# Patient Record
Sex: FEMALE | Race: WHITE | NOT HISPANIC OR LATINO | Employment: UNEMPLOYED | URBAN - NONMETROPOLITAN AREA
[De-identification: names, ages, dates, MRNs, and addresses within clinical notes are randomized per-mention and may not be internally consistent; named-entity substitution may affect disease eponyms.]

---

## 2017-04-04 ENCOUNTER — TELEPHONE (OUTPATIENT)
Dept: INTERNAL MEDICINE | Facility: OTHER | Age: 37
End: 2017-04-04

## 2017-04-04 NOTE — TELEPHONE ENCOUNTER
11:34 AM    Reason for Call: OVERBOOK    Patient is having the following symptoms: ingrown toenail for 2 months.    The patient is requesting an appointment for Friday 04/08/17  with Alexi.    Was an appointment offered for this call? No    Preferred method for responding to this message: Telephone Call    If we cannot reach you directly, may we leave a detailed response at the number you provided? Yes    Can this message wait until your PCP/provider returns, if unavailable today?     Breanna Cutler

## 2017-04-04 NOTE — TELEPHONE ENCOUNTER
Please call patient and offer her an appointment for this Friday at 4 pm. ( April 7th) Thank you.

## 2017-04-07 ENCOUNTER — OFFICE VISIT (OUTPATIENT)
Dept: INTERNAL MEDICINE | Facility: OTHER | Age: 37
End: 2017-04-07
Attending: NURSE PRACTITIONER
Payer: COMMERCIAL

## 2017-04-07 VITALS
TEMPERATURE: 98.4 F | DIASTOLIC BLOOD PRESSURE: 62 MMHG | BODY MASS INDEX: 23 KG/M2 | SYSTOLIC BLOOD PRESSURE: 114 MMHG | WEIGHT: 125 LBS | HEIGHT: 62 IN | HEART RATE: 85 BPM | OXYGEN SATURATION: 99 %

## 2017-04-07 DIAGNOSIS — M79.674 PAIN OF TOE OF RIGHT FOOT: Primary | ICD-10-CM

## 2017-04-07 PROCEDURE — 99213 OFFICE O/P EST LOW 20 MIN: CPT | Mod: 25 | Performed by: NURSE PRACTITIONER

## 2017-04-07 PROCEDURE — 11055 PARING/CUTG B9 HYPRKER LES 1: CPT | Performed by: NURSE PRACTITIONER

## 2017-04-07 RX ORDER — AMOXICILLIN 875 MG
875 TABLET ORAL 2 TIMES DAILY
Qty: 20 TABLET | Refills: 0 | Status: SHIPPED | OUTPATIENT
Start: 2017-04-07 | End: 2017-05-22

## 2017-04-07 RX ORDER — HYDROCODONE BITARTRATE AND ACETAMINOPHEN 5; 325 MG/1; MG/1
1 TABLET ORAL EVERY 4 HOURS PRN
Qty: 24 TABLET | Refills: 0 | Status: SHIPPED | OUTPATIENT
Start: 2017-04-07 | End: 2017-05-22

## 2017-04-07 RX ORDER — IBUPROFEN 800 MG/1
800 TABLET, FILM COATED ORAL EVERY 8 HOURS PRN
Qty: 90 TABLET | Refills: 1 | Status: SHIPPED | OUTPATIENT
Start: 2017-04-07 | End: 2017-09-26

## 2017-04-07 ASSESSMENT — ANXIETY QUESTIONNAIRES
6. BECOMING EASILY ANNOYED OR IRRITABLE: NOT AT ALL
5. BEING SO RESTLESS THAT IT IS HARD TO SIT STILL: NOT AT ALL
3. WORRYING TOO MUCH ABOUT DIFFERENT THINGS: NOT AT ALL
IF YOU CHECKED OFF ANY PROBLEMS ON THIS QUESTIONNAIRE, HOW DIFFICULT HAVE THESE PROBLEMS MADE IT FOR YOU TO DO YOUR WORK, TAKE CARE OF THINGS AT HOME, OR GET ALONG WITH OTHER PEOPLE: NOT DIFFICULT AT ALL
7. FEELING AFRAID AS IF SOMETHING AWFUL MIGHT HAPPEN: NOT AT ALL
1. FEELING NERVOUS, ANXIOUS, OR ON EDGE: NOT AT ALL
2. NOT BEING ABLE TO STOP OR CONTROL WORRYING: NOT AT ALL
GAD7 TOTAL SCORE: 1

## 2017-04-07 ASSESSMENT — PATIENT HEALTH QUESTIONNAIRE - PHQ9: 5. POOR APPETITE OR OVEREATING: SEVERAL DAYS

## 2017-04-07 ASSESSMENT — PAIN SCALES - GENERAL: PAINLEVEL: NO PAIN (1)

## 2017-04-07 NOTE — PATIENT INSTRUCTIONS
1.  Soak foot in warm soapy water for 10minutes,  2 times a day  2.  Referral to [facility]  Foot Specialist for removal redundant skin.   3. Amoxicillin 875mg 2 times a day for 10 days.

## 2017-04-07 NOTE — NURSING NOTE
"Chief Complaint   Patient presents with     RECHECK     Follow up per dameon. Possible ingrown toenail.       Initial /62 (BP Location: Left arm, Patient Position: Chair, Cuff Size: Adult Regular)  Pulse 85  Temp 98.4  F (36.9  C) (Tympanic)  Ht 5' 2\" (1.575 m)  Wt 125 lb (56.7 kg)  LMP 08/10/2013  SpO2 99%  BMI 22.86 kg/m2 Estimated body mass index is 22.86 kg/(m^2) as calculated from the following:    Height as of this encounter: 5' 2\" (1.575 m).    Weight as of this encounter: 125 lb (56.7 kg).  Medication Reconciliation: complete   Nadine Cedeno LPN      "

## 2017-04-07 NOTE — MR AVS SNAPSHOT
After Visit Summary   4/7/2017    Purnima Fallon    MRN: [id number]           Patient Information     Date Of Birth          1980        Visit Information        Provider Department      4/7/2017 4:00 PM Alexi Romero NP [facility]        Today's Diagnoses     Pain of toe of right foot          Care Instructions    1.  Soak foot in warm soapy water for 10minutes,  2 times a day  2.  Referral to [facility]  Foot Specialist for removal redundant skin.         Follow-ups after your visit        Who to contact     If you have questions or need follow up information about today's clinic visit or your schedule please contact [facility] directly at [phone].  Normal or non-critical lab and imaging results will be communicated to you by [employer], letter or phone within 4 business days after the clinic has received the results. If you do not hear from us within 7 days, please contact the clinic through [employer] or phone. If you have a critical or abnormal lab result, we will notify you by phone as soon as possible.  Submit refill requests through [employer] or call your pharmacy and they will forward the refill request to us. Please allow 3 business days for your refill to be completed.          Additional Information About Your Visit        MyChart Information     [employer] gives you secure access to your electronic health record. If you see a primary care provider, you can also send messages to your care team and make appointments. If you have questions, please call your primary care clinic.  If you do not have a primary care provider, please call [phone] and they will assist you.        Care EveryWhere ID     This is your Care EveryWhere ID. This could be used by other organizations to access your [place] medical records  [id number]        Your Vitals Were     Pulse Temperature Height Last Period Pulse Oximetry BMI (Body Mass Index)    85 98.4  F (36.9  C) (Tympanic) 5'  "2\" (1.575 m) 08/10/2013 99% 22.86 kg/m2       Blood Pressure from Last 3 Encounters:   04/07/17 114/62   09/26/16 94/64   08/05/16 98/60    Weight from Last 3 Encounters:   04/07/17 125 lb (56.7 kg)   08/05/16 120 lb (54.4 kg)   04/19/16 110 lb (49.9 kg)              Today, you had the following     No orders found for display         Today's Medication Changes          These changes are accurate as of: 4/7/17  4:29 PM.  If you have any questions, ask your nurse or doctor.               Start taking these medicines.        Dose/Directions    amoxicillin 875 MG tablet   Commonly known as:  AMOXIL   Used for:  Pain of toe of right foot   Started by:  Alexi Romero NP        Dose:  875 mg   Take 1 tablet (875 mg) by mouth 2 times daily   Quantity:  20 tablet   Refills:  0       HYDROcodone-acetaminophen 5-325 MG per tablet   Commonly known as:  NORCO   Used for:  Pain of toe of right foot   Started by:  Alexi Romero NP        Dose:  1 tablet   Take 1 tablet by mouth every 4 hours as needed for pain Take 1/2-1 tab every 4-6 hours as needed for worst pain   Quantity:  24 tablet   Refills:  0            Where to get your medicines      These medications were sent to [id number] - [place] - [address] AT [facility] [address]  [address], [place] [address]     Phone:  [phone]     amoxicillin 875 MG tablet    ibuprofen 800 MG tablet         Some of these will need a paper prescription and others can be bought over the counter.  Ask your nurse if you have questions.     Bring a paper prescription for each of these medications     HYDROcodone-acetaminophen 5-325 MG per tablet                Primary Care Provider Office Phone # Fax #    Alexi Romero [phone]       [facility] [address] [address]        Thank you!     Thank you for choosing [facility]  for your care. Our goal is always to provide you with excellent care. " Hearing back from our patients is one way we can continue to improve our services. Please take a few minutes to complete the written survey that you may receive in the mail after your visit with us. Thank you!             Your Updated Medication List - Protect others around you: Learn how to safely use, store and throw away your medicines at www.disposemymeds.org.          This list is accurate as of: 4/7/17  4:29 PM.  Always use your most recent med list.                   Brand Name Dispense Instructions for use    amoxicillin 875 MG tablet    AMOXIL    20 tablet    Take 1 tablet (875 mg) by mouth 2 times daily       HYDROcodone-acetaminophen 5-325 MG per tablet    NORCO    24 tablet    Take 1 tablet by mouth every 4 hours as needed for pain Take 1/2-1 tab every 4-6 hours as needed for worst pain       ibuprofen 800 MG tablet    ADVIL/MOTRIN    90 tablet    Take 1 tablet (800 mg) by mouth every 8 hours as needed for pain       valACYclovir 500 MG tablet    VALTREX    90 tablet    Take 1 tablet (500 mg) by mouth daily

## 2017-04-08 ASSESSMENT — ANXIETY QUESTIONNAIRES: GAD7 TOTAL SCORE: 1

## 2017-04-08 ASSESSMENT — PATIENT HEALTH QUESTIONNAIRE - PHQ9: SUM OF ALL RESPONSES TO PHQ QUESTIONS 1-9: 0

## 2017-04-10 NOTE — PROGRESS NOTES
"SUBJECTIVE:  Purnima Fallon, a 36 year old female scheduled an appointment to discuss the following issues:     Pain of toe of right foot Has been having pain in 5th toe right foot on and off  Thinks has ingrown toenail.  Does not remember any injury.  No redness or pus-difficult to wear shoes.         Medical, social, surgical, and family histories reviewed.    Current Outpatient Prescriptions   Medication     ibuprofen (ADVIL/MOTRIN) 800 MG tablet     HYDROcodone-acetaminophen (NORCO) 5-325 MG per tablet     amoxicillin (AMOXIL) 875 MG tablet     valACYclovir (VALTREX) 500 MG tablet     No current facility-administered medications for this visit.        ROS:  C: NEGATIVE for fever, chills, sore throat, earache  E: NEGATIVE for vision changes   R: NEGATIVE for  cough , SOB  CV: NEGATIVE for chest pain, palpitations   GI: NEGATIVE for nausea, abdominal pain, heartburn, or constipation, diarrhea.   : NEGATIVE for frequency, dysuria, or hematuria  M:POSITIVE for  arthralgias or myalgia 5th toe  Right foot pain     N: NEGATIVE for weakness, no   Paresthesias, dizziness  or headache    OBJECTIVE:  /62 (BP Location: Left arm, Patient Position: Chair, Cuff Size: Adult Regular)  Pulse 85  Temp 98.4  F (36.9  C) (Tympanic)  Ht 5' 2\" (1.575 m)  Wt 125 lb (56.7 kg)  LMP 08/10/2013  SpO2 99%  BMI 22.86 kg/m2  EXAM:  GENERAL APPEARANCE:  alert and no distress  EYES: EOMI,  PERRL  NECK: Supple, no lymphadenopathy, no thyromegaly, no carotid bruits, No JVD  RESP: lungs clear to auscultation anteriorly and posteriorly - no rales, rhonchi or wheezes  CV: regular rates and rhythm, normal S1 S2, no S3 or S4 and no murmur, no click or rub -  MS: No edema 5th toe right foot has damaged nail. Overgrowth tissue to lateral edge. Has small swelling to lateral edge of what is the toenail. No ingrown toenail seen. Looks like foreign body tissue growth. Washed with soap and water. Pared down with #11 blade-thought saw sliver of " glass come out.     NEURO:CMS intact to toes.       ASSESSMENT/PLAN:  ASSESSMENT / PLAN:  (M28.045) Pain of toe of right foot  (primary encounter diagnosis)  Comment:  Looked like foreign body overgrowth of skin.   Plan: ibuprofen (ADVIL/MOTRIN) 800 MG tablet,         HYDROcodone-acetaminophen (NORCO) 5-325 MG per         tablet, amoxicillin (AMOXIL) 875 MG tablet,         ORTHOPEDICS ADULT REFERRAL

## 2017-04-10 NOTE — PROGRESS NOTES
"SUBJECTIVE:  Purnima Fallon, a 36 year old female scheduled an appointment to discuss the following issues:     Pain of toe of right foot  Has been having pain in little toe for 1month.  No known injury. Thinks has ingrown toenail. No redness no fever-pain.        Medical, social, surgical, and family histories reviewed.    Current Outpatient Prescriptions   Medication     ibuprofen (ADVIL/MOTRIN) 800 MG tablet     HYDROcodone-acetaminophen (NORCO) 5-325 MG per tablet     amoxicillin (AMOXIL) 875 MG tablet     valACYclovir (VALTREX) 500 MG tablet     No current facility-administered medications for this visit.        ROS:  C: NEGATIVE for fever, chills, sore throat, earache  E: NEGATIVE for vision changes   R: NEGATIVE for  cough , SOB  CV: NEGATIVE for chest pain, palpitations   GI: NEGATIVE for nausea, abdominal pain, heartburn, or constipation, diarrhea.   : NEGATIVE for frequency, dysuria, or hematuria  M: POSITIVE  for  arthralgias or myalgia 5th toe right foot.    N: NEGATIVE for weakness,  Paresthesias, dizziness  or headache    OBJECTIVE:  /62 (BP Location: Left arm, Patient Position: Chair, Cuff Size: Adult Regular)  Pulse 85  Temp 98.4  F (36.9  C) (Tympanic)  Ht 5' 2\" (1.575 m)  Wt 125 lb (56.7 kg)  LMP 08/10/2013  SpO2 99%  BMI 22.86 kg/m2  EXAM:  GENERAL APPEARANCE:  alert and no distress  EYES: EOMI,  PERRL   NECK: Supple, no lymphadenopathy, no thyromegaly, no carotid bruits, No JVD  RESP: lungs clear to auscultation anteriorly and posteriorly - no rales, rhonchi or wheezes  CV: regular rates and rhythm, normal S1 S2, no S3 or S4 and no murmur, no click or rub -  MS: No edema  Verbal consent given.   5th toe right foot.   Washed with soap and water  Pared down built up skin to lateral nail (lateral edge of nail-not normal-has been injured in past)  Thought saw sliver glass come out.    NEURO:CMS intact to toes         ASSESSMENT / PLAN:  (M79.568) Pain of toe of right foot  (primary " encounter diagnosis)  Comment: Pared down overgrown reactive tissue to lateral side toenail-think saw glass sliver come out.  Covered with bandaid.  Nail bed has been damaged in past-1/2 toenail is spongy, and nail less Will have evaluated by [facility].  No ingrown nail noted.  Given Amoxicillin 875mg BID for 10 days-soak foot 2 times a day in warm soapy water.  Given few Lorta for pain.    Plan: ibuprofen (ADVIL/MOTRIN) 800 MG tablet,         HYDROcodone-acetaminophen (NORCO) 5-325 MG per         tablet, amoxicillin (AMOXIL) 875 MG tablet,         ORTHOPEDICS ADULT REFERRAL              Subjective: Purnima Fallon a 36 year old female who presents today for lesion removal. The lesion(s) is/are located on the 5th  Toe, right foot.  , number 1 and measures 4mm She The patient reports the lesion is painfull and denies other significant symptoms on ROS. Medications reviewed.    Pause for the cause has been completed prior to the prceedure.   1. Purnima was identified by both name and date of birth   2. The correct site was identified   3. Site was marked by provider    4. Written informed consent correct and signed or verbal authorization  to proceed was obtained   5. Verifed necessary supplies, equipment, and diagnostics are available    6. Time out was performed immediately prior to procedure    Objective: The lesion(s) is/are of the above mentioned size and location and is/are typical. The area was prepped and appropriately anesthetized. Using the usual technique, #11 blade, pared down overgrown tisssue.   removal of foreign body was performed. An appropriate dressing was applied. The procedure was well tolerated and without complications.     Assessment: foreign body    Plan: Follow up: The patient may return prn.. Wound care instructions provided.  Patient was instructed to be alert for any signs of cutaneous infection.

## 2017-05-15 DIAGNOSIS — B00.9 HERPES SIMPLEX VIRUS INFECTION: ICD-10-CM

## 2017-05-16 RX ORDER — VALACYCLOVIR HYDROCHLORIDE 500 MG/1
TABLET, FILM COATED ORAL
Qty: 90 TABLET | Refills: 0 | Status: SHIPPED | OUTPATIENT
Start: 2017-05-16 | End: 2017-09-27

## 2017-05-22 ENCOUNTER — HOSPITAL ENCOUNTER (EMERGENCY)
Facility: HOSPITAL | Age: 37
Discharge: HOME | End: 2017-05-22
Attending: NURSE PRACTITIONER | Admitting: NURSE PRACTITIONER
Payer: COMMERCIAL

## 2017-05-22 ENCOUNTER — TELEPHONE (OUTPATIENT)
Dept: INTERNAL MEDICINE | Facility: OTHER | Age: 37
End: 2017-05-22

## 2017-05-22 VITALS
DIASTOLIC BLOOD PRESSURE: 68 MMHG | RESPIRATION RATE: 20 BRPM | HEART RATE: 73 BPM | SYSTOLIC BLOOD PRESSURE: 106 MMHG | TEMPERATURE: 98.4 F

## 2017-05-22 DIAGNOSIS — Z76.0 ENCOUNTER FOR MEDICATION REFILL: ICD-10-CM

## 2017-05-22 PROCEDURE — 99213 OFFICE O/P EST LOW 20 MIN: CPT

## 2017-05-22 PROCEDURE — 99213 OFFICE O/P EST LOW 20 MIN: CPT | Performed by: NURSE PRACTITIONER

## 2017-05-22 RX ORDER — HYDROCODONE BITARTRATE AND ACETAMINOPHEN 5; 325 MG/1; MG/1
1 TABLET ORAL EVERY 6 HOURS PRN
Qty: 20 TABLET | Refills: 0 | Status: SHIPPED | OUTPATIENT
Start: 2017-05-22 | End: 2017-06-27

## 2017-05-22 ASSESSMENT — ENCOUNTER SYMPTOMS
TROUBLE SWALLOWING: 0
WOUND: 1
APPETITE CHANGE: 0
DIARRHEA: 0
NAUSEA: 0
DYSURIA: 0
VOMITING: 0
FEVER: 0
ACTIVITY CHANGE: 0
CHILLS: 0

## 2017-05-22 NOTE — ED AVS SNAPSHOT
[facility]    [address]    [place] [address]    Phone:  [phone]                                       Purnima Fallon   MRN: [id number]    Department:  [facility]   Date of Visit:  5/22/2017           Patient Information     Date Of Birth          1980        Your diagnoses for this visit were:     Encounter for medication refill        You were seen by Payton Levi NP.      Follow-up Information     Follow up with Alexi Romero NP.    Specialty:  Internal Medicine    Why:  As needed, If symptoms worsen    Contact information:    [facility] [place]  [address]  [place] [address] [phone]          Follow up with [facility].    Specialty:  EMERGENCY MEDICINE    Why:  As needed, If symptoms worsen    Contact information:    [address]  [place] [address] [phone]    Additional information:    From [place]: Take [address]. Turn left at [address]. Turn left at the first stoplight on [address]. At the first stop sign, take a right onto [address]. Take a left into the parking lot and continue through until you reach the North enterance of the building.       From [place]: Take [address]. Take the [address] ramp towards [place]. Turn left onto [address]. Take a slight right onto [address]. Turn left at the first stoplight on [address]. At the first stop sign, take a right onto [address]. Take a left into the parking lot and continue through until you reach the North enterance of the building.       From [place]: Take [address]. Take a right at [address]. At the first stop sign, take a right onto [address]. Take a left into the parking lot and continue through until you reach the North enterance of the building.         Discharge Instructions       Take tylenol and or ibuprofen for pain. Follow dosing on package.  No more than 4000 mg of tylenol in 24 hours.   Take Lortab for pain not helped by tylenol or ibuprofen. Do not drive or participate in activities that require alertness when taking.   Elevate right foot as much as able.   Follow up with [facility] as directed.   Follow up with PCP with any increase in symptoms or concerns.   Return to urgent care or emergency department with any increase in symptoms or concerns.     Discharge References/Attachments     HYDROCODONE BITARTRATE, ACETAMINOPHEN ORAL TABLET (ENGLISH)         Review of your medicines      START taking        Dose / Directions Last dose taken    HYDROcodone-acetaminophen 5-325 MG per tablet   Commonly known as:  NORCO   Dose:  1 tablet   Quantity:  20 tablet        Take 1 tablet by mouth every 6 hours as needed for moderate to severe pain   Refills:  0          Our records show that you are taking the medicines listed below. If these are incorrect, please call your family doctor or clinic.        Dose / Directions Last dose taken    ibuprofen 800 MG tablet   Commonly known as:  ADVIL/MOTRIN   Dose:  800 mg   Quantity:  90 tablet        Take 1 tablet (800 mg) by mouth every 8 hours as needed for pain   Refills:  1        valACYclovir 500 MG tablet   Commonly known as:  VALTREX   Quantity:  90 tablet        TAKE ONE TABLET BY MOUTH DAILY   Refills:  0                Prescriptions were sent or printed at these locations (1 Prescription)                   [facility] [address] [place] [address] [address] AT [facility] [address]   [address] [place] [address]    Telephone:  [phone]   Fax:  [phone]   Hours:                  Printed at Department/Unit printer (1 of 1)         HYDROcodone-acetaminophen (NORCO) 5-325 MG per tablet                Orders Needing Specimen Collection     None      Pending Results     No orders found from 5/20/2017 to 5/23/2017.            Pending Culture Results     No orders found from 5/20/2017  to 5/23/2017.            Thank you for choosing [facility]       Thank you for choosing [facility] for your care. Our goal is always to provide you with excellent care. Hearing back from our patients is one way we can continue to improve our services. Please take a few minutes to complete the written survey that you may receive in the mail after you visit with us. Thank you!        [employer] Information     [employer] gives you secure access to your electronic health record. If you see a primary care provider, you can also send messages to your care team and make appointments. If you have questions, please call your primary care clinic.  If you do not have a primary care provider, please call [phone] and they will assist you.        Care EveryWhere ID     This is your Care EveryWhere ID. This could be used by other organizations to access your [facility] medical records  [id number]        After Visit Summary       This is your record. Keep this with you and show to your community pharmacist(s) and doctor(s) at your next visit.

## 2017-05-22 NOTE — ED PROVIDER NOTES
History     Chief Complaint   Patient presents with     Medication Refill     The history is provided by the patient. No  was used.     Purnima Fallon is a 36 year old female who presents with right 5th toe pain after she had her right 5th toenail removed this am at [facility]. She's taken ibuprofen with no effectiveness. Pain has a throbbing and burning characteristic. Denies fever, chills, or night sweats. Eating and drinking well. Bowel and bladder are working well. She is out of Norco. She is able to bear weight on right foot.     I have reviewed the Medications, Allergies, Past Medical and Surgical History, and Social History in the Epic system.    Review of Systems   Constitutional: Negative for activity change, appetite change, chills and fever.   HENT: Negative for trouble swallowing.    Gastrointestinal: Negative for diarrhea, nausea and vomiting.   Genitourinary: Negative for dysuria.   Skin: Positive for wound. Negative for rash.        To right 5th toe.        Physical Exam   BP: 106/68  Pulse: 73  Temp: 98.4  F (36.9  C)  Resp: 20  Physical Exam   Constitutional: She is oriented to person, place, and time. She appears well-developed and well-nourished. No distress.   HENT:   Head: Normocephalic.   Mouth/Throat: Oropharynx is clear and moist. No oropharyngeal exudate.   Cardiovascular: Normal rate, regular rhythm, normal heart sounds and intact distal pulses.    No murmur heard.  Pulmonary/Chest: Effort normal. No respiratory distress. She has no wheezes. She has no rales.   Abdominal: Soft. She exhibits no distension.   Musculoskeletal: Normal range of motion. She exhibits tenderness. She exhibits no edema or deformity.   CMS and ROM intact to right foot. Right dorsalis pedis pulse +2.    Neurological: She is alert and oriented to person, place, and time.   Skin: Skin is warm and dry. No rash noted. She is not diaphoretic.   Wound to right anterior 5th digit tip with  nail bed absent. Bleeding controlled. Dressing removed and re-dressed.    Psychiatric: She has a normal mood and affect. Her behavior is normal.   Nursing note and vitals reviewed.      ED Course     ED Course     Procedures      Assessments & Plan (with Medical Decision Making)     Called PCP, Alexi Romero NP to discuss pain management. Norco 5/325 #20 (twenty) will be prescribed for pain management. She will follow up with [facility] in 2 weeks as scheduled. She will follow up with PCP or UC/ER with any increase in symptoms or concerns. She is happy with this plan.     Discussed plan of care. She verbalized understanding. All questions answered.     I have reviewed the nursing notes.    I have reviewed the findings, diagnosis, plan and need for follow up with the patient.  Discharged in stable condition.     New Prescriptions    HYDROCODONE-ACETAMINOPHEN (NORCO) 5-325 MG PER TABLET    Take 1 tablet by mouth every 6 hours as needed for moderate to severe pain       Final diagnoses:   Encounter for medication refill     Take tylenol and or ibuprofen for pain. Follow dosing on package. No more than 4000 mg of tylenol in 24 hours.   Take Lortab for pain not helped by tylenol or ibuprofen. Do not drive or participate in activities that require alertness when taking.   Elevate right foot as much as able.   Follow up with [facility] as directed in 2 weeks.   Follow up with PCP with any increase in symptoms or concerns.   Return to urgent care or emergency department with any increase in symptoms or concerns.     GALLITO Alberto  5/22/2017  3:22 PM  URGENT CARE CLINIC       Payton Levi NP  05/22/17 [id number]

## 2017-05-22 NOTE — ED AVS SNAPSHOT
[facility]    [address] [address]    Phone:  [phone]                                       Purnima Fallon   MRN: [id number]    Department:  [facility]   Date of Visit:  5/22/2017           After Visit Summary Signature Page     I have received my discharge instructions, and my questions have been answered. I have discussed any challenges I see with this plan with the nurse or doctor.    ..........................................................................................................................................  Patient/Patient Representative Signature      ..........................................................................................................................................  Patient Representative Print Name and Relationship to Patient    ..................................................               ................................................  Date                                            Time    ..........................................................................................................................................  Reviewed by Signature/Title    ...................................................              ..............................................  Date                                                            Time

## 2017-05-22 NOTE — ED NOTES
Pt presents today alone for c/o pain in foot after having had surgery this am, says she was on Hydrocodone prior to surgery and the surgeon must not have known that because he told this pt that since you weren't on anything prior, you don't need anything now, she tried to get in with SAM Romero NP with no luck and has called and left messages for the surgeons nurse and has gotten no response. Surgery was done By [facility].

## 2017-05-22 NOTE — DISCHARGE INSTRUCTIONS
Take tylenol and or ibuprofen for pain. Follow dosing on package. No more than 4000 mg of tylenol in 24 hours.   Take Lortab for pain not helped by tylenol or ibuprofen. Do not drive or participate in activities that require alertness when taking.   Elevate right foot as much as able.   Follow up with [facility] as directed.   Follow up with PCP with any increase in symptoms or concerns.   Return to urgent care or emergency department with any increase in symptoms or concerns.

## 2017-05-22 NOTE — TELEPHONE ENCOUNTER
12:11 PM    Reason for Call: Phone Call    Description: patient in pain, had her baby toenail removed.  Could she get something for the pain?    Was an appointment offered for this call? No    Preferred method for responding to this message: Telephone Call    If we cannot reach you directly, may we leave a detailed response at the number you provided? Yes    Can this message wait until your PCP/provider returns, if available today? Not applicable, provider is in today    Barbara Pan

## 2017-05-22 NOTE — TELEPHONE ENCOUNTER
The patient is requesting something for toe pain, see note below.  Last seen 4/7/2017 last fill of Norco 4/7/2017 #24

## 2017-05-22 NOTE — TELEPHONE ENCOUNTER
If still having pain-needs to see a foot doctor as not appropriate to keep giving Lortab.Can refer if wishes.   Alexi

## 2017-05-23 NOTE — TELEPHONE ENCOUNTER
Alexi gave patient Norco #20, 5/22/2017, patient had toe nail removed from a foot doctor, called patient, no answer.

## 2017-06-27 ENCOUNTER — OFFICE VISIT (OUTPATIENT)
Dept: FAMILY MEDICINE | Facility: OTHER | Age: 37
End: 2017-06-27
Attending: FAMILY MEDICINE
Payer: COMMERCIAL

## 2017-06-27 VITALS
OXYGEN SATURATION: 99 % | BODY MASS INDEX: 22.86 KG/M2 | HEART RATE: 77 BPM | WEIGHT: 125 LBS | SYSTOLIC BLOOD PRESSURE: 128 MMHG | RESPIRATION RATE: 19 BRPM | DIASTOLIC BLOOD PRESSURE: 74 MMHG | TEMPERATURE: 98.6 F

## 2017-06-27 DIAGNOSIS — R10.84 ABDOMINAL PAIN, GENERALIZED: Primary | ICD-10-CM

## 2017-06-27 LAB
ALBUMIN SERPL-MCNC: 4.5 G/DL (ref 3.4–5)
ALBUMIN UR-MCNC: NEGATIVE MG/DL
ALP SERPL-CCNC: 50 U/L (ref 40–150)
ALT SERPL W P-5'-P-CCNC: 29 U/L (ref 0–50)
AMYLASE SERPL-CCNC: 349 U/L (ref 30–110)
ANION GAP SERPL CALCULATED.3IONS-SCNC: 7 MMOL/L (ref 3–14)
APPEARANCE UR: CLEAR
AST SERPL W P-5'-P-CCNC: 19 U/L (ref 0–45)
BASOPHILS # BLD AUTO: 0 10E9/L (ref 0–0.2)
BASOPHILS NFR BLD AUTO: 0.4 %
BILIRUB SERPL-MCNC: 0.5 MG/DL (ref 0.2–1.3)
BILIRUB UR QL STRIP: NEGATIVE
BUN SERPL-MCNC: 11 MG/DL (ref 7–30)
CALCIUM SERPL-MCNC: 9.1 MG/DL (ref 8.5–10.1)
CHLORIDE SERPL-SCNC: 105 MMOL/L (ref 94–109)
CO2 SERPL-SCNC: 27 MMOL/L (ref 20–32)
COLOR UR AUTO: YELLOW
CREAT SERPL-MCNC: 0.72 MG/DL (ref 0.52–1.04)
DIFFERENTIAL METHOD BLD: ABNORMAL
EOSINOPHIL # BLD AUTO: 0 10E9/L (ref 0–0.7)
EOSINOPHIL NFR BLD AUTO: 0.3 %
ERYTHROCYTE [DISTWIDTH] IN BLOOD BY AUTOMATED COUNT: 12.9 % (ref 10–15)
GFR SERPL CREATININE-BSD FRML MDRD: NORMAL ML/MIN/1.7M2
GLUCOSE SERPL-MCNC: 85 MG/DL (ref 70–99)
GLUCOSE UR STRIP-MCNC: NEGATIVE MG/DL
HCT VFR BLD AUTO: 42.5 % (ref 35–47)
HGB BLD-MCNC: 14.7 G/DL (ref 11.7–15.7)
HGB UR QL STRIP: NEGATIVE
IMM GRANULOCYTES # BLD: 0 10E9/L (ref 0–0.4)
IMM GRANULOCYTES NFR BLD: 0.3 %
KETONES UR STRIP-MCNC: 5 MG/DL
LEUKOCYTE ESTERASE UR QL STRIP: NEGATIVE
LIPASE SERPL-CCNC: 94 U/L (ref 73–393)
LYMPHOCYTES # BLD AUTO: 3.5 10E9/L (ref 0.8–5.3)
LYMPHOCYTES NFR BLD AUTO: 45.2 %
MCH RBC QN AUTO: 33.6 PG (ref 26.5–33)
MCHC RBC AUTO-ENTMCNC: 34.6 G/DL (ref 31.5–36.5)
MCV RBC AUTO: 97 FL (ref 78–100)
MONOCYTES # BLD AUTO: 0.4 10E9/L (ref 0–1.3)
MONOCYTES NFR BLD AUTO: 4.8 %
NEUTROPHILS # BLD AUTO: 3.8 10E9/L (ref 1.6–8.3)
NEUTROPHILS NFR BLD AUTO: 49 %
NITRATE UR QL: NEGATIVE
NRBC # BLD AUTO: 0 10*3/UL
NRBC BLD AUTO-RTO: 0 /100
PH UR STRIP: 7.5 PH (ref 4.7–8)
PLATELET # BLD AUTO: 239 10E9/L (ref 150–450)
POTASSIUM SERPL-SCNC: 3.6 MMOL/L (ref 3.4–5.3)
PROT SERPL-MCNC: 7.9 G/DL (ref 6.8–8.8)
RBC # BLD AUTO: 4.37 10E12/L (ref 3.8–5.2)
SODIUM SERPL-SCNC: 139 MMOL/L (ref 133–144)
SP GR UR STRIP: 1.01 (ref 1–1.03)
URN SPEC COLLECT METH UR: ABNORMAL
UROBILINOGEN UR STRIP-MCNC: NORMAL MG/DL (ref 0–2)
WBC # BLD AUTO: 7.7 10E9/L (ref 4–11)

## 2017-06-27 PROCEDURE — 99214 OFFICE O/P EST MOD 30 MIN: CPT | Performed by: FAMILY MEDICINE

## 2017-06-27 PROCEDURE — 83690 ASSAY OF LIPASE: CPT | Performed by: FAMILY MEDICINE

## 2017-06-27 PROCEDURE — 36415 COLL VENOUS BLD VENIPUNCTURE: CPT | Performed by: FAMILY MEDICINE

## 2017-06-27 PROCEDURE — 85025 COMPLETE CBC W/AUTO DIFF WBC: CPT | Performed by: FAMILY MEDICINE

## 2017-06-27 PROCEDURE — 80053 COMPREHEN METABOLIC PANEL: CPT | Performed by: FAMILY MEDICINE

## 2017-06-27 PROCEDURE — 82150 ASSAY OF AMYLASE: CPT | Performed by: FAMILY MEDICINE

## 2017-06-27 PROCEDURE — 81003 URINALYSIS AUTO W/O SCOPE: CPT | Performed by: FAMILY MEDICINE

## 2017-06-27 RX ORDER — TRAMADOL HYDROCHLORIDE 50 MG/1
50 TABLET ORAL EVERY 6 HOURS PRN
Qty: 30 TABLET | Refills: 0 | Status: SHIPPED | OUTPATIENT
Start: 2017-06-27 | End: 2017-09-21

## 2017-06-27 ASSESSMENT — PAIN SCALES - GENERAL: PAINLEVEL: MILD PAIN (2)

## 2017-06-27 NOTE — Clinical Note
Alexi, I saw Purnima today and ordered an ultrasound to evaluate for gallstones. She may just have a mild case of pancreatitis but I'm not sure what would cause this. She says she doesn't drink alcohol. She is scheduled to follow up with you later this week to review the ultrasound. I will be out on vacation the rest of the week.

## 2017-06-27 NOTE — PROGRESS NOTES
[facility]    Purnima Fallon, 36 year old, female presents with   Chief Complaint   Patient presents with     Abdominal Pain     started friday with some bloating with diarrhea. No vomiting or nausea. Pain is throughout abdomen. Rates pain 2/10. No fever, worse with eating. No symptoms of nephrolithiasis. She has had a hysterectomy, No alcohol use.        PAST MEDICAL HISTORY:  Past Medical History:   Diagnosis Date     Hidradenitis 2/16/2007     Ischiorectal abscess and fistula      Kidney stones 2008    x2 passed on her own     Nondependent tobacco use disorder 10/11/2012     Papanicolaou smear of cervix with low grade squamous intraepithelial lesion (LGSIL) 10/29/2008       PAST SURGICAL HISTORY:  Past Surgical History:   Procedure Laterality Date     EXCISE MASS NECK Right 8/4/2015    Procedure: EXCISE MASS NECK;  Surgeon: Nasir Jones MD;  Location: [facility]     INCISION AND DRAINAGE PERINEAL, COMBINED N/A 3/18/2015    Procedure: COMBINED INCISION AND DRAINAGE PERINEAL;  Surgeon: Jay Torres DO;  Location: [facility]     LAPAROSCOPIC HYSTERECTOMY SUPRACERVICAL, BILATERAL SALPINGO-OOPHORECTOMY, COMBINED  9/27/2013    Procedure: COMBINED LAPAROSCOPIC HYSTERECTOMY SUPRACERVICAL, SALPINGO-OOPHORECTOMY;  LAPAROSCOPIC SUPRACERVICAL HYSTERECTOMY AND RIGHT SALPINGO-OOPHORECTOMY, CYSTOSCOPY;  Surgeon: Denys Callahan MD;  Location: [facility] OR     marsupialization of pilonidal cyst  2007     TUBAL LIGATION  2005       SOCIAL HISTORY  Social History     Social History     Marital status:      Spouse name: N/A     Number of children: N/A     Years of education: N/A     Occupational History     Not on file.     Social History Main Topics     Smoking status: Current Every Day Smoker     Packs/day: 0.50     Years: 15.00     Types: Cigarettes     Last attempt to quit: 7/18/2013     Smokeless tobacco: Never Used     Alcohol use No     Drug use: No     Sexual activity: Yes     Partners: Male     Birth  control/ protection: Condom     Other Topics Concern     Parent/Sibling W/ Cabg, Mi Or Angioplasty Before 65f 55m? No     Blood Transfusions Yes     PERMITS     Caffeine Concern Yes     SODA - NOT DAILY     Social History Narrative    9/14  Purnima lives with her two boys.  She has a boyfriend who doesn't live with her.  She is an .   She has been a  for 4 years.            MEDICATIONS:  Prior to Admission medications    Medication Sig Start Date End Date Taking? Authorizing Provider   valACYclovir (VALTREX) 500 MG tablet TAKE ONE TABLET BY MOUTH DAILY 5/16/17  Yes Alexi Romero NP   ibuprofen (ADVIL/MOTRIN) 800 MG tablet Take 1 tablet (800 mg) by mouth every 8 hours as needed for pain 4/7/17  Yes Alexi Romero NP       ALLERGIES:   No Known Allergies    ROS:  C: NEGATIVE for fever, chills, change in weight  I: NEGATIVE for worrisome rashes, moles or lesions  R: NEGATIVE for significant cough or SOB  CV: NEGATIVE for chest pain, palpitations or peripheral edema  : NEGATIVE for frequency, dysuria, or hematuria  M: NEGATIVE for significant arthralgias or myalgia  N: NEGATIVE for weakness, dizziness or paresthesias  P: NEGATIVE for changes in mood or affect    EXAM:  /74  Pulse 77  Temp 98.6  F (37  C) (Tympanic)  Resp 19  Wt 125 lb (56.7 kg)  LMP 08/10/2013  SpO2 99%  Breastfeeding? No  BMI 22.86 kg/m2 Body mass index is 22.86 kg/(m^2).   GENERAL APPEARANCE: healthy, alert and no distress  NECK: no adenopathy, no asymmetry, masses, or scars and thyroid normal to palpation  RESP: lungs clear to auscultation - no rales, rhonchi or wheezes  CV: regular rates and rhythm, normal S1 S2, no S3 or S4 and no murmur, click or rub  LYMPHATICS: normal ant/post cervical and supraclavicular nodes  ABDOMEN: soft, tender in the right upper quadrant and epigastric area without rebound or guarding, without hepatosplenomegaly or masses and bowel sounds normal  MS: extremities normal- no  gross deformities noted  SKIN: no suspicious lesions or rashes  NEURO: Normal strength and tone, mentation intact and speech normal  PSYCH: mentation appears normal and affect normal/bright    LABS AND IMAGING:     Results for orders placed or performed in visit on 06/27/17   CBC with platelets and differential   Result Value Ref Range    WBC 7.7 4.0 - 11.0 10e9/L    RBC Count 4.37 3.8 - 5.2 10e12/L    Hemoglobin 14.7 11.7 - 15.7 g/dL    Hematocrit 42.5 35.0 - 47.0 %    MCV 97 78 - 100 fl    MCH 33.6 (H) 26.5 - 33.0 pg    MCHC 34.6 31.5 - 36.5 g/dL    RDW 12.9 10.0 - 15.0 %    Platelet Count 239 150 - 450 10e9/L    Diff Method Automated Method     % Neutrophils 49.0 %    % Lymphocytes 45.2 %    % Monocytes 4.8 %    % Eosinophils 0.3 %    % Basophils 0.4 %    % Immature Granulocytes 0.3 %    Nucleated RBCs 0 0 /100    Absolute Neutrophil 3.8 1.6 - 8.3 10e9/L    Absolute Lymphocytes 3.5 0.8 - 5.3 10e9/L    Absolute Monocytes 0.4 0.0 - 1.3 10e9/L    Absolute Eosinophils 0.0 0.0 - 0.7 10e9/L    Absolute Basophils 0.0 0.0 - 0.2 10e9/L    Abs Immature Granulocytes 0.0 0 - 0.4 10e9/L    Absolute Nucleated RBC 0.0    Comprehensive metabolic panel   Result Value Ref Range    Sodium 139 133 - 144 mmol/L    Potassium 3.6 3.4 - 5.3 mmol/L    Chloride 105 94 - 109 mmol/L    Carbon Dioxide 27 20 - 32 mmol/L    Anion Gap 7 3 - 14 mmol/L    Glucose 85 70 - 99 mg/dL    Urea Nitrogen 11 7 - 30 mg/dL    Creatinine 0.72 0.52 - 1.04 mg/dL    GFR Estimate >90  Non  GFR Calc   >60 mL/min/1.7m2    GFR Estimate If Black >90   GFR Calc   >60 mL/min/1.7m2    Calcium 9.1 8.5 - 10.1 mg/dL    Bilirubin Total 0.5 0.2 - 1.3 mg/dL    Albumin 4.5 3.4 - 5.0 g/dL    Protein Total 7.9 6.8 - 8.8 g/dL    Alkaline Phosphatase 50 40 - 150 U/L    ALT 29 0 - 50 U/L    AST 19 0 - 45 U/L   Lipase   Result Value Ref Range    Lipase 94 73 - 393 U/L   Amylase   Result Value Ref Range    Amylase 349 (H) 30 - 110 U/L   UA reflex to  Microscopic and Culture   Result Value Ref Range    Color Urine Yellow     Appearance Urine Clear     Glucose Urine Negative NEG mg/dL    Bilirubin Urine Negative NEG    Ketones Urine 5 (A) NEG mg/dL    Specific Gravity Urine 1.012 1.003 - 1.035    Blood Urine Negative NEG    pH Urine 7.5 4.7 - 8.0 pH    Protein Albumin Urine Negative NEG mg/dL    Urobilinogen mg/dL Normal 0.0 - 2.0 mg/dL    Nitrite Urine Negative NEG    Leukocyte Esterase Urine Negative NEG    Source Midstream Urine          ASSESSMENT/PLAN:  (R10.84) Abdominal pain, generalized  (primary encounter diagnosis)  Comment:   Plan: CBC with platelets and differential,         Comprehensive metabolic panel, Lipase, Amylase,        UA reflex to Microscopic and Culture        Reviewed labs with her, amylase is the only abnormality. This may be a mild case of pancreatitis. Will check further with an ultrasound of the abdomen to evaluate for gallstones. Will have her schedule follow up with her primary Alexi Romero later this week to review findings. She is given Tramadol for pain. Follow up sooner if symptoms worsen      Laurita Avery MD  June 27, 2017

## 2017-06-27 NOTE — MR AVS SNAPSHOT
After Visit Summary   6/27/2017    Purnima Fallon    MRN: [id number]           Patient Information     Date Of Birth          1980        Visit Information        Provider Department      6/27/2017 1:30 PM Laurita Avery MD [facility]        Today's Diagnoses     Abdominal pain, generalized    -  1       Follow-ups after your visit        Your next 10 appointments already scheduled     Jul 07, 2017  8:00 AM CDT   Radiology with [facility] 2   [facility] ([facility] )    [address]  [facility] [address] [phone]            Jul 20, 2017  2:00 PM CDT   (Arrive by 1:45 PM)   SHORT with Alexi Romero NP   [facility] ([facility] )    [address]  [facility] [address] [phone]              Who to contact     If you have questions or need follow up information about today's clinic visit or your schedule please contact [facility] directly at [phone].  Normal or non-critical lab and imaging results will be communicated to you by F[employer], letter or phone within 4 business days after the clinic has received the results. If you do not hear from us within 7 days, please contact the clinic through ieCrow[employer] or phone. If you have a critical or abnormal lab result, we will notify you by phone as soon as possible.  Submit refill requests through [employer] or call your pharmacy and they will forward the refill request to us. Please allow 3 business days for your refill to be completed.          Additional Information About Your Visit        [employer] Information     [employer] gives you secure access to your electronic health record. If you see a primary care provider, you can also send messages to your care team and make appointments. If you have questions, please call your primary care clinic.  If you do not have a primary care provider, please call [phone] and they will assist you.        Care EveryWhere ID      This is your Care EveryWhere ID. This could be used by other organizations to access your [place] medical records  [id number]        Your Vitals Were     Pulse Temperature Respirations Last Period Pulse Oximetry Breastfeeding?    77 98.6  F (37  C) (Tympanic) 19 08/10/2013 99% No    BMI (Body Mass Index)                   22.86 kg/m2            Blood Pressure from Last 3 Encounters:   06/27/17 128/74   05/22/17 106/68   04/07/17 114/62    Weight from Last 3 Encounters:   06/27/17 125 lb (56.7 kg)   04/07/17 125 lb (56.7 kg)   08/05/16 120 lb (54.4 kg)              We Performed the Following     Amylase     CBC with platelets and differential     Comprehensive metabolic panel     Lipase     UA reflex to Microscopic and Culture          Today's Medication Changes          These changes are accurate as of: 6/27/17  2:42 PM.  If you have any questions, ask your nurse or doctor.               Start taking these medicines.        Dose/Directions    traMADol 50 MG tablet   Commonly known as:  ULTRAM   Used for:  Abdominal pain, generalized   Started by:  Laurita Avery MD        Dose:  50 mg   Take 1 tablet (50 mg) by mouth every 6 hours as needed for pain maximum 4 tablet(s) per day   Quantity:  30 tablet   Refills:  0            Where to get your medicines      Some of these will need a paper prescription and others can be bought over the counter.  Ask your nurse if you have questions.     Bring a paper prescription for each of these medications     traMADol 50 MG tablet                Primary Care Provider Office Phone # Fax #    Alexi Romero [phone]       [place] [facility] [place] [address]  [place] [address]        Equal Access to Services     [facility]: Hadii otto hoover Sostacy, waaxda luqadaha, qaybta kaalmada stella, debby clemons. So [facility] [phone].    ATENCIÓN: Si habla español, tiene a cyr disposición servicios gratuitos de asistencia  lingüística. Maycol al [phone].    We comply with applicable federal civil rights laws and [place] laws. We do not discriminate on the basis of race, color, national origin, age, disability sex, sexual orientation or gender identity.            Thank you!     Thank you for choosing [facility]  for your care. Our goal is always to provide you with excellent care. Hearing back from our patients is one way we can continue to improve our services. Please take a few minutes to complete the written survey that you may receive in the mail after your visit with us. Thank you!             Your Updated Medication List - Protect others around you: Learn how to safely use, store and throw away your medicines at www.disposemymeds.org.          This list is accurate as of: 6/27/17  2:42 PM.  Always use your most recent med list.                   Brand Name Dispense Instructions for use Diagnosis    ibuprofen 800 MG tablet    ADVIL/MOTRIN    90 tablet    Take 1 tablet (800 mg) by mouth every 8 hours as needed for pain    Pain of toe of right foot       traMADol 50 MG tablet    ULTRAM    30 tablet    Take 1 tablet (50 mg) by mouth every 6 hours as needed for pain maximum 4 tablet(s) per day    Abdominal pain, generalized       valACYclovir 500 MG tablet    VALTREX    90 tablet    TAKE ONE TABLET BY MOUTH DAILY    Herpes simplex virus infection

## 2017-06-27 NOTE — NURSING NOTE
"Chief Complaint   Patient presents with     Abdominal Pain     started friday with some bloating with diarrhea. No vomiting or nausea. Pain is throughout abdomen. Rates pain 2/10.       Initial /74  Pulse 77  Temp 98.6  F (37  C) (Tympanic)  Resp 19  Wt 125 lb (56.7 kg)  LMP 08/10/2013  SpO2 99%  Breastfeeding? No  BMI 22.86 kg/m2 Estimated body mass index is 22.86 kg/(m^2) as calculated from the following:    Height as of 4/7/17: 5' 2\" (1.575 m).    Weight as of this encounter: 125 lb (56.7 kg).  Medication Reconciliation: complete   Marisela Higgins      "

## 2017-06-29 ENCOUNTER — HOSPITAL ENCOUNTER (EMERGENCY)
Facility: HOSPITAL | Age: 37
Discharge: HOME | End: 2017-06-29
Attending: PHYSICIAN ASSISTANT | Admitting: PHYSICIAN ASSISTANT
Payer: COMMERCIAL

## 2017-06-29 VITALS
HEART RATE: 66 BPM | SYSTOLIC BLOOD PRESSURE: 94 MMHG | TEMPERATURE: 97.4 F | RESPIRATION RATE: 16 BRPM | OXYGEN SATURATION: 98 % | DIASTOLIC BLOOD PRESSURE: 65 MMHG

## 2017-06-29 DIAGNOSIS — R10.11 RUQ ABDOMINAL PAIN: ICD-10-CM

## 2017-06-29 LAB
ALBUMIN SERPL-MCNC: 4.4 G/DL (ref 3.4–5)
ALBUMIN UR-MCNC: NEGATIVE MG/DL
ALP SERPL-CCNC: 51 U/L (ref 40–150)
ALT SERPL W P-5'-P-CCNC: 29 U/L (ref 0–50)
ANION GAP SERPL CALCULATED.3IONS-SCNC: 8 MMOL/L (ref 3–14)
APPEARANCE UR: CLEAR
AST SERPL W P-5'-P-CCNC: 23 U/L (ref 0–45)
BASOPHILS # BLD AUTO: 0.1 10E9/L (ref 0–0.2)
BASOPHILS NFR BLD AUTO: 0.4 %
BILIRUB SERPL-MCNC: 0.4 MG/DL (ref 0.2–1.3)
BILIRUB UR QL STRIP: NEGATIVE
BUN SERPL-MCNC: 15 MG/DL (ref 7–30)
CALCIUM SERPL-MCNC: 9.3 MG/DL (ref 8.5–10.1)
CHLORIDE SERPL-SCNC: 103 MMOL/L (ref 94–109)
CO2 SERPL-SCNC: 27 MMOL/L (ref 20–32)
COLOR UR AUTO: YELLOW
CREAT SERPL-MCNC: 0.78 MG/DL (ref 0.52–1.04)
CRP SERPL-MCNC: <2.9 MG/L (ref 0–8)
DIFFERENTIAL METHOD BLD: ABNORMAL
EOSINOPHIL # BLD AUTO: 0.1 10E9/L (ref 0–0.7)
EOSINOPHIL NFR BLD AUTO: 0.6 %
ERYTHROCYTE [DISTWIDTH] IN BLOOD BY AUTOMATED COUNT: 12.6 % (ref 10–15)
GFR SERPL CREATININE-BSD FRML MDRD: 83 ML/MIN/1.7M2
GLUCOSE SERPL-MCNC: 81 MG/DL (ref 70–99)
GLUCOSE UR STRIP-MCNC: NEGATIVE MG/DL
HCT VFR BLD AUTO: 42.2 % (ref 35–47)
HGB BLD-MCNC: 14.7 G/DL (ref 11.7–15.7)
HGB UR QL STRIP: NEGATIVE
IMM GRANULOCYTES # BLD: 0 10E9/L (ref 0–0.4)
IMM GRANULOCYTES NFR BLD: 0.3 %
KETONES UR STRIP-MCNC: 5 MG/DL
LEUKOCYTE ESTERASE UR QL STRIP: NEGATIVE
LIPASE SERPL-CCNC: 126 U/L (ref 73–393)
LYMPHOCYTES # BLD AUTO: 4.8 10E9/L (ref 0.8–5.3)
LYMPHOCYTES NFR BLD AUTO: 42 %
MCH RBC QN AUTO: 33.6 PG (ref 26.5–33)
MCHC RBC AUTO-ENTMCNC: 34.8 G/DL (ref 31.5–36.5)
MCV RBC AUTO: 96 FL (ref 78–100)
MONOCYTES # BLD AUTO: 0.7 10E9/L (ref 0–1.3)
MONOCYTES NFR BLD AUTO: 5.9 %
NEUTROPHILS # BLD AUTO: 5.8 10E9/L (ref 1.6–8.3)
NEUTROPHILS NFR BLD AUTO: 50.8 %
NITRATE UR QL: NEGATIVE
NRBC # BLD AUTO: 0 10*3/UL
NRBC BLD AUTO-RTO: 0 /100
PH UR STRIP: 5.5 PH (ref 4.7–8)
PLATELET # BLD AUTO: 237 10E9/L (ref 150–450)
POTASSIUM SERPL-SCNC: 3.6 MMOL/L (ref 3.4–5.3)
PROT SERPL-MCNC: 7.8 G/DL (ref 6.8–8.8)
RBC # BLD AUTO: 4.38 10E12/L (ref 3.8–5.2)
SODIUM SERPL-SCNC: 138 MMOL/L (ref 133–144)
SP GR UR STRIP: 1.02 (ref 1–1.03)
URN SPEC COLLECT METH UR: ABNORMAL
UROBILINOGEN UR STRIP-MCNC: 2 MG/DL (ref 0–2)
WBC # BLD AUTO: 11.4 10E9/L (ref 4–11)

## 2017-06-29 PROCEDURE — 25000125 ZZHC RX 250: Performed by: PHYSICIAN ASSISTANT

## 2017-06-29 PROCEDURE — 99285 EMERGENCY DEPT VISIT HI MDM: CPT | Performed by: PHYSICIAN ASSISTANT

## 2017-06-29 PROCEDURE — 96375 TX/PRO/DX INJ NEW DRUG ADDON: CPT

## 2017-06-29 PROCEDURE — 81003 URINALYSIS AUTO W/O SCOPE: CPT | Performed by: PHYSICIAN ASSISTANT

## 2017-06-29 PROCEDURE — 86140 C-REACTIVE PROTEIN: CPT | Performed by: PHYSICIAN ASSISTANT

## 2017-06-29 PROCEDURE — 99285 EMERGENCY DEPT VISIT HI MDM: CPT | Mod: 25

## 2017-06-29 PROCEDURE — 25000128 H RX IP 250 OP 636: Performed by: PHYSICIAN ASSISTANT

## 2017-06-29 PROCEDURE — 74177 CT ABD & PELVIS W/CONTRAST: CPT | Mod: TC

## 2017-06-29 PROCEDURE — 25000132 ZZH RX MED GY IP 250 OP 250 PS 637: Performed by: PHYSICIAN ASSISTANT

## 2017-06-29 PROCEDURE — 96374 THER/PROPH/DIAG INJ IV PUSH: CPT | Mod: 59

## 2017-06-29 PROCEDURE — 83690 ASSAY OF LIPASE: CPT | Performed by: PHYSICIAN ASSISTANT

## 2017-06-29 PROCEDURE — 76705 ECHO EXAM OF ABDOMEN: CPT | Mod: TC

## 2017-06-29 PROCEDURE — 85025 COMPLETE CBC W/AUTO DIFF WBC: CPT | Performed by: PHYSICIAN ASSISTANT

## 2017-06-29 PROCEDURE — 80053 COMPREHEN METABOLIC PANEL: CPT | Performed by: PHYSICIAN ASSISTANT

## 2017-06-29 PROCEDURE — 36415 COLL VENOUS BLD VENIPUNCTURE: CPT | Performed by: PHYSICIAN ASSISTANT

## 2017-06-29 RX ORDER — ONDANSETRON 2 MG/ML
4 INJECTION INTRAMUSCULAR; INTRAVENOUS ONCE
Status: COMPLETED | OUTPATIENT
Start: 2017-06-29 | End: 2017-06-29

## 2017-06-29 RX ORDER — KETOROLAC TROMETHAMINE 30 MG/ML
30 INJECTION, SOLUTION INTRAMUSCULAR; INTRAVENOUS ONCE
Status: COMPLETED | OUTPATIENT
Start: 2017-06-29 | End: 2017-06-29

## 2017-06-29 RX ORDER — MORPHINE SULFATE 2 MG/ML
4 INJECTION, SOLUTION INTRAMUSCULAR; INTRAVENOUS ONCE
Status: COMPLETED | OUTPATIENT
Start: 2017-06-29 | End: 2017-06-29

## 2017-06-29 RX ORDER — IOPAMIDOL 612 MG/ML
100 INJECTION, SOLUTION INTRAVASCULAR ONCE
Status: COMPLETED | OUTPATIENT
Start: 2017-06-29 | End: 2017-06-29

## 2017-06-29 RX ADMIN — LIDOCAINE HYDROCHLORIDE 30 ML: 20 SOLUTION ORAL; TOPICAL at 20:13

## 2017-06-29 RX ADMIN — ONDANSETRON 4 MG: 2 INJECTION, SOLUTION INTRAMUSCULAR; INTRAVENOUS at 19:37

## 2017-06-29 RX ADMIN — SODIUM CHLORIDE 1000 ML: 9 INJECTION, SOLUTION INTRAVENOUS at 20:30

## 2017-06-29 RX ADMIN — PANTOPRAZOLE SODIUM 40 MG: 40 INJECTION, POWDER, FOR SOLUTION INTRAVENOUS at 20:15

## 2017-06-29 RX ADMIN — KETOROLAC TROMETHAMINE 30 MG: 30 INJECTION, SOLUTION INTRAMUSCULAR at 19:37

## 2017-06-29 RX ADMIN — MORPHINE SULFATE 4 MG: 2 INJECTION, SOLUTION INTRAMUSCULAR; INTRAVENOUS at 20:31

## 2017-06-29 RX ADMIN — IOPAMIDOL 100 ML: 612 INJECTION, SOLUTION INTRAVASCULAR at 20:47

## 2017-06-29 ASSESSMENT — ENCOUNTER SYMPTOMS
ANAL BLEEDING: 0
FEVER: 0
VOMITING: 0
SORE THROAT: 0
DYSURIA: 0
MUSCULOSKELETAL NEGATIVE: 1
CHILLS: 0
DIFFICULTY URINATING: 0
FREQUENCY: 0
APPETITE CHANGE: 0
SHORTNESS OF BREATH: 0
UNEXPECTED WEIGHT CHANGE: 0
CONSTIPATION: 0
DIARRHEA: 0
ABDOMINAL DISTENTION: 0
NAUSEA: 0
NEUROLOGICAL NEGATIVE: 1
HEMATURIA: 0
ABDOMINAL PAIN: 1
BLOOD IN STOOL: 0
FLANK PAIN: 0

## 2017-06-29 NOTE — ED AVS SNAPSHOT
[facility]    [address]    [place] [address]    Phone:  [phone]                                       Purnima Fallon   MRN: [id number]    Department:  [facility]   Date of Visit:  6/29/2017           Patient Information     Date Of Birth          1980        Your diagnoses for this visit were:     RUQ abdominal pain        You were seen by Yuliana Teague PA-C.      Follow-up Information     Follow up with Alexi Romero NP In 4 days.    Specialty:  Internal Medicine    Contact information:    [address] [address]  [address] [place] [address]  [phone]          Discharge Instructions         Follow up with your primary care provider in 4 days for re-check. Return here sooner with any new or worsening symptoms.             *Abdominal Pain, Unknown Cause (Female)    The exact cause of your abdominal (stomach) pain is not certain. This does not mean that this is something to worry about, or the right tests were not done. Everyone likes to know the exact cause of the problem, but sometimes with abdominal pain, there is no clear-cut cause, and this could be a good thing. The good news is that your symptoms can be treated, and you will feel better.   Your condition does not seem serious now; however, sometimes the signs of a serious problem may take more time to appear. For this reason, it is important for you to watch for any new symptoms, problems, or worsening of your condition.  Over the next few days, the abdominal pain may come and go, or be continuous. Other common symptoms can include nausea and vomiting. Sometimes it can be difficult to tell if you feel nauseous, you may just feel bad and not associate that feeling with nausea. Constipation, diarrhea, and a fever may go along with the pain.  The pain may continue even if treated correctly over the following days. Depending on how things go, sometimes the cause can become clear and may require  further or different treatment. Additional evaluations, medications, or tests may be needed.  Home care  Your health care provider may prescribe medications for pain, symptoms, or an infection.  Follow the health care provider's instructions for taking these medications.  General care    Rest until your next exam. No strenuous activities.    Try to find positions that ease discomfort. A small pillow placed on the abdomen may help relieve pain.    Something warm on your abdomen (such as a heating pad) may help, but be careful not to burn yourself.  Diet    Do not force yourself to eat, especially if having cramps, vomiting, or diarrhea.    Water is important so you do not get dehydrated. Soup may also be good. Sports drinks may also help, especially if they are not too acidic. Make sure you don't drink sugary drinks as this can make things worse. Take liquids in small amounts. Do not guzzle them.    Caffeine sometimes makes the pain and cramping worse.    Avoid dairy products if you have vomiting or diarrhea.    Don't eat large amounts at a time. Wait a few minutes between bites.    Eat a diet low in fiber (called a low-residue diet). Foods allowed include refined breads, white rice, fruit and vegetable juices without pulp, tender meats. These foods will pass more easily through the intestine.    Avoid fried or fatty foods, dairy, alcohol and spicy foods until your symptoms go away.  Follow-up care  Follow up with your health care provider as instructed, or if your pain does not begin to improve in the next 24 hours.  When to seek medical care  Seek prompt medical care if any of the following occur:    Pain gets worse or moves to the right lower abdomen    New or worsening vomiting or diarrhea    Swelling of the abdomen    Unable to pass stool for more than three days    New fever over 101  F (38.3 C), or rising fever    Blood in vomit or bowel movements (dark red or black color)    Jaundice (yellow color of eyes and  skin)    Weakness, dizziness    Chest, arm, back, neck or jaw pain    Unexpected vaginal bleeding or missed period  Call 911  Call emergency services if any of the following occur:    Trouble breathing    Confusion    Fainting or loss of consciousness    Rapid heart rate    Seizure    [id number] Derrek Yip, [address] [facility], [place] [address]. All rights reserved. This information is not intended as a substitute for professional medical care. Always follow your healthcare professional's instructions.          What to expect when you have contrast    During your exam, we will inject  contrast  into your vein or artery. (Contrast is a clear liquid with iodine in it. It shows up on X-rays.)    You may feel warm or hot. You may have a metal taste in your mouth and a slight upset stomach. You may also feel pressure near the kidneys and bladder. These effects will last about 1 to 3 minutes.    Please tell us if you have:    Sneezing     Itching    Hives     Swelling in the face    A hoarse voice    Breathing problems    Other new symptoms    Serious problems are rare.  They may include:    Irregular heartbeat     Seizures    Kidney failure              Tissue damage    Shock      Death    If you have any problems during the exam, we  will treat them right away.    When you get home    Call your hospital if you have any new symptoms in the next 2 days, like hives or swelling. (Phone numbers are at the bottom of this page.) Or call your family doctor.     If you have wheezing or trouble breathing, call 911.    Self-care  -Drink at least 4 extra glasses of water today.   This reduces the stress on your kidneys.  -Keep taking your regular medicines.    The contrast will pass out of your body in your  Urine(pee). This will happen in the next 24 hours. You  will not feel this. Your urine will not  change color.    If you have kidney problems or take metformin    Drink 4 to 8 large glasses of water for the next  2  days, if you are not on a fluid restriction.    ?If you take metformin (Glucophage or Glucovance) for diabetes, keep taking it.      ?Your kidney function tests are abnormal.  If you take Metformin, do not take it for 48 hours. Please go to your clinic for a blood test within 3 days after your exam before the restarting this medicine.     (Note to provider:please give patient prescription for lab tests.)    ?Special instructions: DRINK EXTRA WATER TODAY    I have read and understand the above information.    Patient Sign Here:______________________________________Date:________Time:______    Staff Sign Here:________________________________________Date:_______Time:______      Radiology Departments:     ?[facility] [phone] ?[facility]: [phone]     ?[place]: [phone] ?[facility]:[phone]      ?[place]: [phone]  ?[facility]: [phone]  ?[place]:[phone]    ?[facility]:[phone]  ?[facility]:[phone]    Future Appointments        Provider Department Dept Phone [place]    7/7/2017 8:00 AM [facility] 2 [facility] [phone] [facility]    7/20/2017 2:00 PM Alexi Romero NP [facility] [phone] [place]         Review of your medicines      Our records show that you are taking the medicines listed below. If these are incorrect, please call your family doctor or clinic.        Dose / Directions Last dose taken    ibuprofen 800 MG tablet   Commonly known as:  ADVIL/MOTRIN   Dose:  800 mg   Quantity:  90 tablet        Take 1 tablet (800 mg) by mouth every 8 hours as needed for pain   Refills:  1        traMADol 50 MG tablet   Commonly known as:  ULTRAM   Dose:  50 mg   Quantity:  30 tablet        Take 1 tablet (50 mg) by mouth every 6 hours as needed for pain maximum 4 tablet(s) per day   Refills:  0        valACYclovir 500 MG tablet   Commonly known as:  VALTREX   Quantity:  90 tablet        TAKE ONE TABLET BY MOUTH DAILY   Refills:  0                 Procedures and tests performed during your visit     Abdomen US, limited (RUQ only)    CBC with platelets differential    CRP inflammation    CT Abdomen Pelvis w Contrast    Comprehensive metabolic panel    Lipase    Peripheral IV catheter    UA reflex to Microscopic and Culture      Orders Needing Specimen Collection     None      Pending Results     Date and Time Order Name Status Description    6/29/2017 2023 CT Abdomen Pelvis w Contrast In process     6/29/2017 1924 Abdomen US, limited (RUQ only) In process             Pending Culture Results     No orders found from 6/27/2017 to 6/30/2017.            Thank you for choosing [facility]       Thank you for choosing [facility] for your care. Our goal is always to provide you with excellent care. Hearing back from our patients is one way we can continue to improve our services. Please take a few minutes to complete the written survey that you may receive in the mail after you visit with us. Thank you!        [employer] Information     [employer] gives you secure access to your electronic health record. If you see a primary care provider, you can also send messages to your care team and make appointments. If you have questions, please call your primary care clinic.  If you do not have a primary care provider, please call [phone] and they will assist you.        Care EveryWhere ID     This is your Care EveryWhere ID. This could be used by other organizations to access your [facility] medical records  [id number]        Equal Access to Services     ALDO CRUZ : Felix Buckley, waaxda lupatriciaadaha, qaybta kaalmada stella, debby clemons. So [facility] [phone].    ATENCIÓN: Si habla español, tiene a cyr disposición servicios gratuitos de asistencia lingüística. Llame al [phone].    We comply with applicable federal civil rights laws and [place] laws. We do not discriminate on the basis of race, color, national origin, age,  disability sex, sexual orientation or gender identity.            After Visit Summary       This is your record. Keep this with you and show to your community pharmacist(s) and doctor(s) at your next visit.

## 2017-06-29 NOTE — ED AVS SNAPSHOT
[facility]    [address] [address]    Phone:  [phone]                                       Purnima Fallon   MRN: [id number]    Department:  [facility]   Date of Visit:  6/29/2017           After Visit Summary Signature Page     I have received my discharge instructions, and my questions have been answered. I have discussed any challenges I see with this plan with the nurse or doctor.    ..........................................................................................................................................  Patient/Patient Representative Signature      ..........................................................................................................................................  Patient Representative Print Name and Relationship to Patient    ..................................................               ................................................  Date                                            Time    ..........................................................................................................................................  Reviewed by Signature/Title    ...................................................              ..............................................  Date                                                            Time

## 2017-06-30 NOTE — ED NOTES
Pt ambulatory to ED room 5 with c/o RLQ, right shoulder pain, and n/v/d. Pt states that she had an elevated amylase in the clinic 2 days ago and was scheduled for an outpatient US next week. Pt states that she does have her gallbladder and appendix. Pt denies dysuria.

## 2017-06-30 NOTE — PROGRESS NOTES
CT Scan of Abdomen and Pelvis with IV Contrast report routed to PCP, SAM Romero NP.  IMPRESSION:    ESSENTIALLY NEGATIVE  CT SCAN OF ABDOMEN AND PELVIS. THERE IS A SMALL VOLUME OF FLUID WITHIN THE LOWER PELVIS AND SOMEWHAT  PROMINENT FOLLICLE, SUGGESTING RECENT RUPTURE OF AN OVARIAN FOLLICLE VERSUS CYST. Pt was advised to f/u with PCP next week for further work up and probably a GI referral.

## 2017-06-30 NOTE — DISCHARGE INSTRUCTIONS
Follow up with your primary care provider in 4 days for re-check. Return here sooner with any new or worsening symptoms.             *Abdominal Pain, Unknown Cause (Female)    The exact cause of your abdominal (stomach) pain is not certain. This does not mean that this is something to worry about, or the right tests were not done. Everyone likes to know the exact cause of the problem, but sometimes with abdominal pain, there is no clear-cut cause, and this could be a good thing. The good news is that your symptoms can be treated, and you will feel better.   Your condition does not seem serious now; however, sometimes the signs of a serious problem may take more time to appear. For this reason, it is important for you to watch for any new symptoms, problems, or worsening of your condition.  Over the next few days, the abdominal pain may come and go, or be continuous. Other common symptoms can include nausea and vomiting. Sometimes it can be difficult to tell if you feel nauseous, you may just feel bad and not associate that feeling with nausea. Constipation, diarrhea, and a fever may go along with the pain.  The pain may continue even if treated correctly over the following days. Depending on how things go, sometimes the cause can become clear and may require further or different treatment. Additional evaluations, medications, or tests may be needed.  Home care  Your health care provider may prescribe medications for pain, symptoms, or an infection.  Follow the health care provider's instructions for taking these medications.  General care    Rest until your next exam. No strenuous activities.    Try to find positions that ease discomfort. A small pillow placed on the abdomen may help relieve pain.    Something warm on your abdomen (such as a heating pad) may help, but be careful not to burn yourself.  Diet    Do not force yourself to eat, especially if having cramps, vomiting, or diarrhea.    Water is important so  you do not get dehydrated. Soup may also be good. Sports drinks may also help, especially if they are not too acidic. Make sure you don't drink sugary drinks as this can make things worse. Take liquids in small amounts. Do not guzzle them.    Caffeine sometimes makes the pain and cramping worse.    Avoid dairy products if you have vomiting or diarrhea.    Don't eat large amounts at a time. Wait a few minutes between bites.    Eat a diet low in fiber (called a low-residue diet). Foods allowed include refined breads, white rice, fruit and vegetable juices without pulp, tender meats. These foods will pass more easily through the intestine.    Avoid fried or fatty foods, dairy, alcohol and spicy foods until your symptoms go away.  Follow-up care  Follow up with your health care provider as instructed, or if your pain does not begin to improve in the next 24 hours.  When to seek medical care  Seek prompt medical care if any of the following occur:    Pain gets worse or moves to the right lower abdomen    New or worsening vomiting or diarrhea    Swelling of the abdomen    Unable to pass stool for more than three days    New fever over 101  F (38.3 C), or rising fever    Blood in vomit or bowel movements (dark red or black color)    Jaundice (yellow color of eyes and skin)    Weakness, dizziness    Chest, arm, back, neck or jaw pain    Unexpected vaginal bleeding or missed period  Call 911  Call emergency services if any of the following occur:    Trouble breathing    Confusion    Fainting or loss of consciousness    Rapid heart rate    Seizure    [id number] [facility], [address] [place] [address]. All rights reserved. This information is not intended as a substitute for professional medical care. Always follow your healthcare professional's instructions.          What to expect when you have contrast    During your exam, we will inject  contrast  into your vein or artery. (Contrast is a clear liquid with  iodine in it. It shows up on X-rays.)    You may feel warm or hot. You may have a metal taste in your mouth and a slight upset stomach. You may also feel pressure near the kidneys and bladder. These effects will last about 1 to 3 minutes.    Please tell us if you have:    Sneezing     Itching    Hives     Swelling in the face    A hoarse voice    Breathing problems    Other new symptoms    Serious problems are rare.  They may include:    Irregular heartbeat     Seizures    Kidney failure              Tissue damage    Shock      Death    If you have any problems during the exam, we  will treat them right away.    When you get home    Call your hospital if you have any new symptoms in the next 2 days, like hives or swelling. (Phone numbers are at the bottom of this page.) Or call your family doctor.     If you have wheezing or trouble breathing, call 911.    Self-care  -Drink at least 4 extra glasses of water today.   This reduces the stress on your kidneys.  -Keep taking your regular medicines.    The contrast will pass out of your body in your  Urine(pee). This will happen in the next 24 hours. You  will not feel this. Your urine will not  change color.    If you have kidney problems or take metformin    Drink 4 to 8 large glasses of water for the next  2 days, if you are not on a fluid restriction.    ?If you take metformin (Glucophage or Glucovance) for diabetes, keep taking it.      ?Your kidney function tests are abnormal.  If you take Metformin, do not take it for 48 hours. Please go to your clinic for a blood test within 3 days after your exam before the restarting this medicine.     (Note to provider:please give patient prescription for lab tests.)    ?Special instructions: DRINK EXTRA WATER TODAY    I have read and understand the above information.    Patient Sign Here:______________________________________Date:________Time:______    Staff Sign  Here:________________________________________Date:_______Time:______      Radiology Departments:     ?[facility]: [phone] ?[place]: [phone]     ?[place]: [phone] ?[facility]:[phone]      ?[place]: [phone]  ?[place]: [phone]  ?[place]:[phone]    ?[facility]:[phone]  ?[facility]:[phone]

## 2017-06-30 NOTE — ED PROVIDER NOTES
History     Chief Complaint   Patient presents with     Abdominal Pain     Right flank, right lower chest, and right shoulder pain since last friday. Patient states Amylase was increased at PCP office.      HPI  Purnima Fallon is a 36 year old female who presents with right upper abdominal pain with radiation to her right shoulder x 6 days. No alleviating or aggravating factors. Denies n/v/d or fevers/chills. No dysuria. States her amylase was elevated at her PCP's office a few days ago. Scheduled for a gallbladder US in 1 week.     I have reviewed the Medications, Allergies, Past Medical and Surgical History, and Social History in the [facility] system.    Past Medical History:   Past Medical History:   Diagnosis Date     Hidradenitis 2/16/2007     Ischiorectal abscess and fistula      Kidney stones 2008    x2 passed on her own     Nondependent tobacco use disorder 10/11/2012     Papanicolaou smear of cervix with low grade squamous intraepithelial lesion (LGSIL) 10/29/2008       Past Surgical History:   Procedure Laterality Date     EXCISE MASS NECK Right 8/4/2015    Procedure: EXCISE MASS NECK;  Surgeon: Nasir Jones MD;  Location: [facility]     INCISION AND DRAINAGE PERINEAL, COMBINED N/A 3/18/2015    Procedure: COMBINED INCISION AND DRAINAGE PERINEAL;  Surgeon: Jay Torres DO;  Location: [facility]     LAPAROSCOPIC HYSTERECTOMY SUPRACERVICAL, BILATERAL SALPINGO-OOPHORECTOMY, COMBINED  9/27/2013    Procedure: COMBINED LAPAROSCOPIC HYSTERECTOMY SUPRACERVICAL, SALPINGO-OOPHORECTOMY;  LAPAROSCOPIC SUPRACERVICAL HYSTERECTOMY AND RIGHT SALPINGO-OOPHORECTOMY, CYSTOSCOPY;  Surgeon: Denys Callahan MD;  Location: [facility]     marsupialization of pilonidal cyst  2007     TUBAL LIGATION  2005       Social History     Social History     Marital status:      Spouse name: N/A     Number of children: N/A     Years of education: N/A     Occupational History     Not on file.     Social History Main Topics     Smoking  status: Current Every Day Smoker     Packs/day: 0.50     Years: 15.00     Types: Cigarettes     Last attempt to quit: 7/18/2013     Smokeless tobacco: Never Used     Alcohol use No     Drug use: No     Sexual activity: Yes     Partners: Male     Birth control/ protection: Condom     Other Topics Concern     Parent/Sibling W/ Cabg, Mi Or Angioplasty Before 65f 55m? No     Blood Transfusions Yes     PERMITS     Caffeine Concern Yes     SODA - NOT DAILY     Social History Narrative    9/14  Purinma lives with her two boys.  She has a boyfriend who doesn't live with her.  She is an .   She has been a  for 4 years.            Patient's Medications   New Prescriptions    No medications on file   Previous Medications    IBUPROFEN (ADVIL/MOTRIN) 800 MG TABLET    Take 1 tablet (800 mg) by mouth every 8 hours as needed for pain    TRAMADOL (ULTRAM) 50 MG TABLET    Take 1 tablet (50 mg) by mouth every 6 hours as needed for pain maximum 4 tablet(s) per day    VALACYCLOVIR (VALTREX) 500 MG TABLET    TAKE ONE TABLET BY MOUTH DAILY   Modified Medications    No medications on file   Discontinued Medications    No medications on file       Allergies: Review of patient's allergies indicates no known allergies.      Review of Systems   Constitutional: Negative for appetite change, chills, fever and unexpected weight change.   HENT: Negative for sore throat.    Respiratory: Negative for shortness of breath.    Cardiovascular: Negative for chest pain.   Gastrointestinal: Positive for abdominal pain. Negative for abdominal distention, anal bleeding, blood in stool, constipation, diarrhea, nausea and vomiting.   Genitourinary: Negative.  Negative for difficulty urinating, dyspareunia, dysuria, flank pain, frequency, genital sores, hematuria, pelvic pain, urgency, vaginal bleeding, vaginal discharge and vaginal pain.   Musculoskeletal: Negative.    Skin: Negative.    Neurological: Negative.    All other systems  reviewed and are negative.      Physical Exam   BP: 108/76  Pulse: 82  Heart Rate: 63  Temp: 98.4  F (36.9  C)  Resp: 16  SpO2: 98 %  Physical Exam   Constitutional: She is oriented to person, place, and time. Vital signs are normal. She appears well-developed and well-nourished.  Non-toxic appearance. She does not have a sickly appearance. She does not appear ill. No distress.   HENT:   Head: Normocephalic and atraumatic.   Right Ear: External ear normal.   Left Ear: External ear normal.   Nose: Nose normal.   Mouth/Throat: Oropharynx is clear and moist. No oropharyngeal exudate.   Eyes: Conjunctivae are normal. Pupils are equal, round, and reactive to light. Right eye exhibits no discharge. Left eye exhibits no discharge. No scleral icterus.   Neck: Normal range of motion. Neck supple.   Cardiovascular: Normal rate, regular rhythm, normal heart sounds and intact distal pulses.  Exam reveals no gallop and no friction rub.    No murmur heard.  Pulmonary/Chest: Effort normal and breath sounds normal. No respiratory distress. She has no wheezes. She has no rales. She exhibits no tenderness.   Abdominal: Soft. Bowel sounds are normal. She exhibits no distension and no mass. There is tenderness in the right upper quadrant and epigastric area. There is no rebound and no guarding.   Musculoskeletal: Normal range of motion. She exhibits no edema.   Lymphadenopathy:     She has no cervical adenopathy.   Neurological: She is alert and oriented to person, place, and time. No cranial nerve deficit.   Skin: Skin is warm and dry. No rash noted. She is not diaphoretic. No erythema. No pallor.   Psychiatric: She has a normal mood and affect. Her behavior is normal. Judgment and thought content normal.   Nursing note and vitals reviewed.      ED Course     ED Course     Procedures          Labs Ordered and Resulted from Time of ED Arrival Up to the Time of Departure from the ED   CBC WITH PLATELETS DIFFERENTIAL - Abnormal; Notable  for the following:        Result Value    WBC 11.4 (*)     MCH 33.6 (*)     All other components within normal limits   UA MACROSCOPIC WITH REFLEX TO MICRO AND CULTURE - Abnormal; Notable for the following:     Ketones Urine 5 (*)     All other components within normal limits   COMPREHENSIVE METABOLIC PANEL   CRP INFLAMMATION   LIPASE   PERIPHERAL IV CATHETER       Assessments & Plan (with Medical Decision Making)   Purnima presents with RUQ/epigastric pain with radiation to her right shoulder x 6 days. She is tender to the RUQ and epigastrum. RUQ US is completely normal. Labs largely unremarkable other than slight bump in her white count at 11.4. Discussed pros/cons of CT and pt absolutely wants to pursue a CT for further evaluation at this point. CT abd/pelvis with IV contrast is also completely normal. She had no relief with a GI cocktail or protonix. No relief from toradol. Morphine provided some relief but she is in NAD. Will have her f/u with PCP next week for further work up and probably a GI referral. She is happy with this plan and was discharged home in good condition.       I have reviewed the nursing notes.    I have reviewed the findings, diagnosis, plan and need for follow up with the patient.    New Prescriptions    No medications on file       Final diagnoses:   RUQ abdominal pain       6/29/2017   [facility]     Yuliana Teague PA-C  06/29/17 [id number]

## 2017-09-03 ENCOUNTER — HEALTH MAINTENANCE LETTER (OUTPATIENT)
Age: 37
End: 2017-09-03

## 2017-09-05 ENCOUNTER — OFFICE VISIT (OUTPATIENT)
Dept: INTERNAL MEDICINE | Facility: OTHER | Age: 37
End: 2017-09-05
Attending: NURSE PRACTITIONER
Payer: COMMERCIAL

## 2017-09-05 ENCOUNTER — TELEPHONE (OUTPATIENT)
Dept: FAMILY MEDICINE | Facility: OTHER | Age: 37
End: 2017-09-05

## 2017-09-05 VITALS
BODY MASS INDEX: 23.92 KG/M2 | TEMPERATURE: 97.7 F | SYSTOLIC BLOOD PRESSURE: 100 MMHG | DIASTOLIC BLOOD PRESSURE: 60 MMHG | WEIGHT: 130 LBS | OXYGEN SATURATION: 97 % | HEART RATE: 79 BPM | HEIGHT: 62 IN | RESPIRATION RATE: 18 BRPM

## 2017-09-05 DIAGNOSIS — R10.11 RUQ ABDOMINAL PAIN: ICD-10-CM

## 2017-09-05 DIAGNOSIS — L05.01 PILONIDAL CYST WITH ABSCESS: Primary | ICD-10-CM

## 2017-09-05 PROCEDURE — 99214 OFFICE O/P EST MOD 30 MIN: CPT | Performed by: NURSE PRACTITIONER

## 2017-09-05 RX ORDER — CEPHALEXIN 500 MG/1
500 CAPSULE ORAL 4 TIMES DAILY
Qty: 40 CAPSULE | Refills: 0 | Status: SHIPPED | OUTPATIENT
Start: 2017-09-05 | End: 2017-09-26

## 2017-09-05 RX ORDER — OXYCODONE AND ACETAMINOPHEN 5; 325 MG/1; MG/1
1 TABLET ORAL EVERY 4 HOURS PRN
Qty: 24 TABLET | Refills: 0 | Status: SHIPPED | OUTPATIENT
Start: 2017-09-05 | End: 2017-09-21

## 2017-09-05 ASSESSMENT — PAIN SCALES - GENERAL: PAINLEVEL: SEVERE PAIN (6)

## 2017-09-05 NOTE — TELEPHONE ENCOUNTER
Called Purnima and she thinks that she has a cyst some where near her tail bone, it is a little painful and is draining. She would like to be seen sooner than 9/8/17.

## 2017-09-05 NOTE — NURSING NOTE
"Chief Complaint   Patient presents with     Derm Problem     cyst near tail bone, c/o drainage and pain 6/10       Initial /60 (BP Location: Left arm, Patient Position: Chair, Cuff Size: Adult Regular)  Pulse 79  Temp 97.7  F (36.5  C) (Tympanic)  Resp 18  Ht 5' 2\" (1.575 m)  Wt 130 lb (59 kg)  LMP 08/10/2013  SpO2 97%  BMI 23.78 kg/m2 Estimated body mass index is 23.78 kg/(m^2) as calculated from the following:    Height as of this encounter: 5' 2\" (1.575 m).    Weight as of this encounter: 130 lb (59 kg).  Medication Reconciliation: complete   Crystal Polo    "

## 2017-09-05 NOTE — PATIENT INSTRUCTIONS
1. Set up with Dr. Kat hoover THursday AM  2. Keflex 500mg 4 times a day for 10 days  3. Hot bath or warm packs to area 2 times a day.    4 Percocet 5/235mg up to 4 a day for pain.    5. Set up for HIDA scan.

## 2017-09-05 NOTE — TELEPHONE ENCOUNTER
8:57 AM    Reason for Call: OVERBOOK    Patient is having the following symptoms: tailbone is bleeding for 1 weeks.    The patient is requesting an appointment for (09/05/17) with Alexi Romero.    Was an appointment offered for this call? Yes did not want to wait until Sep 8th  If yes : Appointment type              Date    Preferred method for responding to this message: Telephone Call  What is your phone number ?    If we cannot reach you directly, may we leave a detailed response at the number you provided? Yes    Can this message wait until your PCP/provider returns, if unavailable today? Not applicable,     Daya Moctezuma

## 2017-09-05 NOTE — TELEPHONE ENCOUNTER
Called the patient to let her know that we can see her today at 12:30 am .  Tg please add Purnima to Alexi's schedule today. Thank you.

## 2017-09-05 NOTE — PROGRESS NOTES
"SUBJECTIVE:  Purnima Fallon, a 36 year old female scheduled an appointment to discuss the following issues:  Possible new  Pilonidal cyst: Has pain and pus, bloody drainage from lower spinal area.  Can't sit well.  No fevers Hx pilonidal cyst removal .    RUQ pain:  Was in ER with RUQ pain. CT and Ultrasound showed some fluid in pelvis consistent with ruptured follicle.  No GB stones.    Continues to have intermittant pain. Her sister and mother had GB problems.  No constipation or diarrhea.  Eats pretty healthy.    Medical, social, surgical, and family histories reviewed.    Current Outpatient Prescriptions   Medication     valACYclovir (VALTREX) 500 MG tablet     traMADol (ULTRAM) 50 MG tablet     ibuprofen (ADVIL/MOTRIN) 800 MG tablet     No current facility-administered medications for this visit.        ROS:  C: NEGATIVE for fever, chills, sore throat, earache  E: NEGATIVE for vision changes   R: NEGATIVE for  cough , SOB  CV: NEGATIVE for chest pain, palpitations   GI: POSITIVE   for nausea, RUQ   abdominal pain, comes and goes.   No  heartburn, or constipation, diarrhea.   : NEGATIVE for frequency, dysuria, or hematuria  M:POSITIVE  for significant arthralgias or myalgia Pain to lower spine area-feels painful bump , and   Has pus, bloody drainage.    N: NEGATIVE for weakness,  Paresthesias, dizziness  , +  headache    OBJECTIVE:  /60 (BP Location: Left arm, Patient Position: Chair, Cuff Size: Adult Regular)  Pulse 79  Temp 97.7  F (36.5  C) (Tympanic)  Resp 18  Ht 5' 2\" (1.575 m)  Wt 130 lb (59 kg)  LMP 08/10/2013  SpO2 97%  BMI 23.78 kg/m2  EXAM:  GENERAL APPEARANCE:  alert and no distress  EYES: EOMI,  PERRL  HENT:EARS: TM's pearly gray, good lite reflex, NOSE: Nares red, moist nonswollen, THROAT: Oropharynx pink  Tongue midline, no fasciculations.   NECK: Supple, no lymphadenopathy, no thyromegaly, no carotid bruits, No JVD  RESP: lungs clear to auscultation anteriorly and posteriorly - no " rales, rhonchi or wheezes  CV: regular rates and rhythm, normal S1 S2, no S3 or S4 and no murmur, no click or rub -  ABDOMEN:  soft, slight tender RUQ  , no hepatomegaly, no splenomegaly,  or masses,  bowel sounds normal  MS: No edema Has dime size soft  Tender swelling at base of spine above old pilonidal cyst area. 2 pinpoint holes noted. No drainage at this time.    NEURO:No focal deficits.     ER labs and Xrays:    Results for orders placed or performed during the hospital encounter of 06/29/17   Abdomen US, limited (RUQ only)    Narrative    ABDOMINAL ULTRASOUND    HISTORY:  This is a 36-year-old female with abdominal pain, right  upper quadrant.    FINDINGS:  The pancreas is unremarkable.  Hepatic echogenicity is  uniform.  The abdominal aorta proximally measures 1.9 cm and distally  1.4 cm.  There is no aneurysm.  The inferior vena cava is patent.  No  gallstones are present.  Wall thickness is 2 mm.  There is no  pericholecystic fluid.  The extrahepatic bile duct is 4 mm in  diameter.  The right kidney is 9.8 cm in length.  Cortical thickness  and echogenicity are normal.  There is no hydronephrosis.  There is an  echogenic structure in the right lobe of the liver, measuring 6 x 10 x  7 mm in diameter.  The appearance is suggestive of hemangioma.    IMPRESSION:  NEGATIVE ABDOMINAL ULTRASOUND.  THERE IS NO EVIDENCE OF  CHOLELITHIASIS OR ACUTE CHOLECYSTITIS.  THERE IS NO EVIDENCE OF  BILIARY DILATATION.  Exam Date: Jun 29, 2017 07:58:00 PM  Author: ROLANDA MALDONADO  This report is final and signed     CT Abdomen Pelvis w Contrast    Narrative    CT SCAN OF ABDOMEN AND PELVIS WITH IV CONTRAST    CLINICAL HISTORY:  A 36-year-old female with abdominal pain and prior  surgery.    COMPARISON:  Today's study is compared to a prior examination which is  dated September 26, 2016.    FINDINGS:  Lung bases are essentially clear.  The liver, gallbladder,  spleen, pancreas, adrenal glands and kidneys are normal. The  ureters  are not well characterized, however, appear to be grossly normal.  The  urinary bladder is also normal.    The large and small bowel are non-dilated.  The appendix is normal.  Small volume of fluid is seen within the lower pelvis.    Postoperative change is seen, consistent with a partial hysterectomy.  A number of prominent follicles are seen upon the ovaries.  Bony  structures are unremarkable.    IMPRESSION:    ESSENTIALLY NEGATIVE  CT SCAN OF ABDOMEN AND PELVIS.  THERE IS A SMALL VOLUME OF FLUID WITHIN THE LOWER PELVIS AND SOMEWHAT  PROMINENT FOLLICLE, SUGGESTING RECENT RUPTURE OF AN OVARIAN FOLLICLE  VERSUS CYST.  THIS REPORT IS IN AGREEMENT WITH THE PRELIMINARY REPORT  AS SUBMITTED BY [facility].                        SIGNATURE PAGE ONLY  Exam Date: Jun 29, 2017 08:56:00 PM  Author: CHIN JARAMILLO  This report is final and signed     CBC with platelets differential   Result Value Ref Range    WBC 11.4 (H) 4.0 - 11.0 10e9/L    RBC Count 4.38 3.8 - 5.2 10e12/L    Hemoglobin 14.7 11.7 - 15.7 g/dL    Hematocrit 42.2 35.0 - 47.0 %    MCV 96 78 - 100 fl    MCH 33.6 (H) 26.5 - 33.0 pg    MCHC 34.8 31.5 - 36.5 g/dL    RDW 12.6 10.0 - 15.0 %    Platelet Count 237 150 - 450 10e9/L    Diff Method Automated Method     % Neutrophils 50.8 %    % Lymphocytes 42.0 %    % Monocytes 5.9 %    % Eosinophils 0.6 %    % Basophils 0.4 %    % Immature Granulocytes 0.3 %    Nucleated RBCs 0 0 /100    Absolute Neutrophil 5.8 1.6 - 8.3 10e9/L    Absolute Lymphocytes 4.8 0.8 - 5.3 10e9/L    Absolute Monocytes 0.7 0.0 - 1.3 10e9/L    Absolute Eosinophils 0.1 0.0 - 0.7 10e9/L    Absolute Basophils 0.1 0.0 - 0.2 10e9/L    Abs Immature Granulocytes 0.0 0 - 0.4 10e9/L    Absolute Nucleated RBC 0.0    Comprehensive metabolic panel   Result Value Ref Range    Sodium 138 133 - 144 mmol/L    Potassium 3.6 3.4 - 5.3 mmol/L    Chloride 103 94 - 109 mmol/L    Carbon Dioxide 27 20 - 32 mmol/L    Anion Gap 8 3 - 14 mmol/L    Glucose  81 70 - 99 mg/dL    Urea Nitrogen 15 7 - 30 mg/dL    Creatinine 0.78 0.52 - 1.04 mg/dL    GFR Estimate 83 >60 mL/min/1.7m2    GFR Estimate If Black >90   GFR Calc   >60 mL/min/1.7m2    Calcium 9.3 8.5 - 10.1 mg/dL    Bilirubin Total 0.4 0.2 - 1.3 mg/dL    Albumin 4.4 3.4 - 5.0 g/dL    Protein Total 7.8 6.8 - 8.8 g/dL    Alkaline Phosphatase 51 40 - 150 U/L    ALT 29 0 - 50 U/L    AST 23 0 - 45 U/L   CRP inflammation   Result Value Ref Range    CRP Inflammation <2.9 0.0 - 8.0 mg/L   Lipase   Result Value Ref Range    Lipase 126 73 - 393 U/L   UA reflex to Microscopic and Culture   Result Value Ref Range    Color Urine Yellow     Appearance Urine Clear     Glucose Urine Negative NEG mg/dL    Bilirubin Urine Negative NEG    Ketones Urine 5 (A) NEG mg/dL    Specific Gravity Urine 1.018 1.003 - 1.035    Blood Urine Negative NEG    pH Urine 5.5 4.7 - 8.0 pH    Protein Albumin Urine Negative NEG mg/dL    Urobilinogen mg/dL 2.0 0.0 - 2.0 mg/dL    Nitrite Urine Negative NEG    Leukocyte Esterase Urine Negative NEG    Source Midstream Urine          ASSESSMENT / PLAN:  (L05.01) Pilonidal cyst with abscess  (primary encounter diagnosis)  Comment:Will set up with Dr. Stephens to evaluate cyst for drainage or other.  Given Keflex 500mg QID for 10 days, Sit in hot tub.  Percocet 5/325mg for pain.    Plan: cephALEXin (KEFLEX) 500 MG capsule,         oxyCODONE-acetaminophen (PERCOCET) 5-325 MG per        tablet         (R10.11) RUQ abdominal pain  Comment: Will get HIDA scan.    Plan: NM Hepatobiliary Scan w GB EF

## 2017-09-05 NOTE — MR AVS SNAPSHOT
After Visit Summary   9/5/2017    Purnima Fallon    MRN: [id number]           Patient Information     Date Of Birth          1980        Visit Information        Provider Department      9/5/2017 12:30 PM Alexi Romero NP [facility]        Today's Diagnoses     Pilonidal cyst with abscess    -  1    RUQ abdominal pain          Care Instructions    1. Set up with Dr. Kat hoover THursday AM  2. Keflex 500mg 4 times a day for 10 days  3. Hot bath or warm packs to area 2 times a day.    4 Percocet 5/235mg up to 4 a day for pain.    5. Set up for HIDA scan.            Follow-ups after your visit        Your next 10 appointments already scheduled     Sep 07, 2017  9:00 AM CDT   (Arrive by 8:45 AM)   New Visit with Cordell Stephens MD   [facility] ([facility] )    [address] [address]   [phone]              Who to contact     If you have questions or need follow up information about today's clinic visit or your schedule please contact [facility] directly at [phone].  Normal or non-critical lab and imaging results will be communicated to you by MyChart, letter or phone within 4 business days after the clinic has received the results. If you do not hear from us within 7 days, please contact the clinic through [employer] or phone. If you have a critical or abnormal lab result, we will notify you by phone as soon as possible.  Submit refill requests through [facility] or call your pharmacy and they will forward the refill request to us. Please allow 3 business days for your refill to be completed.          Additional Information About Your Visit        MyChart Information     [facility] gives you secure access to your electronic health record. If you see a primary care provider, you can also send messages to your care team and make appointments. If you have questions, please call your primary care clinic.  If you do not  "have a primary care provider, please call [phone] and they will assist you.        Care EveryWhere ID     This is your Care EveryWhere ID. This could be used by other organizations to access your [place] medical records  [id number]        Your Vitals Were     Pulse Temperature Respirations Height Last Period Pulse Oximetry    79 97.7  F (36.5  C) (Tympanic) 18 5' 2\" (1.575 m) 08/10/2013 97%    BMI (Body Mass Index)                   23.78 kg/m2            Blood Pressure from Last 3 Encounters:   09/05/17 100/60   06/29/17 94/65   06/27/17 128/74    Weight from Last 3 Encounters:   09/05/17 130 lb (59 kg)   06/27/17 125 lb (56.7 kg)   04/07/17 125 lb (56.7 kg)                 Today's Medication Changes          These changes are accurate as of: 9/5/17  1:26 PM.  If you have any questions, ask your nurse or doctor.               Start taking these medicines.        Dose/Directions    cephALEXin 500 MG capsule   Commonly known as:  KEFLEX   Used for:  Pilonidal cyst with abscess   Started by:  Alexi Romero, MYLES        Dose:  500 mg   Take 1 capsule (500 mg) by mouth 4 times daily   Quantity:  40 capsule   Refills:  0       oxyCODONE-acetaminophen 5-325 MG per tablet   Commonly known as:  PERCOCET   Used for:  Pilonidal cyst with abscess   Started by:  Alexi Romero, MYLES        Dose:  1 tablet   Take 1 tablet by mouth every 4 hours as needed for pain   Quantity:  24 tablet   Refills:  0            Where to get your medicines      These medications were sent to [id number] - [place], [place] - [address] [place] [facility] [place] [address], [place] [address]     Phone:  [phone]     cephALEXin 500 MG capsule         Some of these will need a paper prescription and others can be bought over the counter.  Ask your nurse if you have questions.     Bring a paper prescription for each of these medications     oxyCODONE-acetaminophen 5-325 MG per tablet                Primary Care Provider " Office Phone # Fax #    Alexi Romero [phone]       [facility] [place] [address]  [facility] [address]        Equal Access to Services     MAGALYCIELO NANCY : Hadii aad ku hadaileeno Soomaali, waaxda luqadaha, qaybta kaalmada adeegyada, waxay manoloin griceldan juan casiano laSageirene clemons. So [facility] [phone].    ATENCIÓN: Si habla español, tiene a cyr disposición servicios gratuitos de asistencia lingüística. Llame al [phone].    We comply with applicable federal civil rights laws and [place] laws. We do not discriminate on the basis of race, color, national origin, age, disability sex, sexual orientation or gender identity.            Thank you!     Thank you for choosing [facility]  for your care. Our goal is always to provide you with excellent care. Hearing back from our patients is one way we can continue to improve our services. Please take a few minutes to complete the written survey that you may receive in the mail after your visit with us. Thank you!             Your Updated Medication List - Protect others around you: Learn how to safely use, store and throw away your medicines at www.disposemymeds.org.          This list is accurate as of: 9/5/17  1:26 PM.  Always use your most recent med list.                   Brand Name Dispense Instructions for use Diagnosis    cephALEXin 500 MG capsule    KEFLEX    40 capsule    Take 1 capsule (500 mg) by mouth 4 times daily    Pilonidal cyst with abscess       ibuprofen 800 MG tablet    ADVIL/MOTRIN    90 tablet    Take 1 tablet (800 mg) by mouth every 8 hours as needed for pain    Pain of toe of right foot       oxyCODONE-acetaminophen 5-325 MG per tablet    PERCOCET    24 tablet    Take 1 tablet by mouth every 4 hours as needed for pain    Pilonidal cyst with abscess       traMADol 50 MG tablet    ULTRAM    30 tablet    Take 1 tablet (50 mg) by mouth every 6 hours as needed for pain maximum 4 tablet(s) per day    Abdominal pain,  generalized       valACYclovir 500 MG tablet    VALTREX    90 tablet    TAKE ONE TABLET BY MOUTH DAILY    Herpes simplex virus infection

## 2017-09-07 ENCOUNTER — HOSPITAL ENCOUNTER (OUTPATIENT)
Dept: ULTRASOUND IMAGING | Facility: HOSPITAL | Age: 37
Discharge: HOME | End: 2017-09-07
Attending: SURGERY | Admitting: SURGERY
Payer: COMMERCIAL

## 2017-09-07 ENCOUNTER — OFFICE VISIT (OUTPATIENT)
Dept: SURGERY | Facility: OTHER | Age: 37
End: 2017-09-07
Attending: SURGERY
Payer: COMMERCIAL

## 2017-09-07 VITALS
HEART RATE: 82 BPM | WEIGHT: 130 LBS | DIASTOLIC BLOOD PRESSURE: 65 MMHG | TEMPERATURE: 98.5 F | BODY MASS INDEX: 23.92 KG/M2 | HEIGHT: 62 IN | OXYGEN SATURATION: 99 % | SYSTOLIC BLOOD PRESSURE: 106 MMHG | RESPIRATION RATE: 18 BRPM

## 2017-09-07 DIAGNOSIS — L05.91 PILONIDAL CYST: Primary | ICD-10-CM

## 2017-09-07 PROCEDURE — 76705 ECHO EXAM OF ABDOMEN: CPT | Mod: TC

## 2017-09-07 PROCEDURE — 99243 OFF/OP CNSLTJ NEW/EST LOW 30: CPT | Performed by: SURGERY

## 2017-09-07 ASSESSMENT — PAIN SCALES - GENERAL: PAINLEVEL: SEVERE PAIN (6)

## 2017-09-07 NOTE — PATIENT INSTRUCTIONS
Thank you for allowing Dr. Stephens and our surgical team to participate in your care.  If you have a scheduling or an appointment question please contact Radha, our Health Unit Coordinator at her direct line [phone].   ALL nursing questions or concerns can be directed to your surgical nurse at: [phone].

## 2017-09-07 NOTE — PROGRESS NOTES
[facility] Surgery Consultation    CC:  Pilonidal cyst    HPI:  This 36 year old year old female is seen at the request of Alexi Rmoero NP for evaluation of pilonidal cyst.  The history is obtained from the patient, and reviewing the medical record.  She is good medical historian.    Mrs. Fallon presents with a history of painful, draining superior gluteal cleft cysts. She states that this most recent pilonidal cyst has formed over the past year. Over the past year she has noted that there has been recurrent draining to the area. At times the area will become inflamed and then drain. She has used warm compresses and hot bath soaks for symptomatic. She was recently seen with her primary care doctor and started on antibiotics as she has been draining purulent fluid.     Mrs. Fallon does state that she has a history of pilonidal cysts. She says that about 6-10 years ago she underwent surgical excision with reconstruction. Since that time she has had continued problems with cysts and drainage. She does state that she has had problems with cysts/abscesses in other areas as well and has had these opened or removed.     With her ongoing problem she was then referred to general surgery for evaluation.     Past Medical History:   Diagnosis Date     Hidradenitis 2/16/2007     Ischiorectal abscess and fistula      Kidney stones 2008    x2 passed on her own     Nondependent tobacco use disorder 10/11/2012     Papanicolaou smear of cervix with low grade squamous intraepithelial lesion (LGSIL) 10/29/2008       Past Surgical History:   Procedure Laterality Date     EXCISE MASS NECK Right 8/4/2015    Procedure: EXCISE MASS NECK;  Surgeon: Nasir Jones MD;  Location: [facility]     INCISION AND DRAINAGE PERINEAL, COMBINED N/A 3/18/2015    Procedure: COMBINED INCISION AND DRAINAGE PERINEAL;  Surgeon: Jay Torres DO;  Location: [facility]     LAPAROSCOPIC HYSTERECTOMY SUPRACERVICAL, BILATERAL SALPINGO-OOPHORECTOMY, COMBINED   "9/27/2013    Procedure: COMBINED LAPAROSCOPIC HYSTERECTOMY SUPRACERVICAL, SALPINGO-OOPHORECTOMY;  LAPAROSCOPIC SUPRACERVICAL HYSTERECTOMY AND RIGHT SALPINGO-OOPHORECTOMY, CYSTOSCOPY;  Surgeon: Denys Callahan MD;  Location: [facility] OR     marsupialization of pilonidal cyst  2007     TUBAL LIGATION  2005       Pt denied problems with bleeding or anesthesia    Current Outpatient Prescriptions   Medication Sig Dispense Refill     cephALEXin (KEFLEX) 500 MG capsule Take 1 capsule (500 mg) by mouth 4 times daily 40 capsule 0     oxyCODONE-acetaminophen (PERCOCET) 5-325 MG per tablet Take 1 tablet by mouth every 4 hours as needed for pain 24 tablet 0     valACYclovir (VALTREX) 500 MG tablet TAKE ONE TABLET BY MOUTH DAILY 90 tablet 0     ibuprofen (ADVIL/MOTRIN) 800 MG tablet Take 1 tablet (800 mg) by mouth every 8 hours as needed for pain 90 tablet 1     traMADol (ULTRAM) 50 MG tablet Take 1 tablet (50 mg) by mouth every 6 hours as needed for pain maximum 4 tablet(s) per day (Patient not taking: Reported on 9/5/2017) 30 tablet 0        No Known Allergies      HABITS:    Social History   Substance Use Topics     Smoking status: Current Every Day Smoker     Packs/day: 0.50     Years: 15.00     Types: Cigarettes     Last attempt to quit: 7/18/2013     Smokeless tobacco: Never Used     Alcohol use No     No mood altering drug use.    Family History   Problem Relation Age of Onset     DIABETES Paternal Grandmother        REVIEW OF SYSTEMS:  Ten point review of systems negative except those mentioned in the HPI.     The patient denies sleep apnea, latex allergies or MRSA    OBJECTIVE:    /65 (BP Location: Right arm, Patient Position: Chair, Cuff Size: Adult Regular)  Pulse 82  Temp 98.5  F (36.9  C) (Tympanic)  Resp 18  Ht 5' 2\" (1.575 m)  Wt 130 lb (59 kg)  LMP 08/10/2013  SpO2 99%  BMI 23.78 kg/m2    GENERAL: Generally appears well, in no distress with appropriate affect.  HEENT:   Sclerae anicteric - No cervical, " supra/infraclavciular lymphadenopathy, no thyroid masses  Respiratory:  Lungs clear to ausculation bilaterally with good air excursion  Cardiovascular:  Regular Rate and Rhythm with no murmurs gallops or rubs, normal   Abdomen: soft, nontender, nondistended  : pilonidal cyst at superior gluteal cleft. Palpable cyst with small, 3mm nodule, superior to large cyst. There is a well formed Z-plasty scar. At the inferior edge of the z-plasty scar there are to small openings with no active drainage. The most superior aspect there are 2 small pits with no active drainage. The area is not erythematous but is tender to palpation.  Extremities:  Extremities normal. No deformities, edema, or skin discoloration.  Skin:  no suspicious lesions or rashes  Neurological: grossly intact    Psych:  Alert, oriented, affect appropriate with normal decision making ability.      IMPRESSION:  Pilonidal cyst with sinus and no active infection.    PLAN:  Will plan on doing an ultrasound to evaluate the depth of the most superior nodule as this was extensively tender and to rule out any deep attachments. After the ultrasound I will have the patient come back into the office to discuss surgical options at that time and proceed forward. Patient was agreeable with the plan.  Thank you for allowing me to participate in her care.        Cordell Stephens MD, FACS    9/7/2017  9:27 AM    cc:  Alexi Romero NP

## 2017-09-07 NOTE — NURSING NOTE
"Chief Complaint   Patient presents with     Consult     cyst on tailbone        Initial /65 (BP Location: Right arm, Patient Position: Chair, Cuff Size: Adult Regular)  Pulse 82  Temp 98.5  F (36.9  C) (Tympanic)  Resp 18  Ht 5' 2\" (1.575 m)  Wt 130 lb (59 kg)  LMP 08/10/2013  SpO2 99%  BMI 23.78 kg/m2 Estimated body mass index is 23.78 kg/(m^2) as calculated from the following:    Height as of this encounter: 5' 2\" (1.575 m).    Weight as of this encounter: 130 lb (59 kg).  Medication Reconciliation: complete   Whit Tristan    "

## 2017-09-07 NOTE — MR AVS SNAPSHOT
After Visit Summary   9/7/2017    Purnima Fallon    MRN: [id number]           Patient Information     Date Of Birth          1980        Visit Information        Provider Department      9/7/2017 9:00 AM Cordell Stephens MD [facility]        Today's Diagnoses     Pilonidal cyst    -  1      Care Instructions    Thank you for allowing Dr. Stephens and our surgical team to participate in your care.  If you have a scheduling or an appointment question please contact Radha, our Health Unit Coordinator at her direct line [phone].   ALL nursing questions or concerns can be directed to your surgical nurse at: [phone].            Follow-ups after your visit        Your next 10 appointments already scheduled     Sep 07, 2017  1:00 PM CDT   Radiology with [facility] UNTRASOUND 1   [facility] ([facility] )    [address] [place] [id number] [phone]            Sep 29, 2017 12:00 PM CDT   Radiology with [facility] NUC MED   [facility] ([facility] )    [address] [place] [id number] [phone]              Who to contact     If you have questions or need follow up information about today's clinic visit or your schedule please contact [facility] (formerly [facility]) directly at [phone].  Normal or non-critical lab and imaging results will be communicated to you by MyChart, letter or phone within 4 business days after the clinic has received the results. If you do not hear from us within 7 days, please contact the clinic through [employer] or phone. If you have a critical or abnormal lab result, we will notify you by phone as soon as possible.  Submit refill requests through [facility] or call your pharmacy and they will forward the refill request to us. Please allow 3 business days for your refill to be completed.          Additional Information About Your Visit        [employer] Information     [facility] gives you secure access to your  "electronic health record. If you see a primary care provider, you can also send messages to your care team and make appointments. If you have questions, please call your primary care clinic.  If you do not have a primary care provider, please call [phone] and they will assist you.        Care EveryWhere ID     This is your Care EveryWhere ID. This could be used by other organizations to access your [place] medical records  [id number]        Your Vitals Were     Pulse Temperature Respirations Height Last Period Pulse Oximetry    82 98.5  F (36.9  C) (Tympanic) 18 5' 2\" (1.575 m) 08/10/2013 99%    BMI (Body Mass Index)                   23.78 kg/m2            Blood Pressure from Last 3 Encounters:   09/07/17 106/65   09/05/17 100/60   06/29/17 94/65    Weight from Last 3 Encounters:   09/07/17 130 lb (59 kg)   09/05/17 130 lb (59 kg)   06/27/17 125 lb (56.7 kg)               Primary Care Provider Office Phone # Fax #    Alexi Romero [phone]       [facility] [place] [address]  [facility] [address]        Equal Access to Services     ALDO CRUZ AH: Hadii aad ku hadasho Soomaali, waaxda luqadaha, qaybta kaalmada adeegyada, waxay manoloin hayjaymen juan casiano larhea . So [facility] [phone].    ATENCIÓN: Si habla español, tiene a cyr disposición servicios gratuitos de asistencia lingüística. Llame al [phone].    We comply with applicable federal civil rights laws and [place] laws. We do not discriminate on the basis of race, color, national origin, age, disability sex, sexual orientation or gender identity.            Thank you!     Thank you for choosing [facility]  for your care. Our goal is always to provide you with excellent care. Hearing back from our patients is one way we can continue to improve our services. Please take a few minutes to complete the written survey that you may receive in the mail after your visit with us. Thank you!             Your Updated " Medication List - Protect others around you: Learn how to safely use, store and throw away your medicines at www.disposemymeds.org.          This list is accurate as of: 9/7/17  9:50 AM.  Always use your most recent med list.                   Brand Name Dispense Instructions for use Diagnosis    cephALEXin 500 MG capsule    KEFLEX    40 capsule    Take 1 capsule (500 mg) by mouth 4 times daily    Pilonidal cyst with abscess       ibuprofen 800 MG tablet    ADVIL/MOTRIN    90 tablet    Take 1 tablet (800 mg) by mouth every 8 hours as needed for pain    Pain of toe of right foot       oxyCODONE-acetaminophen 5-325 MG per tablet    PERCOCET    24 tablet    Take 1 tablet by mouth every 4 hours as needed for pain    Pilonidal cyst with abscess       traMADol 50 MG tablet    ULTRAM    30 tablet    Take 1 tablet (50 mg) by mouth every 6 hours as needed for pain maximum 4 tablet(s) per day    Abdominal pain, generalized       valACYclovir 500 MG tablet    VALTREX    90 tablet    TAKE ONE TABLET BY MOUTH DAILY    Herpes simplex virus infection

## 2017-09-12 ENCOUNTER — OFFICE VISIT (OUTPATIENT)
Dept: SURGERY | Facility: OTHER | Age: 37
End: 2017-09-12
Attending: SURGERY
Payer: COMMERCIAL

## 2017-09-12 VITALS
OXYGEN SATURATION: 97 % | RESPIRATION RATE: 18 BRPM | HEIGHT: 62 IN | WEIGHT: 130 LBS | SYSTOLIC BLOOD PRESSURE: 102 MMHG | HEART RATE: 77 BPM | BODY MASS INDEX: 23.92 KG/M2 | DIASTOLIC BLOOD PRESSURE: 63 MMHG | TEMPERATURE: 98.8 F

## 2017-09-12 DIAGNOSIS — L05.91 PILONIDAL CYST: Primary | ICD-10-CM

## 2017-09-12 PROCEDURE — 99212 OFFICE O/P EST SF 10 MIN: CPT | Performed by: SURGERY

## 2017-09-12 ASSESSMENT — PAIN SCALES - GENERAL: PAINLEVEL: SEVERE PAIN (6)

## 2017-09-12 NOTE — PROGRESS NOTES
SUBJECTIVE:  36 year old female presents for discussion of her pilonidal cyst. She states that she has not had any further problems or complications associated with the cyst.    OBJECTIVE:  Gen: awake, alert, oriented, and in no acute distress  Imaging was reviewed and discussed with the patient.    ASSESSMENT/PLAN:  A discussion was had with the patient regarding her pilonidal cyst and the treatment plan. At this time I had a discussion with her regarding doing a pilonidal cystectomy and exam under anesthesia. I discussed the treatment of pilonidal disease and then long term treatment of an open wound. She understood and wishes to proceed with operative intervention.    I have discussed these issues with the patient and all questions were answered.  Patient is agreeable and consents to the procedure mentioned above.    Cordell Stephens  9/12/2017

## 2017-09-12 NOTE — H&P (VIEW-ONLY)
[facility] Surgery Consultation    CC:  Pilonidal cyst    HPI:  This 36 year old year old female is seen at the request of Alexi Romero NP for evaluation of pilonidal cyst.  The history is obtained from the patient, and reviewing the medical record.  She is good medical historian.    Mrs. Fallon presents with a history of painful, draining superior gluteal cleft cysts. She states that this most recent pilonidal cyst has formed over the past year. Over the past year she has noted that there has been recurrent draining to the area. At times the area will become inflamed and then drain. She has used warm compresses and hot bath soaks for symptomatic. She was recently seen with her primary care doctor and started on antibiotics as she has been draining purulent fluid.     Mrs. Fallon does state that she has a history of pilonidal cysts. She says that about 6-10 years ago she underwent surgical excision with reconstruction. Since that time she has had continued problems with cysts and drainage. She does state that she has had problems with cysts/abscesses in other areas as well and has had these opened or removed.     With her ongoing problem she was then referred to general surgery for evaluation.     Past Medical History:   Diagnosis Date     Hidradenitis 2/16/2007     Ischiorectal abscess and fistula      Kidney stones 2008    x2 passed on her own     Nondependent tobacco use disorder 10/11/2012     Papanicolaou smear of cervix with low grade squamous intraepithelial lesion (LGSIL) 10/29/2008       Past Surgical History:   Procedure Laterality Date     EXCISE MASS NECK Right 8/4/2015    Procedure: EXCISE MASS NECK;  Surgeon: Nasir Jones MD;  [facility]: [facility]     INCISION AND DRAINAGE PERINEAL, COMBINED N/A 3/18/2015    Procedure: COMBINED INCISION AND DRAINAGE PERINEAL;  Surgeon: Jay Torres DO;  Location: [facility]     LAPAROSCOPIC HYSTERECTOMY SUPRACERVICAL, BILATERAL SALPINGO-OOPHORECTOMY, COMBINED   "9/27/2013    Procedure: COMBINED LAPAROSCOPIC HYSTERECTOMY SUPRACERVICAL, SALPINGO-OOPHORECTOMY;  LAPAROSCOPIC SUPRACERVICAL HYSTERECTOMY AND RIGHT SALPINGO-OOPHORECTOMY, CYSTOSCOPY;  Surgeon: Denys Callahan MD;  Location: [facility]     marsupialization of pilonidal cyst  2007     TUBAL LIGATION  2005       Pt denied problems with bleeding or anesthesia    Current Outpatient Prescriptions   Medication Sig Dispense Refill     cephALEXin (KEFLEX) 500 MG capsule Take 1 capsule (500 mg) by mouth 4 times daily 40 capsule 0     oxyCODONE-acetaminophen (PERCOCET) 5-325 MG per tablet Take 1 tablet by mouth every 4 hours as needed for pain 24 tablet 0     valACYclovir (VALTREX) 500 MG tablet TAKE ONE TABLET BY MOUTH DAILY 90 tablet 0     ibuprofen (ADVIL/MOTRIN) 800 MG tablet Take 1 tablet (800 mg) by mouth every 8 hours as needed for pain 90 tablet 1     traMADol (ULTRAM) 50 MG tablet Take 1 tablet (50 mg) by mouth every 6 hours as needed for pain maximum 4 tablet(s) per day (Patient not taking: Reported on 9/5/2017) 30 tablet 0        No Known Allergies      HABITS:    Social History   Substance Use Topics     Smoking status: Current Every Day Smoker     Packs/day: 0.50     Years: 15.00     Types: Cigarettes     Last attempt to quit: 7/18/2013     Smokeless tobacco: Never Used     Alcohol use No     No mood altering drug use.    Family History   Problem Relation Age of Onset     DIABETES Paternal Grandmother        REVIEW OF SYSTEMS:  Ten point review of systems negative except those mentioned in the HPI.     The patient denies sleep apnea, latex allergies or MRSA    OBJECTIVE:    /65 (BP Location: Right arm, Patient Position: Chair, Cuff Size: Adult Regular)  Pulse 82  Temp 98.5  F (36.9  C) (Tympanic)  Resp 18  Ht 5' 2\" (1.575 m)  Wt 130 lb (59 kg)  LMP 08/10/2013  SpO2 99%  BMI 23.78 kg/m2    GENERAL: Generally appears well, in no distress with appropriate affect.  HEENT:   Sclerae anicteric - No cervical, " supra/infraclavciular lymphadenopathy, no thyroid masses  Respiratory:  Lungs clear to ausculation bilaterally with good air excursion  Cardiovascular:  Regular Rate and Rhythm with no murmurs gallops or rubs, normal   Abdomen: soft, nontender, nondistended  : pilonidal cyst at superior gluteal cleft. Palpable cyst with small, 3mm nodule, superior to large cyst. There is a well formed Z-plasty scar. At the inferior edge of the z-plasty scar there are to small openings with no active drainage. The most superior aspect there are 2 small pits with no active drainage. The area is not erythematous but is tender to palpation.  Extremities:  Extremities normal. No deformities, edema, or skin discoloration.  Skin:  no suspicious lesions or rashes  Neurological: grossly intact    Psych:  Alert, oriented, affect appropriate with normal decision making ability.      IMPRESSION:  Pilonidal cyst with sinus and no active infection.    PLAN:  Will plan on doing an ultrasound to evaluate the depth of the most superior nodule as this was extensively tender and to rule out any deep attachments. After the ultrasound I will have the patient come back into the office to discuss surgical options at that time and proceed forward. Patient was agreeable with the plan.  Thank you for allowing me to participate in her care.        Cordell Stephens MD, FACS    9/7/2017  9:27 AM    cc:  Alexi Romero NP

## 2017-09-12 NOTE — PATIENT INSTRUCTIONS
Thank you for allowing Dr. Stephens and our surgical team to participate in your care.  If you have a scheduling or an appointment question please contact our Health Unit Coordinator, Radha,  at her direct line [phone].     You are scheduled for a: Cyst removal  Your procedure date is: 9/18/17        HOW TO PREPARE-      You need a friend or family member available to drive you home AND stay with you for 24 hours after you leave the hospital. You will not be allowed to drive yourself. IF you need to take a taxi or the bus you MUST have a responsible person to ride with you. YOUR PROCEDURE WILL BE CANCELLED IF YOU DO NOT HAVE A RESPONSIBLE ADULT TO DRIVE YOU HOME.       You CANNOT have anything to eat or drink after midnight the night before your surgery, ncluding water and coffee. Your stomach needs to be completely empty. Do NOT chew gum, suck on hard candy, or smoke. You can brush your teeth the morning of surgery.       You need to call our Surgery Education Nurses 1-2 weeks prior to your surgery date at  [phone] or toll free [phone]. Please have you medication and allergy lists ready.      Stop your aspirin or other NSAIDs(Ibuprofen, Motrin, Aleve, Celebrex, Naproxen, etc...) 7 days before your surgery.      Hospital admitting will call you the day before your surgery with your arrival time. If you are scheduled on a Monday admitting will call you the Friday before.      Please call your primary care physician if you should become ill within 24 hours of scheduled surgery. (ex.vomiting, diarrhea, fever)          You will need to wash the night before AND the morning of you procedure with the supplied Hibiclens. Wash your Surgical area with your bare hands, apply friction and rinse. KEEP IT AWAY FROM YOUR EYES, EARS, NOSE AND MOUTH.     Hold Advil until after procedure. Hold all medication morning of procedure.

## 2017-09-12 NOTE — NURSING NOTE
"Chief Complaint   Patient presents with     Consult     discuss surg options for pilonidal cyst       Initial /63 (BP Location: Right arm, Patient Position: Chair, Cuff Size: Adult Regular)  Pulse 77  Temp 98.8  F (37.1  C) (Tympanic)  Resp 18  Ht 5' 2\" (1.575 m)  Wt 130 lb (59 kg)  LMP 08/10/2013  SpO2 97%  BMI 23.78 kg/m2 Estimated body mass index is 23.78 kg/(m^2) as calculated from the following:    Height as of this encounter: 5' 2\" (1.575 m).    Weight as of this encounter: 130 lb (59 kg).  Medication Reconciliation: complete   Whit Tristan    "

## 2017-09-12 NOTE — MR AVS SNAPSHOT
After Visit Summary   9/12/2017    Purnima Fallon    MRN: [id number]           Patient Information     Date Of Birth          1980        Visit Information        Provider Department      9/12/2017 1:30 PM Cordell Stephens MD [facility]        Care Instructions    Thank you for allowing Dr. Stephens and our surgical team to participate in your care.  If you have a scheduling or an appointment question please contact our Health Unit Coordinator, Radha,  at her direct line [phone].     You are scheduled for a: Cyst removal  Your procedure date is: 9/18/17        HOW TO PREPARE-      You need a friend or family member available to drive you home AND stay with you for 24 hours after you leave the hospital. You will not be allowed to drive yourself. IF you need to take a taxi or the bus you MUST have a responsible person to ride with you. YOUR PROCEDURE WILL BE CANCELLED IF YOU DO NOT HAVE A RESPONSIBLE ADULT TO DRIVE YOU HOME.       You CANNOT have anything to eat or drink after midnight the night before your surgery, ncluding water and coffee. Your stomach needs to be completely empty. Do NOT chew gum, suck on hard candy, or smoke. You can brush your teeth the morning of surgery.       You need to call our Surgery Education Nurses 1-2 weeks prior to your surgery date at  [phone] or toll free [phone]. Please have you medication and allergy lists ready.      Stop your aspirin or other NSAIDs(Ibuprofen, Motrin, Aleve, Celebrex, Naproxen, etc...) 7 days before your surgery.      Hospital admitting will call you the day before your surgery with your arrival time. If you are scheduled on a Monday admitting will call you the Friday before.      Please call your primary care physician if you should become ill within 24 hours of scheduled surgery. (ex.vomiting, diarrhea, fever)          You will need to wash the night before AND the morning of you procedure with the  "supplied Hibiclens. Wash your Surgical area with your bare hands, apply friction and rinse. KEEP IT AWAY FROM YOUR EYES, EARS, NOSE AND MOUTH.     Hold Advil until after procedure. Hold all medication morning of procedure.             Follow-ups after your visit        Your next 10 appointments already scheduled     Sep 29, 2017 12:00 PM CDT   Radiology with [facility]   [facility] ([facility] )    [address] [place] [id number] [phone]              Who to contact     If you have questions or need follow up information about today's clinic visit or your schedule please contact [facility] directly at [phone].  Normal or non-critical lab and imaging results will be communicated to you by [employer], letter or phone within 4 business days after the clinic has received the results. If you do not hear from us within 7 days, please contact the clinic through [employer] or phone. If you have a critical or abnormal lab result, we will notify you by phone as soon as possible.  Submit refill requests through [employer] or call your pharmacy and they will forward the refill request to us. Please allow 3 business days for your refill to be completed.          Additional Information About Your Visit        [employer] Information     [employer] gives you secure access to your electronic health record. If you see a primary care provider, you can also send messages to your care team and make appointments. If you have questions, please call your primary care clinic.  If you do not have a primary care provider, please call [phone] and they will assist you.        Care EveryWhere ID     This is your Care EveryWhere ID. This could be used by other organizations to access your [facility] medical records  [id number]        Your Vitals Were     Pulse Temperature Respirations Height Last Period Pulse Oximetry    77 98.8  F (37.1  C) (Tympanic) 18 5' 2\" (1.575 m) 08/10/2013 97%    BMI (Body " Mass Index)                   23.78 kg/m2            Blood Pressure from Last 3 Encounters:   09/12/17 102/63   09/07/17 106/65   09/05/17 100/60    Weight from Last 3 Encounters:   09/12/17 130 lb (59 kg)   09/07/17 130 lb (59 kg)   09/05/17 130 lb (59 kg)              Today, you had the following     No orders found for display       Primary Care Provider Office Phone # Fax #    Alexi CAROLINA Heather, MYLES [phone]       [facility] [place] [address]  [facility] [address]        Equal Access to Services     [facility]: Hadii aad ku hadasho Soomaali, waaxda luqadaha, qaybta kaalmada adeegyada, debby bailey hayirene garcia . So [facility] [phone].    ATENCIÓN: Si habla español, tiene a cyr disposición servicios gratuitos de asistencia lingüística. Llame al [phone].    We comply with applicable federal civil rights laws and [place] laws. We do not discriminate on the basis of race, color, national origin, age, disability sex, sexual orientation or gender identity.            Thank you!     Thank you for choosing [facility]  for your care. Our goal is always to provide you with excellent care. Hearing back from our patients is one way we can continue to improve our services. Please take a few minutes to complete the written survey that you may receive in the mail after your visit with us. Thank you!             Your Updated Medication List - Protect others around you: Learn how to safely use, store and throw away your medicines at www.disposemymeds.org.          This list is accurate as of: 9/12/17  1:56 PM.  Always use your most recent med list.                   Brand Name Dispense Instructions for use Diagnosis    cephALEXin 500 MG capsule    KEFLEX    40 capsule    Take 1 capsule (500 mg) by mouth 4 times daily    Pilonidal cyst with abscess       ibuprofen 800 MG tablet    ADVIL/MOTRIN    90 tablet    Take 1 tablet (800 mg) by mouth every 8 hours as needed for pain     Pain of toe of right foot       oxyCODONE-acetaminophen 5-325 MG per tablet    PERCOCET    24 tablet    Take 1 tablet by mouth every 4 hours as needed for pain    Pilonidal cyst with abscess       traMADol 50 MG tablet    ULTRAM    30 tablet    Take 1 tablet (50 mg) by mouth every 6 hours as needed for pain maximum 4 tablet(s) per day    Abdominal pain, generalized       valACYclovir 500 MG tablet    VALTREX    90 tablet    TAKE ONE TABLET BY MOUTH DAILY    Herpes simplex virus infection

## 2017-09-18 ENCOUNTER — ANESTHESIA EVENT (OUTPATIENT)
Dept: SURGERY | Facility: HOSPITAL | Age: 37
End: 2017-09-18
Payer: COMMERCIAL

## 2017-09-18 ENCOUNTER — SURGERY (OUTPATIENT)
Age: 37
End: 2017-09-18

## 2017-09-18 ENCOUNTER — ANESTHESIA (OUTPATIENT)
Dept: SURGERY | Facility: HOSPITAL | Age: 37
End: 2017-09-18
Payer: COMMERCIAL

## 2017-09-18 ENCOUNTER — HOSPITAL ENCOUNTER (OUTPATIENT)
Facility: HOSPITAL | Age: 37
Discharge: HOME | End: 2017-09-18
Attending: SURGERY | Admitting: SURGERY
Payer: COMMERCIAL

## 2017-09-18 VITALS
OXYGEN SATURATION: 97 % | RESPIRATION RATE: 20 BRPM | HEART RATE: 92 BPM | DIASTOLIC BLOOD PRESSURE: 70 MMHG | SYSTOLIC BLOOD PRESSURE: 102 MMHG | TEMPERATURE: 97 F

## 2017-09-18 DIAGNOSIS — L05.91 PILONIDAL CYST: Primary | ICD-10-CM

## 2017-09-18 PROCEDURE — 25000128 H RX IP 250 OP 636: Performed by: SURGERY

## 2017-09-18 PROCEDURE — 71000027 ZZH RECOVERY PHASE 2 EACH 15 MINS: Performed by: SURGERY

## 2017-09-18 PROCEDURE — S0074 INJECTION, CEFOTETAN DISODIU: HCPCS | Performed by: SURGERY

## 2017-09-18 PROCEDURE — 11771 EXC PILONIDAL CYST XTNSV: CPT | Performed by: SURGERY

## 2017-09-18 PROCEDURE — 37000008 ZZH ANESTHESIA TECHNICAL FEE, 1ST 30 MIN: Performed by: SURGERY

## 2017-09-18 PROCEDURE — 25000125 ZZHC RX 250: Performed by: ANESTHESIOLOGY

## 2017-09-18 PROCEDURE — 25000128 H RX IP 250 OP 636: Performed by: ANESTHESIOLOGY

## 2017-09-18 PROCEDURE — 25000132 ZZH RX MED GY IP 250 OP 250 PS 637: Performed by: ANESTHESIOLOGY

## 2017-09-18 PROCEDURE — 37000009 ZZH ANESTHESIA TECHNICAL FEE, EACH ADDTL 15 MIN: Performed by: SURGERY

## 2017-09-18 PROCEDURE — 25000125 ZZHC RX 250: Performed by: NURSE ANESTHETIST, CERTIFIED REGISTERED

## 2017-09-18 PROCEDURE — 25000125 ZZHC RX 250: Performed by: SURGERY

## 2017-09-18 PROCEDURE — 36000050 ZZH SURGERY LEVEL 2 1ST 30 MIN: Performed by: SURGERY

## 2017-09-18 PROCEDURE — 36000052 ZZH SURGERY LEVEL 2 EA 15 ADDTL MIN: Performed by: SURGERY

## 2017-09-18 PROCEDURE — 11771 EXC PILONIDAL CYST XTNSV: CPT | Performed by: ANESTHESIOLOGY

## 2017-09-18 PROCEDURE — 40000306 ZZH STATISTIC PRE PROC ASSESS II: Performed by: SURGERY

## 2017-09-18 PROCEDURE — 27210794 ZZH OR GENERAL SUPPLY STERILE: Performed by: SURGERY

## 2017-09-18 PROCEDURE — 25000128 H RX IP 250 OP 636: Performed by: NURSE ANESTHETIST, CERTIFIED REGISTERED

## 2017-09-18 RX ORDER — FENTANYL CITRATE 50 UG/ML
25-50 INJECTION, SOLUTION INTRAMUSCULAR; INTRAVENOUS
Status: DISCONTINUED | OUTPATIENT
Start: 2017-09-18 | End: 2017-09-20 | Stop reason: HOSPADM

## 2017-09-18 RX ORDER — ACETAMINOPHEN 325 MG/1
975 TABLET ORAL ONCE
Status: DISCONTINUED | OUTPATIENT
Start: 2017-09-18 | End: 2017-09-18 | Stop reason: HOSPADM

## 2017-09-18 RX ORDER — LABETALOL HYDROCHLORIDE 5 MG/ML
10 INJECTION, SOLUTION INTRAVENOUS
Status: DISCONTINUED | OUTPATIENT
Start: 2017-09-18 | End: 2017-09-20 | Stop reason: HOSPADM

## 2017-09-18 RX ORDER — OXYCODONE HYDROCHLORIDE 5 MG/1
5 TABLET ORAL EVERY 4 HOURS PRN
Status: DISCONTINUED | OUTPATIENT
Start: 2017-09-18 | End: 2017-09-20 | Stop reason: HOSPADM

## 2017-09-18 RX ORDER — PROMETHAZINE HYDROCHLORIDE 25 MG/ML
12.5 INJECTION, SOLUTION INTRAMUSCULAR; INTRAVENOUS
Status: DISCONTINUED | OUTPATIENT
Start: 2017-09-18 | End: 2017-09-20 | Stop reason: HOSPADM

## 2017-09-18 RX ORDER — OXYCODONE HCL 5 MG/5 ML
5 SOLUTION, ORAL ORAL EVERY 4 HOURS PRN
Status: DISCONTINUED | OUTPATIENT
Start: 2017-09-18 | End: 2017-09-18 | Stop reason: CLARIF

## 2017-09-18 RX ORDER — SODIUM CHLORIDE, SODIUM LACTATE, POTASSIUM CHLORIDE, CALCIUM CHLORIDE 600; 310; 30; 20 MG/100ML; MG/100ML; MG/100ML; MG/100ML
INJECTION, SOLUTION INTRAVENOUS CONTINUOUS
Status: DISCONTINUED | OUTPATIENT
Start: 2017-09-18 | End: 2017-09-18 | Stop reason: HOSPADM

## 2017-09-18 RX ORDER — HYDRALAZINE HYDROCHLORIDE 20 MG/ML
2.5-5 INJECTION INTRAMUSCULAR; INTRAVENOUS EVERY 10 MIN PRN
Status: DISCONTINUED | OUTPATIENT
Start: 2017-09-18 | End: 2017-09-20 | Stop reason: HOSPADM

## 2017-09-18 RX ORDER — MEPERIDINE HYDROCHLORIDE 25 MG/ML
12.5 INJECTION INTRAMUSCULAR; INTRAVENOUS; SUBCUTANEOUS
Status: DISCONTINUED | OUTPATIENT
Start: 2017-09-18 | End: 2017-09-20 | Stop reason: HOSPADM

## 2017-09-18 RX ORDER — PROPOFOL 10 MG/ML
INJECTION, EMULSION INTRAVENOUS CONTINUOUS PRN
Status: DISCONTINUED | OUTPATIENT
Start: 2017-09-18 | End: 2017-09-18

## 2017-09-18 RX ORDER — KETOROLAC TROMETHAMINE 30 MG/ML
30 INJECTION, SOLUTION INTRAMUSCULAR; INTRAVENOUS EVERY 6 HOURS PRN
Status: DISCONTINUED | OUTPATIENT
Start: 2017-09-18 | End: 2017-09-20 | Stop reason: HOSPADM

## 2017-09-18 RX ORDER — BUPIVACAINE HYDROCHLORIDE AND EPINEPHRINE 5; 5 MG/ML; UG/ML
INJECTION, SOLUTION PERINEURAL PRN
Status: DISCONTINUED | OUTPATIENT
Start: 2017-09-18 | End: 2017-09-18 | Stop reason: HOSPADM

## 2017-09-18 RX ORDER — FENTANYL CITRATE 50 UG/ML
50 INJECTION, SOLUTION INTRAMUSCULAR; INTRAVENOUS EVERY 5 MIN PRN
Status: ACTIVE | OUTPATIENT
Start: 2017-09-18 | End: 2017-09-19

## 2017-09-18 RX ORDER — ONDANSETRON 4 MG/1
4 TABLET, ORALLY DISINTEGRATING ORAL EVERY 30 MIN PRN
Status: DISCONTINUED | OUTPATIENT
Start: 2017-09-18 | End: 2017-09-20 | Stop reason: HOSPADM

## 2017-09-18 RX ORDER — NALOXONE HYDROCHLORIDE 0.4 MG/ML
.1-.4 INJECTION, SOLUTION INTRAMUSCULAR; INTRAVENOUS; SUBCUTANEOUS
Status: ACTIVE | OUTPATIENT
Start: 2017-09-18 | End: 2017-09-19

## 2017-09-18 RX ORDER — OXYCODONE HYDROCHLORIDE 5 MG/1
5 TABLET ORAL EVERY 4 HOURS PRN
Qty: 18 TABLET | Refills: 0 | Status: SHIPPED | OUTPATIENT
Start: 2017-09-18 | End: 2017-09-21

## 2017-09-18 RX ORDER — ONDANSETRON 2 MG/ML
INJECTION INTRAMUSCULAR; INTRAVENOUS PRN
Status: DISCONTINUED | OUTPATIENT
Start: 2017-09-18 | End: 2017-09-18

## 2017-09-18 RX ORDER — NALOXONE HYDROCHLORIDE 0.4 MG/ML
.1-.4 INJECTION, SOLUTION INTRAMUSCULAR; INTRAVENOUS; SUBCUTANEOUS
Status: DISCONTINUED | OUTPATIENT
Start: 2017-09-18 | End: 2017-09-20 | Stop reason: HOSPADM

## 2017-09-18 RX ORDER — DEXAMETHASONE SODIUM PHOSPHATE 10 MG/ML
INJECTION, SOLUTION INTRAMUSCULAR; INTRAVENOUS PRN
Status: DISCONTINUED | OUTPATIENT
Start: 2017-09-18 | End: 2017-09-18

## 2017-09-18 RX ORDER — LIDOCAINE HYDROCHLORIDE 20 MG/ML
INJECTION, SOLUTION INFILTRATION; PERINEURAL PRN
Status: DISCONTINUED | OUTPATIENT
Start: 2017-09-18 | End: 2017-09-18

## 2017-09-18 RX ORDER — ALBUTEROL SULFATE 0.83 MG/ML
2.5 SOLUTION RESPIRATORY (INHALATION) EVERY 4 HOURS PRN
Status: DISCONTINUED | OUTPATIENT
Start: 2017-09-18 | End: 2017-09-20 | Stop reason: HOSPADM

## 2017-09-18 RX ORDER — SCOLOPAMINE TRANSDERMAL SYSTEM 1 MG/1
1 PATCH, EXTENDED RELEASE TRANSDERMAL ONCE
Status: COMPLETED | OUTPATIENT
Start: 2017-09-18 | End: 2017-09-18

## 2017-09-18 RX ORDER — GLYCOPYRROLATE 0.2 MG/ML
INJECTION, SOLUTION INTRAMUSCULAR; INTRAVENOUS PRN
Status: DISCONTINUED | OUTPATIENT
Start: 2017-09-18 | End: 2017-09-18

## 2017-09-18 RX ORDER — SODIUM CHLORIDE, SODIUM LACTATE, POTASSIUM CHLORIDE, CALCIUM CHLORIDE 600; 310; 30; 20 MG/100ML; MG/100ML; MG/100ML; MG/100ML
INJECTION, SOLUTION INTRAVENOUS CONTINUOUS
Status: DISCONTINUED | OUTPATIENT
Start: 2017-09-18 | End: 2017-09-20 | Stop reason: HOSPADM

## 2017-09-18 RX ORDER — PROPOFOL 10 MG/ML
INJECTION, EMULSION INTRAVENOUS PRN
Status: DISCONTINUED | OUTPATIENT
Start: 2017-09-18 | End: 2017-09-18

## 2017-09-18 RX ORDER — FENTANYL CITRATE 50 UG/ML
INJECTION, SOLUTION INTRAMUSCULAR; INTRAVENOUS PRN
Status: DISCONTINUED | OUTPATIENT
Start: 2017-09-18 | End: 2017-09-18

## 2017-09-18 RX ORDER — KETAMINE HYDROCHLORIDE 10 MG/ML
INJECTION, SOLUTION INTRAMUSCULAR; INTRAVENOUS PRN
Status: DISCONTINUED | OUTPATIENT
Start: 2017-09-18 | End: 2017-09-18

## 2017-09-18 RX ORDER — DEXAMETHASONE SODIUM PHOSPHATE 4 MG/ML
4 INJECTION, SOLUTION INTRA-ARTICULAR; INTRALESIONAL; INTRAMUSCULAR; INTRAVENOUS; SOFT TISSUE EVERY 10 MIN PRN
Status: DISCONTINUED | OUTPATIENT
Start: 2017-09-18 | End: 2017-09-20 | Stop reason: HOSPADM

## 2017-09-18 RX ORDER — ONDANSETRON 2 MG/ML
4 INJECTION INTRAMUSCULAR; INTRAVENOUS EVERY 30 MIN PRN
Status: DISCONTINUED | OUTPATIENT
Start: 2017-09-18 | End: 2017-09-20 | Stop reason: HOSPADM

## 2017-09-18 RX ADMIN — SCOPALAMINE 1 PATCH: 1 PATCH, EXTENDED RELEASE TRANSDERMAL at 07:32

## 2017-09-18 RX ADMIN — LIDOCAINE HYDROCHLORIDE 60 MG: 20 INJECTION, SOLUTION INFILTRATION; PERINEURAL at 08:03

## 2017-09-18 RX ADMIN — FENTANYL CITRATE 50 MCG: 50 INJECTION, SOLUTION INTRAMUSCULAR; INTRAVENOUS at 09:55

## 2017-09-18 RX ADMIN — KETAMINE HYDROCHLORIDE 5 MG: 10 INJECTION, SOLUTION INTRAMUSCULAR; INTRAVENOUS at 08:25

## 2017-09-18 RX ADMIN — MIDAZOLAM HYDROCHLORIDE 1 MG: 1 INJECTION, SOLUTION INTRAMUSCULAR; INTRAVENOUS at 08:01

## 2017-09-18 RX ADMIN — ONDANSETRON 4 MG: 2 INJECTION INTRAMUSCULAR; INTRAVENOUS at 08:14

## 2017-09-18 RX ADMIN — FENTANYL CITRATE 50 MCG: 50 INJECTION, SOLUTION INTRAMUSCULAR; INTRAVENOUS at 09:00

## 2017-09-18 RX ADMIN — KETAMINE HYDROCHLORIDE 5 MG: 10 INJECTION, SOLUTION INTRAMUSCULAR; INTRAVENOUS at 08:30

## 2017-09-18 RX ADMIN — MIDAZOLAM HYDROCHLORIDE 2 MG: 1 INJECTION, SOLUTION INTRAMUSCULAR; INTRAVENOUS at 07:58

## 2017-09-18 RX ADMIN — KETAMINE HYDROCHLORIDE 5 MG: 10 INJECTION, SOLUTION INTRAMUSCULAR; INTRAVENOUS at 08:28

## 2017-09-18 RX ADMIN — FENTANYL CITRATE 50 MCG: 50 INJECTION, SOLUTION INTRAMUSCULAR; INTRAVENOUS at 09:38

## 2017-09-18 RX ADMIN — FENTANYL CITRATE 50 MCG: 50 INJECTION, SOLUTION INTRAMUSCULAR; INTRAVENOUS at 09:13

## 2017-09-18 RX ADMIN — PROPOFOL 100 MCG/KG/MIN: 10 INJECTION, EMULSION INTRAVENOUS at 08:04

## 2017-09-18 RX ADMIN — KETAMINE HYDROCHLORIDE 10 MG: 10 INJECTION, SOLUTION INTRAMUSCULAR; INTRAVENOUS at 08:15

## 2017-09-18 RX ADMIN — GLYCOPYRROLATE 0.2 MG: 0.2 INJECTION, SOLUTION INTRAMUSCULAR; INTRAVENOUS at 08:10

## 2017-09-18 RX ADMIN — FENTANYL CITRATE 50 MCG: 50 INJECTION, SOLUTION INTRAMUSCULAR; INTRAVENOUS at 08:29

## 2017-09-18 RX ADMIN — CEFOTETAN DISODIUM 50 ML: 2 INJECTION, POWDER, FOR SOLUTION INTRAMUSCULAR; INTRAVENOUS at 08:12

## 2017-09-18 RX ADMIN — SODIUM CHLORIDE, POTASSIUM CHLORIDE, SODIUM LACTATE AND CALCIUM CHLORIDE 1000 ML: 600; 310; 30; 20 INJECTION, SOLUTION INTRAVENOUS at 07:33

## 2017-09-18 RX ADMIN — PROPOFOL 20 MG: 10 INJECTION, EMULSION INTRAVENOUS at 08:03

## 2017-09-18 RX ADMIN — OXYCODONE HYDROCHLORIDE 5 MG: 5 TABLET ORAL at 10:30

## 2017-09-18 RX ADMIN — FENTANYL CITRATE 50 MCG: 50 INJECTION, SOLUTION INTRAMUSCULAR; INTRAVENOUS at 08:30

## 2017-09-18 RX ADMIN — MIDAZOLAM HYDROCHLORIDE 1 MG: 1 INJECTION, SOLUTION INTRAMUSCULAR; INTRAVENOUS at 08:16

## 2017-09-18 RX ADMIN — DEXAMETHASONE SODIUM PHOSPHATE 6 MG: 10 INJECTION, SOLUTION INTRAMUSCULAR; INTRAVENOUS at 08:14

## 2017-09-18 RX ADMIN — KETAMINE HYDROCHLORIDE 10 MG: 10 INJECTION, SOLUTION INTRAMUSCULAR; INTRAVENOUS at 08:19

## 2017-09-18 RX ADMIN — FENTANYL CITRATE 100 MCG: 50 INJECTION, SOLUTION INTRAMUSCULAR; INTRAVENOUS at 07:58

## 2017-09-18 RX ADMIN — KETAMINE HYDROCHLORIDE 5 MG: 10 INJECTION, SOLUTION INTRAMUSCULAR; INTRAVENOUS at 08:23

## 2017-09-18 RX ADMIN — BUPIVACAINE HYDROCHLORIDE AND EPINEPHRINE BITARTRATE 38 ML: 5; .005 INJECTION, SOLUTION PERINEURAL at 08:48

## 2017-09-18 ASSESSMENT — LIFESTYLE VARIABLES: TOBACCO_USE: 1

## 2017-09-18 NOTE — ANESTHESIA CARE TRANSFER NOTE
Patient: Purnima Fallon    Procedure(s):  PILONIDAL CYSTECTOMY  - Wound Class: II-Clean Contaminated    Diagnosis: PILONIDAL CYST  Diagnosis Additional Information: No value filed.    Anesthesia Type:   MAC     Note:  Airway :Nasal Cannula  Patient transferred to:Phase II        Vitals: (Last set prior to Anesthesia Care Transfer)    CRNA VITALS  9/18/2017 0816 - 9/18/2017 0852      9/18/2017             Pulse: 80    SpO2: 100 %    Resp Rate (set): 8                Electronically Signed By: ALONDRA Young CRNA  September 18, 2017  8:52 AM

## 2017-09-18 NOTE — OR NURSING
Patient and responsible adult given discharge instructions with no questions regarding instructions. Aliya score 20 Pain level 0/10.  Discharged from unit via ambulatory. Patient discharged to home after drank fluids no nausea or vomiting and IV dced..

## 2017-09-18 NOTE — IP AVS SNAPSHOT
[facility] Preop/Phase II    [address] [address]    Phone:  [phone]                                       After Visit Summary   9/18/2017    Purnima Fallon    MRN: [id number]           After Visit Summary Signature Page     I have received my discharge instructions, and my questions have been answered. I have discussed any challenges I see with this plan with the nurse or doctor.    ..........................................................................................................................................  Patient/Patient Representative Signature      ..........................................................................................................................................  Patient Representative Print Name and Relationship to Patient    ..................................................               ................................................  Date                                            Time    ..........................................................................................................................................  Reviewed by Signature/Title    ...................................................              ..............................................  Date                                                            Time

## 2017-09-18 NOTE — ANESTHESIA POSTPROCEDURE EVALUATION
Patient: Purnima Fallon    Procedure(s):  PILONIDAL CYSTECTOMY  - Wound Class: II-Clean Contaminated    Diagnosis:PILONIDAL CYST  Diagnosis Additional Information: No value filed.    Anesthesia Type:  MAC    Note:  Anesthesia Post Evaluation    Patient location during evaluation: Phase 2 and Bedside  Patient participation: Able to fully participate in evaluation  Level of consciousness: awake and alert  Pain management: adequate  Airway patency: patent  Cardiovascular status: acceptable  Respiratory status: acceptable  Hydration status: stable  PONV: none     Anesthetic complications: None          Last vitals:  Vitals:    09/18/17 1000 09/18/17 1015 09/18/17 1030   BP: 112/60 116/70 102/70   Pulse: 92 90 92   Resp: 20 20 20   Temp:   97  F (36.1  C)         Electronically Signed By: Mariano Ash MD  September 18, 2017  10:56 AM

## 2017-09-18 NOTE — IP AVS SNAPSHOT
MRN:[id number]                      After Visit Summary   9/18/2017    Purnima Fallon    MRN: [id number]           Thank you!     Thank you for choosing [facility] for your care. Our goal is always to provide you with excellent care. Hearing back from our patients is one way we can continue to improve our services. Please take a few minutes to complete the written survey that you may receive in the mail after you visit with us. Thank you!        Patient Information     Date Of Birth          1980        About your hospital stay     You were admitted on:  September 18, 2017 You last received care in the:  [facility] Preop/Phase II    You were discharged on:  September 18, 2017       Who to Call     For medical emergencies, please call 911.  For non-urgent questions about your medical care, please call your primary care provider or clinic, [phone]  For questions related to your surgery, please call your surgery clinic        Attending Provider     Provider Specialty    Cordell Stephens MD Surgery       Primary Care Provider Office Phone # Fax #    Alexi CAITY Romero [phone]      After Care Instructions     Dressing       Patient is to pack her wound with fluffs twice daily. She can use moist dressings and then cover with a dry dressing.                  Your next 10 appointments already scheduled     Sep 26, 2017 10:00 AM CDT   (Arrive by 9:45 AM)   Post Op with Cordell Stephens MD   [facility] ([facility] )    [address] [place] [id number]   [phone]            Sep 29, 2017 12:00 PM CDT   Radiology with [facility] ([facility] )    [address] [place] [id number] [phone]              Further instructions from your care team         Wound Care After Packing Removal or Replacement  Packing is a special type of dressing placed inside a wound to help it heal. Once the packing is  removed, you need to care for your wound. Good wound care helps prevent infection. Be sure to keep all follow-up appointments with your healthcare provider. Follow these instructions to take care of the wound once you re at home.  Home care  Your healthcare provider may prescribe medicines for pain. Or he or she may suggest an over-the-counter (OTC) pain reliever, such as ibuprofen or acetaminophen. Talk with your healthcare provider before taking any OTC medicines if you have chronic liver or kidney disease, a stomach ulcer, or gastrointestinal bleeding. In certain cases, you may also need to take antibiotics to help prevent infection. If so, take them exactly as directed for as long as directed. Don t stop taking your antibiotics until they are all gone, even if you feel better.  Here are some general care guidelines for your wound:    Follow your healthcare provider s instructions on how to care for your wound.  Always wash your hands with soap and warm water before and after tending to your wound.    Change dressing twice daily, use 4x4 (fluffs) and get them moist with water and pack the wound, cover the moist 4x4s with dry and then tape in place. Do this twice daily        If a bandage was put on, remove and change it once a day or as directed. If the bandage gets wet or dirty, replace it as soon as possible with a new bandage. Use a clean cloth to gently pat the wound dry.    If your packing was replaced, a small piece of gauze may hang from the wound. It allows fluid to continue draining from the wound. You may need to use an ointment or cream to keep the packing from sticking to the bandage.    Don't bathe in a tub or soak your wound until your healthcare provider says it s OK. Take showers or sponge baths instead. Avoid swimming.    Don t scratch, rub, scrub, or pick your wound.    Check your wound daily for the signs of infection listed below.  If your wound was closed, it was likely with one of four types  of closures. These include stitches (sutures), strips of surgical tape, skin glue, and staples. Your healthcare provider will decide on the best closure based on the size and location of your wound. Each type of closure needs specific care.    Sutures. You may want to clean the wound daily after the first 2 to 3 days. To do this, remove the bandage and gently wash the area with soap and warm water. After cleaning, apply a thin layer of antibiotic ointment if recommended. Then put on a new bandage. Sutures on the outside of the skin usually need to be removed by your healthcare provider.    Surgical tape. Keep the area dry. If it gets wet, blot it dry with a towel. Surgical tape closures usually fall off within 7 to 10 days. If they have not fallen off after 10 days, you can remove them yourself. To remove the tape, use mineral oil or petroleum jelly on a cotton ball to gently rub the adhesive.    Skin glue. You may shower or bathe as usual. But do not use soaps, lotions, or ointments on the wound area. Do not scrub the wound. After bathing, pat the wound dry with a soft towel. Do not apply liquids like peroxide, ointments, or creams to the wound while the strips or film is in place. Don't scratch, rub, or pick at the strips or film. Don't put tape directly over the strips or film. Skin adhesive film will fall off naturally in 5 to 10 days. If it does not peel off in 10 days, gently rub petroleum jelly or an ointment onto the film.    Maria Elena. Take showers or sponge baths. Don't take tub baths. Don't use lotions on the wound area. The area may be cleaned with soap and water 2 to 3 days after the wound was stapled. Don't scrub the wound. Pat it dry with a clean soft cloth or towel. You can use antibiotic ointment if your healthcare provider tells you to. Staples will need to be removed in 10 to 14 days.  Follow-up care  Follow up with your healthcare provider, or as directed. If your packing was replaced, you may need  another visit within 1 to 3 days to remove or replace it. If you have sutures or staples, return for their removal as directed.  When to seek medical advice  Call your health care provider right away if you have signs of infection:    Fever of 1 degree or more above your normal temperature, or as directed by your healthcare provider    Increasing pain in the wound or pain that doesn t get better even with pain medicine    Increasing redness or swelling    Pus or bad-smelling drainage from the wound  Also call your healthcare provider right away if any of these occur:    Your wound bleeds more than a small amount or won t stop bleeding.    The edges of your wound come apart.    You have numbness or weakness in the wound area that doesn t go away.  Date Last Reviewed: 10/2/2015    8895-9336 [employer]. [address] [place] [address]. All rights reserved. This information is not intended as a substitute for professional medical care. Always follow your healthcare professional's instructions.        Pending Results     No orders found from 9/16/2017 to 9/19/2017.            Statement of Approval     Ordered          09/18/17 0907  I have reviewed and agree with all the recommendations and orders detailed in this document.  EFFECTIVE NOW     Approved and electronically signed by:  Cordell Stephens MD             Admission Information     Date & Time Provider Department Dept. Phone    9/18/2017 Cordell Stephens MD [facility] Preop/Phase [phone]      Your Vitals Were     Last Period                   08/10/2013           MyChart Information     [employer] gives you secure access to your electronic health record. If you see a primary care provider, you can also send messages to your care team and make appointments. If you have questions, please call your primary care clinic.  If you do not have a primary care provider, please call [phone] and they will assist you.        Care EveryWhere  ID     This is your Care EveryWhere ID. This could be used by other organizations to access your [facility] medical records  [id number]        Equal Access to Services     ALDO CRUZ : Felix Buckley, brian rain, mileysavanah escamillajudymontez barnettcliff, waxcami manoloin hayaaaminta barnettpresley casiano laamosaminta clemonsTerese So [facility] [phone].    ATENCIÓN: Si habla español, tiene a cyr disposición servicios gratuitos de asistencia lingüística. Llame al [phone].    We comply with applicable federal civil rights laws and [place] laws. We do not discriminate on the basis of race, color, national origin, age, disability sex, sexual orientation or gender identity.               Review of your medicines      START taking        Dose / Directions    oxyCODONE 5 MG IR tablet   Commonly known as:  ROXICODONE   Used for:  Pilonidal cyst        Dose:  5 mg   Take 1 tablet (5 mg) by mouth every 4 hours as needed for moderate to severe pain or pain maximum 6 tablet(s) per day   Quantity:  18 tablet   Refills:  0         CONTINUE these medicines which have NOT CHANGED        Dose / Directions    cephALEXin 500 MG capsule   Commonly known as:  KEFLEX   Used for:  Pilonidal cyst with abscess        Dose:  500 mg   Take 1 capsule (500 mg) by mouth 4 times daily   Quantity:  40 capsule   Refills:  0       ibuprofen 800 MG tablet   Commonly known as:  ADVIL/MOTRIN   Used for:  Pain of toe of right foot        Dose:  800 mg   Take 1 tablet (800 mg) by mouth every 8 hours as needed for pain   Quantity:  90 tablet   Refills:  1       oxyCODONE-acetaminophen 5-325 MG per tablet   Commonly known as:  PERCOCET   Used for:  Pilonidal cyst with abscess        Dose:  1 tablet   Take 1 tablet by mouth every 4 hours as needed for pain   Quantity:  24 tablet   Refills:  0       traMADol 50 MG tablet   Commonly known as:  ULTRAM   Used for:  Abdominal pain, generalized        Dose:  50 mg   Take 1 tablet (50 mg) by mouth every 6 hours as needed for pain  maximum 4 tablet(s) per day   Quantity:  30 tablet   Refills:  0       valACYclovir 500 MG tablet   Commonly known as:  VALTREX   Used for:  Herpes simplex virus infection        TAKE ONE TABLET BY MOUTH DAILY   Quantity:  90 tablet   Refills:  0            Where to get your medicines      Some of these will need a paper prescription and others can be bought over the counter. Ask your nurse if you have questions.     Bring a paper prescription for each of these medications     oxyCODONE 5 MG IR tablet                Protect others around you: Learn how to safely use, store and throw away your medicines at www.disposemymeds.org.             Medication List: This is a list of all your medications and when to take them. Check marks below indicate your daily home schedule. Keep this list as a reference.      Medications           Morning Afternoon Evening Bedtime As Needed    cephALEXin 500 MG capsule   Commonly known as:  KEFLEX   Take 1 capsule (500 mg) by mouth 4 times daily                                ibuprofen 800 MG tablet   Commonly known as:  ADVIL/MOTRIN   Take 1 tablet (800 mg) by mouth every 8 hours as needed for pain                                oxyCODONE 5 MG IR tablet   Commonly known as:  ROXICODONE   Take 1 tablet (5 mg) by mouth every 4 hours as needed for moderate to severe pain or pain maximum 6 tablet(s) per day                                oxyCODONE-acetaminophen 5-325 MG per tablet   Commonly known as:  PERCOCET   Take 1 tablet by mouth every 4 hours as needed for pain                                traMADol 50 MG tablet   Commonly known as:  ULTRAM   Take 1 tablet (50 mg) by mouth every 6 hours as needed for pain maximum 4 tablet(s) per day                                valACYclovir 500 MG tablet   Commonly known as:  VALTREX   TAKE ONE TABLET BY MOUTH DAILY                                          More Information        Pilonidal Cyst    A pilonidal cyst is found near the base of the  spine (tailbone) or top of the buttocks crease. It may look like a pit or small depression. In some cases, it may have a hollow tunnel (sinus tract) that connects it to the surface of the skin. Normally, a pilonidal cyst does not cause symptoms. But if it becomes infected, it can cause pain and swelling. This sheet tells you more about pilonidal cysts and how they are treated.  What causes a pilonidal cyst and who gets them?  Two main causes are:    Ingrown hairs. This happens when a hair is forced under the skin or when a hair follicle ruptures.    Injury to the area. This can happen from sitting for long periods of time.  These cysts are often diagnosed in people between ages 16 and 26. But people of any age can have a pilonidal cyst. They affect both men and women, but they are more common in men.  Symptoms of a pilonidal cyst infection  A pilonidal cyst does not cause symptoms unless it becomes infected. Once a pilonidal cyst becomes infected, it is called a pilonidal abscess. Infection may cause the following symptoms:    Pain, redness, and swelling of the cyst and area around it    Foul-smelling drainage from the cyst    Fever  Diagnosing a pilonidal cyst  A pilonidal cyst can be diagnosed by how it looks and by its location. Your healthcare provider will examine the suspected cyst to confirm a diagnosis. You will be told if any tests are needed.  Treating a pilonidal cyst infection  Most pilonidal cysts are left alone. But if a cyst becomes infected, treatment is needed. It may include the following:    Incision and drainage. If needed, the cyst is cut open, and pus and other infected material is allowed to drain.    Antibiotic medicines for the infection. Know that medicines do not make the cyst go away, and antibiotics have limited use in treating an abscess. They also won t keep a cyst from becoming infected again.    Hot water soaks. These can help draw out the infection and ease pain and  itching.    Surgery to remove the cyst (excision). This may be done if the infection is severe, does not respond to medicine, or keeps coming back. A surgeon cuts and removes the cyst and the tissue around it. Your healthcare provider can tell you more if this is needed.    Laser hair removalaround the area. This may decrease the frequency of flare-ups.  Preventing infection  A pilonidal cyst can easily become infected. Do the following to help prevent infections:    Keep the cyst and surrounding skin area clean.    Remove hair from the area of the cyst regularly. Ask your healthcare provider about safe hair removal products or procedures.    Avoid sitting in one position for long periods of time. This helps to reduce weight and pressure on your tailbone area. Sitting on a special cushion to relieve pressure on the tailbone may also help. Ask your healthcare provider about where to purchase these cushions.    Avoid tight-fitting clothing to reduce skin irritation around the cyst.  Date Last Reviewed: 2/1/2017 2000-2017 [employer]. [address] [place] [address], [place] [address]. All rights reserved. This information is not intended as a substitute for professional medical care. Always follow your healthcare professional's instructions.

## 2017-09-18 NOTE — DISCHARGE INSTRUCTIONS
Wound Care After Packing Removal or Replacement  Packing is a special type of dressing placed inside a wound to help it heal. Once the packing is removed, you need to care for your wound. Good wound care helps prevent infection. Be sure to keep all follow-up appointments with your healthcare provider. Follow these instructions to take care of the wound once you re at home.  Home care  Your healthcare provider may prescribe medicines for pain. Or he or she may suggest an over-the-counter (OTC) pain reliever, such as ibuprofen or acetaminophen. Talk with your healthcare provider before taking any OTC medicines if you have chronic liver or kidney disease, a stomach ulcer, or gastrointestinal bleeding. In certain cases, you may also need to take antibiotics to help prevent infection. If so, take them exactly as directed for as long as directed. Don t stop taking your antibiotics until they are all gone, even if you feel better.  Here are some general care guidelines for your wound:    Follow your healthcare provider s instructions on how to care for your wound.  Always wash your hands with soap and warm water before and after tending to your wound.    Change dressing twice daily, use 4x4 (fluffs) and get them moist with water and pack the wound, cover the moist 4x4s with dry and then tape in place. Do this twice daily        If a bandage was put on, remove and change it once a day or as directed. If the bandage gets wet or dirty, replace it as soon as possible with a new bandage. Use a clean cloth to gently pat the wound dry.    If your packing was replaced, a small piece of gauze may hang from the wound. It allows fluid to continue draining from the wound. You may need to use an ointment or cream to keep the packing from sticking to the bandage.    Don't bathe in a tub or soak your wound until your healthcare provider says it s OK. Take showers or sponge baths instead. Avoid swimming.    Don t scratch, rub, scrub, or  pick your wound.    Check your wound daily for the signs of infection listed below.  If your wound was closed, it was likely with one of four types of closures. These include stitches (sutures), strips of surgical tape, skin glue, and staples. Your healthcare provider will decide on the best closure based on the size and location of your wound. Each type of closure needs specific care.    Sutures. You may want to clean the wound daily after the first 2 to 3 days. To do this, remove the bandage and gently wash the area with soap and warm water. After cleaning, apply a thin layer of antibiotic ointment if recommended. Then put on a new bandage. Sutures on the outside of the skin usually need to be removed by your healthcare provider.    Surgical tape. Keep the area dry. If it gets wet, blot it dry with a towel. Surgical tape closures usually fall off within 7 to 10 days. If they have not fallen off after 10 days, you can remove them yourself. To remove the tape, use mineral oil or petroleum jelly on a cotton ball to gently rub the adhesive.    Skin glue. You may shower or bathe as usual. But do not use soaps, lotions, or ointments on the wound area. Do not scrub the wound. After bathing, pat the wound dry with a soft towel. Do not apply liquids like peroxide, ointments, or creams to the wound while the strips or film is in place. Don't scratch, rub, or pick at the strips or film. Don't put tape directly over the strips or film. Skin adhesive film will fall off naturally in 5 to 10 days. If it does not peel off in 10 days, gently rub petroleum jelly or an ointment onto the film.    Maria Elena. Take showers or sponge baths. Don't take tub baths. Don't use lotions on the wound area. The area may be cleaned with soap and water 2 to 3 days after the wound was stapled. Don't scrub the wound. Pat it dry with a clean soft cloth or towel. You can use antibiotic ointment if your healthcare provider tells you to. Staples will need  to be removed in 10 to 14 days.  Follow-up care  Follow up with your healthcare provider, or as directed. If your packing was replaced, you may need another visit within 1 to 3 days to remove or replace it. If you have sutures or staples, return for their removal as directed.  When to seek medical advice  Call your health care provider right away if you have signs of infection:    Fever of 1 degree or more above your normal temperature, or as directed by your healthcare provider    Increasing pain in the wound or pain that doesn t get better even with pain medicine    Increasing redness or swelling    Pus or bad-smelling drainage from the wound  Also call your healthcare provider right away if any of these occur:    Your wound bleeds more than a small amount or won t stop bleeding.    The edges of your wound come apart.    You have numbness or weakness in the wound area that doesn t go away.  Date Last Reviewed: 10/2/2015    3116-2042 [employer]. [address] [place] [address]. All rights reserved. This information is not intended as a substitute for professional medical care. Always follow your healthcare professional's instructions.

## 2017-09-18 NOTE — ANESTHESIA PREPROCEDURE EVALUATION
Anesthesia Evaluation     . Pt has had prior anesthetic.     No history of anesthetic complications          ROS/MED HX    ENT/Pulmonary:     (+)tobacco use, Current use 0.5 PPD packs/day  , . .    Neurologic:     (+)migraines,     Cardiovascular:  - neg cardiovascular ROS       METS/Exercise Tolerance:     Hematologic:  - neg hematologic  ROS       Musculoskeletal:   (+) , , other musculoskeletal- Cystic acne      GI/Hepatic:     (+) Other GI/Hepatic pilonidal cysts      Renal/Genitourinary:     (+) Nephrolithiasis ,       Endo:  - neg endo ROS       Psychiatric:     (+) psychiatric history depression and other (comment) (ADHD)      Infectious Disease:  - neg infectious disease ROS       Malignancy:      - no malignancy   Other: Comment: S/p Hysterectomy   (+) No chance of pregnancy   - neg other ROS                 Physical Exam  Normal systems: cardiovascular, pulmonary and dental    Airway   Mallampati: II  TM distance: >3 FB  Neck ROM: full    Dental     Cardiovascular   Rhythm and rate: regular and normal      Pulmonary    breath sounds clear to auscultation                    Anesthesia Plan      History & Physical Review  History and physical reviewed and following examination; no interval change.    ASA Status:  2 .    NPO Status:  > 8 hours    Plan for MAC with Intravenous and Propofol induction. Maintenance will be TIVA.  Reason for MAC:  Deep or markedly invasive procedure (G8) and Chronic cardiopulmonary disease (G9)  PONV prophylaxis:  Ondansetron (or other 5HT-3), Scopolamine patch and Dexamethasone or Solumedrol       Postoperative Care  Postoperative pain management:  IV analgesics and Oral pain medications.      Consents  Anesthetic plan, risks, benefits and alternatives discussed with:  Patient..                          .

## 2017-09-18 NOTE — OP NOTE
PREOPERATIVE DIAGNOSIS:  Pilonidal sinus with cyst      POSTOPERATIVE DIAGNOSIS:  Pilonidal sinus with cyst      PROCEDURE PERFORMED:  Excision pilonidal cyst/sinus with marsupulization.      HISTORY:  This 36-year-old male has had repetitive infections with a large pilonidal process.  She presents for elective repair.      DESCRIPTION OF PROCEDURE:  With the patient in the supine position on the operating table, monitored anesthesia was induced.  She was rolled to the prone position with and the perianal region, buttocks, and spine were prepped and draped sterilely.  The requisite timeout pause was observed during which the patient's correct identity and planned procedure was confirmed by the operating room personnel in attendance.  A lacrimal probe was extended from the inferior sinus and a small skin incision was made on the superior aspect of the pilonodal cyst. Using sharp dissection an incision was made over the lacrimal probe. The pilonidal cyst was identified along the tract of the lacrimal probe. Curretage was performed of the pilonidal cyst overlying the presacral fascia. The pilonidal cysts, pits, and sinuses that were surrounding the incision were excised.  The skin and full thickness-subcutaneous tissue was elevated off the presacral fascia and the incision was extended to include the sinus tracts, both to the left and right of midline.  The wound was irrigated with copious amounts of saline solution. The edges of the wound were then sutured to the base of the wound using 2-0 chromic in a running fashion. Local anesthetic was applied surrounding the wound. Sterile dressings were applied and the patient was transported to the recovery room in satisfactory condition.  The estimated blood loss was minimal and there were no apparent complications.  The procedure was well tolerated and the patient left the operating room in good condition upon confirmation that the final sponge, needle and instrument counts  were correct.      Cordell Stephens MD

## 2017-09-19 ENCOUNTER — TELEPHONE (OUTPATIENT)
Dept: SURGERY | Facility: OTHER | Age: 37
End: 2017-09-19

## 2017-09-19 NOTE — TELEPHONE ENCOUNTER
Returned pt call about her pain. Dr Stephens would like her to take 1,000mg of tylenol Q6hr and 400mg of ibuprofen Q6hr, alternating so she is taking something Q3hrs. Also instructed her to take her oxy Q4hrs as directed to keep up on her pain. If pain continues to get worse in the next few days to call us back. Pt verbalized understanding.   Whit Tristan

## 2017-09-19 NOTE — TELEPHONE ENCOUNTER
[facility] she would like a call back regarding her pain she is still having. Patient can be reached at [phone].

## 2017-09-21 ENCOUNTER — TELEPHONE (OUTPATIENT)
Dept: SURGERY | Facility: OTHER | Age: 37
End: 2017-09-21

## 2017-09-21 ENCOUNTER — OFFICE VISIT (OUTPATIENT)
Dept: SURGERY | Facility: OTHER | Age: 37
End: 2017-09-21
Attending: SURGERY
Payer: COMMERCIAL

## 2017-09-21 VITALS
DIASTOLIC BLOOD PRESSURE: 70 MMHG | WEIGHT: 130 LBS | OXYGEN SATURATION: 99 % | SYSTOLIC BLOOD PRESSURE: 102 MMHG | RESPIRATION RATE: 18 BRPM | HEART RATE: 72 BPM | HEIGHT: 62 IN | TEMPERATURE: 98.8 F | BODY MASS INDEX: 23.92 KG/M2

## 2017-09-21 DIAGNOSIS — Z87.2 H/O PILONIDAL CYST: Primary | ICD-10-CM

## 2017-09-21 PROCEDURE — 99024 POSTOP FOLLOW-UP VISIT: CPT | Performed by: SURGERY

## 2017-09-21 RX ORDER — GABAPENTIN 600 MG/1
600 TABLET ORAL EVERY EVENING
Qty: 7 TABLET | Refills: 0 | Status: SHIPPED | OUTPATIENT
Start: 2017-09-21 | End: 2017-09-26

## 2017-09-21 RX ORDER — CELECOXIB 200 MG/1
200 CAPSULE ORAL 2 TIMES DAILY
Qty: 14 CAPSULE | Refills: 0 | Status: SHIPPED | OUTPATIENT
Start: 2017-09-21 | End: 2017-10-05

## 2017-09-21 RX ORDER — OXYCODONE AND ACETAMINOPHEN 5; 325 MG/1; MG/1
1-2 TABLET ORAL EVERY 4 HOURS PRN
Qty: 40 TABLET | Refills: 0 | Status: SHIPPED | OUTPATIENT
Start: 2017-09-21 | End: 2017-09-29

## 2017-09-21 ASSESSMENT — PAIN SCALES - GENERAL: PAINLEVEL: SEVERE PAIN (7)

## 2017-09-21 NOTE — NURSING NOTE
"Chief Complaint   Patient presents with     Surgical Followup     is having increased pain says pain medication is not helping       Initial /70  Pulse 72  Temp 98.8  F (37.1  C) (Tympanic)  Resp 18  Ht 5' 2\" (1.575 m)  Wt 130 lb (59 kg)  LMP 08/10/2013  SpO2 99%  BMI 23.78 kg/m2 Estimated body mass index is 23.78 kg/(m^2) as calculated from the following:    Height as of this encounter: 5' 2\" (1.575 m).    Weight as of this encounter: 130 lb (59 kg).  Medication Reconciliation: complete   Whit Tristan    "

## 2017-09-21 NOTE — PATIENT INSTRUCTIONS
Thank you for allowing Dr. Stephens and our surgical team to participate in your care. Please call with any scheduling questions or concerns to Radha at [phone] or for nursing questions Dominique [phone].      Please got to your pharmacy to  new prescriptions. Continue to do dressing changes twice a day.

## 2017-09-21 NOTE — MR AVS SNAPSHOT
After Visit Summary   9/21/2017    Purnima Fallon    MRN: [id number]           Patient Information     Date Of Birth          1980        Visit Information        Provider Department      9/21/2017 4:00 PM Cordell Stephens MD [facility]        Today's Diagnoses     H/O pilonidal cyst    -  1      Care Instructions    Thank you for allowing Dr. Stephens and our surgical team to participate in your care. Please call with any scheduling questions or concerns to [facility] at [phone] or for nursing questions Dominique [phone].      Please got to your pharmacy to  new prescriptions. Continue to do dressing changes twice a day.             Follow-ups after your visit        Your next 10 appointments already scheduled     Sep 26, 2017 10:00 AM CDT   (Arrive by 9:45 AM)   Post Op with Cordell Stephens MD   [facility] ([facility] )    [address] [place] [address] [id number] [phone]            Sep 29, 2017 12:00 PM CDT   Radiology with [facility] ([facility] )    [address] [address] [phone]              Who to contact     If you have questions or need follow up information about today's clinic visit or your schedule please contact [facility] directly at [phone].  Normal or non-critical lab and imaging results will be communicated to you by MyChart, letter or phone within 4 business days after the clinic has received the results. If you do not hear from us within 7 days, please contact the clinic through MyChart or phone. If you have a critical or abnormal lab result, we will notify you by phone as soon as possible.  Submit refill requests through [employer] or call your pharmacy and they will forward the refill request to us. Please allow 3 business days for your refill to be completed.          Additional Information About Your Visit        [facility]  "Information     R[employer] gives you secure access to your electronic health record. If you see a primary care provider, you can also send messages to your care team and make appointments. If you have questions, please call your primary care clinic.  If you do not have a primary care provider, please call [phone] and they will assist you.        Care EveryWhere ID     This is your Care EveryWhere ID. This could be used by other organizations to access your [place] medical records  [id number]        Your Vitals Were     Pulse Temperature Respirations Height Last Period Pulse Oximetry    72 98.8  F (37.1  C) (Tympanic) 18 5' 2\" (1.575 m) 08/10/2013 99%    BMI (Body Mass Index)                   23.78 kg/m2            Blood Pressure from Last 3 Encounters:   09/21/17 102/70   09/18/17 102/70   09/12/17 102/63    Weight from Last 3 Encounters:   09/21/17 130 lb (59 kg)   09/12/17 130 lb (59 kg)   09/07/17 130 lb (59 kg)              Today, you had the following     No orders found for display         Today's Medication Changes          These changes are accurate as of: 9/21/17  4:22 PM.  If you have any questions, ask your nurse or doctor.               Start taking these medicines.        Dose/Directions    celecoxib 200 MG capsule   Commonly known as:  celeBREX   Used for:  H/O pilonidal cyst   Started by:  Cordell Stephens MD        Dose:  200 mg   Take 1 capsule (200 mg) by mouth 2 times daily for 7 days   Quantity:  14 capsule   Refills:  0       gabapentin 600 MG tablet   Commonly known as:  NEURONTIN   Used for:  H/O pilonidal cyst   Started by:  Cordell Stephens MD        Dose:  600 mg   Take 1 tablet (600 mg) by mouth every evening for 7 days   Quantity:  7 tablet   Refills:  0         These medicines have changed or have updated prescriptions.        Dose/Directions    oxyCODONE-acetaminophen 5-325 MG per tablet   Commonly known as:  PERCOCET   This may have changed:    - how much to take  - " reasons to take this  - additional instructions   Used for:  H/O pilonidal cyst   Changed by:  Cordell Stephens MD        Dose:  1-2 tablet   Take 1-2 tablets by mouth every 4 hours as needed for moderate to severe pain or pain maximum 12 tablet(s) per day   Quantity:  40 tablet   Refills:  0         Stop taking these medicines if you haven't already. Please contact your care team if you have questions.     oxyCODONE 5 MG IR tablet   Commonly known as:  ROXICODONE   Stopped by:  Cordell Stephens MD           traMADol 50 MG tablet   Commonly known as:  ULTRAM   Stopped by:  Cordell Stephens MD                Where to get your medicines      These medications were sent to [id number] - [place], [place] - [address] AT [facility] of [facility] [address], [facility] [address]     Phone:  [phone]     celecoxib 200 MG capsule    gabapentin 600 MG tablet         Some of these will need a paper prescription and others can be bought over the counter.  Ask your nurse if you have questions.     Bring a paper prescription for each of these medications     oxyCODONE-acetaminophen 5-325 MG per tablet                Primary Care Provider Office Phone # Fax #    Aelxi Romero [phone]       [facility] [address] [address]        Equal Access to Services     [facility] AH: Hadii aad ku hadasho Soomaali, waaxda luqadaha, qaybta kaalmada adeegyada, debby clemons. So [facility] [phone].    ATENCIÓN: Si habla español, tiene a cyr disposición servicios gratuitos de asistencia lingüística. Maycol al [phone].    We comply with applicable federal civil rights laws and [place] laws. We do not discriminate on the basis of race, color, national origin, age, disability sex, sexual orientation or gender identity.            Thank you!     Thank you for choosing [facility]  for your care. Our goal is always to provide  you with excellent care. Hearing back from our patients is one way we can continue to improve our services. Please take a few minutes to complete the written survey that you may receive in the mail after your visit with us. Thank you!             Your Updated Medication List - Protect others around you: Learn how to safely use, store and throw away your medicines at www.disposemymeds.org.          This list is accurate as of: 9/21/17  4:22 PM.  Always use your most recent med list.                   Brand Name Dispense Instructions for use Diagnosis    celecoxib 200 MG capsule    celeBREX    14 capsule    Take 1 capsule (200 mg) by mouth 2 times daily for 7 days    H/O pilonidal cyst       cephALEXin 500 MG capsule    KEFLEX    40 capsule    Take 1 capsule (500 mg) by mouth 4 times daily    Pilonidal cyst with abscess       gabapentin 600 MG tablet    NEURONTIN    7 tablet    Take 1 tablet (600 mg) by mouth every evening for 7 days    H/O pilonidal cyst       ibuprofen 800 MG tablet    ADVIL/MOTRIN    90 tablet    Take 1 tablet (800 mg) by mouth every 8 hours as needed for pain    Pain of toe of right foot       oxyCODONE-acetaminophen 5-325 MG per tablet    PERCOCET    40 tablet    Take 1-2 tablets by mouth every 4 hours as needed for moderate to severe pain or pain maximum 12 tablet(s) per day    H/O pilonidal cyst       valACYclovir 500 MG tablet    VALTREX    90 tablet    TAKE ONE TABLET BY MOUTH DAILY    Herpes simplex virus infection

## 2017-09-21 NOTE — PROGRESS NOTES
"  HPI:  Returns for first post surgical examination following pilonidal cystectomy. Over the course of the past week she has been having pain associated with the surgical wound. She has called in on Tuesday requesting a change in pain medication/regimen which was then discussed with the patient.  Denies wound complication, fever, chills, nausea or vomiting. She has been tolerating dressing changes.  ROS:   10 point ROS neg other than the symptoms noted above in the HPI.    Examination:  /70  Pulse 72  Temp 98.8  F (37.1  C) (Tympanic)  Resp 18  Ht 5' 2\" (1.575 m)  Wt 130 lb (59 kg)  LMP 08/10/2013  SpO2 99%  BMI 23.78 kg/m2    Constitutional: healthy, alert and no distress  HEENT:  No obvious masses, lesions,  or abnormalities  Wound(s) healing nicely without evidence of infection.  Wound packing was removed at the bedside. The edges of the wound and base has granulation tissue growing in. With palpation there is tenderness to the base, but no fluctuance, drainage, erythema. The skin edges all appear to be healthy with no signs of infection.    Impression:  Satisfactory course post pilonidal cystectomy  Recommendations:  At this time recommendations were given to the patient regarding a new pain regimen.  I recommended that the patient start gabapentin 600 mg every evening, celecoxib 200 mg twice daily, Percocet 5-325 mg 1-2 tabs every 4 hours when necessary.  The patient is to continue dressing changes twice daily.  I instructed her significant other how to pack the wound appropriately and discussed with them that for appropriate debridement to allow the dressings to dry between dressing changes to allow for debridement. However, if it is too painful removing the dressings then by making them wet in a shower and then remove them that can help decrease pain associated with the dressing changes.  I will have patient follow-up next Tuesday for further evaluation.  I discussed that if the wound is healing " appropriately at that time referral for a wound VAC therapy will be made.  Patient understands and can call us with any questions or concerns.  Cordell Stephens MD, FACS    9/21/2017  4:40 PM

## 2017-09-21 NOTE — TELEPHONE ENCOUNTER
Returned patients call, stated her pain medication is not working and would like something else. Dr Stephens would like to see her before he gives her a new prescription so appointment was made for this afternoon. Whit Tristan

## 2017-09-21 NOTE — TELEPHONE ENCOUNTER
Patient called and is requesting more pain medication from Dr Stephens. I spoke with Dr Stephens and her post op appointment is on 9-26-17.  Patient can be reached at [phone].

## 2017-09-26 ENCOUNTER — OFFICE VISIT (OUTPATIENT)
Dept: SURGERY | Facility: OTHER | Age: 37
End: 2017-09-26
Attending: SURGERY
Payer: COMMERCIAL

## 2017-09-26 VITALS
WEIGHT: 130 LBS | TEMPERATURE: 98.5 F | HEART RATE: 62 BPM | HEIGHT: 62 IN | SYSTOLIC BLOOD PRESSURE: 100 MMHG | DIASTOLIC BLOOD PRESSURE: 58 MMHG | OXYGEN SATURATION: 99 % | BODY MASS INDEX: 23.92 KG/M2 | RESPIRATION RATE: 16 BRPM

## 2017-09-26 DIAGNOSIS — Z87.2 H/O PILONIDAL CYST: Primary | ICD-10-CM

## 2017-09-26 PROCEDURE — 99024 POSTOP FOLLOW-UP VISIT: CPT | Performed by: SURGERY

## 2017-09-26 ASSESSMENT — PAIN SCALES - GENERAL: PAINLEVEL: SEVERE PAIN (6)

## 2017-09-26 NOTE — MR AVS SNAPSHOT
After Visit Summary   9/26/2017    Purnima Fallon    MRN: [id number]           Patient Information     Date Of Birth          1980        Visit Information        Provider Department      9/26/2017 10:00 AM Cordell Stephens MD [facility]        Today's Diagnoses     H/O pilonidal cyst    -  1      Care Instructions    Thank you for allowing Dr. Stephens and our surgical team to participate in your care. Please call with any scheduling questions or concerns to Radha at [phone] or for nursing questions Whit [phone].    Wound care will be calling you to set up your appointment for wound vac placement.           Follow-ups after your visit        Your next 10 appointments already scheduled     Sep 29, 2017 12:00 PM CDT   [facility] ([facility] )    [address] [place] [address] [phone]              Who to contact     If you have questions or need follow up information about today's clinic visit or your schedule please contact [facility] directly at [phone].  Normal or non-critical lab and imaging results will be communicated to you by EyesBothart, letter or phone within 4 business days after the clinic has received the results. If you do not hear from us within 7 days, please contact the clinic through [employer]Both[employer] or phone. If you have a critical or abnormal lab result, we will notify you by phone as soon as possible.  Submit refill requests through [email or web address] or call your pharmacy and they will forward the refill request to us. Please allow 3 business days for your refill to be completed.          Additional Information About Your Visit        Eyes[employer] Information     [email or web address] gives you secure access to your electronic health record. If you see a primary care provider, you can also send messages to your care team and make appointments. If you have questions, please call your primary care  "clinic.  If you do not have a primary care provider, please call [phone] and they will assist you.        Care EveryWhere ID     This is your Care EveryWhere ID. This could be used by other organizations to access your [place] medical records  [id number]        Your Vitals Were     Pulse Temperature Respirations Height Last Period Pulse Oximetry    62 98.5  F (36.9  C) (Tympanic) 16 5' 2\" (1.575 m) 08/10/2013 99%    BMI (Body Mass Index)                   23.78 kg/m2            Blood Pressure from Last 3 Encounters:   09/26/17 100/58   09/21/17 102/70   09/18/17 102/70    Weight from Last 3 Encounters:   09/26/17 130 lb (59 kg)   09/21/17 130 lb (59 kg)   09/12/17 130 lb (59 kg)              Today, you had the following     No orders found for display         Today's Medication Changes          These changes are accurate as of: 9/26/17 10:00 AM.  If you have any questions, ask your nurse or doctor.               Stop taking these medicines if you haven't already. Please contact your care team if you have questions.     cephALEXin 500 MG capsule   Commonly known as:  KEFLEX   Stopped by:  Cordell Stephens MD           gabapentin 600 MG tablet   Commonly known as:  NEURONTIN   Stopped by:  Cordell Stephens MD           ibuprofen 800 MG tablet   Commonly known as:  ADVIL/MOTRIN   Stopped by:  Cordell Stephens MD                    Primary Care Provider Office Phone # Fax #    Alexi CAITY Romero [phone]       [place] [facility] [place] [address]  [place] [address]        Equal Access to Services     [facility] AH: Hadii otto fletcher hadashange Sostacy, waaxda luqadaha, qaybta kaalmada debby douglas. So [facility] [phone].    ATENCIÓN: Si habla español, tiene a cyr disposición servicios gratuitos de asistencia lingüística. Llame al [phone].    We comply with applicable federal civil rights laws and [place] laws. We do not discriminate on the " basis of race, color, national origin, age, disability sex, sexual orientation or gender identity.            Thank you!     Thank you for choosing [facility]  for your care. Our goal is always to provide you with excellent care. Hearing back from our patients is one way we can continue to improve our services. Please take a few minutes to complete the written survey that you may receive in the mail after your visit with us. Thank you!             Your Updated Medication List - Protect others around you: Learn how to safely use, store and throw away your medicines at www.disposemymeds.org.          This list is accurate as of: 9/26/17 10:00 AM.  Always use your most recent med list.                   Brand Name Dispense Instructions for use Diagnosis    celecoxib 200 MG capsule    celeBREX    14 capsule    Take 1 capsule (200 mg) by mouth 2 times daily for 7 days    H/O pilonidal cyst       oxyCODONE-acetaminophen 5-325 MG per tablet    PERCOCET    40 tablet    Take 1-2 tablets by mouth every 4 hours as needed for moderate to severe pain or pain maximum 12 tablet(s) per day    H/O pilonidal cyst       valACYclovir 500 MG tablet    VALTREX    90 tablet    TAKE ONE TABLET BY MOUTH DAILY    Herpes simplex virus infection

## 2017-09-26 NOTE — NURSING NOTE
"Chief Complaint   Patient presents with     Surgical Followup     S/P Excsion pilonidal cyst 09/18/2017.       Initial /58  Pulse 62  Temp 98.5  F (36.9  C) (Tympanic)  Resp 16  Ht 5' 2\" (1.575 m)  Wt 130 lb (59 kg)  LMP 08/10/2013  SpO2 99%  BMI 23.78 kg/m2 Estimated body mass index is 23.78 kg/(m^2) as calculated from the following:    Height as of this encounter: 5' 2\" (1.575 m).    Weight as of this encounter: 130 lb (59 kg).  Medication Reconciliation: complete   Priya Hinojosa      "

## 2017-09-26 NOTE — PATIENT INSTRUCTIONS
Thank you for allowing Dr. Stephens and our surgical team to participate in your care. Please call with any scheduling questions or concerns to Radha at [phone] or for nursing questions Whit [phone].    Wound care will be calling you to set up your appointment for wound vac placement.

## 2017-09-26 NOTE — PROGRESS NOTES
"  HPI:  Returns for post surgical examination following pilonidal cystectomy without complaint.  Denies wound complication, fever, chills, nausea or vomiting. Since her last visit she says that her pain has been well controlled. She did use the gabapentin one evening and the following morning felt very drowsy so she has stopped that medication. He pain is well controlled with the addition of schedule tylenol and celecoxib. She is tolerating dressing changes without any complaints.  ROS:   10 point ROS neg other than the symptoms noted above in the HPI.    Examination:  /58  Pulse 62  Temp 98.5  F (36.9  C) (Tympanic)  Resp 16  Ht 5' 2\" (1.575 m)  Wt 130 lb (59 kg)  LMP 08/10/2013  SpO2 99%  BMI 23.78 kg/m2    Constitutional: healthy, alert and no distress  HEENT:  No obvious masses, lesions,  or abnormalities  Cardiovascular: negative findings: regular rate and rhythm, S1 normal, S2 normal  Respiratory: Good diaphragmatic excursion. Llungs clear to auscultation  Abdomen:  Soft nontender, nondistended    Wound(s) healing nicely without evidence of infection. Good granulation tissue at the base and around the edges, no purulent discharge or drainage.     Impression:  Satisfactory course status post pilonidal cystectomy  Recommendations:  The wound is healing in quiet well with twice daily dressing changes and has formed a nice base of granulation tissue. I discussed with the patient that at this time I would recommend that she be referred to wound care therapy for negative pressure wound VAC therapy. A discussion was had with the patient regarding negative pressure wound therapy and the rationale behind its use. She understood and is willing to proceed with that therapy. All questions and concerns were addressed. She can call the office with any concerns.  Cordell Stephens MD    9/26/2017  10:53 AM               "

## 2017-09-27 DIAGNOSIS — B00.9 HERPES SIMPLEX VIRUS INFECTION: ICD-10-CM

## 2017-09-27 RX ORDER — VALACYCLOVIR HYDROCHLORIDE 500 MG/1
TABLET, FILM COATED ORAL
Qty: 90 TABLET | Refills: 0 | Status: SHIPPED | OUTPATIENT
Start: 2017-09-27 | End: 2018-01-16

## 2017-09-29 ENCOUNTER — OFFICE VISIT (OUTPATIENT)
Dept: WOUND CARE | Facility: OTHER | Age: 37
End: 2017-09-29
Attending: SURGERY
Payer: COMMERCIAL

## 2017-09-29 ENCOUNTER — HOSPITAL ENCOUNTER (OUTPATIENT)
Dept: NUCLEAR MEDICINE | Facility: HOSPITAL | Age: 37
Discharge: HOME | End: 2017-09-29
Attending: NURSE PRACTITIONER | Admitting: NURSE PRACTITIONER
Payer: COMMERCIAL

## 2017-09-29 VITALS
BODY MASS INDEX: 23.92 KG/M2 | HEIGHT: 62 IN | TEMPERATURE: 98.9 F | SYSTOLIC BLOOD PRESSURE: 105 MMHG | DIASTOLIC BLOOD PRESSURE: 68 MMHG | HEART RATE: 74 BPM | WEIGHT: 130 LBS

## 2017-09-29 DIAGNOSIS — T81.89XA DELAYED SURGICAL WOUND HEALING, INITIAL ENCOUNTER: Primary | ICD-10-CM

## 2017-09-29 DIAGNOSIS — Z87.2 H/O PILONIDAL CYST: ICD-10-CM

## 2017-09-29 PROCEDURE — 99213 OFFICE O/P EST LOW 20 MIN: CPT | Mod: 25 | Performed by: NURSE PRACTITIONER

## 2017-09-29 PROCEDURE — 78227 HEPATOBIL SYST IMAGE W/DRUG: CPT | Mod: TC

## 2017-09-29 PROCEDURE — A9537 TC99M MEBROFENIN: HCPCS | Mod: TC

## 2017-09-29 PROCEDURE — 97605 NEG PRS WND THER DME<=50SQCM: CPT | Performed by: NURSE PRACTITIONER

## 2017-09-29 RX ORDER — OXYCODONE AND ACETAMINOPHEN 5; 325 MG/1; MG/1
1 TABLET ORAL EVERY 8 HOURS PRN
Qty: 20 TABLET | Refills: 0 | Status: SHIPPED | OUTPATIENT
Start: 2017-09-29 | End: 2017-10-16

## 2017-09-29 RX ORDER — KIT FOR THE PREPARATION OF TECHNETIUM TC 99M MEBROFENIN 45 MG/10ML
5 INJECTION, POWDER, LYOPHILIZED, FOR SOLUTION INTRAVENOUS ONCE
Status: COMPLETED | OUTPATIENT
Start: 2017-09-29 | End: 2017-09-29

## 2017-09-29 RX ADMIN — KIT FOR THE PREPARATION OF TECHNETIUM TC 99M MEBROFENIN 5 MILLICURIE: 45 INJECTION, POWDER, LYOPHILIZED, FOR SOLUTION INTRAVENOUS at 12:18

## 2017-09-29 ASSESSMENT — PAIN SCALES - GENERAL: PAINLEVEL: SEVERE PAIN (6)

## 2017-09-29 NOTE — MR AVS SNAPSHOT
After Visit Summary   9/29/2017    Purnima Fallon    MRN: [id number]           Patient Information     Date Of Birth          1980        Visit Information        Provider Department      9/29/2017 8:00 AM Yanci Arauz NP [facility]        Today's Diagnoses     H/O pilonidal cyst          Care Instructions    Please come back to see us on Monday and we will change the dressing.  Call [facility] for pump problems.          Follow-ups after your visit        Your next 10 appointments already scheduled     Sep 29, 2017 12:00 PM CDT   Radiology with [facility] NUC MED   [facility] ([facility] )    [address] [address]   [phone]            Oct 02, 2017  3:00 PM CDT   (Arrive by 2:45 PM)   Return Visit with Yanci Arauz NP   [facility] [place] ([facility] [place] )    [address]  [place] [address]   [phone]            Oct 04, 2017  3:00 PM CDT   Return Visit with [facility] WOUND CARE   [facility] W[facility] Ostomy ([facility] )    [address] [place] [address]   [phone]            Oct 06, 2017 10:00 AM CDT   (Arrive by 9:45 AM)   Return Visit with Yanci Arauz NP   [facility] [place] ([facility] [place] )    [address]  [place] [address]   [phone]            Oct 09, 2017  3:00 PM CDT   (Arrive by 2:45 PM)   Return Visit with Yanci Arauz NP   [facility] [place] ([facility] [place] )    [address]  [place] [address]   [phone]            Oct 11, 2017  3:00 PM CDT   (Arrive by 2:45 PM)   Return Visit with Yanci Arauz NP   [facility] [place] ([facility] [place] )    [address]  [place] [address]   [phone]            Oct 13, 2017 11:00 AM CDT   Return Visit with [facility] W[facility] CARE   [facility] ([facility] )    [address] [place] [address]   [phone]             Oct 16, 2017  3:00 PM CDT   (Arrive by 2:45 PM)   [facility] [place] [facility]    [address]  [place] [id number] [phone]            Oct 18, 2017  3:00 PM CDT   Return Visit with [facility] W[facility] CARE   [facility] W[facility] Ost[facility] ([facility] )    [address] [place] [address]  [place] [id number] [phone]            Oct 20, 2017  3:00 PM CDT   (Arrive by 2:45 PM)   Return Visit with Yanci Arauz NP   [facility] [place] ([facility] [place] )    [address]  [place] [id number] [phone]              Who to contact     If you have questions or need follow up information about today's clinic visit or your schedule please contact [facility] directly at [phone].  Normal or non-critical lab and imaging results will be communicated to you by Quixbyhart, letter or phone within 4 business days after the clinic has received the results. If you do not hear from us within 7 days, please contact the clinic through NLT SPINEt or phone. If you have a critical or abnormal lab result, we will notify you by phone as soon as possible.  Submit refill requests through [employer] or call your pharmacy and they will forward the refill request to us. Please allow 3 business days for your refill to be completed.          Additional Information About Your Visit        QuixbyharGlocal Information     [employer] gives you secure access to your electronic health record. If you see a primary care provider, you can also send messages to your care team and make appointments. If you have questions, please call your primary care clinic.  If you do not have a primary care provider, please call [phone] and they will assist you.        Care EveryWhere ID     This is your Care EveryWhere ID. This could be used by other organizations to access your [facility] medical records  [id number]        Your Vitals Were     Pulse Temperature  "Height Last Period BMI (Body Mass Index)       74 98.9  F (37.2  C) 5' 2\" (1.575 m) 08/10/2013 23.78 kg/m2        Blood Pressure from Last 3 Encounters:   09/29/17 105/68   09/26/17 100/58   09/21/17 102/70    Weight from Last 3 Encounters:   09/29/17 130 lb (59 kg)   09/26/17 130 lb (59 kg)   09/21/17 130 lb (59 kg)              Today, you had the following     No orders found for display         Today's Medication Changes          These changes are accurate as of: 9/29/17  8:59 AM.  If you have any questions, ask your nurse or doctor.               These medicines have changed or have updated prescriptions.        Dose/Directions    oxyCODONE-acetaminophen 5-325 MG per tablet   Commonly known as:  PERCOCET   This may have changed:    - how much to take  - when to take this   Used for:  H/O pilonidal cyst   Changed by:  Yanci Arauz NP        Dose:  1 tablet   Take 1 tablet by mouth every 8 hours as needed for moderate to severe pain or pain maximum 12 tablet(s) per day   Quantity:  20 tablet   Refills:  0            Where to get your medicines      Some of these will need a paper prescription and others can be bought over the counter.  Ask your nurse if you have questions.     Bring a paper prescription for each of these medications     oxyCODONE-acetaminophen 5-325 MG per tablet                Primary Care Provider Office Phone # Fax #    Alexi Romero [phone]       [facility] [place] [address]  [place] [address]        Equal Access to Services     [facility] AH: Hadii otto fletcher hadasho Soomaali, waaxda luqadaha, qaybta kaalmada adeegyada, waxay idicharlotte clemons. So [facility] [phone].    ATENCIÓN: Si habla español, tiene a cyr disposición servicios gratuitos de asistencia lingüística. Llame al [phone].    We comply with applicable federal civil rights laws and [place] laws. We do not discriminate on the basis of race, color, national origin, age, " disability sex, sexual orientation or gender identity.            Thank you!     Thank you for choosing [facility]  for your care. Our goal is always to provide you with excellent care. Hearing back from our patients is one way we can continue to improve our services. Please take a few minutes to complete the written survey that you may receive in the mail after your visit with us. Thank you!             Your Updated Medication List - Protect others around you: Learn how to safely use, store and throw away your medicines at www.disposemymeds.org.          This list is accurate as of: 9/29/17  8:59 AM.  Always use your most recent med list.                   Brand Name Dispense Instructions for use Diagnosis    celecoxib 200 MG capsule    celeBREX    14 capsule    Take 1 capsule (200 mg) by mouth 2 times daily for 7 days    H/O pilonidal cyst       oxyCODONE-acetaminophen 5-325 MG per tablet    PERCOCET    20 tablet    Take 1 tablet by mouth every 8 hours as needed for moderate to severe pain or pain maximum 12 tablet(s) per day    H/O pilonidal cyst       valACYclovir 500 MG tablet    VALTREX    90 tablet    TAKE ONE TABLET BY MOUTH DAILY    Herpes simplex virus infection

## 2017-09-29 NOTE — PROGRESS NOTES
Patient is seen in consultation for referring provider Dr. Stephens and PCP Alexi Romero    SUBJECTIVE:  Purnima Fallon, 36 year old, female presents with the following Chief Complaint(s) with HPI to follow: wound care     HPI:  Purnima is here today for evaluation and treatment of her pilonidal cyst wound.  She had had an issue with pilonidal cysts for many years.  She has had 3 different operations on the cysts over the years.  She had her last surgery on 9/18/17 and her  has been dressing the area twice daily with wet to dry dressings.  She had quite a bit of pain in the beginning, but this is beginning to get better, she is taking her percocet about twice daily and is not taking the gabapentin or celecoxib.  She has noticed a good amount of bloody drainage from the wound.  Of note: she also tells me that she has been diagnosed with hidradenditis supprativa    Patient Active Problem List   Diagnosis     Migraines     Depression     ADHD (attention deficit hyperactivity disorder)     Cystic acne     S/P laparoscopic hysterectomy     Kidney stones     Ischiorectal abscess and fistula     ACP (advance care planning)     Mass of breast     Pilonidal cyst     H/O pilonidal cyst       Past Medical History:   Diagnosis Date     Hidradenitis 2/16/2007     Ischiorectal abscess and fistula      Kidney stones 2008    x2 passed on her own     Nondependent tobacco use disorder 10/11/2012     Papanicolaou smear of cervix with low grade squamous intraepithelial lesion (LGSIL) 10/29/2008       Past Surgical History:   Procedure Laterality Date     CYSTECTOMY PILONIDAL N/A 9/18/2017    Procedure: CYSTECTOMY PILONIDAL;  PILONIDAL CYSTECTOMY ;  Surgeon: Cordell Stephens MD;  [facility]: [facility]     EXCISE MASS NECK Right 8/4/2015    Procedure: EXCISE MASS NECK;  Surgeon: Nasir Jones MD;  Location: [facility]     INCISION AND DRAINAGE PERINEAL, COMBINED N/A 3/18/2015    Procedure: COMBINED INCISION AND DRAINAGE PERINEAL;   Surgeon: Jay Torres DO;  Location: [facility] OR     LAPAROSCOPIC HYSTERECTOMY SUPRACERVICAL, BILATERAL SALPINGO-OOPHORECTOMY, COMBINED  9/27/2013    Procedure: COMBINED LAPAROSCOPIC HYSTERECTOMY SUPRACERVICAL, SALPINGO-OOPHORECTOMY;  LAPAROSCOPIC SUPRACERVICAL HYSTERECTOMY AND RIGHT SALPINGO-OOPHORECTOMY, CYSTOSCOPY;  Surgeon: Denys Callahan MD;  Location: [facility] OR     marsupialization of pilonidal cyst  2007     TUBAL LIGATION  2005       Family History   Problem Relation Age of Onset     DIABETES Paternal Grandmother        Social History   Substance Use Topics     Smoking status: Current Every Day Smoker     Packs/day: 0.50     Years: 15.00     Types: Cigarettes     Last attempt to quit: 7/18/2013     Smokeless tobacco: Never Used     Alcohol use No       Current Outpatient Prescriptions   Medication Sig Dispense Refill     valACYclovir (VALTREX) 500 MG tablet TAKE ONE TABLET BY MOUTH DAILY 90 tablet 0     celecoxib (CELEBREX) 200 MG capsule Take 1 capsule (200 mg) by mouth 2 times daily for 7 days 14 capsule 0     oxyCODONE-acetaminophen (PERCOCET) 5-325 MG per tablet Take 1-2 tablets by mouth every 4 hours as needed for moderate to severe pain or pain maximum 12 tablet(s) per day 40 tablet 0       No Known Allergies    REVIEW OF SYSTEMS  Skin: as above, hydradenditis suprativa  Eyes: contacts  Ears/Nose/Throat: negative  Respiratory: No shortness of breath, dyspnea on exertion, cough, or hemoptysis  Cardiovascular: negative  Gastrointestinal: abdominal pain  Genitourinary: hx of kidney stones  Musculoskeletal: negative  Neurologic: negative  Psychiatric: negative  Hematologic/Lymphatic/Immunologic: negative  Endocrine: negative    OBJECTIVE:    B/P: 105/68, T: 98.9, P: 74, R: Data Unavailable, W: 130 lbs 0 oz, BMI: Body mass index is 23.78 kg/(m^2).  Constitutional: healthy, alert and no distress  Head: Normocephalic. No masses, lesions, tenderness or abnormalities  Cardiovascular: RRR. No murmurs, clicks  gallops or rub  Respiratory:  Good diaphragmatic excursion. Lungs clear  Gastrointestinal: Abdomen soft, non-tender. BS normal. No masses, organomegaly  : Deferred  Musculoskeletal: extremities normal- no gross deformities noted, gait normal and normal muscle tone  Skin:        Wound description:  Type of Wound- surgical; Location- buttocks/coccyx region, Drainage amount-large, Drainage color-bloody,  Odor- none; Wound bed-100% pink and clean, Surrounding skin-intact, a few sutures still noted.       Measurements: 6.2 X 2.5 cm X 1.5 cm deep       Dressing change:  Wound cleansed with soap and water, wound windowed with thin duoderm, wound packed with one black sponge in the wound and another on top for the disc landing.  Pump is set at 125 mm/hg continuous, good seal obtained.    Neurologic: Gait normal. Sensation grossly WNL.  Psychiatric: mentation appears normal and affect normal/bright    THERAPY GOAL: Keep wound bed moist and drainage contained.  Prevent infection. Provide negative pressure to enhance healing.    ASSESSMENT / PLAN:  (Z87.2) H/O pilonidal cyst  Comment: NPWT placed today and patient verbalized understanding of all instructions, including how to contact [facility] with pump emergencies.  Plan: Start NPWT today and continue 3X week for now.    Yanci Arauz  CNP, CWOCN  Urology and Wound Care  September 29, 2017

## 2017-09-29 NOTE — PATIENT INSTRUCTIONS
Please come back to see us on Monday and we will change the dressing.  Call [facility] for pump problems.

## 2017-10-02 ENCOUNTER — OFFICE VISIT (OUTPATIENT)
Dept: WOUND CARE | Facility: OTHER | Age: 37
End: 2017-10-02
Attending: SURGERY
Payer: COMMERCIAL

## 2017-10-02 VITALS
OXYGEN SATURATION: 98 % | BODY MASS INDEX: 23.92 KG/M2 | HEIGHT: 62 IN | DIASTOLIC BLOOD PRESSURE: 66 MMHG | HEART RATE: 78 BPM | SYSTOLIC BLOOD PRESSURE: 102 MMHG | WEIGHT: 130 LBS | TEMPERATURE: 99.3 F

## 2017-10-02 DIAGNOSIS — T81.89XA DELAYED SURGICAL WOUND HEALING, INITIAL ENCOUNTER: Primary | ICD-10-CM

## 2017-10-02 PROCEDURE — 99212 OFFICE O/P EST SF 10 MIN: CPT | Mod: 25 | Performed by: NURSE PRACTITIONER

## 2017-10-02 PROCEDURE — 97605 NEG PRS WND THER DME<=50SQCM: CPT | Performed by: NURSE PRACTITIONER

## 2017-10-02 NOTE — MR AVS SNAPSHOT
After Visit Summary   10/2/2017    Purnima Fallon    MRN: [id number]           Patient Information     Date Of Birth          1980        Visit Information        Provider Department      10/2/2017 3:00 PM Yanci Arauz NP [facility] [place]        Today's Diagnoses     Delayed surgical wound healing, initial encounter    -  1       Follow-ups after your visit        Your next 10 appointments already scheduled     Oct 04, 2017  3:00 PM CDT   Return Visit with [facility] W[facility] CARE   [facility] Ostomy ([facility] )    [address]  [place] [address]   [phone]            Oct 06, 2017 10:00 AM CDT   (Arrive by 9:45 AM)   Return Visit with Yanci Arauz NP   [facility] [place] ([facility] [place] )    [address]  [place] [address]   [phone]            Oct 09, 2017  3:00 PM CDT   (Arrive by 2:45 PM)   Return Visit with Yanci Arauz NP   [facility] [place] ([facility] [place] )    [address]  [place] [address]   [phone]            Oct 11, 2017  3:00 PM CDT   (Arrive by 2:45 PM)   Return Visit with Yanci Arauz NP   [facility] [place] ([facility] [place] )    [address]  [place] [address]   [phone]            Oct 13, 2017 11:00 AM CDT   Return Visit with [facility] WOUND CARE   [facility] ([facility] )    [address]  [place] [address]   [phone]            Oct 16, 2017  3:00 PM CDT   (Arrive by 2:45 PM)   Return Visit with Yanci Arauz NP   [facility] [place] ([facility] [place] )    [address]  [place] [address]   [phone]            Oct 18, 2017  3:00 PM CDT   Return Visit with [facility] WOUND CARE   [facility] ([facility] )    [address]  [place] [address]   [phone]            Oct 20, 2017  3:00 PM CDT   (Arrive by 2:45 PM)   Return Visit with Yanci Arauz NP  "  [facility] [place] ([facility] [place] [facility] )    [place] [address] [phone]              Who to contact     If you have questions or need follow up information about today's clinic visit or your schedule please contact [facility] directly at [phone].  Normal or non-critical lab and imaging results will be communicated to you by MyChart, letter or phone within 4 business days after the clinic has received the results. If you do not hear from us within 7 days, please contact the clinic through MyChart or phone. If you have a critical or abnormal lab result, we will notify you by phone as soon as possible.  Submit refill requests through [employer] or call your pharmacy and they will forward the refill request to us. Please allow 3 business days for your refill to be completed.          Additional Information About Your Visit        SmarterShadehart Information     [employer] gives you secure access to your electronic health record. If you see a primary care provider, you can also send messages to your care team and make appointments. If you have questions, please call your primary care clinic.  If you do not have a primary care provider, please call [phone] and they will assist you.        Care EveryWhere ID     This is your Care EveryWhere ID. This could be used by other organizations to access your [facility] medical records  [id number]        Your Vitals Were     Pulse Temperature Height Last Period Pulse Oximetry BMI (Body Mass Index)    78 99.3  F (37.4  C) 5' 2\" (1.575 m) 08/10/2013 98% 23.78 kg/m2       Blood Pressure from Last 3 Encounters:   10/02/17 102/66   09/29/17 105/68   09/26/17 100/58    Weight from Last 3 Encounters:   10/02/17 130 lb (59 kg)   09/29/17 130 lb (59 kg)   09/26/17 130 lb (59 kg)              Today, you had the following     No orders found for display       Primary Care Provider Office Phone # Fax #    Alexi Romero NP " [phone]       [facility] [place] [address]  [place] [address]        Equal Access to Services     ALDO CRUZ : Hadii aad ku hadaileenange Ina, wadayannada luqlara, mileyta kajudyda stella, debby wardaminta clemons. So [facility] [phone].    ATENCIÓN: Si habla español, tiene a cyr disposición servicios gratuitos de asistencia lingüística. Llame al [phone].    We comply with applicable federal civil rights laws and [place] laws. We do not discriminate on the basis of race, color, national origin, age, disability, sex, sexual orientation, or gender identity.            Thank you!     Thank you for choosing [facility]  for your care. Our goal is always to provide you with excellent care. Hearing back from our patients is one way we can continue to improve our services. Please take a few minutes to complete the written survey that you may receive in the mail after your visit with us. Thank you!             Your Updated Medication List - Protect others around you: Learn how to safely use, store and throw away your medicines at www.disposemymeds.org.          This list is accurate as of: 10/2/17  3:40 PM.  Always use your most recent med list.                   Brand Name Dispense Instructions for use Diagnosis    celecoxib 200 MG capsule    celeBREX    14 capsule    Take 1 capsule (200 mg) by mouth 2 times daily for 7 days    H/O pilonidal cyst       oxyCODONE-acetaminophen 5-325 MG per tablet    PERCOCET    20 tablet    Take 1 tablet by mouth every 8 hours as needed for moderate to severe pain or pain maximum 12 tablet(s) per day    H/O pilonidal cyst       valACYclovir 500 MG tablet    VALTREX    90 tablet    TAKE ONE TABLET BY MOUTH DAILY    Herpes simplex virus infection

## 2017-10-02 NOTE — PROGRESS NOTES
SUBJECTIVE:  Purnima Fallon, 36 year old, female presents with the following Chief Complaint(s) with HPI to follow: wound care     HPI:  Purnima is here today for evaluation and treatment of her pilonidal cyst wound.  We are currently dressing the area with NPWT (negative pressure wound therapy) (wound vac).  She had it started on 9/29/17.  She has very little discomfort in the area now.  Past hx:  She has had an issue with pilonidal cysts for many years.  She has had 3 different operations on the cysts over the years.  She had her last surgery on 9/18/17. Of note: she also tells me that she has been diagnosed with hidradenditis supprativa    Patient Active Problem List   Diagnosis     Migraines     Depression     ADHD (attention deficit hyperactivity disorder)     Cystic acne     S/P laparoscopic hysterectomy     Kidney stones     Ischiorectal abscess and fistula     ACP (advance care planning)     Mass of breast     Pilonidal cyst     H/O pilonidal cyst     Delayed surgical wound healing, initial encounter       Past Medical History:   Diagnosis Date     Hidradenitis 2/16/2007     Ischiorectal abscess and fistula      Kidney stones 2008    x2 passed on her own     Nondependent tobacco use disorder 10/11/2012     Papanicolaou smear of cervix with low grade squamous intraepithelial lesion (LGSIL) 10/29/2008       Past Surgical History:   Procedure Laterality Date     CYSTECTOMY PILONIDAL N/A 9/18/2017    Procedure: CYSTECTOMY PILONIDAL;  PILONIDAL CYSTECTOMY ;  Surgeon: Cordell Stephens MD;  Location: [facility]     EXCISE MASS NECK Right 8/4/2015    Procedure: EXCISE MASS NECK;  Surgeon: Nasir Jones MD;  Location: [facility]     INCISION AND DRAINAGE PERINEAL, COMBINED N/A 3/18/2015    Procedure: COMBINED INCISION AND DRAINAGE PERINEAL;  Surgeon: Jay Torres DO;  Location: [facility]     LAPAROSCOPIC HYSTERECTOMY SUPRACERVICAL, BILATERAL SALPINGO-OOPHORECTOMY, COMBINED  9/27/2013    Procedure: COMBINED  LAPAROSCOPIC HYSTERECTOMY SUPRACERVICAL, SALPINGO-OOPHORECTOMY;  LAPAROSCOPIC SUPRACERVICAL HYSTERECTOMY AND RIGHT SALPINGO-OOPHORECTOMY, CYSTOSCOPY;  Surgeon: Denys Callahan MD;  Location: [facility] OR     marsupialization of pilonidal cyst  2007     TUBAL LIGATION  2005       Family History   Problem Relation Age of Onset     DIABETES Paternal Grandmother        Social History   Substance Use Topics     Smoking status: Current Every Day Smoker     Packs/day: 0.50     Years: 15.00     Types: Cigarettes     Last attempt to quit: 7/18/2013     Smokeless tobacco: Never Used     Alcohol use No       Current Outpatient Prescriptions   Medication Sig Dispense Refill     oxyCODONE-acetaminophen (PERCOCET) 5-325 MG per tablet Take 1 tablet by mouth every 8 hours as needed for moderate to severe pain or pain maximum 12 tablet(s) per day 20 tablet 0     valACYclovir (VALTREX) 500 MG tablet TAKE ONE TABLET BY MOUTH DAILY 90 tablet 0     celecoxib (CELEBREX) 200 MG capsule Take 1 capsule (200 mg) by mouth 2 times daily for 7 days 14 capsule 0       No Known Allergies    REVIEW OF SYSTEMS  Skin: as above, hydradenditis suprativa  Eyes: contacts  Ears/Nose/Throat: negative  Respiratory: No shortness of breath, dyspnea on exertion, cough, or hemoptysis  Cardiovascular: negative  Gastrointestinal: abdominal pain  Genitourinary: hx of kidney stones  Musculoskeletal: negative  Neurologic: negative  Psychiatric: negative  Hematologic/Lymphatic/Immunologic: negative  Endocrine: negative    OBJECTIVE:    B/P: 105/68, T: 98.9, P: 74, R: Data Unavailable, W: 130 lbs 0 oz, BMI: Body mass index is 23.78 kg/(m^2).  Constitutional: healthy, alert and no distress  Head: Normocephalic. No masses, lesions, tenderness or abnormalities  Cardiovascular: RRR. No murmurs, clicks gallops or rub  Respiratory:  Good diaphragmatic excursion. Lungs clear  Gastrointestinal: Abdomen soft, non-tender. BS normal. No masses, organomegaly  :  Deferred  Musculoskeletal: extremities normal- no gross deformities noted, gait normal and normal muscle tone  Skin:        Wound description:  Type of Wound- surgical; Location- buttocks/coccyx region, Drainage amount-small, Drainage color-bloody,  Odor- none; Wound bed- pink, but slippery and not granulating at the base, Surrounding skin-intact, a few sutures still noted.       Measurements: 6.1 X 2.2cm X 1.3 cm deep       Dressing change:  Wound cleansed with soap and water, wound windowed with thin duoderm, wound packed with one black sponge in the base of the wound (cut to a thinner thickness) and another on top, and a final black sponge cut thinner for the disc landing.  Pump is set at 125 mm/hg continuous, good seal obtained.    Neurologic: Gait normal. Sensation grossly WNL.  Psychiatric: mentation appears normal and affect normal/bright    THERAPY GOAL: Keep wound bed moist and drainage contained.  Prevent infection. Provide negative pressure to enhance healing.    ASSESSMENT / PLAN:  (Z87.2) H/O pilonidal cyst  Comment: Patient did well with the NPWT equipment and dressing.  She had one alarm and dealt with it.  I cut the sponges differently today because I suspect that the base sponge did not go all the way to the base of the wound, causing decreased granulation.   Plan: Continue NPWT 3X week.    Yanic Arauz  CNP, CWOCN  Urology and Wound Care  October 2, 2017

## 2017-10-04 ENCOUNTER — OFFICE VISIT (OUTPATIENT)
Dept: WOUND CARE | Facility: OTHER | Age: 37
End: 2017-10-04
Attending: SURGERY
Payer: COMMERCIAL

## 2017-10-04 VITALS
WEIGHT: 125 LBS | DIASTOLIC BLOOD PRESSURE: 70 MMHG | BODY MASS INDEX: 23 KG/M2 | HEART RATE: 74 BPM | TEMPERATURE: 99.4 F | HEIGHT: 62 IN | SYSTOLIC BLOOD PRESSURE: 103 MMHG

## 2017-10-04 DIAGNOSIS — K61.39 ISCHIORECTAL ABSCESS AND FISTULA: ICD-10-CM

## 2017-10-04 DIAGNOSIS — Z87.2 H/O PILONIDAL CYST: ICD-10-CM

## 2017-10-04 DIAGNOSIS — T81.89XA DELAYED SURGICAL WOUND HEALING, INITIAL ENCOUNTER: Primary | ICD-10-CM

## 2017-10-04 PROCEDURE — 97605 NEG PRS WND THER DME<=50SQCM: CPT | Performed by: CLINICAL NURSE SPECIALIST

## 2017-10-04 PROCEDURE — 99213 OFFICE O/P EST LOW 20 MIN: CPT | Mod: 25 | Performed by: CLINICAL NURSE SPECIALIST

## 2017-10-04 ASSESSMENT — PAIN SCALES - GENERAL: PAINLEVEL: NO PAIN (0)

## 2017-10-04 NOTE — MR AVS SNAPSHOT
After Visit Summary   10/4/2017    Purnima Fallon    MRN: [id number]           Patient Information     Date Of Birth          1980        Visit Information        Provider Department      10/4/2017 3:00 PM Samira Santiago APRN CNS [facility] [place]        Today's Diagnoses     Delayed surgical wound healing, initial encounter    -  1    H/O pilonidal cyst        Ischiorectal abscess and fistula           Follow-ups after your visit        Your next 10 appointments already scheduled     Oct 06, 2017 10:00 AM CDT   (Arrive by 9:45 AM)   Return Visit with Yanci Arauz NP   [facility] [place] ([facility] [place] )    [address]  [place] [address]   [phone]            Oct 09, 2017  3:00 PM CDT   (Arrive by 2:45 PM)   Return Visit with Yanci Arauz NP   [facility] [place] ([facility] [place] )    [address]  [place] [address]   [phone]            Oct 11, 2017  3:00 PM CDT   (Arrive by 2:45 PM)   Return Visit with Yanci Arauz NP   [facility] [place] ([facility] [place] )    [address]  [place] [address]   [phone]            Oct 13, 2017 11:00 AM CDT   [facility] Visit with [facility]   [facility] ([facility] )    [address]  [place] [address] [phone]            Oct 16, 2017  3:00 PM CDT   (Arrive by 2:45 PM)   Return Visit with Yanci Arauz NP   [facility] [place] ([facility] [place] )    [address]  [place] [address]   [phone]            Oct 18, 2017  3:00 PM CDT   [facility] Visit with [facility]   [facility] ([facility] )    [address] [place] [address] [phone]            Oct 20, 2017  3:00 PM CDT   (Arrive by 2:45 PM)   Return Visit with Yanci Arauz NP   [facility] [place] ([facility] [place] )    [address]  [place] [address]  "  [phone]              Who to contact     If you have questions or need follow up information about today's clinic visit or your schedule please contact [facility] directly at [phone].  Normal or non-critical lab and imaging results will be communicated to you by MyChart, letter or phone within 4 business days after the clinic has received the results. If you do not hear from us within 7 days, please contact the clinic through MyChart or phone. If you have a critical or abnormal lab result, we will notify you by phone as soon as possible.  Submit refill requests through [employer] or call your pharmacy and they will forward the refill request to us. Please allow 3 business days for your refill to be completed.          Additional Information About Your Visit        [employer] Information     [employer] gives you secure access to your electronic health record. If you see a primary care provider, you can also send messages to your care team and make appointments. If you have questions, please call your primary care clinic.  If you do not have a primary care provider, please call [phone] and they will assist you.        Care EveryWhere ID     This is your Care EveryWhere ID. This could be used by other organizations to access your [facility] medical records  [id number]        Your Vitals Were     Pulse Temperature Height Last Period BMI (Body Mass Index)       74 99.4  F (37.4  C) 5' 2\" (1.575 m) 08/10/2013 22.86 kg/m2        Blood Pressure from Last 3 Encounters:   10/04/17 103/70   10/02/17 102/66   09/29/17 105/68    Weight from Last 3 Encounters:   10/04/17 125 lb (56.7 kg)   10/02/17 130 lb (59 kg)   09/29/17 130 lb (59 kg)              Today, you had the following     No orders found for display       Primary Care Provider Office Phone # Fax #    Alexi Romero [phone]       [facility] [address] [place] [address]        Equal Access to Services     " ALDO CRUZ : Hadii aad ku kiko Buckley, waaxda luqadaha, qaybta kaalmada erickajoanamontez, debby lynn hayirene thompsonomarlily garcia . So [facility] [phone].    ATENCIÓN: Si habla español, tiene a cyr disposición servicios gratuitos de asistencia lingüística. Llame al [phone].    We comply with applicable federal civil rights laws and [place] laws. We do not discriminate on the basis of race, color, national origin, age, disability, sex, sexual orientation, or gender identity.            Thank you!     Thank you for choosing [facility]  for your care. Our goal is always to provide you with excellent care. Hearing back from our patients is one way we can continue to improve our services. Please take a few minutes to complete the written survey that you may receive in the mail after your visit with us. Thank you!             Your Updated Medication List - Protect others around you: Learn how to safely use, store and throw away your medicines at www.disposemymeds.org.          This list is accurate as of: 10/4/17  5:04 PM.  Always use your most recent med list.                   Brand Name Dispense Instructions for use Diagnosis    celecoxib 200 MG capsule    celeBREX    14 capsule    Take 1 capsule (200 mg) by mouth 2 times daily for 7 days    H/O pilonidal cyst       oxyCODONE-acetaminophen 5-325 MG per tablet    PERCOCET    20 tablet    Take 1 tablet by mouth every 8 hours as needed for moderate to severe pain or pain maximum 12 tablet(s) per day    H/O pilonidal cyst       valACYclovir 500 MG tablet    VALTREX    90 tablet    TAKE ONE TABLET BY MOUTH DAILY    Herpes simplex virus infection

## 2017-10-04 NOTE — PROGRESS NOTES
SUBJECTIVE:  Purnima Fallon, 36 year old, female presents with the following Chief Complaint(s) with HPI to follow:   Wound care     HPI:  Purnima is here today for evaluation and treatment of her pilonidal cyst wound.  We are currently dressing the area with NPWT (negative pressure wound therapy) (wound vac).  She had it started on 9/29/17.  Her pain is well managed with percocet; she takes one prior to the dressing change and one at bedtime.    Past hx:  She has had an issue with pilonidal cysts for many years.  She has had three different operations on the cysts over the years.  She had her last surgery on 9/18/17. Of note: she also reports she has been diagnosed with hidradenditis supprativa and she believes this contributes to her cyst issue.    Patient Active Problem List   Diagnosis     Migraines     Depression     ADHD (attention deficit hyperactivity disorder)     Cystic acne     S/P laparoscopic hysterectomy     Kidney stones     Ischiorectal abscess and fistula     ACP (advance care planning)     Mass of breast     Pilonidal cyst     H/O pilonidal cyst     Delayed surgical wound healing, initial encounter       Past Medical History:   Diagnosis Date     Hidradenitis 2/16/2007     Ischiorectal abscess and fistula      Kidney stones 2008    x2 passed on her own     Nondependent tobacco use disorder 10/11/2012     Papanicolaou smear of cervix with low grade squamous intraepithelial lesion (LGSIL) 10/29/2008       Past Surgical History:   Procedure Laterality Date     CYSTECTOMY PILONIDAL N/A 9/18/2017    Procedure: CYSTECTOMY PILONIDAL;  PILONIDAL CYSTECTOMY ;  Surgeon: Cordell Stephens MD;  [facility]     EXCISE MASS NECK Right 8/4/2015    Procedure: EXCISE MASS NECK;  Surgeon: Nasir Jones MD;  [facility]     INCISION AND DRAINAGE PERINEAL, COMBINED N/A 3/18/2015    Procedure: COMBINED INCISION AND DRAINAGE PERINEAL;  Surgeon: Jay Torres DO;  [facility]: [facility]     LAPAROSCOPIC  "HYSTERECTOMY SUPRACERVICAL, BILATERAL SALPINGO-OOPHORECTOMY, COMBINED  9/27/2013    Procedure: COMBINED LAPAROSCOPIC HYSTERECTOMY SUPRACERVICAL, SALPINGO-OOPHORECTOMY;  LAPAROSCOPIC SUPRACERVICAL HYSTERECTOMY AND RIGHT SALPINGO-OOPHORECTOMY, CYSTOSCOPY;  Surgeon: Denys Callahan MD;  Location: [facility]     marsupialization of pilonidal cyst  2007     TUBAL LIGATION  2005       Family History   Problem Relation Age of Onset     DIABETES Paternal Grandmother        Social History   Substance Use Topics     Smoking status: Current Every Day Smoker     Packs/day: 0.50     Years: 15.00     Types: Cigarettes     Last attempt to quit: 7/18/2013     Smokeless tobacco: Never Used     Alcohol use No       Current Outpatient Prescriptions   Medication Sig Dispense Refill     oxyCODONE-acetaminophen (PERCOCET) 5-325 MG per tablet Take 1 tablet by mouth every 8 hours as needed for moderate to severe pain or pain maximum 12 tablet(s) per day 20 tablet 0     valACYclovir (VALTREX) 500 MG tablet TAKE ONE TABLET BY MOUTH DAILY 90 tablet 0     celecoxib (CELEBREX) 200 MG capsule Take 1 capsule (200 mg) by mouth 2 times daily for 7 days 14 capsule 0       No Known Allergies    REVIEW OF SYSTEMS  Skin: as above, hydradenditis suprativa  Eyes: contacts  Ears/Nose/Throat: negative  Respiratory: No shortness of breath, dyspnea on exertion, cough, or hemoptysis  Cardiovascular: negative  Gastrointestinal: abdominal pain  Genitourinary: hx of kidney stones  Musculoskeletal: negative  Neurologic: negative  Psychiatric: negative  Hematologic/Lymphatic/Immunologic: negative  Endocrine: negative    OBJECTIVE:  /70  Pulse 74  Temp 99.4  F (37.4  C)  Ht 5' 2\" (1.575 m)  Wt 125 lb (56.7 kg)  LMP 08/10/2013  BMI 22.86 kg/m2  Constitutional: healthy, alert and no distress  Head: Normocephalic. No masses, lesions, tenderness or abnormalities  Cardiovascular: RRR. No murmurs, clicks gallops or rub  Respiratory:  Good diaphragmatic excursion. " Lungs clear  Gastrointestinal: Abdomen soft, non-tender. BS normal. No masses, organomegaly  : Deferred  Musculoskeletal: extremities normal- no gross deformities noted, gait normal and normal muscle tone  Skin:        Wound description:  Type of Wound- surgical; Location- buttocks/coccyx region, Drainage amount-small, Drainage color-bloody,  Odor- strong when the vac is shut off; Wound bed- pink, slippery and not granulating at the base, sides are granulating nicely. Surrounding skin-intact       Measurements: 6.1 X 2.5cm X 1.3 cm deep       Dressing change:  Wound cleansed with soap and water, wound windowed with thin duoderm, wound packed with one black sponge in the base of the wound (cut to a thinner thickness) and another on top, and a final black sponge cut thinner for the disc landing.  Pump is set at 125 mm/hg continuous, good seal obtained.    Neurologic: Gait normal. Sensation grossly WNL.  Psychiatric: mentation appears normal and affect normal/bright    THERAPY GOAL: Keep wound bed moist and drainage contained.  Prevent infection. Provide negative pressure to enhance healing.    ASSESSMENT / PLAN:  (Z87.2) H/O pilonidal cyst  Comment: Patient did well with the NPWT equipment and dressing.      Plan: Continue NPWT 3X week.    Samira Santiago CNS  Surgery and Wound Care  [facility]

## 2017-10-05 ENCOUNTER — OFFICE VISIT (OUTPATIENT)
Dept: WOUND CARE | Facility: OTHER | Age: 37
End: 2017-10-05
Attending: NURSE PRACTITIONER
Payer: COMMERCIAL

## 2017-10-05 ENCOUNTER — TELEPHONE (OUTPATIENT)
Dept: INTERNAL MEDICINE | Facility: OTHER | Age: 37
End: 2017-10-05

## 2017-10-05 VITALS — HEART RATE: 73 BPM | TEMPERATURE: 97.6 F | SYSTOLIC BLOOD PRESSURE: 114 MMHG | DIASTOLIC BLOOD PRESSURE: 76 MMHG

## 2017-10-05 DIAGNOSIS — T81.89XA DELAYED SURGICAL WOUND HEALING, INITIAL ENCOUNTER: Primary | ICD-10-CM

## 2017-10-05 PROCEDURE — 99213 OFFICE O/P EST LOW 20 MIN: CPT | Mod: 25 | Performed by: NURSE PRACTITIONER

## 2017-10-05 PROCEDURE — 97605 NEG PRS WND THER DME<=50SQCM: CPT | Performed by: NURSE PRACTITIONER

## 2017-10-05 NOTE — PROGRESS NOTES
SUBJECTIVE:  Purnima Fallon, 36 year old, female presents with the following Chief Complaint(s) with HPI to follow:  Chief Complaint   Patient presents with     WOUND CARE     NPWT dressing, leaking        Wound Care    HPI:  Purnima is here today for the reassessment and treatment of pilonidal post-surgical wound.  She's here today as she was having issues with her NPWT not sealing  Back story:   She has had an issue with pilonidal cysts for many years.  She has had 3 different operations on the cysts over the years.  She had her last surgery on 9/18/17.   Continues to see Wound Care every Monday, Wednesday, and Friday for NPWT wound dressing changes.  As noted above, she was having issues with her NPWT not sealing.     Denies any fevers, chills, and/or malodorous drainage.   PMH: hidradenditis supprativa    She's planning on coming here tomorrow as Dr. Stephens was planning on seeing her wound at this time.     Patient Active Problem List   Diagnosis     Migraines     Depression     ADHD (attention deficit hyperactivity disorder)     Cystic acne     S/P laparoscopic hysterectomy     Kidney stones     Ischiorectal abscess and fistula     ACP (advance care planning)     Mass of breast     Pilonidal cyst     H/O pilonidal cyst     Biliary dyskinesia       Past Medical History:   Diagnosis Date     Hidradenitis 2/16/2007     Ischiorectal abscess and fistula      Kidney stones 2008    x2 passed on her own     Nondependent tobacco use disorder 10/11/2012     Papanicolaou smear of cervix with low grade squamous intraepithelial lesion (LGSIL) 10/29/2008       Past Surgical History:   Procedure Laterality Date     CYSTECTOMY PILONIDAL N/A 9/18/2017    Procedure: CYSTECTOMY PILONIDAL;  PILONIDAL CYSTECTOMY ;  Surgeon: Cordell Stephens MD;  [facility]     EXCISE MASS NECK Right 8/4/2015    Procedure: EXCISE MASS NECK;  Surgeon: Nasir Jones MD;  [facility]: [facility]     INCISION AND DRAINAGE PERINEAL, COMBINED N/A  3/18/2015    Procedure: COMBINED INCISION AND DRAINAGE PERINEAL;  Surgeon: Jay Torres DO;  Location: [facility]     LAPAROSCOPIC HYSTERECTOMY SUPRACERVICAL, BILATERAL SALPINGO-OOPHORECTOMY, COMBINED  9/27/2013    Procedure: COMBINED LAPAROSCOPIC HYSTERECTOMY SUPRACERVICAL, SALPINGO-OOPHORECTOMY;  LAPAROSCOPIC SUPRACERVICAL HYSTERECTOMY AND RIGHT SALPINGO-OOPHORECTOMY, CYSTOSCOPY;  Surgeon: Denys Callahan MD;  Location: [facility]     marsupialization of pilonidal cyst  2007     TUBAL LIGATION  2005       Family History   Problem Relation Age of Onset     DIABETES Paternal Grandmother        Social History   Substance Use Topics     Smoking status: Current Every Day Smoker     Packs/day: 0.50     Years: 15.00     Types: Cigarettes     Last attempt to quit: 7/18/2013     Smokeless tobacco: Never Used     Alcohol use No       Current Outpatient Prescriptions   Medication Sig Dispense Refill     oxyCODONE-acetaminophen (PERCOCET) 5-325 MG per tablet Take 1 tablet by mouth every 8 hours as needed for moderate to severe pain or pain maximum 12 tablet(s) per day (Patient not taking: Reported on 10/11/2017) 20 tablet 0     valACYclovir (VALTREX) 500 MG tablet TAKE ONE TABLET BY MOUTH DAILY 90 tablet 0       No Known Allergies    REVIEW OF SYSTEMS  Skin: as noted above  Eyes: negative; wear corrective lens  Ears/Nose/Throat: negative  Respiratory: No shortness of breath, dyspnea on exertion, cough, or hemoptysis  Cardiovascular: negative  Gastrointestinal: negative  Genitourinary: negative  Musculoskeletal: negative  Neurologic: negative  Psychiatric: negative  Hematologic/Lymphatic/Immunologic: negative  Endocrine: negative    OBJECTIVE:  /76  Pulse 73  Temp 97.6  F (36.4  C)  LMP 08/10/2013  Constitutional: healthy, alert and no distress  Cardiovascular: RRR. No murmurs, clicks gallops or rub  Respiratory:  Good diaphragmatic excursion. Lungs clear  Skin:   Wound description: Type of Wound- surgical; Location-  coccyx region, Drainage amount-scant, Drainage color- serosanguinous, Odor- none; Wound bed-reddish pink, more granulated tissue noted, Surrounding skin- several follicular lesions, otherwise WDL.  Measurements: not measured  Dressing change:   Cris HAN RN CWON assisted with NPWT change.   Removed old dressing.   Wound cleansed with saline.   Periwound area cleansed with water and soap, dried.   Cris shaved periwound area.  Painted periwound area with Chlorhexidine.   Window-paned wound with CGF duoderm  Applied 2 black foams to base, drapped, and added another black foam for disc landing  NPWT restarted--low, 125, continuous.  No leaks noted     Psychiatric: mentation appears normal and affect normal/bright      LABS  Results for orders placed or performed in visit on 09/07/17   US Abdomen Limited    Narrative    ABDOMINAL ULTRASOUND    FINDINGS:  There is an echogenic collection seen in the lower midline  of the back, in the region of the sacrum and coccyx.  This mixed  echogenicity lesion lies approximately 1 cm deep to the skin and lies  superficial to the sacral fascia.    IMPRESSION:  ECHOGENIC MASS, POSSIBLY A PILONIDAL CYST.  THE DEPTH ON  THE SCAN IS APPROXIMATELY 1 CM.  Exam Date: Sep 07, 2017 01:12:00 PM  Author: DAISHA HAAS  This report is final and signed         ASSESSMENT / PLAN:  (T81.89XA) Delayed surgical wound healing, initial encounter  (primary encounter diagnosis)  Comment:   Added chlorhexidine to periwound area.   Window-paned wound with CGF duoderm  Plan:   Follow up with Wound Care tomorrow.      Treatment goal: NPWT to enhance healing and moisture balance.          Patient Instructions   Continue with Monday, Wednesday, and Friday NPWT dressing changes at the Wound Care Center.      Please call if any questions/concerns and/or problems ([phone])    Follow up this Friday, as scheduled      Three building blocks of wound healing  1. Protein (if not contraindicated)  2. Vitamin C  500 mg oral twice a day  3.  Zinc 220 mg oral daily         Time: 40 minutes  Barrier: none  Willingness to learn: accepting    Maida CANTU FNP-BC  Disease Management

## 2017-10-05 NOTE — PATIENT INSTRUCTIONS
Continue with Monday, Wednesday, and Friday NPWT dressing changes at the Wound Care Center.      Please call if any questions/concerns and/or problems ([phone])    Follow up this Friday, as scheduled      Three building blocks of wound healing  1. Protein (if not contraindicated)  2. Vitamin C 500 mg oral twice a day  3.  Zinc 220 mg oral daily

## 2017-10-05 NOTE — MR AVS SNAPSHOT
After Visit Summary   10/5/2017    Purnima Fallon    MRN: [id number]           Patient Information     Date Of Birth          1980        Visit Information        Provider Department      10/5/2017 2:00 PM Maida Mckeon NP [facility] [place]        Today's Diagnoses     Delayed surgical wound healing, initial encounter    -  1      Care Instructions    Continue with Monday, Wednesday, and Friday NPWT dressing changes at the [facility] Care Center.      Please call if any questions/concerns and/or problems ([phone])    Follow up this Friday, as scheduled      Three building blocks of wound healing  1. Protein (if not contraindicated)  2. Vitamin C 500 mg oral twice a day  3.  Zinc 220 mg oral daily             Follow-ups after your visit        Follow-up notes from your care team     Return in about 1 day (around 10/6/2017).      Your next 10 appointments already scheduled     Oct 13, 2017 11:00 AM CDT   (Arrive by 10:45 AM)   Return Visit with Yanci Arauz NP   [facility] ([facility] [place] )    [address]  [place] [address]   [phone]            Oct 16, 2017  3:00 PM CDT   (Arrive by 2:45 PM)   Return Visit with Yanci Arauz NP   [facility] ([facility] [place] )    [address]  [place] [address]   [phone]            Oct 18, 2017  3:00 PM CDT   Return Visit with [facility]   [facility] ([facility] )    [address] [place] [address]  [place] [address] [phone]            Oct 20, 2017  3:00 PM CDT   (Arrive by 2:45 PM)   Return Visit with [facility] NP   [facility] [place] )    [address]  [place] [address]   [phone]            Oct 23, 2017  3:00 PM CDT   (Arrive by 2:45 PM)   Return Visit with [facility] NP   [facility] [place] )    [address]  [place] [address]    [phone]            Oct 25, 2017  3:00 PM CDT   Return Visit with [facility]   [facility] ([facility] )    [address] [place] [address] [phone]            Oct 27, 2017  3:30 PM CDT   (Arrive by 3:15 PM)   Return Visit with Yanci Arauz NP   [facility] ([facility] )    [address]  [place] [address] [phone]            Nov 20, 2017   Procedure with Cordell Stephens MD   [facility] ([facility] )    [address] [address] [phone]              Who to contact     If you have questions or need follow up information about today's clinic visit or your schedule please contact [facility] directly at [phone].  Normal or non-critical lab and imaging results will be communicated to you by MyChart, letter or phone within 4 business days after the clinic has received the results. If you do not hear from us within 7 days, please contact the clinic through [employer] or phone. If you have a critical or abnormal lab result, we will notify you by phone as soon as possible.  Submit refill requests through [employer] or call your pharmacy and they will forward the refill request to us. Please allow 3 business days for your refill to be completed.          Additional Information About Your Visit        [employer] Information     [employer] gives you secure access to your electronic health record. If you see a primary care provider, you can also send messages to your care team and make appointments. If you have questions, please call your primary care clinic.  If you do not have a primary care provider, please call [phone] and they will assist you.        Care EveryWhere ID     This is your Care EveryWhere ID. This could be used by other organizations to access your [facility] medical records  [id number]        Your Vitals Were     Pulse Temperature Last Period             73  97.6  F (36.4  C) 08/10/2013          Blood Pressure from Last 3 Encounters:   10/11/17 104/66   10/09/17 107/70   10/06/17 108/68    Weight from Last 3 Encounters:   10/06/17 125 lb (56.7 kg)   10/06/17 125 lb (56.7 kg)   10/04/17 125 lb (56.7 kg)              Today, you had the following     No orders found for display         Today's Medication Changes          These changes are accurate as of: 10/5/17 11:59 PM.  If you have any questions, ask your nurse or doctor.               Stop taking these medicines if you haven't already. Please contact your care team if you have questions.     celecoxib 200 MG capsule   Commonly known as:  celeBREX   Stopped by:  Maida Mckeon NP                    Primary Care Provider Office Phone # Fax #    Alexi Romero [phone]       [facility] [place] [address]  [facility] [place] [address]        Equal Access to Services     [facility]: Hadii aad ku hadasho Soomaali, waaxda luqadaha, qaybta kaalmada adeegyada, waxay idiin hayaan erickaeg oh garcia . So [facility] [phone].    ATENCIÓN: Si habla español, tiene a cyr disposición servicios gratuitos de asistencia lingüística. Llame al [phone].    We comply with applicable federal civil rights laws and [place] laws. We do not discriminate on the basis of race, color, national origin, age, disability, sex, sexual orientation, or gender identity.            Thank you!     Thank you for choosing [facility] [place]  for your care. Our goal is always to provide you with excellent care. Hearing back from our patients is one way we can continue to improve our services. Please take a few minutes to complete the written survey that you may receive in the mail after your visit with us. Thank you!             Your Updated Medication List - Protect others around you: Learn how to safely use, store and throw away your medicines at www.disposemymeds.org.          This list is accurate as of: 10/5/17 11:59  PM.  Always use your most recent med list.                   Brand Name Dispense Instructions for use Diagnosis    oxyCODONE-acetaminophen 5-325 MG per tablet    PERCOCET    20 tablet    Take 1 tablet by mouth every 8 hours as needed for moderate to severe pain or pain maximum 12 tablet(s) per day    H/O pilonidal cyst       valACYclovir 500 MG tablet    VALTREX    90 tablet    TAKE ONE TABLET BY MOUTH DAILY    Herpes simplex virus infection

## 2017-10-05 NOTE — TELEPHONE ENCOUNTER
Reason for call:  RESULTS    1. What is the test that was ordered? Radiology  2. Who ordered your test? Alexi Romero  3. When was the test performed?  9-29-17  Description: Patient is requesting the results of the test performed  Was an appointment offered for this a call? No  If Yes :  Appointment type                 Date    Preferred method for responding to this message: Telephone Call  What is your phone number ? [phone]    If we cannot reach you directly, may we leave a detailed response at the number you provided? Yes  Can this message wait until your PCP/Provider returns if not available today? YES

## 2017-10-05 NOTE — TELEPHONE ENCOUNTER
Called patient with HIDA scan result-was abnormal-Plan surgery referral for Hien after wound has healed. GURPREET

## 2017-10-06 ENCOUNTER — OFFICE VISIT (OUTPATIENT)
Dept: SURGERY | Facility: OTHER | Age: 37
End: 2017-10-06
Attending: SURGERY
Payer: COMMERCIAL

## 2017-10-06 ENCOUNTER — OFFICE VISIT (OUTPATIENT)
Dept: WOUND CARE | Facility: OTHER | Age: 37
End: 2017-10-06
Attending: NURSE PRACTITIONER
Payer: COMMERCIAL

## 2017-10-06 VITALS
BODY MASS INDEX: 23 KG/M2 | HEART RATE: 71 BPM | SYSTOLIC BLOOD PRESSURE: 108 MMHG | DIASTOLIC BLOOD PRESSURE: 68 MMHG | TEMPERATURE: 98.7 F | OXYGEN SATURATION: 98 % | HEIGHT: 62 IN | WEIGHT: 125 LBS

## 2017-10-06 VITALS
HEIGHT: 62 IN | SYSTOLIC BLOOD PRESSURE: 104 MMHG | BODY MASS INDEX: 23 KG/M2 | DIASTOLIC BLOOD PRESSURE: 60 MMHG | TEMPERATURE: 98.8 F | OXYGEN SATURATION: 97 % | HEART RATE: 90 BPM | WEIGHT: 125 LBS

## 2017-10-06 DIAGNOSIS — K82.8 BILIARY DYSKINESIA: Primary | ICD-10-CM

## 2017-10-06 DIAGNOSIS — T81.89XD DELAYED SURGICAL WOUND HEALING, SUBSEQUENT ENCOUNTER: Primary | ICD-10-CM

## 2017-10-06 PROBLEM — T81.89XA DELAYED SURGICAL WOUND HEALING, INITIAL ENCOUNTER: Status: RESOLVED | Noted: 2017-09-29 | Resolved: 2017-10-06

## 2017-10-06 PROCEDURE — 97605 NEG PRS WND THER DME<=50SQCM: CPT | Performed by: NURSE PRACTITIONER

## 2017-10-06 PROCEDURE — 99213 OFFICE O/P EST LOW 20 MIN: CPT | Performed by: SURGERY

## 2017-10-06 ASSESSMENT — PAIN SCALES - GENERAL: PAINLEVEL: NO PAIN (0)

## 2017-10-06 NOTE — PATIENT INSTRUCTIONS
Thank you for allowing Dr. Stephens and our surgical team to participate in your care.  If you have a scheduling or an appointment question please contact our Health Unit Coordinator, Radha,  at her direct line [phone].   ALL nursing questions or concerns can be directed to your surgical nurse at: [phone]    You are scheduled for a: Laparoscopic Cholecystectomy   Your procedure date is: 11/20/17        HOW TO PREPARE-      You need a friend or family member available to drive you home AND stay with you for 24 hours after you leave the hospital. You will not be allowed to drive yourself. IF you need to take a taxi or the bus you MUST have a responsible person to ride with you. YOUR PROCEDURE WILL BE CANCELLED IF YOU DO NOT HAVE A RESPONSIBLE ADULT TO DRIVE YOU HOME.       You CANNOT have anything to eat or drink after midnight the night before your surgery, ncluding water and coffee. Your stomach needs to be completely empty. Do NOT chew gum, suck on hard candy, or smoke. You can brush your teeth the morning of surgery. Hold all medications the morning of procedure.       You need to call our Surgery Education Nurses 1-2 weeks prior to your surgery date at  [phone] or toll free [phone]. Please have you medication and allergy lists ready.      Stop your aspirin or other NSAIDs(Ibuprofen, Motrin, Aleve, Celebrex, Naproxen, etc...) 7 days before your surgery.      Hospital admitting will call you the day before your surgery with your arrival time. If you are scheduled on a Monday admitting will call you the Friday before.      Please call your primary care physician if you should become ill within 24 hours of scheduled surgery. (ex.vomiting, diarrhea, fever)          You will need to wash the night before AND the morning of you procedure with the supplied Hibiclens. Wash your Surgical area with your bare hands, apply friction and rinse. KEEP IT AWAY FROM YOUR EYES, EARS, NOSE AND MOUTH.

## 2017-10-06 NOTE — MR AVS SNAPSHOT
After Visit Summary   10/6/2017    Purnima Fallon    MRN: [id number]           Patient Information     Date Of Birth          1980        Visit Information        Provider Department      10/6/2017 10:15 AM Cordell Stephens MD [facility]        Care Instructions    Thank you for allowing Dr. Stephens and our surgical team to participate in your care.  If you have a scheduling or an appointment question please contact our Health Unit Coordinator, Radha,  at her direct line [phone].   ALL nursing questions or concerns can be directed to your surgical nurse at: [phone]    You are scheduled for a: Laparoscopic Cholecystectomy   Your procedure date is: 11/20/17        HOW TO PREPARE-      You need a friend or family member available to drive you home AND stay with you for 24 hours after you leave the hospital. You will not be allowed to drive yourself. IF you need to take a taxi or the bus you MUST have a responsible person to ride with you. YOUR PROCEDURE WILL BE CANCELLED IF YOU DO NOT HAVE A RESPONSIBLE ADULT TO DRIVE YOU HOME.       You CANNOT have anything to eat or drink after midnight the night before your surgery, ncluding water and coffee. Your stomach needs to be completely empty. Do NOT chew gum, suck on hard candy, or smoke. You can brush your teeth the morning of surgery. Hold all medications the morning of procedure.       You need to call our Surgery Education Nurses 1-2 weeks prior to your surgery date at  [phone] or toll free [phone]. Please have you medication and allergy lists ready.      Stop your aspirin or other NSAIDs(Ibuprofen, Motrin, Aleve, Celebrex, Naproxen, etc...) 7 days before your surgery.      Hospital admitting will call you the day before your surgery with your arrival time. If you are scheduled on a Monday admitting will call you the Friday before.      Please call your primary care physician if you should become ill within 24  hours of scheduled surgery. (ex.vomiting, diarrhea, fever)          You will need to wash the night before AND the morning of you procedure with the supplied Hibiclens. Wash your Surgical area with your bare hands, apply friction and rinse. KEEP IT AWAY FROM YOUR EYES, EARS, NOSE AND MOUTH.             Follow-ups after your visit        Your next 10 appointments already scheduled     Oct 09, 2017  3:00 PM CDT   Return Visit with [facility]   [facility] ([facility] )    [address]  [place] [address] [phone]            Oct 11, 2017  3:00 PM CDT   (Arrive by 2:45 PM)   Return Visit with Yanci Arauz NP   [facility] [place] ([facility] )    [address]  [place] [address] [phone]            Oct 13, 2017 11:00 AM CDT   Return Visit with [facility]   [facility] ([facility] )    [address]  [place] [address] [phone]            Oct 16, 2017  3:00 PM CDT   (Arrive by 2:45 PM)   Return Visit with Yanci Arauz NP   [facility] [place] ([facility] )    [address]  [place] [address] [phone]            Oct 18, 2017  3:00 PM CDT   Return Visit with [facility]   [facility] ([facility] )    [address]  [place] [address] [phone]            Oct 20, 2017  3:00 PM CDT   (Arrive by 2:45 PM)   Return Visit with Yanci Arauz NP   [facility] [place] ([facility] [place] )    [address]  [place] [address] [phone]              Who to contact     If you have questions or need follow up information about today's clinic visit or your schedule please contact [facility] directly at [phone].  Normal or non-critical lab and imaging results will be communicated to you by MyChart, letter or phone within 4 business days after the clinic has received the results. If you do not hear  "from us within 7 days, please contact the clinic through ShinyByte or phone. If you have a critical or abnormal lab result, we will notify you by phone as soon as possible.  Submit refill requests through ShinyByte or call your pharmacy and they will forward the refill request to us. Please allow 3 business days for your refill to be completed.          Additional Information About Your Visit        Finariohart Information     ShinyByte gives you secure access to your electronic health record. If you see a primary care provider, you can also send messages to your care team and make appointments. If you have questions, please call your primary care clinic.  If you do not have a primary care provider, please call [phone] and they will assist you.        Care EveryWhere ID     This is your Care EveryWhere ID. This could be used by other organizations to access your [place] medical records  [id number]        Your Vitals Were     Pulse Temperature Height Last Period Pulse Oximetry BMI (Body Mass Index)    71 98.7  F (37.1  C) (Tympanic) 5' 2\" (1.575 m) 08/10/2013 98% 22.86 kg/m2       Blood Pressure from Last 3 Encounters:   10/06/17 108/68   10/06/17 104/60   10/05/17 114/76    Weight from Last 3 Encounters:   10/06/17 125 lb (56.7 kg)   10/06/17 125 lb (56.7 kg)   10/04/17 125 lb (56.7 kg)              Today, you had the following     No orders found for display       Primary Care Provider Office Phone # Fax #    Alexi Romero [phone]       [place] [address]  [place] [address]        Equal Access to Services     [facility]: Hadii otto fletcher hadashange Sostacy, waaxda luqadaha, qaybta kaalmada debby douglas. So [facility] [phone].    ATENCIÓN: Si habla español, tiene a cyr disposición servicios gratuitos de asistencia lingüística. Maycol al [phone].    We comply with applicable federal civil rights laws and [place] laws. We do not " discriminate on the basis of race, color, national origin, age, disability, sex, sexual orientation, or gender identity.            Thank you!     Thank you for choosing [facility]  for your care. Our goal is always to provide you with excellent care. Hearing back from our patients is one way we can continue to improve our services. Please take a few minutes to complete the written survey that you may receive in the mail after your visit with us. Thank you!             Your Updated Medication List - Protect others around you: Learn how to safely use, store and throw away your medicines at www.disposemymeds.org.          This list is accurate as of: 10/6/17 10:45 AM.  Always use your most recent med list.                   Brand Name Dispense Instructions for use Diagnosis    oxyCODONE-acetaminophen 5-325 MG per tablet    PERCOCET    20 tablet    Take 1 tablet by mouth every 8 hours as needed for moderate to severe pain or pain maximum 12 tablet(s) per day    H/O pilonidal cyst       valACYclovir 500 MG tablet    VALTREX    90 tablet    TAKE ONE TABLET BY MOUTH DAILY    Herpes simplex virus infection

## 2017-10-06 NOTE — PROGRESS NOTES
SUBJECTIVE:  Purnima Fallon, 36 year old, female presents with the following Chief Complaint(s) with HPI to follow: wound care     HPI:  Purnima is here today for evaluation and treatment of her pilonidal cyst wound.  We are currently dressing the area with NPWT (negative pressure wound therapy) (wound vac).  She had it started on 9/29/17.  She has very little discomfort in the area now.  She did have an extra visit yesterday because the dressing was leaking.  She had no further leaks from yesterday to today.    Past hx:  She has had an issue with pilonidal cysts for many years.  She has had 3 different operations on the cysts over the years.  She had her last surgery on 9/18/17. Of note: she also tells me that she has been diagnosed with hidradenditis supprativa    Patient Active Problem List   Diagnosis     Migraines     Depression     ADHD (attention deficit hyperactivity disorder)     Cystic acne     S/P laparoscopic hysterectomy     Kidney stones     Ischiorectal abscess and fistula     ACP (advance care planning)     Mass of breast     Pilonidal cyst     H/O pilonidal cyst     Delayed surgical wound healing, initial encounter       Past Medical History:   Diagnosis Date     Hidradenitis 2/16/2007     Ischiorectal abscess and fistula      Kidney stones 2008    x2 passed on her own     Nondependent tobacco use disorder 10/11/2012     Papanicolaou smear of cervix with low grade squamous intraepithelial lesion (LGSIL) 10/29/2008       Past Surgical History:   Procedure Laterality Date     CYSTECTOMY PILONIDAL N/A 9/18/2017    Procedure: CYSTECTOMY PILONIDAL;  PILONIDAL CYSTECTOMY ;  Surgeon: Cordell Stephens MD;  [facility]     EXCISE MASS NECK Right 8/4/2015    Procedure: EXCISE MASS NECK;  Surgeon: Nasir Jones MD;  Location: [facility]     INCISION AND DRAINAGE PERINEAL, COMBINED N/A 3/18/2015    Procedure: COMBINED INCISION AND DRAINAGE PERINEAL;  Surgeon: Jay Torres DO;  Location[facility]  "    LAPAROSCOPIC HYSTERECTOMY SUPRACERVICAL, BILATERAL SALPINGO-OOPHORECTOMY, COMBINED  9/27/2013    Procedure: COMBINED LAPAROSCOPIC HYSTERECTOMY SUPRACERVICAL, SALPINGO-OOPHORECTOMY;  LAPAROSCOPIC SUPRACERVICAL HYSTERECTOMY AND RIGHT SALPINGO-OOPHORECTOMY, CYSTOSCOPY;  Surgeon: Denys Callahan MD;  Location: [facility] OR     marsupialization of pilonidal cyst  2007     TUBAL LIGATION  2005       Family History   Problem Relation Age of Onset     DIABETES Paternal Grandmother        Social History   Substance Use Topics     Smoking status: Current Every Day Smoker     Packs/day: 0.50     Years: 15.00     Types: Cigarettes     Last attempt to quit: 7/18/2013     Smokeless tobacco: Never Used     Alcohol use No       Current Outpatient Prescriptions   Medication Sig Dispense Refill     oxyCODONE-acetaminophen (PERCOCET) 5-325 MG per tablet Take 1 tablet by mouth every 8 hours as needed for moderate to severe pain or pain maximum 12 tablet(s) per day 20 tablet 0     valACYclovir (VALTREX) 500 MG tablet TAKE ONE TABLET BY MOUTH DAILY 90 tablet 0       No Known Allergies    REVIEW OF SYSTEMS  Skin: as above, hydradenditis suprativa  Eyes: contacts  Ears/Nose/Throat: negative  Respiratory: No shortness of breath, dyspnea on exertion, cough, or hemoptysis  Cardiovascular: negative  Gastrointestinal: abdominal pain  Genitourinary: hx of kidney stones  Musculoskeletal: negative  Neurologic: negative  Psychiatric: negative  Hematologic/Lymphatic/Immunologic: negative  Endocrine: negative    OBJECTIVE:  Vital signs:  Temp: 98.8  F (37.1  C)   BP: 104/60 Pulse: 90     SpO2: 97 %     Height: 5' 2\" (157.5 cm) Weight: 125 lb (56.7 kg)  Estimated body mass index is 22.86 kg/(m^2) as calculated from the following:    Height as of this encounter: 5' 2\" (1.575 m).    Weight as of this encounter: 125 lb (56.7 kg).  Constitutional: healthy, alert and no distress  Head: Normocephalic. No masses, lesions, tenderness or " "abnormalities  Cardiovascular: RRR. No murmurs, clicks gallops or rub  Respiratory:  Good diaphragmatic excursion. Lungs clear  Gastrointestinal: Abdomen soft, non-tender. BS normal. No masses, organomegaly  : Deferred  Musculoskeletal: extremities normal- no gross deformities noted, gait normal and normal muscle tone  Skin:        Wound description:  Type of Wound- surgical; Location- buttocks/coccyx region, Drainage amount-scant, Drainage color-bloody,  Odor- none; Wound bed- pink, more granulation tissue noted, the very base is still a bit \"slippery\" appearing;  Surrounding skin-intact, a few sutures still noted.       Measurements: not measured, visually looks smaller       Dressing change:  Wound cleansed with saline and water, wound windowed with thick duoderm, wound packed with one black sponge in the base of the wound  and another on top for the disc landing.  Pump is set at 125 mm/hg continuous, good seal obtained.    Neurologic: Gait normal. Sensation grossly WNL.  Psychiatric: mentation appears normal and affect normal/bright    THERAPY GOAL: Keep wound bed moist and drainage contained.  Prevent infection. Provide negative pressure to enhance healing.    ASSESSMENT / PLAN:  Continue NPWT 3X week.   Dr. Stephens stopped by to see Purnima as well and examine her wound.  I tried one single sponge in the wound again, per Dr. Salas's suggestion, her wound may have matured enough where this will work better.  We will also use the thicker duoderm to window her wound, which may help to keep a better seal.  She will see Dr. Stephens in office today to discuss a choleycystectomy.    Yanci Arauz  CNP, CWOCN  Urology and Wound Care  October 2, 2017        "

## 2017-10-06 NOTE — MR AVS SNAPSHOT
After Visit Summary   10/6/2017    Purnima Fallon    MRN: [id number]           Patient Information     Date Of Birth          1980        Visit Information        Provider Department      10/6/2017 10:00 AM Davonte, Yanci M, NP [facility] [place]        Today's Diagnoses     Delayed surgical wound healing, subsequent encounter    -  1       Follow-ups after your visit        Your next 10 appointments already scheduled     Oct 09, 2017  3:00 PM CDT   Return Visit with [facility]   [facility] ([facility] )    [address]  [place] [address] [phone]            Oct 11, 2017  3:00 PM CDT   (Arrive by 2:45 PM)   Return Visit with MYLES [place] [facility] [place] ([facility] [place] )    [address]  [place] [address] [phone]            Oct 13, 2017 11:00 AM CDT   Return Visit with [facility]   [facility] ([facility] )    [address]  [place] [id number] [phone]            Oct 16, 2017  3:00 PM CDT   (Arrive by 2:45 PM)   Return Visit with Yanci Arauz NP   [facility] [place] ([facility] [place] )    [address]  [place] [address] [phone]            Oct 18, 2017  3:00 PM CDT   Return Visit with [facility]   [facility] ([facility] )    [address] [place] [address] [phone]            Oct 20, 2017  3:00 PM CDT   (Arrive by 2:45 PM)   Return Visit with Yanci Arauz NP   [facility] [place] ([facility] [place] )    [address]  [place] [address] [phone]              Who to contact     If you have questions or need follow up information about today's clinic visit or your schedule please contact [facility] [facility] directly at [phone].  Normal or non-critical lab and imaging results will be communicated to you by MyChart, letter or phone within 4 business  "days after the clinic has received the results. If you do not hear from us within 7 days, please contact the clinic through Affle or phone. If you have a critical or abnormal lab result, we will notify you by phone as soon as possible.  Submit refill requests through Affle or call your pharmacy and they will forward the refill request to us. Please allow 3 business days for your refill to be completed.          Additional Information About Your Visit        Affle Information     Affle gives you secure access to your electronic health record. If you see a primary care provider, you can also send messages to your care team and make appointments. If you have questions, please call your primary care clinic.  If you do not have a primary care provider, please call [phone] and they will assist you.        Care EveryWhere ID     This is your Care EveryWhere ID. This could be used by other organizations to access your [facility] medical records  [id number]        Your Vitals Were     Pulse Temperature Height Last Period Pulse Oximetry BMI (Body Mass Index)    90 98.8  F (37.1  C) 5' 2\" (1.575 m) 08/10/2013 97% 22.86 kg/m2       Blood Pressure from Last 3 Encounters:   10/06/17 108/68   10/06/17 104/60   10/05/17 114/76    Weight from Last 3 Encounters:   10/06/17 125 lb (56.7 kg)   10/06/17 125 lb (56.7 kg)   10/04/17 125 lb (56.7 kg)              Today, you had the following     No orders found for display       Primary Care Provider Office Phone # Fax #    Alexi Romero [phone]       [place] [place] [address]  [place] [address]        Equal Access to Services     [facility] : Hadii otto fletcher hadasho Soomaali, waaxda luqadaha, qaybta kaalmada adeegyada, debby garcia . So [facility] [phone].    ATENCIÓN: Si habla español, tiene a cyr disposición servicios gratuitos de asistencia lingüística. Llame al [phone].    We comply with applicable federal " civil rights laws and [place] laws. We do not discriminate on the basis of race, color, national origin, age, disability, sex, sexual orientation, or gender identity.            Thank you!     Thank you for choosing [facility]  for your care. Our goal is always to provide you with excellent care. Hearing back from our patients is one way we can continue to improve our services. Please take a few minutes to complete the written survey that you may receive in the mail after your visit with us. Thank you!             Your Updated Medication List - Protect others around you: Learn how to safely use, store and throw away your medicines at www.disposemymeds.org.          This list is accurate as of: 10/6/17 10:44 AM.  Always use your most recent med list.                   Brand Name Dispense Instructions for use Diagnosis    oxyCODONE-acetaminophen 5-325 MG per tablet    PERCOCET    20 tablet    Take 1 tablet by mouth every 8 hours as needed for moderate to severe pain or pain maximum 12 tablet(s) per day    H/O pilonidal cyst       valACYclovir 500 MG tablet    VALTREX    90 tablet    TAKE ONE TABLET BY MOUTH DAILY    Herpes simplex virus infection

## 2017-10-06 NOTE — PROGRESS NOTES
SUBJECTIVE:  Purnima returns for followup of pilonidal surgery. In the interval of time Purnima is doing well. She has been undergoing dressing changes with a wound vac in wound care.She is experiencing pain, soreness Minor. She has been tolerating the wound care with the wound VAC and dressing changes. The soreness is progressively getting better.    She does state that she is having abdominal bloating and post prandial pain. She was evaluated over the summer with an ultrasound which did not demonstrate any cholelithiasis, gallbladder wall thickening, or pericholecystic fluid. She then underwent a HIDA scan which demonstrated an ejection fraction of 23%. Due to this she was seen in general surgery for biliary dyskinesia.     Mrs. Fallon states that she has had issues with abdominal bloating, and pain for some time now. She states that the pain is in the right upper quadrant and can occur post prandial. She has not had any fevers, chills, or emesis associated with the pain or bloating.     ROS: 10 point review of system negative     EXAM:   General: awake, alert, no acute distress  Chest: clear to auscultation bilaterally  CV: regular rate and rhythm  Ab: soft, nontender, nondistended, scars from previously laparoscopic procedures at umbilicus and right/left upper quadrant    Assessment: Biliary dyskinesia and pilonidal cyst status post pilonidal cystectomy    Plan: I discussed with the patient the pathophysiology of biliary dyskinesia and the variety of symptoms that can occur. I discussed how approximately 20% of patients who undergo laparoscopic cholecystectomy for biliary dyskinesia may not have symptom relief. She understands the disease and the procedure. Informed consent was obtained from the patient and she wishes to proceed forward with scheduling a laparoscopic cholecystectomy for biliary dyskinesia at a mutually agreable time.    Thank you for allowing me to participate in the care of your patient.     Cordell  BELLO Stephens  10/6/2017    Cc: Alexi Romero

## 2017-10-06 NOTE — NURSING NOTE
"Chief Complaint   Patient presents with     Consult     WOund check/Abdominal pain        Initial /68 (BP Location: Right arm, Patient Position: Chair, Cuff Size: Adult Regular)  Pulse 71  Temp 98.7  F (37.1  C) (Tympanic)  Ht 5' 2\" (1.575 m)  Wt 125 lb (56.7 kg)  LMP 08/10/2013  SpO2 98%  BMI 22.86 kg/m2 Estimated body mass index is 22.86 kg/(m^2) as calculated from the following:    Height as of this encounter: 5' 2\" (1.575 m).    Weight as of this encounter: 125 lb (56.7 kg).  Medication Reconciliation: complete   Allyson Smith          "

## 2017-10-09 ENCOUNTER — HOSPITAL ENCOUNTER (OUTPATIENT)
Dept: WOUND CARE | Facility: HOSPITAL | Age: 37
Discharge: HOME | End: 2017-10-09
Attending: NURSE PRACTITIONER | Admitting: SURGERY
Payer: COMMERCIAL

## 2017-10-09 VITALS — SYSTOLIC BLOOD PRESSURE: 107 MMHG | DIASTOLIC BLOOD PRESSURE: 70 MMHG | HEART RATE: 67 BPM | TEMPERATURE: 99.2 F

## 2017-10-09 PROCEDURE — 97605 NEG PRS WND THER DME<=50SQCM: CPT

## 2017-10-09 NOTE — PROGRESS NOTES
Purnima Fallon, 1980  Chief Complaint   Patient presents with     WOUND CARE     ischiorectal abscess and fistula       Patient Active Problem List   Diagnosis     Migraines     Depression     ADHD (attention deficit hyperactivity disorder)     Cystic acne     S/P laparoscopic hysterectomy     Kidney stones     Ischiorectal abscess and fistula     ACP (advance care planning)     Mass of breast     Pilonidal cyst     H/O pilonidal cyst     Biliary dyskinesia       Concerns/Comments since last visits: Here for follow up of pilonidal cyst with NPWT in place.  This is the 3rd time she has had a pilonidal cyst.  She also has a dx of hidradenitis.      The wound vac was initiated on 9/29/2017.  She has had some issues with leakage and a noisy vac.  Today she rates her pain as a 3 - 4 and states it is better every day.  There is some tenderness along the left edge and some itching in this area.     Objective:   Wound base had some tan fibrinous debris in the base.  There is a slight odor to the foam when it was removed.  The left edge of the wound is macerated.  There are scattered tiny pustules 4 - 6 from what appears to be folliculits.  The base of the wound is pink/red and smooth.  Edges are open.  Serosanguinous scant - small amount of drainage with slight odor.      Procedures:   Wound vac dressing change done.  Removed 1 black foam and disc landing.  Cleansed with soap and water and chloroprep applied. Dusted pruritic areas with miconazole powder. Shahnaz wound skin protected with marathon skin protectant and Duoderm CGF.  Inserted 2 small foam into irregular base of wound with 1 piece over entire base and disc landing.  Draped wound.  Confirmed seal check and vac settings (125 mm/hg, low, continuous)  prior to departure.  There were some cycling noises present that were decreasing in frequency.    Assessments  Slight odor; macerated edge, few pustules    Plan of care:   LILY Arauz CNP present to assist with  assessment and development of plan of care  Chloroprep to decrease bioburden to periwound skin  Marathon to protect edges  Miconazole to pruritic areas   NPWT - dressing changed 3 x weekly    Treatment Goals:  Enhanced granulation tissue formation    Supplies provided:  n/a    Education:  Wound care instructions/education provided. Patient verbalized understanding of instruction/education.    Nurse face to face time: 25 min    CC: Alexi Romero/Cordell Stephens

## 2017-10-11 ENCOUNTER — OFFICE VISIT (OUTPATIENT)
Dept: WOUND CARE | Facility: OTHER | Age: 37
End: 2017-10-11
Attending: PHYSICAL MEDICINE & REHABILITATION
Payer: COMMERCIAL

## 2017-10-11 VITALS
OXYGEN SATURATION: 97 % | TEMPERATURE: 99.1 F | HEART RATE: 77 BPM | DIASTOLIC BLOOD PRESSURE: 66 MMHG | SYSTOLIC BLOOD PRESSURE: 104 MMHG

## 2017-10-11 DIAGNOSIS — K61.39 ISCHIORECTAL ABSCESS AND FISTULA: Primary | ICD-10-CM

## 2017-10-11 PROCEDURE — 97605 NEG PRS WND THER DME<=50SQCM: CPT | Performed by: NURSE PRACTITIONER

## 2017-10-11 NOTE — PROGRESS NOTES
SUBJECTIVE:  Purnima Fallon, 36 year old, female presents with the following Chief Complaint(s) with HPI to follow: wound care     HPI:  Purnima is here today for evaluation and treatment of her pilonidal cyst wound.  We are currently dressing the area with NPWT (negative pressure wound therapy) (wound vac).  She had it started on 9/29/17.  She has very little discomfort in the area now.  She had no issues with the pump or dressing since the last change.    Past hx:  She has had an issue with pilonidal cysts for many years.  She has had 3 different operations on the cysts over the years.  She had her last surgery on 9/18/17. Of note: she also tells me that she has been diagnosed with hidradenditis supprativa    Patient Active Problem List   Diagnosis     Migraines     Depression     ADHD (attention deficit hyperactivity disorder)     Cystic acne     S/P laparoscopic hysterectomy     Kidney stones     Ischiorectal abscess and fistula     ACP (advance care planning)     Mass of breast     Pilonidal cyst     H/O pilonidal cyst     Biliary dyskinesia       Past Medical History:   Diagnosis Date     Hidradenitis 2/16/2007     Ischiorectal abscess and fistula      Kidney stones 2008    x2 passed on her own     Nondependent tobacco use disorder 10/11/2012     Papanicolaou smear of cervix with low grade squamous intraepithelial lesion (LGSIL) 10/29/2008       Past Surgical History:   Procedure Laterality Date     CYSTECTOMY PILONIDAL N/A 9/18/2017    Procedure: CYSTECTOMY PILONIDAL;  PILONIDAL CYSTECTOMY ;  Surgeon: Cordell Stephens MD;  [facility]     EXCISE MASS NECK Right 8/4/2015    Procedure: EXCISE MASS NECK;  Surgeon: Nasir Jones MD;  [facility]     INCISION AND DRAINAGE PERINEAL, COMBINED N/A 3/18/2015    Procedure: COMBINED INCISION AND DRAINAGE PERINEAL;  Surgeon: Jay Torres DO;  [facility]     LAPAROSCOPIC HYSTERECTOMY SUPRACERVICAL, BILATERAL SALPINGO-OOPHORECTOMY, COMBINED   "9/27/2013    Procedure: COMBINED LAPAROSCOPIC HYSTERECTOMY SUPRACERVICAL, SALPINGO-OOPHORECTOMY;  LAPAROSCOPIC SUPRACERVICAL HYSTERECTOMY AND RIGHT SALPINGO-OOPHORECTOMY, CYSTOSCOPY;  Surgeon: Denys Callahan MD;  [facility]     marsupialization of pilonidal cyst  2007     TUBAL LIGATION  2005       Family History   Problem Relation Age of Onset     DIABETES Paternal Grandmother        Social History   Substance Use Topics     Smoking status: Current Every Day Smoker     Packs/day: 0.50     Years: 15.00     Types: Cigarettes     Last attempt to quit: 7/18/2013     Smokeless tobacco: Never Used     Alcohol use No       Current Outpatient Prescriptions   Medication Sig Dispense Refill     valACYclovir (VALTREX) 500 MG tablet TAKE ONE TABLET BY MOUTH DAILY 90 tablet 0     oxyCODONE-acetaminophen (PERCOCET) 5-325 MG per tablet Take 1 tablet by mouth every 8 hours as needed for moderate to severe pain or pain maximum 12 tablet(s) per day (Patient not taking: Reported on 10/11/2017) 20 tablet 0       No Known Allergies    REVIEW OF SYSTEMS  Skin: as above, hydradenditis suprativa  Eyes: contacts  Ears/Nose/Throat: negative  Respiratory: No shortness of breath, dyspnea on exertion, cough, or hemoptysis  Cardiovascular: negative  Gastrointestinal: abdominal pain  Genitourinary: hx of kidney stones  Musculoskeletal: negative  Neurologic: negative  Psychiatric: negative  Hematologic/Lymphatic/Immunologic: negative  Endocrine: negative    OBJECTIVE:  Vital signs:  Temp: 99.1  F (37.3  C)   BP: 104/66 Pulse: 77     SpO2: 97 %          Estimated body mass index is 22.86 kg/(m^2) as calculated from the following:    Height as of 10/6/17: 5' 2\" (1.575 m).    Weight as of 10/6/17: 125 lb (56.7 kg).  Constitutional: healthy, alert and no distress  Cardiovascular: RRR. No murmurs, clicks gallops or rub  Respiratory:  Good diaphragmatic excursion. Lungs clear  Gastrointestinal: Abdomen soft, non-tender. BS normal. No masses, " "organomegaly  : Deferred  Musculoskeletal: extremities normal- no gross deformities noted, gait normal and normal muscle tone  Skin:        Wound description:  Type of Wound- surgical; Location- buttocks/coccyx region, Drainage amount-scant, Drainage color-bloody,  Odor- none; Wound bed- pink, more granulation tissue noted, the very base is still a bit \"slippery\" appearing;  Surrounding skin-intact, a few sutures still noted.       Measurements: 4.2 X 1.6 cm X 0.8 cm deep       Dressing change:  Wound cleansed with soap and water, wound windowed with thick duoderm, wound packed with three black sponges in the base of the wound (cut like a jigsaw to perfectly fit in the base, with another on top that fills the whole wound and another on top for the disc landing.  Pump is set at 150 mm/hg continuous, good seal obtained.    Neurologic: Gait normal. Sensation grossly WNL.  Psychiatric: mentation appears normal and affect normal/bright    THERAPY GOAL: Keep wound bed moist and drainage contained.  Prevent infection. Provide negative pressure to enhance healing.    ASSESSMENT / PLAN:  Continue NPWT 3X week. I increased the pump pressure to 150 mm/hg and cut more small sponges to try to cover the base really well.    Yanci Arauz  CNP, CWOCN  Urology and Wound Care  October 11, 2017          "

## 2017-10-11 NOTE — MR AVS SNAPSHOT
After Visit Summary   10/11/2017    Purnima Fallon    MRN: [id number]           Patient Information     Date Of Birth          1980        Visit Information        Provider Department      10/11/2017 3:00 PM Yanci Arauz NP [facility] [place]        Today's Diagnoses     Ischiorectal abscess and fistula    -  1       Follow-ups after your visit        Your next 10 appointments already scheduled     Oct 13, 2017 11:00 AM CDT   (Arrive by 10:45 AM)   Return Visit with Yanci Arauz NP   [facility] [place] ([facility] [place] )    [address]  [place] [address]   [phone]            Oct 16, 2017  3:00 PM CDT   (Arrive by 2:45 PM)   Return Visit with Yanci Arauz NP   [facility] [place] ([facility] [place] )    [address]  [place] [address]   [phone]            Oct 18, 2017  3:00 PM CDT   [facility] with [facility]   [facility] ([facility] )    [address]  [place] [address]   [phone]            Oct 20, 2017  3:00 PM CDT   (Arrive by 2:45 PM)   Return Visit with Yanci Arauz NP   [facility] [place] ([facility] [place] )    [address]  [place] [address]   [phone]            Oct 23, 2017  3:00 PM CDT   (Arrive by 2:45 PM)   Return Visit with Yanci Arauz NP   [facility] [place] ([facility] [place] )    [address]  [place] [address]   [phone]            Oct 25, 2017  3:00 PM CDT   [facility] Visit with [facility]   [facility] ([facility] )    [address] [place] [address]   [phone]            Oct 27, 2017  3:30 PM CDT   (Arrive by 3:15 PM)   Return Visit with Yanci Arauz NP   [facility] [place] ([facility] [place] )    [address]  [place] [address]   [phone]            Nov 20, 2017   Procedure with Cordell Stephens MD   [facility] ([facility] )    [address] [place] [id number] [phone]              Who to contact     If you have questions or need follow up information about today's clinic visit or your schedule please contact [facility] directly at [phone].  Normal or non-critical lab and imaging results will be communicated to you by MyChart, letter or phone within 4 business days after the clinic has received the results. If you do not hear from us within 7 days, please contact the clinic through MyChart or phone. If you have a critical or abnormal lab result, we will notify you by phone as soon as possible.  Submit refill requests through [employer] or call your pharmacy and they will forward the refill request to us. Please allow 3 business days for your refill to be completed.          Additional Information About Your Visit        MyChart Information     [employer] gives you secure access to your electronic health record. If you see a primary care provider, you can also send messages to your care team and make appointments. If you have questions, please call your primary care clinic.  If you do not have a primary care provider, please call [phone] and they will assist you.        Care EveryWhere ID     This is your Care EveryWhere ID. This could be used by other organizations to access your [place] medical records  [id number]        Your Vitals Were     Pulse Temperature Last Period Pulse Oximetry          77 99.1  F (37.3  C) 08/10/2013 97%         Blood Pressure from Last 3 Encounters:   10/11/17 104/66   10/09/17 107/70   10/06/17 108/68    Weight from Last 3 Encounters:   10/06/17 125 lb (56.7 kg)   10/06/17 125 lb (56.7 kg)   10/04/17 125 lb (56.7 kg)              Today, you had the following     No orders found for display       Primary Care Provider Office Phone # Fax #    Alexi Romero [phone]       [place] [id number] [facility]  [place] [address]        Equal Access to Services     [facility] : Hadii otto fletcher loganange Sostacy, wadayannada luqadaha, qaybta kajudydebby akersomarlily clemons. So [facility] [phone].    ATENCIÓN: Si habla español, tiene a cyr disposición servicios gratuitos de asistencia lingüística. Janellame al [phone].    We comply with applicable federal civil rights laws and [place] laws. We do not discriminate on the basis of race, color, national origin, age, disability, sex, sexual orientation, or gender identity.            Thank you!     Thank you for choosing [facility]  for your care. Our goal is always to provide you with excellent care. Hearing back from our patients is one way we can continue to improve our services. Please take a few minutes to complete the written survey that you may receive in the mail after your visit with us. Thank you!             Your Updated Medication List - Protect others around you: Learn how to safely use, store and throw away your medicines at www.disposemymeds.org.          This list is accurate as of: 10/11/17  4:48 PM.  Always use your most recent med list.                   Brand Name Dispense Instructions for use Diagnosis    oxyCODONE-acetaminophen 5-325 MG per tablet    PERCOCET    20 tablet    Take 1 tablet by mouth every 8 hours as needed for moderate to severe pain or pain maximum 12 tablet(s) per day    H/O pilonidal cyst       valACYclovir 500 MG tablet    VALTREX    90 tablet    TAKE ONE TABLET BY MOUTH DAILY    Herpes simplex virus infection

## 2017-10-13 ENCOUNTER — OFFICE VISIT (OUTPATIENT)
Dept: WOUND CARE | Facility: OTHER | Age: 37
End: 2017-10-13
Attending: NURSE PRACTITIONER
Payer: COMMERCIAL

## 2017-10-13 VITALS
WEIGHT: 125 LBS | HEIGHT: 62 IN | SYSTOLIC BLOOD PRESSURE: 104 MMHG | BODY MASS INDEX: 23 KG/M2 | OXYGEN SATURATION: 98 % | DIASTOLIC BLOOD PRESSURE: 68 MMHG | HEART RATE: 90 BPM | TEMPERATURE: 99.3 F

## 2017-10-13 DIAGNOSIS — T81.89XD DELAYED SURGICAL WOUND HEALING, SUBSEQUENT ENCOUNTER: ICD-10-CM

## 2017-10-13 DIAGNOSIS — K61.39 ISCHIORECTAL ABSCESS AND FISTULA: Primary | ICD-10-CM

## 2017-10-13 PROCEDURE — 97605 NEG PRS WND THER DME<=50SQCM: CPT | Performed by: NURSE PRACTITIONER

## 2017-10-13 NOTE — PROGRESS NOTES
SUBJECTIVE:  Purnima Fallon, 36 year old, female presents with the following Chief Complaint(s) with HPI to follow: wound care     HPI:  Purnima is here today for evaluation and treatment of her pilonidal cyst wound.  We are currently dressing the area with NPWT (negative pressure wound therapy) (wound vac).  She had it started on 9/29/17.  She has very little discomfort in the area now.  At her last visit I increased the pump pressure to 150 mm/hg  She had no issues with the pump or dressing since the last change.    Past hx:  She has had an issue with pilonidal cysts for many years.  She has had 3 different operations on the cysts over the years.  She had her last surgery on 9/18/17. Of note: she also tells me that she has been diagnosed with hidradenditis supprativa    Patient Active Problem List   Diagnosis     Migraines     Depression     ADHD (attention deficit hyperactivity disorder)     Cystic acne     S/P laparoscopic hysterectomy     Kidney stones     Ischiorectal abscess and fistula     ACP (advance care planning)     Mass of breast     Pilonidal cyst     H/O pilonidal cyst     Biliary dyskinesia       Past Medical History:   Diagnosis Date     Hidradenitis 2/16/2007     Ischiorectal abscess and fistula      Kidney stones 2008    x2 passed on her own     Nondependent tobacco use disorder 10/11/2012     Papanicolaou smear of cervix with low grade squamous intraepithelial lesion (LGSIL) 10/29/2008       Past Surgical History:   Procedure Laterality Date     CYSTECTOMY PILONIDAL N/A 9/18/2017    Procedure: CYSTECTOMY PILONIDAL;  PILONIDAL CYSTECTOMY ;  Surgeon: Cordell Stepehns MD;  [facility]     EXCISE MASS NECK Right 8/4/2015    Procedure: EXCISE MASS NECK;  Surgeon: Nasir Jones MD;  [facility]     INCISION AND DRAINAGE PERINEAL, COMBINED N/A 3/18/2015    Procedure: COMBINED INCISION AND DRAINAGE PERINEAL;  Surgeon: Jay Torres DO;  [facility]     LAPAROSCOPIC  "HYSTERECTOMY SUPRACERVICAL, BILATERAL SALPINGO-OOPHORECTOMY, COMBINED  9/27/2013    Procedure: COMBINED LAPAROSCOPIC HYSTERECTOMY SUPRACERVICAL, SALPINGO-OOPHORECTOMY;  LAPAROSCOPIC SUPRACERVICAL HYSTERECTOMY AND RIGHT SALPINGO-OOPHORECTOMY, CYSTOSCOPY;  Surgeon: Denys Callahan MD;  [facility]     marsupialization of pilonidal cyst  2007     TUBAL LIGATION  2005       Family History   Problem Relation Age of Onset     DIABETES Paternal Grandmother        Social History   Substance Use Topics     Smoking status: Current Every Day Smoker     Packs/day: 0.50     Years: 15.00     Types: Cigarettes     Last attempt to quit: 7/18/2013     Smokeless tobacco: Never Used     Alcohol use No       Current Outpatient Prescriptions   Medication Sig Dispense Refill     valACYclovir (VALTREX) 500 MG tablet TAKE ONE TABLET BY MOUTH DAILY 90 tablet 0     oxyCODONE-acetaminophen (PERCOCET) 5-325 MG per tablet Take 1 tablet by mouth every 8 hours as needed for moderate to severe pain or pain maximum 12 tablet(s) per day (Patient not taking: Reported on 10/11/2017) 20 tablet 0       No Known Allergies    REVIEW OF SYSTEMS  Skin: as above, hydradenditis suprativa  Eyes: contacts  Ears/Nose/Throat: negative  Respiratory: No shortness of breath, dyspnea on exertion, cough, or hemoptysis  Cardiovascular: negative  Gastrointestinal: abdominal pain  Genitourinary: hx of kidney stones  Musculoskeletal: negative  Neurologic: negative  Psychiatric: negative  Hematologic/Lymphatic/Immunologic: negative  Endocrine: negative    OBJECTIVE:  Vital signs:  Vital signs:  Temp: 99.3  F (37.4  C)   BP: 104/68 Pulse: 90     SpO2: 98 %     Height: 5' 2\" (157.5 cm) Weight: 125 lb (56.7 kg)  Estimated body mass index is 22.86 kg/(m^2) as calculated from the following:    Height as of this encounter: 5' 2\" (1.575 m).    Weight as of this encounter: 125 lb (56.7 kg).  Constitutional: healthy, alert and no distress  Cardiovascular: RRR. No murmurs, clicks " "gallops or rub  Respiratory:  Good diaphragmatic excursion. Lungs clear  Gastrointestinal: Abdomen soft, non-tender. BS normal. No masses, organomegaly  : Deferred  Musculoskeletal: extremities normal- no gross deformities noted, gait normal and normal muscle tone  Skin:        Wound description:  Type of Wound- surgical; Location- buttocks/coccyx region, Drainage amount-scant, Drainage color-bloody,  Odor- none; Wound bed- pink, more granulation tissue noted, the very base is still a bit \"slippery\" appearing;  Surrounding skin-intact, but a bit more macerated       Measurements: 4.2 X 1.6 cm X 0.8 cm deep       Dressing change:  Wound cleansed with soap and water, wound edges painted with Marathon skin protectant, then windowed with thick duoderm, wound packed with two black sponges in the base of the wound (cut like a jigsaw to perfectly fit in the base, with another on top that fills the whole wound and another on top for the disc landing.  Pump is set at 150 mm/hg continuous, good seal obtained.    Neurologic: Gait normal. Sensation grossly WNL.  Psychiatric: mentation appears normal and affect normal/bright    THERAPY GOAL: Keep wound bed moist and drainage contained.  Prevent infection. Provide negative pressure to enhance healing.    ASSESSMENT / PLAN:  Continue NPWT 3X week.  She seems a bit better with the increased pump pressure, but still has some \"slippery\" tissue where we are not expecting it.    Yanci Arauz  CNP, CWOCN  Urology and Wound Care  October 13, 2017            "

## 2017-10-13 NOTE — MR AVS SNAPSHOT
After Visit Summary   10/13/2017    Purnima Fallon    MRN: [id number]           Patient Information     Date Of Birth          1980        Visit Information        Provider Department      10/13/2017 11:00 AM Yanci Arauz NP [facility] [place]        Today's Diagnoses     Ischiorectal abscess and fistula    -  1    Delayed surgical wound healing, subsequent encounter           Follow-ups after your visit        Your next 10 appointments already scheduled     Oct 16, 2017  3:00 PM CDT   (Arrive by 2:45 PM)   Return Visit with Yanci Arauz NP   [facility] [place] ([facility] [place] )    [address]  [place] [address]   [phone]            Oct 18, 2017  3:00 PM CDT   Return Visit with [facility] W[facility]ARE   [facility] ([facility] )    [address]  [place] [address]   [phone]            Oct 20, 2017  3:00 PM CDT   (Arrive by 2:45 PM)   Return Visit with Yanci Arauz NP   [facility] [place] ([facility] [place] )    [address]  [place] [address]   [phone]            Oct 23, 2017  3:00 PM CDT   (Arrive by 2:45 PM)   Return Visit with Yanci Arauz NP   [facility] [place] ([facility] [place] )    [address]  [place] [address]   [phone]            Oct 25, 2017  3:00 PM CDT   Return Visit with [facility] W[facility]   [facility] ([facility] )    [address]  [place] [address]   [phone]            Oct 27, 2017  3:30 PM CDT   (Arrive by 3:15 PM)   [facility] [place] [facility] [place] [facility]    [address]  [place] [address]   [phone]            Oct 30, 2017  3:00 PM CDT   (Arrive by 2:45 PM)   [facility] [place] [facility] [place] [facility]    [address]  [place] [address] [phone]             Nov 01, 2017  3:00 PM CDT   (Arrive by 2:45 PM)   Return Visit with Yanci Arauz NP   [facility] [place] ([facility] [place] )    [address]  [place] [address] [phone]            Nov 03, 2017  3:00 PM CDT   (Arrive by 2:45 PM)   Return Visit with Yanci Arauz NP   [facility] [place] ([facility] [place] )    [address]  [place] [address] [phone]            Nov 20, 2017   Procedure with Cordell Stephens MD   [facility] ([facility] )    [address] [place] [address]  [place] [address] [phone]              Who to contact     If you have questions or need follow up information about today's clinic visit or your schedule please contact [facility] directly at [phone].  Normal or non-critical lab and imaging results will be communicated to you by QualySensehart, letter or phone within 4 business days after the clinic has received the results. If you do not hear from us within 7 days, please contact the clinic through Emtricst or phone. If you have a critical or abnormal lab result, we will notify you by phone as soon as possible.  Submit refill requests through Acrisure or call your pharmacy and they will forward the refill request to us. Please allow 3 business days for your refill to be completed.          Additional Information About Your Visit        QualySenseharPredictSpring Information     Acrisure gives you secure access to your electronic health record. If you see a primary care provider, you can also send messages to your care team and make appointments. If you have questions, please call your primary care clinic.  If you do not have a primary care provider, please call [phone] and they will assist you.        Care EveryWhere ID     This is your Care EveryWhere ID. This could be used by other organizations to access your [place] medical records  [id number]        Your Vitals Were     Pulse Temperature Height  "Last Period Pulse Oximetry BMI (Body Mass Index)    90 99.3  F (37.4  C) 5' 2\" (1.575 m) 08/10/2013 98% 22.86 kg/m2       Blood Pressure from Last 3 Encounters:   10/13/17 104/68   10/11/17 104/66   10/09/17 107/70    Weight from Last 3 Encounters:   10/13/17 125 lb (56.7 kg)   10/06/17 125 lb (56.7 kg)   10/06/17 125 lb (56.7 kg)              Today, you had the following     No orders found for display       Primary Care Provider Office Phone # Fax #    Alexi CAROLINA Heather, MYLES [phone]       [facility] [address] [place] [address]        Equal Access to Services     [facility] : Hadii otto fordo Sostacy, waaxda luqadaha, qaybta kaalmada adeegyada, debby garcia . So [facility] [phone].    ATENCIÓN: Si habla español, tiene a cyr disposición servicios gratuitos de asistencia lingüística. Llame al [phone].    We comply with applicable federal civil rights laws and [place] laws. We do not discriminate on the basis of race, color, national origin, age, disability, sex, sexual orientation, or gender identity.            Thank you!     Thank you for choosing [facility]  for your care. Our goal is always to provide you with excellent care. Hearing back from our patients is one way we can continue to improve our services. Please take a few minutes to complete the written survey that you may receive in the mail after your visit with us. Thank you!             Your Updated Medication List - Protect others around you: Learn how to safely use, store and throw away your medicines at www.disposemymeds.org.          This list is accurate as of: 10/13/17 11:46 AM.  Always use your most recent med list.                   Brand Name Dispense Instructions for use Diagnosis    oxyCODONE-acetaminophen 5-325 MG per tablet    PERCOCET    20 tablet    Take 1 tablet by mouth every 8 hours as needed for moderate to severe pain or pain maximum 12 tablet(s) per " day    H/O pilonidal cyst       valACYclovir 500 MG tablet    VALTREX    90 tablet    TAKE ONE TABLET BY MOUTH DAILY    Herpes simplex virus infection

## 2017-10-16 ENCOUNTER — OFFICE VISIT (OUTPATIENT)
Dept: WOUND CARE | Facility: OTHER | Age: 37
End: 2017-10-16
Attending: NURSE PRACTITIONER
Payer: COMMERCIAL

## 2017-10-16 VITALS
HEART RATE: 74 BPM | OXYGEN SATURATION: 98 % | SYSTOLIC BLOOD PRESSURE: 102 MMHG | TEMPERATURE: 98.5 F | DIASTOLIC BLOOD PRESSURE: 64 MMHG

## 2017-10-16 DIAGNOSIS — K61.39 ISCHIORECTAL ABSCESS AND FISTULA: Primary | ICD-10-CM

## 2017-10-16 DIAGNOSIS — T81.89XD DELAYED SURGICAL WOUND HEALING, SUBSEQUENT ENCOUNTER: ICD-10-CM

## 2017-10-16 PROCEDURE — 97605 NEG PRS WND THER DME<=50SQCM: CPT | Performed by: NURSE PRACTITIONER

## 2017-10-16 NOTE — PROGRESS NOTES
SUBJECTIVE:  Purnima Fallon, 36 year old, female presents with the following Chief Complaint(s) with HPI to follow: wound care     HPI:  Purnima is here today for evaluation and treatment of her pilonidal cyst wound.  We are currently dressing the area with NPWT (negative pressure wound therapy) (wound vac).  She had it started on 9/29/17.  She has very little discomfort in the area now.  Her pump pressure is now increased to 150 mm/hg  Her pump has been a bit noisier since the last change.    Past hx:  She has had an issue with pilonidal cysts for many years.  She has had 3 different operations on the cysts over the years.  She had her last surgery on 9/18/17. Of note: she also tells me that she has been diagnosed with hidradenditis supprativa    Patient Active Problem List   Diagnosis     Migraines     Depression     ADHD (attention deficit hyperactivity disorder)     Cystic acne     S/P laparoscopic hysterectomy     Kidney stones     Ischiorectal abscess and fistula     ACP (advance care planning)     Mass of breast     Pilonidal cyst     H/O pilonidal cyst     Biliary dyskinesia       Past Medical History:   Diagnosis Date     Hidradenitis 2/16/2007     Ischiorectal abscess and fistula      Kidney stones 2008    x2 passed on her own     Nondependent tobacco use disorder 10/11/2012     Papanicolaou smear of cervix with low grade squamous intraepithelial lesion (LGSIL) 10/29/2008       Past Surgical History:   Procedure Laterality Date     CYSTECTOMY PILONIDAL N/A 9/18/2017    Procedure: CYSTECTOMY PILONIDAL;  PILONIDAL CYSTECTOMY ;  Surgeon: Cordell Stephens MD;  Location: [facility]     EXCISE MASS NECK Right 8/4/2015    Procedure: EXCISE MASS NECK;  Surgeon: Nasir Jones MD;  Location: [facility] OR     INCISION AND DRAINAGE PERINEAL, COMBINED N/A 3/18/2015    Procedure: COMBINED INCISION AND DRAINAGE PERINEAL;  Surgeon: Jay Torres DO;  Location: [facility] OR     LAPAROSCOPIC HYSTERECTOMY SUPRACERVICAL,  BILATERAL SALPINGO-OOPHORECTOMY, COMBINED  9/27/2013    Procedure: COMBINED LAPAROSCOPIC HYSTERECTOMY SUPRACERVICAL, SALPINGO-OOPHORECTOMY;  LAPAROSCOPIC SUPRACERVICAL HYSTERECTOMY AND RIGHT SALPINGO-OOPHORECTOMY, CYSTOSCOPY;  Surgeon: Denys Callahan MD;  Location: [facility] OR     marsupialization of pilonidal cyst  2007     TUBAL LIGATION  2005       Family History   Problem Relation Age of Onset     DIABETES Paternal Grandmother        Social History   Substance Use Topics     Smoking status: Current Every Day Smoker     Packs/day: 0.50     Years: 15.00     Types: Cigarettes     Last attempt to quit: 7/18/2013     Smokeless tobacco: Never Used     Alcohol use No       Current Outpatient Prescriptions   Medication Sig Dispense Refill     valACYclovir (VALTREX) 500 MG tablet TAKE ONE TABLET BY MOUTH DAILY 90 tablet 0       No Known Allergies    REVIEW OF SYSTEMS  Skin: as above, hydradenditis suprativa  Eyes: contacts  Ears/Nose/Throat: negative  Respiratory: No shortness of breath, dyspnea on exertion, cough, or hemoptysis  Cardiovascular: negative  Gastrointestinal: abdominal pain  Genitourinary: hx of kidney stones  Musculoskeletal: negative  Neurologic: negative  Psychiatric: negative  Hematologic/Lymphatic/Immunologic: negative  Endocrine: negative    OBJECTIVE:  Temp: 98.5  F (36.9  C)   BP: 102/64 Pulse: 74     SpO2: 98 %      Constitutional: healthy, alert and no distress  Cardiovascular: RRR. No murmurs, clicks gallops or rub  Respiratory:  Good diaphragmatic excursion. Lungs clear  Gastrointestinal: Abdomen soft, non-tender. BS normal. No masses, organomegaly  : Deferred  Musculoskeletal: extremities normal- no gross deformities noted, gait normal and normal muscle tone  Skin:        Wound description:  Type of Wound- surgical; Location- buttocks/coccyx region, Drainage amount-scant, Drainage color-bloody,  Odor- none; Wound bed- pink and clean, more granulation tissue noted,  Surrounding skin-intact, the skin  on the left side of her wound has healed with bumps, rather than smoothly       Measurements: not measured today       Dressing change:  Wound cleansed with soap and water, a strip of Coloplast strip paste was placed on the left side of the wound, then windowed with thick duoderm, wound packed with one black sponges in the base of the wound,with second black sponge on top and another on top for the disc landing.  Pump is set at 150 mm/hg continuous, good seal obtained.  Pump seems quieter than the last visit.  Neurologic: Gait normal. Sensation grossly WNL.  Psychiatric: mentation appears normal and affect normal/bright    THERAPY GOAL: Keep wound bed moist and drainage contained.  Prevent infection. Provide negative pressure to enhance healing.    ASSESSMENT / PLAN:  Continue NPWT 3X week.  Wound continues to heal.    Yanci Arauz  CNP, CWOCN  Urology and Wound Care  October 16, 2017

## 2017-10-16 NOTE — MR AVS SNAPSHOT
After Visit Summary   10/16/2017    Purnima Fallon    MRN: [id number]           Patient Information     Date Of Birth          1980        Visit Information        Provider Department      10/16/2017 3:00 PM Yanci Arauz NP [facility]        Today's Diagnoses     Ischiorectal abscess and fistula    -  1    Delayed surgical wound healing, subsequent encounter           Follow-ups after your visit        Your next 10 appointments already scheduled     Oct 18, 2017  3:00 PM CDT   Return Visit with [facility]   [facility] ([facility] )    [address]  [place] [address]   [phone]            Oct 20, 2017  3:00 PM CDT   (Arrive by 2:45 PM)   [facility] [place] [facility] [place] [facility]    [address]  [place] [address]   [phone]            Oct 23, 2017  3:00 PM CDT   (Arrive by 2:45 PM)   Return Visit with Yanci Arauz NP   [facility] [place] ([facility] [place] )    [address]  [place] [address]   [phone]            Oct 25, 2017  3:00 PM CDT   Return Visit with [facility] WOUND CARE   [facility] ([facility] )    [address]  [place] [address]   [phone]            Oct 27, 2017  3:30 PM CDT   (Arrive by 3:15 PM)   [facility] [place] [facility] [place] [facility]    [address]  [place] [address]   [phone]            Oct 30, 2017  3:00 PM CDT   (Arrive by 2:45 PM)   [facility] [place] [facility] [place] [facility]    [address]  [place] [address]   [phone]            Nov 01, 2017  3:00 PM CDT   (Arrive by 2:45 PM)   [facility] [place] [facility] [place] [facility]    [address]  [place] [address]   [phone]            Nov  03, 2017  3:00 PM CDT   (Arrive by 2:45 PM)   [facility] Visit [facility]   [facility]    [address]  [place] [address] [phone]            Nov 20, 2017   Procedure with Cordell Stephens MD   [facility] ([facility] )    [address]  [place] [address] [phone]              Who to contact     If you have questions or need follow up information about today's clinic visit or your schedule please contact [facility] directly at [phone].  Normal or non-critical lab and imaging results will be communicated to you by MyChart, letter or phone within 4 business days after the clinic has received the results. If you do not hear from us within 7 days, please contact the clinic through Momentum Biosciencehart or phone. If you have a critical or abnormal lab result, we will notify you by phone as soon as possible.  Submit refill requests through [employer] or call your pharmacy and they will forward the refill request to us. Please allow 3 business days for your refill to be completed.          Additional Information About Your Visit        Momentum Biosciencehart Information     [employer] gives you secure access to your electronic health record. If you see a primary care provider, you can also send messages to your care team and make appointments. If you have questions, please call your primary care clinic.  If you do not have a primary care provider, please call [phone] and they will assist you.        Care EveryWhere ID     This is your Care EveryWhere ID. This could be used by other organizations to access your [place] medical records  [id number]        Your Vitals Were     Pulse Temperature Last Period Pulse Oximetry          74 98.5  F (36.9  C) 08/10/2013 98%         Blood Pressure from Last 3 Encounters:   10/16/17 102/64   10/13/17 104/68   10/11/17 104/66    Weight from Last 3 Encounters:   10/13/17 125 lb (56.7  kg)   10/06/17 125 lb (56.7 kg)   10/06/17 125 lb (56.7 kg)              Today, you had the following     No orders found for display         Today's Medication Changes          These changes are accurate as of: 10/16/17  3:23 PM.  If you have any questions, ask your nurse or doctor.               Stop taking these medicines if you haven't already. Please contact your care team if you have questions.     oxyCODONE-acetaminophen 5-325 MG per tablet   Commonly known as:  PERCOCET   Stopped by:  Yanci Arauz NP                    Primary Care Provider Office Phone # Fax #    Alexi Romero [phone]       [facility] [place] [address] [facility] [address]        Equal Access to Services     [facility]: Hadii otto fletcher hadasho Soomaali, waaxda luqadaha, qaybta kaalmada adeegyada, waxay manoloin angie garcia . So [facility] [phone].    ATENCIÓN: Si habla español, tiene a cyr disposición servicios gratuitos de asistencia lingüística. Llame al [phone].    We comply with applicable federal civil rights laws and [place] laws. We do not discriminate on the basis of race, color, national origin, age, disability, sex, sexual orientation, or gender identity.            Thank you!     Thank you for choosing [facility]]  for your care. Our goal is always to provide you with excellent care. Hearing back from our patients is one way we can continue to improve our services. Please take a few minutes to complete the written survey that you may receive in the mail after your visit with us. Thank you!             Your Updated Medication List - Protect others around you: Learn how to safely use, store and throw away your medicines at www.disposemymeds.org.          This list is accurate as of: 10/16/17  3:23 PM.  Always use your most recent med list.                   Brand Name Dispense Instructions for use Diagnosis    valACYclovir 500 MG tablet    VALTREX    90 tablet     TAKE ONE TABLET BY MOUTH DAILY    Herpes simplex virus infection

## 2017-10-18 ENCOUNTER — HOSPITAL ENCOUNTER (OUTPATIENT)
Dept: WOUND CARE | Facility: HOSPITAL | Age: 37
Discharge: HOME | End: 2017-10-18
Attending: SURGERY | Admitting: SURGERY
Payer: COMMERCIAL

## 2017-10-18 VITALS — SYSTOLIC BLOOD PRESSURE: 117 MMHG | HEART RATE: 102 BPM | TEMPERATURE: 99.4 F | DIASTOLIC BLOOD PRESSURE: 81 MMHG

## 2017-10-18 DIAGNOSIS — K61.39 ISCHIORECTAL ABSCESS AND FISTULA: Primary | ICD-10-CM

## 2017-10-18 DIAGNOSIS — T81.89XD DELAYED SURGICAL WOUND HEALING, SUBSEQUENT ENCOUNTER: ICD-10-CM

## 2017-10-18 PROCEDURE — 99213 OFFICE O/P EST LOW 20 MIN: CPT

## 2017-10-18 NOTE — PROGRESS NOTES
Purnima Fallon, 1980  Chief Complaint   Patient presents with     WOUND CARE     pilonidal cyst with wound vac        Patient Active Problem List   Diagnosis     Migraines     Depression     ADHD (attention deficit hyperactivity disorder)     Cystic acne     S/P laparoscopic hysterectomy     Kidney stones     Ischiorectal abscess and fistula     ACP (advance care planning)     Mass of breast     Pilonidal cyst     H/O pilonidal cyst     Biliary dyskinesia       Concerns/Comments since last visits: Purnima is here for a wound vac dressing change.  She has not had any issues with the vac - the pressure was increased to 150 mm/Hg by LILY Arauz CNP.    She has a history of pilonidal cysts and hidradenitis supprativa - her most recent surgery was 9/18/17.  She lives with her  who is supportive and able to help with dressing changes.  She is getting somewhat tired of the wound vac    Objective:   Scant drainage in canister that is serosanguinous.  There is a very faint odor of stagnant drainage when dressing removed.  The wound base is pink and non granular and measures 4.5 x 2.4 x 0.5 cm.  The right edge is macerated and tender to touch.  The rest of the surrounding skin is intact.  No undermining.  Edges are open.    Procedures:   Cleansed with soap and water and irrigated with saline.  LILY Arauz CNP to assist with assessment and plan of care.  Decision made to place vac on hold and see how she does with saline damp to dry dressings.    Assessments  Wound size is improving but right edge is macerated    Plan of care:   Place vac on hold - [facility] Asst to contact [facility] to notify them  BID saline damp to dry dressings covered with dry gauze  Shower Daily with dressing off to cleanse wound; cleanse with baby soap for 2nd dressing  Return on 10/23 and bring vac to [facility]     Treatment Goals:  Mechanical debridement with damp to dry dressing to facilitate healing    Supplies provided:  Rx sent for 2 x 2's, [facility]  gauze and saline by LILY Arauz    Education:  Wound care instructions/education provided. Patient verbalized understanding of instruction/education.    Nurse face to face time: 35 min    CC: Alexi Romero/Cordell Stephens

## 2017-10-25 ENCOUNTER — HOSPITAL ENCOUNTER (OUTPATIENT)
Dept: WOUND CARE | Facility: HOSPITAL | Age: 37
Discharge: HOME | End: 2017-10-25
Attending: SURGERY | Admitting: SURGERY
Payer: COMMERCIAL

## 2017-10-25 ENCOUNTER — TELEPHONE (OUTPATIENT)
Dept: WOUND CARE | Facility: OTHER | Age: 37
End: 2017-10-25

## 2017-10-25 VITALS — SYSTOLIC BLOOD PRESSURE: 108 MMHG | TEMPERATURE: 99 F | DIASTOLIC BLOOD PRESSURE: 74 MMHG | HEART RATE: 85 BPM

## 2017-10-25 DIAGNOSIS — Z87.2 HISTORY OF HIDRADENITIS SUPPURATIVA: Primary | ICD-10-CM

## 2017-10-25 PROCEDURE — 97597 DBRDMT OPN WND 1ST 20 CM/<: CPT

## 2017-10-25 NOTE — PROGRESS NOTES
Purnima Fallon, 1980  Chief Complaint   Patient presents with     WOUND CARE     pilonidal cyst       Patient Active Problem List   Diagnosis     Migraines     Depression     ADHD (attention deficit hyperactivity disorder)     Cystic acne     S/P laparoscopic hysterectomy     Kidney stones     Ischiorectal abscess and fistula     ACP (advance care planning)     Mass of breast     Pilonidal cyst     H/O pilonidal cyst     Biliary dyskinesia       Concerns/Comments since last visits:   Purnima is here for follow up for her pilonidal cyst S/P I and D.  Last visit we placed the wound vac on hold due to deterioration of her edges from maceration.  Her  has been doing BID damp to dry dressings.      Purnima says she has slightly more pain as she feels the gauze dressings aren't quite as comfortable, but she is willing to continue them and not have the wound vac.    Objective:   Scant tan drainage on dressing - no odor.  Wound base had some fibrinous buildup prior to debridment.  3.1 x 1.7 x 0.3 cm with pink non-granular base.  Edges are attached.  There are signs of epithelial migration on the distal end.      Procedures:   Cleansed with antibacterial soap      Informed consent done for serial conservative sharp debridement - gave explanation of risks (slight bleeding, pain, infection, scarring) and benefits (enhanced healing) and answered all questions.  Form signed and sent to medical records.  Verified name, , and site prior to procedure as part of time out.  4% lidocaine cream applied. Used curette and sharp pickups to remove fibrinous debris and devitalized tissue from wound base (<20 sq cm).  no bleeding;  No c/o pain    Assessments  Improvement in maceration; wound steadily improving    Plan of care:   D/C NPWT - Christel Domínguez to notify [employer] (done)  Continue BID saline damp to dry dressings  Return in one week  Left Message for BELLO Mckeon on pt request to see Dr Bernal for hidrenditis suppurativa  "treatment    Treatment Goals:  Continued granulation tissue formation     Supplies provided:  Pt had received \"soft woven gauze\"  Talked with MAURICE Shelton at [facility] - they will replace with the non-woven gauze.    Education:  Wound care instructions/education provided. Patient verbalized understanding of instruction/education.    Nurse face to face time: 25 min    CC: Alexi Romero/Cordell Stephens                  "

## 2017-11-01 ENCOUNTER — OFFICE VISIT (OUTPATIENT)
Dept: WOUND CARE | Facility: OTHER | Age: 37
End: 2017-11-01
Attending: NURSE PRACTITIONER
Payer: COMMERCIAL

## 2017-11-01 VITALS — TEMPERATURE: 99.2 F | SYSTOLIC BLOOD PRESSURE: 104 MMHG | HEART RATE: 82 BPM | DIASTOLIC BLOOD PRESSURE: 60 MMHG

## 2017-11-01 DIAGNOSIS — K61.39 ISCHIORECTAL ABSCESS AND FISTULA: ICD-10-CM

## 2017-11-01 DIAGNOSIS — T81.89XD DELAYED SURGICAL WOUND HEALING, SUBSEQUENT ENCOUNTER: Primary | ICD-10-CM

## 2017-11-01 PROCEDURE — 99213 OFFICE O/P EST LOW 20 MIN: CPT | Performed by: NURSE PRACTITIONER

## 2017-11-01 NOTE — MR AVS SNAPSHOT
After Visit Summary   11/1/2017    Purnima Fallon    MRN: [id number]           Patient Information     Date Of Birth          1980        Visit Information        Provider Department      11/1/2017 3:00 PM Yanci Arauz NP [facility]        Today's Diagnoses     Delayed surgical wound healing, subsequent encounter    -  1    Ischiorectal abscess and fistula           Follow-ups after your visit        Your next 10 appointments already scheduled     Nov 08, 2017  3:00 PM CST   (Arrive by 2:45 PM)   Return Visit with Yanci Arauz NP   [facility] ([facility] )    [facility] [facility] [address] [phone]            Nov 20, 2017   Procedure with Cordell Stephens MD   [facility] ([facility] )    [address] [place] [id number] [phone]            May 15, 2018 10:45 AM CDT   (Arrive by 10:30 AM)   New Visit with DIDI Bernal MD   [facility] ([facility] )    [address]  [facility] [id number] [phone]              Who to contact     If you have questions or need follow up information about today's clinic visit or your schedule please contact [facility] directly at [phone].  Normal or non-critical lab and imaging results will be communicated to you by MyChart, letter or phone within 4 business days after the clinic has received the results. If you do not hear from us within 7 days, please contact the clinic through MyChart or phone. If you have a critical or abnormal lab result, we will notify you by phone as soon as possible.  Submit refill requests through [employer] or call your pharmacy and they will forward the refill request to us. Please allow 3 business days for your refill to be completed.          Additional Information About Your Visit        [facility] Information     [employer] gives you secure access to your electronic  health record. If you see a primary care provider, you can also send messages to your care team and make appointments. If you have questions, please call your primary care clinic.  If you do not have a primary care provider, please call [phone] and they will assist you.        Care EveryWhere ID     This is your Care EveryWhere ID. This could be used by other organizations to access your [place] medical records  [id number]        Your Vitals Were     Pulse Temperature Last Period             82 99.2  F (37.3  C) 08/10/2013          Blood Pressure from Last 3 Encounters:   11/01/17 104/60   10/25/17 108/74   10/18/17 117/81    Weight from Last 3 Encounters:   10/13/17 125 lb (56.7 kg)   10/06/17 125 lb (56.7 kg)   10/06/17 125 lb (56.7 kg)              Today, you had the following     No orders found for display       Primary Care Provider Office Phone # Fax #    Alexi Romero [phone]       [facility] [place] [address]  [facility] [address]        Equal Access to Services     [facility] : Hadii aad ku hadasho Soomaali, waaxda luqadaha, qaybta kaalmada adeegyada, debby garcia . So [facility] [phone].    ATENCIÓN: Si habla español, tiene a cyr disposición servicios gratuitos de asistencia lingüística. Llame al [phone].    We comply with applicable federal civil rights laws and [place] laws. We do not discriminate on the basis of race, color, national origin, age, disability, sex, sexual orientation, or gender identity.            Thank you!     Thank you for choosing [facility] [place]  for your care. Our goal is always to provide you with excellent care. Hearing back from our patients is one way we can continue to improve our services. Please take a few minutes to complete the written survey that you may receive in the mail after your visit with us. Thank you!             Your Updated Medication List - Protect others around you: Learn how  "to safely use, store and throw away your medicines at www.disposemymeds.org.          This list is accurate as of: 11/1/17  3:44 PM.  Always use your most recent med list.                   Brand Name Dispense Instructions for use Diagnosis    * order for DME     1 Package    medipore tape, 4\"    Ischiorectal abscess and fistula, Delayed surgical wound healing, subsequent encounter       * order for DME     1 Package    2X2 gauze pads, non sterile sleeve    Ischiorectal abscess and fistula, Delayed surgical wound healing, subsequent encounter       valACYclovir 500 MG tablet    VALTREX    90 tablet    TAKE ONE TABLET BY MOUTH DAILY    Herpes simplex virus infection       * Notice:  This list has 2 medication(s) that are the same as other medications prescribed for you. Read the directions carefully, and ask your doctor or other care provider to review them with you.      "

## 2017-11-01 NOTE — PROGRESS NOTES
SUBJECTIVE:  Purnima Fallon, 36 year old, female presents with the following Chief Complaint(s) with HPI to follow: wound care     HPI:  Purnima is here today for evaluation and treatment of her pilonidal cyst wound.  We are currently dressing the area with gauze moistened with saline bid. She has had NPWT (negative pressure wound therapy) as well in the past, it was discontinued on 10/18/17.  She has been healing well.  Her  assists her with the dressings.    Past hx:  She has had an issue with pilonidal cysts for many years.  She has had 3 different operations on the cysts over the years.  She had her last surgery on 9/18/17. Of note: she also tells me that she has been diagnosed with hidradenditis supprativa    Patient Active Problem List   Diagnosis     Migraines     Depression     ADHD (attention deficit hyperactivity disorder)     Cystic acne     S/P laparoscopic hysterectomy     Kidney stones     Ischiorectal abscess and fistula     ACP (advance care planning)     Mass of breast     Pilonidal cyst     H/O pilonidal cyst     Biliary dyskinesia       Past Medical History:   Diagnosis Date     Hidradenitis 2/16/2007     Ischiorectal abscess and fistula      Kidney stones 2008    x2 passed on her own     Nondependent tobacco use disorder 10/11/2012     Papanicolaou smear of cervix with low grade squamous intraepithelial lesion (LGSIL) 10/29/2008       Past Surgical History:   Procedure Laterality Date     CYSTECTOMY PILONIDAL N/A 9/18/2017    Procedure: CYSTECTOMY PILONIDAL;  PILONIDAL CYSTECTOMY ;  Surgeon: Cordell Stephens MD;  [facility]     EXCISE MASS NECK Right 8/4/2015    Procedure: EXCISE MASS NECK;  Surgeon: Nasir Jones MD;  [facility]: [facility]     INCISION AND DRAINAGE PERINEAL, COMBINED N/A 3/18/2015    Procedure: COMBINED INCISION AND DRAINAGE PERINEAL;  Surgeon: Jay Torres DO;  [facility]: [facility]     LAPAROSCOPIC HYSTERECTOMY SUPRACERVICAL, BILATERAL SALPINGO-OOPHORECTOMY,  "COMBINED  9/27/2013    Procedure: COMBINED LAPAROSCOPIC HYSTERECTOMY SUPRACERVICAL, SALPINGO-OOPHORECTOMY;  LAPAROSCOPIC SUPRACERVICAL HYSTERECTOMY AND RIGHT SALPINGO-OOPHORECTOMY, CYSTOSCOPY;  Surgeon: Denys Callahan MD;  Location: [facility]     marsupialization of pilonidal cyst  2007     TUBAL LIGATION  2005       Family History   Problem Relation Age of Onset     DIABETES Paternal Grandmother        Social History   Substance Use Topics     Smoking status: Current Every Day Smoker     Packs/day: 0.50     Years: 15.00     Types: Cigarettes     Last attempt to quit: 7/18/2013     Smokeless tobacco: Never Used     Alcohol use No       Current Outpatient Prescriptions   Medication Sig Dispense Refill     order for DME medipore tape, 4\" 1 Package 0     order for DME 2X2 gauze pads, non sterile sleeve 1 Package 0     valACYclovir (VALTREX) 500 MG tablet TAKE ONE TABLET BY MOUTH DAILY 90 tablet 0       No Known Allergies    REVIEW OF SYSTEMS  Skin: as above, hydradenditis suprativa  Eyes: contacts  Ears/Nose/Throat: negative  Respiratory: No shortness of breath, dyspnea on exertion, cough, or hemoptysis  Cardiovascular: negative  Gastrointestinal: abdominal pain  Genitourinary: hx of kidney stones  Musculoskeletal: negative  Neurologic: negative  Psychiatric: negative  Hematologic/Lymphatic/Immunologic: negative  Endocrine: negative    OBJECTIVE:  Temp: 99.2  F (37.3  C)   BP: 104/60 Pulse: 82            Constitutional: healthy, alert and no distress  Cardiovascular: RRR. No murmurs, clicks gallops or rub  Respiratory:  Good diaphragmatic excursion. Lungs clear  Gastrointestinal: Abdomen soft, non-tender. BS normal. No masses, organomegaly  : Deferred  Musculoskeletal: extremities normal- no gross deformities noted, gait normal and normal muscle tone  Skin:        Wound description:  Type of Wound- surgical; Location- buttocks/coccyx region, Drainage amount-scant, Drainage color-bloody,  Odor- none; Wound bed- pink and " clean,  Surrounding skin-intact,      Measurements: 2.2 X 1.0 cm X 0.2 cm deep       Dressing change:  Wound cleansed with soap and water, dressed with gauze moistened with saline and covered with dry gauze.  Neurologic: Gait normal. Sensation grossly WNL.  Psychiatric: mentation appears normal and affect normal/bright    THERAPY GOAL: Keep wound bed moist and drainage contained.  Prevent infection.     ASSESSMENT / PLAN:  Patient will continue bid dressings with saline and gauze. She will return in one week.  She is [place] out [place] in [place] a [place] and will need an option for dressings that she can do independently over the weekend.    Yanci Arauz CNP, CWOCN  Urology and Wound Care  November 1, 2017

## 2017-11-08 ENCOUNTER — OFFICE VISIT (OUTPATIENT)
Dept: WOUND CARE | Facility: OTHER | Age: 37
End: 2017-11-08
Attending: NURSE PRACTITIONER
Payer: COMMERCIAL

## 2017-11-08 VITALS
HEART RATE: 69 BPM | HEIGHT: 63 IN | SYSTOLIC BLOOD PRESSURE: 116 MMHG | WEIGHT: 125 LBS | TEMPERATURE: 99.2 F | BODY MASS INDEX: 22.15 KG/M2 | DIASTOLIC BLOOD PRESSURE: 79 MMHG

## 2017-11-08 DIAGNOSIS — T81.89XD DELAYED SURGICAL WOUND HEALING, SUBSEQUENT ENCOUNTER: Primary | ICD-10-CM

## 2017-11-08 PROCEDURE — 99213 OFFICE O/P EST LOW 20 MIN: CPT | Performed by: NURSE PRACTITIONER

## 2017-11-08 ASSESSMENT — PAIN SCALES - GENERAL: PAINLEVEL: MODERATE PAIN (4)

## 2017-11-08 NOTE — MR AVS SNAPSHOT
After Visit Summary   11/8/2017    Purnima Fallon    MRN: [id number]           Patient Information     Date Of Birth          1980        Visit Information        Provider Department      11/8/2017 3:00 PM Yanci Arauz NP [facility]        Today's Diagnoses     Delayed surgical wound healing, subsequent encounter    -  1       Follow-ups after your visit        Your next 10 appointments already scheduled     Nov 20, 2017   Procedure with Cordell Stephens MD   [facility] ([facility] )    [address] [place] [id number] [phone]            May 15, 2018 10:45 AM CDT   (Arrive by 10:30 AM)   New Visit with DIDI Bernal MD   [facility] ([facility] )    [address] [facility] [address] [phone]              Who to contact     If you have questions or need follow up information about today's clinic visit or your schedule please contact [facility] directly at [phone].  Normal or non-critical lab and imaging results will be communicated to you by [employer], letter or phone within 4 business days after the clinic has received the results. If you do not hear from us within 7 days, please contact the clinic through [employer] or phone. If you have a critical or abnormal lab result, we will notify you by phone as soon as possible.  Submit refill requests through [employer] or call your pharmacy and they will forward the refill request to us. Please allow 3 business days for your refill to be completed.          Additional Information About Your Visit        MyChart Information     [employer] gives you secure access to your electronic health record. If you see a primary care provider, you can also send messages to your care team and make appointments. If you have questions, please call your primary care clinic.  If you do not have a primary care provider, please call [phone] and they  "will assist you.        Care EveryWhere ID     This is your Care EveryWhere ID. This could be used by other organizations to access your [place] medical records  [id number]        Your Vitals Were     Pulse Temperature Height Last Period BMI (Body Mass Index)       69 99.2  F (37.3  C) 5' 2.5\" (1.588 m) 08/10/2013 22.5 kg/m2        Blood Pressure from Last 3 Encounters:   11/08/17 116/79   11/01/17 104/60   10/25/17 108/74    Weight from Last 3 Encounters:   11/08/17 125 lb (56.7 kg)   10/13/17 125 lb (56.7 kg)   10/06/17 125 lb (56.7 kg)              Today, you had the following     No orders found for display         Today's Medication Changes          These changes are accurate as of: 11/8/17  4:05 PM.  If you have any questions, ask your nurse or doctor.               These medicines have changed or have updated prescriptions.        Dose/Directions    * order for DME   This may have changed:  Another medication with the same name was added. Make sure you understand how and when to take each.   Used for:  Ischiorectal abscess and fistula, Delayed surgical wound healing, subsequent encounter   Changed by:  Yanci Arauz NP        medipore tape, 4\"   Quantity:  1 Package   Refills:  0       * order for DME   This may have changed:  Another medication with the same name was added. Make sure you understand how and when to take each.   Used for:  Ischiorectal abscess and fistula, Delayed surgical wound healing, subsequent encounter   Changed by:  Yanci Arauz NP        2X2 gauze pads, non sterile sleeve   Quantity:  1 Package   Refills:  0       * order for DME   This may have changed:  You were already taking a medication with the same name, and this prescription was added. Make sure you understand how and when to take each.   Used for:  Delayed surgical wound healing, subsequent encounter   Changed by:  Yanci Arauz NP        3X3\" optifoam dressings, change dressing every other day, please fill #7 " "  Quantity:  7 each   Refills:  1       * Notice:  This list has 3 medication(s) that are the same as other medications prescribed for you. Read the directions carefully, and ask your doctor or other care provider to review them with you.         Where to get your medicines      Some of these will need a paper prescription and others can be bought over the counter.  Ask your nurse if you have questions.     Bring a paper prescription for each of these medications     order for DME                Primary Care Provider Office Phone # Fax #    Alexi Romero [phone]       [facility] [place] [address]  [facility] [address]        Equal Access to Services     [facility]: Hadii aad ku hadasho Soomaali, waaxda luqadaha, qaybta kaalmada adeegyada, waxay idiin hayaan adeeg oh garcia . So [facility] [phone].    ATENCIÓN: Si habla español, tiene a cyr disposición servicios gratuitos de asistencia lingüística. Llame al [phone].    We comply with applicable federal civil rights laws and [place] laws. We do not discriminate on the basis of race, color, national origin, age, disability, sex, sexual orientation, or gender identity.            Thank you!     Thank you for choosing [facility]  for your care. Our goal is always to provide you with excellent care. Hearing back from our patients is one way we can continue to improve our services. Please take a few minutes to complete the written survey that you may receive in the mail after your visit with us. Thank you!             Your Updated Medication List - Protect others around you: Learn how to safely use, store and throw away your medicines at www.disposemymeds.org.          This list is accurate as of: 11/8/17  4:05 PM.  Always use your most recent med list.                   Brand Name Dispense Instructions for use Diagnosis    * order for DME     1 Package    medipore tape, 4\"    Ischiorectal abscess and fistula, " "Delayed surgical wound healing, subsequent encounter       * order for DME     1 Package    2X2 gauze pads, non sterile sleeve    Ischiorectal abscess and fistula, Delayed surgical wound healing, subsequent encounter       * order for DME     7 each    3X3\" optifoam dressings, change dressing every other day, please fill #7    Delayed surgical wound healing, subsequent encounter       valACYclovir 500 MG tablet    VALTREX    90 tablet    TAKE ONE TABLET BY MOUTH DAILY    Herpes simplex virus infection       * Notice:  This list has 3 medication(s) that are the same as other medications prescribed for you. Read the directions carefully, and ask your doctor or other care provider to review them with you.      "

## 2017-11-08 NOTE — PROGRESS NOTES
SUBJECTIVE:  Purnima Fallon, 36 year old, female presents with the following Chief Complaint(s) with HPI to follow: wound care     HPI:  Purnima is here today for evaluation and treatment of her pilonidal cyst wound.  We are currently dressing the area with gauze moistened with saline bid. She has had NPWT (negative pressure wound therapy) as well in the past, it was discontinued on 10/18/17.  She has been healing well.  Her  assists her with the dressings.  She will be flying out of [place] tomorrow morning and needs an alternate dressing ordered that she can do independently (her  is not going with her).    Past hx:  She has had an issue with pilonidal cysts for many years.  She has had 3 different operations on the cysts over the years.  She had her last surgery on 9/18/17. Of note: she also tells me that she has been diagnosed with hidradenditis supprativa    Patient Active Problem List   Diagnosis     Migraines     Depression     ADHD (attention deficit hyperactivity disorder)     Cystic acne     S/P laparoscopic hysterectomy     Kidney stones     Ischiorectal abscess and fistula     ACP (advance care planning)     Mass of breast     Pilonidal cyst     H/O pilonidal cyst     Biliary dyskinesia       Past Medical History:   Diagnosis Date     Hidradenitis 2/16/2007     Ischiorectal abscess and fistula      Kidney stones 2008    x2 passed on her own     Nondependent tobacco use disorder 10/11/2012     Papanicolaou smear of cervix with low grade squamous intraepithelial lesion (LGSIL) 10/29/2008       Past Surgical History:   Procedure Laterality Date     CYSTECTOMY PILONIDAL N/A 9/18/2017    Procedure: CYSTECTOMY PILONIDAL;  PILONIDAL CYSTECTOMY ;  Surgeon: Cordell Stephens MD;  [facility]: [facility]     EXCISE MASS NECK Right 8/4/2015    Procedure: EXCISE MASS NECK;  Surgeon: Nasir Jones MD;  [facility]: [facility]     INCISION AND DRAINAGE PERINEAL, COMBINED N/A 3/18/2015    Procedure: COMBINED  "INCISION AND DRAINAGE PERINEAL;  Surgeon: Jay Torres DO;  [facility]: [facility]     LAPAROSCOPIC HYSTERECTOMY SUPRACERVICAL, BILATERAL SALPINGO-OOPHORECTOMY, COMBINED  9/27/2013    Procedure: COMBINED LAPAROSCOPIC HYSTERECTOMY SUPRACERVICAL, SALPINGO-OOPHORECTOMY;  LAPAROSCOPIC SUPRACERVICAL HYSTERECTOMY AND RIGHT SALPINGO-OOPHORECTOMY, CYSTOSCOPY;  Surgeon: Denys Callahan MD;  Location: [facility]     marsupialization of pilonidal cyst  2007     TUBAL LIGATION  2005       Family History   Problem Relation Age of Onset     DIABETES Paternal Grandmother        Social History   Substance Use Topics     Smoking status: Current Every Day Smoker     Packs/day: 0.50     Years: 15.00     Types: Cigarettes     Last attempt to quit: 7/18/2013     Smokeless tobacco: Never Used     Alcohol use No       Current Outpatient Prescriptions   Medication Sig Dispense Refill     order for DME medipore tape, 4\" 1 Package 0     order for DME 2X2 gauze pads, non sterile sleeve 1 Package 0     valACYclovir (VALTREX) 500 MG tablet TAKE ONE TABLET BY MOUTH DAILY 90 tablet 0       No Known Allergies    REVIEW OF SYSTEMS  Skin: as above, hydradenditis suprativa  Eyes: contacts  Ears/Nose/Throat: negative  Respiratory: No shortness of breath, dyspnea on exertion, cough, or hemoptysis  Cardiovascular: negative  Gastrointestinal: abdominal pain  Genitourinary: hx of kidney stones  Musculoskeletal: negative  Neurologic: negative  Psychiatric: negative  Hematologic/Lymphatic/Immunologic: negative  Endocrine: negative    OBJECTIVE:  Temp: 99.2  F (37.3  C)   BP: 116/79 Pulse: 69            Constitutional: healthy, alert and no distress  Cardiovascular: RRR. No murmurs, clicks gallops or rub  Respiratory:  Good diaphragmatic excursion. Lungs clear  Gastrointestinal: Abdomen soft, non-tender. BS normal. No masses, organomegaly  : Deferred  Musculoskeletal: extremities normal- no gross deformities noted, gait normal and normal muscle tone  Skin:      " "  Wound description:  Type of Wound- surgical; Location- buttocks/coccyx region, Drainage amount-scant, Drainage color-bloody,  Odor- none; Wound bed- pink and clean,  Surrounding skin-intact,      Measurements: 1.5 X 0.7 cm X surface cm deep       Dressing change:  Wound cleansed with soap and water, dressed with a 3X3\" bordered foam.    Neurologic: Gait normal. Sensation grossly WNL.  Psychiatric: mentation appears normal and affect normal/bright    THERAPY GOAL: Keep wound bed moist and drainage contained.  Prevent infection.     ASSESSMENT / PLAN:  Patient will switch to dressing the area every 2-3 days with a 3X3\" bordered foam.  Supplies ordered.  She will follow with us in about two weeks.    Yanci Arauz  CNP, CWOCN  Urology and Wound Care  November 8, 2017                "

## 2017-11-15 ENCOUNTER — HOSPITAL ENCOUNTER (OUTPATIENT)
Dept: WOUND CARE | Facility: HOSPITAL | Age: 37
Discharge: HOME | End: 2017-11-15
Attending: NURSE PRACTITIONER | Admitting: NURSE PRACTITIONER
Payer: COMMERCIAL

## 2017-11-15 VITALS — TEMPERATURE: 98 F | HEART RATE: 73 BPM | DIASTOLIC BLOOD PRESSURE: 71 MMHG | SYSTOLIC BLOOD PRESSURE: 111 MMHG

## 2017-11-15 PROCEDURE — 99212 OFFICE O/P EST SF 10 MIN: CPT

## 2017-11-15 NOTE — PROGRESS NOTES
Purnima Fallon, 1980  Chief Complaint   Patient presents with     WOUND CARE     pilondial cyst       Patient Active Problem List   Diagnosis     Migraines     Depression     ADHD (attention deficit hyperactivity disorder)     Cystic acne     S/P laparoscopic hysterectomy     Kidney stones     Ischiorectal abscess and fistula     ACP (advance care planning)     Mass of breast     Pilonidal cyst     H/O pilonidal cyst     Biliary dyskinesia       Concerns/Comments since last visits: Purnima is here for follow up of her pilonidal cyst.  She has been treated with NPWT (wound Vac) and damp to dry saline dressings until recently.  She needed to travel east due to her grandmothers serious illness, so she was switched to Optifoam so she could manage on her own and had no difficulties with the dressing.  Stated everything feels good.  She is set to hap a lap jayant on Monday    Objective:   Scant serosanguinous drainage on foam dressing.  There is an area approx 1 x 0.1 cm that is either open or has very fragile epithelial tissue.  This is surrounded by dry encrustations.  Attempted to lift these with petroleum jelly and pickups but was unable to do so without damaging fragile tissue.  Edges are attached and surrounding skin has some scarring.    Procedures:   Cleansed and evaluated.  Applied petroleum jelly to attempt to lift encrustations but unable to do so    Assessments  Fully epithelialized vs very small area that is still open  Encrustations    Plan of care: LILY Arauz CNP present to assist in evaluation and care planning  Applied 3 x 3 optifoam adhesive - she can remove this if there is no drainage  Use emollient lotion to soften encrustations  No additional visits needed    Treatment Goals:  met    Supplies provided:  n/a    Education:  Wound care instructions/education provided. Patient verbalized understanding of instruction/education.    Nurse face to face time: 20 min    CC: Alexi Romero/ Kat  Cordell

## 2017-11-20 ENCOUNTER — SURGERY (OUTPATIENT)
Age: 37
End: 2017-11-20

## 2017-11-20 ENCOUNTER — ANESTHESIA EVENT (OUTPATIENT)
Dept: SURGERY | Facility: HOSPITAL | Age: 37
End: 2017-11-20
Payer: COMMERCIAL

## 2017-11-20 ENCOUNTER — ANESTHESIA (OUTPATIENT)
Dept: SURGERY | Facility: HOSPITAL | Age: 37
End: 2017-11-20
Payer: COMMERCIAL

## 2017-11-20 ENCOUNTER — HOSPITAL ENCOUNTER (OUTPATIENT)
Facility: HOSPITAL | Age: 37
Discharge: HOME | End: 2017-11-20
Attending: SURGERY | Admitting: SURGERY
Payer: COMMERCIAL

## 2017-11-20 VITALS
SYSTOLIC BLOOD PRESSURE: 95 MMHG | WEIGHT: 126 LBS | OXYGEN SATURATION: 97 % | BODY MASS INDEX: 23.19 KG/M2 | HEART RATE: 79 BPM | HEIGHT: 62 IN | TEMPERATURE: 98.4 F | RESPIRATION RATE: 16 BRPM | DIASTOLIC BLOOD PRESSURE: 64 MMHG

## 2017-11-20 DIAGNOSIS — K82.8 BILIARY DYSKINESIA: Primary | ICD-10-CM

## 2017-11-20 PROCEDURE — 27210794 ZZH OR GENERAL SUPPLY STERILE: Performed by: SURGERY

## 2017-11-20 PROCEDURE — 25000128 H RX IP 250 OP 636: Performed by: SURGERY

## 2017-11-20 PROCEDURE — 40000306 ZZH STATISTIC PRE PROC ASSESS II: Performed by: SURGERY

## 2017-11-20 PROCEDURE — 36000058 ZZH SURGERY LEVEL 3 EA 15 ADDTL MIN: Performed by: SURGERY

## 2017-11-20 PROCEDURE — 47562 LAPAROSCOPIC CHOLECYSTECTOMY: CPT | Performed by: ANESTHESIOLOGY

## 2017-11-20 PROCEDURE — 36000056 ZZH SURGERY LEVEL 3 1ST 30 MIN: Performed by: SURGERY

## 2017-11-20 PROCEDURE — 25000125 ZZHC RX 250: Performed by: NURSE ANESTHETIST, CERTIFIED REGISTERED

## 2017-11-20 PROCEDURE — 37000009 ZZH ANESTHESIA TECHNICAL FEE, EACH ADDTL 15 MIN: Performed by: SURGERY

## 2017-11-20 PROCEDURE — 71000027 ZZH RECOVERY PHASE 2 EACH 15 MINS: Performed by: SURGERY

## 2017-11-20 PROCEDURE — 88304 TISSUE EXAM BY PATHOLOGIST: CPT | Mod: TC | Performed by: SURGERY

## 2017-11-20 PROCEDURE — 25000125 ZZHC RX 250: Performed by: ANESTHESIOLOGY

## 2017-11-20 PROCEDURE — 25000128 H RX IP 250 OP 636: Performed by: ANESTHESIOLOGY

## 2017-11-20 PROCEDURE — 27110028 ZZH OR GENERAL SUPPLY NON-STERILE: Performed by: SURGERY

## 2017-11-20 PROCEDURE — 25000566 ZZH SEVOFLURANE, EA 15 MIN: Performed by: ANESTHESIOLOGY

## 2017-11-20 PROCEDURE — 25000132 ZZH RX MED GY IP 250 OP 250 PS 637: Performed by: SURGERY

## 2017-11-20 PROCEDURE — 25000125 ZZHC RX 250: Performed by: SURGERY

## 2017-11-20 PROCEDURE — 71000014 ZZH RECOVERY PHASE 1 LEVEL 2 FIRST HR: Performed by: SURGERY

## 2017-11-20 PROCEDURE — 37000008 ZZH ANESTHESIA TECHNICAL FEE, 1ST 30 MIN: Performed by: SURGERY

## 2017-11-20 PROCEDURE — 47562 LAPAROSCOPIC CHOLECYSTECTOMY: CPT | Performed by: SURGERY

## 2017-11-20 PROCEDURE — 25000128 H RX IP 250 OP 636: Performed by: NURSE ANESTHETIST, CERTIFIED REGISTERED

## 2017-11-20 PROCEDURE — 01999 UNLISTED ANES PROCEDURE: CPT | Performed by: NURSE ANESTHETIST, CERTIFIED REGISTERED

## 2017-11-20 RX ORDER — KETAMINE HYDROCHLORIDE 10 MG/ML
INJECTION, SOLUTION INTRAMUSCULAR; INTRAVENOUS PRN
Status: DISCONTINUED | OUTPATIENT
Start: 2017-11-20 | End: 2017-11-20

## 2017-11-20 RX ORDER — HEPARIN SODIUM 5000 [USP'U]/.5ML
5000 INJECTION, SOLUTION INTRAVENOUS; SUBCUTANEOUS
Status: COMPLETED | OUTPATIENT
Start: 2017-11-20 | End: 2017-11-20

## 2017-11-20 RX ORDER — PROMETHAZINE HYDROCHLORIDE 25 MG/ML
12.5 INJECTION, SOLUTION INTRAMUSCULAR; INTRAVENOUS
Status: DISCONTINUED | OUTPATIENT
Start: 2017-11-20 | End: 2017-11-20 | Stop reason: HOSPADM

## 2017-11-20 RX ORDER — ONDANSETRON 2 MG/ML
4 INJECTION INTRAMUSCULAR; INTRAVENOUS EVERY 30 MIN PRN
Status: DISCONTINUED | OUTPATIENT
Start: 2017-11-20 | End: 2017-11-20 | Stop reason: HOSPADM

## 2017-11-20 RX ORDER — AMOXICILLIN 250 MG
1-2 CAPSULE ORAL 2 TIMES DAILY
Qty: 30 TABLET | Refills: 0 | Status: SHIPPED | OUTPATIENT
Start: 2017-11-20 | End: 2018-03-30

## 2017-11-20 RX ORDER — DEXAMETHASONE SODIUM PHOSPHATE 10 MG/ML
INJECTION, SOLUTION INTRAMUSCULAR; INTRAVENOUS PRN
Status: DISCONTINUED | OUTPATIENT
Start: 2017-11-20 | End: 2017-11-20

## 2017-11-20 RX ORDER — OXYCODONE HYDROCHLORIDE 5 MG/1
5 TABLET ORAL EVERY 4 HOURS PRN
Qty: 30 TABLET | Refills: 0 | Status: SHIPPED | OUTPATIENT
Start: 2017-11-20 | End: 2018-03-30

## 2017-11-20 RX ORDER — BUPIVACAINE HYDROCHLORIDE AND EPINEPHRINE 2.5; 5 MG/ML; UG/ML
INJECTION, SOLUTION EPIDURAL; INFILTRATION; INTRACAUDAL; PERINEURAL PRN
Status: DISCONTINUED | OUTPATIENT
Start: 2017-11-20 | End: 2017-11-20 | Stop reason: HOSPADM

## 2017-11-20 RX ORDER — NALOXONE HYDROCHLORIDE 0.4 MG/ML
.1-.4 INJECTION, SOLUTION INTRAMUSCULAR; INTRAVENOUS; SUBCUTANEOUS
Status: DISCONTINUED | OUTPATIENT
Start: 2017-11-20 | End: 2017-11-20 | Stop reason: HOSPADM

## 2017-11-20 RX ORDER — HYDRALAZINE HYDROCHLORIDE 20 MG/ML
2.5-5 INJECTION INTRAMUSCULAR; INTRAVENOUS EVERY 10 MIN PRN
Status: DISCONTINUED | OUTPATIENT
Start: 2017-11-20 | End: 2017-11-20 | Stop reason: HOSPADM

## 2017-11-20 RX ORDER — FENTANYL CITRATE 50 UG/ML
INJECTION, SOLUTION INTRAMUSCULAR; INTRAVENOUS PRN
Status: DISCONTINUED | OUTPATIENT
Start: 2017-11-20 | End: 2017-11-20

## 2017-11-20 RX ORDER — ALBUTEROL SULFATE 0.83 MG/ML
2.5 SOLUTION RESPIRATORY (INHALATION) EVERY 4 HOURS PRN
Status: DISCONTINUED | OUTPATIENT
Start: 2017-11-20 | End: 2017-11-20 | Stop reason: HOSPADM

## 2017-11-20 RX ORDER — CEFAZOLIN SODIUM 2 G/100ML
2 INJECTION, SOLUTION INTRAVENOUS
Status: COMPLETED | OUTPATIENT
Start: 2017-11-20 | End: 2017-11-20

## 2017-11-20 RX ORDER — PROPOFOL 10 MG/ML
INJECTION, EMULSION INTRAVENOUS PRN
Status: DISCONTINUED | OUTPATIENT
Start: 2017-11-20 | End: 2017-11-20

## 2017-11-20 RX ORDER — DEXAMETHASONE SODIUM PHOSPHATE 4 MG/ML
4 INJECTION, SOLUTION INTRA-ARTICULAR; INTRALESIONAL; INTRAMUSCULAR; INTRAVENOUS; SOFT TISSUE EVERY 10 MIN PRN
Status: DISCONTINUED | OUTPATIENT
Start: 2017-11-20 | End: 2017-11-20 | Stop reason: HOSPADM

## 2017-11-20 RX ORDER — ONDANSETRON 4 MG/1
4 TABLET, ORALLY DISINTEGRATING ORAL EVERY 30 MIN PRN
Status: DISCONTINUED | OUTPATIENT
Start: 2017-11-20 | End: 2017-11-20 | Stop reason: HOSPADM

## 2017-11-20 RX ORDER — OXYCODONE HYDROCHLORIDE 5 MG/1
5 TABLET ORAL ONCE
Status: COMPLETED | OUTPATIENT
Start: 2017-11-20 | End: 2017-11-20

## 2017-11-20 RX ORDER — LIDOCAINE HYDROCHLORIDE 20 MG/ML
INJECTION, SOLUTION INFILTRATION; PERINEURAL PRN
Status: DISCONTINUED | OUTPATIENT
Start: 2017-11-20 | End: 2017-11-20

## 2017-11-20 RX ORDER — SODIUM CHLORIDE, SODIUM LACTATE, POTASSIUM CHLORIDE, CALCIUM CHLORIDE 600; 310; 30; 20 MG/100ML; MG/100ML; MG/100ML; MG/100ML
INJECTION, SOLUTION INTRAVENOUS CONTINUOUS
Status: DISCONTINUED | OUTPATIENT
Start: 2017-11-20 | End: 2017-11-20 | Stop reason: HOSPADM

## 2017-11-20 RX ORDER — FENTANYL CITRATE 50 UG/ML
25-50 INJECTION, SOLUTION INTRAMUSCULAR; INTRAVENOUS
Status: DISCONTINUED | OUTPATIENT
Start: 2017-11-20 | End: 2017-11-20 | Stop reason: HOSPADM

## 2017-11-20 RX ORDER — SCOLOPAMINE TRANSDERMAL SYSTEM 1 MG/1
1 PATCH, EXTENDED RELEASE TRANSDERMAL ONCE
Status: COMPLETED | OUTPATIENT
Start: 2017-11-20 | End: 2017-11-20

## 2017-11-20 RX ORDER — MEPERIDINE HYDROCHLORIDE 25 MG/ML
12.5 INJECTION INTRAMUSCULAR; INTRAVENOUS; SUBCUTANEOUS
Status: DISCONTINUED | OUTPATIENT
Start: 2017-11-20 | End: 2017-11-20 | Stop reason: HOSPADM

## 2017-11-20 RX ORDER — HYDROMORPHONE HYDROCHLORIDE 1 MG/ML
.3-.5 INJECTION, SOLUTION INTRAMUSCULAR; INTRAVENOUS; SUBCUTANEOUS EVERY 10 MIN PRN
Status: DISCONTINUED | OUTPATIENT
Start: 2017-11-20 | End: 2017-11-20 | Stop reason: HOSPADM

## 2017-11-20 RX ORDER — ONDANSETRON 2 MG/ML
INJECTION INTRAMUSCULAR; INTRAVENOUS PRN
Status: DISCONTINUED | OUTPATIENT
Start: 2017-11-20 | End: 2017-11-20

## 2017-11-20 RX ORDER — KETOROLAC TROMETHAMINE 30 MG/ML
INJECTION, SOLUTION INTRAMUSCULAR; INTRAVENOUS PRN
Status: DISCONTINUED | OUTPATIENT
Start: 2017-11-20 | End: 2017-11-20

## 2017-11-20 RX ORDER — NEOSTIGMINE METHYLSULFATE 1 MG/ML
VIAL (ML) INJECTION PRN
Status: DISCONTINUED | OUTPATIENT
Start: 2017-11-20 | End: 2017-11-20

## 2017-11-20 RX ORDER — IBUPROFEN 200 MG
400 TABLET ORAL EVERY 6 HOURS PRN
Qty: 50 TABLET | Refills: 1 | Status: SHIPPED | OUTPATIENT
Start: 2017-11-20 | End: 2018-03-30

## 2017-11-20 RX ORDER — GLYCOPYRROLATE 0.2 MG/ML
INJECTION, SOLUTION INTRAMUSCULAR; INTRAVENOUS PRN
Status: DISCONTINUED | OUTPATIENT
Start: 2017-11-20 | End: 2017-11-20

## 2017-11-20 RX ORDER — KETOROLAC TROMETHAMINE 30 MG/ML
30 INJECTION, SOLUTION INTRAMUSCULAR; INTRAVENOUS EVERY 6 HOURS PRN
Status: DISCONTINUED | OUTPATIENT
Start: 2017-11-20 | End: 2017-11-20 | Stop reason: HOSPADM

## 2017-11-20 RX ADMIN — HYDROMORPHONE HYDROCHLORIDE 0.5 MG: 1 INJECTION, SOLUTION INTRAMUSCULAR; INTRAVENOUS; SUBCUTANEOUS at 10:08

## 2017-11-20 RX ADMIN — CEFAZOLIN SODIUM 2 G: 2 INJECTION, SOLUTION INTRAVENOUS at 09:08

## 2017-11-20 RX ADMIN — FENTANYL CITRATE 50 MCG: 50 INJECTION, SOLUTION INTRAMUSCULAR; INTRAVENOUS at 10:47

## 2017-11-20 RX ADMIN — DEXAMETHASONE SODIUM PHOSPHATE 10 MG: 10 INJECTION, SOLUTION INTRAMUSCULAR; INTRAVENOUS at 09:05

## 2017-11-20 RX ADMIN — MIDAZOLAM HYDROCHLORIDE 2 MG: 1 INJECTION, SOLUTION INTRAMUSCULAR; INTRAVENOUS at 08:56

## 2017-11-20 RX ADMIN — FENTANYL CITRATE 50 MCG: 50 INJECTION, SOLUTION INTRAMUSCULAR; INTRAVENOUS at 10:35

## 2017-11-20 RX ADMIN — GLYCOPYRROLATE 0.5 MG: 0.2 INJECTION, SOLUTION INTRAMUSCULAR; INTRAVENOUS at 09:56

## 2017-11-20 RX ADMIN — ROCURONIUM BROMIDE 30 MG: 10 INJECTION INTRAVENOUS at 09:05

## 2017-11-20 RX ADMIN — FENTANYL CITRATE 50 MCG: 50 INJECTION, SOLUTION INTRAMUSCULAR; INTRAVENOUS at 10:32

## 2017-11-20 RX ADMIN — ONDANSETRON 4 MG: 2 INJECTION INTRAMUSCULAR; INTRAVENOUS at 09:55

## 2017-11-20 RX ADMIN — KETOROLAC TROMETHAMINE 30 MG: 30 INJECTION, SOLUTION INTRAMUSCULAR at 09:55

## 2017-11-20 RX ADMIN — SCOPALAMINE 1 PATCH: 1 PATCH, EXTENDED RELEASE TRANSDERMAL at 08:20

## 2017-11-20 RX ADMIN — BUPIVACAINE HYDROCHLORIDE AND EPINEPHRINE 15 ML: 2.5; 5 INJECTION, SOLUTION EPIDURAL; INFILTRATION; INTRACAUDAL; PERINEURAL at 10:05

## 2017-11-20 RX ADMIN — FENTANYL CITRATE 50 MCG: 50 INJECTION, SOLUTION INTRAMUSCULAR; INTRAVENOUS at 09:03

## 2017-11-20 RX ADMIN — Medication 2.5 MG: at 09:56

## 2017-11-20 RX ADMIN — LIDOCAINE HYDROCHLORIDE 40 MG: 20 INJECTION, SOLUTION INFILTRATION; PERINEURAL at 09:04

## 2017-11-20 RX ADMIN — OXYCODONE HYDROCHLORIDE 5 MG: 5 TABLET ORAL at 11:50

## 2017-11-20 RX ADMIN — FENTANYL CITRATE 25 MCG: 50 INJECTION, SOLUTION INTRAMUSCULAR; INTRAVENOUS at 09:48

## 2017-11-20 RX ADMIN — HYDROMORPHONE HYDROCHLORIDE 0.5 MG: 1 INJECTION, SOLUTION INTRAMUSCULAR; INTRAVENOUS; SUBCUTANEOUS at 10:23

## 2017-11-20 RX ADMIN — KETAMINE HYDROCHLORIDE 20 MG: 10 INJECTION, SOLUTION INTRAMUSCULAR; INTRAVENOUS at 09:05

## 2017-11-20 RX ADMIN — HEPARIN SODIUM 5000 UNITS: 5000 INJECTION, SOLUTION INTRAVENOUS; SUBCUTANEOUS at 08:19

## 2017-11-20 RX ADMIN — PROPOFOL 150 MG: 10 INJECTION, EMULSION INTRAVENOUS at 09:04

## 2017-11-20 RX ADMIN — FENTANYL CITRATE 25 MCG: 50 INJECTION, SOLUTION INTRAMUSCULAR; INTRAVENOUS at 09:26

## 2017-11-20 RX ADMIN — SODIUM CHLORIDE, POTASSIUM CHLORIDE, SODIUM LACTATE AND CALCIUM CHLORIDE: 600; 310; 30; 20 INJECTION, SOLUTION INTRAVENOUS at 08:22

## 2017-11-20 RX ADMIN — FENTANYL CITRATE 50 MCG: 50 INJECTION, SOLUTION INTRAMUSCULAR; INTRAVENOUS at 11:00

## 2017-11-20 ASSESSMENT — LIFESTYLE VARIABLES: TOBACCO_USE: 1

## 2017-11-20 NOTE — OP NOTE
[facility]    PREOPERATIVE DIAGNOSIS: Biliary dyskinesia    POSTOPERATIVE DIAGNOSES:  Biliary dyskinesia    PROCEDURE: Laparoscopic cholecystectomy.    HISTORY: This 37 year old female developed abdominal pain,bloating and was evaluated with imaging and a HIDA scan and was found to have biliary dyskinesia. She was then recommended to undergo laparoscopic cholecystectomy     OPERATIVE FINDINGS:  The gallbladder was normal appearance without suggestion cholecystitis.      DESCRIPTION OF PROCEDURE: With the patient in the supine position on the operating room table, general anesthesia was induced. The abdomen was prepped and draped sterilely and the requisite timeout pause observed during which her correct identity and planned procedure were confirmed by the operating room personnel in attendance. An infraumbilical curvilinear incision was made and carried through full thickness skin and subcutaneous tissue.  The fascia was identified and incised. A finger sweep was performed and then a balloon trochar was placed. Pneumoperitoneum was achieved with insufflation of carbon dioxide to 15 mmHg pressure. Three working ports were placed subcostally on the right side. Local anesthetic was injected subxiphoid and then at the midclavicular line and a skin incision was then created. The 5 mm trochars were then placed under direct visualization. A right lateral 5 mm port was placed under direct visualization after injection of local anesthetic and a skin incision.  The gallbladder was grasped with ratcheted clamp and elevated cephalad. Using blunt dissection, the origin of the cystic duct was identified and the gallbladder neck was released from its reflection on the liver.   The cystic duct was skeletonized and then the cystic artery was skeletonized. With identification of two tubular structures entering the gallbladder the cystic duct was clipped. Two clips were placed on the stay side and on the  specimen side. The duct was then transected. Two clips were then placed on the cystic artery stay side and one on the specimen side. The artery was then transected.  The gallbladder was taken down from its bed on the liver in an antegrade fashion with electrocautery dissection and it was retrieved intact in an endocatch bag through the umbilical port site without spillage.     The abdomen was irrigated with copious amounts of saline solution. The 5 mm ports were removed under direct visualization. The gallbladder bed was inspected and the clips were in place. Pneumoperitoneum was released. The umbilical fascial opening was then closed with 0 vircyl. The skin was reapproximated with subdermal 4-0 Monocryl and reinforced with benzoin and steri-strips. Sterile dressings were applied and the patient was transported to the recovery room in satisfactory condition. The estimated blood loss was minimal and there were no apparent intraoperative complications. The procedure was well tolerated and the patient left the operating room in good condition upon confirmation that the final sponge, needle and instruments counts were correct.  The post surgical debriefing was held and acknowledged at completion.    Cordell Stephens  11/20/2017

## 2017-11-20 NOTE — OR NURSING
Pateint discharged to [facility].  Aliya score 10/10. Pain level 2/10.  Discharged from unit via cart.  Hand off report given to [facility] rn

## 2017-11-20 NOTE — IP AVS SNAPSHOT
MRN:[id number]                      After Visit Summary   11/20/2017    Purnima Fallon    MRN: [id number]           Thank you!     Thank you for choosing [facility] for your care. Our goal is always to provide you with excellent care. Hearing back from our patients is one way we can continue to improve our services. Please take a few minutes to complete the written survey that you may receive in the mail after you visit with us. Thank you!        Patient Information     Date Of Birth          1980        About your hospital stay     You were admitted on:  November 20, 2017 You last received care in the:  [facility]    You were discharged on:  November 20, 2017       Who to Call     For medical emergencies, please call 911.  For non-urgent questions about your medical care, please call your primary care provider or clinic, [phone]  For questions related to your surgery, please call your surgery clinic        Attending Provider     Provider Specialty    Cordell Stephens MD Surgery       Primary Care Provider Office Phone # Fax #    Alexi CAITY Romero [phone]      After Care Instructions     Diet Instructions       Resume pre-procedure diet            Dressing       Keep dressing clean and dry.  Dressing / incisional care as instructed by RN and or Surgeon. Band aids may be removed on Wednesday and the steri-strips will fall off in 7-10 days            Ice to affected area       Ice to operative site PRN            No lifting        No lifting over 20 lbs and no strenuous physical activity for 2 weeks            Shower       No shower for 48 hours post procedure. May shower Postoperative Day (POD)  2                  Your next 10 appointments already scheduled     May 15, 2018 10:45 AM CDT   (Arrive by 10:30 AM)   New Visit with DIDI Bernal MD   [facility] ([facility] )    [address] [place] [address]    [phone]              Further instructions from your care team             ELECTIVE LAPAROSCOPIC CHOLECYSTECTOMY     ACTIVITY:    You may climb stairs.    You may lift or exercise when comfortable.    You may drive without restrictions when comfortable and not using prescription pain medications.    BATHING:    You may take a shower the day following surgery.  You may have steri-strips (looks like tape) on your incision.  They will fall off by themselves; if not, remove after one week.    DIET:    Starting with liquids, gradually return to the diet you were on before surgery.    CALL YOUR PHYSICIAN IF YOU HAVE:    Chills or fever about 101 F    Pain not relieved by your pain medication or rest    HELPFUL HINTS:    It is not uncommon to experience some loose stools or diarrhea after surgery.  This is your body s way of adapting.    You may experience shoulder pain which is due to the air placed within your abdomen during the procedure.  This is temporary and usually passes within 2 days.  If you have difficulty after surgery and are unable to contact your physician at his/her clinic, call the hospital Emergency Room for assistance at [phone].    Post-Anesthesia Patient Instructions    IMMEDIATELY FOLLOWING SURGERY:  Do not drive or operate machinery for the first twenty four hours after surgery.  Do not make any important decisions for twenty four hours after surgery or while taking narcotic pain medications or sedatives.  If you develop intractable nausea and vomiting or a severe headache please notify your doctor immediately.    FOLLOW-UP:  Please make an appointment with your surgeon as instructed. You do not need to follow up with anesthesia unless specifically instructed to do so.    WOUND CARE INSTRUCTIONS (if applicable):  Keep a dry clean dressing on the anesthesia/puncture wound site if there is drainage.  Once the wound has quit draining you may leave it open to air.  Generally you should leave  "the bandage intact for twenty four hours unless there is drainage.  If the epidural site drains for more than 36-48 hours please call the anesthesia department.    QUESTIONS?:  Please feel free to call your physician or the hospital  if you have any questions, and they will be happy to assist you.       Pending Results     No orders found from 11/18/2017 to 11/21/2017.            Admission Information     Date & Time Provider Department Dept. Phone    11/20/2017 Cordell Stephens MD [facility] Preop/Phase [phone]      Your Vitals Were     Blood Pressure Pulse Temperature Respirations Height Weight    95/54 79 98.4  F (36.9  C) (Oral) 18 1.575 m (5' 2\") 57.2 kg (126 lb)    Last Period Pulse Oximetry BMI (Body Mass Index)             08/10/2013 97% 23.05 kg/m2         MyChart Information     [employer] gives you secure access to your electronic health record. If you see a primary care provider, you can also send messages to your care team and make appointments. If you have questions, please call your primary care clinic.  If you do not have a primary care provider, please call [phone] and they will assist you.        Care EveryWhere ID     This is your Care EveryWhere ID. This could be used by other organizations to access your [facility] medical records  [id number]        Equal Access to Services     ALDO CRUZ : Felix hoover Sostacy, waaxda luqadaha, qaybta kaalmada stella, debby clemons. So [facility] [phone].    ATENCIÓN: Si habla español, tiene a cyr disposición servicios gratuitos de asistencia lingüística. Llame al [phone].    We comply with applicable federal civil rights laws and [place] laws. We do not discriminate on the basis of race, color, national origin, age, disability, sex, sexual orientation, or gender identity.               Review of your medicines      START taking        Dose / Directions    ibuprofen 200 MG tablet   Commonly known as:  " "ADVIL/MOTRIN   Used for:  Biliary dyskinesia        Dose:  400 mg   Take 2 tablets (400 mg) by mouth every 6 hours as needed for pain (mild)   Quantity:  50 tablet   Refills:  1       oxyCODONE IR 5 MG tablet   Commonly known as:  ROXICODONE   Used for:  Biliary dyskinesia        Dose:  5 mg   Take 1 tablet (5 mg) by mouth every 4 hours as needed for pain or other (Moderate to Severe)   Quantity:  30 tablet   Refills:  0       senna-docusate 8.6-50 MG per tablet   Commonly known as:  SENOKOT-S;PERICOLACE   Used for:  Biliary dyskinesia        Dose:  1-2 tablet   Take 1-2 tablets by mouth 2 times daily Take while on oral narcotics to prevent or treat constipation.   Quantity:  30 tablet   Refills:  0         CONTINUE these medicines which have NOT CHANGED        Dose / Directions    * order for DME   Used for:  Ischiorectal abscess and fistula, Delayed surgical wound healing, subsequent encounter        medipore tape, 4\"   Quantity:  1 Package   Refills:  0       * order for DME   Used for:  Ischiorectal abscess and fistula, Delayed surgical wound healing, subsequent encounter        2X2 gauze pads, non sterile sleeve   Quantity:  1 Package   Refills:  0       * order for DME   Used for:  Delayed surgical wound healing, subsequent encounter        3X3\" optifoam dressings, change dressing every other day, please fill #7   Quantity:  7 each   Refills:  1       valACYclovir 500 MG tablet   Commonly known as:  VALTREX   Used for:  Herpes simplex virus infection        TAKE ONE TABLET BY MOUTH DAILY   Quantity:  90 tablet   Refills:  0       * Notice:  This list has 3 medication(s) that are the same as other medications prescribed for you. Read the directions carefully, and ask your doctor or other care provider to review them with you.         Where to get your medicines      These medications were sent to [facility] - [address]  [address] [address]     Phone:  " "[phone]     ibuprofen 200 MG tablet    senna-docusate 8.6-50 MG per tablet         Some of these will need a paper prescription and others can be bought over the counter. Ask your nurse if you have questions.     Bring a paper prescription for each of these medications     oxyCODONE IR 5 MG tablet                Protect others around you: Learn how to safely use, store and throw away your medicines at www.disposemymeds.org.             Medication List: This is a list of all your medications and when to take them. Check marks below indicate your daily home schedule. Keep this list as a reference.      Medications           Morning Afternoon Evening Bedtime As Needed    ibuprofen 200 MG tablet   Commonly known as:  ADVIL/MOTRIN   Take 2 tablets (400 mg) by mouth every 6 hours as needed for pain (mild)                                * order for DME   medipore tape, 4\"                                * order for DME   2X2 gauze pads, non sterile sleeve                                * order for DME   3X3\" optifoam dressings, change dressing every other day, please fill #7                                oxyCODONE IR 5 MG tablet   Commonly known as:  ROXICODONE   Take 1 tablet (5 mg) by mouth every 4 hours as needed for pain or other (Moderate to Severe)                                senna-docusate 8.6-50 MG per tablet   Commonly known as:  SENOKOT-S;PERICOLACE   Take 1-2 tablets by mouth 2 times daily Take while on oral narcotics to prevent or treat constipation.                                valACYclovir 500 MG tablet   Commonly known as:  VALTREX   TAKE ONE TABLET BY MOUTH DAILY                                * Notice:  This list has 3 medication(s) that are the same as other medications prescribed for you. Read the directions carefully, and ask your doctor or other care provider to review them with you.      "

## 2017-11-20 NOTE — IP AVS SNAPSHOT
[facility]    [address] [address]    Phone:  [phone]                                       After Visit Summary   11/20/2017    Purnima Fallon    MRN: [id number]           After Visit Summary Signature Page     I have received my discharge instructions, and my questions have been answered. I have discussed any challenges I see with this plan with the nurse or doctor.    ..........................................................................................................................................  Patient/Patient Representative Signature      ..........................................................................................................................................  Patient Representative Print Name and Relationship to Patient    ..................................................               ................................................  Date                                            Time    ..........................................................................................................................................  Reviewed by Signature/Title    ...................................................              ..............................................  Date                                                            Time

## 2017-11-20 NOTE — H&P
[facility] Surgery Consultation    CC:  Bilary dyskinesia    HPI:  Mrs. Fallon was previously seen on 10/6/2017 after return for pilonidal surgery. At that time she was evaluated for post prandial abdominal pain and bloating. She had undergone an ultrasound which did not demonstrate any gallbladder wall thickening, pericholecystic fluid, or cholelithiasis. She then had a HIDA scan done which showed an ejection fraction of 23%.     She has had no changes in her medical history or symptomatology since the last time I saw her.     Past Medical History:   Diagnosis Date     Hidradenitis 2/16/2007     Ischiorectal abscess and fistula      Kidney stones 2008    x2 passed on her own     Nondependent tobacco use disorder 10/11/2012     Papanicolaou smear of cervix with low grade squamous intraepithelial lesion (LGSIL) 10/29/2008       Past Surgical History:   Procedure Laterality Date     CYSTECTOMY PILONIDAL N/A 9/18/2017    Procedure: CYSTECTOMY PILONIDAL;  PILONIDAL CYSTECTOMY ;  Surgeon: Cordell Stephens MD;  Location: [facility] OR     EXCISE MASS NECK Right 8/4/2015    Procedure: EXCISE MASS NECK;  Surgeon: Nasir Jones MD;  Location: [facility] OR     INCISION AND DRAINAGE PERINEAL, COMBINED N/A 3/18/2015    Procedure: COMBINED INCISION AND DRAINAGE PERINEAL;  Surgeon: Jay Torres DO;  Location: [facility] OR     LAPAROSCOPIC HYSTERECTOMY SUPRACERVICAL, BILATERAL SALPINGO-OOPHORECTOMY, COMBINED  9/27/2013    Procedure: COMBINED LAPAROSCOPIC HYSTERECTOMY SUPRACERVICAL, SALPINGO-OOPHORECTOMY;  LAPAROSCOPIC SUPRACERVICAL HYSTERECTOMY AND RIGHT SALPINGO-OOPHORECTOMY, CYSTOSCOPY;  Surgeon: Denys Callahan MD;  Location: [facility] OR     marsupialization of pilonidal cyst  2007     TUBAL LIGATION  2005       Pt denied problems with bleeding or anesthesia    No current outpatient prescriptions on file.        No Known Allergies      HABITS:    Social History   Substance Use Topics     Smoking status: Current Every Day Smoker      Packs/day: 0.50     Years: 15.00     Types: Cigarettes     Last attempt to quit: 7/18/2013     Smokeless tobacco: Never Used     Alcohol use No     No mood altering drug use.    Family History   Problem Relation Age of Onset     DIABETES Paternal Grandmother        REVIEW OF SYSTEMS:  Ten point review of systems negative except those mentioned in the HPI.     The patient denies sleep apnea, latex allergies or MRSA    OBJECTIVE:    LMP 08/10/2013    GENERAL: Generally appears well, in no distress with appropriate affect.  Respiratory:  Lungs clear to ausculation bilaterally with good air excursion  Cardiovascular:  Regular Rate and Rhythm with no murmurs gallops or rubs, normal   Abdomen: soft, non-tender, non-distended  :  deferred  Extremities:  Extremities normal. No deformities, edema, or skin discoloration.  Skin:  no suspicious lesions or rashes  Neurological: grossly intact    Psych:  Alert, oriented, affect appropriate with normal decision making ability.      IMPRESSION:  Biliary Dyskinesia     PLAN:  Plan for laparoscopic cholecystectomy.        Cordell Stephens MD    11/20/2017  8:09 AM

## 2017-11-20 NOTE — DISCHARGE INSTRUCTIONS
ELECTIVE LAPAROSCOPIC CHOLECYSTECTOMY     ACTIVITY:    You may climb stairs.    You may lift or exercise when comfortable.    You may drive without restrictions when comfortable and not using prescription pain medications.    BATHING:    You may take a shower the day following surgery.  You may have steri-strips (looks like tape) on your incision.  They will fall off by themselves; if not, remove after one week.    DIET:    Starting with liquids, gradually return to the diet you were on before surgery.    CALL YOUR PHYSICIAN IF YOU HAVE:    Chills or fever about 101 F    Pain not relieved by your pain medication or rest    HELPFUL HINTS:    It is not uncommon to experience some loose stools or diarrhea after surgery.  This is your body s way of adapting.    You may experience shoulder pain which is due to the air placed within your abdomen during the procedure.  This is temporary and usually passes within 2 days.  If you have difficulty after surgery and are unable to contact your physician at his/her clinic, call the [facility] Emergency Room for assistance at [phone].    Post-Anesthesia Patient Instructions    IMMEDIATELY FOLLOWING SURGERY:  Do not drive or operate machinery for the first twenty four hours after surgery.  Do not make any important decisions for twenty four hours after surgery or while taking narcotic pain medications or sedatives.  If you develop intractable nausea and vomiting or a severe headache please notify your doctor immediately.    FOLLOW-UP:  Please make an appointment with your surgeon as instructed. You do not need to follow up with anesthesia unless specifically instructed to do so.    WOUND CARE INSTRUCTIONS (if applicable):  Keep a dry clean dressing on the anesthesia/puncture wound site if there is drainage.  Once the wound has quit draining you may leave it open to air.  Generally you should leave the bandage intact for twenty four hours unless there is drainage.  If the  epidural site drains for more than 36-48 hours please call the anesthesia department.    QUESTIONS?:  Please feel free to call your physician or the hospital  if you have any questions, and they will be happy to assist you.

## 2017-11-20 NOTE — ANESTHESIA CARE TRANSFER NOTE
Patient: Purnima Fallon    Procedure(s):  LAPAROSCOPIC CHOLECYSTECTOMY - Wound Class: III-Contaminated    Diagnosis: BILIARY DYSKINESIA  Diagnosis Additional Information: No value filed.    Anesthesia Type:   General, ETT     Note:  Airway :Room Air  Patient transferred to:Phase II  Handoff Report: Identifed the Patient, Identified the Reponsible Provider, Reviewed the pertinent medical history, Discussed the surgical course, Reviewed Intra-OP anesthesia mangement and issues during anesthesia, Set expectations for post-procedure period and Allowed opportunity for questions and acknowledgement of understanding      Vitals: (Last set prior to Anesthesia Care Transfer)    CRNA VITALS  11/20/2017 0950 - 11/20/2017 1035      11/20/2017             Resp Rate (set): 8                Electronically Signed By: ALONDRA De La Torre CRNA  November 20, 2017  10:35 AM

## 2017-11-20 NOTE — OR NURSING
Patient and responsible adult given discharge instructions with no questions regarding instructions. Aliya score 19/20. Pain level 3/10.  Discharged from unit via wheelchair. Patient discharged to home.

## 2017-11-20 NOTE — ANESTHESIA PREPROCEDURE EVALUATION
Anesthesia Evaluation     . Pt has had prior anesthetic.     No history of anesthetic complications          ROS/MED HX    ENT/Pulmonary:     (+)tobacco use, Current use 0.5 PPD packs/day  , . .    Neurologic:     (+)migraines,     Cardiovascular:  - neg cardiovascular ROS       METS/Exercise Tolerance:     Hematologic:  - neg hematologic  ROS       Musculoskeletal:   (+) , , other musculoskeletal- Cystic acne, pilonidal cysts s/p marsupialization  9/18/2017, hidradenditis supprativa      GI/Hepatic:     (+) cholecystitis/cholelithiasis (BILIARY DYSKINESIA),       Renal/Genitourinary:     (+) Nephrolithiasis ,       Endo:  - neg endo ROS       Psychiatric:     (+) psychiatric history depression and other (comment) (ADHD)      Infectious Disease:  - neg infectious disease ROS       Malignancy:      - no malignancy   Other: Comment: S/p Hysterectomy    (+) No chance of pregnancy                    Physical Exam  Normal systems: cardiovascular, pulmonary and dental    Airway   Mallampati: III  TM distance: >3 FB  Neck ROM: full    Dental     Cardiovascular   Rhythm and rate: regular and normal      Pulmonary    breath sounds clear to auscultation                    Anesthesia Plan      History & Physical Review  History and physical reviewed and following examination; no interval change.    ASA Status:  2 .    NPO Status:  > 8 hours    Plan for General and ETT with Intravenous and Propofol induction. Maintenance will be Balanced.    PONV prophylaxis:  Ondansetron (or other 5HT-3), Dexamethasone or Solumedrol and Scopolamine patch       Postoperative Care  Postoperative pain management:  IV analgesics and Oral pain medications.      Consents  Anesthetic plan, risks, benefits and alternatives discussed with:  Patient..                          .

## 2017-11-21 LAB — COPATH REPORT: NORMAL

## 2017-11-21 NOTE — ANESTHESIA POSTPROCEDURE EVALUATION
Patient: Purnima Fallon    Procedure(s):  LAPAROSCOPIC CHOLECYSTECTOMY - Wound Class: III-Contaminated    Diagnosis:BILIARY DYSKINESIA  Diagnosis Additional Information: No value filed.    Anesthesia Type:  General, ETT    Note:  Anesthesia Post Evaluation    Patient location during evaluation: Phase 2, Bedside and PACU  Patient participation: Able to fully participate in evaluation  Level of consciousness: awake and alert  Pain management: adequate  Airway patency: patent  Cardiovascular status: acceptable  Respiratory status: acceptable  Hydration status: stable  PONV: none     Anesthetic complications: None          Last vitals:  Vitals:    11/20/17 1125 11/20/17 1140 11/20/17 1155   BP: 95/54 93/60 95/64   Pulse:      Resp: 18 16 16   Temp:   98.4  F (36.9  C)   SpO2:   97%         Electronically Signed By: Mariano Ash MD  November 21, 2017  6:47 AM

## 2017-11-29 ENCOUNTER — HOSPITAL ENCOUNTER (EMERGENCY)
Facility: HOSPITAL | Age: 37
Discharge: HOME | End: 2017-11-29
Attending: PHYSICIAN ASSISTANT | Admitting: PHYSICIAN ASSISTANT
Payer: COMMERCIAL

## 2017-11-29 ENCOUNTER — APPOINTMENT (OUTPATIENT)
Dept: GENERAL RADIOLOGY | Facility: HOSPITAL | Age: 37
End: 2017-11-29
Attending: PHYSICIAN ASSISTANT
Payer: COMMERCIAL

## 2017-11-29 VITALS
HEART RATE: 78 BPM | OXYGEN SATURATION: 97 % | TEMPERATURE: 98.3 F | RESPIRATION RATE: 16 BRPM | DIASTOLIC BLOOD PRESSURE: 72 MMHG | SYSTOLIC BLOOD PRESSURE: 105 MMHG

## 2017-11-29 DIAGNOSIS — R10.11 RIGHT UPPER QUADRANT ABDOMINAL PAIN OF UNKNOWN ETIOLOGY: ICD-10-CM

## 2017-11-29 LAB
ALBUMIN SERPL-MCNC: 4.3 G/DL (ref 3.4–5)
ALBUMIN UR-MCNC: 10 MG/DL
ALP SERPL-CCNC: 63 U/L (ref 40–150)
ALT SERPL W P-5'-P-CCNC: 57 U/L (ref 0–50)
ANION GAP SERPL CALCULATED.3IONS-SCNC: 6 MMOL/L (ref 3–14)
APPEARANCE UR: CLEAR
AST SERPL W P-5'-P-CCNC: 30 U/L (ref 0–45)
BACTERIA #/AREA URNS HPF: ABNORMAL /HPF
BASOPHILS # BLD AUTO: 0.1 10E9/L (ref 0–0.2)
BASOPHILS NFR BLD AUTO: 1.2 %
BILIRUB SERPL-MCNC: 0.4 MG/DL (ref 0.2–1.3)
BILIRUB UR QL STRIP: NEGATIVE
BUN SERPL-MCNC: 15 MG/DL (ref 7–30)
CALCIUM SERPL-MCNC: 9.1 MG/DL (ref 8.5–10.1)
CHLORIDE SERPL-SCNC: 102 MMOL/L (ref 94–109)
CO2 SERPL-SCNC: 30 MMOL/L (ref 20–32)
COLOR UR AUTO: YELLOW
CREAT SERPL-MCNC: 0.68 MG/DL (ref 0.52–1.04)
CRP SERPL-MCNC: <2.9 MG/L (ref 0–8)
D DIMER PPP DDU-MCNC: 241 NG/ML D-DU (ref 0–300)
DIFFERENTIAL METHOD BLD: ABNORMAL
EOSINOPHIL # BLD AUTO: 0.2 10E9/L (ref 0–0.7)
EOSINOPHIL NFR BLD AUTO: 2.6 %
ERYTHROCYTE [DISTWIDTH] IN BLOOD BY AUTOMATED COUNT: 12.4 % (ref 10–15)
GFR SERPL CREATININE-BSD FRML MDRD: >90 ML/MIN/1.7M2
GLUCOSE SERPL-MCNC: 100 MG/DL (ref 70–99)
GLUCOSE UR STRIP-MCNC: NEGATIVE MG/DL
HCT VFR BLD AUTO: 40.9 % (ref 35–47)
HGB BLD-MCNC: 14.5 G/DL (ref 11.7–15.7)
HGB UR QL STRIP: NEGATIVE
IMM GRANULOCYTES # BLD: 0 10E9/L (ref 0–0.4)
IMM GRANULOCYTES NFR BLD: 0.2 %
KETONES UR STRIP-MCNC: 10 MG/DL
LACTATE SERPL-SCNC: 0.9 MMOL/L (ref 0.4–2)
LEUKOCYTE ESTERASE UR QL STRIP: NEGATIVE
LIPASE SERPL-CCNC: 163 U/L (ref 73–393)
LYMPHOCYTES # BLD AUTO: 4.3 10E9/L (ref 0.8–5.3)
LYMPHOCYTES NFR BLD AUTO: 47.4 %
MCH RBC QN AUTO: 33.7 PG (ref 26.5–33)
MCHC RBC AUTO-ENTMCNC: 35.5 G/DL (ref 31.5–36.5)
MCV RBC AUTO: 95 FL (ref 78–100)
MONOCYTES # BLD AUTO: 0.6 10E9/L (ref 0–1.3)
MONOCYTES NFR BLD AUTO: 6.3 %
MUCOUS THREADS #/AREA URNS LPF: PRESENT /LPF
NEUTROPHILS # BLD AUTO: 3.8 10E9/L (ref 1.6–8.3)
NEUTROPHILS NFR BLD AUTO: 42.3 %
NITRATE UR QL: NEGATIVE
NRBC # BLD AUTO: 0 10*3/UL
NRBC BLD AUTO-RTO: 0 /100
PH UR STRIP: 5.5 PH (ref 4.7–8)
PLATELET # BLD AUTO: 275 10E9/L (ref 150–450)
POTASSIUM SERPL-SCNC: 3.4 MMOL/L (ref 3.4–5.3)
PROT SERPL-MCNC: 7.9 G/DL (ref 6.8–8.8)
RBC # BLD AUTO: 4.3 10E12/L (ref 3.8–5.2)
RBC #/AREA URNS AUTO: <1 /HPF (ref 0–2)
SODIUM SERPL-SCNC: 138 MMOL/L (ref 133–144)
SOURCE: ABNORMAL
SP GR UR STRIP: 1.02 (ref 1–1.03)
SQUAMOUS #/AREA URNS AUTO: 2 /HPF (ref 0–1)
UROBILINOGEN UR STRIP-MCNC: NORMAL MG/DL (ref 0–2)
WBC # BLD AUTO: 9 10E9/L (ref 4–11)
WBC #/AREA URNS AUTO: <1 /HPF (ref 0–2)

## 2017-11-29 PROCEDURE — 99284 EMERGENCY DEPT VISIT MOD MDM: CPT | Performed by: PHYSICIAN ASSISTANT

## 2017-11-29 PROCEDURE — 25000128 H RX IP 250 OP 636: Performed by: PHYSICIAN ASSISTANT

## 2017-11-29 PROCEDURE — 83690 ASSAY OF LIPASE: CPT | Performed by: PHYSICIAN ASSISTANT

## 2017-11-29 PROCEDURE — 85025 COMPLETE CBC W/AUTO DIFF WBC: CPT | Performed by: PHYSICIAN ASSISTANT

## 2017-11-29 PROCEDURE — 81001 URINALYSIS AUTO W/SCOPE: CPT | Performed by: PHYSICIAN ASSISTANT

## 2017-11-29 PROCEDURE — 85379 FIBRIN DEGRADATION QUANT: CPT | Performed by: PHYSICIAN ASSISTANT

## 2017-11-29 PROCEDURE — 86140 C-REACTIVE PROTEIN: CPT | Performed by: PHYSICIAN ASSISTANT

## 2017-11-29 PROCEDURE — 83605 ASSAY OF LACTIC ACID: CPT | Performed by: PHYSICIAN ASSISTANT

## 2017-11-29 PROCEDURE — 74020 XR ABDOMEN 2 VW: CPT | Mod: TC

## 2017-11-29 PROCEDURE — 36415 COLL VENOUS BLD VENIPUNCTURE: CPT | Performed by: PHYSICIAN ASSISTANT

## 2017-11-29 PROCEDURE — 80053 COMPREHEN METABOLIC PANEL: CPT | Performed by: PHYSICIAN ASSISTANT

## 2017-11-29 PROCEDURE — 99284 EMERGENCY DEPT VISIT MOD MDM: CPT | Mod: 25

## 2017-11-29 PROCEDURE — 96374 THER/PROPH/DIAG INJ IV PUSH: CPT

## 2017-11-29 RX ORDER — KETOROLAC TROMETHAMINE 30 MG/ML
30 INJECTION, SOLUTION INTRAMUSCULAR; INTRAVENOUS ONCE
Status: COMPLETED | OUTPATIENT
Start: 2017-11-29 | End: 2017-11-29

## 2017-11-29 RX ORDER — HYDROCODONE BITARTRATE AND ACETAMINOPHEN 5; 325 MG/1; MG/1
1-2 TABLET ORAL EVERY 4 HOURS PRN
Qty: 15 TABLET | Refills: 0 | Status: SHIPPED | OUTPATIENT
Start: 2017-11-29 | End: 2017-12-06

## 2017-11-29 RX ADMIN — KETOROLAC TROMETHAMINE 30 MG: 30 INJECTION, SOLUTION INTRAMUSCULAR at 19:39

## 2017-11-29 ASSESSMENT — ENCOUNTER SYMPTOMS
DIARRHEA: 1
VOMITING: 0
NAUSEA: 0
FEVER: 1
FLANK PAIN: 1
APPETITE CHANGE: 1
CHILLS: 1
ACTIVITY CHANGE: 0
ABDOMINAL PAIN: 1
CHEST TIGHTNESS: 0

## 2017-11-29 NOTE — ED NOTES
Pt presents with c/o abd pain, fever,nausea, and night sweats after having her gall bladder removed on 11/20 /17 by dr Rhodes.

## 2017-11-29 NOTE — ED AVS SNAPSHOT
[facility]    [address] [address]    Phone:  [phone]                                       Purnima Fallon   MRN: [id number]    Department:  [facility]   Date of Visit:  11/29/2017           After Visit Summary Signature Page     I have received my discharge instructions, and my questions have been answered. I have discussed any challenges I see with this plan with the nurse or doctor.    ..........................................................................................................................................  Patient/Patient Representative Signature      ..........................................................................................................................................  Patient Representative Print Name and Relationship to Patient    ..................................................               ................................................  Date                                            Time    ..........................................................................................................................................  Reviewed by Signature/Title    ...................................................              ..............................................  Date                                                            Time

## 2017-11-29 NOTE — ED AVS SNAPSHOT
[facility]    [address] [address]    Phone:  [phone]                                       Purnima Fallon   MRN: [id number]    Department:  [facility]   Date of Visit:  11/29/2017           Patient Information     Date Of Birth          1980        Your diagnoses for this visit were:     Right upper quadrant abdominal pain of unknown etiology        You were seen by Beth Sumner PA-C.      Follow-up Information     Schedule an appointment as soon as possible for a visit with Cordell Stephens MD.    Specialty:  Surgery    Contact information:    [address]  [facility] [address] [phone]          Follow up with [facility].    Specialty:  EMERGENCY MEDICINE    Why:  If symptoms worsen    Contact information:    [address] [address] [phone]    Additional information:    From [facility]: Take [address]. Turn left at [address]. Turn left at the first stoplight on [address] [place] [address]. At the first stop sign, take a right onto [address]. Take a left into the parking lot and continue through until you reach the North enterance of the building.       From [place]: Take [address]. Take the [address] ramp towards [place]. Turn left onto [address]. Take a slight right onto [address] [facility]. Turn left at the first stoplight on [address]. At the first stop sign, take a right onto [address]. Take a left into the parking lot and continue through until you reach the North enterance of the building.       From [place]: Take [address]. Take a right at [address]. At the first stop sign, take a right onto [address]. Take a left into the parking lot and continue through until you reach the North enterance of the building.         Discharge Instructions       I don't know what is causing your pain today.  This does not mean that  "nothing is wrong.      Rest and stay hydrated.      Pain medications as needed.     Follow-up with Dr. Stephens for further evaluation.     Please return HERE for ANY worsening pain, return of fevers, or any other concerns.      Future Appointments        Provider Department Dept Phone Center    12/6/2017 3:30 PM Cordell Stephens MD [facility] [place] [phone] [place]    5/15/2018 10:45 AM H Blaise Bernal MD [facility] [phone] [place]         Review of your medicines      START taking        Dose / Directions Last dose taken    HYDROcodone-acetaminophen 5-325 MG per tablet   Commonly known as:  NORCO   Dose:  1-2 tablet   Quantity:  15 tablet        Take 1-2 tablets by mouth every 4 hours as needed for moderate to severe pain   Refills:  0          Our records show that you are taking the medicines listed below. If these are incorrect, please call your family doctor or clinic.        Dose / Directions Last dose taken    ibuprofen 200 MG tablet   Commonly known as:  ADVIL/MOTRIN   Dose:  400 mg   Quantity:  50 tablet        Take 2 tablets (400 mg) by mouth every 6 hours as needed for pain (mild)   Refills:  1        * order for DME   Quantity:  1 Package        medipore tape, 4\"   Refills:  0        * order for DME   Quantity:  1 Package        2X2 gauze pads, non sterile sleeve   Refills:  0        * order for DME   Quantity:  7 each        3X3\" optifoam dressings, change dressing every other day, please fill #7   Refills:  1        oxyCODONE IR 5 MG tablet   Commonly known as:  ROXICODONE   Dose:  5 mg   Quantity:  30 tablet        Take 1 tablet (5 mg) by mouth every 4 hours as needed for pain or other (Moderate to Severe)   Refills:  0        senna-docusate 8.6-50 MG per tablet   Commonly known as:  SENOKOT-S;PERICOLACE   Dose:  1-2 tablet   Quantity:  30 tablet        Take 1-2 tablets by mouth 2 times daily Take while on oral narcotics to prevent or treat " constipation.   Refills:  0        valACYclovir 500 MG tablet   Commonly known as:  VALTREX   Quantity:  90 tablet        TAKE ONE TABLET BY MOUTH DAILY   Refills:  0        * Notice:  This list has 3 medication(s) that are the same as other medications prescribed for you. Read the directions carefully, and ask your doctor or other care provider to review them with you.            Prescriptions were sent or printed at these locations (1 Prescription)                   Other Prescriptions                Printed at Department/Unit printer (1 of 1)         HYDROcodone-acetaminophen (NORCO) 5-325 MG per tablet                Procedures and tests performed during your visit     CBC with platelets differential    CRP inflammation    Comprehensive metabolic panel    D-Dimer ([facility])    Lactic acid    Lipase    Peripheral IV: Standard    UA reflex to Microscopic    XR Abdomen 2 Views      Orders Needing Specimen Collection     None      Pending Results     Date and Time Order Name Status Description    11/29/2017 1933 XR Abdomen 2 Views In process             Pending Culture Results     No orders found from 11/27/2017 to 11/30/2017.            Thank you for choosing [facility]       Thank you for choosing [facility] for your care. Our goal is always to provide you with excellent care. Hearing back from our patients is one way we can continue to improve our services. Please take a few minutes to complete the written survey that you may receive in the mail after you visit with us. Thank you!        [facility] Information     [facility] gives you secure access to your electronic health record. If you see a primary care provider, you can also send messages to your care team and make appointments. If you have questions, please call your primary care clinic.  If you do not have a primary care provider, please call [phone] and they will assist you.        Care EveryWhere ID     This is your Care EveryWhere ID. This could be used by  other organizations to access your [facility] medical records  [id number]        Equal Access to Services     ALDO CRUZ : Felix Buckley, brian rain, debby hayden. So [facility] [phone].    ATENCIÓN: Si habla español, tiene a cyr disposición servicios gratuitos de asistencia lingüística. Llame al [phone].    We comply with applicable federal civil rights laws and [place] laws. We do not discriminate on the basis of race, color, national origin, age, disability, sex, sexual orientation, or gender identity.            After Visit Summary       This is your record. Keep this with you and show to your community pharmacist(s) and doctor(s) at your next visit.

## 2017-11-30 NOTE — ED NOTES
"Pt states she has had pain \"like someone is encircling me starting around lower ribs/abdomen that radiates up into my chest\". Started on Sunday. Pt reports a fever up to 102.0 yesterday. Insicion areas healing, no redness, swelling or drainage.  "

## 2017-11-30 NOTE — DISCHARGE INSTRUCTIONS
I don't know what is causing your pain today.  This does not mean that nothing is wrong.      Rest and stay hydrated.      Pain medications as needed.     Follow-up with Dr. Stephens for further evaluation.     Please return HERE for ANY worsening pain, return of fevers, or any other concerns.

## 2017-11-30 NOTE — ED PROVIDER NOTES
History     Chief Complaint   Patient presents with     Post-op Problem     abd pain     The history is provided by the patient.     Purnima Fallon is a 37 year old female who presented to the ED ambulatory for evaluation of RUQ pain.  PMH most appreciable for cholecystectomy on 11/20 by Dr. Stephens.  Did well for about 4 days post op.  Developed RUQ/right flank pain approx 5 days ago.  Approx 3 times a day she has has severe RUQ pain that radiates to her shoulder with associated dyspnea.  Sharp. Has been having fevers.  No LUTS.  No cough.  No chest pain.  No exacerbation with movement.     Problem List:    Patient Active Problem List    Diagnosis Date Noted     Biliary dyskinesia 10/06/2017     Priority: Medium     H/O pilonidal cyst 09/26/2017     Priority: Medium     Pilonidal cyst 09/12/2017     Priority: Medium     ACP (advance care planning) 08/05/2016     Priority: Medium     Advance Care Planning 8/5/2016: ACP Review of Chart / Resources Provided:  Reviewed chart for advance care plan.  Purnima DE JESUS [place] has no plan or code status on file. Discussed available resources and provided with information. Confirmed code status reflects current choices pending further ACP discussions.  Confirmed/documented legally designated decision makers.  Added by Purnima Mireles             Ischiorectal abscess and fistula      Priority: Medium     Kidney stones      Priority: Medium     x2 passed on her own       S/P laparoscopic hysterectomy 09/27/2013     Priority: Medium     Cystic acne 07/23/2013     Priority: Medium     Migraines      Priority: Medium     Depression      Priority: Medium     ADHD (attention deficit hyperactivity disorder)      Priority: Medium     Mass of breast 02/02/2012     Priority: Medium        Past Medical History:    Past Medical History:   Diagnosis Date     Hidradenitis 2/16/2007     Ischiorectal abscess and fistula      Kidney stones 2008     Nondependent tobacco use disorder 10/11/2012      Papanicolaou smear of cervix with low grade squamous intraepithelial lesion (LGSIL) 10/29/2008       Past Surgical History:    Past Surgical History:   Procedure Laterality Date     CYSTECTOMY PILONIDAL N/A 9/18/2017    Procedure: CYSTECTOMY PILONIDAL;  PILONIDAL CYSTECTOMY ;  Surgeon: Cordell Stephens MD;  [facility]: [facility]     EXCISE MASS NECK Right 8/4/2015    Procedure: EXCISE MASS NECK;  Surgeon: Nasir Jones MD;  [facility]: [facility]     INCISION AND DRAINAGE PERINEAL, COMBINED N/A 3/18/2015    Procedure: COMBINED INCISION AND DRAINAGE PERINEAL;  Surgeon: Jay Torres DO;  Location: [facility]     LAPAROSCOPIC CHOLECYSTECTOMY N/A 11/20/2017    Procedure: LAPAROSCOPIC CHOLECYSTECTOMY;  LAPAROSCOPIC CHOLECYSTECTOMY;  Surgeon: Cordell Stephens MD;  [facility]     LAPAROSCOPIC HYSTERECTOMY SUPRACERVICAL, BILATERAL SALPINGO-OOPHORECTOMY, COMBINED  9/27/2013    Procedure: COMBINED LAPAROSCOPIC HYSTERECTOMY SUPRACERVICAL, SALPINGO-OOPHORECTOMY;  LAPAROSCOPIC SUPRACERVICAL HYSTERECTOMY AND RIGHT SALPINGO-OOPHORECTOMY, CYSTOSCOPY;  Surgeon: Denys Callahan MD;  Location: [facility]     marsupialization of pilonidal cyst  2007     TUBAL LIGATION  2005       Family History:    Family History   Problem Relation Age of Onset     DIABETES Paternal Grandmother        Social History:  Marital Status:   [2]  Social History   Substance Use Topics     Smoking status: Current Every Day Smoker     Packs/day: 0.50     Years: 15.00     Types: Cigarettes     Last attempt to quit: 7/18/2013     Smokeless tobacco: Never Used     Alcohol use No        Medications:      HYDROcodone-acetaminophen (NORCO) 5-325 MG per tablet   ibuprofen (ADVIL/MOTRIN) 200 MG tablet   oxyCODONE IR (ROXICODONE) 5 MG tablet   senna-docusate (SENOKOT-S;PERICOLACE) 8.6-50 MG per tablet   order for DME   order for DME   order for DME   valACYclovir (VALTREX) 500 MG tablet         Review of Systems   Constitutional: Positive for appetite change,  chills and fever. Negative for activity change.   Respiratory: Negative for chest tightness.    Cardiovascular: Negative for chest pain.   Gastrointestinal: Positive for abdominal pain and diarrhea. Negative for nausea and vomiting.   Genitourinary: Positive for flank pain.   Skin: Negative.        Physical Exam   BP: 109/75  Pulse: 78  Temp: 98.4  F (36.9  C)  Resp: 16  SpO2: 98 %      Physical Exam   Constitutional: She is oriented to person, place, and time. She appears well-developed and well-nourished. No distress.   Playing on her cell phone   Cardiovascular: Normal rate and regular rhythm.    Pulmonary/Chest: Effort normal and breath sounds normal.   Abdominal: Soft. There is tenderness. There is no guarding.       Surgical wounds look great    Neurological: She is alert and oriented to person, place, and time.   Skin: Skin is warm and dry.   Psychiatric: She has a normal mood and affect.   Nursing note and vitals reviewed.      ED Course     ED Course     Procedures  Medications   ketorolac (TORADOL) injection 30 mg (30 mg Intravenous Given 11/29/17 1939)     Results for orders placed or performed during the hospital encounter of 11/29/17   CBC with platelets differential   Result Value Ref [facility]    WBC 9.0 4.0 - 11.0 10e9/L    RBC Count 4.30 3.8 - 5.2 10e12/L    Hemoglobin 14.5 11.7 - 15.7 g/dL    Hematocrit 40.9 35.0 - 47.0 %    MCV 95 78 - 100 fl    MCH 33.7 (H) 26.5 - 33.0 pg    MCHC 35.5 31.5 - 36.5 g/dL    RDW 12.4 10.0 - 15.0 %    Platelet Count 275 150 - 450 10e9/L    Diff Method Automated Method     % Neutrophils 42.3 %    % Lymphocytes 47.4 %    % Monocytes 6.3 %    % Eosinophils 2.6 %    % Basophils 1.2 %    % Immature Granulocytes 0.2 %    Nucleated RBCs 0 0 /100    Absolute Neutrophil 3.8 1.6 - 8.3 10e9/L    Absolute Lymphocytes 4.3 0.8 - 5.3 10e9/L    Absolute Monocytes 0.6 0.0 - 1.3 10e9/L    Absolute Eosinophils 0.2 0.0 - 0.7 10e9/L    Absolute Basophils 0.1 0.0 - 0.2 10e9/L    Abs Immature  Granulocytes 0.0 0 - 0.4 10e9/L    Absolute Nucleated RBC 0.0    Comprehensive metabolic panel   Result Value Ref [facility]    Sodium 138 133 - 144 mmol/L    Potassium 3.4 3.4 - 5.3 mmol/L    Chloride 102 94 - 109 mmol/L    Carbon Dioxide 30 20 - 32 mmol/L    Anion Gap 6 3 - 14 mmol/L    Glucose 100 (H) 70 - 99 mg/dL    Urea Nitrogen 15 7 - 30 mg/dL    Creatinine 0.68 0.52 - 1.04 mg/dL    GFR Estimate >90 >60 mL/min/1.7m2    GFR Estimate If Black >90 >60 mL/min/1.7m2    Calcium 9.1 8.5 - 10.1 mg/dL    Bilirubin Total 0.4 0.2 - 1.3 mg/dL    Albumin 4.3 3.4 - 5.0 g/dL    Protein Total 7.9 6.8 - 8.8 g/dL    Alkaline Phosphatase 63 40 - 150 U/L    ALT 57 (H) 0 - 50 U/L    AST 30 0 - 45 U/L   Lactic acid   Result Value Ref Range    Lactic Acid 0.9 0.4 - 2.0 mmol/L   CRP inflammation   Result Value Ref Range    CRP Inflammation <2.9 0.0 - 8.0 mg/L   Lipase   Result Value Ref [facility]    Lipase 163 73 - 393 U/L   D-Dimer ([facility])   Result Value Ref [facility]    D-Dimer ng/mL 241 0 - 300 ng/ml D-DU   UA reflex to Microscopic   Result Value Ref Range    Color Urine Yellow     Appearance Urine Clear     Glucose Urine Negative NEG^Negative mg/dL    Bilirubin Urine Negative NEG^Negative    Ketones Urine 10 (A) NEG^Negative mg/dL    Specific Gravity Urine 1.024 1.003 - 1.035    Blood Urine Negative NEG^Negative    pH Urine 5.5 4.7 - 8.0 pH    Protein Albumin Urine 10 (A) NEG^Negative mg/dL    Urobilinogen mg/dL Normal 0.0 - 2.0 mg/dL    Nitrite Urine Negative NEG^Negative    Leukocyte Esterase Urine Negative NEG^Negative    Source Unspecified Urine     RBC Urine <1 0 - 2 /HPF    WBC Urine <1 0 - 2 /HPF    Bacteria Urine None (A) NEG^Negative /HPF    Squamous Epithelial /HPF Urine 2 (H) 0 - 1 /HPF    Mucous Urine Present (A) NEG^Negative /LPF                Critical Care time:  none               Labs Ordered and Resulted from Time of ED Arrival Up to the Time of Departure from the ED   CBC WITH PLATELETS DIFFERENTIAL - Abnormal; Notable for  the following:        Result Value    MCH 33.7 (*)     All other components within normal limits   COMPREHENSIVE METABOLIC PANEL - Abnormal; Notable for the following:     Glucose 100 (*)     ALT 57 (*)     All other components within normal limits   URINE MACROSCOPIC WITH REFLEX TO MICRO - Abnormal; Notable for the following:     Ketones Urine 10 (*)     Protein Albumin Urine 10 (*)     Bacteria Urine None (*)     Squamous Epithelial /HPF Urine 2 (*)     Mucous Urine Present (*)     All other components within normal limits   LACTIC ACID   CRP INFLAMMATION   LIPASE   D-DIMER ([facility],)   PERIPHERAL IV CATHETER       Assessments & Plan (with Medical Decision Making)   RUQ pain of unknown etiology.  Likely postop pain.  X-ray shows no free air or obstruction.  No reasonable indication for CT at this point. Labs all normal.  Certainly not in any distress.  Pleasant and talkative.  Surgical wounds look good.  Follow-up with surgery.  Return here for any return of pain, fevers, or other concerns.  Ms. Fallon voiced complete understanding and was happy and agreeable.       I have reviewed the nursing notes.    I have reviewed the findings, diagnosis, plan and need for follow up with the patient.       New Prescriptions    HYDROCODONE-ACETAMINOPHEN (NORCO) 5-325 MG PER TABLET    Take 1-2 tablets by mouth every 4 hours as needed for moderate to severe pain       Final diagnoses:   Right upper quadrant abdominal pain of unknown etiology       11/29/2017   [facility]     Beth Sumner PA-C  11/29/17 2019

## 2017-12-06 ENCOUNTER — OFFICE VISIT (OUTPATIENT)
Dept: SURGERY | Facility: OTHER | Age: 37
End: 2017-12-06
Attending: SURGERY
Payer: COMMERCIAL

## 2017-12-06 VITALS
BODY MASS INDEX: 23 KG/M2 | WEIGHT: 125 LBS | TEMPERATURE: 98.3 F | HEIGHT: 62 IN | DIASTOLIC BLOOD PRESSURE: 70 MMHG | OXYGEN SATURATION: 97 % | SYSTOLIC BLOOD PRESSURE: 104 MMHG | RESPIRATION RATE: 18 BRPM | HEART RATE: 70 BPM

## 2017-12-06 DIAGNOSIS — Z98.890 POSTOPERATIVE STATE: Primary | ICD-10-CM

## 2017-12-06 PROCEDURE — 99024 POSTOP FOLLOW-UP VISIT: CPT | Performed by: SURGERY

## 2017-12-06 ASSESSMENT — PAIN SCALES - GENERAL: PAINLEVEL: NO PAIN (0)

## 2017-12-06 NOTE — NURSING NOTE
"Chief Complaint   Patient presents with     Surgical Followup     post op lap jayant       Initial /70  Pulse 70  Temp 98.3  F (36.8  C) (Tympanic)  Resp 18  Ht 5' 2\" (1.575 m)  Wt 125 lb (56.7 kg)  LMP 08/10/2013  SpO2 97%  BMI 22.86 kg/m2 Estimated body mass index is 22.86 kg/(m^2) as calculated from the following:    Height as of this encounter: 5' 2\" (1.575 m).    Weight as of this encounter: 125 lb (56.7 kg).  Medication Reconciliation: complete     Whit Tristan LPN        "

## 2017-12-06 NOTE — MR AVS SNAPSHOT
After Visit Summary   12/6/2017    Purnima Fallon    MRN: [id number]           Patient Information     Date Of Birth          1980        Visit Information        Provider Department      12/6/2017 3:30 PM Cordell Stephens MD [facility]        Today's Diagnoses     Postoperative state    -  1      Care Instructions    Thank you for allowing Dr. Stephens and our surgical team to participate in your care. Please call with any scheduling questions or concerns to Radha at [phone] or for nursing questions Whit [phone]            Follow-ups after your visit        Your next 10 appointments already scheduled     May 15, 2018 10:45 AM CDT   (Arrive by 10:30 AM)   New Visit with DIDI Bernal MD   [facility] [place] ([facility] )    [address]  [place] [address] [phone]              Who to contact     If you have questions or need follow up information about today's clinic visit or your schedule please contact [facility] [place] directly at [phone].  Normal or non-critical lab and imaging results will be communicated to you by Boqiihart, letter or phone within 4 business days after the clinic has received the results. If you do not hear from us within 7 days, please contact the clinic through Boqiihart or phone. If you have a critical or abnormal lab result, we will notify you by phone as soon as possible.  Submit refill requests through [email or web address] or call your pharmacy and they will forward the refill request to us. Please allow 3 business days for your refill to be completed.          Additional Information About Your Visit        MyChart Information     [email or web address] gives you secure access to your electronic health record. If you see a primary care provider, you can also send messages to your care team and make appointments. If you have questions, please call your primary care clinic.  If you do not have a primary care  "provider, please call [phone] and they will assist you.        Care EveryWhere ID     This is your Care EveryWhere ID. This could be used by other organizations to access your [place] medical records  [id number]        Your Vitals Were     Pulse Temperature Respirations Height Last Period Pulse Oximetry    70 98.3  F (36.8  C) (Tympanic) 18 5' 2\" (1.575 m) 08/10/2013 97%    BMI (Body Mass Index)                   22.86 kg/m2            Blood Pressure from Last 3 Encounters:   12/06/17 104/70   11/29/17 105/72   11/20/17 95/64    Weight from Last 3 Encounters:   12/06/17 125 lb (56.7 kg)   11/20/17 126 lb (57.2 kg)   11/08/17 125 lb (56.7 kg)              Today, you had the following     No orders found for display         Today's Medication Changes          These changes are accurate as of: 12/6/17  8:56 PM.  If you have any questions, ask your nurse or doctor.               Stop taking these medicines if you haven't already. Please contact your care team if you have questions.     HYDROcodone-acetaminophen 5-325 MG per tablet   Commonly known as:  NORCO   Stopped by:  Cordell Stephens MD                    Primary Care Provider Office Phone # Fax #    Alexi CAITY Romero [phone]       [facility] [place] [address]  [facility] [address]        Equal Access to Services     [facility]: Hadii aad ku hadasho Soomaali, waaxda luqadaha, qaybta kaalmada erickaegyada, debby clemons. So [facility] [phone].    ATENCIÓN: Si habla español, tiene a cyr disposición servicios gratuitos de asistencia lingüística. Llame al [phone].    We comply with applicable federal civil rights laws and [place] laws. We do not discriminate on the basis of race, color, national origin, age, disability, sex, sexual orientation, or gender identity.            Thank you!     Thank you for choosing [facility] [place]  for your care. Our goal is always to provide you with " "excellent care. Hearing back from our patients is one way we can continue to improve our services. Please take a few minutes to complete the written survey that you may receive in the mail after your visit with us. Thank you!             Your Updated Medication List - Protect others around you: Learn how to safely use, store and throw away your medicines at www.disposemymeds.org.          This list is accurate as of: 12/6/17  8:56 PM.  Always use your most recent med list.                   Brand Name Dispense Instructions for use Diagnosis    ibuprofen 200 MG tablet    ADVIL/MOTRIN    50 tablet    Take 2 tablets (400 mg) by mouth every 6 hours as needed for pain (mild)    Biliary dyskinesia       * order for DME     1 Package    medipore tape, 4\"    Ischiorectal abscess and fistula, Delayed surgical wound healing, subsequent encounter       * order for DME     1 Package    2X2 gauze pads, non sterile sleeve    Ischiorectal abscess and fistula, Delayed surgical wound healing, subsequent encounter       * order for DME     7 each    3X3\" optifoam dressings, change dressing every other day, please fill #7    Delayed surgical wound healing, subsequent encounter       oxyCODONE IR 5 MG tablet    ROXICODONE    30 tablet    Take 1 tablet (5 mg) by mouth every 4 hours as needed for pain or other (Moderate to Severe)    Biliary dyskinesia       senna-docusate 8.6-50 MG per tablet    SENOKOT-S;PERICOLACE    30 tablet    Take 1-2 tablets by mouth 2 times daily Take while on oral narcotics to prevent or treat constipation.    Biliary dyskinesia       valACYclovir 500 MG tablet    VALTREX    90 tablet    TAKE ONE TABLET BY MOUTH DAILY    Herpes simplex virus infection       * Notice:  This list has 3 medication(s) that are the same as other medications prescribed for you. Read the directions carefully, and ask your doctor or other care provider to review them with you.      "

## 2017-12-06 NOTE — PATIENT INSTRUCTIONS
Thank you for allowing Dr. Stephens and our surgical team to participate in your care. Please call with any scheduling questions or concerns to Radha at [phone] or for nursing questions Whit [phone]

## 2017-12-07 NOTE — PROGRESS NOTES
"SUBJECTIVE:  Patient states that she has done ok since the operation. She says that she has had crampy abdominal pain that occurs in her bilateral flanks. She has not had any chills, nausea, emesis since the operation. She did present to the ER for right upper quadrant pain after the operation and her labs at that time were all normal. She says that the pain she had at that time has improved.     OBJECTIVE:  /70  Pulse 70  Temp 98.3  F (36.8  C) (Tympanic)  Resp 18  Ht 5' 2\" (1.575 m)  Wt 125 lb (56.7 kg)  LMP 08/10/2013  SpO2 97%  BMI 22.86 kg/m2    General: awake, alert, in no acute distress  Abdomen: soft, non-tender, non-distended, incisions are well healed with no erythema or drainage    ASSESSMENT/PLAN:  37 year old female status post laparoscopic cholecystectomy for biliary dyskinesia    At this time the patient has done well with no surgical complications. She says that she will have intermittent abdominal pain at times but this does not occur every day or every other day. She is tolerating a diet, ambulating well, and is not using narcotic pain medication on a daily basis. She can be discharged from the surgical clinic and follow up on an as needed basis. As for the cramping abdominal pain I would recommend that she back off on the Senokot and see how that helps. All questions and concerns were answered.     Cordell Stephens  12/6/2017    "

## 2018-03-30 ENCOUNTER — OFFICE VISIT (OUTPATIENT)
Dept: INTERNAL MEDICINE | Facility: OTHER | Age: 38
End: 2018-03-30
Attending: NURSE PRACTITIONER
Payer: COMMERCIAL

## 2018-03-30 VITALS
WEIGHT: 115 LBS | HEIGHT: 62 IN | TEMPERATURE: 97.8 F | SYSTOLIC BLOOD PRESSURE: 110 MMHG | RESPIRATION RATE: 18 BRPM | HEART RATE: 79 BPM | BODY MASS INDEX: 21.16 KG/M2 | DIASTOLIC BLOOD PRESSURE: 60 MMHG

## 2018-03-30 DIAGNOSIS — M67.40 MUCOID CYST, JOINT: Primary | ICD-10-CM

## 2018-03-30 PROCEDURE — 99213 OFFICE O/P EST LOW 20 MIN: CPT | Performed by: NURSE PRACTITIONER

## 2018-03-30 RX ORDER — OXYCODONE AND ACETAMINOPHEN 5; 325 MG/1; MG/1
1 TABLET ORAL EVERY 6 HOURS PRN
Qty: 30 TABLET | Refills: 0 | Status: SHIPPED | OUTPATIENT
Start: 2018-03-30 | End: 2018-05-15

## 2018-03-30 ASSESSMENT — ANXIETY QUESTIONNAIRES
2. NOT BEING ABLE TO STOP OR CONTROL WORRYING: MORE THAN HALF THE DAYS
GAD7 TOTAL SCORE: 12
1. FEELING NERVOUS, ANXIOUS, OR ON EDGE: MORE THAN HALF THE DAYS
IF YOU CHECKED OFF ANY PROBLEMS ON THIS QUESTIONNAIRE, HOW DIFFICULT HAVE THESE PROBLEMS MADE IT FOR YOU TO DO YOUR WORK, TAKE CARE OF THINGS AT HOME, OR GET ALONG WITH OTHER PEOPLE: SOMEWHAT DIFFICULT
5. BEING SO RESTLESS THAT IT IS HARD TO SIT STILL: MORE THAN HALF THE DAYS
6. BECOMING EASILY ANNOYED OR IRRITABLE: MORE THAN HALF THE DAYS
7. FEELING AFRAID AS IF SOMETHING AWFUL MIGHT HAPPEN: NOT AT ALL
3. WORRYING TOO MUCH ABOUT DIFFERENT THINGS: MORE THAN HALF THE DAYS

## 2018-03-30 ASSESSMENT — PATIENT HEALTH QUESTIONNAIRE - PHQ9: 5. POOR APPETITE OR OVEREATING: MORE THAN HALF THE DAYS

## 2018-03-30 ASSESSMENT — PAIN SCALES - GENERAL: PAINLEVEL: MODERATE PAIN (5)

## 2018-03-30 NOTE — MR AVS SNAPSHOT
After Visit Summary   3/30/2018    Purnima Fallon    MRN: [id number]           Patient Information     Date Of Birth          1980        Visit Information        Provider Department      3/30/2018 10:00 AM [facility]        Today's Diagnoses     Mucoid cyst, joint    -  1      Care Instructions    1. Wrap cyst with wrap and keep on 24 hours for compression  2. Keflex 500mg 4 times a day for 10 days  3. Wear roomy shoes.    4.  Percocet 5/325mg   Every six hours as needed for pain            Follow-ups after your visit        Your next 10 appointments already scheduled     May 15, 2018 10:45 AM CDT   (Arrive by 10:30 AM)   New Visit with DIDI Bernal MD   [facility] [place] ([facility] )    [address]  [place] [address] [phone]              Who to contact     If you have questions or need follow up information about today's clinic visit or your schedule please contact [facility] directly at [phone].  Normal or non-critical lab and imaging results will be communicated to you by Entigohart, letter or phone within 4 business days after the clinic has received the results. If you do not hear from us within 7 days, please contact the clinic through Entigohart or phone. If you have a critical or abnormal lab result, we will notify you by phone as soon as possible.  Submit refill requests through CrimeReports or call your pharmacy and they will forward the refill request to us. Please allow 3 business days for your refill to be completed.          Additional Information About Your Visit        MyChart Information     CrimeReports gives you secure access to your electronic health record. If you see a primary care provider, you can also send messages to your care team and make appointments. If you have questions, please call your primary care clinic.  If you do not have a primary care provider, please call [phone]  "and they will assist you.        Care EveryWhere ID     This is your Care EveryWhere ID. This could be used by other organizations to access your [place] medical records  [id number]        Your Vitals Were     Pulse Temperature Respirations Height Last Period BMI (Body Mass Index)    79 97.8  F (36.6  C) (Tympanic) 18 5' 2\" (1.575 m) 08/10/2013 21.03 kg/m2       Blood Pressure from Last 3 Encounters:   03/30/18 110/60   12/06/17 104/70   11/29/17 105/72    Weight from Last 3 Encounters:   03/30/18 115 lb (52.2 kg)   12/06/17 125 lb (56.7 kg)   11/20/17 126 lb (57.2 kg)              Today, you had the following     No orders found for display         Today's Medication Changes          These changes are accurate as of 3/30/18 10:10 AM.  If you have any questions, ask your nurse or doctor.               Start taking these medicines.        Dose/Directions    cephalexin 250 MG capsule   Commonly known as:  KEFLEX   Used for:  Mucoid cyst, joint   Started by:  Alexi Romero NP        Dose:  250 mg   Take 1 capsule (250 mg) by mouth 4 times daily   Quantity:  40 capsule   Refills:  0       oxyCODONE-acetaminophen 5-325 MG per tablet   Commonly known as:  PERCOCET   Used for:  Mucoid cyst, joint   Started by:  Alexi Romero NP        Dose:  1 tablet   Take 1 tablet by mouth every 6 hours as needed for severe pain maximum 6 tablet(s) per day   Quantity:  30 tablet   Refills:  0         These medicines have changed or have updated prescriptions.        Dose/Directions    ibuprofen 800 MG tablet   Commonly known as:  ADVIL/MOTRIN   This may have changed:  Another medication with the same name was removed. Continue taking this medication, and follow the directions you see here.   Used for:  Pain of toe of right foot   Changed by:  Alexi Romero NP        TAKE 1 TABLET(800 MG) BY MOUTH EVERY 8 HOURS AS NEEDED FOR PAIN   Quantity:  90 tablet   Refills:  0         Stop taking these medicines if you haven't already. " Please contact your care team if you have questions.     order for DME   Stopped by:  Alexi Romero NP           oxyCODONE IR 5 MG tablet   Commonly known as:  ROXICODONE   Stopped by:  Alexi Romero NP           senna-docusate 8.6-50 MG per tablet   Commonly known as:  SENOKOT-S;PERICOLACE   Stopped by:  Alexi Romero NP                Where to get your medicines      These medications were sent to [facility] [address] [facility] [place], [place] - [address] AT [facility] [address], [facility] [address]     Phone:  [phone]     cephalexin 250 MG capsule         Some of these will need a paper prescription and others can be bought over the counter.  Ask your nurse if you have questions.     Bring a paper prescription for each of these medications     oxyCODONE-acetaminophen 5-325 MG per tablet                Primary Care Provider Office Phone # Fax #    Alexi Romero [phone]       [facility] [address]  [facility] [address]        Equal Access to Services     [facility]: Hadii aad ku hadasho Soomaali, waaxda luqadaha, qaybta kaalmada adeegyada, debby garcia . So [facility] [phone].    ATENCIÓN: Si habla español, tiene a cyr disposición servicios gratPresentainos de asistencia lingüística. LlLicking [facility] [phone].    We comply with applicable federal civil rights laws and [place] laws. We do not discriminate on the basis of race, color, national origin, age, disability, sex, sexual orientation, or gender identity.            Thank you!     Thank you for choosing [facility]  for your care. Our goal is always to provide you with excellent care. Hearing back from our patients is one way we can continue to improve our services. Please take a few minutes to complete the written survey that you may receive in the mail after your visit with us. Thank you!             Your Updated Medication List - Protect others around you:  Learn how to safely use, store and throw away your medicines at www.disposemymeds.org.          This list is accurate as of 3/30/18 10:10 AM.  Always use your most recent med list.                   Brand Name Dispense Instructions for use Diagnosis    cephalexin 250 MG capsule    KEFLEX    40 capsule    Take 1 capsule (250 mg) by mouth 4 times daily    Mucoid cyst, joint       ibuprofen 800 MG tablet    ADVIL/MOTRIN    90 tablet    TAKE 1 TABLET(800 MG) BY MOUTH EVERY 8 HOURS AS NEEDED FOR PAIN    Pain of toe of right foot       oxyCODONE-acetaminophen 5-325 MG per tablet    PERCOCET    30 tablet    Take 1 tablet by mouth every 6 hours as needed for severe pain maximum 6 tablet(s) per day    Mucoid cyst, joint       valACYclovir 500 MG tablet    VALTREX    90 tablet    TAKE ONE TABLET BY MOUTH DAILY    Herpes simplex virus infection

## 2018-03-30 NOTE — PROGRESS NOTES
"SUBJECTIVE:  Purnima Fallon, a 37 year old female scheduled an appointment to discuss the following issues:  Cyst on top Right  large toe  - 3 weeks ago started having swelling on top of toe. Has gotten larger, painful and red.  No fever-has been soaking. Looked up on internet-similar to a mucoid cyst.    Cysts:  Just got over pilonidal cyst removal. Has had many incidences of cysts.    Will be seeing Dermatologist for evaluation.    Medical, social, surgical, and family histories reviewed.    Current Outpatient Prescriptions   Medication     cephalexin (KEFLEX) 250 MG capsule     oxyCODONE-acetaminophen (PERCOCET) 5-325 MG per tablet     ibuprofen (ADVIL/MOTRIN) 800 MG tablet     valACYclovir (VALTREX) 500 MG tablet     No current facility-administered medications for this visit.        ROS:  CONSTITUTIONAL:  negative for  fevers, chills, malaise and weight loss  EYES:  negative for  blurred vision and eye discharge  EARS/NOSE/THROAT:  negative for  ear drainage, earaches, nasal congestion , sore throat , difficulty swallowing  SKIN:  Has cyst at base of nail on large toe  RESPIRATORY:  negative for cough, dyspnea and wheezing  CARDIOVASCULAR:  negative for  chest pain, palpitations, edema  GASTROINTESTINAL:  negative for nausea, vomiting, reflux,   abdominal pain   diarrhea, constipation Had gallbladder out and has had no further problems.    MUSCULOSKELETAL:  negative for  myalgias, arthralgias, neck pain,  back   pain, Large right toe pain.  Patient has historically low pain tolerance.   URINARY:  negative for frequency, dysuria, nocturia and hesitancy  NEUROLOGICAL:  negative for headaches, dizziness, memory problems, weakness and No   numbness    OBJECTIVE:  /60 (BP Location: Left arm, Patient Position: Chair, Cuff Size: Adult Regular)  Pulse 79  Temp 97.8  F (36.6  C) (Tympanic)  Resp 18  Ht 5' 2\" (1.575 m)  Wt 115 lb (52.2 kg)  LMP 08/10/2013  BMI 21.03 kg/m2  EXAM:  GENERAL APPEARANCE:  alert " and no distress  EYES: EOMI,  PERRL  NECK: Supple, no lymphadenopathy, no thyromegaly, no carotid bruits, No JVD  RESP: lungs clear to auscultation anteriorly and posteriorly - no rales, rhonchi or wheezes  CV: regular rates and rhythm, normal S1 S2, no S3 or S4 and no murmur, no click or rub -  MS: No edema Has fluid filled sac extending length of toenail bed. Red around edges. Painful to palpation.     NEURO:No focal deficits, CN 2-12 intact. CMS intact to toe.          ASSESSMENT / PLAN:  (M67.40) Mucoid cyst, joint  (primary encounter diagnosis)  Comment:   Does look like a mucoid cyst.  Given Keflex 500mg QID for 10 days. Stretch wrap given to wrap toe for compression. Wear roomy shoes. Continue to soak.  Percocet 5/325mg for pain #30. Will see if recedes , if not will refer for removal.     Plan: cephalexin (KEFLEX) 250 MG capsule,         oxyCODONE-acetaminophen (PERCOCET) 5-325 MG per        tablet

## 2018-03-30 NOTE — PATIENT INSTRUCTIONS
1. Wrap cyst with wrap and keep on 24 hours for compression  2. Keflex 500mg 4 times a day for 10 days  3. Wear roomy shoes.    4.  Percocet 5/325mg   Every six hours as needed for pain

## 2018-03-31 ASSESSMENT — ANXIETY QUESTIONNAIRES: GAD7 TOTAL SCORE: 12

## 2018-03-31 ASSESSMENT — PATIENT HEALTH QUESTIONNAIRE - PHQ9: SUM OF ALL RESPONSES TO PHQ QUESTIONS 1-9: 1

## 2018-05-05 DIAGNOSIS — M79.674 PAIN OF TOE OF RIGHT FOOT: ICD-10-CM

## 2018-05-07 RX ORDER — IBUPROFEN 800 MG/1
TABLET, FILM COATED ORAL
Qty: 90 TABLET | Refills: 1 | Status: SHIPPED | OUTPATIENT
Start: 2018-05-07 | End: 2018-06-21

## 2018-05-15 ENCOUNTER — OFFICE VISIT (OUTPATIENT)
Dept: INTERNAL MEDICINE | Facility: OTHER | Age: 38
End: 2018-05-15
Attending: NURSE PRACTITIONER
Payer: COMMERCIAL

## 2018-05-15 ENCOUNTER — OFFICE VISIT (OUTPATIENT)
Dept: DERMATOLOGY | Facility: OTHER | Age: 38
End: 2018-05-15
Attending: DERMATOLOGY
Payer: COMMERCIAL

## 2018-05-15 VITALS
RESPIRATION RATE: 18 BRPM | TEMPERATURE: 99.4 F | SYSTOLIC BLOOD PRESSURE: 110 MMHG | HEART RATE: 75 BPM | HEIGHT: 63 IN | OXYGEN SATURATION: 98 % | BODY MASS INDEX: 21.26 KG/M2 | DIASTOLIC BLOOD PRESSURE: 62 MMHG | WEIGHT: 120 LBS

## 2018-05-15 VITALS
DIASTOLIC BLOOD PRESSURE: 62 MMHG | HEART RATE: 75 BPM | TEMPERATURE: 98.3 F | SYSTOLIC BLOOD PRESSURE: 114 MMHG | BODY MASS INDEX: 21.4 KG/M2 | WEIGHT: 117 LBS | OXYGEN SATURATION: 98 %

## 2018-05-15 DIAGNOSIS — R10.13 ABDOMINAL PAIN, EPIGASTRIC: ICD-10-CM

## 2018-05-15 DIAGNOSIS — Z13.220 LIPID SCREENING: ICD-10-CM

## 2018-05-15 DIAGNOSIS — Z01.419 WELL WOMAN EXAM WITH ROUTINE GYNECOLOGICAL EXAM: Primary | ICD-10-CM

## 2018-05-15 DIAGNOSIS — Z71.6 ENCOUNTER FOR TOBACCO USE CESSATION COUNSELING: ICD-10-CM

## 2018-05-15 DIAGNOSIS — Z87.2 HISTORY OF HIDRADENITIS SUPPURATIVA: ICD-10-CM

## 2018-05-15 DIAGNOSIS — M67.40 MUCOID CYST OF JOINT: ICD-10-CM

## 2018-05-15 DIAGNOSIS — L73.2 HYDRADENITIS: ICD-10-CM

## 2018-05-15 LAB
ALBUMIN SERPL-MCNC: 4.5 G/DL (ref 3.4–5)
ALP SERPL-CCNC: 54 U/L (ref 40–150)
ALT SERPL W P-5'-P-CCNC: 33 U/L (ref 0–50)
ANION GAP SERPL CALCULATED.3IONS-SCNC: 5 MMOL/L (ref 3–14)
AST SERPL W P-5'-P-CCNC: 22 U/L (ref 0–45)
BASOPHILS # BLD AUTO: 0.1 10E9/L (ref 0–0.2)
BASOPHILS NFR BLD AUTO: 0.5 %
BILIRUB SERPL-MCNC: 0.3 MG/DL (ref 0.2–1.3)
BUN SERPL-MCNC: 14 MG/DL (ref 7–30)
CALCIUM SERPL-MCNC: 9 MG/DL (ref 8.5–10.1)
CHLORIDE SERPL-SCNC: 105 MMOL/L (ref 94–109)
CHOLEST SERPL-MCNC: 131 MG/DL
CO2 SERPL-SCNC: 28 MMOL/L (ref 20–32)
CREAT SERPL-MCNC: 0.83 MG/DL (ref 0.52–1.04)
DIFFERENTIAL METHOD BLD: ABNORMAL
EOSINOPHIL # BLD AUTO: 0.1 10E9/L (ref 0–0.7)
EOSINOPHIL NFR BLD AUTO: 1.2 %
ERYTHROCYTE [DISTWIDTH] IN BLOOD BY AUTOMATED COUNT: 13.2 % (ref 10–15)
GFR SERPL CREATININE-BSD FRML MDRD: 77 ML/MIN/1.7M2
GLUCOSE SERPL-MCNC: 81 MG/DL (ref 70–99)
HCT VFR BLD AUTO: 41.9 % (ref 35–47)
HDLC SERPL-MCNC: 62 MG/DL
HGB BLD-MCNC: 14.7 G/DL (ref 11.7–15.7)
IMM GRANULOCYTES # BLD: 0 10E9/L (ref 0–0.4)
IMM GRANULOCYTES NFR BLD: 0.3 %
LDLC SERPL CALC-MCNC: 60 MG/DL
LYMPHOCYTES # BLD AUTO: 4.9 10E9/L (ref 0.8–5.3)
LYMPHOCYTES NFR BLD AUTO: 52.7 %
MCH RBC QN AUTO: 33.8 PG (ref 26.5–33)
MCHC RBC AUTO-ENTMCNC: 35.1 G/DL (ref 31.5–36.5)
MCV RBC AUTO: 96 FL (ref 78–100)
MONOCYTES # BLD AUTO: 0.5 10E9/L (ref 0–1.3)
MONOCYTES NFR BLD AUTO: 5.1 %
NEUTROPHILS # BLD AUTO: 3.8 10E9/L (ref 1.6–8.3)
NEUTROPHILS NFR BLD AUTO: 40.2 %
NONHDLC SERPL-MCNC: 69 MG/DL
NRBC # BLD AUTO: 0 10*3/UL
NRBC BLD AUTO-RTO: 0 /100
PLATELET # BLD AUTO: 245 10E9/L (ref 150–450)
POTASSIUM SERPL-SCNC: 3.8 MMOL/L (ref 3.4–5.3)
PROT SERPL-MCNC: 7.7 G/DL (ref 6.8–8.8)
RBC # BLD AUTO: 4.35 10E12/L (ref 3.8–5.2)
SODIUM SERPL-SCNC: 138 MMOL/L (ref 133–144)
SPECIMEN SOURCE: NORMAL
TRIGL SERPL-MCNC: 46 MG/DL
TSH SERPL DL<=0.005 MIU/L-ACNC: 2.12 MU/L (ref 0.4–4)
WBC # BLD AUTO: 9.4 10E9/L (ref 4–11)
WET PREP SPEC: NORMAL

## 2018-05-15 PROCEDURE — 87624 HPV HI-RISK TYP POOLED RSLT: CPT | Mod: 90 | Performed by: NURSE PRACTITIONER

## 2018-05-15 PROCEDURE — 99395 PREV VISIT EST AGE 18-39: CPT | Performed by: NURSE PRACTITIONER

## 2018-05-15 PROCEDURE — 36415 COLL VENOUS BLD VENIPUNCTURE: CPT | Performed by: NURSE PRACTITIONER

## 2018-05-15 PROCEDURE — 99202 OFFICE O/P NEW SF 15 MIN: CPT | Performed by: DERMATOLOGY

## 2018-05-15 PROCEDURE — 87591 N.GONORRHOEAE DNA AMP PROB: CPT | Performed by: NURSE PRACTITIONER

## 2018-05-15 PROCEDURE — 80050 GENERAL HEALTH PANEL: CPT | Performed by: NURSE PRACTITIONER

## 2018-05-15 PROCEDURE — 80061 LIPID PANEL: CPT | Performed by: NURSE PRACTITIONER

## 2018-05-15 PROCEDURE — 87491 CHLMYD TRACH DNA AMP PROBE: CPT | Performed by: NURSE PRACTITIONER

## 2018-05-15 PROCEDURE — 99000 SPECIMEN HANDLING OFFICE-LAB: CPT | Performed by: NURSE PRACTITIONER

## 2018-05-15 PROCEDURE — G0123 SCREEN CERV/VAG THIN LAYER: HCPCS | Performed by: NURSE PRACTITIONER

## 2018-05-15 PROCEDURE — 87210 SMEAR WET MOUNT SALINE/INK: CPT | Performed by: NURSE PRACTITIONER

## 2018-05-15 RX ORDER — SUCRALFATE 1 G/1
1 TABLET ORAL 4 TIMES DAILY
Qty: 40 TABLET | Refills: 0 | Status: SHIPPED | OUTPATIENT
Start: 2018-05-15 | End: 2018-08-20

## 2018-05-15 RX ORDER — HYDROCODONE BITARTRATE AND ACETAMINOPHEN 5; 325 MG/1; MG/1
1 TABLET ORAL 3 TIMES DAILY PRN
Qty: 24 TABLET | Refills: 0 | Status: SHIPPED | OUTPATIENT
Start: 2018-05-15 | End: 2018-05-31

## 2018-05-15 ASSESSMENT — ANXIETY QUESTIONNAIRES
GAD7 TOTAL SCORE: 3
2. NOT BEING ABLE TO STOP OR CONTROL WORRYING: NOT AT ALL
5. BEING SO RESTLESS THAT IT IS HARD TO SIT STILL: SEVERAL DAYS
6. BECOMING EASILY ANNOYED OR IRRITABLE: NOT AT ALL
3. WORRYING TOO MUCH ABOUT DIFFERENT THINGS: NOT AT ALL
7. FEELING AFRAID AS IF SOMETHING AWFUL MIGHT HAPPEN: NOT AT ALL
IF YOU CHECKED OFF ANY PROBLEMS ON THIS QUESTIONNAIRE, HOW DIFFICULT HAVE THESE PROBLEMS MADE IT FOR YOU TO DO YOUR WORK, TAKE CARE OF THINGS AT HOME, OR GET ALONG WITH OTHER PEOPLE: NOT DIFFICULT AT ALL
1. FEELING NERVOUS, ANXIOUS, OR ON EDGE: SEVERAL DAYS

## 2018-05-15 ASSESSMENT — ENCOUNTER SYMPTOMS
NECK PAIN: 0
FATIGUE: 0
PALPITATIONS: 0
FREQUENCY: 0
TROUBLE SWALLOWING: 0
CHEST TIGHTNESS: 0
NERVOUS/ANXIOUS: 1
SORE THROAT: 0
SHORTNESS OF BREATH: 0
VOMITING: 0
WEAKNESS: 0
DIZZINESS: 0
ADENOPATHY: 0
HEADACHES: 0
ABDOMINAL PAIN: 1
ARTHRALGIAS: 0
FEVER: 0
COUGH: 0
SLEEP DISTURBANCE: 0
DIARRHEA: 0
NUMBNESS: 0
MYALGIAS: 0
NAUSEA: 0
RHINORRHEA: 0
DYSPHORIC MOOD: 0
DYSURIA: 0
SINUS PAIN: 0
SINUS PRESSURE: 0
CONSTIPATION: 1
BACK PAIN: 0

## 2018-05-15 ASSESSMENT — PAIN SCALES - GENERAL
PAINLEVEL: MODERATE PAIN (5)
PAINLEVEL: MODERATE PAIN (4)

## 2018-05-15 ASSESSMENT — PATIENT HEALTH QUESTIONNAIRE - PHQ9: 5. POOR APPETITE OR OVEREATING: SEVERAL DAYS

## 2018-05-15 NOTE — NURSING NOTE
"Chief Complaint   Patient presents with     Derm Problem     HX of Hidradenitis       Initial /62 (BP Location: Left arm, Patient Position: Chair, Cuff Size: Adult Large)  Pulse 75  Temp 98.3  F (36.8  C) (Tympanic)  Wt 53.1 kg (117 lb)  LMP 08/10/2013  SpO2 98%  Breastfeeding? No  BMI 21.4 kg/m2 Estimated body mass index is 21.4 kg/(m^2) as calculated from the following:    Height as of 3/30/18: 1.575 m (5' 2\").    Weight as of this encounter: 53.1 kg (117 lb).  Medication Reconciliation: complete    Kamilla Doshi MA    "

## 2018-05-15 NOTE — NURSING NOTE
"Chief Complaint   Patient presents with     Physical       Initial /62  Pulse 75  Temp 99.4  F (37.4  C) (Tympanic)  Resp 18  Ht 5' 3\" (1.6 m)  Wt 120 lb (54.4 kg)  LMP 08/10/2013  SpO2 98%  BMI 21.26 kg/m2 Estimated body mass index is 21.26 kg/(m^2) as calculated from the following:    Height as of this encounter: 5' 3\" (1.6 m).    Weight as of this encounter: 120 lb (54.4 kg).  Medication Reconciliation: complete    Crystal Polo LPN  "

## 2018-05-15 NOTE — PROGRESS NOTES
SUBJECTIVE:   CC: Purnima Fallon is an 37 year old woman who presents for preventive health visit.     Healthy Habits:    Do you get at least three servings of calcium containing foods daily (dairy, green leafy vegetables, etc.)? yes and no vitamins    Amount of exercise or daily activities, outside of work: none    Problems taking medications regularly No    Medication side effects: No    Have you had an eye exam in the past two years? yes    Do you see a dentist twice per year? no    Do you have sleep apnea, excessive snoring or daytime drowsiness?no      cysts      Duration: follow up on cysts    Description (location/character/radiation):  Left  Buttocks crease.  Well healed pilonidal surgery.      Intensity:  4-5    Accompanying signs and symptoms: Pain      History (similar episodes/previous evaluation): yes    Precipitating or alleviating factors: None    Therapies tried and outcome: Tried to see a dermatologist who did not offer any good advice.      Mucoid Cyst: Has mucoid cyst on large right toe. Was large this spring, wrapped it and did rupture and go down. Now has returned. Hurts.      Epigastric pain: Has sharp pains midepigastric area.  Has had gallbladder removed.  Some nausea, no vomiting.   Today's PHQ-2 Score: No flowsheet data found.    Abuse: Current or Past(Physical, Sexual or Emotional)- No  Do you feel safe in your environment - No    Social History   Substance Use Topics     Smoking status: Current Every Day Smoker     Packs/day: 0.50     Years: 15.00     Types: Cigarettes     Last attempt to quit: 7/18/2013     Smokeless tobacco: Never Used     Alcohol use No     If you drink alcohol do you typically have >3 drinks per day or >7 drinks per week? No                     Reviewed orders with patient.  Reviewed health maintenance and updated orders accordingly - Yes  BP Readings from Last 3 Encounters:   05/15/18 110/62   05/15/18 114/62   03/30/18 110/60    Wt Readings from Last 3  Encounters:   05/15/18 120 lb (54.4 kg)   05/15/18 117 lb (53.1 kg)   03/30/18 115 lb (52.2 kg)                  Patient Active Problem List   Diagnosis     Migraines     Depression     ADHD (attention deficit hyperactivity disorder)     Cystic acne     S/P laparoscopic hysterectomy     Kidney stones     Ischiorectal abscess and fistula     ACP (advance care planning)     Mass of breast     Pilonidal cyst     H/O pilonidal cyst     Biliary dyskinesia     Past Surgical History:   Procedure Laterality Date     CYSTECTOMY PILONIDAL N/A 9/18/2017    Procedure: CYSTECTOMY PILONIDAL;  PILONIDAL CYSTECTOMY ;  Surgeon: Cordell Stephens MD;  [facility]     EXCISE MASS NECK Right 8/4/2015    Procedure: EXCISE MASS NECK;  Surgeon: Nasir Jones MD;  [facility]     INCISION AND DRAINAGE PERINEAL, COMBINED N/A 3/18/2015    Procedure: COMBINED INCISION AND DRAINAGE PERINEAL;  Surgeon: Jay Torres DO;  [facility]     LAPAROSCOPIC CHOLECYSTECTOMY N/A 11/20/2017    Procedure: LAPAROSCOPIC CHOLECYSTECTOMY;  LAPAROSCOPIC CHOLECYSTECTOMY;  Surgeon: Cordell Stephens MD;  [facility]     LAPAROSCOPIC HYSTERECTOMY SUPRACERVICAL, BILATERAL SALPINGO-OOPHORECTOMY, COMBINED  9/27/2013    Procedure: COMBINED LAPAROSCOPIC HYSTERECTOMY SUPRACERVICAL, SALPINGO-OOPHORECTOMY;  LAPAROSCOPIC SUPRACERVICAL HYSTERECTOMY AND RIGHT SALPINGO-OOPHORECTOMY, CYSTOSCOPY;  Surgeon: Denys Callahan MD;  [facility]     marsupialization of pilonidal cyst  2007     TUBAL LIGATION  2005       Social History   Substance Use Topics     Smoking status: Current Every Day Smoker     Packs/day: 0.50     Years: 15.00     Types: Cigarettes     Last attempt to quit: 7/18/2013     Smokeless tobacco: Never Used     Alcohol use No     Family History   Problem Relation Age of Onset     DIABETES Paternal Grandmother          Current Outpatient Prescriptions   Medication Sig Dispense Refill     HYDROcodone-acetaminophen (NORCO) 5-325  MG per tablet Take 1 tablet by mouth 3 times daily as needed for pain 24 tablet 0     ibuprofen (ADVIL/MOTRIN) 800 MG tablet TAKE 1 TABLET(800 MG) BY MOUTH EVERY 8 HOURS AS NEEDED FOR PAIN 90 tablet 1     omeprazole (PRILOSEC) 20 MG CR capsule Take 1 capsule (20 mg) by mouth daily 30 capsule 1     sucralfate (CARAFATE) 1 GM tablet Take 1 tablet (1 g) by mouth 4 times daily 40 tablet 0     valACYclovir (VALTREX) 500 MG tablet TAKE ONE TABLET BY MOUTH DAILY 90 tablet 1     No Known Allergies      Pertinent mammograms are reviewed under the imaging tab.  History of abnormal Pap smear: Had hysterectoomy-still has cervix and one ovary.   Para 2002.      Reviewed and updated as needed this visit by clinical staff  Tobacco  Allergies  Meds  Med Hx  Surg Hx  Fam Hx  Soc Hx        Reviewed and updated as needed this visit by Provider        Past Medical History:   Diagnosis Date     Hidradenitis 2/16/2007     Ischiorectal abscess and fistula      Kidney stones 2008    x2 passed on her own     Nondependent tobacco use disorder 10/11/2012     Papanicolaou smear of cervix with low grade squamous intraepithelial lesion (LGSIL) 10/29/2008      Past Surgical History:   Procedure Laterality Date     CYSTECTOMY PILONIDAL N/A 9/18/2017    Procedure: CYSTECTOMY PILONIDAL;  PILONIDAL CYSTECTOMY ;  Surgeon: Cordell Stephens MD;  [facility]     EXCISE MASS NECK Right 8/4/2015    Procedure: EXCISE MASS NECK;  Surgeon: Nasir Jones MD;  [facility]     INCISION AND DRAINAGE PERINEAL, COMBINED N/A 3/18/2015    Procedure: COMBINED INCISION AND DRAINAGE PERINEAL;  Surgeon: Jay Torres DO;  [facility]     LAPAROSCOPIC CHOLECYSTECTOMY N/A 11/20/2017    Procedure: LAPAROSCOPIC CHOLECYSTECTOMY;  LAPAROSCOPIC CHOLECYSTECTOMY;  Surgeon: Cordell Stephens MD;  [facility]     LAPAROSCOPIC HYSTERECTOMY SUPRACERVICAL, BILATERAL SALPINGO-OOPHORECTOMY, COMBINED  9/27/2013    Procedure: COMBINED LAPAROSCOPIC  "HYSTERECTOMY SUPRACERVICAL, SALPINGO-OOPHORECTOMY;  LAPAROSCOPIC SUPRACERVICAL HYSTERECTOMY AND RIGHT SALPINGO-OOPHORECTOMY, CYSTOSCOPY;  Surgeon: Denys Callahan MD;  [facility]     marsupialization of pilonidal cyst  2007     TUBAL LIGATION  2005     Obstetric History     No data available          ROS:  Review of Systems   Constitutional: Negative for fatigue and fever.   HENT: Negative for congestion, nosebleeds, postnasal drip, rhinorrhea, sinus pain, sinus pressure, sore throat and trouble swallowing.         + dentist.     Eyes:        Saw Opthalmologist this AM.     Respiratory: Negative for cough, chest tightness and shortness of breath.    Cardiovascular: Negative for chest pain, palpitations and leg swelling.   Gastrointestinal: Positive for abdominal pain and constipation. Negative for diarrhea, nausea and vomiting.        Bloating , pain, passes flatus.  Occas constipation but better after gallbladder out.     Endocrine: Negative for cold intolerance and heat intolerance.   Genitourinary: Negative for dysuria, frequency, urgency, vaginal bleeding, vaginal discharge and vaginal pain.   Musculoskeletal: Negative for arthralgias, back pain, gait problem, myalgias and neck pain.   Skin:        Cyst on right foot large toe, and buttock sebaceous cyst.     Neurological: Negative for dizziness, syncope, weakness, numbness and headaches.   Hematological: Negative for adenopathy.   Psychiatric/Behavioral: Negative for dysphoric mood and sleep disturbance. The patient is nervous/anxious.         Has anxiety.         OBJECTIVE:   /62  Pulse 75  Temp 99.4  F (37.4  C) (Tympanic)  Resp 18  Ht 5' 3\" (1.6 m)  Wt 120 lb (54.4 kg)  LMP 08/10/2013  SpO2 98%  BMI 21.26 kg/m2  EXAM:  Physical Exam   Constitutional: She is oriented to person, place, and time. She appears well-developed and well-nourished.   HENT:   Right Ear: External ear normal.   Left Ear: External ear normal.   Nose: Nose normal. "   Mouth/Throat: Oropharynx is clear and moist.   Eyes: Conjunctivae and EOM are normal. Pupils are equal, round, and reactive to light.   Neck: Normal range of motion. Neck supple. No JVD present. No thyromegaly present.   Cardiovascular: Normal rate, regular rhythm, normal heart sounds and intact distal pulses.    No murmur heard.  Pulmonary/Chest: Effort normal and breath sounds normal.   Abdominal: Soft. Bowel sounds are normal. She exhibits no mass. There is no tenderness.   Genitourinary: Vagina normal. No breast swelling, tenderness, discharge or bleeding. No vaginal discharge found.   Genitourinary Comments: Labia without lmps or lesions. Vagina well rugated. Cervix pink. PAP done, Wet prep done, Gc chlamydia done.  Has no uterus. No masses or tenderness in adnexa.     Musculoskeletal: Normal range of motion. She exhibits no edema or tenderness.   Lymphadenopathy:     She has no cervical adenopathy.   Neurological: She is alert and oriented to person, place, and time. She has normal reflexes. She displays normal reflexes. No cranial nerve deficit. She exhibits normal muscle tone. Coordination normal.   Skin: Skin is warm and dry.   Has red lump to crease on left buttock. Painful to touch.  Small mucoid cyst structure reappearing from nail bed. On large toe right foot.       Results for orders placed or performed in visit on 05/15/18   Lipid Profile (Chol, Trig, HDL, LDL calc)   Result Value Ref Range    Cholesterol 131 <200 mg/dL    Triglycerides 46 <150 mg/dL    HDL Cholesterol 62 >49 mg/dL    LDL Cholesterol Calculated 60 <100 mg/dL    Non HDL Cholesterol 69 <130 mg/dL   CBC with platelets and differential   Result Value Ref Range    WBC 9.4 4.0 - 11.0 10e9/L    RBC Count 4.35 3.8 - 5.2 10e12/L    Hemoglobin 14.7 11.7 - 15.7 g/dL    Hematocrit 41.9 35.0 - 47.0 %    MCV 96 78 - 100 fl    MCH 33.8 (H) 26.5 - 33.0 pg    MCHC 35.1 31.5 - 36.5 g/dL    RDW 13.2 10.0 - 15.0 %    Platelet Count 245 150 - 450 10e9/L     Diff Method Automated Method     % Neutrophils 40.2 %    % Lymphocytes 52.7 %    % Monocytes 5.1 %    % Eosinophils 1.2 %    % Basophils 0.5 %    % Immature Granulocytes 0.3 %    Nucleated RBCs 0 0 /100    Absolute Neutrophil 3.8 1.6 - 8.3 10e9/L    Absolute Lymphocytes 4.9 0.8 - 5.3 10e9/L    Absolute Monocytes 0.5 0.0 - 1.3 10e9/L    Absolute Eosinophils 0.1 0.0 - 0.7 10e9/L    Absolute Basophils 0.1 0.0 - 0.2 10e9/L    Abs Immature Granulocytes 0.0 0 - 0.4 10e9/L    Absolute Nucleated RBC 0.0    Comprehensive metabolic panel (BMP + Alb, Alk Phos, ALT, AST, Total. Bili, TP)   Result Value Ref Range    Sodium 138 133 - 144 mmol/L    Potassium 3.8 3.4 - 5.3 mmol/L    Chloride 105 94 - 109 mmol/L    Carbon Dioxide 28 20 - 32 mmol/L    Anion Gap 5 3 - 14 mmol/L    Glucose 81 70 - 99 mg/dL    Urea Nitrogen 14 7 - 30 mg/dL    Creatinine 0.83 0.52 - 1.04 mg/dL    GFR Estimate 77 >60 mL/min/1.7m2    GFR Estimate If Black >90 >60 mL/min/1.7m2    Calcium 9.0 8.5 - 10.1 mg/dL    Bilirubin Total 0.3 0.2 - 1.3 mg/dL    Albumin 4.5 3.4 - 5.0 g/dL    Protein Total 7.7 6.8 - 8.8 g/dL    Alkaline Phosphatase 54 40 - 150 U/L    ALT 33 0 - 50 U/L    AST 22 0 - 45 U/L   TSH with free T4 reflex   Result Value Ref Range    TSH 2.12 0.40 - 4.00 mU/L   Wet prep   Result Value Ref Range    Specimen Description Vagina     Wet Prep Moderate  WBC'S seen       Wet Prep No Trichomonas seen     Wet Prep No yeast seen     Wet Prep No clue cells seen    GC/Chlamydia by PCR - [facility]   Result Value Ref Range    Specimen Source Cervix     Neisseria gonorrhoreae PCR Not Detected NDET^Not Detected    Chlamydia Trachomatis PCR Not Detected NDET^Not Detected       ASSESSMENT/PLAN:   ASSESSMENT / PLAN:  (Z01.419) Well woman exam with routine gynecological exam  (primary encounter diagnosis)  Comment:PAP done  Wet prep and GC chlamydia negative.    Plan: CBC with platelets and differential,         Comprehensive metabolic panel (BMP + Alb, Alk          Phos, ALT, AST, Total. Bili, TP), TSH with free        T4 reflex, Wet prep, A pap thin layer screen         with  HPV - recommended age 30 - 65 years         (select HPV order below), HPV High Risk Types         DNA Cervical, GC/Chlamydia by PCR - [facility],         CANCELED: NEISSERIA GONORRHOEA PCR, CANCELED:         CHLAMYDIA TRACHOMATIS PCR      (R10.13) Abdominal pain, epigastric  Comment: May have gastric erosion. Given Prilosec 20mg a day for 10 days at least, and Carafate 1 gm 4 times a day.    Plan: sucralfate (CARAFATE) 1 GM tablet, omeprazole         (PRILOSEC) 20 MG CR capsule, GC/Chlamydia by         PCR - [facility]        (Z13.220) Lipid screening  Comment:  Lab Results   Component Value Date    CHOL 131 05/15/2018     Lab Results   Component Value Date    HDL 62 05/15/2018     Lab Results   Component Value Date    LDL 60 05/15/2018     Lab Results   Component Value Date    TRIG 46 05/15/2018     No results found for: CHOLHDLRATIO  Lab Results   Component Value Date    AST 22 05/15/2018     Lab Results   Component Value Date    ALT 33 05/15/2018       Plan: Lipid Profile (Chol, Trig, HDL, LDL calc)       (L73.2) Hydradenitis  Comment: Will have Dr. Stephens look at hidradenitis on buttock while still small.  For possible removal Asking for pain med. Given Lortabs 5/325mg  Over the counter Ibu not working.   Will look to refer patient to [facility]  Postnote-referred to Dr. Apple Pruitt, [facility].   [phone].    Plan: GENERAL SURG ADULT REFERRAL,         HYDROcodone-acetaminophen (NORCO) 5-325 MG per         tablet           (Z71.6) Encounter for tobacco use cessation counseling  Comment:Offered smoking cessation help    Plan: Declines at  This time      (M67.40) Mucoid cyst of joint  Comment:Will see if Dr. Stephens will remove. If not referral to ortho or derm.    Plan:Referral.          COUNSELING:     Counseled to quit smoking.         reports that she has been smoking  "Cigarettes.  She has a 7.50 pack-year smoking history. She has never used smokeless tobacco.    Estimated body mass index is 21.26 kg/(m^2) as calculated from the following:    Height as of this encounter: 5' 3\" (1.6 m).    Weight as of this encounter: 120 lb (54.4 kg).       Counseling Resources:  ATP IV Guidelines  Pooled Cohorts Equation Calculator  Breast Cancer Risk Calculator  FRAX Risk Assessment  ICSI Preventive Guidelines  Dietary Guidelines for Americans, 2010  USDA's MyPlate  ASA Prophylaxis  Lung CA Screening    Alexi Romero, NP  [facility] [place]  "

## 2018-05-15 NOTE — PATIENT INSTRUCTIONS
1. Referral to Dr. Stephens  Will ask him about mucoid cyst.    2. Will find derm place in [place].      Preventive Health Recommendations  Female Ages 26 - 39  Yearly exam:   See your health care provider every year in order to    Review health changes.     Discuss preventive care.      Review your medicines if you your doctor has prescribed any.    Until age 30: Get a Pap test every three years (more often if you have had an abnormal result).    After age 30: Talk to your doctor about whether you should have a Pap test every 3 years or have a Pap test with HPV screening every 5 years.   You do not need a Pap test if your uterus was removed (hysterectomy) and you have not had cancer.  You should be tested each year for STDs (sexually transmitted diseases), if you're at risk.   Talk to your provider about how often to have your cholesterol checked.  If you are at risk for diabetes, you should have a diabetes test (fasting glucose).  Shots: Get a flu shot each year. Get a tetanus shot every 10 years.   Nutrition:     Eat at least 5 servings of fruits and vegetables each day.    Eat whole-grain bread, whole-wheat pasta and brown rice instead of white grains and rice.    Talk to your provider about Calcium and Vitamin D.     Lifestyle    Exercise at least 150 minutes a week (30 minutes a day, 5 days of the week). This will help you control your weight and prevent disease.    Limit alcohol to one drink per day.    No smoking.     Wear sunscreen to prevent skin cancer.    See your dentist every six months for an exam and cleaning.

## 2018-05-15 NOTE — MR AVS SNAPSHOT
After Visit Summary   5/15/2018    Purnima Fallon    MRN: [id number]           Patient Information     Date Of Birth          1980        Visit Information        Provider Department      5/15/2018 4:00 PM Alexi Romero NP [facility] [place]        Today's Diagnoses     Abdominal pain, epigastric    -  1    Lipid screening        Well woman exam with routine gynecological exam        Hydradenitis          Care Instructions    1. Referral to Dr. Stephens  Will ask him about mucoid cyst.    2. Will find derm place in [place].      Preventive Health Recommendations  Female Ages 26 - 39  Yearly exam:   See your health care provider every year in order to    Review health changes.     Discuss preventive care.      Review your medicines if you your doctor has prescribed any.    Until age 30: Get a Pap test every three years (more often if you have had an abnormal result).    After age 30: Talk to your doctor about whether you should have a Pap test every 3 years or have a Pap test with HPV screening every 5 years.   You do not need a Pap test if your uterus was removed (hysterectomy) and you have not had cancer.  You should be tested each year for STDs (sexually transmitted diseases), if you're at risk.   Talk to your provider about how often to have your cholesterol checked.  If you are at risk for diabetes, you should have a diabetes test (fasting glucose).  Shots: Get a flu shot each year. Get a tetanus shot every 10 years.   Nutrition:     Eat at least 5 servings of fruits and vegetables each day.    Eat whole-grain bread, whole-wheat pasta and brown rice instead of white grains and rice.    Talk to your provider about Calcium and Vitamin D.     Lifestyle    Exercise at least 150 minutes a week (30 minutes a day, 5 days of the week). This will help you control your weight and prevent disease.    Limit alcohol to one drink per day.    No smoking.     Wear sunscreen to prevent skin  cancer.    See your dentist every six months for an exam and cleaning.            Follow-ups after your visit        Additional Services     GENERAL SURG ADULT REFERRAL       Your provider has referred you to: Dr. Stephens.   Re small hydradenitis  cyst to right buttock crease-draining painful.  Would like removed while is still small.      Please be aware that coverage of these services is subject to the terms and limitations of your health insurance plan.  Call member services at your health plan with any benefit or coverage questions.      Please bring the following with you to your appointment:    (1) Any X-Rays, CTs or MRIs which have been performed.  Contact the facility where they were done to arrange for  prior to your scheduled appointment.   (2) List of current medications   (3) This referral request   (4) Any documents/labs given to you for this referral                  Who to contact     If you have questions or need follow up information about today's clinic visit or your schedule please contact [facility] directly at [phone].  Normal or non-critical lab and imaging results will be communicated to you by [employer], letter or phone within 4 business days after the clinic has received the results. If you do not hear from us within 7 days, please contact the clinic through [employer] or phone. If you have a critical or abnormal lab result, we will notify you by phone as soon as possible.  Submit refill requests through [employer] or call your pharmacy and they will forward the refill request to us. Please allow 3 business days for your refill to be completed.          Additional Information About Your Visit        [employer] Information     [employer] gives you secure access to your electronic health record. If you see a primary care provider, you can also send messages to your care team and make appointments. If you have questions, please call your primary care clinic.  If you do not have a  "primary care provider, please call [phone] and they will assist you.        Care EveryWhere ID     This is your Care EveryWhere ID. This could be used by other organizations to access your [place] medical records  [id number]        Your Vitals Were     Pulse Temperature Respirations Height Last Period Pulse Oximetry    75 99.4  F (37.4  C) (Tympanic) 18 5' 3\" (1.6 m) 08/10/2013 98%    BMI (Body Mass Index)                   21.26 kg/m2            Blood Pressure from Last 3 Encounters:   05/15/18 110/62   05/15/18 114/62   03/30/18 110/60    Weight from Last 3 Encounters:   05/15/18 120 lb (54.4 kg)   05/15/18 117 lb (53.1 kg)   03/30/18 115 lb (52.2 kg)              We Performed the Following     CBC with platelets and differential     Comprehensive metabolic panel (BMP + Alb, Alk Phos, ALT, AST, Total. Bili, TP)     GENERAL SURG ADULT REFERRAL     Lipid Profile (Chol, Trig, HDL, LDL calc)     TSH with free T4 reflex          Today's Medication Changes          These changes are accurate as of 5/15/18  4:58 PM.  If you have any questions, ask your nurse or doctor.               Start taking these medicines.        Dose/Directions    omeprazole 20 MG CR capsule   Commonly known as:  priLOSEC   Used for:  Abdominal pain, epigastric   Started by:  Alexi Romero NP        Dose:  20 mg   Take 1 capsule (20 mg) by mouth daily   Quantity:  30 capsule   Refills:  1       sucralfate 1 GM tablet   Commonly known as:  CARAFATE   Used for:  Abdominal pain, epigastric   Started by:  Alexi Romero NP        Dose:  1 g   Take 1 tablet (1 g) by mouth 4 times daily   Quantity:  40 tablet   Refills:  0            Where to get your medicines      These medications were sent to [id number] - [place], [place] - [address] AT [facility] [address] [address]     Phone:  [phone]     omeprazole 20 MG CR capsule    sucralfate 1 GM tablet                Primary Care Provider Office " Phone # Fax #    Alexi RomeroMYLES [phone] [address] [facility] [address]        Equal Access to Services     ALDO CRUZ : Hadii aad ku hadaileenange Sostacy, wadayannada luqadaha, qaybta kaalmada stella, debby madisonaminta barnettpresley casiano radha clemons. So [facility] [phone].    ATENCIÓN: Si habla español, tiene a cyr disposición servicios gratuitos de asistencia lingüística. Llame al [phone].    We comply with applicable federal civil rights laws and [place] laws. We do not discriminate on the basis of race, color, national origin, age, disability, sex, sexual orientation, or gender identity.            Thank you!     Thank you for choosing [facility]  for your care. Our goal is always to provide you with excellent care. Hearing back from our patients is one way we can continue to improve our services. Please take a few minutes to complete the written survey that you may receive in the mail after your visit with us. Thank you!             Your Updated Medication List - Protect others around you: Learn how to safely use, store and throw away your medicines at www.disposemymeds.org.          This list is accurate as of 5/15/18  4:58 PM.  Always use your most recent med list.                   Brand Name Dispense Instructions for use Diagnosis    ibuprofen 800 MG tablet    ADVIL/MOTRIN    90 tablet    TAKE 1 TABLET(800 MG) BY MOUTH EVERY 8 HOURS AS NEEDED FOR PAIN    Pain of toe of right foot       omeprazole 20 MG CR capsule    priLOSEC    30 capsule    Take 1 capsule (20 mg) by mouth daily    Abdominal pain, epigastric       sucralfate 1 GM tablet    CARAFATE    40 tablet    Take 1 tablet (1 g) by mouth 4 times daily    Abdominal pain, epigastric       valACYclovir 500 MG tablet    VALTREX    90 tablet    TAKE ONE TABLET BY MOUTH DAILY    Herpes simplex virus infection

## 2018-05-15 NOTE — MR AVS SNAPSHOT
After Visit Summary   5/15/2018    Purnima Fallon    MRN: [id number]           Patient Information     Date Of Birth          1980        Visit Information        Provider Department      5/15/2018 10:45 AM DIDI Bernal MD [facility]        Today's Diagnoses     History of hidradenitis suppurativa           Follow-ups after your visit        Your next 10 appointments already scheduled     May 31, 2018 10:00 AM CDT   (Arrive by 9:45 AM)   New Visit with Cordell Stephens MD   [facility] ([facility] )    [address] [place] [address]   [phone]              Who to contact     If you have questions or need follow up information about today's clinic visit or your schedule please contact [facility] directly at [phone].  Normal or non-critical lab and imaging results will be communicated to you by MyChart, letter or phone within 4 business days after the clinic has received the results. If you do not hear from us within 7 days, please contact the clinic through MyChart or phone. If you have a critical or abnormal lab result, we will notify you by phone as soon as possible.  Submit refill requests through Exmovere or call your pharmacy and they will forward the refill request to us. Please allow 3 business days for your refill to be completed.          Additional Information About Your Visit        MyChart Information     Exmovere gives you secure access to your electronic health record. If you see a primary care provider, you can also send messages to your care team and make appointments. If you have questions, please call your primary care clinic.  If you do not have a primary care provider, please call [phone] and they will assist you.        Care EveryWhere ID     This is your Care EveryWhere ID. This could be used by other organizations to access your [place] medical records  [id number]        Your Vitals  Were     Pulse Temperature Last Period Pulse Oximetry Breastfeeding? BMI (Body Mass Index)    75 98.3  F (36.8  C) (Tympanic) 08/10/2013 98% No 21.4 kg/m2       Blood Pressure from Last 3 Encounters:   05/15/18 110/62   05/15/18 114/62   03/30/18 110/60    Weight from Last 3 Encounters:   05/15/18 54.4 kg (120 lb)   05/15/18 53.1 kg (117 lb)   03/30/18 52.2 kg (115 lb)              Today, you had the following     No orders found for display         Today's Medication Changes          These changes are accurate as of 5/15/18 11:59 PM.  If you have any questions, ask your nurse or doctor.               Start taking these medicines.        Dose/Directions    HYDROcodone-acetaminophen 5-325 MG per tablet   Commonly known as:  NORCO   Used for:  Hydradenitis   Started by:  Alexi Romero NP        Dose:  1 tablet   Take 1 tablet by mouth 3 times daily as needed for pain   Quantity:  24 tablet   Refills:  0       omeprazole 20 MG CR capsule   Commonly known as:  priLOSEC   Used for:  Abdominal pain, epigastric   Started by:  Alexi Romero NP        Dose:  20 mg   Take 1 capsule (20 mg) by mouth daily   Quantity:  30 capsule   Refills:  1       sucralfate 1 GM tablet   Commonly known as:  CARAFATE   Used for:  Abdominal pain, epigastric   Started by:  Alexi Romero NP        Dose:  1 g   Take 1 tablet (1 g) by mouth 4 times daily   Quantity:  40 tablet   Refills:  0            Where to get your medicines      These medications were sent to [facility] [address]  [place] - [address] [place] [facility] [place] [address] [address]     Phone:  [phone]     omeprazole 20 MG CR capsule    sucralfate 1 GM tablet    valACYclovir 500 MG tablet         Some of these will need a paper prescription and others can be bought over the counter.  Ask your nurse if you have questions.     Bring a paper prescription for each of these medications     HYDROcodone-acetaminophen 5-325 MG per tablet                 Primary Care Provider Office Phone # Fax #    Alexi Romero [phone] [address] [facility] [address]        Equal Access to Services     ALDO CRUZ : Hadhonorio otto fletcher kiko Buckley, waaxda luqadaha, qaybta kaalmada stella, debby casiano laSageirene clemons. So [facility] [phone].    ATENCIÓN: Si habla español, tiene a cyr disposición servicios gratuitos de asistencia lingüística. Llame al [phone].    We comply with applicable federal civil rights laws and [place] laws. We do not discriminate on the basis of race, color, national origin, age, disability, sex, sexual orientation, or gender identity.            Thank you!     Thank you for choosing [facility]  for your care. Our goal is always to provide you with excellent care. Hearing back from our patients is one way we can continue to improve our services. Please take a few minutes to complete the written survey that you may receive in the mail after your visit with us. Thank you!             Your Updated Medication List - Protect others around you: Learn how to safely use, store and throw away your medicines at www.disposemymeds.org.          This list is accurate as of 5/15/18 11:59 PM.  Always use your most recent med list.                   Brand Name Dispense Instructions for use Diagnosis    HYDROcodone-acetaminophen 5-325 MG per tablet    NORCO    24 tablet    Take 1 tablet by mouth 3 times daily as needed for pain    Hydradenitis       ibuprofen 800 MG tablet    ADVIL/MOTRIN    90 tablet    TAKE 1 TABLET(800 MG) BY MOUTH EVERY 8 HOURS AS NEEDED FOR PAIN    Pain of toe of right foot       omeprazole 20 MG CR capsule    priLOSEC    30 capsule    Take 1 capsule (20 mg) by mouth daily    Abdominal pain, epigastric       sucralfate 1 GM tablet    CARAFATE    40 tablet    Take 1 tablet (1 g) by mouth 4 times daily    Abdominal pain, epigastric       valACYclovir 500 MG tablet    VALTREX     90 tablet    TAKE ONE TABLET BY MOUTH DAILY    Herpes simplex virus infection

## 2018-05-16 LAB
C TRACH DNA SPEC QL PROBE+SIG AMP: NOT DETECTED
N GONORRHOEA DNA SPEC QL PROBE+SIG AMP: NOT DETECTED
SPECIMEN SOURCE: NORMAL

## 2018-05-16 ASSESSMENT — PATIENT HEALTH QUESTIONNAIRE - PHQ9: SUM OF ALL RESPONSES TO PHQ QUESTIONS 1-9: 3

## 2018-05-16 ASSESSMENT — ANXIETY QUESTIONNAIRES: GAD7 TOTAL SCORE: 3

## 2018-05-16 NOTE — CONSULTS
"Consult Date:  05/15/2018      SUBJECTIVE:  First visit for Purnima, who states that she has had a history of hidradenitis suppurativa (HS) .  However, she has never had any axillary lesions.  She has had no breast lesions.  She states that she gets scattered lesions that can be anywhere on her body.  She states they get festered, then they leave deep scars.  She has had  them in the inguinal area, which I did not examine today.  She points out 1 on the cuticle of the right great toe.  This is very nonspecific and though she states this is one of her typical lesions it is certainly not something that would be seen with hidradenitis.  She has a few scattered on her arms and legs as well.  These sites don't contain apocrine glands which is the focus of hidradenitis.       ASSESSMENT:  By history this does not appear to be hidradenitis. The lesions come  very episodically.  It is not constant and overall I do not know exactly what is going on.      She comes in having been told  that there is \"a shot\" for her condition.  I was not aware of any shots for hidradenitis unless they are talking about some of the new biologics ( Humira or Enbrel, etc. )  that are being used in a few resistant cases of H.S.but have their main use for psoriasis.       PLAN: Her lesions (other than persistent groin abscesses)  do not suggest hidradentis   I advised her that what she tells me and what I see today (the toe lesion and some deeply scarred lesions on her arms) does not suggest HS.  I advised her that the best thing to do would be to come in with acute lesions and have it cultured for exam and perhaps culture if these lesions look more like true abscesses to  find out what organism is present and go from there.  She did not seem satisfied  with what I said to her today, but hopefully she will come in for acute lesions or have  them photographed, cultured etc. so that we get a better handle of what is going on with her.  It is possible " that she has a staphylococcus issue, but today there was  no evidence of that today.      MEDICATIONS/ALLERGIES:  Reviewed.         H TOMY JOSEPH MD             D: 2018   T: 2018   MT: JUNE      Name:     MAO LANDA   MRN:      [id number]        Account:       [id number]   :      1980           Consult Date:  05/15/2018      Document: [id number]

## 2018-05-22 LAB
COPATH REPORT: NORMAL
PAP: NORMAL

## 2018-05-24 LAB
FINAL DIAGNOSIS: NORMAL
HPV HR 12 DNA CVX QL NAA+PROBE: NEGATIVE
HPV16 DNA SPEC QL NAA+PROBE: NEGATIVE
HPV18 DNA SPEC QL NAA+PROBE: NEGATIVE
SPECIMEN DESCRIPTION: NORMAL
SPECIMEN SOURCE CVX/VAG CYTO: NORMAL

## 2018-05-31 ENCOUNTER — OFFICE VISIT (OUTPATIENT)
Dept: SURGERY | Facility: OTHER | Age: 38
End: 2018-05-31
Attending: NURSE PRACTITIONER
Payer: COMMERCIAL

## 2018-05-31 VITALS
HEART RATE: 78 BPM | DIASTOLIC BLOOD PRESSURE: 62 MMHG | SYSTOLIC BLOOD PRESSURE: 108 MMHG | TEMPERATURE: 99 F | HEIGHT: 62 IN | WEIGHT: 126 LBS | OXYGEN SATURATION: 100 % | BODY MASS INDEX: 23.19 KG/M2

## 2018-05-31 DIAGNOSIS — L73.2 HYDRADENITIS: ICD-10-CM

## 2018-05-31 DIAGNOSIS — M67.40 MUCOID CYST OF JOINT: Primary | ICD-10-CM

## 2018-05-31 PROCEDURE — 99213 OFFICE O/P EST LOW 20 MIN: CPT | Performed by: SURGERY

## 2018-05-31 NOTE — PROGRESS NOTES
[facility] Surgery Consultation    CC:  Hidradenitis    HPI:  This 37 year old year old female is seen at the request of Alexi Romero for evaluation of hidradenitis.  The history is obtained from the patient, and reviewing the medical record.  She is good medical historian. She has a long history of problems with skin abscesses and multiple procedures in the past. She says that this recent problem she has been having is associated with her left inferior gluteal crease. She has has this for the past year where it will enlarge and drain over time. She has pain associated with this lesion and it has not gone away. She says she has similar lesions to her bilateral groins which have not cause her any problems recently. She has never had any lesions along her breasts or in her axillas.     She also states that she was having problems with a great toe mucoid cyst. The cyst has almost resolved as of now, but previously she was having a bulge and pain associated with it. She has been doing wraps to it.     Past Medical History:   Diagnosis Date     Hidradenitis 2/16/2007     Ischiorectal abscess and fistula      Kidney stones 2008    x2 passed on her own     Nondependent tobacco use disorder 10/11/2012     Papanicolaou smear of cervix with low grade squamous intraepithelial lesion (LGSIL) 10/29/2008       Past Surgical History:   Procedure Laterality Date     CYSTECTOMY PILONIDAL N/A 9/18/2017    Procedure: CYSTECTOMY PILONIDAL;  PILONIDAL CYSTECTOMY ;  Surgeon: Cordell Stephens MD;  Location: [facility]     EXCISE MASS NECK Right 8/4/2015    Procedure: EXCISE MASS NECK;  Surgeon: Nasir Jones MD;  Location: [facility]     INCISION AND DRAINAGE PERINEAL, COMBINED N/A 3/18/2015    Procedure: COMBINED INCISION AND DRAINAGE PERINEAL;  Surgeon: Jay Torres DO;  Location: [facility]     LAPAROSCOPIC CHOLECYSTECTOMY N/A 11/20/2017    Procedure: LAPAROSCOPIC CHOLECYSTECTOMY;  LAPAROSCOPIC CHOLECYSTECTOMY;  Surgeon:  "Cordell Stephens MD;  Location: [facility] OR     LAPAROSCOPIC HYSTERECTOMY SUPRACERVICAL, BILATERAL SALPINGO-OOPHORECTOMY, COMBINED  9/27/2013    Procedure: COMBINED LAPAROSCOPIC HYSTERECTOMY SUPRACERVICAL, SALPINGO-OOPHORECTOMY;  LAPAROSCOPIC SUPRACERVICAL HYSTERECTOMY AND RIGHT SALPINGO-OOPHORECTOMY, CYSTOSCOPY;  Surgeon: Denys Callahan MD;  Location: [facility] OR     marsupialization of pilonidal cyst  2007     TUBAL LIGATION  2005       Pt denied problems with bleeding or anesthesia    Prior to Admission medications    Medication Sig Start Date End Date Taking? Authorizing Provider   ibuprofen (ADVIL/MOTRIN) 800 MG tablet TAKE 1 TABLET(800 MG) BY MOUTH EVERY 8 HOURS AS NEEDED FOR PAIN 5/7/18  Yes Alexi Romero NP   omeprazole (PRILOSEC) 20 MG CR capsule Take 1 capsule (20 mg) by mouth daily 5/15/18  Yes Alexi Romero NP   sucralfate (CARAFATE) 1 GM tablet Take 1 tablet (1 g) by mouth 4 times daily 5/15/18  Yes Alexi Romero NP   valACYclovir (VALTREX) 500 MG tablet TAKE ONE TABLET BY MOUTH DAILY 5/16/18  Yes Alexi Romero NP        No Known Allergies      HABITS:    Social History   Substance Use Topics     Smoking status: Current Every Day Smoker     Packs/day: 0.50     Years: 15.00     Types: Cigarettes     Last attempt to quit: 7/18/2013     Smokeless tobacco: Never Used     Alcohol use No     No mood altering drug use.    Family History   Problem Relation Age of Onset     DIABETES Paternal Grandmother        REVIEW OF SYSTEMS:  Ten point review of systems negative except those mentioned in the HPI.     The patient denies sleep apnea, latex allergies or MRSA    OBJECTIVE:    /62 (BP Location: Right arm, Patient Position: Chair, Cuff Size: Adult Regular)  Pulse 78  Temp 99  F (37.2  C) (Tympanic)  Ht 5' 2\" (1.575 m)  Wt 126 lb (57.2 kg)  LMP 08/10/2013  SpO2 100%  BMI 23.05 kg/m2    GENERAL: Generally appears well, in no distress with appropriate affect.  :  Left inferior gluteal crease with an " area of erythema 1 x 2cm with multiple pits. There is tenderness to the area with no purulent drainage  Neurological: grossly intact  Psych:  Alert, oriented, affect appropriate with normal decision making ability.      IMPRESSION:  37 year old female with concern for hidradenitis and mucoid cyst of the foot    PLAN:  I discussed the pathophysiology of hidradenitis and the staging system for the disease. I also spent time discussing the treatment options for the disease. I stated that typically for Stage I and II medical treatment with punch biopsy can be performed. As she has multiple areas in close proximity I discussed that excision of this inflamed area can be undertaken. An informed consent was obtained documenting the risks including but not limited to bleeding and infection. All questions and concerns were addressed.    A referral to podiatry will be made for her mucoid cyst.     Thank you for allowing me to participate in the care of your patient.           Cordell Stephens MD    5/31/2018  10:31 AM    cc:  Alexi Romero

## 2018-05-31 NOTE — NURSING NOTE
"Chief Complaint   Patient presents with     Consult     Cyst on right buttock x's 1 year-draining and painful.  SAM Romero is primary.  Also has one on her right foot x's a couple months.         Initial /62 (BP Location: Right arm, Patient Position: Chair, Cuff Size: Adult Regular)  Pulse 78  Temp 99  F (37.2  C) (Tympanic)  Ht 5' 2\" (1.575 m)  Wt 126 lb (57.2 kg)  LMP 08/10/2013  SpO2 100%  BMI 23.05 kg/m2 Estimated body mass index is 23.05 kg/(m^2) as calculated from the following:    Height as of this encounter: 5' 2\" (1.575 m).    Weight as of this encounter: 126 lb (57.2 kg).  Medication Reconciliation: complete    HEENA KEARNS LPN    "

## 2018-05-31 NOTE — PATIENT INSTRUCTIONS
Thank you for allowing Dr. Stephens and our surgical team to participate in your care.  If you have a scheduling or an appointment question please contact our Health Unit Coordinator at her direct line [phone].   ALL nursing questions or concerns can be directed to your clinic surgical nurse at: [phone]     You are scheduled for : Excision of left gluteal skin lesion  Your procedure date is: 6/8/18      HOW TO PREPARE-      You need a friend or family member available to drive you home AND stay with you for 24 hours after you leave the hospital. You will not be allowed to drive yourself. IF you need to take a taxi or the bus you MUST have a responsible person to ride with you. YOUR PROCEDURE WILL BE CANCELLED IF YOU DO NOT HAVE A RESPONSIBLE ADULT TO DRIVE YOU HOME.       You CANNOT have anything to eat or drink after midnight the night before your surgery, ncluding water and coffee. Your stomach needs to be completely empty. Do NOT chew gum, suck on hard candy, or smoke. You can brush your teeth the morning of surgery.       Please call our Surgery Education Nurses 1-2 weeks prior to your surgery date at  [phone] or toll free [phone]. Please have you medication and allergy lists ready.      Stop your aspirin or other NSAIDs(Ibuprofen, Motrin, Aleve, Celebrex, Naproxen, etc...) 7 days before your surgery unless otherwise instructed.      Hospital admitting will call you the day before your surgery with your arrival time. If you are scheduled on a Monday admitting will call you the Friday before.      Please call your primary care physician if you should become ill within 24 hours of scheduled surgery. (ex.vomiting, diarrhea, fever)          You will need to wash the night before AND the morning of you procedure with the supplied Hibiclens following the instructions in your surgery handbook.       Return to clinic for a postop appointment in 2 weeks.

## 2018-05-31 NOTE — MR AVS SNAPSHOT
After Visit Summary   5/31/2018    Purnima Fallon    MRN: [id number]           Patient Information     Date Of Birth          1980        Visit Information        Provider Department      5/31/2018 10:00 AM Cordell Stephens MD [facility]        Today's Diagnoses     Hydradenitis          Care Instructions    Thank you for allowing Dr. Stephens and our surgical team to participate in your care.  If you have a scheduling or an appointment question please contact our Health Unit Coordinator at her direct line [phone].   ALL nursing questions or concerns can be directed to your clinic surgical nurse at: [phone]     You are scheduled for : Excision of left gluteal skin lesion  Your procedure date is: 6/8/18      HOW TO PREPARE-      You need a friend or family member available to drive you home AND stay with you for 24 hours after you leave the hospital. You will not be allowed to drive yourself. IF you need to take a taxi or the bus you MUST have a responsible person to ride with you. YOUR PROCEDURE WILL BE CANCELLED IF YOU DO NOT HAVE A RESPONSIBLE ADULT TO DRIVE YOU HOME.       You CANNOT have anything to eat or drink after midnight the night before your surgery, ncluding water and coffee. Your stomach needs to be completely empty. Do NOT chew gum, suck on hard candy, or smoke. You can brush your teeth the morning of surgery.       Please call our Surgery Education Nurses 1-2 weeks prior to your surgery date at  [phone] or toll free [phone]. Please have you medication and allergy lists ready.      Stop your aspirin or other NSAIDs(Ibuprofen, Motrin, Aleve, Celebrex, Naproxen, etc...) 7 days before your surgery unless otherwise instructed.      Hospital admitting will call you the day before your surgery with your arrival time. If you are scheduled on a Monday admitting will call you the Friday before.      Please call your primary care physician if you should  "become ill within 24 hours of scheduled surgery. (ex.vomiting, diarrhea, fever)          You will need to wash the night before AND the morning of you procedure with the supplied Hibiclens following the instructions in your surgery handbook.       Return to clinic for a postop appointment in 2 weeks.             Follow-ups after your visit        Who to contact     If you have questions or need follow up information about today's clinic visit or your schedule please contact [facility] directly at [phone].  Normal or non-critical lab and imaging results will be communicated to you by Ultromexhart, letter or phone within 4 business days after the clinic has received the results. If you do not hear from us within 7 days, please contact the clinic through Infot or phone. If you have a critical or abnormal lab result, we will notify you by phone as soon as possible.  Submit refill requests through Epom or call your pharmacy and they will forward the refill request to us. Please allow 3 business days for your refill to be completed.          Additional Information About Your Visit        Ult[employer] Information     Epom gives you secure access to your electronic health record. If you see a primary care provider, you can also send messages to your care team and make appointments. If you have questions, please call your primary care clinic.  If you do not have a primary care provider, please call [phone] and they will assist you.        Care EveryWhere ID     This is your Care EveryWhere ID. This could be used by other organizations to access your [place] medical records  [id number]        Your Vitals Were     Pulse Temperature Height Last Period Pulse Oximetry BMI (Body Mass Index)    78 99  F (37.2  C) (Tympanic) 5' 2\" (1.575 m) 08/10/2013 100% 23.05 kg/m2       Blood Pressure from Last 3 Encounters:   05/31/18 108/62   05/15/18 110/62   05/15/18 114/62    Weight from Last 3 Encounters: "   05/31/18 126 lb (57.2 kg)   05/15/18 120 lb (54.4 kg)   05/15/18 117 lb (53.1 kg)              Today, you had the following     No orders found for display         Today's Medication Changes          These changes are accurate as of 5/31/18 10:25 AM.  If you have any questions, ask your nurse or doctor.               Stop taking these medicines if you haven't already. Please contact your care team if you have questions.     HYDROcodone-acetaminophen 5-325 MG per tablet   Commonly known as:  NORCO   Stopped by:  Cordell Stephens MD                    Primary Care Provider Office Phone # Fax #    Alexi CAROLINA Heather, MYLES [phone] [address] [address]        Equal Access to Services     [facility] : Felix Buckley, waaxda luqadaha, qaybta kaalmada adeegyada, debby garcia . So [facility] [phone].    ATENCIÓN: Si habla español, tiene a cyr disposición servicios gratuitos de asistencia lingüística. [facility] [phone].    We comply with applicable federal civil rights laws and [place] laws. We do not discriminate on the basis of race, color, national origin, age, disability, sex, sexual orientation, or gender identity.            Thank you!     Thank you for choosing [facility]  for your care. Our goal is always to provide you with excellent care. Hearing back from our patients is one way we can continue to improve our services. Please take a few minutes to complete the written survey that you may receive in the mail after your visit with us. Thank you!             Your Updated Medication List - Protect others around you: Learn how to safely use, store and throw away your medicines at www.disposemymeds.org.          This list is accurate as of 5/31/18 10:25 AM.  Always use your most recent med list.                   Brand Name Dispense Instructions for use Diagnosis    ibuprofen 800 MG tablet    ADVIL/MOTRIN    90 tablet     TAKE 1 TABLET(800 MG) BY MOUTH EVERY 8 HOURS AS NEEDED FOR PAIN    Pain of toe of right foot       omeprazole 20 MG CR capsule    priLOSEC    30 capsule    Take 1 capsule (20 mg) by mouth daily    Abdominal pain, epigastric       sucralfate 1 GM tablet    CARAFATE    40 tablet    Take 1 tablet (1 g) by mouth 4 times daily    Abdominal pain, epigastric       valACYclovir 500 MG tablet    VALTREX    90 tablet    TAKE ONE TABLET BY MOUTH DAILY    Herpes simplex virus infection

## 2018-06-01 NOTE — H&P (VIEW-ONLY)
SUBJECTIVE:   CC: Purnima Fallon is an 37 year old woman who presents for preventive health visit.     Healthy Habits:    Do you get at least three servings of calcium containing foods daily (dairy, green leafy vegetables, etc.)? yes and no vitamins    Amount of exercise or daily activities, outside of work: none    Problems taking medications regularly No    Medication side effects: No    Have you had an eye exam in the past two years? yes    Do you see a dentist twice per year? no    Do you have sleep apnea, excessive snoring or daytime drowsiness?no      cysts      Duration: follow up on cysts    Description (location/character/radiation):  Left  Buttocks crease.  Well healed pilonidal surgery.      Intensity:  4-5    Accompanying signs and symptoms: Pain      History (similar episodes/previous evaluation): yes    Precipitating or alleviating factors: None    Therapies tried and outcome: Tried to see a dermatologist who did not offer any good advice.      Mucoid Cyst: Has mucoid cyst on large right toe. Was large this spring, wrapped it and did rupture and go down. Now has returned. Hurts.      Epigastric pain: Has sharp pains midepigastric area.  Has had gallbladder removed.  Some nausea, no vomiting.   Today's PHQ-2 Score: No flowsheet data found.    Abuse: Current or Past(Physical, Sexual or Emotional)- No  Do you feel safe in your environment - No    Social History   Substance Use Topics     Smoking status: Current Every Day Smoker     Packs/day: 0.50     Years: 15.00     Types: Cigarettes     Last attempt to quit: 7/18/2013     Smokeless tobacco: Never Used     Alcohol use No     If you drink alcohol do you typically have >3 drinks per day or >7 drinks per week? No                     Reviewed orders with patient.  Reviewed health maintenance and updated orders accordingly - Yes  BP Readings from Last 3 Encounters:   05/15/18 110/62   05/15/18 114/62   03/30/18 110/60    Wt Readings from Last 3  Encounters:   05/15/18 120 lb (54.4 kg)   05/15/18 117 lb (53.1 kg)   03/30/18 115 lb (52.2 kg)                  Patient Active Problem List   Diagnosis     Migraines     Depression     ADHD (attention deficit hyperactivity disorder)     Cystic acne     S/P laparoscopic hysterectomy     Kidney stones     Ischiorectal abscess and fistula     ACP (advance care planning)     Mass of breast     Pilonidal cyst     H/O pilonidal cyst     Biliary dyskinesia     Past Surgical History:   Procedure Laterality Date     CYSTECTOMY PILONIDAL N/A 9/18/2017    Procedure: CYSTECTOMY PILONIDAL;  PILONIDAL CYSTECTOMY ;  Surgeon: Cordell Stephens MD;  [facility]: [facility]     EXCISE MASS NECK Right 8/4/2015    Procedure: EXCISE MASS NECK;  Surgeon: Nasir Jones MD;  [facility]: [facility]     INCISION AND DRAINAGE PERINEAL, COMBINED N/A 3/18/2015    Procedure: COMBINED INCISION AND DRAINAGE PERINEAL;  Surgeon: Jay Torres DO;  Location: [facility]     LAPAROSCOPIC CHOLECYSTECTOMY N/A 11/20/2017    Procedure: LAPAROSCOPIC CHOLECYSTECTOMY;  LAPAROSCOPIC CHOLECYSTECTOMY;  Surgeon: Cordell Stephens MD;  [facility]     LAPAROSCOPIC HYSTERECTOMY SUPRACERVICAL, BILATERAL SALPINGO-OOPHORECTOMY, COMBINED  9/27/2013    Procedure: COMBINED LAPAROSCOPIC HYSTERECTOMY SUPRACERVICAL, SALPINGO-OOPHORECTOMY;  LAPAROSCOPIC SUPRACERVICAL HYSTERECTOMY AND RIGHT SALPINGO-OOPHORECTOMY, CYSTOSCOPY;  Surgeon: Denys Callahan MD;  [facility]: [facility]     marsupialization of pilonidal cyst  2007     TUBAL LIGATION  2005       Social History   Substance Use Topics     Smoking status: Current Every Day Smoker     Packs/day: 0.50     Years: 15.00     Types: Cigarettes     Last attempt to quit: 7/18/2013     Smokeless tobacco: Never Used     Alcohol use No     Family History   Problem Relation Age of Onset     DIABETES Paternal Grandmother          Current Outpatient Prescriptions   Medication Sig Dispense Refill     HYDROcodone-acetaminophen (NORCO) 5-325  MG per tablet Take 1 tablet by mouth 3 times daily as needed for pain 24 tablet 0     ibuprofen (ADVIL/MOTRIN) 800 MG tablet TAKE 1 TABLET(800 MG) BY MOUTH EVERY 8 HOURS AS NEEDED FOR PAIN 90 tablet 1     omeprazole (PRILOSEC) 20 MG CR capsule Take 1 capsule (20 mg) by mouth daily 30 capsule 1     sucralfate (CARAFATE) 1 GM tablet Take 1 tablet (1 g) by mouth 4 times daily 40 tablet 0     valACYclovir (VALTREX) 500 MG tablet TAKE ONE TABLET BY MOUTH DAILY 90 tablet 1     No Known Allergies      Pertinent mammograms are reviewed under the imaging tab.  History of abnormal Pap smear: Had hysterectoomy-still has cervix and one ovary.   Para 2002.      Reviewed and updated as needed this visit by clinical staff  Tobacco  Allergies  Meds  Med Hx  Surg Hx  Fam Hx  Soc Hx        Reviewed and updated as needed this visit by Provider        Past Medical History:   Diagnosis Date     Hidradenitis 2/16/2007     Ischiorectal abscess and fistula      Kidney stones 2008    x2 passed on her own     Nondependent tobacco use disorder 10/11/2012     Papanicolaou smear of cervix with low grade squamous intraepithelial lesion (LGSIL) 10/29/2008      Past Surgical History:   Procedure Laterality Date     CYSTECTOMY PILONIDAL N/A 9/18/2017    Procedure: CYSTECTOMY PILONIDAL;  PILONIDAL CYSTECTOMY ;  Surgeon: Cordell Stephens MD;  [facility]     EXCISE MASS NECK Right 8/4/2015    Procedure: EXCISE MASS NECK;  Surgeon: Nasir Jones MD;  [facility]     INCISION AND DRAINAGE PERINEAL, COMBINED N/A 3/18/2015    Procedure: COMBINED INCISION AND DRAINAGE PERINEAL;  Surgeon: Jay Torres DO;  [facility]     LAPAROSCOPIC CHOLECYSTECTOMY N/A 11/20/2017    Procedure: LAPAROSCOPIC CHOLECYSTECTOMY;  LAPAROSCOPIC CHOLECYSTECTOMY;  Surgeon: Cordell Stephens MD;  [facility]     LAPAROSCOPIC HYSTERECTOMY SUPRACERVICAL, BILATERAL SALPINGO-OOPHORECTOMY, COMBINED  9/27/2013    Procedure: COMBINED LAPAROSCOPIC  "HYSTERECTOMY SUPRACERVICAL, SALPINGO-OOPHORECTOMY;  LAPAROSCOPIC SUPRACERVICAL HYSTERECTOMY AND RIGHT SALPINGO-OOPHORECTOMY, CYSTOSCOPY;  Surgeon: Denys Callahan MD;  [facility]     marsupialization of pilonidal cyst  2007     TUBAL LIGATION  2005     Obstetric History     No data available          ROS:  Review of Systems   Constitutional: Negative for fatigue and fever.   HENT: Negative for congestion, nosebleeds, postnasal drip, rhinorrhea, sinus pain, sinus pressure, sore throat and trouble swallowing.         + dentist.     Eyes:        Saw Opthalmologist this AM.     Respiratory: Negative for cough, chest tightness and shortness of breath.    Cardiovascular: Negative for chest pain, palpitations and leg swelling.   Gastrointestinal: Positive for abdominal pain and constipation. Negative for diarrhea, nausea and vomiting.        Bloating , pain, passes flatus.  Occas constipation but better after gallbladder out.     Endocrine: Negative for cold intolerance and heat intolerance.   Genitourinary: Negative for dysuria, frequency, urgency, vaginal bleeding, vaginal discharge and vaginal pain.   Musculoskeletal: Negative for arthralgias, back pain, gait problem, myalgias and neck pain.   Skin:        Cyst on right foot large toe, and buttock sebaceous cyst.     Neurological: Negative for dizziness, syncope, weakness, numbness and headaches.   Hematological: Negative for adenopathy.   Psychiatric/Behavioral: Negative for dysphoric mood and sleep disturbance. The patient is nervous/anxious.         Has anxiety.         OBJECTIVE:   /62  Pulse 75  Temp 99.4  F (37.4  C) (Tympanic)  Resp 18  Ht 5' 3\" (1.6 m)  Wt 120 lb (54.4 kg)  LMP 08/10/2013  SpO2 98%  BMI 21.26 kg/m2  EXAM:  Physical Exam   Constitutional: She is oriented to person, place, and time. She appears well-developed and well-nourished.   HENT:   Right Ear: External ear normal.   Left Ear: External ear normal.   Nose: Nose normal. "   Mouth/Throat: Oropharynx is clear and moist.   Eyes: Conjunctivae and EOM are normal. Pupils are equal, round, and reactive to light.   Neck: Normal range of motion. Neck supple. No JVD present. No thyromegaly present.   Cardiovascular: Normal rate, regular rhythm, normal heart sounds and intact distal pulses.    No murmur heard.  Pulmonary/Chest: Effort normal and breath sounds normal.   Abdominal: Soft. Bowel sounds are normal. She exhibits no mass. There is no tenderness.   Genitourinary: Vagina normal. No breast swelling, tenderness, discharge or bleeding. No vaginal discharge found.   Genitourinary Comments: Labia without lmps or lesions. Vagina well rugated. Cervix pink. PAP done, Wet prep done, Gc chlamydia done.  Has no uterus. No masses or tenderness in adnexa.     Musculoskeletal: Normal range of motion. She exhibits no edema or tenderness.   Lymphadenopathy:     She has no cervical adenopathy.   Neurological: She is alert and oriented to person, place, and time. She has normal reflexes. She displays normal reflexes. No cranial nerve deficit. She exhibits normal muscle tone. Coordination normal.   Skin: Skin is warm and dry.   Has red lump to crease on left buttock. Painful to touch.  Small mucoid cyst structure reappearing from nail bed. On large toe right foot.       Results for orders placed or performed in visit on 05/15/18   Lipid Profile (Chol, Trig, HDL, LDL calc)   Result Value Ref Range    Cholesterol 131 <200 mg/dL    Triglycerides 46 <150 mg/dL    HDL Cholesterol 62 >49 mg/dL    LDL Cholesterol Calculated 60 <100 mg/dL    Non HDL Cholesterol 69 <130 mg/dL   CBC with platelets and differential   Result Value Ref Range    WBC 9.4 4.0 - 11.0 10e9/L    RBC Count 4.35 3.8 - 5.2 10e12/L    Hemoglobin 14.7 11.7 - 15.7 g/dL    Hematocrit 41.9 35.0 - 47.0 %    MCV 96 78 - 100 fl    MCH 33.8 (H) 26.5 - 33.0 pg    MCHC 35.1 31.5 - 36.5 g/dL    RDW 13.2 10.0 - 15.0 %    Platelet Count 245 150 - 450 10e9/L     Diff Method Automated Method     % Neutrophils 40.2 %    % Lymphocytes 52.7 %    % Monocytes 5.1 %    % Eosinophils 1.2 %    % Basophils 0.5 %    % Immature Granulocytes 0.3 %    Nucleated RBCs 0 0 /100    Absolute Neutrophil 3.8 1.6 - 8.3 10e9/L    Absolute Lymphocytes 4.9 0.8 - 5.3 10e9/L    Absolute Monocytes 0.5 0.0 - 1.3 10e9/L    Absolute Eosinophils 0.1 0.0 - 0.7 10e9/L    Absolute Basophils 0.1 0.0 - 0.2 10e9/L    Abs Immature Granulocytes 0.0 0 - 0.4 10e9/L    Absolute Nucleated RBC 0.0    Comprehensive metabolic panel (BMP + Alb, Alk Phos, ALT, AST, Total. Bili, TP)   Result Value Ref Range    Sodium 138 133 - 144 mmol/L    Potassium 3.8 3.4 - 5.3 mmol/L    Chloride 105 94 - 109 mmol/L    Carbon Dioxide 28 20 - 32 mmol/L    Anion Gap 5 3 - 14 mmol/L    Glucose 81 70 - 99 mg/dL    Urea Nitrogen 14 7 - 30 mg/dL    Creatinine 0.83 0.52 - 1.04 mg/dL    GFR Estimate 77 >60 mL/min/1.7m2    GFR Estimate If Black >90 >60 mL/min/1.7m2    Calcium 9.0 8.5 - 10.1 mg/dL    Bilirubin Total 0.3 0.2 - 1.3 mg/dL    Albumin 4.5 3.4 - 5.0 g/dL    Protein Total 7.7 6.8 - 8.8 g/dL    Alkaline Phosphatase 54 40 - 150 U/L    ALT 33 0 - 50 U/L    AST 22 0 - 45 U/L   TSH with free T4 reflex   Result Value Ref Range    TSH 2.12 0.40 - 4.00 mU/L   Wet prep   Result Value Ref Range    Specimen Description Vagina     Wet Prep Moderate  WBC'S seen       Wet Prep No Trichomonas seen     Wet Prep No yeast seen     Wet Prep No clue cells seen    GC/Chlamydia by PCR - [facility]   Result Value Ref Range    Specimen Source Cervix     Neisseria gonorrhoreae PCR Not Detected NDET^Not Detected    Chlamydia Trachomatis PCR Not Detected NDET^Not Detected       ASSESSMENT/PLAN:   ASSESSMENT / PLAN:  (Z01.419) Well woman exam with routine gynecological exam  (primary encounter diagnosis)  Comment:PAP done  Wet prep and GC chlamydia negative.    Plan: CBC with platelets and differential,         Comprehensive metabolic panel (BMP + Alb, Alk          Phos, ALT, AST, Total. Bili, TP), TSH with free        T4 reflex, Wet prep, A pap thin layer screen         with  HPV - recommended age 30 - 65 years         (select HPV order below), HPV High Risk Types         DNA Cervical, GC/Chlamydia by PCR - [facility],         CANCELED: NEISSERIA GONORRHOEA PCR, CANCELED:         CHLAMYDIA TRACHOMATIS PCR      (R10.13) Abdominal pain, epigastric  Comment: May have gastric erosion. Given Prilosec 20mg a day for 10 days at least, and Carafate 1 gm 4 times a day.    Plan: sucralfate (CARAFATE) 1 GM tablet, omeprazole         (PRILOSEC) 20 MG CR capsule, GC/Chlamydia by         PCR - [facility]        (Z13.220) Lipid screening  Comment:  Lab Results   Component Value Date    CHOL 131 05/15/2018     Lab Results   Component Value Date    HDL 62 05/15/2018     Lab Results   Component Value Date    LDL 60 05/15/2018     Lab Results   Component Value Date    TRIG 46 05/15/2018     No results found for: CHOLHDLRATIO  Lab Results   Component Value Date    AST 22 05/15/2018     Lab Results   Component Value Date    ALT 33 05/15/2018       Plan: Lipid Profile (Chol, Trig, HDL, LDL calc)       (L73.2) Hydradenitis  Comment: Will have Dr. Stephens look at hidradenitis on buttock while still small.  For possible removal Asking for pain med. Given Lortabs 5/325mg  Over the counter Ibu not working.   Will look to refer patient to [facility]  Postnote-referred to Dr. Apple Pruitt, [facility].   [phone].    Plan: GENERAL SURG ADULT REFERRAL,         HYDROcodone-acetaminophen (NORCO) 5-325 MG per         tablet           (Z71.6) Encounter for tobacco use cessation counseling  Comment:Offered smoking cessation help    Plan: Declines at  This time      (M67.40) Mucoid cyst of joint  Comment:Will see if Dr. Stephens will remove. If not referral to ortho or derm.    Plan:Referral.          COUNSELING:     Counseled to quit smoking.         reports that she has been smoking  "Cigarettes.  She has a 7.50 pack-year smoking history. She has never used smokeless tobacco.    Estimated body mass index is 21.26 kg/(m^2) as calculated from the following:    Height as of this encounter: 5' 3\" (1.6 m).    Weight as of this encounter: 120 lb (54.4 kg).       Counseling Resources:  ATP IV Guidelines  Pooled Cohorts Equation Calculator  Breast Cancer Risk Calculator  FRAX Risk Assessment  ICSI Preventive Guidelines  Dietary Guidelines for Americans, 2010  USDA's MyPlate  ASA Prophylaxis  Lung CA Screening    Alexi Romero, NP  [facility] [place]  "

## 2018-06-07 ENCOUNTER — ANESTHESIA EVENT (OUTPATIENT)
Dept: SURGERY | Facility: HOSPITAL | Age: 38
End: 2018-06-07
Payer: COMMERCIAL

## 2018-06-07 ASSESSMENT — LIFESTYLE VARIABLES: TOBACCO_USE: 1

## 2018-06-07 NOTE — ANESTHESIA PREPROCEDURE EVALUATION
Anesthesia Evaluation     . Pt has had prior anesthetic. Type: General    No history of anesthetic complications          ROS/MED HX    ENT/Pulmonary:     (+)tobacco use, Current use 1/2PPD packs/day  , . .   (-) recent URI   Neurologic:     (+)migraines,     Cardiovascular:  - neg cardiovascular ROS       METS/Exercise Tolerance:     Hematologic:  - neg hematologic  ROS       Musculoskeletal:   (+) arthritis, , , -       GI/Hepatic:  - neg GI/hepatic ROS       Renal/Genitourinary:  - ROS Renal section negative    (-) renal disease   Endo:  - neg endo ROS       Psychiatric: Comment: ADHD - neg psychiatric ROS   (+) psychiatric history anxiety and depression      Infectious Disease:  - neg infectious disease ROS       Malignancy:      - no malignancy   Other:    - neg other ROS                 Physical Exam  Normal systems: dental    Airway   Mallampati: II  TM distance: >3 FB  Neck ROM: full    Dental     Cardiovascular   Rhythm and rate: regular and normal      Pulmonary    breath sounds clear to auscultation                    Anesthesia Plan      History & Physical Review  History and physical reviewed and following examination; no interval change.    ASA Status:  2 .    NPO Status:  > 8 hours    Plan for MAC with Intravenous and Propofol induction. Reason for MAC:  Difficulty with conscious sedation (QS)  PONV prophylaxis:  Ondansetron (or other 5HT-3)       Postoperative Care  Postoperative pain management:  IV analgesics.      Consents  Anesthetic plan, risks, benefits and alternatives discussed with:  Patient..                          .

## 2018-06-08 ENCOUNTER — HOSPITAL ENCOUNTER (OUTPATIENT)
Facility: HOSPITAL | Age: 38
Discharge: HOME | End: 2018-06-08
Attending: SURGERY | Admitting: SURGERY
Payer: COMMERCIAL

## 2018-06-08 ENCOUNTER — SURGERY (OUTPATIENT)
Age: 38
End: 2018-06-08

## 2018-06-08 ENCOUNTER — ANESTHESIA (OUTPATIENT)
Dept: SURGERY | Facility: HOSPITAL | Age: 38
End: 2018-06-08
Payer: COMMERCIAL

## 2018-06-08 VITALS
BODY MASS INDEX: 22.63 KG/M2 | OXYGEN SATURATION: 96 % | DIASTOLIC BLOOD PRESSURE: 69 MMHG | TEMPERATURE: 97.2 F | SYSTOLIC BLOOD PRESSURE: 102 MMHG | HEIGHT: 62 IN | WEIGHT: 123 LBS | HEART RATE: 80 BPM | RESPIRATION RATE: 18 BRPM

## 2018-06-08 DIAGNOSIS — L98.9 SKIN LESION OF LEFT LOWER LIMB: Primary | ICD-10-CM

## 2018-06-08 PROCEDURE — 12032 INTMD RPR S/A/T/EXT 2.6-7.5: CPT | Performed by: SURGERY

## 2018-06-08 PROCEDURE — 11404 EXC TR-EXT B9+MARG 3.1-4 CM: CPT | Performed by: SURGERY

## 2018-06-08 PROCEDURE — 25000132 ZZH RX MED GY IP 250 OP 250 PS 637: Performed by: SURGERY

## 2018-06-08 PROCEDURE — 11406 EXC TR-EXT B9+MARG >4.0 CM: CPT | Performed by: ANESTHESIOLOGY

## 2018-06-08 PROCEDURE — 37000008 ZZH ANESTHESIA TECHNICAL FEE, 1ST 30 MIN: Performed by: SURGERY

## 2018-06-08 PROCEDURE — 25000125 ZZHC RX 250: Performed by: NURSE ANESTHETIST, CERTIFIED REGISTERED

## 2018-06-08 PROCEDURE — 25000128 H RX IP 250 OP 636: Performed by: NURSE ANESTHETIST, CERTIFIED REGISTERED

## 2018-06-08 PROCEDURE — 01999 UNLISTED ANES PROCEDURE: CPT | Performed by: NURSE ANESTHETIST, CERTIFIED REGISTERED

## 2018-06-08 PROCEDURE — 40000306 ZZH STATISTIC PRE PROC ASSESS II: Performed by: SURGERY

## 2018-06-08 PROCEDURE — 36000050 ZZH SURGERY LEVEL 2 1ST 30 MIN: Performed by: SURGERY

## 2018-06-08 PROCEDURE — 88305 TISSUE EXAM BY PATHOLOGIST: CPT | Mod: TC | Performed by: SURGERY

## 2018-06-08 PROCEDURE — 27210794 ZZH OR GENERAL SUPPLY STERILE: Performed by: SURGERY

## 2018-06-08 PROCEDURE — 25000128 H RX IP 250 OP 636: Performed by: ANESTHESIOLOGY

## 2018-06-08 PROCEDURE — 71000027 ZZH RECOVERY PHASE 2 EACH 15 MINS: Performed by: SURGERY

## 2018-06-08 PROCEDURE — 25000125 ZZHC RX 250: Performed by: SURGERY

## 2018-06-08 PROCEDURE — 25000128 H RX IP 250 OP 636: Performed by: SURGERY

## 2018-06-08 RX ORDER — BUPIVACAINE HYDROCHLORIDE AND EPINEPHRINE 5; 5 MG/ML; UG/ML
INJECTION, SOLUTION PERINEURAL PRN
Status: DISCONTINUED | OUTPATIENT
Start: 2018-06-08 | End: 2018-06-08 | Stop reason: HOSPADM

## 2018-06-08 RX ORDER — PROPOFOL 10 MG/ML
INJECTION, EMULSION INTRAVENOUS PRN
Status: DISCONTINUED | OUTPATIENT
Start: 2018-06-08 | End: 2018-06-08

## 2018-06-08 RX ORDER — HYDROMORPHONE HYDROCHLORIDE 1 MG/ML
.3-.5 INJECTION, SOLUTION INTRAMUSCULAR; INTRAVENOUS; SUBCUTANEOUS EVERY 10 MIN PRN
Status: DISCONTINUED | OUTPATIENT
Start: 2018-06-08 | End: 2018-06-08 | Stop reason: HOSPADM

## 2018-06-08 RX ORDER — SODIUM CHLORIDE, SODIUM LACTATE, POTASSIUM CHLORIDE, CALCIUM CHLORIDE 600; 310; 30; 20 MG/100ML; MG/100ML; MG/100ML; MG/100ML
INJECTION, SOLUTION INTRAVENOUS CONTINUOUS
Status: DISCONTINUED | OUTPATIENT
Start: 2018-06-08 | End: 2018-06-08 | Stop reason: HOSPADM

## 2018-06-08 RX ORDER — OXYCODONE HYDROCHLORIDE 5 MG/1
5-10 TABLET ORAL EVERY 4 HOURS PRN
Qty: 6 TABLET | Refills: 0 | Status: SHIPPED | OUTPATIENT
Start: 2018-06-08 | End: 2018-06-21

## 2018-06-08 RX ORDER — MEPERIDINE HYDROCHLORIDE 50 MG/ML
12.5 INJECTION INTRAMUSCULAR; INTRAVENOUS; SUBCUTANEOUS
Status: DISCONTINUED | OUTPATIENT
Start: 2018-06-08 | End: 2018-06-08 | Stop reason: HOSPADM

## 2018-06-08 RX ORDER — LIDOCAINE HYDROCHLORIDE 20 MG/ML
INJECTION, SOLUTION INFILTRATION; PERINEURAL PRN
Status: DISCONTINUED | OUTPATIENT
Start: 2018-06-08 | End: 2018-06-08

## 2018-06-08 RX ORDER — CEFAZOLIN SODIUM 1 G/50ML
1 INJECTION, SOLUTION INTRAVENOUS SEE ADMIN INSTRUCTIONS
Status: DISCONTINUED | OUTPATIENT
Start: 2018-06-08 | End: 2018-06-08 | Stop reason: HOSPADM

## 2018-06-08 RX ORDER — OXYCODONE HYDROCHLORIDE 5 MG/1
5 TABLET ORAL
Status: COMPLETED | OUTPATIENT
Start: 2018-06-08 | End: 2018-06-08

## 2018-06-08 RX ORDER — ONDANSETRON 4 MG/1
4 TABLET, ORALLY DISINTEGRATING ORAL EVERY 30 MIN PRN
Status: DISCONTINUED | OUTPATIENT
Start: 2018-06-08 | End: 2018-06-08 | Stop reason: HOSPADM

## 2018-06-08 RX ORDER — ONDANSETRON 2 MG/ML
4 INJECTION INTRAMUSCULAR; INTRAVENOUS EVERY 30 MIN PRN
Status: DISCONTINUED | OUTPATIENT
Start: 2018-06-08 | End: 2018-06-08 | Stop reason: HOSPADM

## 2018-06-08 RX ORDER — CEFAZOLIN SODIUM 2 G/100ML
2 INJECTION, SOLUTION INTRAVENOUS
Status: COMPLETED | OUTPATIENT
Start: 2018-06-08 | End: 2018-06-08

## 2018-06-08 RX ORDER — FENTANYL CITRATE 50 UG/ML
25-50 INJECTION, SOLUTION INTRAMUSCULAR; INTRAVENOUS
Status: DISCONTINUED | OUTPATIENT
Start: 2018-06-08 | End: 2018-06-08 | Stop reason: HOSPADM

## 2018-06-08 RX ORDER — FENTANYL CITRATE 50 UG/ML
INJECTION, SOLUTION INTRAMUSCULAR; INTRAVENOUS PRN
Status: DISCONTINUED | OUTPATIENT
Start: 2018-06-08 | End: 2018-06-08

## 2018-06-08 RX ORDER — NALOXONE HYDROCHLORIDE 0.4 MG/ML
.1-.4 INJECTION, SOLUTION INTRAMUSCULAR; INTRAVENOUS; SUBCUTANEOUS
Status: DISCONTINUED | OUTPATIENT
Start: 2018-06-08 | End: 2018-06-08 | Stop reason: HOSPADM

## 2018-06-08 RX ADMIN — PROPOFOL 20 MG: 10 INJECTION, EMULSION INTRAVENOUS at 08:37

## 2018-06-08 RX ADMIN — OXYCODONE HYDROCHLORIDE 5 MG: 5 TABLET ORAL at 09:30

## 2018-06-08 RX ADMIN — CEFAZOLIN SODIUM 2 G: 2 INJECTION, SOLUTION INTRAVENOUS at 08:35

## 2018-06-08 RX ADMIN — SODIUM CHLORIDE, POTASSIUM CHLORIDE, SODIUM LACTATE AND CALCIUM CHLORIDE: 600; 310; 30; 20 INJECTION, SOLUTION INTRAVENOUS at 08:20

## 2018-06-08 RX ADMIN — PROPOFOL 20 MG: 10 INJECTION, EMULSION INTRAVENOUS at 08:42

## 2018-06-08 RX ADMIN — BUPIVACAINE HYDROCHLORIDE AND EPINEPHRINE BITARTRATE 18 ML: 5; .005 INJECTION, SOLUTION PERINEURAL at 08:50

## 2018-06-08 RX ADMIN — PROPOFOL 50 MG: 10 INJECTION, EMULSION INTRAVENOUS at 08:33

## 2018-06-08 RX ADMIN — FENTANYL CITRATE 100 MCG: 50 INJECTION, SOLUTION INTRAMUSCULAR; INTRAVENOUS at 08:32

## 2018-06-08 RX ADMIN — LIDOCAINE HYDROCHLORIDE 40 MG: 20 INJECTION, SOLUTION INFILTRATION; PERINEURAL at 08:33

## 2018-06-08 RX ADMIN — PROPOFOL 20 MG: 10 INJECTION, EMULSION INTRAVENOUS at 08:39

## 2018-06-08 RX ADMIN — MIDAZOLAM 2 MG: 1 INJECTION INTRAMUSCULAR; INTRAVENOUS at 08:32

## 2018-06-08 RX ADMIN — PROPOFOL 20 MG: 10 INJECTION, EMULSION INTRAVENOUS at 08:45

## 2018-06-08 NOTE — OR NURSING
Wants to go home.Pain tolerable 4/10.No bleeding or drainage op site.Patient and responsible adult given discharge instructions with no questions regarding instructions. Aliya score 19. Pain level 4/10.  Discharged from unit via walking. Patient discharged to home.

## 2018-06-08 NOTE — IP AVS SNAPSHOT
MRN:[id number]                      After Visit Summary   6/8/2018    Purnima Fallon    MRN: [id number]           Thank you!     Thank you for choosing [facility] for your care. Our goal is always to provide you with excellent care. Hearing back from our patients is one way we can continue to improve our services. Please take a few minutes to complete the written survey that you may receive in the mail after you visit with us. Thank you!        Patient Information     Date Of Birth          1980        About your hospital stay     You were admitted on:  June 8, 2018 You last received care in the:  [facility]    You were discharged on:  June 8, 2018       Who to Call     For medical emergencies, please call 911.  For non-urgent questions about your medical care, please call your primary care provider or clinic, [phone]  For questions related to your surgery, please call your surgery clinic        Attending Provider     Provider Specialty    Cordell Stephens MD Surgery       Primary Care Provider Office Phone # Fax #    Alexi CAITY Romero [phone]      After Care Instructions     Diet Instructions       Resume pre-procedure diet            Discharge Instructions       Patient to follow up with appointment in 2 weeks            Shower       No shower for 24 hours post procedure. May shower Postoperative Day (POD)  1, may shower on Saturday June 9th.                  Your next 10 appointments already scheduled     Jun 21, 2018 10:30 AM CDT   (Arrive by 10:15 AM)   Post Op with Cordell Stephens MD   [facility] ([facility] )    [address] [place] [address]   [phone]              Further instructions from your care team           Post-Anesthesia Patient Instructions    IMMEDIATELY FOLLOWING SURGERY:  Do not drive or operate machinery for the first twenty four hours after surgery.  Do not make any important  decisions for twenty four hours after surgery or while taking narcotic pain medications or sedatives.  If you develop intractable nausea and vomiting or a severe headache please notify your doctor immediately.    FOLLOW-UP:  Please make an appointment with your surgeon as instructed. You do not need to follow up with anesthesia unless specifically instructed to do so.    WOUND CARE INSTRUCTIONS (if applicable):  Keep a dry clean dressing on the anesthesia/puncture wound site if there is drainage.  Once the wound has quit draining you may leave it open to air.  Generally you should leave the bandage intact for twenty four hours unless there is drainage.  If the epidural site drains for more than 36-48 hours please call the anesthesia department.    QUESTIONS?:  Please feel free to call your physician or the hospital  if you have any questions, and they will be happy to assist you.     Wound Care    You have a break in the skin. This wound may be because of an injury. Or it may be the result of surgery. Closing the wound helps stop bleeding, protects the wound from infection, and speeds healing. The type of closure that is used depends on the size and location of the wound. Choices include stitches (sutures), strips of surgical tape, skin glue, or staples.  Home care  Your healthcare provider may prescribe medicines for pain. Or he or she may suggest an over-the-counter (OTC) pain reliever, such as ibuprofen. If you have chronic kidney disease, talk with your provider before taking any OTC medicines. Also talk with your provider if you've had a stomach ulcer or gastrointestinal bleeding. In certain cases, antibiotics may be prescribed to help prevent infection. If antibiotics are prescribed, take them exactly as directed for as long as directed. Do not stop taking your antibiotics until they are all gone, even if you feel better.  General care    Follow the healthcare provider s instructions on how to care for  the wound.    Wash your hands with soap and warm water before and after caring for the wound. This helps prevent infection.    If a bandage was applied, change it once a day or as directed. If at any time the bandage becomes wet or dirty, replace it with a new one.    Unless told otherwise, avoid soaking the wound in water. Take showers or sponge baths instead of tub baths. Don't scrub or pick at the wound.    Don't go swimming.    If you have a bandage and it gets wet, use a clean cloth to gently pat the wound dry. Then replace the bandage with a dry one.    Don't scratch, rub, or pick at the area.    Watch for the signs of infection listed below. Any wound can get infected, even if you are taking antibiotics. Seek care right away if you see any possible signs of infection.  Care for specific closures    Sutures. You may want to clean the wound daily after the first 2 to 3 days. To do this, remove the bandage and gently wash the area with soap and warm water. After cleaning, apply a thin layer of antibiotic ointment if recommended. Then apply a new bandage. Sutures on the outside of the skin usually need to be removed by your healthcare provider.    Surgical tape. Keep the area dry. If it gets wet, blot it dry with a towel. Surgical tape closures usually fall off within 7 to 10 days. If they have not fallen off after 10 days, you can remove them yourself. To remove the tape, use mineral oil or petroleum jelly on a cotton ball to gently rub the adhesive.    Skin glue. You may shower or bathe as usual, but do not use soaps, lotions, or ointments on the wound area. Do not scrub the wound. After bathing, pat the wound dry with a soft towel. Do not apply liquids like peroxide, ointments, or creams to the wound while the strips or film is in place. Don't scratch, rub, or pick at the strips or film. Don't put tape directly over the strips or film. Skin adhesive film will fall off naturally in 5 to 10 days. If it does not  "peel off in 10 days, gently rub petroleum jelly or an ointment onto the film.    [place]. Take showers or sponge baths. Don't take tub baths. Don't use lotions on the wound area. The area may be cleaned with soap and water 2 to 3 days after the wound was stapled. Don't scrub the wound. Pat it dry with a clean soft cloth or towel. You can use antibiotic ointment if your provider tells you to. Staples will need to be removed by your healthcare provider in 10 to 14 days.  Follow-up care  Follow up with your healthcare provider, or as directed. If you have sutures or staples, return for their removal as directed.  When to seek medical advice  Call your healthcare provider right away if you have signs of infection:    Fever of 100.4 F (38 C) or higher, or as directed by your healthcare provider    Increasing pain in the wound    Increasing redness or swelling    Pus or bad-smelling drainage from the wound  Also call your provider right away if any of these occur:    Wound bleeds more than a small amount or won t stop bleeding    Wound edges come apart    Numbness or weakness in the wound area that doesn t go away  Date Last Reviewed: 1/7/2016 2000-2017 [employer]. [address] [place] [address], [place] [address]. All rights reserved. This information is not intended as a substitute for professional medical care. Always follow your healthcare professional's instructions.          Pending Results     No orders found from 6/6/2018 to 6/9/2018.            Admission Information     Date & Time Provider Department Dept. Phone    6/8/2018 Cordell Stephens MD [facility] Operating [facility] [phone]      Your Vitals Were     Blood Pressure Pulse Temperature Respirations Height Weight    100/58 80 98.6  F (37  C) (Oral) 18 1.575 m (5' 2\") 55.8 kg (123 lb)    Last Period Pulse Oximetry BMI (Body Mass Index)             08/10/2013 97% 22.5 kg/m2         MyChart Information     [employer] gives you secure access to your " electronic health record. If you see a primary care provider, you can also send messages to your care team and make appointments. If you have questions, please call your primary care clinic.  If you do not have a primary care provider, please call [phone] and they will assist you.        Care EveryWhere ID     This is your Care EveryWhere ID. This could be used by other organizations to access your [facility] medical records  [id number]        Equal Access to Services     ALDO CRUZ : Hadii otto hoover Sostacy, wadayannada lupatriciaadaha, qaybta kaalmada stella, debby thompsonomarlily clemons. So [facility] [place] [facility] [phone].    ATENCIÓN: Si habla marlyn, tiene a cyr disposición servicios gratuitos de asistencia lingüística. Llame al [phone].    We comply with applicable federal civil rights laws and [place] laws. We do not discriminate on the basis of race, color, national origin, age, disability, sex, sexual orientation, or gender identity.               Review of your medicines      START taking        Dose / Directions    oxyCODONE IR 5 MG tablet   Commonly known as:  ROXICODONE   Used for:  Skin lesion of left lower limb        Dose:  5-10 mg   Take 1-2 tablets (5-10 mg) by mouth every 4 hours as needed for pain or other (Moderate to Severe)   Quantity:  6 tablet   Refills:  0         CONTINUE these medicines which have NOT CHANGED        Dose / Directions    ibuprofen 800 MG tablet   Commonly known as:  ADVIL/MOTRIN   Used for:  Pain of toe of right foot        TAKE 1 TABLET(800 MG) BY MOUTH EVERY 8 HOURS AS NEEDED FOR PAIN   Quantity:  90 tablet   Refills:  1       omeprazole 20 MG CR capsule   Commonly known as:  priLOSEC   Used for:  Abdominal pain, epigastric        Dose:  20 mg   Take 1 capsule (20 mg) by mouth daily   Quantity:  30 capsule   Refills:  1       sucralfate 1 GM tablet   Commonly known as:  CARAFATE   Used for:  Abdominal pain, epigastric        Dose:  1 g   Take 1 tablet (1 g) by  mouth 4 times daily   Quantity:  40 tablet   Refills:  0       valACYclovir 500 MG tablet   Commonly known as:  VALTREX   Used for:  Herpes simplex virus infection        TAKE ONE TABLET BY MOUTH DAILY   Quantity:  90 tablet   Refills:  1            Where to get your medicines      Some of these will need a paper prescription and others can be bought over the counter. Ask your nurse if you have questions.     Bring a paper prescription for each of these medications     oxyCODONE IR 5 MG tablet                Protect others around you: Learn how to safely use, store and throw away your medicines at www.disposemymeds.org.        Information about OPIOIDS     PRESCRIPTION OPIOIDS: WHAT YOU NEED TO KNOW   You have a prescription for an opioid (narcotic) pain medicine. Opioids can cause addiction. If you have a history of chemical dependency of any type, you are at a higher risk of becoming addicted to opioids. Only take this medicine after all other options have been tried. Take it for as short a time and as few doses as possible.     Do not:    Drive. If you drive while taking these medicines, you could be arrested for driving under the influence (DUI).    Operate heavy machinery    Do any other dangerous activities while taking these medicines.     Drink any alcohol while taking these medicines.      Take with any other medicines that contain acetaminophen. Read all labels carefully. Look for the word  acetaminophen  or  Tylenol.  Ask your pharmacist if you have questions or are unsure.    Store your pills in a secure place, locked if possible. We will not replace any lost or stolen medicine. If you don t finish your medicine, please throw away (dispose) as directed by your pharmacist. The [place] Pollution [employer] has more information about safe disposal: https://www.pca.[facility].mn.us/living-green/managing-unwanted-medications    All opioids tend to cause constipation. Drink plenty of water and eat foods that  have a lot of fiber, such as fruits, vegetables, prune juice, apple juice and high-fiber cereal. Take a laxative (Miralax, milk of magnesia, Colace, Senna) if you don t move your bowels at least every other day.              Medication List: This is a list of all your medications and when to take them. Check marks below indicate your daily home schedule. Keep this list as a reference.      Medications           Morning Afternoon Evening Bedtime As Needed    ibuprofen 800 MG tablet   Commonly known as:  ADVIL/MOTRIN   TAKE 1 TABLET(800 MG) BY MOUTH EVERY 8 HOURS AS NEEDED FOR PAIN                                omeprazole 20 MG CR capsule   Commonly known as:  priLOSEC   Take 1 capsule (20 mg) by mouth daily                                oxyCODONE IR 5 MG tablet   Commonly known as:  ROXICODONE   Take 1-2 tablets (5-10 mg) by mouth every 4 hours as needed for pain or other (Moderate to Severe)                                sucralfate 1 GM tablet   Commonly known as:  CARAFATE   Take 1 tablet (1 g) by mouth 4 times daily                                valACYclovir 500 MG tablet   Commonly known as:  VALTREX   TAKE ONE TABLET BY MOUTH DAILY

## 2018-06-08 NOTE — ANESTHESIA CARE TRANSFER NOTE
Patient: Purnima Fallon    Procedure(s):  EXCISION OF GLUTEAL LEFT SKIN LESION - Wound Class: I-Clean    Diagnosis: HYDRADENITIS  Diagnosis Additional Information: No value filed.    Anesthesia Type:   MAC     Note:  Airway :Room Air  Patient transferred to:Phase II  Handoff Report: Identifed the Patient, Identified the Reponsible Provider, Reviewed the pertinent medical history, Discussed the surgical course, Reviewed Intra-OP anesthesia mangement and issues during anesthesia, Set expectations for post-procedure period and Allowed opportunity for questions and acknowledgement of understanding      Vitals: (Last set prior to Anesthesia Care Transfer)    CRNA VITALS  6/8/2018 0828 - 6/8/2018 0858      6/8/2018             Resp Rate (set): 8                Electronically Signed By: ALONDRA Young CRNA  June 8, 2018  8:58 AM

## 2018-06-08 NOTE — PHARMACY
[facility]    Pharmacy      Antimicrobial Stewardship Note     Current antimicrobial therapy:  Anti-infectives (Future)    Start     Dose/Rate Route Frequency Ordered Stop    06/08/18 0742  ceFAZolin (ANCEF) intermittent infusion 2 g in 100 mL dextrose PRE-MIX      2 g  over 30 Minutes Intravenous PRE-OP/PRE-PROCEDURE 06/08/18 0742      06/08/18 0742  ceFAZolin (ANCEF) intermittent infusion 1 g      1 g  over 30 Minutes Intravenous SEE ADMIN INSTRUCTIONS 06/08/18 0742            Indication: Perioperative Pharmacoprophylaxis    Days of Therapy: 1     Pertinent labs:  Creatinine   Creatinine   Date Value Ref Range Status   05/15/2018 0.83 0.52 - 1.04 mg/dL Final   11/29/2017 0.68 0.52 - 1.04 mg/dL Final   06/29/2017 0.78 0.52 - 1.04 mg/dL Final     WBC   WBC   Date Value Ref Range Status   05/15/2018 9.4 4.0 - 11.0 10e9/L Final   11/29/2017 9.0 4.0 - 11.0 10e9/L Final   06/29/2017 11.4 (H) 4.0 - 11.0 10e9/L Final     Procalcitonin No results found for: PCAL  CRP   CRP Inflammation   Date Value Ref Range Status   11/29/2017 <2.9 0.0 - 8.0 mg/L Final   06/29/2017 <2.9 0.0 - 8.0 mg/L Final   07/31/2015 <2.9 0.0 - 8.0 mg/L Final       Culture Results: N/A     Recommendations/Interventions:  1. No recommendations at this time.    Delio Santana DIDI  June 8, 2018

## 2018-06-08 NOTE — DISCHARGE INSTRUCTIONS
Post-Anesthesia Patient Instructions    IMMEDIATELY FOLLOWING SURGERY:  Do not drive or operate machinery for the first twenty four hours after surgery.  Do not make any important decisions for twenty four hours after surgery or while taking narcotic pain medications or sedatives.  If you develop intractable nausea and vomiting or a severe headache please notify your doctor immediately.    FOLLOW-UP:  Please make an appointment with your surgeon as instructed. You do not need to follow up with anesthesia unless specifically instructed to do so.    WOUND CARE INSTRUCTIONS (if applicable):  Keep a dry clean dressing on the anesthesia/puncture wound site if there is drainage.  Once the wound has quit draining you may leave it open to air.  Generally you should leave the bandage intact for twenty four hours unless there is drainage.  If the epidural site drains for more than 36-48 hours please call the anesthesia department.    QUESTIONS?:  Please feel free to call your physician or the hospital  if you have any questions, and they will be happy to assist you.     Wound Care    You have a break in the skin. This wound may be because of an injury. Or it may be the result of surgery. Closing the wound helps stop bleeding, protects the wound from infection, and speeds healing. The type of closure that is used depends on the size and location of the wound. Choices include stitches (sutures), strips of surgical tape, skin glue, or staples.  Home care  Your healthcare provider may prescribe medicines for pain. Or he or she may suggest an over-the-counter (OTC) pain reliever, such as ibuprofen. If you have chronic kidney disease, talk with your provider before taking any OTC medicines. Also talk with your provider if you've had a stomach ulcer or gastrointestinal bleeding. In certain cases, antibiotics may be prescribed to help prevent infection. If antibiotics are prescribed, take them exactly as directed for as  long as directed. Do not stop taking your antibiotics until they are all gone, even if you feel better.  General care    Follow the healthcare provider s instructions on how to care for the wound.    Wash your hands with soap and warm water before and after caring for the wound. This helps prevent infection.    If a bandage was applied, change it once a day or as directed. If at any time the bandage becomes wet or dirty, replace it with a new one.    Unless told otherwise, avoid soaking the wound in water. Take showers or sponge baths instead of tub baths. Don't scrub or pick at the wound.    Don't go swimming.    If you have a bandage and it gets wet, use a clean cloth to gently pat the wound dry. Then replace the bandage with a dry one.    Don't scratch, rub, or pick at the area.    Watch for the signs of infection listed below. Any wound can get infected, even if you are taking antibiotics. Seek care right away if you see any possible signs of infection.  Care for specific closures    Sutures. You may want to clean the wound daily after the first 2 to 3 days. To do this, remove the bandage and gently wash the area with soap and warm water. After cleaning, apply a thin layer of antibiotic ointment if recommended. Then apply a new bandage. Sutures on the outside of the skin usually need to be removed by your healthcare provider.    Surgical tape. Keep the area dry. If it gets wet, blot it dry with a towel. Surgical tape closures usually fall off within 7 to 10 days. If they have not fallen off after 10 days, you can remove them yourself. To remove the tape, use mineral oil or petroleum jelly on a cotton ball to gently rub the adhesive.    Skin glue. You may shower or bathe as usual, but do not use soaps, lotions, or ointments on the wound area. Do not scrub the wound. After bathing, pat the wound dry with a soft towel. Do not apply liquids like peroxide, ointments, or creams to the wound while the strips or film is  in place. Don't scratch, rub, or pick at the strips or film. Don't put tape directly over the strips or film. Skin adhesive film will fall off naturally in 5 to 10 days. If it does not peel off in 10 days, gently rub petroleum jelly or an ointment onto the film.    [place]. Take showers or sponge baths. Don't take tub baths. Don't use lotions on the wound area. The area may be cleaned with soap and water 2 to 3 days after the wound was stapled. Don't scrub the wound. Pat it dry with a clean soft cloth or towel. You can use antibiotic ointment if your provider tells you to. Staples will need to be removed by your healthcare provider in 10 to 14 days.  Follow-up care  Follow up with your healthcare provider, or as directed. If you have sutures or staples, return for their removal as directed.  When to seek medical advice  Call your healthcare provider right away if you have signs of infection:    Fever of 100.4 F (38 C) or higher, or as directed by your healthcare provider    Increasing pain in the wound    Increasing redness or swelling    Pus or bad-smelling drainage from the wound  Also call your provider right away if any of these occur:    Wound bleeds more than a small amount or won t stop bleeding    Wound edges come apart    Numbness or weakness in the wound area that doesn t go away  Date Last Reviewed: 1/7/2016 2000-2017 [employer]. [address] [place] [address], [place] [address]. All rights reserved. This information is not intended as a substitute for professional medical care. Always follow your healthcare professional's instructions.

## 2018-06-08 NOTE — ANESTHESIA POSTPROCEDURE EVALUATION
Patient: Purnima Faloln    Procedure(s):  EXCISION OF GLUTEAL LEFT SKIN LESION - Wound Class: I-Clean    Diagnosis:HYDRADENITIS  Diagnosis Additional Information: No value filed.    Anesthesia Type:  MAC    Note:  Anesthesia Post Evaluation    Patient location during evaluation: PACU  Patient participation: Able to fully participate in evaluation  Level of consciousness: awake and alert  Pain management: adequate  Airway patency: patent  Cardiovascular status: acceptable  Respiratory status: acceptable  Hydration status: acceptable  PONV: none             Last vitals:  Vitals:    06/08/18 0910 06/08/18 0915 06/08/18 0920   BP: 90/60 93/66 98/64   Pulse:      Resp: 18 18 18   Temp:   97.2  F (36.2  C)   SpO2: 100% 100% 100%         Electronically Signed By: Gael Grant MD  June 8, 2018  9:26 AM

## 2018-06-08 NOTE — IP AVS SNAPSHOT
[facility] Operating Room    [address] [place] [id number]    Phone:  [phone]                                       After Visit Summary   6/8/2018    Purnima Fallon    MRN: [id number]           After Visit Summary Signature Page     I have received my discharge instructions, and my questions have been answered. I have discussed any challenges I see with this plan with the nurse or doctor.    ..........................................................................................................................................  Patient/Patient Representative Signature      ..........................................................................................................................................  Patient Representative Print Name and Relationship to Patient    ..................................................               ................................................  Date                                            Time    ..........................................................................................................................................  Reviewed by Signature/Title    ...................................................              ..............................................  Date                                                            Time

## 2018-06-08 NOTE — OP NOTE
[facility]   Operative Note    Pre-operative diagnosis: HYDRADENITIS   Post-operative diagnosis Left inferior gluteal skin lesion   Procedure: Procedure(s):  EXCISION OF GLUTEAL LEFT SKIN LESION - Wound Class: I-Clean   Surgeon(s): Surgeon(s) and Role:     * Cordell Stephens MD - Primary   Estimated blood loss: minimal    Specimens:   ID Type Source Tests Collected by Time Destination   A : left inferior gluteal skin lesion Tissue Other SURGICAL PATHOLOGY EXAM Cordell Stephens MD 6/8/2018  8:45 AM       Findings: 35 mm by 20 mm ellipse skin excision of inferior gluteal skin lesion. Skin lesion had sinus tracts under the skin with no abscess cavities.     Description of procedure:   The inferior left gluteus was prepped and draped sterilely.  The timeout pause was observed during which the patient confirmed her correct identity, side, site and nature of procedure.  Local anesthesia was obtained with infiltration of 0.5% lidocaine with epinephrine.  An ellipse was fashioned to include grossly clear margins with the incision taken through full thickness skin to the subcutaneous tissue.  The ellipse measured 35 mm x 20 mm. Full thickness skin was elevated off the subcutaneous fat sharply and the lesion was excised in toto.   The wound was irrigated with sterile saline.  Layered closure was accomplished with interrupted 3-0 Vicryl in the subcutaneous tissue; the skin was reapproximated with running 4-0 Monocryl.      The wound was cleansed and dermabond was then applied. The patient was transferred to the PACU in stable condition.    Cordell Stephens  6/8/2018

## 2018-06-08 NOTE — OR NURSING
2 fentanyl vials charged for patient out of omnicell, but only one taken out of omnicell. Total of 100mcg fentanyl given to patient for surgery. Will complete cycle count and resolve the discrepancy with a RN cosign on fentanyl left in omnicell.

## 2018-06-11 LAB — COPATH REPORT: NORMAL

## 2018-06-21 ENCOUNTER — OFFICE VISIT (OUTPATIENT)
Dept: SURGERY | Facility: OTHER | Age: 38
End: 2018-06-21
Attending: SURGERY
Payer: COMMERCIAL

## 2018-06-21 VITALS
WEIGHT: 122.8 LBS | SYSTOLIC BLOOD PRESSURE: 96 MMHG | OXYGEN SATURATION: 99 % | HEART RATE: 69 BPM | HEIGHT: 62 IN | BODY MASS INDEX: 22.6 KG/M2 | TEMPERATURE: 98.1 F | DIASTOLIC BLOOD PRESSURE: 60 MMHG

## 2018-06-21 DIAGNOSIS — L72.3 SEBACEOUS CYST: Primary | ICD-10-CM

## 2018-06-21 PROCEDURE — 99212 OFFICE O/P EST SF 10 MIN: CPT | Performed by: SURGERY

## 2018-06-21 ASSESSMENT — PAIN SCALES - GENERAL: PAINLEVEL: NO PAIN (0)

## 2018-06-21 NOTE — PROGRESS NOTES
"SUBJECTIVE:  Mrs. Fallon has been doing well since her excision. She has no pain, drainage, redness or irritation. She has no complaints.    OBJECTIVE:  BP 96/60  Pulse 69  Temp 98.1  F (36.7  C)  Ht 5' 2\" (1.575 m)  Wt 122 lb 12.8 oz (55.7 kg)  LMP 08/10/2013  SpO2 99%  BMI 22.46 kg/m2    General: aaa, nad  Extremities: left inferior gluteal skin incision well healed with no surrounding skin erythema    ASSESSMENT/PLAN:  37 year old female status post excision of epidermal inclusion cyst    She can follow up on an as needed basis. All questions and concerns were addressed.      Cordell Stephens  6/21/2018      "

## 2018-06-21 NOTE — PATIENT INSTRUCTIONS
Thank you for allowing Dr. Stephens and our surgical team to participate in your care. Please call with any scheduling questions or concerns to our health unit coordinator at [phone] or for nursing questions to nurse at [phone].

## 2018-06-21 NOTE — NURSING NOTE
"Chief Complaint   Patient presents with     Surgical Followup     s/p lesion excision buttocks 6/8/18       Initial BP 96/60  Pulse 69  Temp 98.1  F (36.7  C)  Ht 5' 2\" (1.575 m)  Wt 122 lb 12.8 oz (55.7 kg)  LMP 08/10/2013  SpO2 99%  BMI 22.46 kg/m2 Estimated body mass index is 22.46 kg/(m^2) as calculated from the following:    Height as of this encounter: 5' 2\" (1.575 m).    Weight as of this encounter: 122 lb 12.8 oz (55.7 kg).  Medication Reconciliation: complete    PAMELA PERDOMO LPN    "

## 2018-06-21 NOTE — MR AVS SNAPSHOT
After Visit Summary   6/21/2018    Purnima Fallon    MRN: [id number]           Patient Information     Date Of Birth          1980        Visit Information        Provider Department      6/21/2018 10:30 AM Cordell Stephens MD [facility]        Care Instructions    Thank you for allowing Dr. Stephens and our surgical team to participate in your care. Please call with any scheduling questions or concerns to our health unit coordinator at [phone] or for nursing questions to nurse at [phone].            Follow-ups after your visit        Your next 10 appointments already scheduled     Jun 21, 2018 10:30 AM CDT   (Arrive by 10:15 AM)   Post Op with Cordell Stephens MD   [facility] [place] ([facility] [place] )    [address]  [place] [address] [phone]              Who to contact     If you have questions or need follow up information about today's clinic visit or your schedule please contact [facility] directly at [phone].  Normal or non-critical lab and imaging results will be communicated to you by MyChart, letter or phone within 4 business days after the clinic has received the results. If you do not hear from us within 7 days, please contact the clinic through [employer] or phone. If you have a critical or abnormal lab result, we will notify you by phone as soon as possible.  Submit refill requests through [employer] or call your pharmacy and they will forward the refill request to us. Please allow 3 business days for your refill to be completed.          Additional Information About Your Visit        MyChart Information     [employer] gives you secure access to your electronic health record. If you see a primary care provider, you can also send messages to your care team and make appointments. If you have questions, please call your primary care clinic.  If you do not have a primary care provider, please call [phone]  "and they will assist you.        Care EveryWhere ID     This is your Care EveryWhere ID. This could be used by other organizations to access your [facility] medical records  [id number]        Your Vitals Were     Pulse Temperature Height Last Period Pulse Oximetry BMI (Body Mass Index)    69 98.1  F (36.7  C) 5' 2\" (1.575 m) 08/10/2013 99% 22.46 kg/m2       Blood Pressure from Last 3 Encounters:   06/21/18 96/60   06/08/18 102/69   05/31/18 108/62    Weight from Last 3 Encounters:   06/21/18 122 lb 12.8 oz (55.7 kg)   06/08/18 123 lb (55.8 kg)   05/31/18 126 lb (57.2 kg)              Today, you had the following     No orders found for display       Primary Care Provider Office Phone # Fax #    Alexi Romero [phone]       [facility] [address]        Equal Access to Services     [facility] Center[facility] : Hadii aad ku hadasho Soomaali, waaxda luqadaha, qaybta kaalmada adeegyada, waxay idiin hayaan juan garcia . So [facility] [phone].    ATENCIÓN: Si habla español, tiene a cyr disposición servicios gratuitos de asistencia lingüística. Llame al [phone].    We comply with applicable federal civil rights laws and [place] laws. We do not discriminate on the basis of race, color, national origin, age, disability, sex, sexual orientation, or gender identity.            Thank you!     Thank you for choosing [facility]  for your care. Our goal is always to provide you with excellent care. Hearing back from our patients is one way we can continue to improve our services. Please take a few minutes to complete the written survey that you may receive in the mail after your visit with us. Thank you!             Your Updated Medication List - Protect others around you: Learn how to safely use, store and throw away your medicines at www.disposemymeds.org.          This list is accurate as of 6/21/18 10:22 AM.  Always use your most recent med list.                   Brand Name " Dispense Instructions for use Diagnosis    omeprazole 20 MG CR capsule    priLOSEC    30 capsule    Take 1 capsule (20 mg) by mouth daily    Abdominal pain, epigastric       sucralfate 1 GM tablet    CARAFATE    40 tablet    Take 1 tablet (1 g) by mouth 4 times daily    Abdominal pain, epigastric       valACYclovir 500 MG tablet    VALTREX    90 tablet    TAKE ONE TABLET BY MOUTH DAILY    Herpes simplex virus infection

## 2018-07-02 ENCOUNTER — TELEPHONE (OUTPATIENT)
Dept: INTERNAL MEDICINE | Facility: OTHER | Age: 38
End: 2018-07-02

## 2018-07-02 DIAGNOSIS — K08.89 DENTALGIA: Primary | ICD-10-CM

## 2018-07-02 RX ORDER — AMOXICILLIN 875 MG
875 TABLET ORAL 2 TIMES DAILY
Qty: 20 TABLET | Refills: 0 | Status: SHIPPED | OUTPATIENT
Start: 2018-07-02 | End: 2018-08-20

## 2018-07-02 RX ORDER — HYDROCODONE BITARTRATE AND ACETAMINOPHEN 5; 325 MG/1; MG/1
1 TABLET ORAL 2 TIMES DAILY PRN
Qty: 10 TABLET | Refills: 0 | Status: SHIPPED | OUTPATIENT
Start: 2018-07-02 | End: 2018-08-20

## 2018-07-02 NOTE — TELEPHONE ENCOUNTER
Pt notified that the written RX is ready at the [facility]  registration to be picked up.   norco 5-325 mg

## 2018-07-02 NOTE — TELEPHONE ENCOUNTER
8:01 AM    Reason for Call: OVERBOOK    Patient is having the following symptoms: Poss oral infection for 6 days.    The patient is requesting an appointment for ASAP with Heather.    Was an appointment offered for this call? Yes  If yes : Appointment type short              Date 07/10/18    Preferred method for responding to this message: Telephone Call  What is your phone number ? [phone]    If we cannot reach you directly, may we leave a detailed response at the number you provided? Yes    Can this message wait until your PCP/provider returns, if unavailable today? Not applicable    Lupe Mcduffie

## 2018-07-24 DIAGNOSIS — M79.674 PAIN OF TOE OF RIGHT FOOT: ICD-10-CM

## 2018-07-25 NOTE — TELEPHONE ENCOUNTER
ibuprofen      Last Written Prescription Date:  Not on medication list  Last Fill Quantity: ,   # refills:   Last Office Visit: 05/15/18  Future Office visit: none

## 2018-07-26 RX ORDER — IBUPROFEN 800 MG/1
TABLET, FILM COATED ORAL
Qty: 90 TABLET | Refills: 0 | Status: SHIPPED | OUTPATIENT
Start: 2018-07-26 | End: 2018-09-24

## 2018-08-20 ENCOUNTER — HOSPITAL ENCOUNTER (EMERGENCY)
Facility: HOSPITAL | Age: 38
Discharge: HOME | End: 2018-08-20
Attending: EMERGENCY MEDICINE | Admitting: EMERGENCY MEDICINE
Payer: COMMERCIAL

## 2018-08-20 ENCOUNTER — APPOINTMENT (OUTPATIENT)
Dept: CT IMAGING | Facility: HOSPITAL | Age: 38
End: 2018-08-20
Attending: EMERGENCY MEDICINE
Payer: COMMERCIAL

## 2018-08-20 VITALS
SYSTOLIC BLOOD PRESSURE: 100 MMHG | RESPIRATION RATE: 16 BRPM | DIASTOLIC BLOOD PRESSURE: 66 MMHG | TEMPERATURE: 98.8 F | HEART RATE: 67 BPM | OXYGEN SATURATION: 97 %

## 2018-08-20 DIAGNOSIS — R10.9 FLANK PAIN: ICD-10-CM

## 2018-08-20 LAB
ALBUMIN SERPL-MCNC: 4.6 G/DL (ref 3.4–5)
ALBUMIN UR-MCNC: NEGATIVE MG/DL
ALP SERPL-CCNC: 59 U/L (ref 40–150)
ALT SERPL W P-5'-P-CCNC: 38 U/L (ref 0–50)
ANION GAP SERPL CALCULATED.3IONS-SCNC: 6 MMOL/L (ref 3–14)
APPEARANCE UR: CLEAR
AST SERPL W P-5'-P-CCNC: 18 U/L (ref 0–45)
BASOPHILS # BLD AUTO: 0.1 10E9/L (ref 0–0.2)
BASOPHILS NFR BLD AUTO: 0.5 %
BILIRUB SERPL-MCNC: 0.5 MG/DL (ref 0.2–1.3)
BILIRUB UR QL STRIP: NEGATIVE
BUN SERPL-MCNC: 13 MG/DL (ref 7–30)
CALCIUM SERPL-MCNC: 9 MG/DL (ref 8.5–10.1)
CHLORIDE SERPL-SCNC: 106 MMOL/L (ref 94–109)
CO2 SERPL-SCNC: 27 MMOL/L (ref 20–32)
COLOR UR AUTO: YELLOW
CREAT SERPL-MCNC: 0.8 MG/DL (ref 0.52–1.04)
CRP SERPL-MCNC: <2.9 MG/L (ref 0–8)
DIFFERENTIAL METHOD BLD: ABNORMAL
EOSINOPHIL # BLD AUTO: 0.1 10E9/L (ref 0–0.7)
EOSINOPHIL NFR BLD AUTO: 0.7 %
ERYTHROCYTE [DISTWIDTH] IN BLOOD BY AUTOMATED COUNT: 12.6 % (ref 10–15)
GFR SERPL CREATININE-BSD FRML MDRD: 81 ML/MIN/1.7M2
GLUCOSE SERPL-MCNC: 98 MG/DL (ref 70–99)
GLUCOSE UR STRIP-MCNC: NEGATIVE MG/DL
HCT VFR BLD AUTO: 44 % (ref 35–47)
HGB BLD-MCNC: 15.6 G/DL (ref 11.7–15.7)
HGB UR QL STRIP: NEGATIVE
IMM GRANULOCYTES # BLD: 0 10E9/L (ref 0–0.4)
IMM GRANULOCYTES NFR BLD: 0.3 %
KETONES UR STRIP-MCNC: NEGATIVE MG/DL
LEUKOCYTE ESTERASE UR QL STRIP: NEGATIVE
LYMPHOCYTES # BLD AUTO: 3.7 10E9/L (ref 0.8–5.3)
LYMPHOCYTES NFR BLD AUTO: 37.3 %
MCH RBC QN AUTO: 34.1 PG (ref 26.5–33)
MCHC RBC AUTO-ENTMCNC: 35.5 G/DL (ref 31.5–36.5)
MCV RBC AUTO: 96 FL (ref 78–100)
MONOCYTES # BLD AUTO: 0.5 10E9/L (ref 0–1.3)
MONOCYTES NFR BLD AUTO: 4.8 %
NEUTROPHILS # BLD AUTO: 5.6 10E9/L (ref 1.6–8.3)
NEUTROPHILS NFR BLD AUTO: 56.4 %
NITRATE UR QL: NEGATIVE
NRBC # BLD AUTO: 0 10*3/UL
NRBC BLD AUTO-RTO: 0 /100
PH UR STRIP: 6 PH (ref 4.7–8)
PLATELET # BLD AUTO: 233 10E9/L (ref 150–450)
POTASSIUM SERPL-SCNC: 3.3 MMOL/L (ref 3.4–5.3)
PROT SERPL-MCNC: 8.3 G/DL (ref 6.8–8.8)
RBC # BLD AUTO: 4.57 10E12/L (ref 3.8–5.2)
SODIUM SERPL-SCNC: 139 MMOL/L (ref 133–144)
SOURCE: NORMAL
SP GR UR STRIP: 1.02 (ref 1–1.03)
UROBILINOGEN UR STRIP-MCNC: NORMAL MG/DL (ref 0–2)
WBC # BLD AUTO: 9.9 10E9/L (ref 4–11)

## 2018-08-20 PROCEDURE — 36415 COLL VENOUS BLD VENIPUNCTURE: CPT | Performed by: EMERGENCY MEDICINE

## 2018-08-20 PROCEDURE — 25000128 H RX IP 250 OP 636: Performed by: EMERGENCY MEDICINE

## 2018-08-20 PROCEDURE — 99285 EMERGENCY DEPT VISIT HI MDM: CPT | Performed by: EMERGENCY MEDICINE

## 2018-08-20 PROCEDURE — 85025 COMPLETE CBC W/AUTO DIFF WBC: CPT | Performed by: EMERGENCY MEDICINE

## 2018-08-20 PROCEDURE — 86140 C-REACTIVE PROTEIN: CPT | Performed by: EMERGENCY MEDICINE

## 2018-08-20 PROCEDURE — 99285 EMERGENCY DEPT VISIT HI MDM: CPT | Mod: 25

## 2018-08-20 PROCEDURE — 96361 HYDRATE IV INFUSION ADD-ON: CPT

## 2018-08-20 PROCEDURE — 74176 CT ABD & PELVIS W/O CONTRAST: CPT | Mod: TC

## 2018-08-20 PROCEDURE — 80053 COMPREHEN METABOLIC PANEL: CPT | Performed by: EMERGENCY MEDICINE

## 2018-08-20 PROCEDURE — 96374 THER/PROPH/DIAG INJ IV PUSH: CPT

## 2018-08-20 PROCEDURE — 81003 URINALYSIS AUTO W/O SCOPE: CPT | Performed by: EMERGENCY MEDICINE

## 2018-08-20 RX ORDER — KETOROLAC TROMETHAMINE 30 MG/ML
30 INJECTION, SOLUTION INTRAMUSCULAR; INTRAVENOUS ONCE
Status: COMPLETED | OUTPATIENT
Start: 2018-08-20 | End: 2018-08-20

## 2018-08-20 RX ORDER — SODIUM CHLORIDE, SODIUM LACTATE, POTASSIUM CHLORIDE, CALCIUM CHLORIDE 600; 310; 30; 20 MG/100ML; MG/100ML; MG/100ML; MG/100ML
1000 INJECTION, SOLUTION INTRAVENOUS CONTINUOUS
Status: DISCONTINUED | OUTPATIENT
Start: 2018-08-20 | End: 2018-08-20 | Stop reason: HOSPADM

## 2018-08-20 RX ORDER — METOCLOPRAMIDE 10 MG/1
10 TABLET ORAL
Qty: 10 TABLET | Refills: 1 | Status: SHIPPED | OUTPATIENT
Start: 2018-08-20 | End: 2018-08-30

## 2018-08-20 RX ORDER — OXYCODONE AND ACETAMINOPHEN 5; 325 MG/1; MG/1
1-2 TABLET ORAL EVERY 4 HOURS PRN
Qty: 10 TABLET | Refills: 0 | Status: SHIPPED | OUTPATIENT
Start: 2018-08-20 | End: 2018-08-30

## 2018-08-20 RX ORDER — NAPROXEN 500 MG/1
500 TABLET ORAL 2 TIMES DAILY WITH MEALS
Qty: 16 TABLET | Refills: 0 | Status: SHIPPED | OUTPATIENT
Start: 2018-08-20 | End: 2019-02-07

## 2018-08-20 RX ORDER — LIDOCAINE 40 MG/G
CREAM TOPICAL
Status: DISCONTINUED | OUTPATIENT
Start: 2018-08-20 | End: 2018-08-20 | Stop reason: HOSPADM

## 2018-08-20 RX ADMIN — KETOROLAC TROMETHAMINE 30 MG: 30 INJECTION, SOLUTION INTRAMUSCULAR at 15:01

## 2018-08-20 RX ADMIN — SODIUM CHLORIDE, POTASSIUM CHLORIDE, SODIUM LACTATE AND CALCIUM CHLORIDE 1000 ML: 600; 310; 30; 20 INJECTION, SOLUTION INTRAVENOUS at 15:01

## 2018-08-20 NOTE — ED AVS SNAPSHOT
[facility]    [address] [address]    Phone:  [phone]                                       Purnima Fallon   MRN: [id number]    Department:  [facility]   Date of Visit:  8/20/2018           After Visit Summary Signature Page     I have received my discharge instructions, and my questions have been answered. I have discussed any challenges I see with this plan with the nurse or doctor.    ..........................................................................................................................................  Patient/Patient Representative Signature      ..........................................................................................................................................  Patient Representative Print Name and Relationship to Patient    ..................................................               ................................................  Date                                            Time    ..........................................................................................................................................  Reviewed by Signature/Title    ...................................................              ..............................................  Date                                                            Time

## 2018-08-20 NOTE — ED AVS SNAPSHOT
[facility]    [address] [address]    Phone:  [phone]                                       Purnima Fallon   MRN: [id number]    Department:  [facility]   Date of Visit:  8/20/2018           Patient Information     Date Of Birth          1980        Your diagnoses for this visit were:     Flank pain        You were seen by Jcarlos Perales MD.      Follow-up Information     Follow up with Alexi Romero NP.    Specialty:  Internal Medicine    Why:  As needed    Contact information:    [address] [place] [address]  [phone]           Review of your medicines      START taking        Dose / Directions Last dose taken    metoclopramide 10 MG tablet   Commonly known as:  REGLAN   Dose:  10 mg   Quantity:  10 tablet        Take 1 tablet (10 mg) by mouth once as needed   Refills:  1        naproxen 500 MG tablet   Commonly known as:  NAPROSYN   Dose:  500 mg   Quantity:  16 tablet        Take 1 tablet (500 mg) by mouth 2 times daily (with meals) for 8 days   Refills:  0        oxyCODONE-acetaminophen 5-325 MG per tablet   Commonly known as:  PERCOCET   Dose:  1-2 tablet   Quantity:  10 tablet        Take 1-2 tablets by mouth every 4 hours as needed for pain or moderate to severe pain   Refills:  0          Our records show that you are taking the medicines listed below. If these are incorrect, please call your family doctor or clinic.        Dose / Directions Last dose taken    ibuprofen 800 MG tablet   Commonly known as:  ADVIL/MOTRIN   Quantity:  90 tablet        TAKE 1 TABLET(800 MG) BY MOUTH EVERY 8 HOURS AS NEEDED FOR PAIN   Refills:  0        omeprazole 20 MG CR capsule   Commonly known as:  priLOSEC   Quantity:  30 capsule        TAKE 1 CAPSULE(20 MG) BY MOUTH DAILY   Refills:  9        valACYclovir 500 MG tablet   Commonly known as:  VALTREX   Quantity:  90 tablet        TAKE ONE TABLET BY MOUTH DAILY   Refills:  1                 Information about OPIOIDS     PRESCRIPTION OPIOIDS: WHAT YOU NEED TO KNOW   We gave you an opioid (narcotic) pain medicine. It is important to manage your pain, but opioids are not always the best choice. You should first try all the other options your care team gave you. Take this medicine for as short a time (and as few doses) as possible.    Some activities can increase your pain, such as bandage changes or therapy sessions. It may help to take your pain medicine 30 to 60 minutes before these activities. Reduce your stress by getting enough sleep, working on hobbies you enjoy and practicing relaxation or meditation. Talk to your care team about ways to manage your pain beyond prescription opioids.    These medicines have risks:    DO NOT drive when on new or higher doses of pain medicine. These medicines can affect your alertness and reaction times, and you could be arrested for driving under the influence (DUI). If you need to use opioids long-term, talk to your care team about driving.    DO NOT operate heavy machinery    DO NOT do any other dangerous activities while taking these medicines.    DO NOT drink any alcohol while taking these medicines.     If the opioid prescribed includes acetaminophen, DO NOT take with any other medicines that contain acetaminophen. Read all labels carefully. Look for the word  acetaminophen  or  Tylenol.  Ask your pharmacist if you have questions or are unsure.    You can get addicted to pain medicines, especially if you have a history of addiction (chemical, alcohol or substance dependence). Talk to your care team about ways to reduce this risk.    All opioids tend to cause constipation. Drink plenty of water and eat foods that have a lot of fiber, such as fruits, vegetables, prune juice, apple juice and high-fiber cereal. Take a laxative (Miralax, milk of magnesia, Colace, Senna) if you don t move your bowels at least every other day. Other side effects include upset  stomach, sleepiness, dizziness, throwing up, tolerance (needing more of the medicine to have the same effect), physical dependence and slowed breathing.    Store your pills in a secure place, locked if possible. We will not replace any lost or stolen medicine. If you don t finish your medicine, please throw away (dispose) as directed by your pharmacist. The [place] Pollution Control Agency has more information about safe disposal: https://www.pca.[facility].[place].us/living-green/managing-unwanted-medications        Prescriptions were sent or printed at these locations (3 Prescriptions)                   Other Prescriptions                Printed at Department/Unit printer (3 of 3)         metoclopramide (REGLAN) 10 MG tablet               naproxen (NAPROSYN) 500 MG tablet               oxyCODONE-acetaminophen (PERCOCET) 5-325 MG per tablet                Procedures and tests performed during your visit     CBC with platelets differential    CRP inflammation    CT Abdomen Pelvis without Contrast (stone protocol)    Comprehensive metabolic panel    Peripheral IV catheter    UA reflex to Microscopic and Culture      Orders Needing Specimen Collection     None      Pending Results     No orders found from 8/18/2018 to 8/21/2018.            Pending Culture Results     No orders found from 8/18/2018 to 8/21/2018.            Thank you for choosing [facility]       Thank you for choosing [facility] for your care. Our goal is always to provide you with excellent care. Hearing back from our patients is one way we can continue to improve our services. Please take a few minutes to complete the written survey that you may receive in the mail after you visit with us. Thank you!        [employer] Information     [employer] gives you secure access to your electronic health record. If you see a primary care provider, you can also send messages to your care team and make appointments. If you have questions, please call your primary care clinic.  If  you do not have a primary care provider, please call [phone] and they will assist you.        Care EveryWhere ID     This is your Care EveryWhere ID. This could be used by other organizations to access your [facility] medical records  [id number]        Equal Access to Services     ALDO CRUZ : Felix Buckley, brian rain, jeffrey kaalkendra douglas, debby clemons. So [facility] [phone].    ATENCIÓN: Si habla español, tiene a cyr disposición servicios gratuitos de asistencia lingüística. Llame al [phone].    We comply with applicable federal civil rights laws and [place] laws. We do not discriminate on the basis of race, color, national origin, age, disability, sex, sexual orientation, or gender identity.            After Visit Summary       This is your record. Keep this with you and show to your community pharmacist(s) and doctor(s) at your next visit.

## 2018-08-20 NOTE — ED PROVIDER NOTES
"  History     Chief Complaint   Patient presents with     Flank Pain     lt flank pain, notes hx of kidney stones     HPI  Purnima Fallon is a 37 year old female with the above hx.  Pain began this AM associated with some nausea and radiation into her left groin.  She has had laparoscopic hysterectomy for endometriosis in  Past and occasionally can tell when a small ovarian cyst in her residual left ovary \"pops\".  Denies LUTSx, fever, or gi symptoms.  Never had urologic intervention been able to pas stones on her own previously.     Problem List:    Patient Active Problem List    Diagnosis Date Noted     Biliary dyskinesia 10/06/2017     Priority: Medium     H/O pilonidal cyst 09/26/2017     Priority: Medium     Pilonidal cyst 09/12/2017     Priority: Medium     ACP (advance care planning) 08/05/2016     Priority: Medium     Advance Care Planning 8/5/2016: ACP Review of Chart / Resources Provided:  Reviewed chart for advance care plan.  Purnima Del Real has no plan or code status on file. Discussed available resources and provided with information. Confirmed code status reflects current choices pending further ACP discussions.  Confirmed/documented legally designated decision makers.  Added by Purnima Mireles             Ischiorectal abscess and fistula      Priority: Medium     Kidney stones      Priority: Medium     x2 passed on her own       S/P laparoscopic hysterectomy 09/27/2013     Priority: Medium     Cystic acne 07/23/2013     Priority: Medium     Migraines      Priority: Medium     Depression      Priority: Medium     ADHD (attention deficit hyperactivity disorder)      Priority: Medium     Mass of breast 02/02/2012     Priority: Medium        Past Medical History:    Past Medical History:   Diagnosis Date     Hidradenitis 2/16/2007     Ischiorectal abscess and fistula      Kidney stones 2008     Nondependent tobacco use disorder 10/11/2012     Papanicolaou smear of cervix with low grade squamous " intraepithelial lesion (LGSIL) 10/29/2008       Past Surgical History:    Past Surgical History:   Procedure Laterality Date     CYSTECTOMY PILONIDAL N/A 9/18/2017    Procedure: CYSTECTOMY PILONIDAL;  PILONIDAL CYSTECTOMY ;  Surgeon: Cordell Stephens MD;  Location: [facility]     EXCISE LESION BUTTOCK(S) Left 6/8/2018    Procedure: EXCISE LESION BUTTOCK(S);  EXCISION OF GLUTEAL LEFT SKIN LESION;  Surgeon: Cordell Stephens MD;  [facility]     EXCISE MASS NECK Right 8/4/2015    Procedure: EXCISE MASS NECK;  Surgeon: Nasir Jones MD;  Location: [facility]     INCISION AND DRAINAGE PERINEAL, COMBINED N/A 3/18/2015    Procedure: COMBINED INCISION AND DRAINAGE PERINEAL;  Surgeon: Jay Torres DO;  Location: [facility]     LAPAROSCOPIC CHOLECYSTECTOMY N/A 11/20/2017    Procedure: LAPAROSCOPIC CHOLECYSTECTOMY;  LAPAROSCOPIC CHOLECYSTECTOMY;  Surgeon: Cordell Stephens MD;  [facility]     LAPAROSCOPIC HYSTERECTOMY SUPRACERVICAL, BILATERAL SALPINGO-OOPHORECTOMY, COMBINED  9/27/2013    Procedure: COMBINED LAPAROSCOPIC HYSTERECTOMY SUPRACERVICAL, SALPINGO-OOPHORECTOMY;  LAPAROSCOPIC SUPRACERVICAL HYSTERECTOMY AND RIGHT SALPINGO-OOPHORECTOMY, CYSTOSCOPY;  Surgeon: Denys Callahan MD;  Location: [facility]     marsupialization of pilonidal cyst  2007     TUBAL LIGATION  2005       Family History:    Family History   Problem Relation Age of Onset     Diabetes Paternal Grandmother        Social History:  Marital Status:   [2]  Social History   Substance Use Topics     Smoking status: Current Every Day Smoker     Packs/day: 0.50     Years: 15.00     Types: Cigarettes     Last attempt to quit: 7/18/2013     Smokeless tobacco: Never Used      Comment: Quitplan referral declined 6/21/18     Alcohol use No        Medications:      metoclopramide (REGLAN) 10 MG tablet   naproxen (NAPROSYN) 500 MG tablet   omeprazole (PRILOSEC) 20 MG CR capsule   oxyCODONE-acetaminophen (PERCOCET) 5-325 MG per tablet   valACYclovir  (VALTREX) 500 MG tablet   ibuprofen (ADVIL/MOTRIN) 800 MG tablet         Review of Systems   Constitutional: Positive for appetite change.   HENT: Negative.    Respiratory: Negative.    Cardiovascular: Negative.    Gastrointestinal: Positive for abdominal pain and nausea.   Genitourinary: Positive for flank pain and pelvic pain.   Musculoskeletal: Positive for back pain and myalgias.   Skin: Positive for color change.   Neurological: Positive for light-headedness.   All other systems reviewed and are negative.    Physical Exam   BP: 114/62  Heart Rate: 69  Temp: 99.5  F (37.5  C)  Resp: 14  SpO2: 99 %    Physical Exam   Constitutional: She is oriented to person, place, and time. She appears well-developed and well-nourished. No distress.   Tanned cooperative female with left flank pain.    HENT:   Head: Normocephalic and atraumatic.   Right Ear: External ear normal.   Left Ear: External ear normal.   Eyes: Conjunctivae and EOM are normal. Pupils are equal, round, and reactive to light.   Neck: Normal range of motion. Neck supple. No thyromegaly present.   Cardiovascular: Normal rate.    Pulmonary/Chest: Effort normal.   Abdominal: Soft. Bowel sounds are normal. There is tenderness.   Deep LUQ tenderness without rebound or guarding. + left paralumbar tenderness to percussion   Musculoskeletal: Normal range of motion.   Neurological: She is alert and oriented to person, place, and time.   Skin: Skin is warm. She is not diaphoretic.     ED Course     ED Course     Procedures  Critical Care time:  none    Results for orders placed or performed during the hospital encounter of 08/20/18 (from the past 24 hour(s))   UA reflex to Microscopic and Culture   Result Value Ref Range    Color Urine Yellow     Appearance Urine Clear     Glucose Urine Negative NEG^Negative mg/dL    Bilirubin Urine Negative NEG^Negative    Ketones Urine Negative NEG^Negative mg/dL    Specific Gravity Urine 1.018 1.003 - 1.035    Blood Urine Negative  NEG^Negative    pH Urine 6.0 4.7 - 8.0 pH    Protein Albumin Urine Negative NEG^Negative mg/dL    Urobilinogen mg/dL Normal 0.0 - 2.0 mg/dL    Nitrite Urine Negative NEG^Negative    Leukocyte Esterase Urine Negative NEG^Negative    Source Midstream Urine    CBC with platelets differential   Result Value Ref Range    WBC 9.9 4.0 - 11.0 10e9/L    RBC Count 4.57 3.8 - 5.2 10e12/L    Hemoglobin 15.6 11.7 - 15.7 g/dL    Hematocrit 44.0 35.0 - 47.0 %    MCV 96 78 - 100 fl    MCH 34.1 (H) 26.5 - 33.0 pg    MCHC 35.5 31.5 - 36.5 g/dL    RDW 12.6 10.0 - 15.0 %    Platelet Count 233 150 - 450 10e9/L    Diff Method Automated Method     % Neutrophils 56.4 %    % Lymphocytes 37.3 %    % Monocytes 4.8 %    % Eosinophils 0.7 %    % Basophils 0.5 %    % Immature Granulocytes 0.3 %    Nucleated RBCs 0 0 /100    Absolute Neutrophil 5.6 1.6 - 8.3 10e9/L    Absolute Lymphocytes 3.7 0.8 - 5.3 10e9/L    Absolute Monocytes 0.5 0.0 - 1.3 10e9/L    Absolute Eosinophils 0.1 0.0 - 0.7 10e9/L    Absolute Basophils 0.1 0.0 - 0.2 10e9/L    Abs Immature Granulocytes 0.0 0 - 0.4 10e9/L    Absolute Nucleated RBC 0.0    CRP inflammation   Result Value Ref Range    CRP Inflammation <2.9 0.0 - 8.0 mg/L   Comprehensive metabolic panel   Result Value Ref Range    Sodium 139 133 - 144 mmol/L    Potassium 3.3 (L) 3.4 - 5.3 mmol/L    Chloride 106 94 - 109 mmol/L    Carbon Dioxide 27 20 - 32 mmol/L    Anion Gap 6 3 - 14 mmol/L    Glucose 98 70 - 99 mg/dL    Urea Nitrogen 13 7 - 30 mg/dL    Creatinine 0.80 0.52 - 1.04 mg/dL    GFR Estimate 81 >60 mL/min/1.7m2    GFR Estimate If Black >90 >60 mL/min/1.7m2    Calcium 9.0 8.5 - 10.1 mg/dL    Bilirubin Total 0.5 0.2 - 1.3 mg/dL    Albumin 4.6 3.4 - 5.0 g/dL    Protein Total 8.3 6.8 - 8.8 g/dL    Alkaline Phosphatase 59 40 - 150 U/L    ALT 38 0 - 50 U/L    AST 18 0 - 45 U/L   CT Abdomen Pelvis without Contrast (stone protocol)    Narrative    EXAMINATION: CT ABDOMEN PELVIS W/O CONTRAST, 8/20/2018 3:17  PM    TECHNIQUE:  Helical CT images from the lung bases through the  symphysis pubis were obtained  with IV contrast. Contrast dose:    COMPARISON: none    HISTORY: Abdominal or flank pain; left abdominal pain    FINDINGS:    1    The liver is free of masses or biliary ductal enlargement. The  gallbladder has been removed.    The the spleen and pancreas appear normal.    The adrenal glands are normal.    The right and left kidneys are free of masses or hydronephrosis.    The periaortic lymph nodes are normal in caliber.    No intraperitoneal masses or inflammatory changes are noted. The  appendix was visualized and appears normal.    In the pelvis the bladder and rectum appear normal. The left ovary  appears along the left pelvic sidewall. The uterus appears normal.    The regional skeleton is intact      Impression    IMPRESSION: Negative CT scan of the abdomen and pelvis post  cholecystectomy     DAISHA HAAS MD       Medications   lidocaine 1 % 1 mL (not administered)   lidocaine (LMX4) kit (not administered)   sodium chloride (PF) 0.9% PF flush 3 mL (not administered)   sodium chloride (PF) 0.9% PF flush 3 mL (not administered)   lactated ringers BOLUS 1,000 mL (0 mLs Intravenous Stopped 8/20/18 1602)     Followed by   lactated ringers infusion (not administered)   ketorolac (TORADOL) injection 30 mg (30 mg Intravenous Given 8/20/18 1501)     Assessments & Plan (with Medical Decision Making)   Purnima had hx and exam that seemed like ureteral colic but her stone protocol CT was negative.  IV had been placed and 1 L LR bolused and given Toradol 30mg IV for pain with good effect. Labs were nondiagnostic.  Unclear what is causing this pain. Perhaps a stone already dribbled through.  Does not appear to be nephrolithiasis in either of the kidneys at this time.   She will monitor and return or check in with her NP Alexi RICHARDS  I have reviewed the nursing notes.    I have reviewed the findings, diagnosis, plan and need  for follow up with the patient.       New Prescriptions    METOCLOPRAMIDE (REGLAN) 10 MG TABLET    Take 1 tablet (10 mg) by mouth once as needed    NAPROXEN (NAPROSYN) 500 MG TABLET    Take 1 tablet (500 mg) by mouth 2 times daily (with meals) for 8 days    OXYCODONE-ACETAMINOPHEN (PERCOCET) 5-325 MG PER TABLET    Take 1-2 tablets by mouth every 4 hours as needed for pain or moderate to severe pain       Final diagnoses:   Flank pain       8/20/2018   [facility]     Jcarlos Perales MD  08/21/18 0000

## 2018-08-20 NOTE — ED NOTES
Pt reports with c/o 7/10 left sided flank pain, starting yesterday, has been having pain with urinating and increased frequency since this Friday. Does have history of kidney stones in the past. See assessments. Call light placed within reach. Up to bathroom for urine sample.

## 2018-08-21 ASSESSMENT — ENCOUNTER SYMPTOMS
NAUSEA: 1
FLANK PAIN: 1
BACK PAIN: 1
ABDOMINAL PAIN: 1
RESPIRATORY NEGATIVE: 1
CARDIOVASCULAR NEGATIVE: 1
MYALGIAS: 1
LIGHT-HEADEDNESS: 1
APPETITE CHANGE: 1
COLOR CHANGE: 1

## 2018-08-29 NOTE — PROGRESS NOTES
"  SUBJECTIVE:   Purnima Fallon is a 37 year old female who presents to clinic today for the following health issues:    ED/UC Followup:    Facility:  [facility]  Date of visit: 8/20/2018  Reason for visit: flank pain  Current Status: still has abdominal pain         Abdominal Pain      Duration: 1 year  Ongoing pain, wants to find out what is causing it.      Description (location/character/radiation): left sided flank pain, left lower abdominal pain       Associated flank pain:left side      Intensity: the pain varies can get to a  10/10    Accompanying signs and symptoms:        Fever/Chills: YES had a fever in the ER, does not check temp at home       Gas/Bloating: YES       Nausea/vomitting: YES \" feels sick all the time\"       Diarrhea: YES       Dysuria or Hematuria: YES, has fequency UA in ER negative.      History (previous similar pain/trauma/previous testing): had a CT scan in the ER    Precipitating or alleviating factors:       Pain worse with eating/BM/urination: no comes on suddenly, usually at night.         Pain relieved by BM: no     Therapies tried and outcome: has tried medication and it did not help.   LMP:  not applicable Hx hysterectomy.  Has Left ovary still.      Left gluteal surgery site: Wondering if still open.     Problem list and histories reviewed & adjusted, as indicated.  Additional history: as documented    Patient Active Problem List   Diagnosis     Migraines     Depression     ADHD (attention deficit hyperactivity disorder)     Cystic acne     S/P laparoscopic hysterectomy     Kidney stones     Ischiorectal abscess and fistula     ACP (advance care planning)     Mass of breast     Pilonidal cyst     H/O pilonidal cyst     Biliary dyskinesia     Past Surgical History:   Procedure Laterality Date     CYSTECTOMY PILONIDAL N/A 9/18/2017    Procedure: CYSTECTOMY PILONIDAL;  PILONIDAL CYSTECTOMY ;  Surgeon: Cordell Stephens MD;  [facility]     EXCISE LESION BUTTOCK(S) Left " 6/8/2018    Procedure: EXCISE LESION BUTTOCK(S);  EXCISION OF GLUTEAL LEFT SKIN LESION;  Surgeon: Cordell Stephens MD;  [facility]     EXCISE MASS NECK Right 8/4/2015    Procedure: EXCISE MASS NECK;  Surgeon: Nasir Jones MD;  [facility]     INCISION AND DRAINAGE PERINEAL, COMBINED N/A 3/18/2015    Procedure: COMBINED INCISION AND DRAINAGE PERINEAL;  Surgeon: Jay Torres DO;  [facility]     LAPAROSCOPIC CHOLECYSTECTOMY N/A 11/20/2017    Procedure: LAPAROSCOPIC CHOLECYSTECTOMY;  LAPAROSCOPIC CHOLECYSTECTOMY;  Surgeon: Cordell Stephens MD;  [facility]     LAPAROSCOPIC HYSTERECTOMY SUPRACERVICAL, BILATERAL SALPINGO-OOPHORECTOMY, COMBINED  9/27/2013    Procedure: COMBINED LAPAROSCOPIC HYSTERECTOMY SUPRACERVICAL, SALPINGO-OOPHORECTOMY;  LAPAROSCOPIC SUPRACERVICAL HYSTERECTOMY AND RIGHT SALPINGO-OOPHORECTOMY, CYSTOSCOPY;  Surgeon: Denys Callahan MD;  Location: [facility]     marsupialization of pilonidal cyst  2007     TUBAL LIGATION  2005       Social History   Substance Use Topics     Smoking status: Current Every Day Smoker     Packs/day: 0.50     Years: 15.00     Types: Cigarettes     Last attempt to quit: 7/18/2013     Smokeless tobacco: Never Used      Comment: Quitplan referral declined 6/21/18     Alcohol use No     Family History   Problem Relation Age of Onset     Diabetes Paternal Grandmother          Current Outpatient Prescriptions   Medication Sig Dispense Refill     ibuprofen (ADVIL/MOTRIN) 800 MG tablet TAKE 1 TABLET(800 MG) BY MOUTH EVERY 8 HOURS AS NEEDED FOR PAIN 90 tablet 0     omeprazole (PRILOSEC) 20 MG CR capsule TAKE 1 CAPSULE(20 MG) BY MOUTH DAILY 30 capsule 9     valACYclovir (VALTREX) 500 MG tablet TAKE ONE TABLET BY MOUTH DAILY 90 tablet 1     No Known Allergies    Reviewed and updated as needed this visit by clinical staff       Reviewed and updated as needed this visit by Provider      Review of Systems   Constitutional: Negative for fatigue and fever.  "  HENT: Negative.    Respiratory: Negative for cough and shortness of breath.    Cardiovascular: Negative for chest pain, palpitations and leg swelling.   Gastrointestinal: Positive for abdominal pain, diarrhea and nausea. Negative for constipation and vomiting.        Has bloating daily .  Left flank pain, and sometimes aching all day.    Had hysterectomy  And Left oophorectomy. Had Endometriosis per patient.  .     Genitourinary: Positive for frequency and pelvic pain. Negative for difficulty urinating, vaginal bleeding and vaginal discharge.   Musculoskeletal: Positive for arthralgias, back pain, myalgias and neck pain.   Skin:        Possible open operative site on buttock.     Neurological: Negative for dizziness and headaches.   Psychiatric/Behavioral: Negative.    BP (!) 88/60  Pulse 78  Temp 98.9  F (37.2  C) (Tympanic)  Ht 5' 2\" (1.575 m)  Wt 127 lb (57.6 kg)  LMP 08/10/2013  SpO2 98%  BMI 23.23 kg/m2    Physical Exam   Constitutional: She is oriented to person, place, and time. She appears well-developed and well-nourished.   HENT:   Right Ear: External ear normal.   Left Ear: External ear normal.   Nose: Nose normal.   Mouth/Throat: Oropharynx is clear and moist.   Eyes: Conjunctivae and EOM are normal. Pupils are equal, round, and reactive to light.   Neck: Normal range of motion. Neck supple. No thyromegaly present.   Cardiovascular: Normal rate, regular rhythm, normal heart sounds and intact distal pulses.    No murmur heard.  Pulmonary/Chest: Effort normal and breath sounds normal.   Abdominal: Soft. Bowel sounds are normal. She exhibits no mass. There is tenderness. There is no rebound and no guarding.   Abdomen looks slightly rounded. Has discomfort on LLQ and right side below umbilicus.     Genitourinary: Vagina normal. No vaginal discharge found.   Genitourinary Comments: Wet prep done.     Lymphadenopathy:     She has no cervical adenopathy.   Neurological: She is alert and oriented to " person, place, and time. No cranial nerve deficit.   Skin: Skin is warm and dry.   Has scabbed Pimpled lesion to inner Left thigh. Has scar that is scabbed to posterior left lower buttock. It is not open Per se.  Washed with betadine and Opsite placed to help keep area dry.     Psychiatric: She has a normal mood and affect.     Results for orders placed or performed in visit on 08/30/18   Wet prep   Result Value Ref Range    Specimen Description Vagina     Wet Prep No Trichomonas seen     Wet Prep No clue cells seen     Wet Prep No yeast seen     Wet Prep Few  WBC'S seen        ER labs:   Lab Results   Component Value Date    WBC 9.9 08/20/2018     Lab Results   Component Value Date    RBC 4.57 08/20/2018     Lab Results   Component Value Date    HGB 15.6 08/20/2018     Lab Results   Component Value Date    HCT 44.0 08/20/2018     No components found for: MCT  Lab Results   Component Value Date    MCV 96 08/20/2018     Lab Results   Component Value Date    MCH 34.1 08/20/2018     Lab Results   Component Value Date    MCHC 35.5 08/20/2018     Lab Results   Component Value Date    RDW 12.6 08/20/2018     Lab Results   Component Value Date     08/20/2018     Recent Labs   Lab Test  08/20/18   1431  05/15/18   1707   NA  139  138   POTASSIUM  3.3*  3.8   CHLORIDE  106  105   CO2  27  28   ANIONGAP  6  5   GLC  98  81   BUN  13  14   CR  0.80  0.83   ANDRÉS  9.0  9.0     Lab Results   Component Value Date    AST 18 08/20/2018     Lab Results   Component Value Date    ALT 38 08/20/2018     CRP Inflammation   Date Value Ref Range Status   08/20/2018 <2.9 0.0 - 8.0 mg/L Final   .  URINE: Negative.      ASSESSMENT / PLAN:  (R10.2) Pelvic pain in female  (primary encounter diagnosis)  Comment: Will have patient evaluated by Dr. Reynoso. Unsure if endometriosis causing her pain.  Scar tissue, or adhered left ovary to side wall?   Wet prep=negative. Pain med does help relieve pain when it comes.  Given Lortab 5/325mg #18  For  worst pain.    Plan: US Pelvic Complete with Transvaginal, OB/GYN         REFERRAL, HYDROcodone-acetaminophen (NORCO)         5-325 MG per tablet, Wet prep      (Z72.0) Tobacco abuse  Comment:   Plan: Tobacco Cessation - Order to Satisfy Health         Maintenance      (Z71.6) Tobacco abuse counseling  Comment: Encouraged to stop smoking.    Plan: Patient not ready to stop      (Z87.42) Hx of endometriosis  Comment:Will get pelvic ultrasound and have patient consult with Dr. Reynoso.    Plan: US Pelvic Complete with Transvaginal, OB/GYN         REFERRAL        (R14.3,  R14.1,  R14.2) Flatulence, eructation, and gas pain  Comment:Because of persistent bloating Will do Colonoscopy.Referred to Dr. Lewis.    Plan: GENERAL SURG ADULT REFERRAL         (T81.31XA) Wound check  . Comment: Left gluteal fold surgical site non red. Has scabbing to medial side. Washed with betadine, and  Covered with opsite.  To keep moisture off area.

## 2018-08-30 ENCOUNTER — OFFICE VISIT (OUTPATIENT)
Dept: INTERNAL MEDICINE | Facility: OTHER | Age: 38
End: 2018-08-30
Attending: NURSE PRACTITIONER
Payer: COMMERCIAL

## 2018-08-30 VITALS
HEIGHT: 62 IN | HEART RATE: 78 BPM | WEIGHT: 127 LBS | TEMPERATURE: 98.9 F | SYSTOLIC BLOOD PRESSURE: 88 MMHG | DIASTOLIC BLOOD PRESSURE: 60 MMHG | BODY MASS INDEX: 23.37 KG/M2 | OXYGEN SATURATION: 98 %

## 2018-08-30 DIAGNOSIS — T81.31XA POSTOPERATIVE DEHISCENCE OF SKIN WOUND, INITIAL ENCOUNTER: ICD-10-CM

## 2018-08-30 DIAGNOSIS — Z72.0 TOBACCO ABUSE: ICD-10-CM

## 2018-08-30 DIAGNOSIS — R10.2 PELVIC PAIN IN FEMALE: Primary | ICD-10-CM

## 2018-08-30 DIAGNOSIS — Z87.42 HX OF ENDOMETRIOSIS: ICD-10-CM

## 2018-08-30 DIAGNOSIS — R14.2 FLATULENCE, ERUCTATION, AND GAS PAIN: ICD-10-CM

## 2018-08-30 DIAGNOSIS — Z71.6 TOBACCO ABUSE COUNSELING: ICD-10-CM

## 2018-08-30 DIAGNOSIS — R14.3 FLATULENCE, ERUCTATION, AND GAS PAIN: ICD-10-CM

## 2018-08-30 DIAGNOSIS — R14.1 FLATULENCE, ERUCTATION, AND GAS PAIN: ICD-10-CM

## 2018-08-30 LAB
SPECIMEN SOURCE: NORMAL
WET PREP SPEC: NORMAL

## 2018-08-30 PROCEDURE — 87210 SMEAR WET MOUNT SALINE/INK: CPT | Performed by: NURSE PRACTITIONER

## 2018-08-30 PROCEDURE — 99214 OFFICE O/P EST MOD 30 MIN: CPT | Performed by: NURSE PRACTITIONER

## 2018-08-30 RX ORDER — HYDROCODONE BITARTRATE AND ACETAMINOPHEN 5; 325 MG/1; MG/1
1 TABLET ORAL EVERY 8 HOURS
Qty: 18 TABLET | Refills: 0 | Status: SHIPPED | OUTPATIENT
Start: 2018-08-30 | End: 2018-09-24

## 2018-08-30 ASSESSMENT — ENCOUNTER SYMPTOMS
HEADACHES: 0
DIZZINESS: 0
FREQUENCY: 1
SHORTNESS OF BREATH: 0
VOMITING: 0
DIARRHEA: 1
DIFFICULTY URINATING: 0
ABDOMINAL PAIN: 1
FATIGUE: 0
COUGH: 0
BACK PAIN: 1
PSYCHIATRIC NEGATIVE: 1
PALPITATIONS: 0
ARTHRALGIAS: 1
NECK PAIN: 1
MYALGIAS: 1
FEVER: 0
CONSTIPATION: 0
NAUSEA: 1

## 2018-08-30 ASSESSMENT — ANXIETY QUESTIONNAIRES
7. FEELING AFRAID AS IF SOMETHING AWFUL MIGHT HAPPEN: NOT AT ALL
1. FEELING NERVOUS, ANXIOUS, OR ON EDGE: MORE THAN HALF THE DAYS
GAD7 TOTAL SCORE: 10
IF YOU CHECKED OFF ANY PROBLEMS ON THIS QUESTIONNAIRE, HOW DIFFICULT HAVE THESE PROBLEMS MADE IT FOR YOU TO DO YOUR WORK, TAKE CARE OF THINGS AT HOME, OR GET ALONG WITH OTHER PEOPLE: SOMEWHAT DIFFICULT
3. WORRYING TOO MUCH ABOUT DIFFERENT THINGS: MORE THAN HALF THE DAYS
2. NOT BEING ABLE TO STOP OR CONTROL WORRYING: MORE THAN HALF THE DAYS
5. BEING SO RESTLESS THAT IT IS HARD TO SIT STILL: MORE THAN HALF THE DAYS
6. BECOMING EASILY ANNOYED OR IRRITABLE: NOT AT ALL

## 2018-08-30 ASSESSMENT — PATIENT HEALTH QUESTIONNAIRE - PHQ9: 5. POOR APPETITE OR OVEREATING: MORE THAN HALF THE DAYS

## 2018-08-30 ASSESSMENT — PAIN SCALES - GENERAL: PAINLEVEL: MODERATE PAIN (4)

## 2018-08-30 NOTE — PATIENT INSTRUCTIONS
1. Referral to Dr. Reynoso to assess if endometriosis pain, or ovary pain.    2.  Referral to Dr. Lewis for colonoscopy.    3.  Will check on  CT for other things, like hernia , adherance of ovary?     HOW TO QUIT SMOKING  Smoking is one of the hardest habits to break. About half of all those who have ever smoked have been able to quit, and most of those (about 70%) who still smoke want to quit. Here are some of the best ways to stop smoking.     KEEP TRYING:  It takes most smokers about 8 tries before they are finally able to fully quit. So, the more often you try and fail, the better your chance of quitting the next time! So, don't give up!    GO COLD TURKEY:  Most ex-smokers quit cold turkey. Trying to cut back gradually doesn't seem to work as well, perhaps because it continues the smoking habit. Also, it is possible to fool yourself by inhaling more while smoking fewer cigarettes. This results in the same amount of nicotine in your body!    GET SUPPORT:  Support programs can make an important difference, especially for the heavy smoker. These groups offer lectures, methods to change your behavior and peer support. Call the free national Quitline for more information. 800-QUIT-NOW (520-977-0496). Low-cost or free programs are offered by many hospitals, local chapters of the American Lung Association (401-786-9970) and the American Cancer Society (937-628-6127). Support at home is important too. Non-smokers can help by offering praise and encouragement. If the smoker fails to quit, encourage them to try again!    OVER-THE-COUNTER MEDICINES:  For those who can't quit on their own, Nicotine Replacement Therapy (NRT) may make quitting much easier. Certain aids such as the nicotine patch, gum and lozenge are available without a prescription. However, it is best to use these under the guidance of your doctor. The skin patch provides a steady supply of nicotine to the body. Nicotine gum and lozenge gives temporary bursts  of low levels of nicotine. Both methods take the edge off the craving for cigarettes. WARNING: If you feel symptoms of nicotine overdose, such as nausea, vomiting, dizziness, weakness, or fast heartbeat, stop using these and see your doctor.    PRESCRIPTION MEDICINES:  After evaluating your smoking patterns and prior attempts at quitting, your doctor may offer a prescription medicine such as bupropion (Zyban, Wellbutrin), varenicline (Chantix, Champix), a niocotine inhaler or nasal spray. Each has its unique advantage and side effects which your doctor can review with you.    HEALTH BENEFITS OF QUITTING:  The benefits of quitting start right away and keep improving the longer you go without smokin minutes: blood pressure and pulse return to normal  8 hours: oxygen levels return to normal  2 days: ability to smell and taste begins to improve as damaged nerves start to regrow  2-3 weeks: circulation and lung function improves  1-9 months: decreased cough, congestion and shortness of breath; less tired  1 year: risk of heart attack decreases by half  5 years: risk of lung cancer decreases by half; risk of stroke becomes the same as a non-smoker  For information about how to quit smoking, visit the following links:  National Cancer [place] ,   Clearing the Air, Quit Smoking Today   - an online booklet. http://www.smokefree.gov/pubs/clearing_the_air.pdf  Smokefree.gov http://smokefree.gov/  QuitNet http://www.quitnet.com/    0995-4972 [facility], [address] [place] [address]. All rights reserved. This information is not intended as a substitute for professional medical care. Always follow your healthcare professional's instructions.    The Benefits of Living Smoke Free  What do you want to gain from quitting? Check off some reasons to quit.  Health Benefits  ___ Reduce my risk of lung cancer, heart disease, chronic lung disease  ___ Have fewer wrinkles and softer skin  ___ Improve my sense of  taste and smell  ___ For pregnant women--reduce the risk of having a miscarriage, stillbirth, premature birth, or low-birth-weight baby  Personal Benefits  ___ Feel more in control of my life  ___ Have better-smelling hair, breath, clothes, home, and car  ___ Save time by not having to take smoke breaks, buy cigarettes, or hunt for a light  ___ Have whiter teeth  Family Benefits  ___ Reduce my children s respiratory tract infections  ___ Set a good example for my children  ___ Reduce my family s cancer risk  Financial Benefits  ___ Save hundreds of dollars each year that would be spent on cigarettes  ___ Save money on medical bills  ___ Save on life, health, and car insurance premiums    Those Dollars Add Up!  Cigarettes are expensive, and getting more expensive all the time. Do you realize how much money you are spending on cigarettes per year? What is the average amount you spend on a pack of cigarettes? What is the average number of packs that you smoke per day? Using your answers to these questions, fill in this formula to help you find out:  ($ _____ per pack) ×  ( _____ number of packs per day) × (365 days) =  $ _____ yearly cost of smoking  Besides tobacco, there are other costs, including extra cleaning bills and replacement costs for clothing and furniture; medical expenses for smoking-related illnesses; and higher health, life, and car insurance premiums.    Cigars and Pipes Count Too!  Cigars and pipes are also dangerous. So are smokeless (chewing) tobacco and snuff. All of these products contain nicotine, a highly addictive substance that has harmful effects on your body. Quitting smoking means giving up all tobacco products.      9348-5422 [facility], [address] [place] [address], [place] [address]. All rights reserved. This information is not intended as a substitute for professional medical care. Always follow your healthcare professional's instructions.

## 2018-08-30 NOTE — MR AVS SNAPSHOT
After Visit Summary   8/30/2018    Purnima Fallon    MRN: [id number]           Patient Information     Date Of Birth          1980        Visit Information        Provider Department      8/30/2018 8:00 AM Alexi Romero NP [facility]        Today's Diagnoses     Pelvic pain in female    -  1    Tobacco abuse        Tobacco abuse counseling        Hx of endometriosis        Flatulence, eructation, and gas pain          Care Instructions    1. Referral to Dr. Renyoso to assess if endometriosis pain, or ovary pain.    2.  Referral to Dr. Lewis for colonoscopy.    3.  Will check on  CT for other things, like hernia , adherance of ovary?     HOW TO QUIT SMOKING  Smoking is one of the hardest habits to break. About half of all those who have ever smoked have been able to quit, and most of those (about 70%) who still smoke want to quit. Here are some of the best ways to stop smoking.     KEEP TRYING:  It takes most smokers about 8 tries before they are finally able to fully quit. So, the more often you try and fail, the better your chance of quitting the next time! So, don't give up!    GO COLD TURKEY:  Most ex-smokers quit cold turkey. Trying to cut back gradually doesn't seem to work as well, perhaps because it continues the smoking habit. Also, it is possible to fool yourself by inhaling more while smoking fewer cigarettes. This results in the same amount of nicotine in your body!    GET SUPPORT:  Support programs can make an important difference, especially for the heavy smoker. These groups offer lectures, methods to change your behavior and peer support. Call the free national Quitline for more information. 800-QUIT-NOW (121-473-8560). Low-cost or free programs are offered by many hospitals, local chapters of the American Lung Association (045-506-8043) and the American Cancer Society (856-387-3417). Support at home is important too. Non-smokers can help by offering praise and  encouragement. If the smoker fails to quit, encourage them to try again!    OVER-THE-COUNTER MEDICINES:  For those who can't quit on their own, Nicotine Replacement Therapy (NRT) may make quitting much easier. Certain aids such as the nicotine patch, gum and lozenge are available without a prescription. However, it is best to use these under the guidance of your doctor. The skin patch provides a steady supply of nicotine to the body. Nicotine gum and lozenge gives temporary bursts of low levels of nicotine. Both methods take the edge off the craving for cigarettes. WARNING: If you feel symptoms of nicotine overdose, such as nausea, vomiting, dizziness, weakness, or fast heartbeat, stop using these and see your doctor.    PRESCRIPTION MEDICINES:  After evaluating your smoking patterns and prior attempts at quitting, your doctor may offer a prescription medicine such as bupropion (Zyban, Wellbutrin), varenicline (Chantix, Champix), a niocotine inhaler or nasal spray. Each has its unique advantage and side effects which your doctor can review with you.    HEALTH BENEFITS OF QUITTING:  The benefits of quitting start right away and keep improving the longer you go without smokin minutes: blood pressure and pulse return to normal  8 hours: oxygen levels return to normal  2 days: ability to smell and taste begins to improve as damaged nerves start to regrow  2-3 weeks: circulation and lung function improves  1-9 months: decreased cough, congestion and shortness of breath; less tired  1 year: risk of heart attack decreases by half  5 years: risk of lung cancer decreases by half; risk of stroke becomes the same as a non-smoker  For information about how to quit smoking, visit the following links:  National Cancer [place] ,   Clearing the Air, Quit Smoking Today   - an online booklet. http://www.smokefree.gov/pubs/clearing_the_air.pdf  Smokefree.gov http://smokefree.gov/  QuitNet http://www.quitnet.com/    5554-4072  [facility], [address] [place] [address], [place] [address]. All rights reserved. This information is not intended as a substitute for professional medical care. Always follow your healthcare professional's instructions.    The Benefits of Living Smoke Free  What do you want to gain from quitting? Check off some reasons to quit.  Health Benefits  ___ Reduce my risk of lung cancer, heart disease, chronic lung disease  ___ Have fewer wrinkles and softer skin  ___ Improve my sense of taste and smell  ___ For pregnant women--reduce the risk of having a miscarriage, stillbirth, premature birth, or low-birth-weight baby  Personal Benefits  ___ Feel more in control of my life  ___ Have better-smelling hair, breath, clothes, home, and car  ___ Save time by not having to take smoke breaks, buy cigarettes, or hunt for a light  ___ Have whiter teeth  Family Benefits  ___ Reduce my children s respiratory tract infections  ___ Set a good example for my children  ___ Reduce my family s cancer risk  Financial Benefits  ___ Save hundreds of dollars each year that would be spent on cigarettes  ___ Save money on medical bills  ___ Save on life, health, and car insurance premiums    Those Dollars Add Up!  Cigarettes are expensive, and getting more expensive all the time. Do you realize how much money you are spending on cigarettes per year? What is the average amount you spend on a pack of cigarettes? What is the average number of packs that you smoke per day? Using your answers to these questions, fill in this formula to help you find out:  ($ _____ per pack) ×  ( _____ number of packs per day) × (365 days) =  $ _____ yearly cost of smoking  Besides tobacco, there are other costs, including extra cleaning bills and replacement costs for clothing and furniture; medical expenses for smoking-related illnesses; and higher health, life, and car insurance premiums.    Cigars and Pipes Count Too!  Cigars and pipes are also dangerous. So are  smokeless (chewing) tobacco and snuff. All of these products contain nicotine, a highly addictive substance that has harmful effects on your body. Quitting smoking means giving up all tobacco products.      5396-3448 [facility], [address] [place] [address], [place] [address]. All rights reserved. This information is not intended as a substitute for professional medical care. Always follow your healthcare professional's instructions.          Follow-ups after your visit        Additional Services     GENERAL SURG ADULT REFERRAL       Your provider has referred you to: Dr. Lewis   Colonoscopy  Abdominal bloating constant low abdominal pain.      Please be aware that coverage of these services is subject to the terms and limitations of your health insurance plan.  Call member services at your health plan with any benefit or coverage questions.      Please bring the following with you to your appointment:    (1) Any X-Rays, CTs or MRIs which have been performed.  Contact the facility where they were done to arrange for  prior to your scheduled appointment.   (2) List of current medications   (3) This referral request   (4) Any documents/labs given to you for this referral            OB/GYN REFERRAL       Your provider has referred you to:   Dr Jolly  Has pelvic and left flank pain, can it be endometriosis? Scar tissue?   Had hysterectomy and Right ovary removed.      Please be aware that coverage of these services is subject to the terms and limitations of your health insurance plan.  Call member services at your health plan with any benefit or coverage questions.      Please bring the following with you to your appointment:    (1) Any X-Rays, CTs or MRIs which have been performed.  Contact the facility where they were done to arrange for  prior to your scheduled appointment.   (2) List of current medications   (3) This referral request   (4) Any documents/labs given to you for this referral              "     Future tests that were ordered for you today     Open Future Orders        Priority Expected Expires Ordered    US Pelvic Complete with Transvaginal Routine  8/30/2019 8/30/2018            Who to contact     If you have questions or need follow up information about today's clinic visit or your schedule please contact [facility] directly at [phone].  Normal or non-critical lab and imaging results will be communicated to you by MyChart, letter or phone within 4 business days after the clinic has received the results. If you do not hear from us within 7 days, please contact the clinic through Codemediahart or phone. If you have a critical or abnormal lab result, we will notify you by phone as soon as possible.  Submit refill requests through MeetDoctor or call your pharmacy and they will forward the refill request to us. Please allow 3 business days for your refill to be completed.          Additional Information About Your Visit        Codemediahart Information     Meet[facility] gives you secure access to your electronic health record. If you see a primary care provider, you can also send messages to your care team and make appointments. If you have questions, please call your primary care clinic.  If you do not have a primary care provider, please call [phone] and they will assist you.        Care EveryWhere ID     This is your Care EveryWhere ID. This could be used by other organizations to access your [place] medical records  [id number]        Your Vitals Were     Pulse Temperature Height Last Period Pulse Oximetry BMI (Body Mass Index)    78 98.9  F (37.2  C) (Tympanic) 5' 2\" (1.575 m) 08/10/2013 98% 23.23 kg/m2       Blood Pressure from Last 3 Encounters:   08/30/18 (!) 88/60   08/20/18 100/66   06/21/18 96/60    Weight from Last 3 Encounters:   08/30/18 127 lb (57.6 kg)   06/21/18 122 lb 12.8 oz (55.7 kg)   06/08/18 123 lb (55.8 kg)              We Performed the Following     GENERAL SURG ADULT " REFERRAL     OB/GYN REFERRAL     Tobacco Cessation - Order to Satisfy Health Maintenance          Today's Medication Changes          These changes are accurate as of 8/30/18  9:06 AM.  If you have any questions, ask your nurse or doctor.               Start taking these medicines.        Dose/Directions    HYDROcodone-acetaminophen 5-325 MG per tablet   Commonly known as:  NORCO   Used for:  Pelvic pain in female   Started by:  Alexi Romero NP        Dose:  1 tablet   Take 1 tablet by mouth every 8 hours For severe abdominal pain   Quantity:  18 tablet   Refills:  0         Stop taking these medicines if you haven't already. Please contact your care team if you have questions.     metoclopramide 10 MG tablet   Commonly known as:  REGLAN   Stopped by:  Alexi Romero NP           oxyCODONE-acetaminophen 5-325 MG per tablet   Commonly known as:  PERCOCET   Stopped by:  Alexi Romero NP                Where to get your medicines      Some of these will need a paper prescription and others can be bought over the counter.  Ask your nurse if you have questions.     Bring a paper prescription for each of these medications     HYDROcodone-acetaminophen 5-325 MG per tablet               Information about OPIOIDS     PRESCRIPTION OPIOIDS: WHAT YOU NEED TO KNOW   We gave you an opioid (narcotic) pain medicine. It is important to manage your pain, but opioids are not always the best choice. You should first try all the other options your care team gave you. Take this medicine for as short a time (and as few doses) as possible.    Some activities can increase your pain, such as bandage changes or therapy sessions. It may help to take your pain medicine 30 to 60 minutes before these activities. Reduce your stress by getting enough sleep, working on hobbies you enjoy and practicing relaxation or meditation. Talk to your care team about ways to manage your pain beyond prescription opioids.    These medicines have  risks:    DO NOT drive when on new or higher doses of pain medicine. These medicines can affect your alertness and reaction times, and you could be arrested for driving under the influence (DUI). If you need to use opioids long-term, talk to your care team about driving.    DO NOT operate heavy machinery    DO NOT do any other dangerous activities while taking these medicines.    DO NOT drink any alcohol while taking these medicines.     If the opioid prescribed includes acetaminophen, DO NOT take with any other medicines that contain acetaminophen. Read all labels carefully. Look for the word  acetaminophen  or  Tylenol.  Ask your pharmacist if you have questions or are unsure.    You can get addicted to pain medicines, especially if you have a history of addiction (chemical, alcohol or substance dependence). Talk to your care team about ways to reduce this risk.    All opioids tend to cause constipation. Drink plenty of water and eat foods that have a lot of fiber, such as fruits, vegetables, prune juice, apple juice and high-fiber cereal. Take a laxative (Miralax, milk of magnesia, Colace, Senna) if you don t move your bowels at least every other day. Other side effects include upset stomach, sleepiness, dizziness, throwing up, tolerance (needing more of the medicine to have the same effect), physical dependence and slowed breathing.    Store your pills in a secure place, locked if possible. We will not replace any lost or stolen medicine. If you don t finish your medicine, please throw away (dispose) as directed by your pharmacist. The [place] Pollution Control Agency has more information about safe disposal: https://www.pca.[facility].mn.us/living-green/managing-unwanted-medications         Primary Care Provider Office Phone # Fax #    Alexi Romero [phone]       [address] [place] [address]        Equal Access to Services     ALDO CRUZ AH: brian Arreguin  jeffrey raincezar erickadebby coronado ah. So [facility] [phone].    ATENCIÓN: Si juan carlos cline, tiene a cyr disposición servicios gratuitos de asistencia lingüística. Maycol al [phone].    We comply with applicable federal civil rights laws and [place] laws. We do not discriminate on the basis of race, color, national origin, age, disability, sex, sexual orientation, or gender identity.            Thank you!     Thank you for [facility]  for your care. Our goal is always to provide you with excellent care. Hearing back from our patients is one way we can continue to improve our services. Please take a few minutes to complete the written survey that you may receive in the mail after your visit with us. Thank you!             Your Updated Medication List - Protect others around you: Learn how to safely use, store and throw away your medicines at www.disposemymeds.org.          This list is accurate as of 8/30/18  9:06 AM.  Always use your most recent med list.                   Brand Name Dispense Instructions for use Diagnosis    HYDROcodone-acetaminophen 5-325 MG per tablet    NORCO    18 tablet    Take 1 tablet by mouth every 8 hours For severe abdominal pain    Pelvic pain in female       ibuprofen 800 MG tablet    ADVIL/MOTRIN    90 tablet    TAKE 1 TABLET(800 MG) BY MOUTH EVERY 8 HOURS AS NEEDED FOR PAIN    Pain of toe of right foot       omeprazole 20 MG CR capsule    priLOSEC    30 capsule    TAKE 1 CAPSULE(20 MG) BY MOUTH DAILY    Abdominal pain, epigastric       valACYclovir 500 MG tablet    VALTREX    90 tablet    TAKE ONE TABLET BY MOUTH DAILY    Herpes simplex virus infection

## 2018-08-30 NOTE — NURSING NOTE
"Chief Complaint   Patient presents with     Abdominal Pain     Er F/u     8/20/2018 [facility], flank pain       Initial BP (!) 88/60  Pulse 78  Temp 98.9  F (37.2  C) (Tympanic)  Ht 5' 2\" (1.575 m)  Wt 127 lb (57.6 kg)  LMP 08/10/2013  SpO2 98%  BMI 23.23 kg/m2 Estimated body mass index is 23.23 kg/(m^2) as calculated from the following:    Height as of this encounter: 5' 2\" (1.575 m).    Weight as of this encounter: 127 lb (57.6 kg).  Medication Reconciliation: complete    Crystal Polo LPN  "

## 2018-08-31 ENCOUNTER — HOSPITAL ENCOUNTER (OUTPATIENT)
Dept: ULTRASOUND IMAGING | Facility: HOSPITAL | Age: 38
Discharge: HOME | End: 2018-08-31
Attending: NURSE PRACTITIONER | Admitting: NURSE PRACTITIONER
Payer: COMMERCIAL

## 2018-08-31 DIAGNOSIS — R10.2 PELVIC PAIN IN FEMALE: ICD-10-CM

## 2018-08-31 DIAGNOSIS — Z87.42 HX OF ENDOMETRIOSIS: ICD-10-CM

## 2018-08-31 PROCEDURE — 76830 TRANSVAGINAL US NON-OB: CPT | Mod: TC

## 2018-08-31 ASSESSMENT — ANXIETY QUESTIONNAIRES: GAD7 TOTAL SCORE: 10

## 2018-08-31 ASSESSMENT — PATIENT HEALTH QUESTIONNAIRE - PHQ9: SUM OF ALL RESPONSES TO PHQ QUESTIONS 1-9: 7

## 2018-09-04 ENCOUNTER — OFFICE VISIT (OUTPATIENT)
Dept: OBGYN | Facility: OTHER | Age: 38
End: 2018-09-04
Attending: NURSE PRACTITIONER
Payer: COMMERCIAL

## 2018-09-04 VITALS
DIASTOLIC BLOOD PRESSURE: 62 MMHG | OXYGEN SATURATION: 98 % | BODY MASS INDEX: 23 KG/M2 | HEIGHT: 62 IN | WEIGHT: 125 LBS | SYSTOLIC BLOOD PRESSURE: 98 MMHG | HEART RATE: 71 BPM

## 2018-09-04 DIAGNOSIS — Z87.42 HX OF ENDOMETRIOSIS: ICD-10-CM

## 2018-09-04 DIAGNOSIS — R10.2 CHRONIC PELVIC PAIN IN FEMALE: Primary | ICD-10-CM

## 2018-09-04 DIAGNOSIS — G89.29 CHRONIC PELVIC PAIN IN FEMALE: Primary | ICD-10-CM

## 2018-09-04 DIAGNOSIS — R10.2 PELVIC PAIN IN FEMALE: ICD-10-CM

## 2018-09-04 PROCEDURE — 99215 OFFICE O/P EST HI 40 MIN: CPT | Performed by: OBSTETRICS & GYNECOLOGY

## 2018-09-04 RX ORDER — ACETAMINOPHEN 325 MG/1
975 TABLET ORAL ONCE
Status: CANCELLED | OUTPATIENT
Start: 2018-09-04 | End: 2018-09-04

## 2018-09-04 RX ORDER — PHENAZOPYRIDINE HYDROCHLORIDE 100 MG/1
200 TABLET, FILM COATED ORAL ONCE
Status: CANCELLED | OUTPATIENT
Start: 2018-09-04 | End: 2018-09-04

## 2018-09-04 ASSESSMENT — PAIN SCALES - GENERAL: PAINLEVEL: NO PAIN (0)

## 2018-09-04 NOTE — PROGRESS NOTES
History and Physical  Obstetrics and Gynecology     Date of Admission:  18 (outpatient surgery)    Assessment & Plan   Purnima Fallon is a 37 year old  white female who presents by referral from Alexi Romero for evaluation of chronic pelvic pain and possible endometriosis.    Active Problems:    * No active hospital problems. *    PLAN: Options of expectant therapy vs medication vs surgery were all discussed in detail including the risks and benefits of each. The patient is requesting that we proceed with surgery. Specifically we will do a diagnostic laparoscopy with possible left salpingectomy, lysis of adhesions, and/or removal and dessication of endometriosis. The procedure and the attendant risks and benefits were discussed in detail including but not limited to anesthesia, bleeding, infection, injury to other organs, thromboembolism, death, failure to find or adequately treat the underlying problem, etc. All questions were answered and she was given preop instructions. She will follow up in the office in 2-3 weeks for her postop exam.  I spent 60 minutes face to face with over 50% of that time in discussion.    Suraj Whitaker    Code Status   Full Code    Primary Care Physician   Alexi Romero NP  Internal Medicine    Chief Complaint   Pelvic pain    History is obtained from the patient    History of Present Illness   Purnima Fallon is a 37 year old female who presents with chronic pelvic pain. She had 2 vaginal births in the past and had a PPTL at age 24.  She states that she suffered from menstrual problems all of her menstrual life and eventually underwent a robotic laparoscopic supracervical hysterectomy with RSO in . She was told at that time that she has endometriosis. Last year she started to experience daily bloating as well as daily crampy LLQ abdominal pain. She sometimes has dyspareunia but this is intermittent. She eventually had a laparoscopic cholecystectomy done but this  didn't resolve or improve her symptoms. She hasn't found anything that helps the pain other than heat. She denies fevers, nausea, vomiting, diarrhea, constipation, hematochezia, dysuria, urgency, frequency, hematuria, unusual vaginal discharge, vaginal bleeding.    Past Medical History    I have reviewed this patient's medical history and updated it with pertinent information if needed.   Past Medical History:   Diagnosis Date     Hidradenitis 2/16/2007     Ischiorectal abscess and fistula      Kidney stones 2008    x2 passed on her own     Nondependent tobacco use disorder 10/11/2012     Papanicolaou smear of cervix with low grade squamous intraepithelial lesion (LGSIL) 10/29/2008     S/p partial hysterectomy with remaining cervical stump 09/27/2013       Past Surgical History   I have reviewed this patient's surgical history and updated it with pertinent information if needed.  Past Surgical History:   Procedure Laterality Date     CYSTECTOMY PILONIDAL N/A 9/18/2017    Procedure: CYSTECTOMY PILONIDAL;  PILONIDAL CYSTECTOMY ;  Surgeon: Cordell Stephens MD;  [facility]     EXCISE LESION BUTTOCK(S) Left 6/8/2018    Procedure: EXCISE LESION BUTTOCK(S);  EXCISION OF GLUTEAL LEFT SKIN LESION;  Surgeon: Cordell Stephens MD;  [facility]     EXCISE MASS NECK Right 8/4/2015    Procedure: EXCISE MASS NECK;  Surgeon: Nasir Jones MD;  [facility]: [facility]     INCISION AND DRAINAGE PERINEAL, COMBINED N/A 3/18/2015    Procedure: COMBINED INCISION AND DRAINAGE PERINEAL;  Surgeon: Jay Torres DO;  Location: [facility]     LAPAROSCOPIC CHOLECYSTECTOMY N/A 11/20/2017    Procedure: LAPAROSCOPIC CHOLECYSTECTOMY;  LAPAROSCOPIC CHOLECYSTECTOMY;  Surgeon: Cordell Stephens MD;  [facility]     LAPAROSCOPIC HYSTERECTOMY SUPRACERVICAL, BILATERAL SALPINGO-OOPHORECTOMY, COMBINED  9/27/2013    Procedure: COMBINED LAPAROSCOPIC HYSTERECTOMY SUPRACERVICAL, SALPINGO-OOPHORECTOMY;  LAPAROSCOPIC SUPRACERVICAL  HYSTERECTOMY AND RIGHT SALPINGO-OOPHORECTOMY, CYSTOSCOPY;  Surgeon: Denys Callahan MD;  [facility]: [facility]     marsupialization of pilonidal cyst  2007     TUBAL LIGATION  2005       Prior to Admission Medications   Cannot display prior to admission medications because the patient has not been admitted in this contact.     Allergies   No Known Allergies    Social History   I have reviewed this patient's social history and updated it with pertinent information if needed. Purnima Fallon  reports that she has been smoking Cigarettes.  She has a 7.50 pack-year smoking history. She has never used smokeless tobacco. She reports that she does not drink alcohol or use illicit drugs.    Family History   I have reviewed this patient's family history and updated it with pertinent information if needed.   Family History   Problem Relation Age of Onset     Diabetes Paternal Grandmother        Review of Systems   CONSTITUTIONAL:  negative for  fevers and chills  EYES:  negative for  blurred vision and visual disturbance  HEENT:  negative for  hearing loss, nasal congestion, sore throat and hoarseness  RESPIRATORY:  negative for  dyspnea, wheezing, hemoptysis and chest pain  CARDIOVASCULAR:  negative for  chest pain, palpitations, syncope  GASTROINTESTINAL:  negative for nausea, vomiting, change in bowel habits, diarrhea, constipation, hematemesis and hemtochezia  GENITOURINARY:  negative for frequency, dysuria, nocturia and hematuria  HEMATOLOGIC/LYMPHATIC:  negative for easy bruising and bleeding  ENDOCRINE:  negative for heat intolerance and cold intolerance  MUSCULOSKELETAL:  negative for  joint swelling and muscle weakness  NEUROLOGICAL:  negative for headaches, dizziness and seizures    Physical Exam       BP: 98/62 Pulse: 71     SpO2: 98 %      Vital Signs with Ranges  Pulse:  [71] 71  BP: (98)/(62) 98/62  SpO2:  [98 %] 98 %  125 lbs 0 oz    Constitutional: awake, alert, cooperative, no apparent distress, and appears stated  age  Respiratory: No increased work of breathing, good air exchange, clear to auscultation bilaterally, no crackles or wheezing  Cardiovascular: Normal apical impulse, regular rate and rhythm, normal S1 and S2, no S3 or S4, and no murmur noted  GI: Multiple laparoscopic scars, normal bowel sounds, soft, non-distended, non-tender, no masses palpated, no hepatosplenomegally  Genitounirinary: External Genitalia:  General appearance; normal, Hair distribution; normal  Urethral Meatus:  Size normal, Location normal, Lesions absent  Urethra:  Masses absent  Bladder:  Masses absent, Tenderness absent, Cystocele absent  Vagina:  General appearance normal, Estrogen effect normal, Discharge absent, Lesions absent, Pelvic support normal  Cervix:  General appearance normal, Lesions absent, Tenderness absent  Uterus:  Hystory of supracervical hysterectomy. No posterior cul de sac nodularity  A[place]exa:  Hystory of right ovary removal, left is barely palpable along the left pelvic sidewall. Nontender. No mass.  Anus/Perineum:  Lesions absent and Masses absent  Skin/Extremities: no bruising or bleeding  Musculoskeletal: There is no redness, warmth, or swelling of the joints.  Full range of motion noted.  Motor strength is 5 out of 5 all extremities bilaterally.  Tone is normal.  Neurologic: Awake, alert, oriented to name, place and time. Sensory is intact.  Babinski down going, Romberg negative, and gait is normal.  Neuropsychiatric: General: normal, calm and normal eye contact    Data   CT scan of abdomen and pelvis negative.  Pelvic ultrasound reveals absent uterus and right adnexa. The left ovary is slightly enlarged at 6.1 x 1.8 x 2.7 cm.  Recent wet prep of the vagina negative for yeast, clue cells, and trichomonas.

## 2018-09-04 NOTE — MR AVS SNAPSHOT
After Visit Summary   9/4/2018    Purnima Fallon    MRN: [id number]           Patient Information     Date Of Birth          1980        Visit Information        Provider Department      9/4/2018 1:30 PM Suraj Whitaker MD [facility]        Today's Diagnoses     Chronic pelvic pain in female    -  1    Hx of endometriosis        Pelvic pain in female           Follow-ups after your visit        Your next 10 appointments already scheduled     Sep 05, 2018  1:30 PM CDT   (Arrive by 1:15 PM)   CONSULT with Cordell Stephens MD   [facility] ([facility] )    [address] [address]   [phone]            Sep 06, 2018   Procedure with Suraj Whitaker MD   [facility] ([facility] )    [address] [place] [id number] [phone]            Sep 21, 2018  1:00 PM CDT   (Arrive by 12:45 PM)   SHORT with David Franke Frow, MD   [facility] ([facility] )    [address]  [place] [address]   [phone]              Who to contact     If you have questions or need follow up information about today's clinic visit or your schedule please contact [facility] directly at [phone].  Normal or non-critical lab and imaging results will be communicated to you by MyChart, letter or phone within 4 business days after the clinic has received the results. If you do not hear from us within 7 days, please contact the clinic through MyChart or phone. If you have a critical or abnormal lab result, we will notify you by phone as soon as possible.  Submit refill requests through [employer] or call your pharmacy and they will forward the refill request to us. Please allow 3 business days for your refill to be completed.          Additional Information About Your Visit        [employer] Information     [employer] gives you secure access to your electronic health record. If you  "see a primary care provider, you can also send messages to your care team and make appointments. If you have questions, please call your primary care clinic.  If you do not have a primary care provider, please call [phone] and they will assist you.        Care EveryWhere ID     This is your Care EveryWhere ID. This could be used by other organizations to access your [place] medical records  [id number]        Your Vitals Were     Pulse Height Last Period Pulse Oximetry BMI (Body Mass Index)       71 1.575 m (5' 2\") 08/10/2013 98% 22.86 kg/m2        Blood Pressure from Last 3 Encounters:   09/04/18 98/62   08/30/18 (!) 88/60   08/20/18 100/66    Weight from Last 3 Encounters:   09/04/18 56.7 kg (125 lb)   08/30/18 57.6 kg (127 lb)   06/21/18 55.7 kg (122 lb 12.8 oz)              We Performed the Following     Shahnaz-Operative Worksheet        Primary Care Provider Office Phone # Fax #    Alexi Romero [phone] [address] [address]        Equal Access to Services     [facility] : Hadii aad ku hadasho Soomaali, waaxda luqadaha, qaybta kaalmada adeegyada, waxay lynn madisonn juan garcia . So [facility] [phone].    ATENCIÓN: Si habla español, tiene a cyr disposición servicios gratuitos de asistencia lingüística. [facility] [phone].    We comply with applicable federal civil rights laws and [place] laws. We do not discriminate on the basis of race, color, national origin, age, disability, sex, sexual orientation, or gender identity.            Thank you!     Thank you for choosing [facility]  for your care. Our goal is always to provide you with excellent care. Hearing back from our patients is one way we can continue to improve our services. Please take a few minutes to complete the written survey that you may receive in the mail after your visit with us. Thank you!             Your Updated Medication List - Protect others around you: Learn " how to safely use, store and throw away your medicines at www.disposemymeds.org.          This list is accurate as of 9/4/18  5:36 PM.  Always use your most recent med list.                   Brand Name Dispense Instructions for use Diagnosis    HYDROcodone-acetaminophen 5-325 MG per tablet    NORCO    18 tablet    Take 1 tablet by mouth every 8 hours For severe abdominal pain    Pelvic pain in female       ibuprofen 800 MG tablet    ADVIL/MOTRIN    90 tablet    TAKE 1 TABLET(800 MG) BY MOUTH EVERY 8 HOURS AS NEEDED FOR PAIN    Pain of toe of right foot       valACYclovir 500 MG tablet    VALTREX    90 tablet    TAKE ONE TABLET BY MOUTH DAILY    Herpes simplex virus infection

## 2018-09-05 ENCOUNTER — OFFICE VISIT (OUTPATIENT)
Dept: SURGERY | Facility: OTHER | Age: 38
End: 2018-09-05
Attending: NURSE PRACTITIONER
Payer: COMMERCIAL

## 2018-09-05 ENCOUNTER — ANESTHESIA EVENT (OUTPATIENT)
Dept: SURGERY | Facility: HOSPITAL | Age: 38
End: 2018-09-05
Payer: COMMERCIAL

## 2018-09-05 VITALS
OXYGEN SATURATION: 99 % | TEMPERATURE: 98.8 F | HEART RATE: 79 BPM | DIASTOLIC BLOOD PRESSURE: 60 MMHG | BODY MASS INDEX: 23.15 KG/M2 | SYSTOLIC BLOOD PRESSURE: 96 MMHG | WEIGHT: 125.8 LBS | HEIGHT: 62 IN

## 2018-09-05 DIAGNOSIS — R14.3 FLATULENCE, ERUCTATION, AND GAS PAIN: ICD-10-CM

## 2018-09-05 DIAGNOSIS — R14.2 FLATULENCE, ERUCTATION, AND GAS PAIN: ICD-10-CM

## 2018-09-05 DIAGNOSIS — R14.1 FLATULENCE, ERUCTATION, AND GAS PAIN: ICD-10-CM

## 2018-09-05 PROCEDURE — 99214 OFFICE O/P EST MOD 30 MIN: CPT | Performed by: SURGERY

## 2018-09-05 RX ORDER — SODIUM, POTASSIUM,MAG SULFATES 17.5-3.13G
1 SOLUTION, RECONSTITUTED, ORAL ORAL SEE ADMIN INSTRUCTIONS
Qty: 2 BOTTLE | Refills: 0 | Status: ON HOLD | OUTPATIENT
Start: 2018-09-05 | End: 2018-10-05

## 2018-09-05 ASSESSMENT — PAIN SCALES - GENERAL: PAINLEVEL: MILD PAIN (2)

## 2018-09-05 ASSESSMENT — LIFESTYLE VARIABLES: TOBACCO_USE: 1

## 2018-09-05 NOTE — ANESTHESIA PREPROCEDURE EVALUATION
Anesthesia Evaluation     . Pt has had prior anesthetic. Type: General and MAC    No history of anesthetic complications          ROS/MED HX    ENT/Pulmonary:     (+)tobacco use, Current use 1/2 ppd packs/day  , . .    Neurologic:     (+)migraines,     Cardiovascular:  - neg cardiovascular ROS       METS/Exercise Tolerance:     Hematologic:  - neg hematologic  ROS       Musculoskeletal:   (+) , , other musculoskeletal- Cystic acne, pilonidal cysts s/p marsupialization  9/18/2017, hidradenditis supprativa       GI/Hepatic: Comment: On omeprazole but nothing noted in chart of GERD    (+) GERD Other GI/Hepatic abdominal pain, diarrhea, nausea      Renal/Genitourinary: Comment: History of stones    (+) chronic renal disease, Nephrolithiasis , Pt does not require dialysis, Pt has no history of transplant,       Endo:  - neg endo ROS       Psychiatric:     (+) psychiatric history anxiety, depression and other (comment) (ADHD)      Infectious Disease:  - neg infectious disease ROS       Malignancy:      - no malignancy   Other: Comment: S/p Hysterectomy   (+) No chance of pregnancy   - neg other ROS                 Physical Exam  Normal systems: dental    Airway   Mallampati: III  TM distance: >3 FB  Neck ROM: full    Dental     Cardiovascular   Rhythm and rate: regular and normal      Pulmonary    breath sounds clear to auscultation                    Anesthesia Plan      History & Physical Review  History and physical reviewed and following examination; no interval change.    ASA Status:  3 .    NPO Status:  > 8 hours (due to abdominal pain)    Plan for General, ETT and RSI with Intravenous and Propofol induction. Maintenance will be Balanced.    PONV prophylaxis:  Ondansetron (or other 5HT-3), Dexamethasone or Solumedrol and Scopolamine patch  H&P done by Heather 8/30/18      Postoperative Care  Postoperative pain management:  IV analgesics and Oral pain medications.      Consents  Anesthetic plan, risks, benefits and  alternatives discussed with:  Patient..                          .

## 2018-09-05 NOTE — NURSING NOTE
"Chief Complaint   Patient presents with     Consult     Colonoscopy, abdominal bloating constant low abdominal pain       Initial BP 96/60 (BP Location: Right arm, Patient Position: Sitting, Cuff Size: Adult Regular)  Pulse 79  Temp 98.8  F (37.1  C) (Tympanic)  Ht 5' 2\" (1.575 m)  Wt 125 lb 12.8 oz (57.1 kg)  LMP 08/10/2013  SpO2 99%  BMI 23.01 kg/m2 Estimated body mass index is 23.01 kg/(m^2) as calculated from the following:    Height as of this encounter: 5' 2\" (1.575 m).    Weight as of this encounter: 125 lb 12.8 oz (57.1 kg).  Medication Reconciliation: complete    Tasia Hercules LPN  "

## 2018-09-05 NOTE — H&P (VIEW-ONLY)
"  SUBJECTIVE:   Purnima Fallon is a 37 year old female who presents to clinic today for the following health issues:    ED/UC Followup:    Facility:  [facility] [place] [facility]  Date of visit: 8/20/2018  Reason for visit: flank pain  Current Status: still has abdominal pain         Abdominal Pain      Duration: 1 year  Ongoing pain, wants to find out what is causing it.      Description (location/character/radiation): left sided flank pain, left lower abdominal pain       Associated flank pain:left side      Intensity: the pain varies can get to a  10/10    Accompanying signs and symptoms:        Fever/Chills: YES had a fever in the ER, does not check temp at home       Gas/Bloating: YES       Nausea/vomitting: YES \" feels sick all the time\"       Diarrhea: YES       Dysuria or Hematuria: YES, has fequency UA in ER negative.      History (previous similar pain/trauma/previous testing): had a CT scan in the ER    Precipitating or alleviating factors:       Pain worse with eating/BM/urination: no comes on suddenly, usually at night.         Pain relieved by BM: no     Therapies tried and outcome: has tried medication and it did not help.   LMP:  not applicable Hx hysterectomy.  Has Left ovary still.      Left gluteal surgery site: Wondering if still open.     Problem list and histories reviewed & adjusted, as indicated.  Additional history: as documented    Patient Active Problem List   Diagnosis     Migraines     Depression     ADHD (attention deficit hyperactivity disorder)     Cystic acne     S/P laparoscopic hysterectomy     Kidney stones     Ischiorectal abscess and fistula     ACP (advance care planning)     Mass of breast     Pilonidal cyst     H/O pilonidal cyst     Biliary dyskinesia     Past Surgical History:   Procedure Laterality Date     CYSTECTOMY PILONIDAL N/A 9/18/2017    Procedure: CYSTECTOMY PILONIDAL;  PILONIDAL CYSTECTOMY ;  Surgeon: Cordell Stephens MD;  [facility]     EXCISE LESION BUTTOCK(S) Left " 6/8/2018    Procedure: EXCISE LESION BUTTOCK(S);  EXCISION OF GLUTEAL LEFT SKIN LESION;  Surgeon: Cordell Stephens MD;  [facility]     EXCISE MASS NECK Right 8/4/2015    Procedure: EXCISE MASS NECK;  Surgeon: Nasir Jones MD;  [facility]     INCISION AND DRAINAGE PERINEAL, COMBINED N/A 3/18/2015    Procedure: COMBINED INCISION AND DRAINAGE PERINEAL;  Surgeon: Jay Torres DO;  [facility]     LAPAROSCOPIC CHOLECYSTECTOMY N/A 11/20/2017    Procedure: LAPAROSCOPIC CHOLECYSTECTOMY;  LAPAROSCOPIC CHOLECYSTECTOMY;  Surgeon: Cordell Stephens MD;  [facility]     LAPAROSCOPIC HYSTERECTOMY SUPRACERVICAL, BILATERAL SALPINGO-OOPHORECTOMY, COMBINED  9/27/2013    Procedure: COMBINED LAPAROSCOPIC HYSTERECTOMY SUPRACERVICAL, SALPINGO-OOPHORECTOMY;  LAPAROSCOPIC SUPRACERVICAL HYSTERECTOMY AND RIGHT SALPINGO-OOPHORECTOMY, CYSTOSCOPY;  Surgeon: Denys Callahan MD;  [facility]     marsupialization of pilonidal cyst  2007     TUBAL LIGATION  2005       Social History   Substance Use Topics     Smoking status: Current Every Day Smoker     Packs/day: 0.50     Years: 15.00     Types: Cigarettes     Last attempt to quit: 7/18/2013     Smokeless tobacco: Never Used      Comment: Quitplan referral declined 6/21/18     Alcohol use No     Family History   Problem Relation Age of Onset     Diabetes Paternal Grandmother          Current Outpatient Prescriptions   Medication Sig Dispense Refill     ibuprofen (ADVIL/MOTRIN) 800 MG tablet TAKE 1 TABLET(800 MG) BY MOUTH EVERY 8 HOURS AS NEEDED FOR PAIN 90 tablet 0     omeprazole (PRILOSEC) 20 MG CR capsule TAKE 1 CAPSULE(20 MG) BY MOUTH DAILY 30 capsule 9     valACYclovir (VALTREX) 500 MG tablet TAKE ONE TABLET BY MOUTH DAILY 90 tablet 1     No Known Allergies    Reviewed and updated as needed this visit by clinical staff       Reviewed and updated as needed this visit by Provider      Review of Systems   Constitutional: Negative for fatigue and fever.  "  HENT: Negative.    Respiratory: Negative for cough and shortness of breath.    Cardiovascular: Negative for chest pain, palpitations and leg swelling.   Gastrointestinal: Positive for abdominal pain, diarrhea and nausea. Negative for constipation and vomiting.        Has bloating daily .  Left flank pain, and sometimes aching all day.    Had hysterectomy  And Left oophorectomy. Had Endometriosis per patient.  .     Genitourinary: Positive for frequency and pelvic pain. Negative for difficulty urinating, vaginal bleeding and vaginal discharge.   Musculoskeletal: Positive for arthralgias, back pain, myalgias and neck pain.   Skin:        Possible open operative site on buttock.     Neurological: Negative for dizziness and headaches.   Psychiatric/Behavioral: Negative.    BP (!) 88/60  Pulse 78  Temp 98.9  F (37.2  C) (Tympanic)  Ht 5' 2\" (1.575 m)  Wt 127 lb (57.6 kg)  LMP 08/10/2013  SpO2 98%  BMI 23.23 kg/m2    Physical Exam   Constitutional: She is oriented to person, place, and time. She appears well-developed and well-nourished.   HENT:   Right Ear: External ear normal.   Left Ear: External ear normal.   Nose: Nose normal.   Mouth/Throat: Oropharynx is clear and moist.   Eyes: Conjunctivae and EOM are normal. Pupils are equal, round, and reactive to light.   Neck: Normal range of motion. Neck supple. No thyromegaly present.   Cardiovascular: Normal rate, regular rhythm, normal heart sounds and intact distal pulses.    No murmur heard.  Pulmonary/Chest: Effort normal and breath sounds normal.   Abdominal: Soft. Bowel sounds are normal. She exhibits no mass. There is tenderness. There is no rebound and no guarding.   Abdomen looks slightly rounded. Has discomfort on LLQ and right side below umbilicus.     Genitourinary: Vagina normal. No vaginal discharge found.   Genitourinary Comments: Wet prep done.     Lymphadenopathy:     She has no cervical adenopathy.   Neurological: She is alert and oriented to " person, place, and time. No cranial nerve deficit.   Skin: Skin is warm and dry.   Has scabbed Pimpled lesion to inner Left thigh. Has scar that is scabbed to posterior left lower buttock. It is not open Per se.  Washed with betadine and Opsite placed to help keep area dry.     Psychiatric: She has a normal mood and affect.     Results for orders placed or performed in visit on 08/30/18   Wet prep   Result Value Ref Range    Specimen Description Vagina     Wet Prep No Trichomonas seen     Wet Prep No clue cells seen     Wet Prep No yeast seen     Wet Prep Few  WBC'S seen        ER labs:   Lab Results   Component Value Date    WBC 9.9 08/20/2018     Lab Results   Component Value Date    RBC 4.57 08/20/2018     Lab Results   Component Value Date    HGB 15.6 08/20/2018     Lab Results   Component Value Date    HCT 44.0 08/20/2018     No components found for: MCT  Lab Results   Component Value Date    MCV 96 08/20/2018     Lab Results   Component Value Date    MCH 34.1 08/20/2018     Lab Results   Component Value Date    MCHC 35.5 08/20/2018     Lab Results   Component Value Date    RDW 12.6 08/20/2018     Lab Results   Component Value Date     08/20/2018     Recent Labs   Lab Test  08/20/18   1431  05/15/18   1707   NA  139  138   POTASSIUM  3.3*  3.8   CHLORIDE  106  105   CO2  27  28   ANIONGAP  6  5   GLC  98  81   BUN  13  14   CR  0.80  0.83   ANDRÉS  9.0  9.0     Lab Results   Component Value Date    AST 18 08/20/2018     Lab Results   Component Value Date    ALT 38 08/20/2018     CRP Inflammation   Date Value Ref Range Status   08/20/2018 <2.9 0.0 - 8.0 mg/L Final   .  URINE: Negative.      ASSESSMENT / PLAN:  (R10.2) Pelvic pain in female  (primary encounter diagnosis)  Comment: Will have patient evaluated by Dr. Reynoso. Unsure if endometriosis causing her pain.  Scar tissue, or adhered left ovary to side wall?   Wet prep=negative. Pain med does help relieve pain when it comes.  Given Lortab 5/325mg #18  For  worst pain.    Plan: US Pelvic Complete with Transvaginal, OB/GYN         REFERRAL, HYDROcodone-acetaminophen (NORCO)         5-325 MG per tablet, Wet prep      (Z72.0) Tobacco abuse  Comment:   Plan: Tobacco Cessation - Order to Satisfy Health         Maintenance      (Z71.6) Tobacco abuse counseling  Comment: Encouraged to stop smoking.    Plan: Patient not ready to stop      (Z87.42) Hx of endometriosis  Comment:Will get pelvic ultrasound and have patient consult with Dr. Reynoso.    Plan: US Pelvic Complete with Transvaginal, OB/GYN         REFERRAL        (R14.3,  R14.1,  R14.2) Flatulence, eructation, and gas pain  Comment:Because of persistent bloating Will do Colonoscopy.Referred to Dr. Lewis.    Plan: GENERAL SURG ADULT REFERRAL         (T81.31XA) Wound check  . Comment: Left gluteal fold surgical site non red. Has scabbing to medial side. Washed with betadine, and  Covered with opsite.  To keep moisture off area.

## 2018-09-05 NOTE — PROGRESS NOTES
[facility] Surgery Consultation    CC:  Abdominal bloating    HPI:  This 37 year old year old female is seen at the request of Alexi Romero for evaluation of abdominal bloating.  The history is obtained from the patient, and reviewing the medical record.  She is good medical historian. Mrs. Fallon is well known to me and she presents with concerns of abdominal bloating. She has undergone a laparoscopic cholecystectomy for biliary dyskinesia and says initially she thought she was doing good, but then was having left sided abdominal pain and bloating. She says when she wakes up in the morning she says her abdomen if flat and then after eating she feels bloated. She has no pain or tenderness but just a bloating sensation. She has not had any melena, hematochezia, or upper GI symptoms. She is scheduled to have exploratory laparoscopy tomorrow with gynecology for this same pain and concern for endometriosis.     Past Medical History:   Diagnosis Date     Hidradenitis 2/16/2007     Ischiorectal abscess and fistula      Kidney stones 2008    x2 passed on her own     Nondependent tobacco use disorder 10/11/2012     Papanicolaou smear of cervix with low grade squamous intraepithelial lesion (LGSIL) 10/29/2008     S/p partial hysterectomy with remaining cervical stump 09/27/2013       Past Surgical History:   Procedure Laterality Date     CYSTECTOMY PILONIDAL N/A 9/18/2017    Procedure: CYSTECTOMY PILONIDAL;  PILONIDAL CYSTECTOMY ;  Surgeon: Cordell Stephens MD;  Location: [facility]     EXCISE LESION BUTTOCK(S) Left 6/8/2018    Procedure: EXCISE LESION BUTTOCK(S);  EXCISION OF GLUTEAL LEFT SKIN LESION;  Surgeon: Cordell Stephens MD;  Location: [facility]     EXCISE MASS NECK Right 8/4/2015    Procedure: EXCISE MASS NECK;  Surgeon: Nasir Jones MD;  Location: [facility]     INCISION AND DRAINAGE PERINEAL, COMBINED N/A 3/18/2015    Procedure: COMBINED INCISION AND DRAINAGE PERINEAL;  Surgeon: Jay Torres DO;   Location: [facility]     LAPAROSCOPIC CHOLECYSTECTOMY N/A 11/20/2017    Procedure: LAPAROSCOPIC CHOLECYSTECTOMY;  LAPAROSCOPIC CHOLECYSTECTOMY;  Surgeon: Cordell Stephens MD;  Location: [facility]     LAPAROSCOPIC HYSTERECTOMY SUPRACERVICAL, BILATERAL SALPINGO-OOPHORECTOMY, COMBINED  9/27/2013    Procedure: COMBINED LAPAROSCOPIC HYSTERECTOMY SUPRACERVICAL, SALPINGO-OOPHORECTOMY;  LAPAROSCOPIC SUPRACERVICAL HYSTERECTOMY AND RIGHT SALPINGO-OOPHORECTOMY, CYSTOSCOPY;  Surgeon: Denys Callahan MD;  Location: [facility]     marsupialization of pilonidal cyst  2007     TUBAL LIGATION  2005       Pt denied problems with bleeding or anesthesia    Prior to Admission medications    Medication Sig Start Date End Date Taking? Authorizing Provider   HYDROcodone-acetaminophen (NORCO) 5-325 MG per tablet Take 1 tablet by mouth every 8 hours For severe abdominal pain 8/30/18  Yes Alexi Romero NP   ibuprofen (ADVIL/MOTRIN) 800 MG tablet TAKE 1 TABLET(800 MG) BY MOUTH EVERY 8 HOURS AS NEEDED FOR PAIN 7/26/18  Yes Nayla Boo MD   Na Sulfate-K Sulfate-Mg Sulf (SUPREP BOWEL PREP KIT) solution Take 177 mLs (1 Bottle) by mouth See Admin Instructions 9/5/18  Yes Cordell Stephens MD   valACYclovir (VALTREX) 500 MG tablet TAKE ONE TABLET BY MOUTH DAILY 5/16/18  Yes Alexi Romero NP        No Known Allergies      HABITS:    Social History   Substance Use Topics     Smoking status: Current Every Day Smoker     Packs/day: 0.50     Years: 15.00     Types: Cigarettes     Last attempt to quit: 7/18/2013     Smokeless tobacco: Never Used      Comment: Quitplan referral declined 6/21/18     Alcohol use No     No mood altering drug use.    Family History   Problem Relation Age of Onset     Diabetes Paternal Grandmother        REVIEW OF SYSTEMS:  Ten point review of systems negative except those mentioned in the HPI.     The patient denies sleep apnea, latex allergies or MRSA    OBJECTIVE:    BP 96/60 (BP Location: Right arm, Patient Position:  "Sitting, Cuff Size: Adult Regular)  Pulse 79  Temp 98.8  F (37.1  C) (Tympanic)  Ht 5' 2\" (1.575 m)  Wt 125 lb 12.8 oz (57.1 kg)  LMP 08/10/2013  SpO2 99%  BMI 23.01 kg/m2    GENERAL: Generally appears well, in no distress with appropriate affect.  HEENT:   Sclerae anicteric - No cervical, supra/infraclavicular lymphadenopathy, no thyroid masses  Respiratory:  Lungs clear to ausculation bilaterally with good air excursion  Cardiovascular:  Regular Rate and Rhythm with no murmurs gallops or rubs, normal   Abdomen: soft, NT/ND  :  deferred  Extremities:  Extremities normal. No deformities, edema, or skin discoloration.  Skin:  no suspicious lesions or rashes  Neurological: grossly intact    Psych:  Alert, oriented, affect appropriate with normal decision making ability.      IMPRESSION:  38 yo female with abdominal bloating    PLAN:  As she is scheduled to undergo laparoscopy tomorrow I recommend that we see how she recovers from that first. Then in the next month we can get her scheduled for endoscopic evaluation of both her stomach and colon. If there is no evidence or reasons for her bloating a referral will then be placed to gastroenterology at that point. She understands and is willing to proceed forward. An informed consent for both upper and lower endoscopy was obtained. We will schedule her for endoscopy in the next month.      Thank you for allowing me to participate in the care of your patient.       Cordell Stephens MD    9/5/2018  3:23 PM    cc:  Aleix Romero    "

## 2018-09-05 NOTE — PATIENT INSTRUCTIONS
Guide to your Upper Endoscopy/Colonoscopy with SuPrep     Date of Procedure 10/5/18 with Dr. Stephens    Admit time: Surgery Department will call you the day before your procedure by 5pm with your admit time. If your surgery is on Monday, please expect a call on Friday.    If we were unable to reach you before 5PM, you may call admitting at [phone] or toll free at [phone] ext [phone]    Please call the Registered Nurse in Surgery Education at [phone] or toll free at [phone] one to two weeks before your procedure and have an allergy and medication list ready     Call the surgery nurse or your primary care provider if you should become ill within 1 week of your procedure and we will reschedule it when you are healthy. This includes signs or symptoms of a cold or the flu. This can include fever, chills, sore throat, cough, chest congestions, productive cough, runny nose.        YOUR UPCOMING COLONOSCOPY    At [facility], we want to make sure that your colonoscopy is as pleasant as possible. This guide is designed to answer any questions you might have and to walk you through the preparations you will need to make before your procedure.    Should you have additional questions, please feel free to contact us. Contact numbers are listed below. Thank you for choosing [facility].    Important Numbers    Clinic Health Unit Coordinator: [phone]  Clinic Nurse: [phone] (or [phone]; [phone])  Surgery Education Nurse: [phone] or toll free [phone]    All nursing questions or concerns can be directed to the clinic or surgery education nurse    If you have a scheduling or appointment question, or need to postpone your procedure, please call the Health Unit Coordinator between 8am and 4pm Monday through Friday.    After hours or on weekends, please call [phone] to postpone.       COLONOSCOPY PREP    7 DAYS BEFORE THE EXAM:     Do not take Aspirin (325mg)or  "other NSAIDS (Ibuprofen, Motrin, Aleve, Celebrex, Naproxen, etc) 7 days before your surgery. Tylenol is fine.    Stop taking fiber supplements, vitamins, iron or vitamins that contain iron, and herbals.  Do not eat any corn, nuts or seeds.       Arrange transportation with a family member or friend to drive you home and have an adult available to stay with you for the next 4 hours when you arrive home for your safety. If you need to take a taxi or the bus, you MUST have a responsible adult to ride with you OR YOUR PROCEDURE WILL BE CANCELLED. It is recommended that you DO NOT DRIVE for the next 24 hours after receiving anesthesia.      prescriptions at your pharmacy as soon as possible. If it has been more than one week since your appointment was scheduled, please call your pharmacy to verify it is still ready for .     Call the Surgery Education Nurse if you should become ill within 1 week of your procedure and we will reschedule it when you are healthy. This includes sings or symptoms of a cold or the flu. This can include fever, chills, sore throat, cough, chest congestions, productive cough, runny nose.       2 DAYS BEFORE THE EXAM:   Begin a low fiber diet. No raw fruits or vegatables or whole grains.  Please see the list of foods you can have and foods to avoid on page 3 of the separate \"Split-Dose SuPrep\" colonoscopy instruction packet.   Drink at least 4-6 large glasses of sports drink each day (not red or purple).      1 DAY BEFORE THE EXAM:  DO NOT EAT ANY SOLID FOOD OR MILK PRODUCTS AFTER 12:00 AM (MIDNIGHT).  Drink only clear liquids for breakfast, lunch and dinner. (No red or purple colors)  Please see the list of liquids you can have and what to avoid on page 2 of the separate \"Split-Dose SuPrep\" colonoscopy instruction packet.   Follow the instructions of your colon preparation.  No red or purple colors, milk products, or alcohol.        AT 6:00 PM THE EVENING PRIOR TO PROCEDURE:  Pour " "one 6 ounce bottle of SUPREP liquid into the mixing container.  Add cool drinking water to the 16 ounce line on the container and mix.  Note: Be sure to dilute SUPREP before you drink it.  Drink ALL the liquid in the container.    You must drink 2 or more 16 ounce containers of water over the next hour.  Stay near a toilet while using this medication.      6 HOURS PRIOR TO THE EXAM:  Pour the 2nd 6 ounce bottle of SUPREP liquid into the mixing container.  Add cool drinking water to the 16 ounce line on the container and mix.  Note: Be sure to dilute SUPREP before you drink it.  Drink ALL the liquid in the container.    You must drink 2 or more 16 ounce containers of water over the next hour.  You should be done with with prep 4 hours before the exam.    You may continue clear liquids until 4 hours prior to exam.     If you must take medication, take it with a sip of water.    DAY OF COLONOSCOPY PROCEDURE:     You many have clear liquids up until 3 hours before you check in at admitting.  Wear comfortable clothes. No jewelry, body piercings, make-up, nail polish, hair spray, lotions, perfumes or colognes. Shower before you arrive.  [place] in Admitting through the [place] [facility].  You must have a  with you and and adult available to stay with your for 4 hours at home. The medicine used in this test will make you sleepy. If you do not have someone, please reschedule or your test will be cancelled.  It is recommended that you do not drive for 24 hours after your test. Do not operate power equipment, drink alcoholic beverages, make important decisions or sign legal documents.     COLONOSCOPY FREQUENTLY ASKED QUESTIONS    What is a colonoscopy?    A colonoscopy is a test to look at the lining of your large intestine. The purpose of the exam is to check for abnormalities including growths called \"polyps\" that can lead to serious disease. A flexibles scope is inserted into your rectum by the doctor to examine your " large intestine.    What are polyps?  Polyps are abnormal growths on the lining of the colon. Most polyps are not cancerous, but some polyps have the potential to turn into cancer with time. Polyps can also bleed. For these reasons, most polyps are removed during a colonoscopy and sent to the laboratory for microscopic examination.    What preparation is needed?  The colon must be completely clean for the procedure to be performed. You may be given one or two different prep solutions to cleanse your bowel. You will also need to follow a clear liquid diet the day before your procedure.    What happens after the procedure?  After your procedure is complete, you will be taken back to your day surgery room where you will be monitored for approximately 1 hour. You can expect to feel drowsy for several hours afterward. You may experience some cramping or bloating due to the air introduced into your colon during the exam. You will not be able to drive or operate machinery the rest of the day. You will be given written discharge instructions and appropriate learning material before you go home. You must have an adult to stay at home with you for the next 4 hours after you leave the hospital for your safety.    When will I find out the results of my test?  Your surgeon will talk to you and your designated  before you leave and usually the preliminary results can be given to you at that time. If a biopsy was taken during your procedure, it will be sent to the laboratory for examination. Results usually take one week. You will be contacted by phone or by letter with results.      TIPS FOR COLON CLEANSING BEFORE YOUR COLONOSCOPY    To get accurate results from your exam, your colon must be completely clean and empty. Please follow your doctor's instructions. If you do not, you may need to repeat both the exam and colon-cleansing process.    The medicine you take may cause bloating, nausea and other discomfort. Follow  these tips to make the process as easy as possible:     You may use alcohol-free baby wipes to ease anal irritation. You may also use Vaseline to help protect the skin. Other options include Tucks wipes, hemorrhoid treatments and hydrocortisone cream.    To chill the solution, put it in your refrigerator or set it in a bowl of ice. Do not add ice in your drinking glass. You may remove the colon preparation from the refrigerator 15-30 minutes before drinking.    Stay near a toilet!    You will have diarrhea (loose watery stools) and may also have chills. Dress for comfort.    Expect to feel discomfort until the stool clears from your colon. This usually takes about 2 to 4 hours.    If you followed your doctor's orders and your stool is a clear or yellow liquid, you are ready for the exam.    If you are not sure if your colon is clean, please call your clinic and ask to speak to a nurse.

## 2018-09-05 NOTE — MR AVS SNAPSHOT
After Visit Summary   9/5/2018    Purnima Fallon    MRN: [id number]           Patient Information     Date Of Birth          1980        Visit Information        Provider Department      9/5/2018 1:30 PM Cordell Stephens MD [facility] [place]        Today's Diagnoses     Flatulence, eructation, and gas pain          Care Instructions     Guide to your Upper Endoscopy/Colonoscopy with SuPrep     Date of Procedure 10/5/18 with Dr. Stephens    Admit time: Surgery Department will call you the day before your procedure by 5pm with your admit time. If your surgery is on Monday, please expect a call on Friday.    If we were unable to reach you before 5PM, you may call admitting at [phone] or toll free at [phone] ext [phone]    Please call the Registered Nurse in Surgery Education at [phone] or toll free at [phone] one to two weeks before your procedure and have an allergy and medication list ready     Call the surgery nurse or your primary care provider if you should become ill within 1 week of your procedure and we will reschedule it when you are healthy. This includes signs or symptoms of a cold or the flu. This can include fever, chills, sore throat, cough, chest congestions, productive cough, runny nose.        YOUR UPCOMING COLONOSCOPY    At [facility], we want to make sure that your colonoscopy is as pleasant as possible. This guide is designed to answer any questions you might have and to walk you through the preparations you will need to make before your procedure.    Should you have additional questions, please feel free to contact us. Contact numbers are listed below. Thank you for choosing [facility].    Important Numbers    Clinic Health Unit Coordinator: [phone]  Clinic Nurse: [phone] (or [phone]; [phone])  Surgery Education Nurse: [phone] or toll free [phone]    All nursing questions or concerns can be  "directed to the clinic or surgery education nurse    If you have a scheduling or appointment question, or need to postpone your procedure, please call the Health Unit Coordinator between 8am and 4pm Monday through Friday.    After hours or on weekends, please call [phone] to postpone.       COLONOSCOPY PREP    7 DAYS BEFORE THE EXAM:     Do not take Aspirin (325mg)or other NSAIDS (Ibuprofen, Motrin, Aleve, Celebrex, Naproxen, etc) 7 days before your surgery. Tylenol is fine.    Stop taking fiber supplements, vitamins, iron or vitamins that contain iron, and herbals.  Do not eat any corn, nuts or seeds.       Arrange transportation with a family member or friend to drive you home and have an adult available to stay with you for the next 4 hours when you arrive home for your safety. If you need to take a taxi or the bus, you MUST have a responsible adult to ride with you OR YOUR PROCEDURE WILL BE CANCELLED. It is recommended that you DO NOT DRIVE for the next 24 hours after receiving anesthesia.      prescriptions at your pharmacy as soon as possible. If it has been more than one week since your appointment was scheduled, please call your pharmacy to verify it is still ready for .     Call the Surgery Education Nurse if you should become ill within 1 week of your procedure and we will reschedule it when you are healthy. This includes sings or symptoms of a cold or the flu. This can include fever, chills, sore throat, cough, chest congestions, productive cough, runny nose.       2 DAYS BEFORE THE EXAM:   Begin a low fiber diet. No raw fruits or vegatables or whole grains.  Please see the list of foods you can have and foods to avoid on page 3 of the separate \"Split-Dose SuPrep\" colonoscopy instruction packet.   Drink at least 4-6 large glasses of sports drink each day (not red or purple).      1 DAY BEFORE THE EXAM:  DO NOT EAT ANY SOLID FOOD OR MILK PRODUCTS AFTER 12:00 AM (MIDNIGHT).  Drink only clear " "liquids for breakfast, lunch and dinner. (No red or purple colors)  Please see the list of liquids you can have and what to avoid on page 2 of the separate \"Split-Dose SuPrep\" colonoscopy instruction packet.   Follow the instructions of your colon preparation.  No red or purple colors, milk products, or alcohol.        AT 6:00 PM THE EVENING PRIOR TO PROCEDURE:  Pour one 6 ounce bottle of SUPREP liquid into the mixing container.  Add cool drinking water to the 16 ounce line on the container and mix.  Note: Be sure to dilute SUPREP before you drink it.  Drink ALL the liquid in the container.    You must drink 2 or more 16 ounce containers of water over the next hour.  Stay near a toilet while using this medication.      6 HOURS PRIOR TO THE EXAM:  Pour the 2nd 6 ounce bottle of SUPREP liquid into the mixing container.  Add cool drinking water to the 16 ounce line on the container and mix.  Note: Be sure to dilute SUPREP before you drink it.  Drink ALL the liquid in the container.    You must drink 2 or more 16 ounce containers of water over the next hour.  You should be done with with prep 4 hours before the exam.    You may continue clear liquids until 4 hours prior to exam.     If you must take medication, take it with a sip of water.    DAY OF COLONOSCOPY PROCEDURE:     You many have clear liquids up until 3 hours before you check in at admitting.  Wear comfortable clothes. No jewelry, body piercings, make-up, nail polish, hair spray, lotions, perfumes or colognes. Shower before you arrive.  [place] in Admitting through the [facility].  You must have a  with you and and adult available to stay with your for 4 hours at home. The medicine used in this test will make you sleepy. If you do not have someone, please reschedule or your test will be cancelled.  It is recommended that you do not drive for 24 hours after your test. Do not operate power equipment, drink alcoholic beverages, make important " "decisions or sign legal documents.     COLONOSCOPY FREQUENTLY ASKED QUESTIONS    What is a colonoscopy?    A colonoscopy is a test to look at the lining of your large intestine. The purpose of the exam is to check for abnormalities including growths called \"polyps\" that can lead to serious disease. A flexibles scope is inserted into your rectum by the doctor to examine your large intestine.    What are polyps?  Polyps are abnormal growths on the lining of the colon. Most polyps are not cancerous, but some polyps have the potential to turn into cancer with time. Polyps can also bleed. For these reasons, most polyps are removed during a colonoscopy and sent to the laboratory for microscopic examination.    What preparation is needed?  The colon must be completely clean for the procedure to be performed. You may be given one or two different prep solutions to cleanse your bowel. You will also need to follow a clear liquid diet the day before your procedure.    What happens after the procedure?  After your procedure is complete, you will be taken back to your day surgery room where you will be monitored for approximately 1 hour. You can expect to feel drowsy for several hours afterward. You may experience some cramping or bloating due to the air introduced into your colon during the exam. You will not be able to drive or operate machinery the rest of the day. You will be given written discharge instructions and appropriate learning material before you go home. You must have an adult to stay at home with you for the next 4 hours after you leave the hospital for your safety.    When will I find out the results of my test?  Your surgeon will talk to you and your designated  before you leave and usually the preliminary results can be given to you at that time. If a biopsy was taken during your procedure, it will be sent to the laboratory for examination. Results usually take one week. You will be contacted by phone or " by letter with results.      TIPS FOR COLON CLEANSING BEFORE YOUR COLONOSCOPY    To get accurate results from your exam, your colon must be completely clean and empty. Please follow your doctor's instructions. If you do not, you may need to repeat both the exam and colon-cleansing process.    The medicine you take may cause bloating, nausea and other discomfort. Follow these tips to make the process as easy as possible:     You may use alcohol-free baby wipes to ease anal irritation. You may also use Vaseline to help protect the skin. Other options include Tucks wipes, hemorrhoid treatments and hydrocortisone cream.    To chill the solution, put it in your refrigerator or set it in a bowl of ice. Do not add ice in your drinking glass. You may remove the colon preparation from the refrigerator 15-30 minutes before drinking.    Stay near a toilet!    You will have diarrhea (loose watery stools) and may also have chills. Dress for comfort.    Expect to feel discomfort until the stool clears from your colon. This usually takes about 2 to 4 hours.    If you followed your doctor's orders and your stool is a clear or yellow liquid, you are ready for the exam.    If you are not sure if your colon is clean, please call your clinic and ask to speak to a nurse.                 Follow-ups after your visit        Your next 10 appointments already scheduled     Sep 06, 2018   Procedure with Suraj Whitaker MD   [facility] ([facility] )    [address] [place] [id number] [phone]            Sep 21, 2018  1:00 PM CDT   (Arrive by 12:45 PM)   SHORT with David Franke Frow, MD   [facility] ([facility] )    [address] [place] [address]   [phone]              Who to contact     If you have questions or need follow up information about today's clinic visit or your schedule please contact [facility] directly at [phone].  Normal or  "non-critical lab and imaging results will be communicated to you by MyChart, letter or phone within 4 business days after the clinic has received the results. If you do not hear from us within 7 days, please contact the clinic through [employer] or phone. If you have a critical or abnormal lab result, we will notify you by phone as soon as possible.  Submit refill requests through [employer] or call your pharmacy and they will forward the refill request to us. Please allow 3 business days for your refill to be completed.          Additional Information About Your Visit        [employer] Information     [employer] gives you secure access to your electronic health record. If you see a primary care provider, you can also send messages to your care team and make appointments. If you have questions, please call your primary care clinic.  If you do not have a primary care provider, please call [phone] and they will assist you.        Care EveryWhere ID     This is your Care EveryWhere ID. This could be used by other organizations to access your [place] medical records  [id number]        Your Vitals Were     Pulse Temperature Height Last Period Pulse Oximetry BMI (Body Mass Index)    79 98.8  F (37.1  C) (Tympanic) 5' 2\" (1.575 m) 08/10/2013 99% 23.01 kg/m2       Blood Pressure from Last 3 Encounters:   09/05/18 96/60   09/04/18 98/62   08/30/18 (!) 88/60    Weight from Last 3 Encounters:   09/05/18 125 lb 12.8 oz (57.1 kg)   09/04/18 125 lb (56.7 kg)   08/30/18 127 lb (57.6 kg)              Today, you had the following     No orders found for display       Primary Care Provider Office Phone # Fax #    Alexi Romero [phone]       [address] [place], [place] [id number]        Equal Access to Services     CIELO CRUZ : Felix hoover Sostacy, waaxda luqadaha, qaybta kaalmada juanyada, debby clemons. So [facility] [phone].    ATENCIÓN: Si mignonla español, tiene a cyr " disposición servicios gratuitos de asistencia lingüística. Maycol birch [phone].    We comply with applicable federal civil rights laws and [place] laws. We do not discriminate on the basis of race, color, national origin, age, disability, sex, sexual orientation, or gender identity.            Thank you!     Thank you for [facility]  [facility]. Our goal is always to provide you with excellent care. Hearing back from our patients is one way we can continue to improve our services. Please take a few minutes to complete the written survey that you may receive in the mail after your visit with us. Thank you!             Your Updated Medication List - Protect others around you: Learn how to safely use, store and throw away your medicines at www.disposemymeds.org.          This list is accurate as of 9/5/18  1:48 PM.  Always use your most recent med list.                   Brand Name Dispense Instructions for use Diagnosis    HYDROcodone-acetaminophen 5-325 MG per tablet    NORCO    18 tablet    Take 1 tablet by mouth every 8 hours For severe abdominal pain    Pelvic pain in female       ibuprofen 800 MG tablet    ADVIL/MOTRIN    90 tablet    TAKE 1 TABLET(800 MG) BY MOUTH EVERY 8 HOURS AS NEEDED FOR PAIN    Pain of toe of right foot       valACYclovir 500 MG tablet    VALTREX    90 tablet    TAKE ONE TABLET BY MOUTH DAILY    Herpes simplex virus infection

## 2018-09-06 ENCOUNTER — SURGERY (OUTPATIENT)
Age: 38
End: 2018-09-06

## 2018-09-06 ENCOUNTER — HOSPITAL ENCOUNTER (OUTPATIENT)
Facility: HOSPITAL | Age: 38
Discharge: HOME | End: 2018-09-06
Attending: OBSTETRICS & GYNECOLOGY | Admitting: OBSTETRICS & GYNECOLOGY
Payer: COMMERCIAL

## 2018-09-06 ENCOUNTER — TELEPHONE (OUTPATIENT)
Dept: SURGERY | Facility: OTHER | Age: 38
End: 2018-09-06

## 2018-09-06 ENCOUNTER — ANESTHESIA (OUTPATIENT)
Dept: SURGERY | Facility: HOSPITAL | Age: 38
End: 2018-09-06
Payer: COMMERCIAL

## 2018-09-06 VITALS
DIASTOLIC BLOOD PRESSURE: 57 MMHG | HEIGHT: 62 IN | TEMPERATURE: 98.2 F | OXYGEN SATURATION: 96 % | RESPIRATION RATE: 14 BRPM | SYSTOLIC BLOOD PRESSURE: 109 MMHG | WEIGHT: 125 LBS | HEART RATE: 75 BPM | BODY MASS INDEX: 23 KG/M2

## 2018-09-06 DIAGNOSIS — R14.1 FLATULENCE, ERUCTATION AND GAS PAIN: Primary | ICD-10-CM

## 2018-09-06 DIAGNOSIS — R14.3 FLATULENCE, ERUCTATION AND GAS PAIN: Primary | ICD-10-CM

## 2018-09-06 DIAGNOSIS — Z98.890 STATUS POST LAPAROSCOPIC PROCEDURE: Primary | ICD-10-CM

## 2018-09-06 DIAGNOSIS — R14.2 FLATULENCE, ERUCTATION AND GAS PAIN: Primary | ICD-10-CM

## 2018-09-06 LAB — HGB BLD-MCNC: 15.3 G/DL (ref 11.7–15.7)

## 2018-09-06 PROCEDURE — 25000128 H RX IP 250 OP 636: Performed by: ANESTHESIOLOGY

## 2018-09-06 PROCEDURE — 25000128 H RX IP 250 OP 636: Performed by: OBSTETRICS & GYNECOLOGY

## 2018-09-06 PROCEDURE — 25000125 ZZHC RX 250: Performed by: NURSE ANESTHETIST, CERTIFIED REGISTERED

## 2018-09-06 PROCEDURE — 25000566 ZZH SEVOFLURANE, EA 15 MIN: Performed by: ANESTHESIOLOGY

## 2018-09-06 PROCEDURE — 01999 UNLISTED ANES PROCEDURE: CPT | Performed by: NURSE ANESTHETIST, CERTIFIED REGISTERED

## 2018-09-06 PROCEDURE — 25000128 H RX IP 250 OP 636: Performed by: NURSE ANESTHETIST, CERTIFIED REGISTERED

## 2018-09-06 PROCEDURE — 58661 LAPAROSCOPY REMOVE ADNEXA: CPT | Performed by: OBSTETRICS & GYNECOLOGY

## 2018-09-06 PROCEDURE — 71000014 ZZH RECOVERY PHASE 1 LEVEL 2 FIRST HR: Performed by: OBSTETRICS & GYNECOLOGY

## 2018-09-06 PROCEDURE — 25000125 ZZHC RX 250: Performed by: ANESTHESIOLOGY

## 2018-09-06 PROCEDURE — 25000132 ZZH RX MED GY IP 250 OP 250 PS 637: Performed by: OBSTETRICS & GYNECOLOGY

## 2018-09-06 PROCEDURE — 36000056 ZZH SURGERY LEVEL 3 1ST 30 MIN: Performed by: OBSTETRICS & GYNECOLOGY

## 2018-09-06 PROCEDURE — 27110028 ZZH OR GENERAL SUPPLY NON-STERILE: Performed by: OBSTETRICS & GYNECOLOGY

## 2018-09-06 PROCEDURE — 71000027 ZZH RECOVERY PHASE 2 EACH 15 MINS: Performed by: OBSTETRICS & GYNECOLOGY

## 2018-09-06 PROCEDURE — 37000009 ZZH ANESTHESIA TECHNICAL FEE, EACH ADDTL 15 MIN: Performed by: OBSTETRICS & GYNECOLOGY

## 2018-09-06 PROCEDURE — 58661 LAPAROSCOPY REMOVE ADNEXA: CPT | Performed by: ANESTHESIOLOGY

## 2018-09-06 PROCEDURE — 85018 HEMOGLOBIN: CPT | Performed by: OBSTETRICS & GYNECOLOGY

## 2018-09-06 PROCEDURE — 88305 TISSUE EXAM BY PATHOLOGIST: CPT | Mod: TC | Performed by: OBSTETRICS & GYNECOLOGY

## 2018-09-06 PROCEDURE — 37000008 ZZH ANESTHESIA TECHNICAL FEE, 1ST 30 MIN: Performed by: OBSTETRICS & GYNECOLOGY

## 2018-09-06 PROCEDURE — 36000058 ZZH SURGERY LEVEL 3 EA 15 ADDTL MIN: Performed by: OBSTETRICS & GYNECOLOGY

## 2018-09-06 PROCEDURE — 36415 COLL VENOUS BLD VENIPUNCTURE: CPT | Performed by: OBSTETRICS & GYNECOLOGY

## 2018-09-06 PROCEDURE — 27210794 ZZH OR GENERAL SUPPLY STERILE: Performed by: OBSTETRICS & GYNECOLOGY

## 2018-09-06 PROCEDURE — 40000306 ZZH STATISTIC PRE PROC ASSESS II: Performed by: OBSTETRICS & GYNECOLOGY

## 2018-09-06 RX ORDER — NALOXONE HYDROCHLORIDE 0.4 MG/ML
.1-.4 INJECTION, SOLUTION INTRAMUSCULAR; INTRAVENOUS; SUBCUTANEOUS
Status: DISCONTINUED | OUTPATIENT
Start: 2018-09-06 | End: 2018-09-06 | Stop reason: HOSPADM

## 2018-09-06 RX ORDER — NEOSTIGMINE METHYLSULFATE 1 MG/ML
VIAL (ML) INJECTION PRN
Status: DISCONTINUED | OUTPATIENT
Start: 2018-09-06 | End: 2018-09-06

## 2018-09-06 RX ORDER — PROPOFOL 10 MG/ML
INJECTION, EMULSION INTRAVENOUS PRN
Status: DISCONTINUED | OUTPATIENT
Start: 2018-09-06 | End: 2018-09-06

## 2018-09-06 RX ORDER — PHENAZOPYRIDINE HYDROCHLORIDE 100 MG/1
200 TABLET, FILM COATED ORAL ONCE
Status: COMPLETED | OUTPATIENT
Start: 2018-09-06 | End: 2018-09-06

## 2018-09-06 RX ORDER — MEPERIDINE HYDROCHLORIDE 50 MG/ML
12.5 INJECTION INTRAMUSCULAR; INTRAVENOUS; SUBCUTANEOUS
Status: DISCONTINUED | OUTPATIENT
Start: 2018-09-06 | End: 2018-09-06 | Stop reason: HOSPADM

## 2018-09-06 RX ORDER — BUPIVACAINE HYDROCHLORIDE 5 MG/ML
INJECTION, SOLUTION PERINEURAL PRN
Status: DISCONTINUED | OUTPATIENT
Start: 2018-09-06 | End: 2018-09-06 | Stop reason: HOSPADM

## 2018-09-06 RX ORDER — SCOLOPAMINE TRANSDERMAL SYSTEM 1 MG/1
1 PATCH, EXTENDED RELEASE TRANSDERMAL ONCE
Status: COMPLETED | OUTPATIENT
Start: 2018-09-06 | End: 2018-09-06

## 2018-09-06 RX ORDER — HYDROCODONE BITARTRATE AND ACETAMINOPHEN 5; 325 MG/1; MG/1
1-2 TABLET ORAL EVERY 4 HOURS PRN
Qty: 20 TABLET | Refills: 0 | Status: SHIPPED | OUTPATIENT
Start: 2018-09-06 | End: 2018-09-24

## 2018-09-06 RX ORDER — ONDANSETRON 4 MG/1
4 TABLET, ORALLY DISINTEGRATING ORAL EVERY 30 MIN PRN
Status: DISCONTINUED | OUTPATIENT
Start: 2018-09-06 | End: 2018-09-06 | Stop reason: HOSPADM

## 2018-09-06 RX ORDER — SODIUM CHLORIDE, SODIUM LACTATE, POTASSIUM CHLORIDE, CALCIUM CHLORIDE 600; 310; 30; 20 MG/100ML; MG/100ML; MG/100ML; MG/100ML
INJECTION, SOLUTION INTRAVENOUS CONTINUOUS
Status: DISCONTINUED | OUTPATIENT
Start: 2018-09-06 | End: 2018-09-06 | Stop reason: HOSPADM

## 2018-09-06 RX ORDER — LABETALOL HYDROCHLORIDE 5 MG/ML
10 INJECTION, SOLUTION INTRAVENOUS
Status: DISCONTINUED | OUTPATIENT
Start: 2018-09-06 | End: 2018-09-06 | Stop reason: HOSPADM

## 2018-09-06 RX ORDER — FENTANYL CITRATE 50 UG/ML
INJECTION, SOLUTION INTRAMUSCULAR; INTRAVENOUS PRN
Status: DISCONTINUED | OUTPATIENT
Start: 2018-09-06 | End: 2018-09-06

## 2018-09-06 RX ORDER — ACETAMINOPHEN 325 MG/1
975 TABLET ORAL ONCE
Status: COMPLETED | OUTPATIENT
Start: 2018-09-06 | End: 2018-09-06

## 2018-09-06 RX ORDER — KETOROLAC TROMETHAMINE 30 MG/ML
30 INJECTION, SOLUTION INTRAMUSCULAR; INTRAVENOUS EVERY 6 HOURS PRN
Status: DISCONTINUED | OUTPATIENT
Start: 2018-09-06 | End: 2018-09-06 | Stop reason: HOSPADM

## 2018-09-06 RX ORDER — ONDANSETRON 2 MG/ML
4 INJECTION INTRAMUSCULAR; INTRAVENOUS EVERY 30 MIN PRN
Status: DISCONTINUED | OUTPATIENT
Start: 2018-09-06 | End: 2018-09-06 | Stop reason: HOSPADM

## 2018-09-06 RX ORDER — ALBUTEROL SULFATE 0.83 MG/ML
2.5 SOLUTION RESPIRATORY (INHALATION) EVERY 4 HOURS PRN
Status: DISCONTINUED | OUTPATIENT
Start: 2018-09-06 | End: 2018-09-06 | Stop reason: HOSPADM

## 2018-09-06 RX ORDER — HYDROCODONE BITARTRATE AND ACETAMINOPHEN 5; 325 MG/1; MG/1
1 TABLET ORAL
Status: COMPLETED | OUTPATIENT
Start: 2018-09-06 | End: 2018-09-06

## 2018-09-06 RX ORDER — FENTANYL CITRATE 50 UG/ML
INJECTION, SOLUTION INTRAMUSCULAR; INTRAVENOUS
Status: DISCONTINUED
Start: 2018-09-06 | End: 2018-09-06 | Stop reason: HOSPADM

## 2018-09-06 RX ORDER — OXYCODONE HYDROCHLORIDE 5 MG/1
5 TABLET ORAL EVERY 4 HOURS PRN
Status: DISCONTINUED | OUTPATIENT
Start: 2018-09-06 | End: 2018-09-06 | Stop reason: HOSPADM

## 2018-09-06 RX ORDER — ONDANSETRON 2 MG/ML
INJECTION INTRAMUSCULAR; INTRAVENOUS PRN
Status: DISCONTINUED | OUTPATIENT
Start: 2018-09-06 | End: 2018-09-06

## 2018-09-06 RX ORDER — HYDROXYZINE HYDROCHLORIDE 25 MG/1
25 TABLET, FILM COATED ORAL
Status: DISCONTINUED | OUTPATIENT
Start: 2018-09-06 | End: 2018-09-06 | Stop reason: HOSPADM

## 2018-09-06 RX ORDER — GLYCOPYRROLATE 0.2 MG/ML
INJECTION, SOLUTION INTRAMUSCULAR; INTRAVENOUS PRN
Status: DISCONTINUED | OUTPATIENT
Start: 2018-09-06 | End: 2018-09-06

## 2018-09-06 RX ORDER — DEXAMETHASONE SODIUM PHOSPHATE 10 MG/ML
INJECTION, SOLUTION INTRAMUSCULAR; INTRAVENOUS PRN
Status: DISCONTINUED | OUTPATIENT
Start: 2018-09-06 | End: 2018-09-06

## 2018-09-06 RX ORDER — DEXAMETHASONE SODIUM PHOSPHATE 4 MG/ML
4 INJECTION, SOLUTION INTRA-ARTICULAR; INTRALESIONAL; INTRAMUSCULAR; INTRAVENOUS; SOFT TISSUE EVERY 10 MIN PRN
Status: DISCONTINUED | OUTPATIENT
Start: 2018-09-06 | End: 2018-09-06 | Stop reason: HOSPADM

## 2018-09-06 RX ORDER — KETOROLAC TROMETHAMINE 30 MG/ML
INJECTION, SOLUTION INTRAMUSCULAR; INTRAVENOUS PRN
Status: DISCONTINUED | OUTPATIENT
Start: 2018-09-06 | End: 2018-09-06

## 2018-09-06 RX ORDER — LIDOCAINE HYDROCHLORIDE 20 MG/ML
INJECTION, SOLUTION INFILTRATION; PERINEURAL PRN
Status: DISCONTINUED | OUTPATIENT
Start: 2018-09-06 | End: 2018-09-06

## 2018-09-06 RX ORDER — HYDRALAZINE HYDROCHLORIDE 20 MG/ML
2.5-5 INJECTION INTRAMUSCULAR; INTRAVENOUS EVERY 10 MIN PRN
Status: DISCONTINUED | OUTPATIENT
Start: 2018-09-06 | End: 2018-09-06 | Stop reason: HOSPADM

## 2018-09-06 RX ORDER — FENTANYL CITRATE 50 UG/ML
25-50 INJECTION, SOLUTION INTRAMUSCULAR; INTRAVENOUS
Status: DISCONTINUED | OUTPATIENT
Start: 2018-09-06 | End: 2018-09-06 | Stop reason: HOSPADM

## 2018-09-06 RX ORDER — IBUPROFEN 600 MG/1
600 TABLET, FILM COATED ORAL
Status: DISCONTINUED | OUTPATIENT
Start: 2018-09-06 | End: 2018-09-06 | Stop reason: HOSPADM

## 2018-09-06 RX ORDER — ONDANSETRON 4 MG/1
4 TABLET, ORALLY DISINTEGRATING ORAL
Status: DISCONTINUED | OUTPATIENT
Start: 2018-09-06 | End: 2018-09-06 | Stop reason: HOSPADM

## 2018-09-06 RX ADMIN — GLYCOPYRROLATE 0.4 MG: 0.2 INJECTION, SOLUTION INTRAMUSCULAR; INTRAVENOUS at 16:00

## 2018-09-06 RX ADMIN — KETOROLAC TROMETHAMINE 30 MG: 30 INJECTION, SOLUTION INTRAMUSCULAR at 16:03

## 2018-09-06 RX ADMIN — PHENAZOPYRIDINE HYDROCHLORIDE 200 MG: 100 TABLET ORAL at 12:23

## 2018-09-06 RX ADMIN — FENTANYL CITRATE 25 MCG: 50 INJECTION INTRAMUSCULAR; INTRAVENOUS at 16:45

## 2018-09-06 RX ADMIN — FENTANYL CITRATE 50 MCG: 50 INJECTION, SOLUTION INTRAMUSCULAR; INTRAVENOUS at 16:05

## 2018-09-06 RX ADMIN — Medication 100 MG: at 15:06

## 2018-09-06 RX ADMIN — FENTANYL CITRATE 25 MCG: 50 INJECTION INTRAMUSCULAR; INTRAVENOUS at 16:40

## 2018-09-06 RX ADMIN — SODIUM CHLORIDE, POTASSIUM CHLORIDE, SODIUM LACTATE AND CALCIUM CHLORIDE: 600; 310; 30; 20 INJECTION, SOLUTION INTRAVENOUS at 12:24

## 2018-09-06 RX ADMIN — ACETAMINOPHEN 975 MG: 325 TABLET, FILM COATED ORAL at 12:25

## 2018-09-06 RX ADMIN — FENTANYL CITRATE 50 MCG: 50 INJECTION, SOLUTION INTRAMUSCULAR; INTRAVENOUS at 16:03

## 2018-09-06 RX ADMIN — BUPIVACAINE HYDROCHLORIDE 10 ML: 5 INJECTION, SOLUTION PERINEURAL at 15:20

## 2018-09-06 RX ADMIN — ONDANSETRON 4 MG: 2 INJECTION INTRAMUSCULAR; INTRAVENOUS at 15:14

## 2018-09-06 RX ADMIN — LIDOCAINE HYDROCHLORIDE 40 MG: 20 INJECTION, SOLUTION INFILTRATION; PERINEURAL at 15:06

## 2018-09-06 RX ADMIN — PROPOFOL 150 MG: 10 INJECTION, EMULSION INTRAVENOUS at 15:06

## 2018-09-06 RX ADMIN — MIDAZOLAM 2 MG: 1 INJECTION INTRAMUSCULAR; INTRAVENOUS at 14:38

## 2018-09-06 RX ADMIN — FENTANYL CITRATE 50 MCG: 50 INJECTION INTRAMUSCULAR; INTRAVENOUS at 17:20

## 2018-09-06 RX ADMIN — FENTANYL CITRATE 50 MCG: 50 INJECTION INTRAMUSCULAR; INTRAVENOUS at 16:32

## 2018-09-06 RX ADMIN — DEXAMETHASONE SODIUM PHOSPHATE 10 MG: 10 INJECTION, SOLUTION INTRAMUSCULAR; INTRAVENOUS at 15:12

## 2018-09-06 RX ADMIN — ROCURONIUM BROMIDE 5 MG: 10 INJECTION INTRAVENOUS at 15:06

## 2018-09-06 RX ADMIN — FENTANYL CITRATE 50 MCG: 50 INJECTION, SOLUTION INTRAMUSCULAR; INTRAVENOUS at 15:06

## 2018-09-06 RX ADMIN — Medication 3 MG: at 16:00

## 2018-09-06 RX ADMIN — HYDROCODONE BITARTRATE AND ACETAMINOPHEN 1 TABLET: 5; 325 TABLET ORAL at 17:17

## 2018-09-06 RX ADMIN — SCOPALAMINE 1 PATCH: 1 PATCH, EXTENDED RELEASE TRANSDERMAL at 12:23

## 2018-09-06 RX ADMIN — ROCURONIUM BROMIDE 15 MG: 10 INJECTION INTRAVENOUS at 15:11

## 2018-09-06 RX ADMIN — FENTANYL CITRATE 50 MCG: 50 INJECTION, SOLUTION INTRAMUSCULAR; INTRAVENOUS at 15:12

## 2018-09-06 RX ADMIN — SODIUM CHLORIDE, POTASSIUM CHLORIDE, SODIUM LACTATE AND CALCIUM CHLORIDE: 600; 310; 30; 20 INJECTION, SOLUTION INTRAVENOUS at 13:45

## 2018-09-06 NOTE — ANESTHESIA CARE TRANSFER NOTE
Patient: Purnima Fallon    Procedure(s):  LAPAROSCOPY WITH PERITONEAL BIOPSIES AND REMOVAL LEFT FALLOPIAN TUBE - Wound Class: II-Clean Contaminated    Diagnosis: CHRONIC PELVIC PAIN IN FEMALE  Diagnosis Additional Information: No value filed.    Anesthesia Type:   General, ETT, RSI     Note:  Airway :Nasal Cannula  Patient transferred to:PACU  Handoff Report: Identifed the Patient, Identified the Reponsible Provider, Reviewed the pertinent medical history, Discussed the surgical course, Reviewed Intra-OP anesthesia mangement and issues during anesthesia, Set expectations for post-procedure period and Allowed opportunity for questions and acknowledgement of understanding      Vitals: (Last set prior to Anesthesia Care Transfer)    CRNA VITALS  9/6/2018 1547 - 9/6/2018 1622      9/6/2018             Resp Rate (set): 8                Electronically Signed By: ALONDRA Latham CRNA  September 6, 2018  4:22 PM

## 2018-09-06 NOTE — IP AVS SNAPSHOT
MRN:[id number]                      After Visit Summary   9/6/2018    Purnima Fallon    MRN: [id number]           Thank you!     Thank you for choosing [facility] for your care. Our goal is always to provide you with excellent care. Hearing back from our patients is one way we can continue to improve our services. Please take a few minutes to complete the written survey that you may receive in the mail after you visit with us. Thank you!        Patient Information     Date Of Birth          1980        About your hospital stay     You were admitted on:  September 6, 2018 You last received care in the:  [facility]    You were discharged on:  September 6, 2018       Who to Call     For medical emergencies, please call 911.  For non-urgent questions about your medical care, please call your primary care provider or clinic, [phone]  For questions related to your surgery, please call your surgery clinic        Attending Provider     Provider Specialty    Suraj Whitaker MD OB/Gyn       Primary Care Provider Office Phone # Fax #    Alexi Romero [phone]      After Care Instructions     Discharge Instructions       Resume pre procedure diet            Discharge Instructions       Pelvic Rest. No tampons, douching or intercourse for  2  weeks.            Discharge Instructions       Patient may return to work as tolerated.            Discharge Instructions       Patient to arrange follow up appointment in 2  weeks            No alcohol       NO ALCOHOL for 24 hours post procedure            No driving or operating machinery       No driving or operating machinery until day after procedure            No lifting       No lifting over 20 pounds and no strenuous physical activity.  For 2 weeks            Shower        Shower on Post-op day  1.   DO NOT take a bath                  Your next 10 appointments already scheduled     Sep 21, 2018  1:00 PM CDT   (Arrive by  "12:45 PM)   SHORT with David Franke Frow, MD   [facility] ([facility] )    [address]  [facility] [address]   [phone]            Oct 05, 2018   Procedure with Cordell Stephens MD   [facility] ([facility] )    [address] [place] [id number] [phone]              Further instructions from your care team           Post-Anesthesia Patient Instructions    IMMEDIATELY FOLLOWING SURGERY:  Do not drive or operate machinery for the first twenty four hours after surgery.  Do not make any important decisions for twenty four hours after surgery or while taking narcotic pain medications or sedatives.  If you develop intractable nausea and vomiting or a severe headache please notify your doctor immediately.    FOLLOW-UP:  Please make an appointment with your surgeon as instructed. You do not need to follow up with anesthesia unless specifically instructed to do so.    WOUND CARE INSTRUCTIONS (if applicable):  Keep a dry clean dressing on the anesthesia/puncture wound site if there is drainage.  Once the wound has quit draining you may leave it open to air.  Generally you should leave the bandage intact for twenty four hours unless there is drainage.  If the epidural site drains for more than 36-48 hours please call the anesthesia department.    QUESTIONS?:  Please feel free to call your physician or the hospital  if you have any questions, and they will be happy to assist you.         You had a pain medication here at hospital at 5:20 pm.    .      Pending Results     No orders found from 9/4/2018 to 9/7/2018.            Admission Information     Date & Time Provider Department Dept. Phone    9/6/2018 Suraj Whitaker MD [facility] Preop/Phase [phone]      Your Vitals Were     Blood Pressure Pulse Temperature Respirations Height Weight    101/66 75 99.1  F (37.3  C) (Temporal) 14 1.575 m (5' 2\") 56.7 kg (125 lb)    Last Period Pulse " Oximetry BMI (Body Mass Index)             08/10/2013 96% 22.86 kg/m2         [email or web address] Information     [employer] gives you secure access to your electronic health record. If you see a primary care provider, you can also send messages to your care team and make appointments. If you have questions, please call your primary care clinic.  If you do not have a primary care provider, please call [phone] and they will assist you.        Care EveryWhere ID     This is your Care EveryWhere ID. This could be used by other organizations to access your [facility] medical records  [id number]        Equal Access to Services     [facility]: Hadii otto hoover Sostacy, waclark rain, jeffrey kaalkendra douglas, debby clemons. So [facility] [phone].    ATENCIÓN: Si habla español, tiene a cyr disposición servicios gratuitos de asistencia lingüística. Llame al [phone].    We comply with applicable federal civil rights laws and [place] laws. We do not discriminate on the basis of race, color, national origin, age, disability, sex, sexual orientation, or gender identity.               Review of your medicines      UNREVIEWED medicines. Ask your doctor about these medicines        Dose / Directions    * HYDROcodone-acetaminophen 5-325 MG per tablet   Commonly known as:  NORCO   This may have changed:  Another medication with the same name was added. Make sure you understand how and when to take each.   Used for:  Pelvic pain in female   Ask about: Which instructions should I use?        Dose:  1 tablet   Take 1 tablet by mouth every 8 hours For severe abdominal pain   Quantity:  18 tablet   Refills:  0       * HYDROcodone-acetaminophen 5-325 MG per tablet   Commonly known as:  NORCO   This may have changed:  You were already taking a medication with the same name, and this prescription was added. Make sure you understand how and when to take each.   Used for:  Status post laparoscopic procedure    Ask about: Which instructions should I use?        Dose:  1-2 tablet   Take 1-2 tablets by mouth every 4 hours as needed for other (Moderate to Severe Pain)   Quantity:  20 tablet   Refills:  0       ibuprofen 800 MG tablet   Commonly known as:  ADVIL/MOTRIN   Used for:  Pain of toe of right foot        TAKE 1 TABLET(800 MG) BY MOUTH EVERY 8 HOURS AS NEEDED FOR PAIN   Quantity:  90 tablet   Refills:  0       Na Sulfate-K Sulfate-Mg Sulf solution   Commonly known as:  SUPREP BOWEL PREP KIT   Used for:  Flatulence, eructation, and gas pain        Dose:  1 Bottle   Take 177 mLs (1 Bottle) by mouth See Admin Instructions   Quantity:  2 Bottle   Refills:  0       valACYclovir 500 MG tablet   Commonly known as:  VALTREX   Used for:  Herpes simplex virus infection        TAKE ONE TABLET BY MOUTH DAILY   Quantity:  90 tablet   Refills:  1       * Notice:  This list has 2 medication(s) that are the same as other medications prescribed for you. Read the directions carefully, and ask your doctor or other care provider to review them with you.         Where to get your medicines      Some of these will need a paper prescription and others can be bought over the counter. Ask your nurse if you have questions.     Bring a paper prescription for each of these medications     HYDROcodone-acetaminophen 5-325 MG per tablet                Protect others around you: Learn how to safely use, store and throw away your medicines at www.disposemymeds.org.        Information about OPIOIDS     PRESCRIPTION OPIOIDS: WHAT YOU NEED TO KNOW   We gave you an opioid (narcotic) pain medicine. It is important to manage your pain, but opioids are not always the best choice. You should first try all the other options your care team gave you. Take this medicine for as short a time (and as few doses) as possible.    Some activities can increase your pain, such as bandage changes or therapy sessions. It may help to take your pain medicine 30 to 60  minutes before these activities. Reduce your stress by getting enough sleep, working on hobbies you enjoy and practicing relaxation or meditation. Talk to your care team about ways to manage your pain beyond prescription opioids.    These medicines have risks:    DO NOT drive when on new or higher doses of pain medicine. These medicines can affect your alertness and reaction times, and you could be arrested for driving under the influence (DUI). If you need to use opioids long-term, talk to your care team about driving.    DO NOT operate heavy machinery    DO NOT do any other dangerous activities while taking these medicines.    DO NOT drink any alcohol while taking these medicines.     If the opioid prescribed includes acetaminophen, DO NOT take with any other medicines that contain acetaminophen. Read all labels carefully. Look for the word  acetaminophen  or  Tylenol.  Ask your pharmacist if you have questions or are unsure.    You can get addicted to pain medicines, especially if you have a history of addiction (chemical, alcohol or substance dependence). Talk to your care team about ways to reduce this risk.    All opioids tend to cause constipation. Drink plenty of water and eat foods that have a lot of fiber, such as fruits, vegetables, prune juice, apple juice and high-fiber cereal. Take a laxative (Miralax, milk of magnesia, Colace, Senna) if you don t move your bowels at least every other day. Other side effects include upset stomach, sleepiness, dizziness, throwing up, tolerance (needing more of the medicine to have the same effect), physical dependence and slowed breathing.    Store your pills in a secure place, locked if possible. We will not replace any lost or stolen medicine. If you don t finish your medicine, please throw away (dispose) as directed by your pharmacist. The [place] Pollution [employer] Agency has more information about safe disposal:  https://www.pca.[facility].[place].us/living-green/managing-unwanted-medications             Medication List: This is a list of all your medications and when to take them. Check marks below indicate your daily home schedule. Keep this list as a reference.      Medications           Morning Afternoon Evening Bedtime As Needed    ibuprofen 800 MG tablet   Commonly known as:  ADVIL/MOTRIN   TAKE 1 TABLET(800 MG) BY MOUTH EVERY 8 HOURS AS NEEDED FOR PAIN                                Na Sulfate-K Sulfate-Mg Sulf solution   Commonly known as:  SUPREP BOWEL PREP KIT   Take 177 mLs (1 Bottle) by mouth See Admin Instructions                                valACYclovir 500 MG tablet   Commonly known as:  VALTREX   TAKE ONE TABLET BY MOUTH DAILY                                  ASK your doctor about these medications           Morning Afternoon Evening Bedtime As Needed    * HYDROcodone-acetaminophen 5-325 MG per tablet   Commonly known as:  NORCO   Take 1 tablet by mouth every 8 hours For severe abdominal pain   Last time this was given:  1 tablet on 9/6/2018  5:17 PM   Ask about: Which instructions should I use?                                * HYDROcodone-acetaminophen 5-325 MG per tablet   Commonly known as:  NORCO   Take 1-2 tablets by mouth every 4 hours as needed for other (Moderate to Severe Pain)   Last time this was given:  1 tablet on 9/6/2018  5:17 PM   Ask about: Which instructions should I use?                                * Notice:  This list has 2 medication(s) that are the same as other medications prescribed for you. Read the directions carefully, and ask your doctor or other care provider to review them with you.              More Information        Pelvic Laparoscopy    Laparoscopy is a type of surgery done using very small incisions. This type of surgery is possible because of the laparoscope (a long, slender tool with a camera and light). It lets your surgeon see inside the stomach. To perform the surgery,  special instruments are inserted into the stomach through small incisions. Pelvic laparoscopy is often used to diagnose and treat the causes of pelvic problems, such as pain and infertility. Laparoscopy often involves:    A short hospital stay. (You can most likely go home the same day.)    A quick recovery    Minimal anesthesia    Small external scars    Mild to moderate postoperative pain  Getting ready  To prepare for surgery:    Tell your surgeon about any medicines you take. Include herbs, supplements, and over-the-counter medicines. You may need to stop taking certain medicines, such as aspirin, for 7 to 10 days before surgery.    Do not eat or drink anything after the midnight before surgery.    Arrange for a ride home after surgery.  Before the procedure  You will most likely be given general anesthesia to make you sleep during the procedure. A catheter may be inserted to drain urine from the bladder.   How pelvic laparoscopy is done  One or more small (quarter- or half-inch) incisions are made near the navel or the pubic hairline, or on either side of the lower belly. The laparoscope is inserted through an incision. It sends images to a video screen, allowing the surgeon a close-up view of the organs. Gas is used to inflate the belly, allowing the surgeon room to see and work. Depending on what is found, surgery to treat the problem may be done at this time.  After the procedure    You ll be taken to a post-op area to wake up and recover from anesthesia.    You may feel some shoulder pain. This is due to irritation from the gas used to inflate the belly.    You may have some discharge from the vagina. If so, ask the nurse for a pad.    You will be asked to walk around to improve breathing and blood flow.    If you had a catheter, it will most likely be removed before you go home.    You can go home as soon as you recover from anesthesia and your condition is stable.  Your recovery  Recovery time depends on  which procedure was done through the laparoscope. Your recovery from pelvic laparoscopy may take up to 6 weeks. If it was a simple procedure, like tubal ligation, then 2 weeks is reasonable. For laparoscopic hysterectomies, the recovery may be closer to 6 weeks. While you recover, be sure to follow your healthcare provider s instructions. During this time:    Take pain medicine as prescribed.    Start eating solid food when you feel ready. To avoid constipation, eat fruits, vegetables, and whole grains. Drink plenty of fluids.    Don t lift anything heavy until your healthcare provider says it s safe.    Take it easy for a few days. Ask your healthcare provider when you can return to work, exercise, and sex.    Arrange for a follow-up visit with your healthcare provider to discuss the results of the procedure.  Call your healthcare provider  Contact your healthcare provider right away if you have any of the following:    Have chills, or a fever of 100.4 F (38 C) or higher, or as directed by your healthcare provider    Notice that the incision is red, swollen, or draining    Have heavy, bright-red vaginal bleeding or a smelly discharge    Have difficulty urinating    Experience severe belly pain or bloating    Have leg pain, redness, or swelling    Have persistent nausea or vomiting    Are not improving daily    Fainting    Trouble breathing   Date Last Reviewed: 12/1/2016 2000-2017 [employer]. [address] [place] [address], [place] [address]. All rights reserved. This information is not intended as a substitute for professional medical care. Always follow your healthcare professional's instructions.

## 2018-09-06 NOTE — DISCHARGE INSTRUCTIONS
Post-Anesthesia Patient Instructions    IMMEDIATELY FOLLOWING SURGERY:  Do not drive or operate machinery for the first twenty four hours after surgery.  Do not make any important decisions for twenty four hours after surgery or while taking narcotic pain medications or sedatives.  If you develop intractable nausea and vomiting or a severe headache please notify your doctor immediately.    FOLLOW-UP:  Please make an appointment with your surgeon as instructed. You do not need to follow up with anesthesia unless specifically instructed to do so.    WOUND CARE INSTRUCTIONS (if applicable):  Keep a dry clean dressing on the anesthesia/puncture wound site if there is drainage.  Once the wound has quit draining you may leave it open to air.  Generally you should leave the bandage intact for twenty four hours unless there is drainage.  If the epidural site drains for more than 36-48 hours please call the anesthesia department.    QUESTIONS?:  Please feel free to call your physician or the hospital  if you have any questions, and they will be happy to assist you.         You had a pain medication here at hospital at 5:20 pm.    .

## 2018-09-06 NOTE — PROCEDURES
[facility]    Gynecology Brief Operative Note    Pre-operative diagnosis: CHRONIC PELVIC PAIN IN FEMALE   Post-operative diagnosis * No post-op diagnosis entered *   Procedure: Procedure(s):  LAPAROSCOPY WITH PERITONEAL BIOPSIES AND REMOVAL LEFT FALLOPIAN TUBE - Wound Class: II-Clean Contaminated   Surgeon: Suraj Whitaker   Assistants(s): Jayshree Valverde   Anesthesia: General    Estimated blood loss: Minimal    Total IV fluids: (See anesthesia record)   Blood transfusion: No transfusion was given during surgery   Total urine output: (See anesthesia record)   Drains: None   Specimens: Left fallopian tube. Peritoneal biopsies   Findings: No pelvic adhesions. Normal appearing left tube and ovary. Absent uterus and right adnexa. A few spots in the pelvic peritoneum that might be endometriosis.   Complications: None   Condition: Stable  Extubated  Transferred to post-anesthesia recovery   Comments: See dictated operative report for full details

## 2018-09-06 NOTE — OR NURSING
Discharge instructions given to patient and patient's spouse. No questions. Ambulated out of unit. Denies pain, nausea or dizziness

## 2018-09-06 NOTE — IP AVS SNAPSHOT
[facility]    [address] [id number]    Phone:  [phone]                                       After Visit Summary   9/6/2018    Purnima Fallon    MRN: [id number]           After Visit Summary Signature Page     I have received my discharge instructions, and my questions have been answered. I have discussed any challenges I see with this plan with the nurse or doctor.    ..........................................................................................................................................  Patient/Patient Representative Signature      ..........................................................................................................................................  Patient Representative Print Name and Relationship to Patient    ..................................................               ................................................  Date                                            Time    ..........................................................................................................................................  Reviewed by Signature/Title    ...................................................              ..............................................  Date                                                            Time          22EPIC Rev 08/18

## 2018-09-07 RX ORDER — BISACODYL 5 MG/1
TABLET, DELAYED RELEASE ORAL
Qty: 4 TABLET | Refills: 0 | Status: ON HOLD | OUTPATIENT
Start: 2018-09-07 | End: 2018-10-05

## 2018-09-07 NOTE — ANESTHESIA POSTPROCEDURE EVALUATION
Patient: Purnima Fallon    Procedure(s):  LAPAROSCOPY WITH PERITONEAL BIOPSIES AND REMOVAL LEFT FALLOPIAN TUBE - Wound Class: II-Clean Contaminated    Diagnosis:CHRONIC PELVIC PAIN IN FEMALE  Diagnosis Additional Information: No value filed.    Anesthesia Type:  General, ETT, RSI    Note:  Anesthesia Post Evaluation    Patient location during evaluation: Bedside  Patient participation: Able to fully participate in evaluation  Level of consciousness: awake and alert  Pain management: adequate  Airway patency: patent  Cardiovascular status: acceptable  Respiratory status: acceptable  Hydration status: acceptable  PONV: none     Anesthetic complications: None          Last vitals:  Vitals:    09/06/18 1715 09/06/18 1730 09/06/18 1741   BP: 101/66 109/60 109/57   Pulse:      Resp: 14 14 14   Temp:  98.2  F (36.8  C)    SpO2: 96% 97% 96%         Electronically Signed By: ALONDRA Young CRNA  September 7, 2018  9:58 AM

## 2018-09-07 NOTE — OP NOTE
Procedure Date: 09/06/2018      DATE OF SERVICE: 09/06/2018      PREOPERATIVE DIAGNOSIS:  Chronic pelvic pain in female.  Endometriosis of the pelvis.      POSTOPERATIVE DIAGNOSIS:  Chronic pelvic pain in female.  Endometriosis of the pelvis, pathology pending.      PROCEDURES:  Laparoscopic salpingectomy and peritoneal biopsies.      SURGEON: Suraj Whitaker MD      FIRST ASSISTANT: Jayshree Valverde      ANESTHESIA:  General.      COMPLICATIONS:  None.      DESCRIPTION OF PROCEDURE: The procedure was discussed in detail and the risks and benefits were covered.  The consent was signed. The patient was taken to the operating room where she was given general anesthetic with endotracheal intubation.  She was placed in the supine position.  She did not receive any prophylactic antibiotics.  Pneumatic compression devices were in place.  The abdomen was prepped and draped in the usual sterile manner.  The bladder was emptied.  A timeout was done to assure appropriate patient and procedure.  Local anesthetic using 0.5% Marcaine without epinephrine was given to the umbilicus and above the pubis in the midline (3 mL at each site).        A small vertical incision was made at the base of the umbilicus and this was taken down sharply through the fascia.  The peritoneum was carefully entered and bowel visualized.  There were no obvious adhesions.  An 11 mm Melo cannula was inserted and the balloon insufflated to hold it in place.  With the 5 mm laparoscope in place, careful inspection was undertaken.  There were no adhesions in the pelvis and we were immediately able to insert a 5 mm trocar above the pubis in the midline under direct visualization after making a small horizontal incision.  A second 5 mm port was placed in the left lower quadrant under direct visualization after injecting and incising, taking care to avoid vessels.  The appendix was visualized and was mobile and normal in appearance.  The upper abdomen looked  normal including liver, stomach, diaphragms, and bowel surface.        With the patient in steep Trendelenburg, the pelvis was inspected.  The left tube and ovary looked normal, but it was noted that the ovary was generous in size.  This organ was completely mobile.  There was no evidence of endometriosis involving the tube or ovary.  The uterus and right adnexa had previously been removed.  There was no scar tissue anywhere in the pelvis.  Careful inspection of the pelvic peritoneum revealed a dark spot over the cervical stump just to the left of the midline and this was excised and sent to pathology.  There was a cluster of vesicles in the posterior cul-de-sac and 1 on the bladder surface.  These were also excised and sent to pathology.  Finally, there was a cluster of dark spots just beneath the bladder to the patient's left side.  One of these was removed and sent to pathology.  It was felt that these may represent eschar from her previous surgery versus endometriosis.  All areas were checked for hemostasis and small bleeders were controlled with unipolar cautery on a setting of 15 guajardo.  Care was taken to avoid bowel, bladder, and ureters.  Using the LigaSure apparatus, the fallopian tube was removed by coapting and transecting the mesosalpinx.  It was easily  from its attachment to the ovary.  It was pulled up through the umbilical port and sent to pathology.  There was complete hemostasis.  After irrigating, the procedure was terminated.  The inferior ports were removed and noted to be hemostatic.  The umbilical port was removed after deflating the abdomen.  The peritoneum at the umbilicus was closed with a running stitch of 0 Vicryl, followed by closure of the fascia with a running 0 Vicryl.  The skin was closed with a running subcuticular 4-0 Monocryl.  Dermabond was placed over this incision and was used to reapproximate the skin edges on the inferior incisions.  The procedure was terminated.   The patient was awakened and taken to the recovery room in a stable condition.  She tolerated the operation well and had no complications.  She had minimal blood loss.      She will be discharged home when stable with the usual instructions and will follow up in the clinic in 2 weeks.  She was given a prescription for Lortab (20 tabs).         GUNNAR BUSCH MD             D: 2018   T: 2018   MT:       Name:     MAO LANDA   MRN:      [id number]        Account:        [id number]   :      1980           Procedure Date: 2018      Document: [id number]

## 2018-09-10 LAB — COPATH REPORT: NORMAL

## 2018-09-18 NOTE — H&P (VIEW-ONLY)
[facility] Surgery Consultation    CC:  Abdominal bloating    HPI:  This 37 year old year old female is seen at the request of Alexi Romero for evaluation of abdominal bloating.  The history is obtained from the patient, and reviewing the medical record.  She is good medical historian. Mrs. Fallon is well known to me and she presents with concerns of abdominal bloating. She has undergone a laparoscopic cholecystectomy for biliary dyskinesia and says initially she thought she was doing good, but then was having left sided abdominal pain and bloating. She says when she wakes up in the morning she says her abdomen if flat and then after eating she feels bloated. She has no pain or tenderness but just a bloating sensation. She has not had any melena, hematochezia, or upper GI symptoms. She is scheduled to have exploratory laparoscopy tomorrow with gynecology for this same pain and concern for endometriosis.     Past Medical History:   Diagnosis Date     Hidradenitis 2/16/2007     Ischiorectal abscess and fistula      Kidney stones 2008    x2 passed on her own     Nondependent tobacco use disorder 10/11/2012     Papanicolaou smear of cervix with low grade squamous intraepithelial lesion (LGSIL) 10/29/2008     S/p partial hysterectomy with remaining cervical stump 09/27/2013       Past Surgical History:   Procedure Laterality Date     CYSTECTOMY PILONIDAL N/A 9/18/2017    Procedure: CYSTECTOMY PILONIDAL;  PILONIDAL CYSTECTOMY ;  Surgeon: Cordell Stephens MD;  Location: [facility]     EXCISE LESION BUTTOCK(S) Left 6/8/2018    Procedure: EXCISE LESION BUTTOCK(S);  EXCISION OF GLUTEAL LEFT SKIN LESION;  Surgeon: Cordell Stephens MD;  Location[facility]     EXCISE MASS NECK Right 8/4/2015    Procedure: EXCISE MASS NECK;  Surgeon: Nasir Jones MD;  Location: [facility]     INCISION AND DRAINAGE PERINEAL, COMBINED N/A 3/18/2015    Procedure: COMBINED INCISION AND DRAINAGE PERINEAL;  Surgeon: Jay Torres DO;   Location: [facility]     LAPAROSCOPIC CHOLECYSTECTOMY N/A 11/20/2017    Procedure: LAPAROSCOPIC CHOLECYSTECTOMY;  LAPAROSCOPIC CHOLECYSTECTOMY;  Surgeon: Cordell Stephens MD;  [facility]     LAPAROSCOPIC HYSTERECTOMY SUPRACERVICAL, BILATERAL SALPINGO-OOPHORECTOMY, COMBINED  9/27/2013    Procedure: COMBINED LAPAROSCOPIC HYSTERECTOMY SUPRACERVICAL, SALPINGO-OOPHORECTOMY;  LAPAROSCOPIC SUPRACERVICAL HYSTERECTOMY AND RIGHT SALPINGO-OOPHORECTOMY, CYSTOSCOPY;  Surgeon: Denys Callahan MD;  Location: [facility]     marsupialization of pilonidal cyst  2007     TUBAL LIGATION  2005       Pt denied problems with bleeding or anesthesia    Prior to Admission medications    Medication Sig Start Date End Date Taking? Authorizing Provider   HYDROcodone-acetaminophen (NORCO) 5-325 MG per tablet Take 1 tablet by mouth every 8 hours For severe abdominal pain 8/30/18  Yes Alexi Romero NP   ibuprofen (ADVIL/MOTRIN) 800 MG tablet TAKE 1 TABLET(800 MG) BY MOUTH EVERY 8 HOURS AS NEEDED FOR PAIN 7/26/18  Yes Nayla Boo MD   Na Sulfate-K Sulfate-Mg Sulf (SUPREP BOWEL PREP KIT) solution Take 177 mLs (1 Bottle) by mouth See Admin Instructions 9/5/18  Yes Cordell Stephens MD   valACYclovir (VALTREX) 500 MG tablet TAKE ONE TABLET BY MOUTH DAILY 5/16/18  Yes Alexi Romero NP        No Known Allergies      HABITS:    Social History   Substance Use Topics     Smoking status: Current Every Day Smoker     Packs/day: 0.50     Years: 15.00     Types: Cigarettes     Last attempt to quit: 7/18/2013     Smokeless tobacco: Never Used      Comment: Quitplan referral declined 6/21/18     Alcohol use No     No mood altering drug use.    Family History   Problem Relation Age of Onset     Diabetes Paternal Grandmother        REVIEW OF SYSTEMS:  Ten point review of systems negative except those mentioned in the HPI.     The patient denies sleep apnea, latex allergies or MRSA    OBJECTIVE:    BP 96/60 (BP Location: Right arm, Patient Position:  "Sitting, Cuff Size: Adult Regular)  Pulse 79  Temp 98.8  F (37.1  C) (Tympanic)  Ht 5' 2\" (1.575 m)  Wt 125 lb 12.8 oz (57.1 kg)  LMP 08/10/2013  SpO2 99%  BMI 23.01 kg/m2    GENERAL: Generally appears well, in no distress with appropriate affect.  HEENT:   Sclerae anicteric - No cervical, supra/infraclavicular lymphadenopathy, no thyroid masses  Respiratory:  Lungs clear to ausculation bilaterally with good air excursion  Cardiovascular:  Regular Rate and Rhythm with no murmurs gallops or rubs, normal   Abdomen: soft, NT/ND  :  deferred  Extremities:  Extremities normal. No deformities, edema, or skin discoloration.  Skin:  no suspicious lesions or rashes  Neurological: grossly intact    Psych:  Alert, oriented, affect appropriate with normal decision making ability.      IMPRESSION:  36 yo female with abdominal bloating    PLAN:  As she is scheduled to undergo laparoscopy tomorrow I recommend that we see how she recovers from that first. Then in the next month we can get her scheduled for endoscopic evaluation of both her stomach and colon. If there is no evidence or reasons for her bloating a referral will then be placed to gastroenterology at that point. She understands and is willing to proceed forward. An informed consent for both upper and lower endoscopy was obtained. We will schedule her for endoscopy in the next month.      Thank you for allowing me to participate in the care of your patient.       Cordell Stephens MD    9/5/2018  3:23 PM    cc:  Alexi Romero    "

## 2018-09-24 ENCOUNTER — OFFICE VISIT (OUTPATIENT)
Dept: OBGYN | Facility: OTHER | Age: 38
End: 2018-09-24
Attending: OBSTETRICS & GYNECOLOGY
Payer: COMMERCIAL

## 2018-09-24 VITALS
DIASTOLIC BLOOD PRESSURE: 60 MMHG | BODY MASS INDEX: 23 KG/M2 | OXYGEN SATURATION: 99 % | TEMPERATURE: 98 F | WEIGHT: 125 LBS | HEART RATE: 69 BPM | SYSTOLIC BLOOD PRESSURE: 98 MMHG | HEIGHT: 62 IN

## 2018-09-24 DIAGNOSIS — R10.2 CHRONIC FEMALE PELVIC PAIN: Primary | ICD-10-CM

## 2018-09-24 DIAGNOSIS — G89.29 CHRONIC FEMALE PELVIC PAIN: Primary | ICD-10-CM

## 2018-09-24 DIAGNOSIS — K58.1 IRRITABLE BOWEL SYNDROME WITH CONSTIPATION: ICD-10-CM

## 2018-09-24 PROCEDURE — 99024 POSTOP FOLLOW-UP VISIT: CPT | Performed by: OBSTETRICS & GYNECOLOGY

## 2018-09-24 RX ORDER — DICYCLOMINE HYDROCHLORIDE 10 MG/1
10 CAPSULE ORAL 4 TIMES DAILY PRN
Qty: 240 CAPSULE | Refills: 11 | Status: SHIPPED | OUTPATIENT
Start: 2018-09-24 | End: 2019-05-10

## 2018-09-24 ASSESSMENT — PAIN SCALES - GENERAL: PAINLEVEL: NO PAIN (0)

## 2018-09-24 NOTE — MR AVS SNAPSHOT
After Visit Summary   9/24/2018    Purnima Fallon    MRN: [id number]           Patient Information     Date Of Birth          1980        Visit Information        Provider Department      9/24/2018 10:00 AM Frow, David Franke, MD [facility]        Today's Diagnoses     Chronic female pelvic pain--LAPAROSCOPY w LEFT SALPINGECTOMY--normal pelvis--NO Endometriosis--9/2018    -  1    Irritable bowel syndrome with constipation--diet,exercise,fluids,DICYCLOMINE--9/2018          Care Instructions    IBS and constiipation discussed and trial of Dicyclomine          Follow-ups after your visit        Follow-up notes from your care team     Return if symptoms worsen or fail to improve.      Your next 10 appointments already scheduled     Oct 05, 2018   Procedure with Cordell Stephens MD   [facility] ([address] [address] [phone]              Who to contact     If you have questions or need follow up information about today's clinic visit or your schedule please contact [facility] directly at [phone].  Normal or non-critical lab and imaging results will be communicated to you by MyChart, letter or phone within 4 business days after the clinic has received the results. If you do not hear from us within 7 days, please contact the clinic through [facility] or phone. If you have a critical or abnormal lab result, we will notify you by phone as soon as possible.  Submit refill requests through [employer] or call your pharmacy and they will forward the refill request to us. Please allow 3 business days for your refill to be completed.          Additional Information About Your Visit        MyChart Information     [employer] gives you secure access to your electronic health record. If you see a primary care provider, you can also send messages to your care team and make appointments. If you have  "questions, please call your primary care clinic.  If you do not have a primary care provider, please call [phone] and they will assist you.        Care EveryWhere ID     This is your Care EveryWhere ID. This could be used by other organizations to access your [facility] medical records  [id number]        Your Vitals Were     Pulse Temperature Height Last Period Pulse Oximetry BMI (Body Mass Index)    69 98  F (36.7  C) (Tympanic) 5' 2\" (1.575 m) 08/10/2013 99% 22.86 kg/m2       Blood Pressure from Last 3 Encounters:   09/24/18 98/60   09/06/18 109/57   09/05/18 96/60    Weight from Last 3 Encounters:   09/24/18 125 lb (56.7 kg)   09/06/18 125 lb (56.7 kg)   09/05/18 125 lb 12.8 oz (57.1 kg)              Today, you had the following     No orders found for display         Today's Medication Changes          These changes are accurate as of 9/24/18 10:13 AM.  If you have any questions, ask your nurse or doctor.               Start taking these medicines.        Dose/Directions    dicyclomine 10 MG capsule   Commonly known as:  BENTYL   Used for:  Irritable bowel syndrome with constipation   Started by:  Frow, David Franke, MD        Dose:  10 mg   Take 1 capsule (10 mg) by mouth 4 times daily as needed   Quantity:  240 capsule   Refills:  11            Where to get your medicines      These medications were sent to [facility] [address] [place] [address] [address] AT [facility] [address], [facility] [address]     Phone:  [phone]     dicyclomine 10 MG capsule                Primary Care Provider Office Phone # Fax #    Alexi Romero [phone]       [facility] [address]        Equal Access to Services     CIELO CRUZ AH: Felix hoover Sostacy, waaxda luqadaha, qaybta kaalmada adeegyada, debby clemons. So [facility] [phone].    ATENCIÓN: Si habla español, tiene a cyr disposición servicios gratuitos de asistencia " lingüística. Maycol al [phone].    We comply with applicable federal civil rights laws and [place] laws. We do not discriminate on the basis of race, color, national origin, age, disability, sex, sexual orientation, or gender identity.            Thank you!     Thank you for choosing [facility]  for your care. Our goal is always to provide you with excellent care. Hearing back from our patients is one way we can continue to improve our services. Please take a few minutes to complete the written survey that you may receive in the mail after your visit with us. Thank you!             Your Updated Medication List - Protect others around you: Learn how to safely use, store and throw away your medicines at www.disposemymeds.org.          This list is accurate as of 9/24/18 10:13 AM.  Always use your most recent med list.                   Brand Name Dispense Instructions for use Diagnosis    bisacodyl 5 MG EC tablet    BISACODYL LAXATIVE    4 tablet    2 tabs po at hs 2 night before surgery. 2 tabs at 3pm day before surgery.    Flatulence, eructation and gas pain       dicyclomine 10 MG capsule    BENTYL    240 capsule    Take 1 capsule (10 mg) by mouth 4 times daily as needed    Irritable bowel syndrome with constipation       Na Sulfate-K Sulfate-Mg Sulf solution    SUPREP BOWEL PREP KIT    2 Bottle    Take 177 mLs (1 Bottle) by mouth See Admin Instructions    Flatulence, eructation, and gas pain       polyethylene glycol 236 g suspension    GoLYTELY/NuLYTELY    4000 mL    6 pm day before surgery drink 8oz q 10 mins until jug 1/2 empty. Refrigerate. Repeat with remainder of jug 6 hours prior to surgery.    Flatulence, eructation and gas pain       valACYclovir 500 MG tablet    VALTREX    90 tablet    TAKE ONE TABLET BY MOUTH DAILY    Herpes simplex virus infection

## 2018-09-24 NOTE — PROGRESS NOTES
"LAUREN Fallon is a 37 year old female presents for post operative check. She is status post LAPAROSCOPY with LEFT SALPINGECTOMY.  She reports doing well and denies significant pain or bleeding.  Bowel and bladder function is satisfactory. Denies incisional problems. Significant findings includes still having some chronic pain.  Admits to chronic constipation.    O.  Blood pressure 98/60, pulse 69, temperature 98  F (36.7  C), temperature source Tympanic, height 5' 2\" (1.575 m), weight 125 lb (56.7 kg), last menstrual period 08/10/2013, SpO2 99 %, not currently breastfeeding.    Abd: soft, non-tender, non-distended. Incision clear, dry, and intact without evidence of infection.    A:  Normal postop check       IBS with constipation    P:  Trial Dicyclomine        IBS diet, exercise, fluids    F/u prn    Raad Carter  "

## 2018-09-24 NOTE — NURSING NOTE
"Chief Complaint   Patient presents with     Surgical Followup       Initial BP 98/60 (BP Location: Left arm, Patient Position: Sitting, Cuff Size: Adult Regular)  Pulse 69  Temp 98  F (36.7  C) (Tympanic)  Ht 5' 2\" (1.575 m)  Wt 125 lb (56.7 kg)  LMP 08/10/2013  SpO2 99%  BMI 22.86 kg/m2 Estimated body mass index is 22.86 kg/(m^2) as calculated from the following:    Height as of this encounter: 5' 2\" (1.575 m).    Weight as of this encounter: 125 lb (56.7 kg).  Medication Reconciliation: complete    Tasia Hercules LPN  "

## 2018-10-04 ENCOUNTER — ANESTHESIA EVENT (OUTPATIENT)
Dept: SURGERY | Facility: HOSPITAL | Age: 38
End: 2018-10-04
Payer: COMMERCIAL

## 2018-10-04 ASSESSMENT — LIFESTYLE VARIABLES: TOBACCO_USE: 1

## 2018-10-04 NOTE — ANESTHESIA PREPROCEDURE EVALUATION
Anesthesia Evaluation     . Pt has had prior anesthetic.     No history of anesthetic complications          ROS/MED HX    ENT/Pulmonary:     (+)tobacco use, Current use 0.5 PPD packs/day  , . .    Neurologic:     (+)migraines,     Cardiovascular:  - neg cardiovascular ROS       METS/Exercise Tolerance:     Hematologic:  - neg hematologic  ROS       Musculoskeletal:   (+) , , other musculoskeletal- Ischiorectal abscess and fistula s/p marsupialization      GI/Hepatic:     (+) GERD bowel prep,       Renal/Genitourinary:     (+) Nephrolithiasis , Other Renal/ Genitourinary, S/p partial hysterectomy       Endo:     (+) Other Endocrine Disorder Hidradenitis.      Psychiatric:     (+) psychiatric history depression and other (comment) (ADHD)      Infectious Disease:  - neg infectious disease ROS       Malignancy:      - no malignancy   Other: Comment: S/p partial hysterectomy    (+) No chance of pregnancy                    Physical Exam  Normal systems: cardiovascular, pulmonary and dental    Airway   Mallampati: II  TM distance: >3 FB  Neck ROM: full    Dental     Cardiovascular   Rhythm and rate: regular and normal      Pulmonary    breath sounds clear to auscultation                    Anesthesia Plan      History & Physical Review  History and physical reviewed and following examination; no interval change.    ASA Status:  2 .    NPO Status:  > 8 hours    Plan for MAC with Intravenous and Propofol induction. Maintenance will be TIVA.  Reason for MAC:  Other - see comments and Procedure to face, neck, head or breast  PONV prophylaxis:  Ondansetron (or other 5HT-3)  Surgeon requests deep sedation. Patient will have prolonged procedure as planned combined upper endoscopy and colonoscopy. Will provide MAC.       Postoperative Care  Postoperative pain management:  IV analgesics.      Consents  Anesthetic plan, risks, benefits and alternatives discussed with:  Patient..                          .

## 2018-10-05 ENCOUNTER — ANESTHESIA (OUTPATIENT)
Dept: SURGERY | Facility: HOSPITAL | Age: 38
End: 2018-10-05
Payer: COMMERCIAL

## 2018-10-05 ENCOUNTER — HOSPITAL ENCOUNTER (OUTPATIENT)
Facility: HOSPITAL | Age: 38
Discharge: HOME | End: 2018-10-05
Attending: SURGERY | Admitting: SURGERY
Payer: COMMERCIAL

## 2018-10-05 ENCOUNTER — HOSPITAL ENCOUNTER (OUTPATIENT)
Facility: CLINIC | Age: 38
Setting detail: SPECIMEN
Discharge: HOME | End: 2018-10-05
Admitting: SURGERY
Payer: COMMERCIAL

## 2018-10-05 ENCOUNTER — SURGERY (OUTPATIENT)
Age: 38
End: 2018-10-05

## 2018-10-05 VITALS
RESPIRATION RATE: 16 BRPM | SYSTOLIC BLOOD PRESSURE: 101 MMHG | BODY MASS INDEX: 22.52 KG/M2 | TEMPERATURE: 97.4 F | OXYGEN SATURATION: 100 % | WEIGHT: 122.4 LBS | DIASTOLIC BLOOD PRESSURE: 63 MMHG | HEIGHT: 62 IN

## 2018-10-05 PROCEDURE — 25000125 ZZHC RX 250: Performed by: NURSE ANESTHETIST, CERTIFIED REGISTERED

## 2018-10-05 PROCEDURE — 45378 DIAGNOSTIC COLONOSCOPY: CPT | Performed by: SURGERY

## 2018-10-05 PROCEDURE — 37000009 ZZH ANESTHESIA TECHNICAL FEE, EACH ADDTL 15 MIN: Performed by: SURGERY

## 2018-10-05 PROCEDURE — 88305 TISSUE EXAM BY PATHOLOGIST: CPT | Mod: TC | Performed by: SURGERY

## 2018-10-05 PROCEDURE — 43239 EGD BIOPSY SINGLE/MULTIPLE: CPT | Performed by: SURGERY

## 2018-10-05 PROCEDURE — 36000052 ZZH SURGERY LEVEL 2 EA 15 ADDTL MIN: Performed by: SURGERY

## 2018-10-05 PROCEDURE — 43239 EGD BIOPSY SINGLE/MULTIPLE: CPT | Performed by: ANESTHESIOLOGY

## 2018-10-05 PROCEDURE — 25000128 H RX IP 250 OP 636: Performed by: ANESTHESIOLOGY

## 2018-10-05 PROCEDURE — 71000027 ZZH RECOVERY PHASE 2 EACH 15 MINS: Performed by: SURGERY

## 2018-10-05 PROCEDURE — 25000125 ZZHC RX 250: Performed by: SURGERY

## 2018-10-05 PROCEDURE — 88305 TISSUE EXAM BY PATHOLOGIST: CPT | Mod: 26 | Performed by: SURGERY

## 2018-10-05 PROCEDURE — 25000128 H RX IP 250 OP 636: Performed by: NURSE ANESTHETIST, CERTIFIED REGISTERED

## 2018-10-05 PROCEDURE — 01999 UNLISTED ANES PROCEDURE: CPT | Performed by: NURSE ANESTHETIST, CERTIFIED REGISTERED

## 2018-10-05 PROCEDURE — 27210794 ZZH OR GENERAL SUPPLY STERILE: Performed by: SURGERY

## 2018-10-05 PROCEDURE — 37000008 ZZH ANESTHESIA TECHNICAL FEE, 1ST 30 MIN: Performed by: SURGERY

## 2018-10-05 PROCEDURE — 36000050 ZZH SURGERY LEVEL 2 1ST 30 MIN: Performed by: SURGERY

## 2018-10-05 PROCEDURE — 40000305 ZZH STATISTIC PRE PROC ASSESS I: Performed by: SURGERY

## 2018-10-05 RX ORDER — MEPERIDINE HYDROCHLORIDE 50 MG/ML
12.5 INJECTION INTRAMUSCULAR; INTRAVENOUS; SUBCUTANEOUS
Status: DISCONTINUED | OUTPATIENT
Start: 2018-10-05 | End: 2018-10-05 | Stop reason: HOSPADM

## 2018-10-05 RX ORDER — SODIUM CHLORIDE, SODIUM LACTATE, POTASSIUM CHLORIDE, CALCIUM CHLORIDE 600; 310; 30; 20 MG/100ML; MG/100ML; MG/100ML; MG/100ML
INJECTION, SOLUTION INTRAVENOUS CONTINUOUS
Status: DISCONTINUED | OUTPATIENT
Start: 2018-10-05 | End: 2018-10-05 | Stop reason: HOSPADM

## 2018-10-05 RX ORDER — ONDANSETRON 4 MG/1
4 TABLET, ORALLY DISINTEGRATING ORAL EVERY 30 MIN PRN
Status: DISCONTINUED | OUTPATIENT
Start: 2018-10-05 | End: 2018-10-05 | Stop reason: HOSPADM

## 2018-10-05 RX ORDER — LIDOCAINE HYDROCHLORIDE 20 MG/ML
INJECTION, SOLUTION INFILTRATION; PERINEURAL PRN
Status: DISCONTINUED | OUTPATIENT
Start: 2018-10-05 | End: 2018-10-05

## 2018-10-05 RX ORDER — DEXAMETHASONE SODIUM PHOSPHATE 4 MG/ML
4 INJECTION, SOLUTION INTRA-ARTICULAR; INTRALESIONAL; INTRAMUSCULAR; INTRAVENOUS; SOFT TISSUE EVERY 10 MIN PRN
Status: DISCONTINUED | OUTPATIENT
Start: 2018-10-05 | End: 2018-10-05 | Stop reason: HOSPADM

## 2018-10-05 RX ORDER — ALBUTEROL SULFATE 0.83 MG/ML
2.5 SOLUTION RESPIRATORY (INHALATION) EVERY 4 HOURS PRN
Status: DISCONTINUED | OUTPATIENT
Start: 2018-10-05 | End: 2018-10-05 | Stop reason: HOSPADM

## 2018-10-05 RX ORDER — FENTANYL CITRATE 50 UG/ML
25-50 INJECTION, SOLUTION INTRAMUSCULAR; INTRAVENOUS
Status: DISCONTINUED | OUTPATIENT
Start: 2018-10-05 | End: 2018-10-05 | Stop reason: HOSPADM

## 2018-10-05 RX ORDER — FLUMAZENIL 0.1 MG/ML
0.2 INJECTION, SOLUTION INTRAVENOUS
Status: DISCONTINUED | OUTPATIENT
Start: 2018-10-05 | End: 2018-10-05 | Stop reason: HOSPADM

## 2018-10-05 RX ORDER — HYDRALAZINE HYDROCHLORIDE 20 MG/ML
2.5-5 INJECTION INTRAMUSCULAR; INTRAVENOUS EVERY 10 MIN PRN
Status: DISCONTINUED | OUTPATIENT
Start: 2018-10-05 | End: 2018-10-05 | Stop reason: HOSPADM

## 2018-10-05 RX ORDER — ONDANSETRON 2 MG/ML
4 INJECTION INTRAMUSCULAR; INTRAVENOUS EVERY 30 MIN PRN
Status: DISCONTINUED | OUTPATIENT
Start: 2018-10-05 | End: 2018-10-05 | Stop reason: HOSPADM

## 2018-10-05 RX ORDER — LIDOCAINE 40 MG/G
CREAM TOPICAL
Status: DISCONTINUED | OUTPATIENT
Start: 2018-10-05 | End: 2018-10-05 | Stop reason: HOSPADM

## 2018-10-05 RX ORDER — PROPOFOL 10 MG/ML
INJECTION, EMULSION INTRAVENOUS PRN
Status: DISCONTINUED | OUTPATIENT
Start: 2018-10-05 | End: 2018-10-05

## 2018-10-05 RX ORDER — NALOXONE HYDROCHLORIDE 0.4 MG/ML
.1-.4 INJECTION, SOLUTION INTRAMUSCULAR; INTRAVENOUS; SUBCUTANEOUS
Status: DISCONTINUED | OUTPATIENT
Start: 2018-10-05 | End: 2018-10-05 | Stop reason: HOSPADM

## 2018-10-05 RX ORDER — HYDROMORPHONE HYDROCHLORIDE 1 MG/ML
.3-.5 INJECTION, SOLUTION INTRAMUSCULAR; INTRAVENOUS; SUBCUTANEOUS EVERY 10 MIN PRN
Status: DISCONTINUED | OUTPATIENT
Start: 2018-10-05 | End: 2018-10-05 | Stop reason: HOSPADM

## 2018-10-05 RX ORDER — KETOROLAC TROMETHAMINE 30 MG/ML
30 INJECTION, SOLUTION INTRAMUSCULAR; INTRAVENOUS EVERY 6 HOURS PRN
Status: DISCONTINUED | OUTPATIENT
Start: 2018-10-05 | End: 2018-10-05 | Stop reason: HOSPADM

## 2018-10-05 RX ADMIN — BENZOCAINE 2 SPRAY: 200 SPRAY DENTAL; ORAL; PERIODONTAL at 12:52

## 2018-10-05 RX ADMIN — PROPOFOL 50 MG: 10 INJECTION, EMULSION INTRAVENOUS at 13:19

## 2018-10-05 RX ADMIN — PROPOFOL 50 MG: 10 INJECTION, EMULSION INTRAVENOUS at 13:06

## 2018-10-05 RX ADMIN — PROPOFOL 50 MG: 10 INJECTION, EMULSION INTRAVENOUS at 12:54

## 2018-10-05 RX ADMIN — LIDOCAINE HYDROCHLORIDE 40 MG: 20 INJECTION, SOLUTION INFILTRATION; PERINEURAL at 12:54

## 2018-10-05 RX ADMIN — PROPOFOL 50 MG: 10 INJECTION, EMULSION INTRAVENOUS at 13:14

## 2018-10-05 RX ADMIN — PROPOFOL 50 MG: 10 INJECTION, EMULSION INTRAVENOUS at 12:55

## 2018-10-05 RX ADMIN — SODIUM CHLORIDE, POTASSIUM CHLORIDE, SODIUM LACTATE AND CALCIUM CHLORIDE: 600; 310; 30; 20 INJECTION, SOLUTION INTRAVENOUS at 11:34

## 2018-10-05 RX ADMIN — PROPOFOL 50 MG: 10 INJECTION, EMULSION INTRAVENOUS at 13:10

## 2018-10-05 RX ADMIN — PROPOFOL 50 MG: 10 INJECTION, EMULSION INTRAVENOUS at 13:01

## 2018-10-05 RX ADMIN — PROPOFOL 50 MG: 10 INJECTION, EMULSION INTRAVENOUS at 12:57

## 2018-10-05 NOTE — DISCHARGE INSTRUCTIONS
UPPER ENDOSCOPY    PURPOSE:  An Upper Endoscopy is a procedure in which the doctor passes a flexible, lighted tube called a gastroscope through your mouth into your stomach in order to:    See the lining of the esophagus, stomach, and duodenum (first part of the small intestine).    Look for bleeding, inflammation (swelling), abnormal tumors or tissue, or ulcers.    Take biopsy specimens.  (Biopsies do not hurt.)    TELL YOUR DOCTOR IF:     You have a heart condition, heart murmur, or have had heart valve surgery.    You have had angioplasty with stents put in within the last year.    You are taking blood thinners - Aspirin, Anti-inflammatory pain pills (like Motrin, ibuprofen, Naproxen, Feldene, Advil, etc.), Coumadin, Plavix.    You have diabetes - contact your regular doctor for management of your insulin.    YOU NEED TO:    Have someone drive you home.  You are not allowed to drive until the next day.  (If you take a taxi, the person staying with you after surgery must ride with you in the taxi.  The  is not responsible for you).    Have someone stay with you for four (4) hours after you leave the hospital.    Know the time to be at admitting:  A nurse will call you with the time the afternoon before your procedure.  If your procedure is on Monday, you will be called on Friday.  If you have not been contacted with the time, call the Admitting Department at [phone] or [phone], ext. [phone] after 5 pm (Admitting is open 24 hours daily).    Talk with the Surgery Patient Education Nurses.  Please call them @ [phone] or toll free [phone] after 8:00 a.m. Monday through Friday.    TO PREPARE FOR THE PROCEDURE:      ONE WEEK BEFORE:    Stop Aspirin, anti-inflammatory pain pills (Motrin, ibuprofen, Naproxen, Feldene, Advil, etc.).  It is OK to take Tylenol (acetaminophen).    Your doctor will tell you what to do if you are on Coumadin or Plavix.     NIGHT BEFORE:    Do not eat or drink  anything after midnight.  Your stomach needs to be empty.     DAY OF YOUR PROCEDURE:    Take your pills you were told to take.  Do not take diabetic pills.  If you are on Insulin, take the dose your doctor told you to take.    Wear loose, comfortable clothing and shoes.    Remove ALL jewelry including wedding rings.  Leave valuables at home.    Come to the hospital Admitting Department located at the [facility] on the lower level.    In Same Day surgery, you will be asked to change into an exam gown.    You will be asked your name, birth date, and what you are having done by every person who is involved with your care.    Your health history, medications, and allergies will be reviewed and verified.    An IV (intravenous line) will be started in your hand.  DURING THE UPPER ENDOSCOPY:    Your doctor and a registered nurse will be with you throughout the procedure which takes about 30 minutes.    You will either lie on your left side or on your back.    Medications will be given to you to help you relax and reduce the gag reflex when swallowing the scope.    Your doctor may need to insert air into your stomach to see better.  This may cause fullness or a cramping sensation.  The air will be removed at the end of the exam.    AFTER THE PROCEDURE    You will return to Same Day Surgery to rest for about an hour before you go home.    The doctor will talk with you and your family.    A family member/friend may visit with you.    You may burp up any air remaining in your stomach.    You may feel dizzy or light-headed from the medicine.    Your nurse will go over the discharge instructions with you and your caregiver and answer any of your questions.      You will be contacted the next day to check on how you are doing.    If biopsies were taken, you will be contacted with the results usually within 3 days.  BACK AT HOME    Rest for an hour or two after you get home.    When your throat is no longer numb and you have a  gag reflex, take a few sips of cool water.  If you can swallow comfortably, you may start eating again.    You may have a mild sore throat for the rest of the day.  WHAT TO WATCH FOR:  Problems rarely occur after the exam, but it is important to be aware of the early signs of a complication.  Call your doctor immediately if you have:    Difficulty swallowing or breathing    Unusual pain in your stomach or chest    Vomiting blood or dark material that looks like coffee grounds    Black or tarry stools    Temperature over 100.6 degrees    MORE QUESTIONS?  Please ask your doctor or nurse before the exam begins  or call your doctor at the clinic.    IF YOU MUST CANCEL YOUR PROCEDURE THE EVENING/NIGHT BEFORE, PLEASE CALL HOSPITAL ADMITTING -[phone] OR TOLL FREE [phone], [phone].    Phone Numbers:  Hospital - [phone]  [facility] - [phone]  Surgery Patient Education - [phone] or toll free [phone]    INSTRUCTIONS AFTER COLONOSCOPY    WHEN YOU ARE BACK HOME:    Plan to rest for an hour or two after you get home.    You may have some cramping or pressure until you pass gas.    You may resume your regular medications.    Eat a small, light meal at first, and then gradually return to normal meal sizes.  If you had a polyp removed:    Slight bleeding may occur.  You may have a slight blood stain on the toilet paper after a bowel movement.    To lessen the chance of bleeding, avoid heavy exercise for ONE WEEK.  This includes heavy lifting, vigorous sport activities, and heavy physical labor.  You may resume your normal sexual activity.      Avoid aspirin or aspirin products if instructed by your doctor.    WHAT TO WATCH FOR:  Problems rarely occur after the exam; however, it is important for you to watch for early signs of possible problems.  If you have     Unusual pain in your abdomen    Nausea and vomiting that persists    Excessive bleeding    Black or bloody bowel  movements    Fever or temperature above 100.6 F  Please call your doctor ([facility] [phone]) or go to the nearest hospital emergency room.    Post-Anesthesia Patient Instructions    IMMEDIATELY FOLLOWING SURGERY:  Do not drive or operate machinery for the first twenty four hours after surgery.  Do not make any important decisions for twenty four hours after surgery or while taking narcotic pain medications or sedatives.  If you develop intractable nausea and vomiting or a severe headache please notify your doctor immediately.    FOLLOW-UP:  Please make an appointment with your surgeon as instructed. You do not need to follow up with anesthesia unless specifically instructed to do so.    WOUND CARE INSTRUCTIONS (if applicable):  Keep a dry clean dressing on the anesthesia/puncture wound site if there is drainage.  Once the wound has quit draining you may leave it open to air.  Generally you should leave the bandage intact for twenty four hours unless there is drainage.  If the epidural site drains for more than 36-48 hours please call the anesthesia department.    QUESTIONS?:  Please feel free to call your physician or the hospital  if you have any questions, and they will be happy to assist you.

## 2018-10-05 NOTE — IP AVS SNAPSHOT
MRN:[id number]                      After Visit Summary   10/5/2018    Purnima Fallon    MRN: [id number]           Thank you!     Thank you for choosing [facility] for your care. Our goal is always to provide you with excellent care. Hearing back from our patients is one way we can continue to improve our services. Please take a few minutes to complete the written survey that you may receive in the mail after you visit with us. Thank you!        Patient Information     Date Of Birth          1980        About your hospital stay     You were admitted on:  October 5, 2018 You last received care in the:  [facility] Preop/Phase II    You were discharged on:  October 5, 2018       Who to Call     For medical emergencies, please call 911.  For non-urgent questions about your medical care, please call your primary care provider or clinic, [phone]  For questions related to your surgery, please call your surgery clinic        Attending Provider     Provider Specialty    Cordell Stephens MD Surgery       Primary Care Provider Office Phone # Fax #    Alexi Romero [phone]      After Care Instructions     Discharge Instructions       Resume pre procedure diet            Discharge Instructions       Restart home medications.                  Further instructions from your care team           UPPER ENDOSCOPY    PURPOSE:  An Upper Endoscopy is a procedure in which the doctor passes a flexible, lighted tube called a gastroscope through your mouth into your stomach in order to:    See the lining of the esophagus, stomach, and duodenum (first part of the small intestine).    Look for bleeding, inflammation (swelling), abnormal tumors or tissue, or ulcers.    Take biopsy specimens.  (Biopsies do not hurt.)    TELL YOUR DOCTOR IF:     You have a heart condition, heart murmur, or have had heart valve surgery.    You have had angioplasty with stents put in within the last year.    You are  taking blood thinners - Aspirin, Anti-inflammatory pain pills (like Motrin, ibuprofen, Naproxen, Feldene, Advil, etc.), Coumadin, Plavix.    You have diabetes - contact your regular doctor for management of your insulin.    YOU NEED TO:    Have someone drive you home.  You are not allowed to drive until the next day.  (If you take a taxi, the person staying with you after surgery must ride with you in the taxi.  The  is not responsible for you).    Have someone stay with you for four (4) hours after you leave the hospital.    Know the time to be at admitting:  A nurse will call you with the time the afternoon before your procedure.  If your procedure is on Monday, you will be called on Friday.  If you have not been contacted with the time, call the Admitting Department at [phone] or [phone], ext. [phone] after 5 pm (Admitting is open 24 hours daily).    Talk with the Surgery Patient Education Nurses.  Please call them @ [phone] or toll free [phone] after 8:00 a.m. Monday through Friday.    TO PREPARE FOR THE PROCEDURE:      ONE WEEK BEFORE:    Stop Aspirin, anti-inflammatory pain pills (Motrin, ibuprofen, Naproxen, Feldene, Advil, etc.).  It is OK to take Tylenol (acetaminophen).    Your doctor will tell you what to do if you are on Coumadin or Plavix.     NIGHT BEFORE:    Do not eat or drink anything after midnight.  Your stomach needs to be empty.     DAY OF YOUR PROCEDURE:    Take your pills you were told to take.  Do not take diabetic pills.  If you are on Insulin, take the dose your doctor told you to take.    Wear loose, comfortable clothing and shoes.    Remove ALL jewelry including wedding rings.  Leave valuables at home.    Come to the hospital Admitting Department located at the [facility] on the lower level.    In Same Day surgery, you will be asked to change into an exam gown.    You will be asked your name, birth date, and what you are having done by every person who is  involved with your care.    Your health history, medications, and allergies will be reviewed and verified.    An IV (intravenous line) will be started in your hand.  DURING THE UPPER ENDOSCOPY:    Your doctor and a registered nurse will be with you throughout the procedure which takes about 30 minutes.    You will either lie on your left side or on your back.    Medications will be given to you to help you relax and reduce the gag reflex when swallowing the scope.    Your doctor may need to insert air into your stomach to see better.  This may cause fullness or a cramping sensation.  The air will be removed at the end of the exam.    AFTER THE PROCEDURE    You will return to Same Day Surgery to rest for about an hour before you go home.    The doctor will talk with you and your family.    A family member/friend may visit with you.    You may burp up any air remaining in your stomach.    You may feel dizzy or light-headed from the medicine.    Your nurse will go over the discharge instructions with you and your caregiver and answer any of your questions.      You will be contacted the next day to check on how you are doing.    If biopsies were taken, you will be contacted with the results usually within 3 days.  BACK AT HOME    Rest for an hour or two after you get home.    When your throat is no longer numb and you have a gag reflex, take a few sips of cool water.  If you can swallow comfortably, you may start eating again.    You may have a mild sore throat for the rest of the day.  WHAT TO WATCH FOR:  Problems rarely occur after the exam, but it is important to be aware of the early signs of a complication.  Call your doctor immediately if you have:    Difficulty swallowing or breathing    Unusual pain in your stomach or chest    Vomiting blood or dark material that looks like coffee grounds    Black or tarry stools    Temperature over 100.6 degrees    MORE QUESTIONS?  Please ask your doctor or nurse before the  exam begins  or call your doctor at the clinic.    IF YOU MUST CANCEL YOUR PROCEDURE THE EVENING/NIGHT BEFORE, PLEASE CALL HOSPITAL ADMITTING -[phone] OR TOLL FREE [phone], [phone]. [phone].    Phone Numbers:  [facility] - [phone]  [facility] - [phone]  Surgery Patient Education - [phone] or toll free [phone]    INSTRUCTIONS AFTER COLONOSCOPY    WHEN YOU ARE BACK HOME:    Plan to rest for an hour or two after you get home.    You may have some cramping or pressure until you pass gas.    You may resume your regular medications.    Eat a small, light meal at first, and then gradually return to normal meal sizes.  If you had a polyp removed:    Slight bleeding may occur.  You may have a slight blood stain on the toilet paper after a bowel movement.    To lessen the chance of bleeding, avoid heavy exercise for ONE WEEK.  This includes heavy lifting, vigorous sport activities, and heavy physical labor.  You may resume your normal sexual activity.      Avoid aspirin or aspirin products if instructed by your doctor.    WHAT TO WATCH FOR:  Problems rarely occur after the exam; however, it is important for you to watch for early signs of possible problems.  If you have     Unusual pain in your abdomen    Nausea and vomiting that persists    Excessive bleeding    Black or bloody bowel movements    Fever or temperature above 100.6 F  Please call your doctor ([facility] [phone]) or go to the nearest hospital emergency room.    Post-Anesthesia Patient Instructions    IMMEDIATELY FOLLOWING SURGERY:  Do not drive or operate machinery for the first twenty four hours after surgery.  Do not make any important decisions for twenty four hours after surgery or while taking narcotic pain medications or sedatives.  If you develop intractable nausea and vomiting or a severe headache please notify your doctor immediately.    FOLLOW-UP:  Please make an appointment with your surgeon as  "instructed. You do not need to follow up with anesthesia unless specifically instructed to do so.    WOUND CARE INSTRUCTIONS (if applicable):  Keep a dry clean dressing on the anesthesia/puncture wound site if there is drainage.  Once the wound has quit draining you may leave it open to air.  Generally you should leave the bandage intact for twenty four hours unless there is drainage.  If the epidural site drains for more than 36-48 hours please call the anesthesia department.    QUESTIONS?:  Please feel free to call your physician or the hospital  if you have any questions, and they will be happy to assist you.       Pending Results     No orders found from 10/3/2018 to 10/6/2018.            Admission Information     Date & Time Provider Department Dept. Phone    10/5/2018 Cordell Stephens MD [facility] Preop/Phase [phone]      Your Vitals Were     Blood Pressure Temperature Respirations Height Weight Last Period    98/68 97.4  F (36.3  C) (Oral) 16 1.575 m (5' 2\") 55.5 kg (122 lb 6.4 oz) 08/10/2013    Pulse Oximetry BMI (Body Mass Index)                97% 22.39 kg/m2          [facility] Information     [facility] gives you secure access to your electronic health record. If you see a primary care provider, you can also send messages to your care team and make appointments. If you have questions, please call your primary care clinic.  If you do not have a primary care provider, please call [phone] and they will assist you.        Care EveryWhere ID     This is your Care EveryWhere ID. This could be used by other organizations to access your [facility] medical records  [id number]        Equal Access to Services     [facility] : Hadii otto fordo Sostacy, waaxda luqadaha, qaybta kaalmada debby douglas. So [facility] [place] [facility] [phone].    ATENCIÓN: Si habla español, tiene a cyr disposición servicios gratuitos de asistencia lingüística. Llame al [phone].    We comply " with applicable federal civil rights laws and [place] laws. We do not discriminate on the basis of race, color, national origin, age, disability, sex, sexual orientation, or gender identity.               Review of your medicines      CONTINUE these medicines which have NOT CHANGED        Dose / Directions    dicyclomine 10 MG capsule   Commonly known as:  BENTYL   Used for:  Irritable bowel syndrome with constipation        Dose:  10 mg   Take 1 capsule (10 mg) by mouth 4 times daily as needed   Quantity:  240 capsule   Refills:  11       valACYclovir 500 MG tablet   Commonly known as:  VALTREX   Used for:  Herpes simplex virus infection        TAKE ONE TABLET BY MOUTH DAILY   Quantity:  90 tablet   Refills:  1                Protect others around you: Learn how to safely use, store and throw away your medicines at www.disposemymeds.org.             Medication List: This is a list of all your medications and when to take them. Check marks below indicate your daily home schedule. Keep this list as a reference.      Medications           Morning Afternoon Evening Bedtime As Needed    dicyclomine 10 MG capsule   Commonly known as:  BENTYL   Take 1 capsule (10 mg) by mouth 4 times daily as needed                                valACYclovir 500 MG tablet   Commonly known as:  VALTREX   TAKE ONE TABLET BY MOUTH DAILY

## 2018-10-05 NOTE — OR NURSING
Patient and responsible adult given discharge instructions with no questions regarding instructions. Aliya score 20. Pain level 0/10.  Discharged from unit via ambulation. Patient discharged to home.

## 2018-10-05 NOTE — ANESTHESIA POSTPROCEDURE EVALUATION
Patient: Purnima Fallon    Procedure(s):  UPPER ENDOSCOPY with Biopsy  AND COLONOSCOPY - Wound Class: IV-Dirty or Infected    Diagnosis:FLATULENCE, ERUCTATION  AND GAS PAIN  Diagnosis Additional Information: No value filed.    Anesthesia Type:  MAC    Note:  Anesthesia Post Evaluation    Patient location during evaluation: Phase 2  Patient participation: Able to fully participate in evaluation  Level of consciousness: awake and alert  Pain management: adequate  Airway patency: patent  Cardiovascular status: acceptable  Respiratory status: acceptable  Hydration status: acceptable  PONV: none             Last vitals:  Vitals:    10/05/18 1124   BP: 98/68   Resp: 16   Temp: 97.4  F (36.3  C)   SpO2: 97%         Electronically Signed By: ALONDRA Hernandez CRNA  October 5, 2018  1:34 PM

## 2018-10-05 NOTE — OP NOTE
Purnima Fallon MRN# [id number]   YOB: 1980 Age: 37 year old      Date of Admission:  10/5/2018    Primary care provider: Alexi Romero    PREOPERATIVE DIAGNOSIS:  Abdominal bloating      POSTOPERATIVE DIAGNOSES:    Gastritis, normal colonic mucosa      PROCEDURE:  Esophagogastroduodenoscopy with cold forceps biopsies, colonoscopy.       HISTORY OF PRESENT ILLNESS:  This 37 year old female developed abdominal bloating and pain. She was evaluated and recommended endoscopic evaluation.     OPERATIVE FINDINGS:  The gastroesophageal junction was noted 37 cm from the incisors.  The Z line was irregular with changes suggesting reflux.  There was no evidence of ulceration or stricture.  There was evidence of inflammation throughout the stomach. The colonic mucosa was normal from the anus to the cecum.     Specimen(s) submitted to pathology:  Antral biopsies, GE junction biopsies    PROCEDURE:  With the patient in the supine position on the transport cart, IV sedation was administered by the nurse anesthetist.  Her correct identity and planned procedure were confirmed during a requisite timeout pause.  A bite block was placed and the fiberoptic endoscope was introduced and negotiated through the cricopharyngeus without difficulty.  The length of the esophagus was examined without findings.  The gastroesophageal junction was noted 37 cm from the incisors; the Z line was irregular without ulceration, bleeding source or stricture.  There was no  evidence of Marin's change.  The stomach was entered and the pylorus was traversed easily.  The gastric mucosa was inflamed; there was no evidence of gastric polyp, ulcer or bleeding source.  The duodenum was normal. The endoscope was returned to the body of the stomach and retroflex confirmed the absence of abnormality in the cardia.      Random cold forceps biopsies were obtained of the antrum for H. pylori.  Hemostasis was judged adequate on visualization.   The  endoscope was returned to the GE junction.  Circumferential biopsies were obtained; hemostasis was satisfactory.  A second circumferential examination of the esophagus was performed on slow withdrawal of the endoscope without additional finding.      The anus was digitally dilated and the fiberoptic colonoscope was introduced and negotiated through the length of the colon to the cecal base.  The cecum was intubated and its landmarks clearly identified.  A circumferential examination of the mucosa on introduction of the colonoscope and on its slow withdrawal confirmed the absence of polyp, neoplasia, inflammation and/or stricture.  There were no diverticuli noted.  Retroflex in the rectal ampulla showed no evidence of pathology.  Air was aspirated and the colonoscope was withdrawn; the patient was returned to day surgery in good condition, without suggestion of complication.  The post surgical debriefing was held and acknowledged at completion.          Cordell Stephens MD    10/5/2018 1:29 PM

## 2018-10-05 NOTE — IP AVS SNAPSHOT
[facility] Preop/Phase II    [address] [address]    Phone:  [phone]                                       After Visit Summary   10/5/2018    Purnima Fallon    MRN: [id number]           After Visit Summary Signature Page     I have received my discharge instructions, and my questions have been answered. I have discussed any challenges I see with this plan with the nurse or doctor.    ..........................................................................................................................................  Patient/Patient Representative Signature      ..........................................................................................................................................  Patient Representative Print Name and Relationship to Patient    ..................................................               ................................................  Date                                   Time    ..........................................................................................................................................  Reviewed by Signature/Title    ...................................................              ..............................................  Date                                               Time          22EPIC Rev 08/18

## 2018-10-05 NOTE — ANESTHESIA CARE TRANSFER NOTE
Patient: Purnima Fallon    Procedure(s):  UPPER ENDOSCOPY with Biopsy  AND COLONOSCOPY - Wound Class: IV-Dirty or Infected    Diagnosis: FLATULENCE, ERUCTATION  AND GAS PAIN  Diagnosis Additional Information: No value filed.    Anesthesia Type:   MAC     Note:  Airway :Nasal Cannula  Patient transferred to:Phase II  Handoff Report: Identifed the Patient, Identified the Reponsible Provider, Reviewed the pertinent medical history, Discussed the surgical course, Reviewed Intra-OP anesthesia mangement and issues during anesthesia, Set expectations for post-procedure period and Allowed opportunity for questions and acknowledgement of understanding      Vitals: (Last set prior to Anesthesia Care Transfer)    CRNA VITALS  10/5/2018 1255 - 10/5/2018 1329      10/5/2018             Resp Rate (set): 8                Electronically Signed By: ALONDRA Hernandez CRNA  October 5, 2018  1:29 PM

## 2018-10-10 LAB — COPATH REPORT: NORMAL

## 2018-10-29 ENCOUNTER — HOSPITAL ENCOUNTER (EMERGENCY)
Facility: HOSPITAL | Age: 38
Discharge: HOME | End: 2018-10-29
Attending: FAMILY MEDICINE | Admitting: FAMILY MEDICINE
Payer: COMMERCIAL

## 2018-10-29 VITALS
OXYGEN SATURATION: 99 % | SYSTOLIC BLOOD PRESSURE: 110 MMHG | BODY MASS INDEX: 21.95 KG/M2 | HEART RATE: 70 BPM | DIASTOLIC BLOOD PRESSURE: 72 MMHG | WEIGHT: 120 LBS | RESPIRATION RATE: 18 BRPM | TEMPERATURE: 98.1 F

## 2018-10-29 DIAGNOSIS — L08.9 INFECTED CYST OF SKIN: ICD-10-CM

## 2018-10-29 DIAGNOSIS — L72.9 INFECTED CYST OF SKIN: ICD-10-CM

## 2018-10-29 PROCEDURE — 99283 EMERGENCY DEPT VISIT LOW MDM: CPT

## 2018-10-29 PROCEDURE — 99283 EMERGENCY DEPT VISIT LOW MDM: CPT | Mod: Z6 | Performed by: FAMILY MEDICINE

## 2018-10-29 RX ORDER — HYDROCODONE BITARTRATE AND ACETAMINOPHEN 5; 325 MG/1; MG/1
1 TABLET ORAL EVERY 4 HOURS PRN
Qty: 10 TABLET | Refills: 0 | Status: SHIPPED | OUTPATIENT
Start: 2018-10-29 | End: 2018-11-13

## 2018-10-29 RX ORDER — CEPHALEXIN 500 MG/1
500 CAPSULE ORAL 4 TIMES DAILY
Qty: 40 CAPSULE | Refills: 0 | Status: SHIPPED | OUTPATIENT
Start: 2018-10-29 | End: 2019-02-07

## 2018-10-29 NOTE — ED AVS SNAPSHOT
[facility]    [address] [address]    Phone:  [phone]                                       Purnima Fallon   MRN: [id number]    Department:  [facility]   Date of Visit:  10/29/2018           After Visit Summary Signature Page     I have received my discharge instructions, and my questions have been answered. I have discussed any challenges I see with this plan with the nurse or doctor.    ..........................................................................................................................................  Patient/Patient Representative Signature      ..........................................................................................................................................  Patient Representative Print Name and Relationship to Patient    ..................................................               ................................................  Date                                   Time    ..........................................................................................................................................  Reviewed by Signature/Title    ...................................................              ..............................................  Date                                               Time          22EPIC Rev 08/18

## 2018-10-29 NOTE — ED NOTES
"In ED for \"having a growth behind and under left ear causing pain in left jaw and left neck area.\"  "

## 2018-10-29 NOTE — ED AVS SNAPSHOT
[facility]    [address] [address]    Phone:  [phone]                                       Purnima Fallon   MRN: [id number]    Department:  [facility]   Date of Visit:  10/29/2018           Patient Information     Date Of Birth          1980        Your diagnoses for this visit were:     Infected cyst of skin        You were seen by Karlie Fuller MD.      Your next 10 appointments already scheduled     Nov 13, 2018  9:00 AM CST   (Arrive by 8:45 AM)   SHORT with Alexi Romero NP   [facility]    [address]  [facility] [id number] [phone]              ED Discharge Orders     GENERAL SURG ADULT REFERRAL       Your provider has referred you to: [facility]: [facility] [phone]   [email or web address]    Please be aware that coverage of these services is subject to the terms and limitations of your health insurance plan.  Call member services at your health plan with any benefit or coverage questions.      Please bring the following with you to your appointment:    (1) Any X-Rays, CTs or MRIs which have been performed.  Contact the facility where they were done to arrange for  prior to your scheduled appointment.   (2) List of current medications   (3) This referral request   (4) Any documents/labs given to you for this referral                     Review of your medicines      START taking        Dose / Directions Last dose taken    cephALEXin 500 MG capsule   Commonly known as:  KEFLEX   Dose:  500 mg   Quantity:  40 capsule        Take 1 capsule (500 mg) by mouth 4 times daily for 10 days   Refills:  0        HYDROcodone-acetaminophen 5-325 MG per tablet   Commonly known as:  NORCO   Dose:  1 tablet   Quantity:  10 tablet        Take 1 tablet by mouth every 4 hours as needed for pain Take 1-2 tablets as needed  for pain   Refills:  0          Our records show that you are taking the medicines listed below. If these are incorrect, please call your family doctor or clinic.        Dose / Directions Last dose taken    dicyclomine 10 MG capsule   Commonly known as:  BENTYL   Dose:  10 mg   Quantity:  240 capsule        Take 1 capsule (10 mg) by mouth 4 times daily as needed   Refills:  11        OMEPRAZOLE PO   Dose:  20 mg        Take 20 mg by mouth every morning   Refills:  0        valACYclovir 500 MG tablet   Commonly known as:  VALTREX   Quantity:  90 tablet        TAKE ONE TABLET BY MOUTH DAILY   Refills:  1                Information about OPIOIDS     PRESCRIPTION OPIOIDS: WHAT YOU NEED TO KNOW   We gave you an opioid (narcotic) pain medicine. It is important to manage your pain, but opioids are not always the best choice. You should first try all the other options your care team gave you. Take this medicine for as short a time (and as few doses) as possible.    Some activities can increase your pain, such as bandage changes or therapy sessions. It may help to take your pain medicine 30 to 60 minutes before these activities. Reduce your stress by getting enough sleep, working on hobbies you enjoy and practicing relaxation or meditation. Talk to your care team about ways to manage your pain beyond prescription opioids.    These medicines have risks:    DO NOT drive when on new or higher doses of pain medicine. These medicines can affect your alertness and reaction times, and you could be arrested for driving under the influence (DUI). If you need to use opioids long-term, talk to your care team about driving.    DO NOT operate heavy machinery    DO NOT do any other dangerous activities while taking these medicines.    DO NOT drink any alcohol while taking these medicines.     If the opioid prescribed includes acetaminophen, DO NOT take with any other medicines that contain acetaminophen. Read all labels carefully. Look  for the word  acetaminophen  or  Tylenol.  Ask your pharmacist if you have questions or are unsure.    You can get addicted to pain medicines, especially if you have a history of addiction (chemical, alcohol or substance dependence). Talk to your care team about ways to reduce this risk.    All opioids tend to cause constipation. Drink plenty of water and eat foods that have a lot of fiber, such as fruits, vegetables, prune juice, apple juice and high-fiber cereal. Take a laxative (Miralax, milk of magnesia, Colace, Senna) if you don t move your bowels at least every other day. Other side effects include upset stomach, sleepiness, dizziness, throwing up, tolerance (needing more of the medicine to have the same effect), physical dependence and slowed breathing.    Store your pills in a secure place, locked if possible. We will not replace any lost or stolen medicine. If you don t finish your medicine, please throw away (dispose) as directed by your pharmacist. The [place] [employer]ution [employer] has more information about safe disposal: https://www.pca.[facility].[place].us/living-green/managing-unwanted-medications        Prescriptions were sent or printed at these locations (2 Prescriptions)                   [facility] [address] [place] [address] - [address] AT [facility] [address]   [address] [facility] [address]    Telephone:  [phone]   Fax:  [phone]   Hours:                  E-Prescribed (1 of 2)         cephALEXin (KEFLEX) 500 MG capsule                 Printed at Department/Unit printer (1 of 2)         HYDROcodone-acetaminophen (NORCO) 5-325 MG per tablet                Orders Needing Specimen Collection     None      Pending Results     No orders found from 10/27/2018 to 10/30/2018.            Pending Culture Results     No orders found from 10/27/2018 to 10/30/2018.            Thank you for choosing [facility]       Thank you for choosing [facility] for your care. Our goal is always to  provide you with excellent care. Hearing back from our patients is one way we can continue to improve our services. Please take a few minutes to complete the written survey that you may receive in the mail after you visit with us. Thank you!        [facility] Information     [facility] gives you secure access to your electronic health record. If you see a primary care provider, you can also send messages to your care team and make appointments. If you have questions, please call your primary care clinic.  If you do not have a primary care provider, please call [phone] and they will assist you.        Care EveryWhere ID     This is your Care EveryWhere ID. This could be used by other organizations to access your [facility] medical records  [id number]        Equal Access to Services     ALDO CRUZ : Felix Buckley, brian rain, jeffrey douglas, debby clemons. So [facility] [phone].    ATENCIÓN: Si habla español, tiene a cyr disposición servicios gratuitos de asistencia lingüística. Llame al [phone].    We comply with applicable federal civil rights laws and [place] laws. We do not discriminate on the basis of race, color, national origin, age, disability, sex, sexual orientation, or gender identity.            After Visit Summary       This is your record. Keep this with you and show to your community pharmacist(s) and doctor(s) at your next visit.

## 2018-10-30 NOTE — ED PROVIDER NOTES
eMERGENCY dEPARTMENT eNCOUnter        CHIEF COMPLAINT    Chief Complaint   Patient presents with     Wound Infection     abscess behind left ear, noticed thursday, worse today       HPI    Purnima Fallon is a 37 year old female who presents with  An area of pain, redness, and swelling localized behind her left ear.   She has a life time history of hidradenitis supparativa and has required surgery on some lesions in the past.  Sometimes they resolve on their own.  The lesion behind her left ear had been present for 6 days and is getting increasingly painful.  She has tried hot packing, warm baths, etc.      REVIEW OF SYSTEMS    Skin: Painful skin lesion as mentioned above  ENT: No throat swelling or tongue swelling  General: No fevers or syncope    PAST MEDICAL & SURGICAL HISTORY    Past Medical History:   Diagnosis Date     Hidradenitis 2/16/2007     Ischiorectal abscess and fistula      Kidney stones 2008    x2 passed on her own     Nondependent tobacco use disorder 10/11/2012     Papanicolaou smear of cervix with low grade squamous intraepithelial lesion (LGSIL) 10/29/2008     S/p partial hysterectomy with remaining cervical stump 09/27/2013     Past Surgical History:   Procedure Laterality Date     CYSTECTOMY PILONIDAL N/A 9/18/2017    Procedure: CYSTECTOMY PILONIDAL;  PILONIDAL CYSTECTOMY ;  Surgeon: Cordell Stephens MD;  [facility]     ENDOSCOPY UPPER, COLONOSCOPY, COMBINED N/A 10/5/2018    Procedure: COMBINED ENDOSCOPY UPPER, COLONOSCOPY;  UPPER ENDOSCOPY with Biopsy  AND COLONOSCOPY;  Surgeon: Cordell Stephens MD;  [facility]     EXCISE LESION BUTTOCK(S) Left 6/8/2018    Procedure: EXCISE LESION BUTTOCK(S);  EXCISION OF GLUTEAL LEFT SKIN LESION;  Surgeon: Cordell Stephens MD;  [facility]     EXCISE MASS NECK Right 8/4/2015    Procedure: EXCISE MASS NECK;  Surgeon: Nasir Jones MD;  [facility]     INCISION AND DRAINAGE PERINEAL, COMBINED N/A 3/18/2015    Procedure:  COMBINED INCISION AND DRAINAGE PERINEAL;  Surgeon: Jay Torres DO;  [facility]     LAPAROSCOPIC CHOLECYSTECTOMY N/A 11/20/2017    Procedure: LAPAROSCOPIC CHOLECYSTECTOMY;  LAPAROSCOPIC CHOLECYSTECTOMY;  Surgeon: Cordell Stephens MD;  [facility]     LAPAROSCOPIC HYSTERECTOMY SUPRACERVICAL, BILATERAL SALPINGO-OOPHORECTOMY, COMBINED  9/27/2013    Procedure: COMBINED LAPAROSCOPIC HYSTERECTOMY SUPRACERVICAL, SALPINGO-OOPHORECTOMY;  LAPAROSCOPIC SUPRACERVICAL HYSTERECTOMY AND RIGHT SALPINGO-OOPHORECTOMY, CYSTOSCOPY;  Surgeon: Denys Callahan MD;  [facility]: [facility]     LAPAROSCOPY DIAGNOSTIC (GYN) N/A 9/6/2018    Procedure: LAPAROSCOPY DIAGNOSTIC (GYN);  LAPAROSCOPY WITH PERITONEAL BIOPSIES AND REMOVAL LEFT FALLOPIAN TUBE;  Surgeon: Suraj Whitaker MD;  Location: [facility]     marsupialization of pilonidal cyst  2007     TUBAL LIGATION  2005       CURRENT MEDICATIONS    Current Outpatient Rx   Medication Sig Dispense Refill     cephALEXin (KEFLEX) 500 MG capsule Take 1 capsule (500 mg) by mouth 4 times daily for 10 days 40 capsule 0     dicyclomine (BENTYL) 10 MG capsule Take 1 capsule (10 mg) by mouth 4 times daily as needed 240 capsule 11     HYDROcodone-acetaminophen (NORCO) 5-325 MG per tablet Take 1 tablet by mouth every 4 hours as needed for pain Take 1-2 tablets as needed for pain 10 tablet 0     OMEPRAZOLE PO Take 20 mg by mouth every morning       valACYclovir (VALTREX) 500 MG tablet TAKE ONE TABLET BY MOUTH DAILY 90 tablet 1       ALLERGIES    No Known Allergies    SOCIAL & FAMILY HISTORY    Social History     Social History     Marital status:      Spouse name: N/A     Number of children: N/A     Years of education: N/A     Social History Main Topics     Smoking status: Current Every Day Smoker     Packs/day: 0.50     Years: 15.00     Types: Cigarettes     Last attempt to quit: 7/18/2013     Smokeless tobacco: Never Used      Comment: Quitplan referral declined 6/21/18     Alcohol use  No     Drug use: No     Sexual activity: Yes     Partners: Male     Birth control/ protection: Condom     Other Topics Concern     Parent/Sibling W/ Cabg, Mi Or Angioplasty Before 65f 55m? No     Blood Transfusions Yes     PERMITS     Caffeine Concern Yes     SODA - NOT DAILY     Social History Narrative    9/14  Purnima lives with her two boys.  She has a boyfriend who doesn't live with her.  She is an .   She has been a  for 4 years.          Family History   Problem Relation Age of Onset     Diabetes Paternal Grandmother        PHYSICAL EXAM    VITAL SIGNS: /72  Pulse 70  Temp 98.1  F (36.7  C) (Oral)  Resp 18  Wt 54.4 kg (120 lb)  LMP 08/10/2013  SpO2 99%  BMI 21.95 kg/m2  Constitutional:  Well developed, well nourished  HENT:  Atraumatic, no facial or lip swelling.behind her left ear is a 2 cm area of fluctuance, very tender  Respiratory:  No respiratory distress, breathing comfortably   Musculoskeletal:  No   Edema, no acute deformities      PROCEDURE  Incision and Drainage Procedure Note  She refuses I & D    ED COURSE & MEDICAL DECISION MAKING    See chart for details of medications given during the ED stay.    Vitals:    10/29/18 1626 10/29/18 1747 10/29/18 1834   BP: 115/72  110/72   Pulse: 70     Resp: 20  18   Temp: 98.5  F (36.9  C)  98.1  F (36.7  C)   TempSrc: Tympanic  Oral   SpO2: 98%  99%   Weight:  54.4 kg (120 lb)            FINAL IMPRESSION    Abscess     PLAN  The patient refuses I & D, she has required surgery before for these.  She is started on Keflex and given lortab for pain, referral to general surgery.          (Please note that this note was completed with a voice recognition program.  Every attempt was made to edit the dictations, but inevitably there remain words that are mis-transcribed.)      Karlie Fuller MD  10/29/18 3821

## 2018-11-12 NOTE — PROGRESS NOTES
SUBJECTIVE:   Purnima Fallon is a 37 year old female who presents to clinic today for the following health issues:      Derm follow:  Had sebaceous cyst behind left ear. Went to ER.  Would not let them I&D it. Went on antibiotics and it ruptured on it's own.  left ear. She has a new cyst forming in her vaginal areathat is painful. No antibiotics work.       Depression and Anxiety Follow-Up    Status since last visit: Worsened right now  cheated on her. Going to counseling.   Would like to try med for anxiety and depression.      Other associated symptoms:None    Complicating factors:     Significant life event: Yes-   cheated on her emotionally he states there was no physical relations.       Current substance abuse: None    PHQ 5/15/2018 8/30/2018 11/13/2018   PHQ-9 Total Score 3 7 8   Q9: Suicide Ideation Not at all Not at all Not at all     WENDY-7 SCORE 5/15/2018 8/30/2018 11/13/2018   Total Score 3 10 21       PHQ-9  English  PHQ-9   Any Language  WENDY-7  Suicide Assessment Five-step Evaluation and Treatment (SAFE-T)    Abdominal Bloating: Had EGD and Colonoscopy-Normal-States has GERD.     Laparoscopy  With left salpingectomy.. Did not have endometriosis.      IBS  :Saw    Gyne doctor Suggested  She has IBS, and gave  Dicyclomine for symptoms of bloating.   States has been a miracle.Abdominal apin and bloating is gone. , though also had Laparoscopy.      Has constipation and would like a stool softener.       Problem list and histories reviewed & adjusted, as indicated.  Additional history: as documented    Patient Active Problem List   Diagnosis     Migraines     Depression     ADHD (attention deficit hyperactivity disorder)     Cystic acne     S/P laparoscopic hysterectomy     Kidney stones     Ischiorectal abscess and fistula     ACP (advance care planning)     Mass of breast     Pilonidal cyst     H/O pilonidal cyst     Biliary dyskinesia     Chronic female pelvic pain--LAPAROSCOPY w LEFT  SALPINGECTOMY--normal pelvis--NO Endometriosis--9/2018     Past Surgical History:   Procedure Laterality Date     CYSTECTOMY PILONIDAL N/A 9/18/2017    Procedure: CYSTECTOMY PILONIDAL;  PILONIDAL CYSTECTOMY ;  Surgeon: Cordell Stephens MD;  Location: [facility]     ENDOSCOPY UPPER, COLONOSCOPY, COMBINED N/A 10/5/2018    Procedure: COMBINED ENDOSCOPY UPPER, COLONOSCOPY;  UPPER ENDOSCOPY with Biopsy  AND COLONOSCOPY;  Surgeon: Cordell Stephens MD;  Location: [facility]     EXCISE LESION BUTTOCK(S) Left 6/8/2018    Procedure: EXCISE LESION BUTTOCK(S);  EXCISION OF GLUTEAL LEFT SKIN LESION;  Surgeon: Cordell Stephens MD;  Location: [facility]     EXCISE MASS NECK Right 8/4/2015    Procedure: EXCISE MASS NECK;  Surgeon: Nasir Jones MD;  Location: [facility]     INCISION AND DRAINAGE PERINEAL, COMBINED N/A 3/18/2015    Procedure: COMBINED INCISION AND DRAINAGE PERINEAL;  Surgeon: Jay Torres DO;  Location: [facility]     LAPAROSCOPIC CHOLECYSTECTOMY N/A 11/20/2017    Procedure: LAPAROSCOPIC CHOLECYSTECTOMY;  LAPAROSCOPIC CHOLECYSTECTOMY;  Surgeon: Cordell Stephens MD;  Location[facility]     LAPAROSCOPIC HYSTERECTOMY SUPRACERVICAL, BILATERAL SALPINGO-OOPHORECTOMY, COMBINED  9/27/2013    Procedure: COMBINED LAPAROSCOPIC HYSTERECTOMY SUPRACERVICAL, SALPINGO-OOPHORECTOMY;  LAPAROSCOPIC SUPRACERVICAL HYSTERECTOMY AND RIGHT SALPINGO-OOPHORECTOMY, CYSTOSCOPY;  Surgeon: Denys Callahan MD;  Location: [facility]     LAPAROSCOPY DIAGNOSTIC (GYN) N/A 9/6/2018    Procedure: LAPAROSCOPY DIAGNOSTIC (GYN);  LAPAROSCOPY WITH PERITONEAL BIOPSIES AND REMOVAL LEFT FALLOPIAN TUBE;  Surgeon: Suraj Whitaker MD;  Location: [facility]     marsupialization of pilonidal cyst  2007     TUBAL LIGATION  2005       Social History   Substance Use Topics     Smoking status: Current Every Day Smoker     Packs/day: 0.50     Years: 15.00     Types: Cigarettes     Last attempt to quit: 7/18/2013     Smokeless tobacco: Never Used      Comment: Quitplan  "referral declined 6/21/18     Alcohol use No     Family History   Problem Relation Age of Onset     Diabetes Paternal Grandmother          Current Outpatient Prescriptions   Medication Sig Dispense Refill     dicyclomine (BENTYL) 10 MG capsule Take 1 capsule (10 mg) by mouth 4 times daily as needed 240 capsule 11     OMEPRAZOLE PO Take 20 mg by mouth every morning       valACYclovir (VALTREX) 500 MG tablet TAKE ONE TABLET BY MOUTH DAILY 90 tablet 1     No Known Allergies    Reviewed and updated as needed this visit by clinical staff  Allergies       Reviewed and updated as needed this visit by Provider      Review of Systems   Constitutional: Negative for fatigue and fever.   HENT: Negative for facial swelling.         Facial swelling gone afer sebaceous cyst ruptured.     Gastrointestinal: Positive for abdominal pain and constipation.        Bloating.  Thought was from ovary problems, but tried dicyclomine and worked well.     Genitourinary: Negative for difficulty urinating.   Skin:        Has another sebaceous cyst growing in left groin. Painful.  Does not want intervention.     Neurological: Positive for headaches. Negative for dizziness.   Psychiatric/Behavioral: Positive for dysphoric mood. The patient is nervous/anxious.    /60  Pulse 73  Temp 98.3  F (36.8  C) (Tympanic)  Resp 18  Ht 5' 2\" (1.575 m)  Wt 115 lb (52.2 kg)  LMP 08/10/2013  SpO2 100%  BMI 21.03 kg/m2  Physical Exam   Constitutional: She is oriented to person, place, and time.   HENT:   Right Ear: External ear normal.   Left Ear: External ear normal.   Nose: Nose normal.   Mouth/Throat: Oropharynx is clear and moist.   Residual monster behind left ear     Eyes: Conjunctivae and EOM are normal. Pupils are equal, round, and reactive to light.   Neck: Normal range of motion. Neck supple. No thyromegaly present.   Cardiovascular: Normal rate, regular rhythm, normal heart sounds and intact distal pulses.    No murmur " heard.  Pulmonary/Chest: Effort normal and breath sounds normal.   Abdominal: Soft. Bowel sounds are normal. She exhibits no mass. There is no tenderness.   Genitourinary:   Genitourinary Comments: Did not look at new sebaceous cyst.     Musculoskeletal: Normal range of motion. She exhibits no edema.   Lymphadenopathy:     She has no cervical adenopathy.   Neurological: She is alert and oriented to person, place, and time.   Skin: Skin is warm and dry.   Psychiatric: She has a normal mood and affect.     Results for orders placed or performed during the hospital encounter of 10/05/18   Surgical pathology exam   Result Value Ref Range    Copath Report       Patient Name: MAO LANDA  MR#: [id number]  Specimen #: [id number]  Collected: 10/5/2018  Received: 10/8/2018  Reported: 10/10/2018 12:34  Ordering Phy(s): DERICK MARTINEZ  Additional Phy(s): BENJI AVILA    For improved result formatting, select 'View Enhanced Report Format' under   Linked Documents section.    SPECIMEN(S):  A: Stomach biopsy, antrum  B: Gastroesophageal biopsy, junction    FINAL DIAGNOSIS:  A: Stomach, antrum, biopsy  - Normal gastric antral and gastric body mucosa  - No evidence of active chronic gastritis  - No evidence of dysplasia or malignancy    B: Gastroesophageal junction, biopsy  - Normal squamous  - Benign columnar mucosa with mild inflammation and reactive changes  - No evidence of goblet cells to indicate intestinal metaplasia of   Marin's esophagus  - No evidence of dysplasia or malignancy    I have personally reviewed all specimens and/or slides, including the   listed special stains, and used them  with my medical judgement to determine or conf irm the final diagnosis.    Electronically signed out by:    Tyler Schmidt M.D.    CLINICAL HISTORY:  Flatulence, eructation and gas pain; upper endoscopy with biopsy and   colonoscopy.    GROSS:  A. The specimen is received in formalin, labeled with the patient's name   and  "date of birth, and designated  \"antrum of stomach biopsy\". It consists of four tan tissue fragments   ranging from 0.1-0.6 cm in greatest  dimension.  Entirely submitted in one cassette.    B. The specimen is received in formalin, labeled the patient's name and   date of birth, and designated \"GE  junction biopsy\".  It consists of two tan tissue fragments measuring 0.2   cm and 0.3 cm in greatest dimension.  Entirely submitted in one cassette. (Dictated by: GEORGIANA Rivera([facility])   10/9/2018 11:15 AM)    MICROSCOPIC:    A. Microscopic performed .    B. The Gastrointestinal junction biopsy shows benign squama mucosa.  The   Columnar mucosa shows mild chronic  inflammation in the lamina propria and mildly patricia gated glands in which   the basal portions show reactive  changes with nuclear enlargement and infrequent mitoses.  There is   maturation toward the mucosal surface and  no dysplastic or malignant changes are seen.    CPT Codes  A: [id number]  B: [id number]    TESTING LAB LOCATION:  [address] [address]  [phone]    COLLECTION SITE:  Client: [facility]  Location: [facility] ()         ASSESSMENT / PLAN:  (F41.9) Anxiety  (primary encounter diagnosis)  Comment:   Plan: sertraline (ZOLOFT) 50 MG tabletDiscussed meds for anxiety. Will try Zoloft 25mg a day for 2 weeks then 50mg... Return 1 month for followup. Discussed if   suicidal thoughts, need to call.           (Z72.0) Tobacco abuse  Comment:  Plan: Tobacco Cessation - Order to Satisfy Health         Maintenance         (Z71.6) Tobacco abuse counseling  Comment:Encouraged to quit    Plan: Is not ready to quit.      (F34.1) Dysthymia  Comment: Discussed anxiety and Dysthymia. Will try Zoloft and see if helps both.   Plan: sertraline (ZOLOFT) 50 MG tablet       (L72.3) Sebaceous cyst  Comment:  Has new one painful forming in groin-does'nt not want antibiotic or referral- would like pain med for " worst pain.    Plan: HYDROcodone-acetaminophen (NORCO) 5-325 MG per         tablet          (K59.01) Slow transit constipation  Comment: Colace 100mg  2 times a day.    Plan: docusate sodium (COLACE) 100 MG tablet

## 2018-11-13 ENCOUNTER — OFFICE VISIT (OUTPATIENT)
Dept: INTERNAL MEDICINE | Facility: OTHER | Age: 38
End: 2018-11-13
Attending: NURSE PRACTITIONER
Payer: COMMERCIAL

## 2018-11-13 VITALS
HEIGHT: 62 IN | RESPIRATION RATE: 18 BRPM | HEART RATE: 73 BPM | DIASTOLIC BLOOD PRESSURE: 60 MMHG | WEIGHT: 115 LBS | TEMPERATURE: 98.3 F | SYSTOLIC BLOOD PRESSURE: 100 MMHG | OXYGEN SATURATION: 100 % | BODY MASS INDEX: 21.16 KG/M2

## 2018-11-13 DIAGNOSIS — Z71.6 TOBACCO ABUSE COUNSELING: ICD-10-CM

## 2018-11-13 DIAGNOSIS — F34.1 DYSTHYMIA: ICD-10-CM

## 2018-11-13 DIAGNOSIS — K59.01 SLOW TRANSIT CONSTIPATION: ICD-10-CM

## 2018-11-13 DIAGNOSIS — L72.3 SEBACEOUS CYST: ICD-10-CM

## 2018-11-13 DIAGNOSIS — F41.9 ANXIETY: Primary | ICD-10-CM

## 2018-11-13 DIAGNOSIS — Z72.0 TOBACCO ABUSE: ICD-10-CM

## 2018-11-13 PROCEDURE — 99214 OFFICE O/P EST MOD 30 MIN: CPT | Performed by: NURSE PRACTITIONER

## 2018-11-13 RX ORDER — ASPIRIN 81 MG
100 TABLET, DELAYED RELEASE (ENTERIC COATED) ORAL DAILY
Qty: 60 TABLET | Refills: 1 | Status: SHIPPED | OUTPATIENT
Start: 2018-11-13 | End: 2019-01-17

## 2018-11-13 RX ORDER — HYDROCODONE BITARTRATE AND ACETAMINOPHEN 5; 325 MG/1; MG/1
1 TABLET ORAL EVERY 4 HOURS PRN
Qty: 20 TABLET | Refills: 0 | Status: SHIPPED | OUTPATIENT
Start: 2018-11-13 | End: 2018-12-13

## 2018-11-13 ASSESSMENT — ENCOUNTER SYMPTOMS
FEVER: 0
CONSTIPATION: 1
HEADACHES: 1
NERVOUS/ANXIOUS: 1
FATIGUE: 0
ABDOMINAL PAIN: 1
DIFFICULTY URINATING: 0
DIZZINESS: 0
DYSPHORIC MOOD: 1
FACIAL SWELLING: 0

## 2018-11-13 ASSESSMENT — ANXIETY QUESTIONNAIRES
2. NOT BEING ABLE TO STOP OR CONTROL WORRYING: NEARLY EVERY DAY
GAD7 TOTAL SCORE: 21
7. FEELING AFRAID AS IF SOMETHING AWFUL MIGHT HAPPEN: NEARLY EVERY DAY
6. BECOMING EASILY ANNOYED OR IRRITABLE: NEARLY EVERY DAY
3. WORRYING TOO MUCH ABOUT DIFFERENT THINGS: NEARLY EVERY DAY
IF YOU CHECKED OFF ANY PROBLEMS ON THIS QUESTIONNAIRE, HOW DIFFICULT HAVE THESE PROBLEMS MADE IT FOR YOU TO DO YOUR WORK, TAKE CARE OF THINGS AT HOME, OR GET ALONG WITH OTHER PEOPLE: SOMEWHAT DIFFICULT
5. BEING SO RESTLESS THAT IT IS HARD TO SIT STILL: NEARLY EVERY DAY
1. FEELING NERVOUS, ANXIOUS, OR ON EDGE: NEARLY EVERY DAY

## 2018-11-13 ASSESSMENT — PAIN SCALES - GENERAL: PAINLEVEL: MODERATE PAIN (4)

## 2018-11-13 ASSESSMENT — PATIENT HEALTH QUESTIONNAIRE - PHQ9
SUM OF ALL RESPONSES TO PHQ QUESTIONS 1-9: 8
5. POOR APPETITE OR OVEREATING: NEARLY EVERY DAY

## 2018-11-13 NOTE — MR AVS SNAPSHOT
After Visit Summary   11/13/2018    Purnima Fallon    MRN: [id number]           Patient Information     Date Of Birth          1980        Visit Information        Provider Department      11/13/2018 9:00 AM Alexi Romero NP [facility]        Today's Diagnoses     Anxiety    -  1    Tobacco abuse        Tobacco abuse counseling        Dysthymia        Sebaceous cyst        Slow transit constipation          Care Instructions    1.   Zoloft 25mg a day for 2 weeks ,then 50mg a day  2.   Return  In 1 month    3.   Colace 100mg  2 times a day or up  to 4 times  A day -Stool softener    HOW TO QUIT SMOKING  Smoking is one of the hardest habits to break. About half of all those who have ever smoked have been able to quit, and most of those (about 70%) who still smoke want to quit. Here are some of the best ways to stop smoking.     KEEP TRYING:  It takes most smokers about 8 tries before they are finally able to fully quit. So, the more often you try and fail, the better your chance of quitting the next time! So, don't give up!    GO COLD TURKEY:  Most ex-smokers quit cold turkey. Trying to cut back gradually doesn't seem to work as well, perhaps because it continues the smoking habit. Also, it is possible to fool yourself by inhaling more while smoking fewer cigarettes. This results in the same amount of nicotine in your body!    GET SUPPORT:  Support programs can make an important difference, especially for the heavy smoker. These groups offer lectures, methods to change your behavior and peer support. Call the free national Quitline for more information. 800-QUIT-NOW (353-315-9998). Low-cost or free programs are offered by many hospitals, local chapters of the American Lung Association (037-015-4055) and the American Cancer Society (832-445-0667). Support at home is important too. Non-smokers can help by offering praise and encouragement. If the smoker fails to quit,  encourage them to try again!    OVER-THE-COUNTER MEDICINES:  For those who can't quit on their own, Nicotine Replacement Therapy (NRT) may make quitting much easier. Certain aids such as the nicotine patch, gum and lozenge are available without a prescription. However, it is best to use these under the guidance of your doctor. The skin patch provides a steady supply of nicotine to the body. Nicotine gum and lozenge gives temporary bursts of low levels of nicotine. Both methods take the edge off the craving for cigarettes. WARNING: If you feel symptoms of nicotine overdose, such as nausea, vomiting, dizziness, weakness, or fast heartbeat, stop using these and see your doctor.    PRESCRIPTION MEDICINES:  After evaluating your smoking patterns and prior attempts at quitting, your doctor may offer a prescription medicine such as bupropion (Zyban, Wellbutrin), varenicline (Chantix, Champix), a niocotine inhaler or nasal spray. Each has its unique advantage and side effects which your doctor can review with you.    HEALTH BENEFITS OF QUITTING:  The benefits of quitting start right away and keep improving the longer you go without smokin minutes: blood pressure and pulse return to normal  8 hours: oxygen levels return to normal  2 days: ability to smell and taste begins to improve as damaged nerves start to regrow  2-3 weeks: circulation and lung function improves  1-9 months: decreased cough, congestion and shortness of breath; less tired  1 year: risk of heart attack decreases by half  5 years: risk of lung cancer decreases by half; risk of stroke becomes the same as a non-smoker  For information about how to quit smoking, visit the following links:  National Cancer [place] ,   Clearing the Air, Quit Smoking Today   - an online booklet. http://www.smokefree.gov/pubs/clearing_the_air.pdf  Smokefree.gov http://smokefree.gov/  QuitNet http://www.quitnet.com/    9791-0031 Derrek Yip, [address] [facility],  [place] [address]. All rights reserved. This information is not intended as a substitute for professional medical care. Always follow your healthcare professional's instructions.    The Benefits of Living Smoke Free  What do you want to gain from quitting? Check off some reasons to quit.  Health Benefits  ___ Reduce my risk of lung cancer, heart disease, chronic lung disease  ___ Have fewer wrinkles and softer skin  ___ Improve my sense of taste and smell  ___ For pregnant women--reduce the risk of having a miscarriage, stillbirth, premature birth, or low-birth-weight baby  Personal Benefits  ___ Feel more in control of my life  ___ Have better-smelling hair, breath, clothes, home, and car  ___ Save time by not having to take smoke breaks, buy cigarettes, or hunt for a light  ___ Have whiter teeth  Family Benefits  ___ Reduce my children s respiratory tract infections  ___ Set a good example for my children  ___ Reduce my family s cancer risk  Financial Benefits  ___ Save hundreds of dollars each year that would be spent on cigarettes  ___ Save money on medical bills  ___ Save on life, health, and car insurance premiums    Those Dollars Add Up!  Cigarettes are expensive, and getting more expensive all the time. Do you realize how much money you are spending on cigarettes per year? What is the average amount you spend on a pack of cigarettes? What is the average number of packs that you smoke per day? Using your answers to these questions, fill in this formula to help you find out:  ($ _____ per pack) ×  ( _____ number of packs per day) × (365 days) =  $ _____ yearly cost of smoking  Besides tobacco, there are other costs, including extra cleaning bills and replacement costs for clothing and furniture; medical expenses for smoking-related illnesses; and higher health, life, and car insurance premiums.    Cigars and Pipes Count Too!  Cigars and pipes are also dangerous. So are smokeless (chewing) tobacco and snuff.  "All of these products contain nicotine, a highly addictive substance that has harmful effects on your body. Quitting smoking means giving up all tobacco products.      3409-4964 [facility], [address] [place] [address], [place] [address]. All rights reserved. This information is not intended as a substitute for professional medical care. Always follow your healthcare professional's instructions.          Follow-ups after your visit        Who to contact     If you have questions or need follow up information about today's clinic visit or your schedule please contact [facility] directly at [phone].  Normal or non-critical lab and imaging results will be communicated to you by KoalaDealhart, letter or phone within 4 business days after the clinic has received the results. If you do not hear from us within 7 days, please contact the clinic through KoalaDealhart or phone. If you have a critical or abnormal lab result, we will notify you by phone as soon as possible.  Submit refill requests through Meddle or call your pharmacy and they will forward the refill request to us. Please allow 3 business days for your refill to be completed.          Additional Information About Your Visit        MyChart Information     Meddle gives you secure access to your electronic health record. If you see a primary care provider, you can also send messages to your care team and make appointments. If you have questions, please call your primary care clinic.  If you do not have a primary care provider, please call [phone] and they will assist you.        Care EveryWhere ID     This is your Care EveryWhere ID. This could be used by other organizations to access your [place] medical records  [id number]        Your Vitals Were     Pulse Temperature Respirations Height Last Period Pulse Oximetry    73 98.3  F (36.8  C) (Tympanic) 18 5' 2\" (1.575 m) 08/10/2013 100%    BMI (Body Mass Index)                   21.03 kg/m2   "          Blood Pressure from Last 3 Encounters:   11/13/18 100/60   10/29/18 110/72   10/05/18 101/63    Weight from Last 3 Encounters:   11/13/18 115 lb (52.2 kg)   10/29/18 120 lb (54.4 kg)   10/05/18 122 lb 6.4 oz (55.5 kg)              We Performed the Following     Tobacco Cessation - Order to Satisfy Health Maintenance          Today's Medication Changes          These changes are accurate as of 11/13/18  9:52 AM.  If you have any questions, ask your nurse or doctor.               Start taking these medicines.        Dose/Directions    docusate sodium 100 MG tablet   Commonly known as:  COLACE   Used for:  Slow transit constipation   Started by:  Alexi Romero NP        Dose:  100 mg   Take 100 mg by mouth daily   Quantity:  60 tablet   Refills:  1       sertraline 50 MG tablet   Commonly known as:  ZOLOFT   Used for:  Anxiety, Dysthymia   Started by:  Alexi Romero NP        Take 1/2 tablet (25 mg) for 1-2 weeks, then increase to 1 tablet orally daily   Quantity:  30 tablet   Refills:  0         These medicines have changed or have updated prescriptions.        Dose/Directions    HYDROcodone-acetaminophen 5-325 MG per tablet   Commonly known as:  NORCO   This may have changed:  additional instructions   Used for:  Sebaceous cyst   Changed by:  Alexi Romero NP        Dose:  1 tablet   Take 1 tablet by mouth every 4 hours as needed for pain   Quantity:  20 tablet   Refills:  0            Where to get your medicines      These medications were sent to [facility] [address] [place] [address] [place], [place] - [address] AT [facility] [address] [address]     Phone:  [phone]     docusate sodium 100 MG tablet    sertraline 50 MG tablet         Some of these will need a paper prescription and others can be bought over the counter.  Ask your nurse if you have questions.     Bring a paper prescription for each of these medications     HYDROcodone-acetaminophen 5-325 MG per tablet                Information about OPIOIDS     PRESCRIPTION OPIOIDS: WHAT YOU NEED TO KNOW   We gave you an opioid (narcotic) pain medicine. It is important to manage your pain, but opioids are not always the best choice. You should first try all the other options your care team gave you. Take this medicine for as short a time (and as few doses) as possible.    Some activities can increase your pain, such as bandage changes or therapy sessions. It may help to take your pain medicine 30 to 60 minutes before these activities. Reduce your stress by getting enough sleep, working on hobbies you enjoy and practicing relaxation or meditation. Talk to your care team about ways to manage your pain beyond prescription opioids.    These medicines have risks:    DO NOT drive when on new or higher doses of pain medicine. These medicines can affect your alertness and reaction times, and you could be arrested for driving under the influence (DUI). If you need to use opioids long-term, talk to your care team about driving.    DO NOT operate heavy machinery    DO NOT do any other dangerous activities while taking these medicines.    DO NOT drink any alcohol while taking these medicines.     If the opioid prescribed includes acetaminophen, DO NOT take with any other medicines that contain acetaminophen. Read all labels carefully. Look for the word  acetaminophen  or  Tylenol.  Ask your pharmacist if you have questions or are unsure.    You can get addicted to pain medicines, especially if you have a history of addiction (chemical, alcohol or substance dependence). Talk to your care team about ways to reduce this risk.    All opioids tend to cause constipation. Drink plenty of water and eat foods that have a lot of fiber, such as fruits, vegetables, prune juice, apple juice and high-fiber cereal. Take a laxative (Miralax, milk of magnesia, Colace, Senna) if you don t move your bowels at least every other day. Other side effects include upset  stomach, sleepiness, dizziness, throwing up, tolerance (needing more of the medicine to have the same effect), physical dependence and slowed breathing.    Store your pills in a secure place, locked if possible. We will not replace any lost or stolen medicine. If you don t finish your medicine, please throw away (dispose) as directed by your pharmacist. The [place] Pollution Control Agency has more information about safe disposal: https://www.pca.[facility].mn.us/living-green/managing-unwanted-medications         Primary Care Provider Office Phone # Fax #    Alexi Romero [phone] [address] [facility] [address]        Equal Access to Services     ALDO CRUZ : Felix Buckley, waclark rain, qaybsavanah kaalmada stella, debby clemons. So [facility] [phone].    ATENCIÓN: Si habla español, tiene a cyr disposición servicios gratuitos de asistencia lingüística. Llame al [phone].    We comply with applicable federal civil rights laws and [place] laws. We do not discriminate on the basis of race, color, national origin, age, disability, sex, sexual orientation, or gender identity.            Thank you!     Thank you for choosing [facility]  for your care. Our goal is always to provide you with excellent care. Hearing back from our patients is one way we can continue to improve our services. Please take a few minutes to complete the written survey that you may receive in the mail after your visit with us. Thank you!             Your Updated Medication List - Protect others around you: Learn how to safely use, store and throw away your medicines at www.disposemymeds.org.          This list is accurate as of 11/13/18  9:52 AM.  Always use your most recent med list.                   Brand Name Dispense Instructions for use Diagnosis    dicyclomine 10 MG capsule    BENTYL    240 capsule    Take 1 capsule (10 mg) by mouth 4  times daily as needed    Irritable bowel syndrome with constipation       docusate sodium 100 MG tablet    COLACE    60 tablet    Take 100 mg by mouth daily    Slow transit constipation       HYDROcodone-acetaminophen 5-325 MG per tablet    NORCO    20 tablet    Take 1 tablet by mouth every 4 hours as needed for pain    Sebaceous cyst       OMEPRAZOLE PO      Take 20 mg by mouth every morning        sertraline 50 MG tablet    ZOLOFT    30 tablet    Take 1/2 tablet (25 mg) for 1-2 weeks, then increase to 1 tablet orally daily    Anxiety, Dysthymia       valACYclovir 500 MG tablet    VALTREX    90 tablet    TAKE ONE TABLET BY MOUTH DAILY    Herpes simplex virus infection

## 2018-11-13 NOTE — PATIENT INSTRUCTIONS
1.   Zoloft 25mg a day for 2 weeks ,then 50mg a day  2.   Return  In 1 month    3.   Colace 100mg  2 times a day or up  to 4 times  A day -Stool softener    HOW TO QUIT SMOKING  Smoking is one of the hardest habits to break. About half of all those who have ever smoked have been able to quit, and most of those (about 70%) who still smoke want to quit. Here are some of the best ways to stop smoking.     KEEP TRYING:  It takes most smokers about 8 tries before they are finally able to fully quit. So, the more often you try and fail, the better your chance of quitting the next time! So, don't give up!    GO COLD TURKEY:  Most ex-smokers quit cold turkey. Trying to cut back gradually doesn't seem to work as well, perhaps because it continues the smoking habit. Also, it is possible to fool yourself by inhaling more while smoking fewer cigarettes. This results in the same amount of nicotine in your body!    GET SUPPORT:  Support programs can make an important difference, especially for the heavy smoker. These groups offer lectures, methods to change your behavior and peer support. Call the free national Quitline for more information. 800-QUIT-NOW (574-421-6713). Low-cost or free programs are offered by many hospitals, local chapters of the American Lung Association (470-876-2862) and the American Cancer Society (593-075-3938). Support at home is important too. Non-smokers can help by offering praise and encouragement. If the smoker fails to quit, encourage them to try again!    OVER-THE-COUNTER MEDICINES:  For those who can't quit on their own, Nicotine Replacement Therapy (NRT) may make quitting much easier. Certain aids such as the nicotine patch, gum and lozenge are available without a prescription. However, it is best to use these under the guidance of your doctor. The skin patch provides a steady supply of nicotine to the body. Nicotine gum and lozenge gives temporary bursts of low levels of nicotine. Both methods  take the edge off the craving for cigarettes. WARNING: If you feel symptoms of nicotine overdose, such as nausea, vomiting, dizziness, weakness, or fast heartbeat, stop using these and see your doctor.    PRESCRIPTION MEDICINES:  After evaluating your smoking patterns and prior attempts at quitting, your doctor may offer a prescription medicine such as bupropion (Zyban, Wellbutrin), varenicline (Chantix, Champix), a niocotine inhaler or nasal spray. Each has its unique advantage and side effects which your doctor can review with you.    HEALTH BENEFITS OF QUITTING:  The benefits of quitting start right away and keep improving the longer you go without smokin minutes: blood pressure and pulse return to normal  8 hours: oxygen levels return to normal  2 days: ability to smell and taste begins to improve as damaged nerves start to regrow  2-3 weeks: circulation and lung function improves  1-9 months: decreased cough, congestion and shortness of breath; less tired  1 year: risk of heart attack decreases by half  5 years: risk of lung cancer decreases by half; risk of stroke becomes the same as a non-smoker  For information about how to quit smoking, visit the following links:  National Cancer [place] ,   Clearing the Air, Quit Smoking Today   - an online booklet. http://www.smokefree.gov/pubs/clearing_the_air.pdf  Smokefree.gov http://smokefree.gov/  QuitNet http://www.quitnet.com/    6279-3643 [facility], [address] [place] [address]. All rights reserved. This information is not intended as a substitute for professional medical care. Always follow your healthcare professional's instructions.    The Benefits of Living Smoke Free  What do you want to gain from quitting? Check off some reasons to quit.  Health Benefits  ___ Reduce my risk of lung cancer, heart disease, chronic lung disease  ___ Have fewer wrinkles and softer skin  ___ Improve my sense of taste and smell  ___ For pregnant  women--reduce the risk of having a miscarriage, stillbirth, premature birth, or low-birth-weight baby  Personal Benefits  ___ Feel more in control of my life  ___ Have better-smelling hair, breath, clothes, home, and car  ___ Save time by not having to take smoke breaks, buy cigarettes, or hunt for a light  ___ Have whiter teeth  Family Benefits  ___ Reduce my children s respiratory tract infections  ___ Set a good example for my children  ___ Reduce my family s cancer risk  Financial Benefits  ___ Save hundreds of dollars each year that would be spent on cigarettes  ___ Save money on medical bills  ___ Save on life, health, and car insurance premiums    Those Dollars Add Up!  Cigarettes are expensive, and getting more expensive all the time. Do you realize how much money you are spending on cigarettes per year? What is the average amount you spend on a pack of cigarettes? What is the average number of packs that you smoke per day? Using your answers to these questions, fill in this formula to help you find out:  ($ _____ per pack) ×  ( _____ number of packs per day) × (365 days) =  $ _____ yearly cost of smoking  Besides tobacco, there are other costs, including extra cleaning bills and replacement costs for clothing and furniture; medical expenses for smoking-related illnesses; and higher health, life, and car insurance premiums.    Cigars and Pipes Count Too!  Cigars and pipes are also dangerous. So are smokeless (chewing) tobacco and snuff. All of these products contain nicotine, a highly addictive substance that has harmful effects on your body. Quitting smoking means giving up all tobacco products.      0696-2911 [facility], [address] [place] [address], [place] [address]. All rights reserved. This information is not intended as a substitute for professional medical care. Always follow your healthcare professional's instructions.

## 2018-11-14 ASSESSMENT — ANXIETY QUESTIONNAIRES: GAD7 TOTAL SCORE: 21

## 2018-12-07 NOTE — TELEPHONE ENCOUNTER
Orders have been signed and completed, thanks.     Patient Instructions by Dina Paulson PA-C at 06/01/17 06:45 PM     Author:  Dina Paulson PA-C Service:  (none) Author Type:  Physician Assistant     Filed:  06/01/17 06:46 PM Encounter Date:  6/1/2017 Status:  Signed     :  Dina Paulson PA-C (Physician Assistant)            Assessment/Plan:  Hypothyroid  Will check TSH and CBC today  If levels normal will renew current dose    Serous otitis of both ears  Use Afrin nasal spray prior to flying     Return in 6 months for full physical     Electronically Signed by:    Dina Paulson PA-C , 6/1/2017         Revision History        User Key Date/Time User Provider Type Action    > [N/A] 06/01/17 06:46 PM Dina Paulson PA-C Physician Assistant Sign

## 2018-12-12 NOTE — PATIENT INSTRUCTIONS
1. Set up for MRI of Left shoulder.    2.  Lexapro 5mg a day- Return 1 month  3.  Vistaril 25mg  Up to 3 times a  day  For anxiety-can make you tired.              HOW TO QUIT SMOKING  Smoking is one of the hardest habits to break. About half of all those who have ever smoked have been able to quit, and most of those (about 70%) who still smoke want to quit. Here are some of the best ways to stop smoking.     KEEP TRYING:  It takes most smokers about 8 tries before they are finally able to fully quit. So, the more often you try and fail, the better your chance of quitting the next time! So, don't give up!    GO COLD TURKEY:  Most ex-smokers quit cold turkey. Trying to cut back gradually doesn't seem to work as well, perhaps because it continues the smoking habit. Also, it is possible to fool yourself by inhaling more while smoking fewer cigarettes. This results in the same amount of nicotine in your body!    GET SUPPORT:  Support programs can make an important difference, especially for the heavy smoker. These groups offer lectures, methods to change your behavior and peer support. Call the free national Quitline for more information. 800-QUIT-NOW (770-092-6220). Low-cost or free programs are offered by many hospitals, local chapters of the American Lung Association (758-151-7556) and the American Cancer Society (006-542-7710). Support at home is important too. Non-smokers can help by offering praise and encouragement. If the smoker fails to quit, encourage them to try again!    OVER-THE-COUNTER MEDICINES:  For those who can't quit on their own, Nicotine Replacement Therapy (NRT) may make quitting much easier. Certain aids such as the nicotine patch, gum and lozenge are available without a prescription. However, it is best to use these under the guidance of your doctor. The skin patch provides a steady supply of nicotine to the body. Nicotine gum and lozenge gives temporary bursts of low levels of nicotine. Both  methods take the edge off the craving for cigarettes. WARNING: If you feel symptoms of nicotine overdose, such as nausea, vomiting, dizziness, weakness, or fast heartbeat, stop using these and see your doctor.    PRESCRIPTION MEDICINES:  After evaluating your smoking patterns and prior attempts at quitting, your doctor may offer a prescription medicine such as bupropion (Zyban, Wellbutrin), varenicline (Chantix, Champix), a niocotine inhaler or nasal spray. Each has its unique advantage and side effects which your doctor can review with you.    HEALTH BENEFITS OF QUITTING:  The benefits of quitting start right away and keep improving the longer you go without smokin minutes: blood pressure and pulse return to normal  8 hours: oxygen levels return to normal  2 days: ability to smell and taste begins to improve as damaged nerves start to regrow  2-3 weeks: circulation and lung function improves  1-9 months: decreased cough, congestion and shortness of breath; less tired  1 year: risk of heart attack decreases by half  5 years: risk of lung cancer decreases by half; risk of stroke becomes the same as a non-smoker  For information about how to quit smoking, visit the following links:  National Cancer [place] ,   Clearing the Air, Quit Smoking Today   - an online booklet. http://www.smokefree.gov/pubs/clearing_the_air.pdf  Smokefree.gov http://smokefree.gov/  QuitNet http://www.quitnet.com/    1275-8642 [facility], [address] [place] [address]. All rights reserved. This information is not intended as a substitute for professional medical care. Always follow your healthcare professional's instructions.    The Benefits of Living Smoke Free  What do you want to gain from quitting? Check off some reasons to quit.  Health Benefits  ___ Reduce my risk of lung cancer, heart disease, chronic lung disease  ___ Have fewer wrinkles and softer skin  ___ Improve my sense of taste and smell  ___ For pregnant  women--reduce the risk of having a miscarriage, stillbirth, premature birth, or low-birth-weight baby  Personal Benefits  ___ Feel more in control of my life  ___ Have better-smelling hair, breath, clothes, home, and car  ___ Save time by not having to take smoke breaks, buy cigarettes, or hunt for a light  ___ Have whiter teeth  Family Benefits  ___ Reduce my children s respiratory tract infections  ___ Set a good example for my children  ___ Reduce my family s cancer risk  Financial Benefits  ___ Save hundreds of dollars each year that would be spent on cigarettes  ___ Save money on medical bills  ___ Save on life, health, and car insurance premiums    Those Dollars Add Up!  Cigarettes are expensive, and getting more expensive all the time. Do you realize how much money you are spending on cigarettes per year? What is the average amount you spend on a pack of cigarettes? What is the average number of packs that you smoke per day? Using your answers to these questions, fill in this formula to help you find out:  ($ _____ per pack) ×  ( _____ number of packs per day) × (365 days) =  $ _____ yearly cost of smoking  Besides tobacco, there are other costs, including extra cleaning bills and replacement costs for clothing and furniture; medical expenses for smoking-related illnesses; and higher health, life, and car insurance premiums.    Cigars and Pipes Count Too!  Cigars and pipes are also dangerous. So are smokeless (chewing) tobacco and snuff. All of these products contain nicotine, a highly addictive substance that has harmful effects on your body. Quitting smoking means giving up all tobacco products.      4898-0699 [facility], [address] [place] [address], [place] [address]. All rights reserved. This information is not intended as a substitute for professional medical care. Always follow your healthcare professional's instructions.  HOW TO QUIT SMOKING  Smoking is one of the hardest habits to break. About half of  all those who have ever smoked have been able to quit, and most of those (about 70%) who still smoke want to quit. Here are some of the best ways to stop smoking.     KEEP TRYING:  It takes most smokers about 8 tries before they are finally able to fully quit. So, the more often you try and fail, the better your chance of quitting the next time! So, don't give up!    GO COLD TURKEY:  Most ex-smokers quit cold turkey. Trying to cut back gradually doesn't seem to work as well, perhaps because it continues the smoking habit. Also, it is possible to fool yourself by inhaling more while smoking fewer cigarettes. This results in the same amount of nicotine in your body!    GET SUPPORT:  Support programs can make an important difference, especially for the heavy smoker. These groups offer lectures, methods to change your behavior and peer support. Call the free national Quitline for more information. 800-QUIT-NOW (032-388-2911). Low-cost or free programs are offered by many hospitals, local chapters of the American Lung Association (289-595-9832) and the American Cancer Society (303-307-5176). Support at home is important too. Non-smokers can help by offering praise and encouragement. If the smoker fails to quit, encourage them to try again!    OVER-THE-COUNTER MEDICINES:  For those who can't quit on their own, Nicotine Replacement Therapy (NRT) may make quitting much easier. Certain aids such as the nicotine patch, gum and lozenge are available without a prescription. However, it is best to use these under the guidance of your doctor. The skin patch provides a steady supply of nicotine to the body. Nicotine gum and lozenge gives temporary bursts of low levels of nicotine. Both methods take the edge off the craving for cigarettes. WARNING: If you feel symptoms of nicotine overdose, such as nausea, vomiting, dizziness, weakness, or fast heartbeat, stop using these and see your doctor.    PRESCRIPTION MEDICINES:  After  evaluating your smoking patterns and prior attempts at quitting, your doctor may offer a prescription medicine such as bupropion (Zyban, Wellbutrin), varenicline (Chantix, Champix), a niocotine inhaler or nasal spray. Each has its unique advantage and side effects which your doctor can review with you.    HEALTH BENEFITS OF QUITTING:  The benefits of quitting start right away and keep improving the longer you go without smokin minutes: blood pressure and pulse return to normal  8 hours: oxygen levels return to normal  2 days: ability to smell and taste begins to improve as damaged nerves start to regrow  2-3 weeks: circulation and lung function improves  1-9 months: decreased cough, congestion and shortness of breath; less tired  1 year: risk of heart attack decreases by half  5 years: risk of lung cancer decreases by half; risk of stroke becomes the same as a non-smoker  For information about how to quit smoking, visit the following links:  National Cancer [place] ,   Clearing the Air, Quit Smoking Today   - an online booklet. http://www.smokefree.gov/pubs/clearing_the_air.pdf  Smokefree.gov http://smokefree.gov/  QuitNet http://www.quitnet.com/    0564-4450 [facility], [address] [place] [address], [place] [address]. All rights reserved. This information is not intended as a substitute for professional medical care. Always follow your healthcare professional's instructions.    The Benefits of Living Smoke Free  What do you want to gain from quitting? Check off some reasons to quit.  Health Benefits  ___ Reduce my risk of lung cancer, heart disease, chronic lung disease  ___ Have fewer wrinkles and softer skin  ___ Improve my sense of taste and smell  ___ For pregnant women--reduce the risk of having a miscarriage, stillbirth, premature birth, or low-birth-weight baby  Personal Benefits  ___ Feel more in control of my life  ___ Have better-smelling hair, breath, clothes, home, and car  ___ Save time by not  having to take smoke breaks, buy cigarettes, or hunt for a light  ___ Have whiter teeth  Family Benefits  ___ Reduce my children s respiratory tract infections  ___ Set a good example for my children  ___ Reduce my family s cancer risk  Financial Benefits  ___ Save hundreds of dollars each year that would be spent on cigarettes  ___ Save money on medical bills  ___ Save on life, health, and car insurance premiums    Those Dollars Add Up!  Cigarettes are expensive, and getting more expensive all the time. Do you realize how much money you are spending on cigarettes per year? What is the average amount you spend on a pack of cigarettes? What is the average number of packs that you smoke per day? Using your answers to these questions, fill in this formula to help you find out:  ($ _____ per pack) ×  ( _____ number of packs per day) × (365 days) =  $ _____ yearly cost of smoking  Besides tobacco, there are other costs, including extra cleaning bills and replacement costs for clothing and furniture; medical expenses for smoking-related illnesses; and higher health, life, and car insurance premiums.    Cigars and Pipes Count Too!  Cigars and pipes are also dangerous. So are smokeless (chewing) tobacco and snuff. All of these products contain nicotine, a highly addictive substance that has harmful effects on your body. Quitting smoking means giving up all tobacco products.      4866-6189 [facility], [address] [place] [address], [place] [id number]. All rights reserved. This information is not intended as a substitute for professional medical care. Always follow your healthcare professional's instructions.

## 2018-12-12 NOTE — PROGRESS NOTES
SUBJECTIVE:   Purnima Fallon is a 38 year old female who presents to clinic today for the following health issues:      Depression/Anxiety Followup    Status since last visit: Improved Feel better Took ZOloft for 2 weeks then stopped-nausea, dizziness.      See PHQ-9 for current symptoms.  Other associated symptoms: None    Complicating factors:   Significant life event:  No   Current substance abuse:  None  Anxiety or Panic symptoms:  Yes-  Both   Wants something she can take episodic for anxiety.  Discussed don't think  Xanax or Lorazepam is the answer  Doesn't really want to take a daily medicine. Has taken Lexapro in past.      PHQ 5/15/2018 8/30/2018 11/13/2018   PHQ-9 Total Score 3 7 8   Q9: Suicide Ideation Not at all Not at all Not at all       PHQ-9  English  PHQ-9   Any Language  Suicide Assessment Five-step Evaluation and Treatment (SAFE-T)    Amount of exercise or physical activity: 4-5 days/week for an average of 45-60 minutes    Problems taking medications regularly: No    Medication side effects: Zoloft Insomnia, sweats, headache    Diet: regular (no restrictions)    Left shoulder pain  : Has had pain in left shoulder for years, but lately pain increased. Pain is constant , wakes her up at night.      Sebaceous cyst: One behind Left ear and on groin did receded without antibiotics.      Problem list and histories reviewed & adjusted, as indicated.  Additional history: as documented    Patient Active Problem List   Diagnosis     Migraines     Depression     ADHD (attention deficit hyperactivity disorder)     Cystic acne     S/P laparoscopic hysterectomy     Kidney stones     Ischiorectal abscess and fistula     ACP (advance care planning)     Mass of breast     Pilonidal cyst     H/O pilonidal cyst     Biliary dyskinesia     Chronic female pelvic pain--LAPAROSCOPY w LEFT SALPINGECTOMY--normal pelvis--NO Endometriosis--9/2018     Past Surgical History:   Procedure Laterality Date     CYSTECTOMY  PILONIDAL N/A 2017    Procedure: CYSTECTOMY PILONIDAL;  PILONIDAL CYSTECTOMY ;  Surgeon: Cordell Stephens MD;  Location: [facility]     ENDOSCOPY UPPER, COLONOSCOPY, COMBINED N/A 10/5/2018    Procedure: COMBINED ENDOSCOPY UPPER, COLONOSCOPY;  UPPER ENDOSCOPY with Biopsy  AND COLONOSCOPY;  Surgeon: Cordell Stephens MD;  Location: [facility]     EXCISE LESION BUTTOCK(S) Left 2018    Procedure: EXCISE LESION BUTTOCK(S);  EXCISION OF GLUTEAL LEFT SKIN LESION;  Surgeon: Cordell Stephens MD;  [facility]     EXCISE MASS NECK Right 2015    Procedure: EXCISE MASS NECK;  Surgeon: Nasir Jones MD;  [facility]: [facility]     INCISION AND DRAINAGE PERINEAL, COMBINED N/A 3/18/2015    Procedure: COMBINED INCISION AND DRAINAGE PERINEAL;  Surgeon: Jay Torres DO;  [facility]: [facility]     LAPAROSCOPIC CHOLECYSTECTOMY N/A 2017    Procedure: LAPAROSCOPIC CHOLECYSTECTOMY;  LAPAROSCOPIC CHOLECYSTECTOMY;  Surgeon: Cordell Stephens MD;  [facility]     LAPAROSCOPIC HYSTERECTOMY SUPRACERVICAL, BILATERAL SALPINGO-OOPHORECTOMY, COMBINED  2013    Procedure: COMBINED LAPAROSCOPIC HYSTERECTOMY SUPRACERVICAL, SALPINGO-OOPHORECTOMY;  LAPAROSCOPIC SUPRACERVICAL HYSTERECTOMY AND RIGHT SALPINGO-OOPHORECTOMY, CYSTOSCOPY;  Surgeon: Denys Callahan MD;  Location: [facility]     LAPAROSCOPY DIAGNOSTIC (GYN) N/A 2018    Procedure: LAPAROSCOPY DIAGNOSTIC (GYN);  LAPAROSCOPY WITH PERITONEAL BIOPSIES AND REMOVAL LEFT FALLOPIAN TUBE;  Surgeon: Suraj Whitaker MD;  Location: [facility]     marsupialization of pilonidal cyst  2007     TUBAL LIGATION         Social History     Tobacco Use     Smoking status: Current Every Day Smoker     Packs/day: 0.50     Years: 15.00     Pack years: 7.50     Types: Cigarettes     Last attempt to quit: 2013     Years since quittin.4     Smokeless tobacco: Never Used     Tobacco comment: Quitplan referral declined 18   Substance Use Topics     Alcohol use: No     Family  "History   Problem Relation Age of Onset     Diabetes Paternal Grandmother          Current Outpatient Medications   Medication Sig Dispense Refill     dicyclomine (BENTYL) 10 MG capsule Take 1 capsule (10 mg) by mouth 4 times daily as needed 240 capsule 11     docusate sodium (COLACE) 100 MG tablet Take 100 mg by mouth daily 60 tablet 1     escitalopram (LEXAPRO) 5 MG tablet Take 1 tablet (5 mg) by mouth daily 90 tablet 3     hydrOXYzine (VISTARIL) 25 MG capsule Can take  25mg  Up to 3 times a day for anxiety 60 capsule 0     oxyCODONE-acetaminophen (PERCOCET) 5-325 MG tablet Take 1 tablet by mouth every 6 hours as needed for pain 24 tablet 0     valACYclovir (VALTREX) 500 MG tablet TAKE ONE TABLET BY MOUTH DAILY 90 tablet 1     OMEPRAZOLE PO Take 20 mg by mouth every morning       No Known Allergies    Reviewed and updated as needed this visit by clinical staff       Reviewed and updated as needed this visit by Provider       Review of Systems   Constitutional: Negative for fatigue and fever.   HENT: Negative.    Respiratory: Negative for cough and shortness of breath.    Cardiovascular: Negative for chest pain, palpitations and leg swelling.   Gastrointestinal: Negative for abdominal pain, constipation, diarrhea and nausea.   Genitourinary: Negative for difficulty urinating.   Musculoskeletal: Positive for arthralgias and myalgias.        Left shoulder pain     Skin:        Sebaceous cysts quiescent.     Neurological: Negative for dizziness and headaches.   Psychiatric/Behavioral: Positive for dysphoric mood. Negative for suicidal ideas. The patient is nervous/anxious.    BP 96/58 (BP Location: Left arm, Patient Position: Sitting, Cuff Size: Adult Regular)   Pulse 72   Temp 98.4  F (36.9  C) (Tympanic)   Ht 1.575 m (5' 2\")   Wt 52.2 kg (115 lb)   LMP 08/10/2013   SpO2 100%   BMI 21.03 kg/m    Physical Exam   HENT:   Right Ear: External ear normal.   Left Ear: External ear normal.   Nose: Nose normal. "   Mouth/Throat: Oropharynx is clear and moist.   Eyes: Conjunctivae and EOM are normal. Pupils are equal, round, and reactive to light.   Neck: Normal range of motion. Neck supple. No thyromegaly present.   Cardiovascular: Normal rate, regular rhythm, normal heart sounds and intact distal pulses.   No murmur heard.  Pulmonary/Chest: Effort normal and breath sounds normal.   Musculoskeletal:   Left shoulder with poor external rotation secondary to pain, internal and cross chest good.     Lymphadenopathy:     She has no cervical adenopathy.   Neurological: She is alert.   Skin: Skin is warm and dry.   Psychiatric: She has a normal mood and affect.   ASSESSMENT / PLAN:  (F41.9) Anxiety  (primary encounter diagnosis)  Comment:Will give Lexapro 5mg  A day, and Vistaril 25mg  Up to 3 times a day for anxiety.  Return 1 month.    Plan: escitalopram (LEXAPRO) 5 MG tablet, hydrOXYzine        (VISTARIL) 25 MG capsule        (Z72.0) Tobacco abuse  Comment: Encouraged to stop smoking        (Z71.6) Tobacco abuse counseling  Comment: Encouraged to stop smoking    Plan: Not ready to stop      (M25.512) Acute pain of left shoulder  Comment: Will get Xray of shoulder , and set up for MRI.  Patient requests pain med for shoulder.  Percocet 5/325mg  1 tab every 6 hurs as needed for pain.  #24.    Plan: MR Shoulder Left w/o Contrast,         oxyCODONE-acetaminophen (PERCOCET) 5-325 MG         tablet, XR Shoulder Left G/E 2 Views,         X

## 2018-12-13 ENCOUNTER — OFFICE VISIT (OUTPATIENT)
Dept: INTERNAL MEDICINE | Facility: OTHER | Age: 38
End: 2018-12-13
Attending: NURSE PRACTITIONER
Payer: COMMERCIAL

## 2018-12-13 ENCOUNTER — ANCILLARY PROCEDURE (OUTPATIENT)
Dept: GENERAL RADIOLOGY | Facility: OTHER | Age: 38
End: 2018-12-13
Attending: NURSE PRACTITIONER
Payer: COMMERCIAL

## 2018-12-13 VITALS
SYSTOLIC BLOOD PRESSURE: 96 MMHG | BODY MASS INDEX: 21.16 KG/M2 | HEART RATE: 72 BPM | TEMPERATURE: 98.4 F | WEIGHT: 115 LBS | HEIGHT: 62 IN | DIASTOLIC BLOOD PRESSURE: 58 MMHG | OXYGEN SATURATION: 100 %

## 2018-12-13 DIAGNOSIS — Z72.0 TOBACCO ABUSE: ICD-10-CM

## 2018-12-13 DIAGNOSIS — F41.9 ANXIETY: Primary | ICD-10-CM

## 2018-12-13 DIAGNOSIS — Z71.6 TOBACCO ABUSE COUNSELING: ICD-10-CM

## 2018-12-13 DIAGNOSIS — M25.512 ACUTE PAIN OF LEFT SHOULDER: ICD-10-CM

## 2018-12-13 PROCEDURE — 99214 OFFICE O/P EST MOD 30 MIN: CPT | Performed by: NURSE PRACTITIONER

## 2018-12-13 PROCEDURE — 73030 X-RAY EXAM OF SHOULDER: CPT | Mod: TC

## 2018-12-13 RX ORDER — ESCITALOPRAM OXALATE 5 MG/1
5 TABLET ORAL DAILY
Qty: 90 TABLET | Refills: 3 | Status: SHIPPED | OUTPATIENT
Start: 2018-12-13 | End: 2019-02-15

## 2018-12-13 RX ORDER — HYDROXYZINE PAMOATE 25 MG/1
CAPSULE ORAL
Qty: 60 CAPSULE | Refills: 0 | Status: SHIPPED | OUTPATIENT
Start: 2018-12-13 | End: 2019-07-01

## 2018-12-13 RX ORDER — OXYCODONE AND ACETAMINOPHEN 5; 325 MG/1; MG/1
1 TABLET ORAL EVERY 6 HOURS PRN
Qty: 24 TABLET | Refills: 0 | Status: SHIPPED | OUTPATIENT
Start: 2018-12-13 | End: 2019-02-07

## 2018-12-13 ASSESSMENT — ENCOUNTER SYMPTOMS
FATIGUE: 0
FEVER: 0
CONSTIPATION: 0
NAUSEA: 0
DIZZINESS: 0
ARTHRALGIAS: 1
DIARRHEA: 0
ABDOMINAL PAIN: 0
DIFFICULTY URINATING: 0
DYSPHORIC MOOD: 1
PALPITATIONS: 0
HEADACHES: 0
COUGH: 0
SHORTNESS OF BREATH: 0
NERVOUS/ANXIOUS: 1
MYALGIAS: 1

## 2018-12-13 ASSESSMENT — ANXIETY QUESTIONNAIRES
7. FEELING AFRAID AS IF SOMETHING AWFUL MIGHT HAPPEN: NOT AT ALL
2. NOT BEING ABLE TO STOP OR CONTROL WORRYING: NOT AT ALL
3. WORRYING TOO MUCH ABOUT DIFFERENT THINGS: NOT AT ALL
5. BEING SO RESTLESS THAT IT IS HARD TO SIT STILL: SEVERAL DAYS
GAD7 TOTAL SCORE: 4
6. BECOMING EASILY ANNOYED OR IRRITABLE: SEVERAL DAYS
1. FEELING NERVOUS, ANXIOUS, OR ON EDGE: SEVERAL DAYS

## 2018-12-13 ASSESSMENT — PATIENT HEALTH QUESTIONNAIRE - PHQ9
5. POOR APPETITE OR OVEREATING: SEVERAL DAYS
SUM OF ALL RESPONSES TO PHQ QUESTIONS 1-9: 3

## 2018-12-13 ASSESSMENT — PAIN SCALES - GENERAL: PAINLEVEL: MODERATE PAIN (5)

## 2018-12-13 ASSESSMENT — MIFFLIN-ST. JEOR: SCORE: 1159.89

## 2018-12-13 NOTE — LETTER
My Depression Action Plan  Name: Purnima Fallon   Date of Birth 1980  Date: 12/12/2018    My doctor: Alexi Romero   My clinic: [facility] [place]  [address] [place] [address]  [phone]          GREEN    ZONE   Good Control    What it looks like:     Things are going generally well. You have normal up s and down s. You may even feel depressed from time to time, but bad moods usually last less than a day.   What you need to do:  1. Continue to care for yourself (see self care plan)  2. Check your depression survival kit and update it as needed  3. Follow your physician s recommendations including any medication.  4. Do not stop taking medication unless you consult with your physician first.           YELLOW         ZONE Getting Worse    What it looks like:     Depression is starting to interfere with your life.     It may be hard to get out of bed; you may be starting to isolate yourself from others.    Symptoms of depression are starting to last most all day and this has happened for several days.     You may have suicidal thoughts but they are not constant.   What you need to do:     1. Call your care team, your response to treatment will improve if you keep your care team informed of your progress. Yellow periods are signs an adjustment may need to be made.     2. Continue your self-care, even if you have to fake it!    3. Talk to someone in your support network    4. Open up your depression survival kit           RED    ZONE Medical Alert - Get Help    What it looks like:     Depression is seriously interfering with your life.     You may experience these or other symptoms: You can t get out of bed most days, can t work or engage in other necessary activities, you have trouble taking care of basic hygiene, or basic responsibilities, thoughts of suicide or death that will not go away, self-injurious behavior.     What you need to do:  1. Call your care team and request  a same-day appointment. If they are not available (weekends or after hours) call your local crisis line, emergency room or 911.            Depression Self Care Plan / Survival Kit    Self-Care for Depression  Here s the deal. Your body and mind are really not as separate as most people think.  What you do and think affects how you feel and how you feel influences what you do and think. This means if you do things that people who feel good do, it will help you feel better.  Sometimes this is all it takes.  There is also a place for medication and therapy depending on how severe your depression is, so be sure to consult with your medical provider and/ or Behavioral Health Consultant if your symptoms are worsening or not improving.     In order to better manage my stress, I will:    Exercise  Get some form of exercise, every day. This will help reduce pain and release endorphins, the  feel good  chemicals in your brain. This is almost as good as taking antidepressants!  This is not the same as joining a gym and then never going! (they count on that by the way ) It can be as simple as just going for a walk or doing some gardening, anything that will get you moving.      Hygiene   Maintain good hygiene (Get out of bed in the morning, Make your bed, Brush your teeth, Take a shower, and Get dressed like you were going to work, even if you are unemployed).  If your clothes don't fit try to get ones that do.    Diet  I will strive to eat foods that are good for me, drink plenty of water, and avoid excessive sugar, caffeine, alcohol, and other mood-altering substances.  Some foods that are helpful in depression are: complex carbohydrates, B vitamins, flaxseed, fish or fish oil, fresh fruits and vegetables.    Psychotherapy  I agree to participate in Individual Therapy (if recommended).    Medication  If prescribed medications, I agree to take them.  Missing doses can result in serious side effects.  I understand that drinking  alcohol, or other illicit drug use, may cause potential side effects.  I will not stop my medication abruptly without first discussing it with my provider.    Staying Connected With Others  I will stay in touch with my friends, family members, and my primary care provider/team.    Use your imagination  Be creative.  We all have a creative side; it doesn t matter if it s oil painting, sand castles, or mud pies! This will also kick up the endorphins.    Witness Beauty  (AKA stop and smell the roses) Take a look outside, even in mid-winter. Notice colors, textures. Watch the squirrels and birds.     Service to others  Be of service to others.  There is always someone else in need.  By helping others we can  get out of ourselves  and remember the really important things.  This also provides opportunities for practicing all the other parts of the program.    Humor  Laugh and be silly!  Adjust your TV habits for less news and crime-drama and more comedy.    Control your stress  Try breathing deep, massage therapy, biofeedback, and meditation. Find time to relax each day.     My support system    Clinic Contact:  Phone number:    Contact 1:  Phone number:    Contact 2:  Phone number:    Gnosticist/:  Phone number:    Therapist:  Phone number:    Local crisis center:    Phone number:    Other community support:  Phone number:

## 2018-12-13 NOTE — NURSING NOTE
"Chief Complaint   Patient presents with     Depression       Initial BP 96/58 (BP Location: Left arm, Patient Position: Sitting, Cuff Size: Adult Regular)   Pulse 72   Temp 98.4  F (36.9  C) (Tympanic)   Ht 1.575 m (5' 2\")   Wt 52.2 kg (115 lb)   LMP 08/10/2013   SpO2 100%   BMI 21.03 kg/m   Estimated body mass index is 21.03 kg/m  as calculated from the following:    Height as of this encounter: 1.575 m (5' 2\").    Weight as of this encounter: 52.2 kg (115 lb).  Medication Reconciliation: complete    Jayshree Welsh LPN  "

## 2018-12-14 ASSESSMENT — ANXIETY QUESTIONNAIRES: GAD7 TOTAL SCORE: 4

## 2018-12-17 ENCOUNTER — TELEPHONE (OUTPATIENT)
Dept: INTERNAL MEDICINE | Facility: OTHER | Age: 38
End: 2018-12-17

## 2018-12-17 DIAGNOSIS — F41.9 ANXIETY DUE TO INVASIVE PROCEDURE: ICD-10-CM

## 2018-12-17 DIAGNOSIS — B37.31 YEAST INFECTION OF THE VAGINA: Primary | ICD-10-CM

## 2018-12-17 NOTE — TELEPHONE ENCOUNTER
The patient has a MRI scheduled for tomorrow and is claustrophobic. She is requesting Ativan to take prior to the MRI.  She also has a yeast infection after taking an antibiotic.  She tried Monistat but that did not clear it up 100% She is requesting Diflucan. She is using [facility].

## 2018-12-18 ENCOUNTER — TELEPHONE (OUTPATIENT)
Dept: INTERNAL MEDICINE | Facility: OTHER | Age: 38
End: 2018-12-18

## 2018-12-18 RX ORDER — FLUCONAZOLE 150 MG/1
TABLET ORAL
Qty: 4 TABLET | Refills: 0 | Status: SHIPPED | OUTPATIENT
Start: 2018-12-18 | End: 2019-01-11

## 2018-12-18 RX ORDER — DIAZEPAM 10 MG
10 TABLET ORAL EVERY 6 HOURS PRN
Qty: 1 TABLET | Refills: 0 | Status: SHIPPED | OUTPATIENT
Start: 2018-12-18 | End: 2019-01-11

## 2018-12-18 NOTE — TELEPHONE ENCOUNTER
Patient called this am to state that she had an MRI appointment and was unable to go through it.  She stated she was supposed to get a med yesterday but didn't hear back from anyone.  Patient is worried about the claustrophobia but is willing to try the normal MRI with a medication but would do the open scan as well.  She wants to do this by the 1st of January as her insurance will start over.  Please advise et call her back at [phone].  Thank you.

## 2018-12-18 NOTE — TELEPHONE ENCOUNTER
I called patient and she said that she was very fearful of having the MRI here. She would prefer a open scan MRI vs a closed one.  Working on this referral however she gave time frame from now to 12/31/2018 to get her in.

## 2018-12-18 NOTE — TELEPHONE ENCOUNTER
Called patient to let her know that her valium was ready.She said that she would prefer go to to [facility] for a open scan because she does not think that she can do one here in our MRI machine. She needs to get this done before the first of January for insurance purposes.   Please sign referral for a open scan mri

## 2018-12-18 NOTE — TELEPHONE ENCOUNTER
Purnima came for her MRI left shoulder.  She did not have the MRI as she was claustrophobic.  She had tried taking her own anxiety medication prior to coming for the MRI but it did not help.  She was thinking she may need the Open MRI but we also said she may want to try with some sedation from her provider.

## 2018-12-19 NOTE — TELEPHONE ENCOUNTER
[facility] Open Scan MRI  [place]  Phone [phone]  Fax [phone]  Dx: acute pain of L shoulder  Appointment: pending review, [facility] staff will contact patient with appointment date and time. Specified this should be done between now and 12/31/18    Faxed demo, med list, referral order, notes 12/13/18, XR shoulder report, images pushed

## 2018-12-28 ENCOUNTER — TRANSFERRED RECORDS (OUTPATIENT)
Dept: HEALTH INFORMATION MANAGEMENT | Facility: CLINIC | Age: 38
End: 2018-12-28

## 2019-01-07 ENCOUNTER — TELEPHONE (OUTPATIENT)
Dept: INTERNAL MEDICINE | Facility: OTHER | Age: 39
End: 2019-01-07

## 2019-01-07 NOTE — TELEPHONE ENCOUNTER
Patient calling in regards to a MRI that she over a week ago.  She  want results  This writer was unable to locate such.  Please call her back.

## 2019-01-10 NOTE — PROGRESS NOTES
SUBJECTIVE:   Purnima Fallon is a 38 year old female who presents to clinic today for the following health issues:      Musculoskeletal problem/pain      Duration: several months ( 2 ?)    Description  Location: left shoulder pain    MRI showed AC sprain.      Intensity:  4/10    Accompanying signs and symptoms: swelling and certain positions  Just won't go away      History  Previous similar problem: YES  Previous evaluation:  MRI    Precipitating or alleviating factors:  Trauma or overuse: no  Doesn't remember hurting it.    Aggravating factors include: overuse    Therapies tried and outcome: different position            Problem list and histories reviewed & adjusted, as indicated.  Additional history: as documented    Patient Active Problem List   Diagnosis     Migraines     Depression     ADHD (attention deficit hyperactivity disorder)     Cystic acne     S/P laparoscopic hysterectomy     Kidney stones     Ischiorectal abscess and fistula     ACP (advance care planning)     Mass of breast     Pilonidal cyst     H/O pilonidal cyst     Biliary dyskinesia     Chronic female pelvic pain--LAPAROSCOPY w LEFT SALPINGECTOMY--normal pelvis--NO Endometriosis--9/2018     Past Surgical History:   Procedure Laterality Date     CYSTECTOMY PILONIDAL N/A 9/18/2017    Procedure: CYSTECTOMY PILONIDAL;  PILONIDAL CYSTECTOMY ;  Surgeon: Cordell Stephens MD;  [facility]     ENDOSCOPY UPPER, COLONOSCOPY, COMBINED N/A 10/5/2018    Procedure: COMBINED ENDOSCOPY UPPER, COLONOSCOPY;  UPPER ENDOSCOPY with Biopsy  AND COLONOSCOPY;  Surgeon: Cordell Stephens MD;  [facility]     EXCISE LESION BUTTOCK(S) Left 6/8/2018    Procedure: EXCISE LESION BUTTOCK(S);  EXCISION OF GLUTEAL LEFT SKIN LESION;  Surgeon: Cordell Stephens MD;  [facility]     EXCISE MASS NECK Right 8/4/2015    Procedure: EXCISE MASS NECK;  Surgeon: Nasir Jones MD;  [facility]     INCISION AND DRAINAGE PERINEAL, COMBINED N/A 3/18/2015     Procedure: COMBINED INCISION AND DRAINAGE PERINEAL;  Surgeon: Jay Torres DO;  [facility]     LAPAROSCOPIC CHOLECYSTECTOMY N/A 2017    Procedure: LAPAROSCOPIC CHOLECYSTECTOMY;  LAPAROSCOPIC CHOLECYSTECTOMY;  Surgeon: Cordell Stephens MD;  [facility]     LAPAROSCOPIC HYSTERECTOMY SUPRACERVICAL, BILATERAL SALPINGO-OOPHORECTOMY, COMBINED  2013    Procedure: COMBINED LAPAROSCOPIC HYSTERECTOMY SUPRACERVICAL, SALPINGO-OOPHORECTOMY;  LAPAROSCOPIC SUPRACERVICAL HYSTERECTOMY AND RIGHT SALPINGO-OOPHORECTOMY, CYSTOSCOPY;  Surgeon: Denys Callahan MD;  [facility]     LAPAROSCOPY DIAGNOSTIC (GYN) N/A 2018    Procedure: LAPAROSCOPY DIAGNOSTIC (GYN);  LAPAROSCOPY WITH PERITONEAL BIOPSIES AND REMOVAL LEFT FALLOPIAN TUBE;  Surgeon: Suraj Whitaker MD;  [facility]     marsupialization of pilonidal cyst       TUBAL LIGATION         Social History     Tobacco Use     Smoking status: Current Every Day Smoker     Packs/day: 0.50     Years: 15.00     Pack years: 7.50     Types: Cigarettes     Last attempt to quit: 2013     Years since quittin.4     Smokeless tobacco: Never Used     Tobacco comment: Quitplan referral declined 18   Substance Use Topics     Alcohol use: No     Family History   Problem Relation Age of Onset     Diabetes Paternal Grandmother          Current Outpatient Medications   Medication Sig Dispense Refill     dicyclomine (BENTYL) 10 MG capsule Take 1 capsule (10 mg) by mouth 4 times daily as needed 240 capsule 11     docusate sodium (COLACE) 100 MG tablet Take 100 mg by mouth daily 60 tablet 1     escitalopram (LEXAPRO) 5 MG tablet Take 1 tablet (5 mg) by mouth daily 90 tablet 3     hydrOXYzine (VISTARIL) 25 MG capsule Can take  25mg  Up to 3 times a day for anxiety 60 capsule 0     OMEPRAZOLE PO Take 20 mg by mouth every morning       valACYclovir (VALTREX) 500 MG tablet TAKE ONE TABLET BY MOUTH DAILY 90 tablet 1     No Known  "Allergies    Reviewed and updated as needed this visit by clinical staff       Reviewed and updated as needed this visit by Provider       Review of Systems   Constitutional: Negative for fatigue and fever.   Respiratory: Negative for cough and shortness of breath.    Cardiovascular: Negative for chest pain and palpitations.   Gastrointestinal: Negative.    Musculoskeletal: Positive for arthralgias and myalgias.        Left shoulder pain.  For 2 months. MRI shows AC strain Has not gotten better.  No numbness to hand.  Has bump sometimes at AC ligament area. Pain to back of shoulder sometimes.   .     Skin: Negative for rash.   Neurological: Positive for weakness. Negative for numbness.        Left arm seems weaker.     Psychiatric/Behavioral: Negative.      /62   Pulse 69   Temp 98.4  F (36.9  C) (Tympanic)   Resp 18   Ht 1.575 m (5' 2\")   Wt 54.4 kg (120 lb)   LMP 08/10/2013   SpO2 98%   BMI 21.95 kg/m    Physical Exam   Constitutional: She is oriented to person, place, and time. She appears well-developed and well-nourished.   Eyes: Conjunctivae and EOM are normal. Pupils are equal, round, and reactive to light.   Neck: Normal range of motion. Neck supple. No thyromegaly present.   Cardiovascular: Normal rate, regular rhythm, normal heart sounds and intact distal pulses.   No murmur heard.  Pulmonary/Chest: Effort normal and breath sounds normal.   Musculoskeletal: She exhibits tenderness. She exhibits no edema or deformity.   Tenderness near AC Ligament to top of left shoulder.     Lymphadenopathy:     She has no cervical adenopathy.   Neurological: She is alert and oriented to person, place, and time. She displays normal reflexes. She exhibits normal muscle tone.   CMS to left hand and fingers intact.     Skin: Skin is warm and dry. Capillary refill takes less than 2 seconds.   Psychiatric: She has a normal mood and affect.     ASSESSMENT / PLAN:  (A96.453C) Strain of AC joint, left, subsequent " encounter  (primary encounter diagnosis)  Comment:Discussed MRI shows an AC strain,   Will refer to Physical Therapy. If no improvement will refer to  Ortho specilaist for possible injection.  Given Hydrocodone 325mg #18  Take Ibuprofen 800mg  3 times a day with food.    Plan: PHYSICAL THERAPY REFERRAL,         HYDROcodone-acetaminophen (NORCO) 5-325 MG         tablet

## 2019-01-11 ENCOUNTER — OFFICE VISIT (OUTPATIENT)
Dept: INTERNAL MEDICINE | Facility: OTHER | Age: 39
End: 2019-01-11
Attending: NURSE PRACTITIONER
Payer: COMMERCIAL

## 2019-01-11 VITALS
TEMPERATURE: 98.4 F | HEIGHT: 62 IN | OXYGEN SATURATION: 98 % | HEART RATE: 69 BPM | RESPIRATION RATE: 18 BRPM | BODY MASS INDEX: 22.08 KG/M2 | DIASTOLIC BLOOD PRESSURE: 62 MMHG | WEIGHT: 120 LBS | SYSTOLIC BLOOD PRESSURE: 100 MMHG

## 2019-01-11 DIAGNOSIS — S46.912D STRAIN OF AC JOINT, LEFT, SUBSEQUENT ENCOUNTER: Primary | ICD-10-CM

## 2019-01-11 PROCEDURE — 99213 OFFICE O/P EST LOW 20 MIN: CPT | Performed by: NURSE PRACTITIONER

## 2019-01-11 RX ORDER — HYDROCODONE BITARTRATE AND ACETAMINOPHEN 5; 325 MG/1; MG/1
1 TABLET ORAL EVERY 6 HOURS PRN
Qty: 18 TABLET | Refills: 0 | Status: SHIPPED | OUTPATIENT
Start: 2019-01-11 | End: 2019-02-07

## 2019-01-11 ASSESSMENT — ENCOUNTER SYMPTOMS
NUMBNESS: 0
FEVER: 0
GASTROINTESTINAL NEGATIVE: 1
MYALGIAS: 1
ARTHRALGIAS: 1
COUGH: 0
WEAKNESS: 1
SHORTNESS OF BREATH: 0
PALPITATIONS: 0
PSYCHIATRIC NEGATIVE: 1
FATIGUE: 0

## 2019-01-11 ASSESSMENT — PAIN SCALES - GENERAL: PAINLEVEL: MODERATE PAIN (4)

## 2019-01-11 ASSESSMENT — MIFFLIN-ST. JEOR: SCORE: 1182.57

## 2019-01-11 NOTE — PATIENT INSTRUCTIONS
1. Set up PT for shoulder   2. Take Ibuprofen 800mg  3 times a day with food for 7-10 days  3.  Hydrocodone for worse pain.

## 2019-01-11 NOTE — NURSING NOTE
"Chief Complaint   Patient presents with     Musculoskeletal Problem     follow up left shoulder       Initial /62   Pulse 69   Temp 98.4  F (36.9  C) (Tympanic)   Resp 18   Ht 1.575 m (5' 2\")   Wt 54.4 kg (120 lb)   LMP 08/10/2013   SpO2 98%   BMI 21.95 kg/m   Estimated body mass index is 21.95 kg/m  as calculated from the following:    Height as of this encounter: 1.575 m (5' 2\").    Weight as of this encounter: 54.4 kg (120 lb).  Medication Reconciliation: complete    Crystal Polo LPN  "

## 2019-02-07 ENCOUNTER — HOSPITAL ENCOUNTER (OUTPATIENT)
Facility: HOSPITAL | Age: 39
Discharge: HOME | End: 2019-02-07
Attending: SURGERY | Admitting: SURGERY
Payer: COMMERCIAL

## 2019-02-07 ENCOUNTER — OFFICE VISIT (OUTPATIENT)
Dept: INTERNAL MEDICINE | Facility: OTHER | Age: 39
End: 2019-02-07
Attending: NURSE PRACTITIONER
Payer: COMMERCIAL

## 2019-02-07 ENCOUNTER — ANESTHESIA (OUTPATIENT)
Dept: SURGERY | Facility: HOSPITAL | Age: 39
End: 2019-02-07
Payer: COMMERCIAL

## 2019-02-07 ENCOUNTER — ANESTHESIA EVENT (OUTPATIENT)
Dept: SURGERY | Facility: HOSPITAL | Age: 39
End: 2019-02-07
Payer: COMMERCIAL

## 2019-02-07 ENCOUNTER — OFFICE VISIT (OUTPATIENT)
Dept: SURGERY | Facility: OTHER | Age: 39
End: 2019-02-07
Attending: SURGERY
Payer: COMMERCIAL

## 2019-02-07 VITALS
WEIGHT: 120 LBS | DIASTOLIC BLOOD PRESSURE: 62 MMHG | RESPIRATION RATE: 18 BRPM | SYSTOLIC BLOOD PRESSURE: 98 MMHG | BODY MASS INDEX: 22.08 KG/M2 | HEART RATE: 76 BPM | TEMPERATURE: 98.4 F | OXYGEN SATURATION: 99 % | HEIGHT: 62 IN

## 2019-02-07 VITALS
DIASTOLIC BLOOD PRESSURE: 62 MMHG | WEIGHT: 120 LBS | SYSTOLIC BLOOD PRESSURE: 98 MMHG | HEART RATE: 76 BPM | OXYGEN SATURATION: 99 % | BODY MASS INDEX: 22.08 KG/M2 | TEMPERATURE: 98.4 F | HEIGHT: 62 IN | RESPIRATION RATE: 16 BRPM

## 2019-02-07 VITALS
BODY MASS INDEX: 22.08 KG/M2 | HEIGHT: 62 IN | HEART RATE: 90 BPM | WEIGHT: 120 LBS | SYSTOLIC BLOOD PRESSURE: 94 MMHG | OXYGEN SATURATION: 99 % | RESPIRATION RATE: 16 BRPM | TEMPERATURE: 99 F | DIASTOLIC BLOOD PRESSURE: 62 MMHG

## 2019-02-07 DIAGNOSIS — L02.214 ABSCESS OF GROIN, LEFT: Primary | ICD-10-CM

## 2019-02-07 DIAGNOSIS — L02.214 ABSCESS OF LEFT GROIN: Primary | ICD-10-CM

## 2019-02-07 DIAGNOSIS — L72.3 SEBACEOUS CYST: Primary | ICD-10-CM

## 2019-02-07 PROCEDURE — 36000050 ZZH SURGERY LEVEL 2 1ST 30 MIN: Performed by: SURGERY

## 2019-02-07 PROCEDURE — 25000125 ZZHC RX 250: Performed by: SURGERY

## 2019-02-07 PROCEDURE — 25000132 ZZH RX MED GY IP 250 OP 250 PS 637: Performed by: SURGERY

## 2019-02-07 PROCEDURE — 99207 ZZC NO CHARGE LOS: CPT | Mod: 25 | Performed by: SURGERY

## 2019-02-07 PROCEDURE — 27110028 ZZH OR GENERAL SUPPLY NON-STERILE: Performed by: SURGERY

## 2019-02-07 PROCEDURE — 25000128 H RX IP 250 OP 636: Performed by: NURSE ANESTHETIST, CERTIFIED REGISTERED

## 2019-02-07 PROCEDURE — 10060 I&D ABSCESS SIMPLE/SINGLE: CPT | Performed by: SURGERY

## 2019-02-07 PROCEDURE — 01999 UNLISTED ANES PROCEDURE: CPT | Performed by: NURSE ANESTHETIST, CERTIFIED REGISTERED

## 2019-02-07 PROCEDURE — 37000008 ZZH ANESTHESIA TECHNICAL FEE, 1ST 30 MIN: Performed by: SURGERY

## 2019-02-07 PROCEDURE — 25000125 ZZHC RX 250: Performed by: NURSE ANESTHETIST, CERTIFIED REGISTERED

## 2019-02-07 PROCEDURE — 25000128 H RX IP 250 OP 636: Performed by: SURGERY

## 2019-02-07 PROCEDURE — 71000027 ZZH RECOVERY PHASE 2 EACH 15 MINS: Performed by: SURGERY

## 2019-02-07 PROCEDURE — 99213 OFFICE O/P EST LOW 20 MIN: CPT | Performed by: NURSE PRACTITIONER

## 2019-02-07 PROCEDURE — 27210794 ZZH OR GENERAL SUPPLY STERILE: Performed by: SURGERY

## 2019-02-07 PROCEDURE — 40000306 ZZH STATISTIC PRE PROC ASSESS II: Performed by: SURGERY

## 2019-02-07 RX ORDER — PROPOFOL 10 MG/ML
INJECTION, EMULSION INTRAVENOUS PRN
Status: DISCONTINUED | OUTPATIENT
Start: 2019-02-07 | End: 2019-02-07

## 2019-02-07 RX ORDER — SODIUM CHLORIDE, SODIUM LACTATE, POTASSIUM CHLORIDE, CALCIUM CHLORIDE 600; 310; 30; 20 MG/100ML; MG/100ML; MG/100ML; MG/100ML
INJECTION, SOLUTION INTRAVENOUS CONTINUOUS
Status: DISCONTINUED | OUTPATIENT
Start: 2019-02-07 | End: 2019-02-07 | Stop reason: HOSPADM

## 2019-02-07 RX ORDER — CEFAZOLIN SODIUM 2 G/100ML
2 INJECTION, SOLUTION INTRAVENOUS ONCE
Status: DISCONTINUED | OUTPATIENT
Start: 2019-02-07 | End: 2019-02-07

## 2019-02-07 RX ORDER — FENTANYL CITRATE 50 UG/ML
25-50 INJECTION, SOLUTION INTRAMUSCULAR; INTRAVENOUS
Status: DISCONTINUED | OUTPATIENT
Start: 2019-02-07 | End: 2019-02-07 | Stop reason: HOSPADM

## 2019-02-07 RX ORDER — AMOXICILLIN 250 MG
1-2 CAPSULE ORAL 2 TIMES DAILY
Qty: 30 TABLET | Refills: 0 | Status: SHIPPED | OUTPATIENT
Start: 2019-02-07 | End: 2019-06-11

## 2019-02-07 RX ORDER — HYDRALAZINE HYDROCHLORIDE 20 MG/ML
2.5-5 INJECTION INTRAMUSCULAR; INTRAVENOUS EVERY 10 MIN PRN
Status: DISCONTINUED | OUTPATIENT
Start: 2019-02-07 | End: 2019-02-07 | Stop reason: HOSPADM

## 2019-02-07 RX ORDER — ONDANSETRON 4 MG/1
4 TABLET, ORALLY DISINTEGRATING ORAL EVERY 30 MIN PRN
Status: DISCONTINUED | OUTPATIENT
Start: 2019-02-07 | End: 2019-02-07 | Stop reason: HOSPADM

## 2019-02-07 RX ORDER — BUPIVACAINE HYDROCHLORIDE AND EPINEPHRINE 2.5; 5 MG/ML; UG/ML
INJECTION, SOLUTION INFILTRATION; PERINEURAL PRN
Status: DISCONTINUED | OUTPATIENT
Start: 2019-02-07 | End: 2019-02-07 | Stop reason: HOSPADM

## 2019-02-07 RX ORDER — HYDROCODONE BITARTRATE AND ACETAMINOPHEN 5; 325 MG/1; MG/1
1 TABLET ORAL EVERY 6 HOURS PRN
Qty: 24 TABLET | Refills: 0 | Status: SHIPPED | OUTPATIENT
Start: 2019-02-07 | End: 2019-02-21

## 2019-02-07 RX ORDER — LIDOCAINE HYDROCHLORIDE 20 MG/ML
INJECTION, SOLUTION INFILTRATION; PERINEURAL PRN
Status: DISCONTINUED | OUTPATIENT
Start: 2019-02-07 | End: 2019-02-07

## 2019-02-07 RX ORDER — OXYCODONE HYDROCHLORIDE 5 MG/1
5-10 TABLET ORAL EVERY 6 HOURS PRN
Qty: 12 TABLET | Refills: 0 | Status: SHIPPED | OUTPATIENT
Start: 2019-02-07 | End: 2019-02-15

## 2019-02-07 RX ORDER — OXYCODONE HYDROCHLORIDE 5 MG/1
5 TABLET ORAL ONCE
Status: COMPLETED | OUTPATIENT
Start: 2019-02-07 | End: 2019-02-07

## 2019-02-07 RX ORDER — CEFAZOLIN SODIUM 2 G/100ML
2 INJECTION, SOLUTION INTRAVENOUS
Status: COMPLETED | OUTPATIENT
Start: 2019-02-07 | End: 2019-02-07

## 2019-02-07 RX ORDER — NALOXONE HYDROCHLORIDE 0.4 MG/ML
.1-.4 INJECTION, SOLUTION INTRAMUSCULAR; INTRAVENOUS; SUBCUTANEOUS
Status: DISCONTINUED | OUTPATIENT
Start: 2019-02-07 | End: 2019-02-07 | Stop reason: HOSPADM

## 2019-02-07 RX ORDER — HYDROMORPHONE HYDROCHLORIDE 1 MG/ML
.3-.5 INJECTION, SOLUTION INTRAMUSCULAR; INTRAVENOUS; SUBCUTANEOUS EVERY 10 MIN PRN
Status: DISCONTINUED | OUTPATIENT
Start: 2019-02-07 | End: 2019-02-07 | Stop reason: HOSPADM

## 2019-02-07 RX ORDER — ALBUTEROL SULFATE 0.83 MG/ML
2.5 SOLUTION RESPIRATORY (INHALATION) EVERY 4 HOURS PRN
Status: DISCONTINUED | OUTPATIENT
Start: 2019-02-07 | End: 2019-02-07 | Stop reason: HOSPADM

## 2019-02-07 RX ORDER — MEPERIDINE HYDROCHLORIDE 50 MG/ML
12.5 INJECTION INTRAMUSCULAR; INTRAVENOUS; SUBCUTANEOUS
Status: DISCONTINUED | OUTPATIENT
Start: 2019-02-07 | End: 2019-02-07 | Stop reason: HOSPADM

## 2019-02-07 RX ORDER — FENTANYL CITRATE 50 UG/ML
INJECTION, SOLUTION INTRAMUSCULAR; INTRAVENOUS PRN
Status: DISCONTINUED | OUTPATIENT
Start: 2019-02-07 | End: 2019-02-07

## 2019-02-07 RX ORDER — ONDANSETRON 2 MG/ML
4 INJECTION INTRAMUSCULAR; INTRAVENOUS EVERY 30 MIN PRN
Status: DISCONTINUED | OUTPATIENT
Start: 2019-02-07 | End: 2019-02-07 | Stop reason: HOSPADM

## 2019-02-07 RX ORDER — CEFAZOLIN SODIUM 1 G/3ML
1 INJECTION, POWDER, FOR SOLUTION INTRAMUSCULAR; INTRAVENOUS SEE ADMIN INSTRUCTIONS
Status: DISCONTINUED | OUTPATIENT
Start: 2019-02-07 | End: 2019-02-07 | Stop reason: HOSPADM

## 2019-02-07 RX ADMIN — MIDAZOLAM 2 MG: 1 INJECTION INTRAMUSCULAR; INTRAVENOUS at 15:10

## 2019-02-07 RX ADMIN — PROPOFOL 50 MG: 10 INJECTION, EMULSION INTRAVENOUS at 15:17

## 2019-02-07 RX ADMIN — PROPOFOL 50 MG: 10 INJECTION, EMULSION INTRAVENOUS at 15:15

## 2019-02-07 RX ADMIN — FENTANYL CITRATE 50 MCG: 50 INJECTION, SOLUTION INTRAMUSCULAR; INTRAVENOUS at 15:42

## 2019-02-07 RX ADMIN — CEFAZOLIN SODIUM 2 G: 2 INJECTION, SOLUTION INTRAVENOUS at 15:06

## 2019-02-07 RX ADMIN — PROPOFOL 50 MG: 10 INJECTION, EMULSION INTRAVENOUS at 15:21

## 2019-02-07 RX ADMIN — FENTANYL CITRATE 50 MCG: 50 INJECTION, SOLUTION INTRAMUSCULAR; INTRAVENOUS at 15:56

## 2019-02-07 RX ADMIN — FENTANYL CITRATE 50 MCG: 50 INJECTION, SOLUTION INTRAMUSCULAR; INTRAVENOUS at 15:15

## 2019-02-07 RX ADMIN — FENTANYL CITRATE 50 MCG: 50 INJECTION, SOLUTION INTRAMUSCULAR; INTRAVENOUS at 15:12

## 2019-02-07 RX ADMIN — FENTANYL CITRATE 50 MCG: 50 INJECTION, SOLUTION INTRAMUSCULAR; INTRAVENOUS at 16:13

## 2019-02-07 RX ADMIN — OXYCODONE HYDROCHLORIDE 5 MG: 5 TABLET ORAL at 15:56

## 2019-02-07 RX ADMIN — PROPOFOL 50 MG: 10 INJECTION, EMULSION INTRAVENOUS at 15:12

## 2019-02-07 RX ADMIN — SODIUM CHLORIDE, POTASSIUM CHLORIDE, SODIUM LACTATE AND CALCIUM CHLORIDE: 600; 310; 30; 20 INJECTION, SOLUTION INTRAVENOUS at 14:59

## 2019-02-07 RX ADMIN — LIDOCAINE HYDROCHLORIDE 40 MG: 20 INJECTION, SOLUTION INFILTRATION; PERINEURAL at 15:12

## 2019-02-07 ASSESSMENT — LIFESTYLE VARIABLES: TOBACCO_USE: 1

## 2019-02-07 ASSESSMENT — ENCOUNTER SYMPTOMS
NUMBNESS: 0
FEVER: 0
MYALGIAS: 1

## 2019-02-07 ASSESSMENT — PAIN SCALES - GENERAL
PAINLEVEL: SEVERE PAIN (6)
PAINLEVEL: SEVERE PAIN (6)

## 2019-02-07 ASSESSMENT — MIFFLIN-ST. JEOR
SCORE: 1182.57

## 2019-02-07 NOTE — ANESTHESIA PREPROCEDURE EVALUATION
Anesthesia Pre-Procedure Evaluation    Patient: Purnima Fallon   MRN: [id number] : 1980          Preoperative Diagnosis: LEFT GROING ABSCESS    Procedure(s):  IRRIGATION AND DEBRIDEMENT LEFT GROIN ABSCESS    Past Medical History:   Diagnosis Date     Hidradenitis 2007     Ischiorectal abscess and fistula      Kidney stones 2008    x2 passed on her own     Nondependent tobacco use disorder 10/11/2012     Papanicolaou smear of cervix with low grade squamous intraepithelial lesion (LGSIL) 10/29/2008     S/p partial hysterectomy with remaining cervical stump 2013     Past Surgical History:   Procedure Laterality Date     CYSTECTOMY PILONIDAL N/A 2017    Procedure: CYSTECTOMY PILONIDAL;  PILONIDAL CYSTECTOMY ;  Surgeon: Cordell Stephens MD;  [facility]     ENDOSCOPY UPPER, COLONOSCOPY, COMBINED N/A 10/5/2018    Procedure: COMBINED ENDOSCOPY UPPER, COLONOSCOPY;  UPPER ENDOSCOPY with Biopsy  AND COLONOSCOPY;  Surgeon: Cordell Stephens MD;  [facility]     EXCISE LESION BUTTOCK(S) Left 2018    Procedure: EXCISE LESION BUTTOCK(S);  EXCISION OF GLUTEAL LEFT SKIN LESION;  Surgeon: Cordell Stephens MD;  [facility]     EXCISE MASS NECK Right 2015    Procedure: EXCISE MASS NECK;  Surgeon: Nasir Jones MD;  [facility]     INCISION AND DRAINAGE PERINEAL, COMBINED N/A 3/18/2015    Procedure: COMBINED INCISION AND DRAINAGE PERINEAL;  Surgeon: Jay Torres DO;  [facility]     LAPAROSCOPIC CHOLECYSTECTOMY N/A 2017    Procedure: LAPAROSCOPIC CHOLECYSTECTOMY;  LAPAROSCOPIC CHOLECYSTECTOMY;  Surgeon: Cordell Stephens MD;  [facility]     LAPAROSCOPIC HYSTERECTOMY SUPRACERVICAL, BILATERAL SALPINGO-OOPHORECTOMY, COMBINED  2013    Procedure: COMBINED LAPAROSCOPIC HYSTERECTOMY SUPRACERVICAL, SALPINGO-OOPHORECTOMY;  LAPAROSCOPIC SUPRACERVICAL HYSTERECTOMY AND RIGHT SALPINGO-OOPHORECTOMY, CYSTOSCOPY;  Surgeon: Denys Callahan MD;  [facility]     LAPAROSCOPY DIAGNOSTIC (GYN) N/A 9/6/2018    Procedure: LAPAROSCOPY DIAGNOSTIC (GYN);  LAPAROSCOPY WITH PERITONEAL BIOPSIES AND REMOVAL LEFT FALLOPIAN TUBE;  Surgeon: Suraj Whitaker MD;  [facility]     marsupialization of pilonidal cyst  2007     TUBAL LIGATION  2005       Anesthesia Evaluation     . Pt has had prior anesthetic.     No history of anesthetic complications          ROS/MED HX    ENT/Pulmonary:     (+)tobacco use, Current use 0.5 packs/day  , . .    Neurologic:  - neg neurologic ROS     Cardiovascular:  - neg cardiovascular ROS       METS/Exercise Tolerance:  >4 METS   Hematologic:  - neg hematologic  ROS       Musculoskeletal:  - neg musculoskeletal ROS       GI/Hepatic: Comment: Ischiorectal abscess and fistula - neg GI/hepatic ROS       Renal/Genitourinary: Comment: Hidradenitis    (+) chronic renal disease, Nephrolithiasis ,       Endo:  - neg endo ROS       Psychiatric:  - neg psychiatric ROS       Infectious Disease:  - neg infectious disease ROS       Malignancy:      - no malignancy   Other:    - neg other ROS                      Physical Exam  Normal systems: cardiovascular, pulmonary and dental    Airway   Mallampati: II  TM distance: >3 FB  Neck ROM: full    Dental     Cardiovascular   Rhythm and rate: regular and normal      Pulmonary             Lab Results   Component Value Date    WBC 9.9 08/20/2018    HGB 15.3 09/06/2018    HCT 44.0 08/20/2018     08/20/2018    CRP <2.9 08/20/2018     08/20/2018    POTASSIUM 3.3 (L) 08/20/2018    CHLORIDE 106 08/20/2018    CO2 27 08/20/2018    BUN 13 08/20/2018    CR 0.80 08/20/2018    GLC 98 08/20/2018    ANDRÉS 9.0 08/20/2018    ALBUMIN 4.6 08/20/2018    PROTTOTAL 8.3 08/20/2018    ALT 38 08/20/2018    AST 18 08/20/2018    ALKPHOS 59 08/20/2018    BILITOTAL 0.5 08/20/2018    LIPASE 163 11/29/2017    AMYLASE 349 (H) 06/27/2017    TSH 2.12 05/15/2018    HCG Negative 09/30/2014    HCGS Negative 07/22/2013       Preop Vitals  BP  "Readings from Last 3 Encounters:   02/07/19 98/62   02/07/19 98/62   01/11/19 100/62    Pulse Readings from Last 3 Encounters:   02/07/19 76   02/07/19 76   01/11/19 69      Resp Readings from Last 3 Encounters:   02/07/19 16   02/07/19 18   01/11/19 18    SpO2 Readings from Last 3 Encounters:   02/07/19 99%   02/07/19 99%   01/11/19 98%      Temp Readings from Last 1 Encounters:   02/07/19 98.4  F (36.9  C) (Tympanic)    Ht Readings from Last 1 Encounters:   02/07/19 1.575 m (5' 2\")      Wt Readings from Last 1 Encounters:   02/07/19 54.4 kg (120 lb)    Estimated body mass index is 21.95 kg/m  as calculated from the following:    Height as of an earlier encounter on 2/7/19: 1.575 m (5' 2\").    Weight as of an earlier encounter on 2/7/19: 54.4 kg (120 lb).       Anesthesia Plan      History & Physical Review  History and physical reviewed and following examination; no interval change.    ASA Status:  2 .    NPO Status:  > 4 hours    Plan for MAC Maintenance will be TIVA.  Reason for MAC:  Deep or markedly invasive procedure (G8), Extreme anxiety (QS) and Difficulty with conscious sedation (QS)    2-7-19       Postoperative Care      Consents  Anesthetic plan, risks, benefits and alternatives discussed with:  Patient..                 ALONDRA Serrato CRNA  "

## 2019-02-07 NOTE — PROGRESS NOTES
SUBJECTIVE:   Purnima Fallon is a 38 year old female who presents to clinic today for the following health issues:    Sebaceous  Cysts      Duration: 2 weeks    Description  Location: left inner thigh    Itching: no    Intensity:  6/10    Accompanying signs and symptoms: painful      History (similar episodes/previous evaluation): yes gets cysts chronically    Precipitating or alleviating factors:  New exposures:  None  Recent travel: no      Therapies tried and outcome: hot soaks.            Problem list and histories reviewed & adjusted, as indicated.  Additional history: as documented    Patient Active Problem List   Diagnosis     Migraines     Depression     ADHD (attention deficit hyperactivity disorder)     Cystic acne     S/P laparoscopic hysterectomy     Kidney stones     Ischiorectal abscess and fistula     ACP (advance care planning)     Mass of breast     Pilonidal cyst     H/O pilonidal cyst     Biliary dyskinesia     Chronic female pelvic pain--LAPAROSCOPY w LEFT SALPINGECTOMY--normal pelvis--NO Endometriosis--9/2018     Past Surgical History:   Procedure Laterality Date     CYSTECTOMY PILONIDAL N/A 9/18/2017    Procedure: CYSTECTOMY PILONIDAL;  PILONIDAL CYSTECTOMY ;  Surgeon: Cordell Stephens MD;  [facility]     ENDOSCOPY UPPER, COLONOSCOPY, COMBINED N/A 10/5/2018    Procedure: COMBINED ENDOSCOPY UPPER, COLONOSCOPY;  UPPER ENDOSCOPY with Biopsy  AND COLONOSCOPY;  Surgeon: Cordell Stephens MD;  [facility]     EXCISE LESION BUTTOCK(S) Left 6/8/2018    Procedure: EXCISE LESION BUTTOCK(S);  EXCISION OF GLUTEAL LEFT SKIN LESION;  Surgeon: Cordell Stephens MD;  [facility]     EXCISE MASS NECK Right 8/4/2015    Procedure: EXCISE MASS NECK;  Surgeon: Nasir Jones MD;  [facility]     INCISION AND DRAINAGE PERINEAL, COMBINED N/A 3/18/2015    Procedure: COMBINED INCISION AND DRAINAGE PERINEAL;  Surgeon: Jay Torres DO;  [facility]     LAPAROSCOPIC  "CHOLECYSTECTOMY N/A 11/20/2017    Procedure: LAPAROSCOPIC CHOLECYSTECTOMY;  LAPAROSCOPIC CHOLECYSTECTOMY;  Surgeon: Cordell Stephens MD;  [facility]     LAPAROSCOPIC HYSTERECTOMY SUPRACERVICAL, BILATERAL SALPINGO-OOPHORECTOMY, COMBINED  9/27/2013    Procedure: COMBINED LAPAROSCOPIC HYSTERECTOMY SUPRACERVICAL, SALPINGO-OOPHORECTOMY;  LAPAROSCOPIC SUPRACERVICAL HYSTERECTOMY AND RIGHT SALPINGO-OOPHORECTOMY, CYSTOSCOPY;  Surgeon: Denys Callahan MD;  [facility]     LAPAROSCOPY DIAGNOSTIC (GYN) N/A 9/6/2018    Procedure: LAPAROSCOPY DIAGNOSTIC (GYN);  LAPAROSCOPY WITH PERITONEAL BIOPSIES AND REMOVAL LEFT FALLOPIAN TUBE;  Surgeon: Suraj Whitaker MD;  [facility]     marsupialization of pilonidal cyst  2007     TUBAL LIGATION  2005       Social History     Tobacco Use     Smoking status: Current Every Day Smoker     Packs/day: 0.50     Years: 15.00     Pack years: 7.50     Types: Cigarettes     Start date: 1/1/2001     Smokeless tobacco: Never Used     Tobacco comment: Quitplan referral declined 6/21/18   Substance Use Topics     Alcohol use: No     Family History   Problem Relation Age of Onset     Diabetes Paternal Grandmother          No current outpatient medications on file.     No Known Allergies    Reviewed and updated as needed this visit by clinical staff  Tobacco  Allergies  Meds       Reviewed and updated as needed this visit by Provider       Review of Systems   Constitutional: Negative for fever.   Musculoskeletal: Positive for myalgias.   Skin:        Large sebaceous cyst to left thigh. Painful     Neurological: Negative for numbness.   Vitals: BP 98/62   Pulse 76   Temp 98.4  F (36.9  C) (Tympanic)   Resp 18   Ht 1.575 m (5' 2\")   Wt 54.4 kg (120 lb)   LMP 08/10/2013   SpO2 99%   BMI 21.95 kg/m    BMI= Body mass index is 21.95 kg/m .  Physical Exam   Constitutional: She is oriented to person, place, and time. She appears well-developed and well-nourished.   Musculoskeletal: " Normal range of motion.   Neurological: She is alert and oriented to person, place, and time.   Skin: Skin is warm and dry.   Half walnut size sebaceous cyst on left upper inner thigh . Cyst itself is red, surrounding tissue not red. Slightly fluctuant.     Psychiatric: She has a normal mood and affect.       ASSESSMENT / PLAN:  (L72.3) Sebaceous cyst  (primary encounter diagnosis)  Comment: Discussed patient with Dr. Stephens , he graciously accepted patient for office I&D to help relieve pain.    Plan: Dr. Stephens at 2pm.  Lortab 5/325mg    q 4-6 hours for pain.

## 2019-02-07 NOTE — ANESTHESIA CARE TRANSFER NOTE
Patient: Purnima Fallon    Procedure(s):  IRRIGATION AND DEBRIDEMENT LEFT GROIN ABSCESS    Diagnosis: LEFT GROING ABSCESS  Diagnosis Additional Information: No value filed.    Anesthesia Type:   MAC     Note:    Patient transferred to:Phase II  Handoff Report: Identifed the Patient, Identified the Reponsible Provider, Reviewed the pertinent medical history, Discussed the surgical course, Reviewed Intra-OP anesthesia mangement and issues during anesthesia, Set expectations for post-procedure period and Allowed opportunity for questions and acknowledgement of understanding      Vitals: (Last set prior to Anesthesia Care Transfer)    CRNA VITALS  2/7/2019 1456 - 2/7/2019 1535      2/7/2019             EKG:  Sinus rhythm                Electronically Signed By: ALONDRA Serrato CRNA  February 7, 2019  3:35 PM

## 2019-02-07 NOTE — OP NOTE
[facility]   Operative Note    Pre-operative diagnosis: LEFT GROING ABSCESS   Post-operative diagnosis Left groin abscess   Procedure: Procedure(s):  IRRIGATION AND DEBRIDEMENT LEFT GROIN ABSCESS   Surgeon(s): Surgeon(s) and Role:     * oCrdell Stephens MD - Primary   Estimated blood loss: minimal    Specimens: * No specimens in log *   Findings: 15 mm inflamed cyst to the groin     Description of procedure:   The patient was transferred to the operating room and placed in the operating room table in supine position.  She was then placed into lithotomy position.  Patient underwent monitored anesthetic care with intravenous anesthetics.  A timeout was then done indicating patient, procedure, and antibiotics given.  Began with a incision overlying the area of palpable fluctuance in the left groin.  Purulent material was then removed.  The cavity was irrigated thoroughly and debrided.  Redundant skin overlying the cyst was excised.  Hemostasis was maintained with electrocautery.  The cavity was then packed with a 4 x 4 and a sterile dressing was applied.  Patient tolerated the procedure well with no intraoperative complications.  A postoperative debrief was performed with staff in the operating room.  All counts were correct.  Patient was transferred to the PACU in stable condition.

## 2019-02-07 NOTE — NURSING NOTE
"Chief Complaint   Patient presents with     Derm Problem     cyst       Initial BP 98/62   Pulse 76   Temp 98.4  F (36.9  C) (Tympanic)   Resp 18   Ht 1.575 m (5' 2\")   Wt 54.4 kg (120 lb)   LMP 08/10/2013   SpO2 99%   BMI 21.95 kg/m   Estimated body mass index is 21.95 kg/m  as calculated from the following:    Height as of this encounter: 1.575 m (5' 2\").    Weight as of this encounter: 54.4 kg (120 lb).  Medication Reconciliation: complete    Crystal Polo LPN  "

## 2019-02-07 NOTE — PROGRESS NOTES
[facility] Surgery Consultation    CC:  Left groin abscess    HPI:  This 38 year old year old female is seen at the request of Alexi Romero for evaluation of left groin abscess.  The history is obtained from the patient, and reviewing the medical record.  She is good medical historian. She says that over the past two weeks she has had increasing pain and redness to a groin cyst. There has been no drainage. She has a history of multiple skin lesions and cysts. She was seen by her PCP and referred to general surgery for evaluation.    Past Medical History:   Diagnosis Date     Hidradenitis 2/16/2007     Ischiorectal abscess and fistula      Kidney stones 2008    x2 passed on her own     Nondependent tobacco use disorder 10/11/2012     Papanicolaou smear of cervix with low grade squamous intraepithelial lesion (LGSIL) 10/29/2008     S/p partial hysterectomy with remaining cervical stump 09/27/2013       Past Surgical History:   Procedure Laterality Date     CYSTECTOMY PILONIDAL N/A 9/18/2017    Procedure: CYSTECTOMY PILONIDAL;  PILONIDAL CYSTECTOMY ;  Surgeon: Cordell Stephens MD;  [facility]     ENDOSCOPY UPPER, COLONOSCOPY, COMBINED N/A 10/5/2018    Procedure: COMBINED ENDOSCOPY UPPER, COLONOSCOPY;  UPPER ENDOSCOPY with Biopsy  AND COLONOSCOPY;  Surgeon: Cordell Stephens MD;  [facility]     EXCISE LESION BUTTOCK(S) Left 6/8/2018    Procedure: EXCISE LESION BUTTOCK(S);  EXCISION OF GLUTEAL LEFT SKIN LESION;  Surgeon: Cordell Stephens MD;  [facility]     EXCISE MASS NECK Right 8/4/2015    Procedure: EXCISE MASS NECK;  Surgeon: Nasir Jones MD;  [facility]     INCISION AND DRAINAGE PERINEAL, COMBINED N/A 3/18/2015    Procedure: COMBINED INCISION AND DRAINAGE PERINEAL;  Surgeon: Jay Torres DO;  [facility]     LAPAROSCOPIC CHOLECYSTECTOMY N/A 11/20/2017    Procedure: LAPAROSCOPIC CHOLECYSTECTOMY;  LAPAROSCOPIC CHOLECYSTECTOMY;  Surgeon: Cordell Stephens MD;   [facility]     LAPAROSCOPIC HYSTERECTOMY SUPRACERVICAL, BILATERAL SALPINGO-OOPHORECTOMY, COMBINED  9/27/2013    Procedure: COMBINED LAPAROSCOPIC HYSTERECTOMY SUPRACERVICAL, SALPINGO-OOPHORECTOMY;  LAPAROSCOPIC SUPRACERVICAL HYSTERECTOMY AND RIGHT SALPINGO-OOPHORECTOMY, CYSTOSCOPY;  Surgeon: Denys Callahan MD;  [facility]     LAPAROSCOPY DIAGNOSTIC (GYN) N/A 9/6/2018    Procedure: LAPAROSCOPY DIAGNOSTIC (GYN);  LAPAROSCOPY WITH PERITONEAL BIOPSIES AND REMOVAL LEFT FALLOPIAN TUBE;  Surgeon: Suraj Whitaker MD;  [facility]     marsupialization of pilonidal cyst  2007     TUBAL LIGATION  2005       Pt denied problems with bleeding or anesthesia    Prior to Admission medications    Medication Sig Start Date End Date Taking? Authorizing Provider   dicyclomine (BENTYL) 10 MG capsule Take 1 capsule (10 mg) by mouth 4 times daily as needed 9/24/18   Frow, David Franke, MD    MG capsule TAKE 1 TABLET BY MOUTH DAILY 1/17/19   Alexi Romero NP   escitalopram (LEXAPRO) 5 MG tablet Take 1 tablet (5 mg) by mouth daily 12/13/18 12/13/19  Alexi Romero NP   HYDROcodone-acetaminophen (NORCO) 5-325 MG tablet Take 1 tablet by mouth every 6 hours as needed for pain 2/7/19 2/10/19  Alexi Romero NP   hydrOXYzine (VISTARIL) 25 MG capsule Can take  25mg  Up to 3 times a day for anxiety 12/13/18   Alexi Romero NP   OMEPRAZOLE PO Take 20 mg by mouth every morning    Reported, Patient   valACYclovir (VALTREX) 500 MG tablet TAKE ONE TABLET BY MOUTH DAILY 11/27/18   Alexi Romero NP        No Known Allergies      HABITS:    Social History     Tobacco Use     Smoking status: Current Every Day Smoker     Packs/day: 0.50     Years: 15.00     Pack years: 7.50     Types: Cigarettes     Start date: 1/1/2001     Smokeless tobacco: Never Used     Tobacco comment: Quitplan referral declined 6/21/18   Substance Use Topics     Alcohol use: No     Drug use: No     No mood altering drug use.    Family History   Problem  "Relation Age of Onset     Diabetes Paternal Grandmother        REVIEW OF SYSTEMS:  Ten point review of systems negative except those mentioned in the HPI.     The patient denies sleep apnea, latex allergies or MRSA    OBJECTIVE:    BP 98/62   Pulse 76   Temp 98.4  F (36.9  C) (Tympanic)   Resp 16   Ht 1.575 m (5' 2\")   Wt 54.4 kg (120 lb)   LMP 08/10/2013   SpO2 99%   BMI 21.95 kg/m      GENERAL: Generally appears well, in no distress with appropriate affect.  Respiratory:  Lungs clear to ausculation bilaterally with good air excursion  Cardiovascular:  Regular Rate and Rhythm with no murmurs gallops or rubs, normal   Skin:  Left groin 2 cm area of induration and erythema, tender to palpation  Neurological: grossly intact  Psych:  Alert, oriented, affect appropriate with normal decision making ability.      IMPRESSION:  37 yo female with left groin abscess    PLAN:  At this time I recommend incision and drainage. Due to the patients inability to tolerated local anesthetic this will be performed in the operating room. All questions and concerns were addressed with adequate time spent answering all concerns.    I discussed in detail that due to her recurrent abscess, sinuses, and pits she may have a disease called hidradenitis suppurative and she should be evaluated for treatment. For now we can proceed with incision and drainage of groin abscess.      Thank you for allowing me to participate in the care of your patient.       Cordell Stephens MD    2/7/2019  2:18 PM    cc:  Alexi Romero    "

## 2019-02-07 NOTE — OR NURSING
Pt eating, drinking, and tolerating well. Pain down to 3/10. Discharge instructions discussed with Pt and . Supplies for wound care obtained and sent home with Pt. IV removed. Dressed independenlty, and escorted to ER entrance via wheelchair.

## 2019-02-07 NOTE — PROGRESS NOTES
"Chief Complaint   Patient presents with     Consult     referred by Alexi Romero for cyst       Initial BP 98/62   Pulse 76   Temp 98.4  F (36.9  C) (Tympanic)   Resp 16   Ht 1.575 m (5' 2\")   Wt 54.4 kg (120 lb)   LMP 08/10/2013   SpO2 99%   BMI 21.95 kg/m   Estimated body mass index is 21.95 kg/m  as calculated from the following:    Height as of this encounter: 1.575 m (5' 2\").    Weight as of this encounter: 54.4 kg (120 lb).  Medication Reconciliation: complete    Ramila Siu LPN    "

## 2019-02-07 NOTE — DISCHARGE INSTRUCTIONS
Wound Care After Packing Removal or Replacement  Packing is a special type of dressing placed inside some wounds to help them heal. Once the packing is removed, you need to care for your wound. Good wound care helps prevent infection. Be sure to keep all follow-up appointments with your healthcare provider. Follow these instructions to take care of the wound once you re at home.  Home care  Your healthcare provider may prescribe medicines for pain. Or he or she may suggest an over-the-counter (OTC) pain reliever, such as ibuprofen or acetaminophen. Talk with your healthcare provider before taking any OTC medicines if you have chronic liver or kidney disease, a stomach ulcer, or gastrointestinal bleeding. In certain cases, you may also need to take antibiotics to help prevent infection. If so, take them exactly as directed for as long as directed. Don t stop taking your antibiotics until they are all gone, even if you feel better.  Here are some general care guidelines for your wound:    Follow your healthcare provider s instructions on how to care for your wound. Always wash your hands with soap and warm water before and after tending to your wound.    If a bandage was put on, remove and change it once a day or as directed. If the bandage gets wet or dirty, replace it as soon as possible with a new bandage. Use a clean cloth to gently pat the wound dry.    If your packing was replaced, a small piece of gauze may hang from the wound. It allows fluid, blood, and possibly pus to continue draining from the wound. You may need to use an ointment or cream to keep the packing from sticking to the bandage.    Don't bathe in a tub or soak your wound until your healthcare provider says it s OK. Take showers or sponge baths instead. Don't swim.    Don t scratch, rub, scrub, or pick your wound.    Check your wound daily for the signs of infection listed below.  If your wound was closed, it was likely with one of four types of  closures. These include stitches (sutures), strips of surgical tape, skin glue, and staples. Your healthcare provider will decide on the best closure based on the size and location of your wound. Each type of closure needs specific care.    Sutures. You may want to clean the wound daily after the first 2 to 3 days. To do this, remove the bandage and gently wash the area with soap and warm water. After cleaning, apply a thin layer of antibiotic ointment if recommended. Then put on a new bandage. Sutures on the outside of the skin usually need to be removed by your healthcare provider.    Surgical tape. Keep the area dry. If it gets wet, blot it dry with a towel. Surgical tape closures usually fall off within 7 to 10 days. If they have not fallen off after 10 days, you can remove them yourself. To remove the tape, use mineral oil or petroleum jelly on a cotton ball to gently rub the adhesive.    Skin glue. You may shower or bathe as usual. But don't use soaps, lotions, or ointments on the wound area. Don't scrub the wound. After bathing, pat the wound dry with a soft towel. Don't apply liquids like peroxide, ointments, or creams to the wound while the strips or film is in place. Don't scratch, rub, or pick at the strips or film. Don't put tape directly over the strips or film. Skin adhesive film will fall off naturally in 5 to 10 days. If it does not peel off in 10 days, gently rub petroleum jelly or an ointment onto the film.    Cochran. Take showers or sponge baths. Don't take tub baths. Don't use lotions on the wound area. The area may be cleaned with soap and water 2 to 3 days after the wound was stapled. Don't scrub the wound. Pat it dry with a clean soft cloth or towel. You can use antibiotic ointment if your healthcare provider tells you to. Staples will need to be removed in 10 to 14 days.  Follow-up care  Follow up with your healthcare provider, or as directed. If your packing was replaced, you may need  another visit within 1 to 3 days to remove or replace it. If you have sutures or staples, return for their removal as directed.  When to seek medical advice  Call your healthcare provider right away if you have signs of infection:    Fever of 100.4  F (38 C) or higher, or as directed by your healthcare provider    Increasing pain in the wound or pain that doesn t get better even with pain medicine    Increasing redness or swelling    Pus or bad-smelling drainage from the wound. This can be normal for an abscess that has been opened or packed. This should not occur in other types of wounds.  Also call your healthcare provider right away if any of these occur:    Your wound bleeds more than a small amount or won t stop bleeding.    You have numbness or weakness in the wound area that doesn t go away.   Date Last Reviewed: 5/1/2018 2000-2018 [employer]. [address] [place] [address]. All rights reserved. This information is not intended as a substitute for professional medical care. Always follow your healthcare professional's instructions.        Discharge Instructions: Caring for Your Wound  Taking proper care of your wound will help it heal. Your healthcare provider or nurse may show you specifically how to clean and dress the wound and how to tell if the wound is healing normally. This sheet will help you remember those guidelines when you are at home.  Getting ready    Put pets and children in another room, away from your work area.    Wash your hands before touching any of your supplies:  ? Turn on the water.  ? Wet your hands and wrists.  ? Use liquid soap from a pump dispenser. Work up a lather.  ? Scrub your hands thoroughly.  ? Rinse your hands with your fingers pointing toward the drain.  ? Dry your hands with a clean cloth or paper towel. Use this towel to turn off the faucet.  ? Remember, once you have washed your hands, don t touch anything other than your supplies. Wash your  hands again if you touch anything, like furniture or your clothes.    Clean your work area:  ? Clean washable surfaces with soap and water, and dry with a clean cloth or paper towel.  ? Wipe surfaces that are not washable (like fabric or wood) so that they are free of dust. Spread a clean cloth or paper towel over your work surface.  ? Move away from the clean surface, if you need to cough or sneeze.    Gather your bandage supplies:  ? Gauze dressing or other bandage material  ? Medical tape  ? Disposable gloves    Wash your hands again.   Dressing the wound    Remove the old dressing:  ? Put on disposable gloves if you re dressing a wound for someone else or if your wound is infected.  ? Pull gently toward the wound to loosen the tape.  ? One layer at a time, gently remove the dressing.  ? If you have a drain or tube, be careful not to pull on it.  ? Look at the dressing. Make sure that you are seeing a decreasing amount of blood, and that the blood is turning into a clear, umang fluid.  ? If your wound has stitches, look for loose ones.  ? Put the dressing in a plastic bag. Then remove your gloves.    Inspect the wound. Look for signs that it isn't healing normally. A wound that isn t healing properly may be dark in color or have white streaks.    Dress the wound:  ? Wash your hands again.  ? Clean and dress the wound as you were shown by your healthcare provider or nurse.  ? If you re dressing a wound for someone else or if your wound is infected, put on a new pair of disposable gloves.  ? If you have a drain or tube, be careful not to pull on it.    Discard any used materials or trash in a sealed plastic bag before placing in a trash can.  Follow-up  Make a follow-up appointment, or as directed by your healthcare provider.  When to call your healthcare provider  Call your healthcare provider right away if you have any of the following:    Shaking chills or fever above 100.4 F (38 C)    Bleeding that soaks the  dressing    Stitches that are pulling away from the wound or pulling apart    Pink fluid weeping from the wound    Increased drainage from the wound or drainage that is yellow, yellow-green, or smelly    Increased swelling, pain, or redness in the skin around the wound    A change in the color or size of the wound    Increased fatigue    Loss of appetite   Date Last Reviewed: 7/1/2017 2000-2018 [employer]. [address] [place] [address]. All rights reserved. This information is not intended as a substitute for professional medical care. Always follow your healthcare professional's instructions.

## 2019-02-15 ENCOUNTER — OFFICE VISIT (OUTPATIENT)
Dept: SURGERY | Facility: OTHER | Age: 39
End: 2019-02-15
Attending: SURGERY
Payer: COMMERCIAL

## 2019-02-15 VITALS
HEART RATE: 74 BPM | HEIGHT: 62 IN | TEMPERATURE: 98.4 F | DIASTOLIC BLOOD PRESSURE: 50 MMHG | SYSTOLIC BLOOD PRESSURE: 102 MMHG | OXYGEN SATURATION: 98 % | WEIGHT: 124.8 LBS | BODY MASS INDEX: 22.97 KG/M2

## 2019-02-15 DIAGNOSIS — Z51.89 WOUND CHECK, ABSCESS: Primary | ICD-10-CM

## 2019-02-15 PROCEDURE — 99024 POSTOP FOLLOW-UP VISIT: CPT | Performed by: SURGERY

## 2019-02-15 ASSESSMENT — PAIN SCALES - GENERAL: PAINLEVEL: MODERATE PAIN (5)

## 2019-02-15 ASSESSMENT — MIFFLIN-ST. JEOR: SCORE: 1204.34

## 2019-02-15 NOTE — PROGRESS NOTES
RTC, patient of Dr. bruce taken to the OR for infection related to her hydradenitis. Has been packing twice daily, appears clean, still somewhat tender with packing. Will have see Dr. Bruce next week when he gets back, may benefit from referral to plastic surgery for discussion about removal of all glandular tissue.     Fernando Lewis

## 2019-02-15 NOTE — PATIENT INSTRUCTIONS
Thank you for allowing Dr. Lewis and our surgical team to participate in your care.  If you have a scheduling or an appointment question please contact Radha, our Health Unit Coordinator at her direct line [phone].   ALL nursing questions or concerns can be directed to your surgical nurse at: [phone]

## 2019-02-15 NOTE — NURSING NOTE
"Chief Complaint   Patient presents with     RECHECK     I&D left groin abscess 2/7/19       Initial /50   Pulse 74   Temp 98.4  F (36.9  C)   Ht 1.575 m (5' 2\")   Wt 56.6 kg (124 lb 12.8 oz)   LMP 08/10/2013   SpO2 98%   BMI 22.83 kg/m   Estimated body mass index is 22.83 kg/m  as calculated from the following:    Height as of this encounter: 1.575 m (5' 2\").    Weight as of this encounter: 56.6 kg (124 lb 12.8 oz).  Medication Reconciliation: complete    PAMELA PERDOMO LPN    "

## 2019-02-21 ENCOUNTER — OFFICE VISIT (OUTPATIENT)
Dept: SURGERY | Facility: OTHER | Age: 39
End: 2019-02-21
Attending: SURGERY
Payer: COMMERCIAL

## 2019-02-21 VITALS
DIASTOLIC BLOOD PRESSURE: 70 MMHG | TEMPERATURE: 99 F | SYSTOLIC BLOOD PRESSURE: 100 MMHG | WEIGHT: 117 LBS | HEIGHT: 62 IN | OXYGEN SATURATION: 99 % | HEART RATE: 68 BPM | BODY MASS INDEX: 21.53 KG/M2 | RESPIRATION RATE: 14 BRPM

## 2019-02-21 DIAGNOSIS — L73.2 HIDRADENITIS SUPPURATIVA: Primary | ICD-10-CM

## 2019-02-21 PROCEDURE — 99212 OFFICE O/P EST SF 10 MIN: CPT | Performed by: SURGERY

## 2019-02-21 ASSESSMENT — MIFFLIN-ST. JEOR: SCORE: 1168.96

## 2019-02-21 ASSESSMENT — PAIN SCALES - GENERAL: PAINLEVEL: NO PAIN (0)

## 2019-02-21 NOTE — PATIENT INSTRUCTIONS
Thank you for allowing Dr. Stephens and our surgical team to participate in your care. Please call with any scheduling questions or concerns to our health unit coordinator at [phone] or for nursing questions to nurse at [phone].

## 2019-02-21 NOTE — NURSING NOTE
"Chief Complaint   Patient presents with     Surgical Followup     I & D left groin abscess 2/7/19       Initial /70 (BP Location: Left arm, Patient Position: Sitting, Cuff Size: Adult Regular)   Pulse 68   Temp 99  F (37.2  C) (Tympanic)   Resp 14   Ht 1.575 m (5' 2\")   Wt 53.1 kg (117 lb)   LMP 08/10/2013   SpO2 99%   BMI 21.40 kg/m   Estimated body mass index is 21.4 kg/m  as calculated from the following:    Height as of this encounter: 1.575 m (5' 2\").    Weight as of this encounter: 53.1 kg (117 lb).  Medication Reconciliation: complete    Ramila Siu LPN    "

## 2019-02-22 NOTE — PROGRESS NOTES
"SUBJECTIVE:  Mrs. Fallon presents after incision and drainage of left groin abscess. She has been doing well and is no longer packing the wound. She has had minimal discomfort associated with the lesion.    OBJECTIVE:  /70 (BP Location: Left arm, Patient Position: Sitting, Cuff Size: Adult Regular)   Pulse 68   Temp 99  F (37.2  C) (Tympanic)   Resp 14   Ht 1.575 m (5' 2\")   Wt 53.1 kg (117 lb)   LMP 08/10/2013   SpO2 99%   BMI 21.40 kg/m      Gen: AAA, NAD  Skin: left groin 1 cm wound that is 2 mm deep with granulation tissue and no surrounding erythema    ASSESSMENT/PLAN:  39 yo female s/p incision and drainage of left groin abscess    At this time she can cover the wound as needed. As discussed with Dr. Joshua castañeda the patient last week a referral will be placed to plastic surgery in [place] for evaluation and possible treatment for her recurrent abscesses of her groins. She can follow up with surgery on an as needed basis. All questions and concerns were addressed with adequate time spent answering all concerns.    Cordell Stephens MD  2/22/2019      "

## 2019-03-21 ENCOUNTER — TELEPHONE (OUTPATIENT)
Dept: INTERNAL MEDICINE | Facility: OTHER | Age: 39
End: 2019-03-21

## 2019-03-21 NOTE — TELEPHONE ENCOUNTER
GENERIC ADULT  Purnima Fallon is a 38 year old female who calls with complaints of 2 small cysts on inner groin area.  She states she has hx of hidradenitis and gets these often.  Last occurrence she was sent right to surgery for irrigation and debridement.  Patient states these are not as bad at this time but she wants to get in before they get worse.      RECOMMENDED DISPOSITION: Patient scheduled tomorrow with PCP.  Advised if anything changes or worsens to please call the clinic back and we can assess further at that time or she can always go into ED/Urgent care.  Patient was agreeable to plan and verbalized understanding.     Next 5 appointments (look out 90 days)    Mar 22, 2019 11:00 AM CDT  (Arrive by 10:45 AM)  SHORT with Alexi Romero NP  [facility] ([facility] [place] ) [address]  [facility] [address]  [phone]        Will comply with recommendation: Yes    If further questions/concerns or if symptoms do not improve, worsen or new symptoms develop, call your PCP or [facility] as soon as possible.      Guideline used: Adult Telephone Protocols Office Version 3rd Edition - Raad Vega MD, FACEP      Sindy Doyle, RN

## 2019-03-21 NOTE — TELEPHONE ENCOUNTER
I called the patient and she said that she does not want a referral to see one of the surgeons for the cyst.  She said that she NEEDs t see you and tell you what is going on. I did not cancel an appointment.  You do have openings next week, Please advise.

## 2019-03-22 ENCOUNTER — OFFICE VISIT (OUTPATIENT)
Dept: INTERNAL MEDICINE | Facility: OTHER | Age: 39
End: 2019-03-22
Attending: NURSE PRACTITIONER
Payer: COMMERCIAL

## 2019-03-22 VITALS
BODY MASS INDEX: 21.35 KG/M2 | HEIGHT: 62 IN | HEART RATE: 83 BPM | SYSTOLIC BLOOD PRESSURE: 110 MMHG | TEMPERATURE: 98.6 F | RESPIRATION RATE: 18 BRPM | WEIGHT: 116 LBS | OXYGEN SATURATION: 98 % | DIASTOLIC BLOOD PRESSURE: 64 MMHG

## 2019-03-22 DIAGNOSIS — N90.89 LESION OF LABIA: ICD-10-CM

## 2019-03-22 DIAGNOSIS — Z91.89 AT HIGH RISK FOR PAIN FROM PROCEDURE: Primary | ICD-10-CM

## 2019-03-22 PROCEDURE — 99212 OFFICE O/P EST SF 10 MIN: CPT | Performed by: NURSE PRACTITIONER

## 2019-03-22 RX ORDER — HYDROCODONE BITARTRATE AND ACETAMINOPHEN 5; 325 MG/1; MG/1
TABLET ORAL
Qty: 2 TABLET | Refills: 0 | Status: SHIPPED | OUTPATIENT
Start: 2019-03-22 | End: 2019-04-18

## 2019-03-22 ASSESSMENT — ENCOUNTER SYMPTOMS
FEVER: 0
DIFFICULTY URINATING: 0
NUMBNESS: 0

## 2019-03-22 ASSESSMENT — PAIN SCALES - GENERAL: PAINLEVEL: NO PAIN (0)

## 2019-03-22 ASSESSMENT — MIFFLIN-ST. JEOR: SCORE: 1164.42

## 2019-03-22 NOTE — PROGRESS NOTES
SUBJECTIVE:   Purnima Fallon is a 38 year old female who presents to clinic today for the following health issues:      Follow up on hydradenitis/abcess      Duration: long term, on going  Has appt with Plastic surgeon in  April 23rd to have evaluation for removal of strip area of cysts so don't recurr.      Description  Location: near vaginal area/upper inner thigh    Itching: no    Intensity:  Mild but fearful it may get larger    Accompanying signs and symptoms: None    History (similar episodes/previous evaluation): yes    Precipitating or alleviating factors:  New exposures:  Not appllicaple  Recent travel: no  n/a    Therapies tried and outcome: surgical interventions.    Soft  Warty lesion  .-has  Right inner labia.  This is a new thing.  Concerned its an HPV wart.             Problem list and histories reviewed & adjusted, as indicated.  Additional history: as documented    Patient Active Problem List   Diagnosis     Migraines     Depression     ADHD (attention deficit hyperactivity disorder)     Cystic acne     S/P laparoscopic hysterectomy     Kidney stones     Ischiorectal abscess and fistula     ACP (advance care planning)     Mass of breast     Pilonidal cyst     H/O pilonidal cyst     Biliary dyskinesia     Chronic female pelvic pain--LAPAROSCOPY w LEFT SALPINGECTOMY--normal pelvis--NO Endometriosis--9/2018     Past Surgical History:   Procedure Laterality Date     CYSTECTOMY PILONIDAL N/A 9/18/2017    Procedure: CYSTECTOMY PILONIDAL;  PILONIDAL CYSTECTOMY ;  Surgeon: Cordell Stephens MD;  [facility]     ENDOSCOPY UPPER, COLONOSCOPY, COMBINED N/A 10/5/2018    Procedure: COMBINED ENDOSCOPY UPPER, COLONOSCOPY;  UPPER ENDOSCOPY with Biopsy  AND COLONOSCOPY;  Surgeon: Cordell Stephens MD;  [facility]     EXCISE LESION BUTTOCK(S) Left 6/8/2018    Procedure: EXCISE LESION BUTTOCK(S);  EXCISION OF GLUTEAL LEFT SKIN LESION;  Surgeon: Cordell Stephens MD;  [facility]     EXCISE MASS  NECK Right 8/4/2015    Procedure: EXCISE MASS NECK;  Surgeon: Nasir Jones MD;  [facility]     INCISION AND DRAINAGE PERINEAL, COMBINED N/A 3/18/2015    Procedure: COMBINED INCISION AND DRAINAGE PERINEAL;  Surgeon: Jay Torres DO;  [facility]     IRRIGATION AND DEBRIDEMENT GROIN N/A 2/7/2019    Procedure: IRRIGATION AND DEBRIDEMENT LEFT GROIN ABSCESS;  Surgeon: Cordell Stephens MD;  [facility]     LAPAROSCOPIC CHOLECYSTECTOMY N/A 11/20/2017    Procedure: LAPAROSCOPIC CHOLECYSTECTOMY;  LAPAROSCOPIC CHOLECYSTECTOMY;  Surgeon: Cordell Stephens MD;  [facility]     LAPAROSCOPIC HYSTERECTOMY SUPRACERVICAL, BILATERAL SALPINGO-OOPHORECTOMY, COMBINED  9/27/2013    Procedure: COMBINED LAPAROSCOPIC HYSTERECTOMY SUPRACERVICAL, SALPINGO-OOPHORECTOMY;  LAPAROSCOPIC SUPRACERVICAL HYSTERECTOMY AND RIGHT SALPINGO-OOPHORECTOMY, CYSTOSCOPY;  Surgeon: Denys Callahan MD;  [facility]     LAPAROSCOPY DIAGNOSTIC (GYN) N/A 9/6/2018    Procedure: LAPAROSCOPY DIAGNOSTIC (GYN);  LAPAROSCOPY WITH PERITONEAL BIOPSIES AND REMOVAL LEFT FALLOPIAN TUBE;  Surgeon: Suraj Whitaker MD;  [facility]     marsupialization of pilonidal cyst  2007     TUBAL LIGATION  2005       Social History     Tobacco Use     Smoking status: Current Every Day Smoker     Packs/day: 0.50     Years: 15.00     Pack years: 7.50     Types: Cigarettes     Start date: 1/1/2001     Smokeless tobacco: Never Used     Tobacco comment: Quitplan referral declined 6/21/18   Substance Use Topics     Alcohol use: No     Family History   Problem Relation Age of Onset     Diabetes Paternal Grandmother          Current Outpatient Medications   Medication Sig Dispense Refill     dicyclomine (BENTYL) 10 MG capsule Take 1 capsule (10 mg) by mouth 4 times daily as needed 240 capsule 11     hydrOXYzine (VISTARIL) 25 MG capsule Can take  25mg  Up to 3 times a day for anxiety 60 capsule 0     OMEPRAZOLE PO Take 20 mg by mouth every morning    "    valACYclovir (VALTREX) 500 MG tablet TAKE ONE TABLET BY MOUTH DAILY 90 tablet 1     No Known Allergies    Reviewed and updated as needed this visit by clinical staff       Reviewed and updated as needed this visit by Provider       Review of Systems   Constitutional: Negative for fever.   Genitourinary: Negative for difficulty urinating, vaginal bleeding, vaginal discharge and vaginal pain.        Has soft warty lesion to right inner labia.     Neurological: Negative for numbness.     Vitals: /64   Pulse 83   Temp 98.6  F (37  C) (Tympanic)   Resp 18   Ht 1.575 m (5' 2\")   Wt 52.6 kg (116 lb)   LMP 08/10/2013   SpO2 98%   BMI 21.22 kg/m     Physical Exam   Constitutional: She is oriented to person, place, and time. She appears well-developed and well-nourished. No distress.   Genitourinary:   Genitourinary Comments: Has 10mm size  Soft papilloma like lesion, has stalk and bigger head.  Nonred,,  non tender.  To inner right labia.     Musculoskeletal: Normal range of motion.   Neurological: She is alert and oriented to person, place, and time.   Skin: Skin is warm and dry.   Psychiatric: She has a normal mood and affect.     ASSESSMENT / PLAN:      (N90.89) Lesion of labia  (primary encounter diagnosis)  Comment: unsure if soft papilloma or HPV   Plan: Wants to biopsy, and doesn't want to go to surgeon for this. Since is small can quickly snip it off .   Patient wants that.  Make an appt for removal and will send to pathology.      (Z91.89) At high risk for pain from procedure  CommentTake 2 hydrocodone before come in for removal skin tag/HPV tag.    Plan: HYDROcodone-acetaminophen (NORCO) 5-325 MG         tablet           "

## 2019-03-22 NOTE — NURSING NOTE
"Chief Complaint   Patient presents with     Derm Problem     cysts       Initial /64   Pulse 83   Temp 98.6  F (37  C) (Tympanic)   Resp 18   Ht 1.575 m (5' 2\")   Wt 52.6 kg (116 lb)   LMP 08/10/2013   SpO2 98%   BMI 21.22 kg/m   Estimated body mass index is 21.22 kg/m  as calculated from the following:    Height as of this encounter: 1.575 m (5' 2\").    Weight as of this encounter: 52.6 kg (116 lb).  Medication Reconciliation: complete    Crystal Polo LPN  "

## 2019-03-28 NOTE — PROGRESS NOTES
SUBJECTIVE:   Purnima Fallon is a 38 year old female who presents to clinic today for the following health issues:      Follow up soft papilloma on inner labia      Duration: on going    Description (location/character/radiation): small soft papilloma like lesion to right inner labia.      Intensity:  mild    Accompanying signs and symptoms: none    History (similar episodes/previous evaluation): has hx of cysts but concern if concerned if genital wart.      Precipitating or alleviating factors: None    Therapies tried and outcome: removal     Sebaceous cysts: Is set up to see plastic surgeon for possible strip removal of area where continuously gets cysts.        Problem list and histories reviewed & adjusted, as indicated.  Additional history: as documented    Patient Active Problem List   Diagnosis     Migraines     Depression     ADHD (attention deficit hyperactivity disorder)     Cystic acne     S/P laparoscopic hysterectomy     Kidney stones     Ischiorectal abscess and fistula     ACP (advance care planning)     Mass of breast     Pilonidal cyst     H/O pilonidal cyst     Biliary dyskinesia     Chronic female pelvic pain--LAPAROSCOPY w LEFT SALPINGECTOMY--normal pelvis--NO Endometriosis--9/2018     Past Surgical History:   Procedure Laterality Date     CYSTECTOMY PILONIDAL N/A 9/18/2017    Procedure: CYSTECTOMY PILONIDAL;  PILONIDAL CYSTECTOMY ;  Surgeon: Cordell Stephens MD;  [facility]     ENDOSCOPY UPPER, COLONOSCOPY, COMBINED N/A 10/5/2018    Procedure: COMBINED ENDOSCOPY UPPER, COLONOSCOPY;  UPPER ENDOSCOPY with Biopsy  AND COLONOSCOPY;  Surgeon: Cordell Stephens MD;  [facility]     EXCISE LESION BUTTOCK(S) Left 6/8/2018    Procedure: EXCISE LESION BUTTOCK(S);  EXCISION OF GLUTEAL LEFT SKIN LESION;  Surgeon: Cordell Stephens MD;  [facility]     EXCISE MASS NECK Right 8/4/2015    Procedure: EXCISE MASS NECK;  Surgeon: Nasir Jones MD;  [facility]     INCISION AND  DRAINAGE PERINEAL, COMBINED N/A 3/18/2015    Procedure: COMBINED INCISION AND DRAINAGE PERINEAL;  Surgeon: Jay Torres DO;  [facility]     IRRIGATION AND DEBRIDEMENT GROIN N/A 2/7/2019    Procedure: IRRIGATION AND DEBRIDEMENT LEFT GROIN ABSCESS;  Surgeon: Cordell Stephens MD;  [facility]     LAPAROSCOPIC CHOLECYSTECTOMY N/A 11/20/2017    Procedure: LAPAROSCOPIC CHOLECYSTECTOMY;  LAPAROSCOPIC CHOLECYSTECTOMY;  Surgeon: Cordell Stephens MD;  [facility]     LAPAROSCOPIC HYSTERECTOMY SUPRACERVICAL, BILATERAL SALPINGO-OOPHORECTOMY, COMBINED  9/27/2013    Procedure: COMBINED LAPAROSCOPIC HYSTERECTOMY SUPRACERVICAL, SALPINGO-OOPHORECTOMY;  LAPAROSCOPIC SUPRACERVICAL HYSTERECTOMY AND RIGHT SALPINGO-OOPHORECTOMY, CYSTOSCOPY;  Surgeon: Denys Callahan MD;  [facility]     LAPAROSCOPY DIAGNOSTIC (GYN) N/A 9/6/2018    Procedure: LAPAROSCOPY DIAGNOSTIC (GYN);  LAPAROSCOPY WITH PERITONEAL BIOPSIES AND REMOVAL LEFT FALLOPIAN TUBE;  Surgeon: Suraj Whitaker MD;  [facility]     marsupialization of pilonidal cyst  2007     TUBAL LIGATION  2005       Social History     Tobacco Use     Smoking status: Current Every Day Smoker     Packs/day: 0.50     Years: 15.00     Pack years: 7.50     Types: Cigarettes     Start date: 1/1/2001     Smokeless tobacco: Never Used     Tobacco comment: Quitplan referral declined 6/21/18   Substance Use Topics     Alcohol use: No     Family History   Problem Relation Age of Onset     Diabetes Paternal Grandmother          Current Outpatient Medications   Medication Sig Dispense Refill     dicyclomine (BENTYL) 10 MG capsule Take 1 capsule (10 mg) by mouth 4 times daily as needed 240 capsule 11     HYDROcodone-acetaminophen (NORCO) 5-325 MG tablet Take 2 tabs   Before procedure. 2 tablet 0     hydrOXYzine (VISTARIL) 25 MG capsule Can take  25mg  Up to 3 times a day for anxiety 60 capsule 0     OMEPRAZOLE PO Take 20 mg by mouth every morning       valACYclovir  "(VALTREX) 500 MG tablet TAKE ONE TABLET BY MOUTH DAILY 90 tablet 1     No Known Allergies    Reviewed and updated as needed this visit by clinical staff       Reviewed and updated as needed this visit by Provider       Review of Systems   Constitutional: Negative for fatigue and fever.   Gastrointestinal:        Stomach better on Bentyl .     Genitourinary: Negative for difficulty urinating.   Skin:        Has small warty soft mass to right inner labia.       /60   Pulse 78   Temp 97.9  F (36.6  C) (Tympanic)   Resp 18   Ht 1.575 m (5' 2\")   Wt 52.6 kg (116 lb)   LMP 08/10/2013   BMI 21.22 kg/m    Physical Exam   Constitutional: She is oriented to person, place, and time. No distress.   Genitourinary:   Genitourinary Comments: Has 2mm size soft warty looking papiloma on right inner labia near [facility]. Permit signed.   Sprayed with \"Pain ease\"  Washed X3 with betadine.  Lesion pulled taught with Adsons.  And snipped at base with sterile scissors.  No bleeding.  Specimen to pathology.     Neurological: She is alert and oriented to person, place, and time.   ASSESSMENT / PLAN:    (A63.0) Genital warts  Comment: Leave alone to seal.  Will send to pathology    Plan: Surgical pathology exam, scalp, neck, hands,         feet, genitalia, other         Subjective: Purnima Fallon a 38 year old female who presents today for lesion removal. The lesion(s) is/are located on the genitalia, number 1 and measures <2mm  She The patient reports the lesion is asymptomatic and denies other significant symptoms on ROS. Medications reviewed.    Pause for the cause has been completed prior to the prceedure.   1. Purnima was identified by both name and date of birth   2. The correct site was identified   3. Site was marked by provider    4. Written informed consent correct and signed or verbal authorization  to proceed was obtained   5. Verifed necessary supplies, equipment, and diagnostics are available    6. Time out was " performed immediately prior to procedure    Objective: The lesion(s) is/are of the above mentioned size and location and is/are typical. The area was prepped and appropriately anesthetized. Using the usual technique, excision with an iris scissors was performed. An appropriate dressing was applied. The procedure was well tolerated and without complications.     Assessment: Genital wart like      Plan: Follow up: The patient may return prn.. Wound care instructions provided.  Patient was instructed to be alert for any signs of cutaneous infection.

## 2019-04-01 ENCOUNTER — OFFICE VISIT (OUTPATIENT)
Dept: INTERNAL MEDICINE | Facility: OTHER | Age: 39
End: 2019-04-01
Attending: NURSE PRACTITIONER
Payer: COMMERCIAL

## 2019-04-01 VITALS
HEIGHT: 62 IN | HEART RATE: 78 BPM | SYSTOLIC BLOOD PRESSURE: 100 MMHG | TEMPERATURE: 97.9 F | WEIGHT: 116 LBS | RESPIRATION RATE: 18 BRPM | DIASTOLIC BLOOD PRESSURE: 60 MMHG | BODY MASS INDEX: 21.35 KG/M2

## 2019-04-01 DIAGNOSIS — D10.39 SQUAMOUS PAPILLOMA OF SOFT PALATE: Primary | ICD-10-CM

## 2019-04-01 DIAGNOSIS — A63.0 GENITAL WARTS: ICD-10-CM

## 2019-04-01 PROCEDURE — 11200 RMVL SKIN TAGS UP TO&INC 15: CPT | Performed by: NURSE PRACTITIONER

## 2019-04-01 PROCEDURE — 88305 TISSUE EXAM BY PATHOLOGIST: CPT | Mod: TC | Performed by: NURSE PRACTITIONER

## 2019-04-01 ASSESSMENT — ENCOUNTER SYMPTOMS
FEVER: 0
DIFFICULTY URINATING: 0
FATIGUE: 0

## 2019-04-01 ASSESSMENT — MIFFLIN-ST. JEOR: SCORE: 1164.42

## 2019-04-01 ASSESSMENT — PAIN SCALES - GENERAL: PAINLEVEL: NO PAIN (0)

## 2019-04-01 NOTE — NURSING NOTE
"Chief Complaint   Patient presents with     Derm Problem     cyst reomoval       Initial /60   Pulse 78   Temp 97.9  F (36.6  C) (Tympanic)   Resp 18   Ht 1.575 m (5' 2\")   Wt 52.6 kg (116 lb)   LMP 08/10/2013   BMI 21.22 kg/m   Estimated body mass index is 21.22 kg/m  as calculated from the following:    Height as of this encounter: 1.575 m (5' 2\").    Weight as of this encounter: 52.6 kg (116 lb).  Medication Reconciliation: complete    Crystal Polo LPN  "

## 2019-04-03 ENCOUNTER — PRE VISIT (OUTPATIENT)
Dept: SURGERY | Facility: CLINIC | Age: 39
End: 2019-04-03

## 2019-04-03 LAB — COPATH REPORT: NORMAL

## 2019-04-03 NOTE — TELEPHONE ENCOUNTER
FUTURE VISIT INFORMATION      FUTURE VISIT INFORMATION:    Date: 4/23/19    Time: 2:00pm    Location: [facility]  REFERRAL INFORMATION:    Referring provider:   Cordell Stephens    Referring providers clinic:  [facility]    Reason for visit/diagnosis  [facility]    RECORDS REQUESTED FROM:       Clinic name Comments Records Status Imaging Status   [facility] Procedure- IRRIGATION AND DEBRIDEMENT LEFT GROIN ABSCESS Dr. Stephens 2/7/19  OV/notes 8/14/13-current [facility] Imaging UC Pelvic Complete 8/20/18 Epic PACs

## 2019-04-04 ENCOUNTER — TELEPHONE (OUTPATIENT)
Dept: INTERNAL MEDICINE | Facility: OTHER | Age: 39
End: 2019-04-04

## 2019-04-04 NOTE — TELEPHONE ENCOUNTER
Called the patient. She something else to have removed. Please see me to explain.  Patient should probably be seen sooner than later.

## 2019-04-04 NOTE — TELEPHONE ENCOUNTER
8:57 AM    Reason for Call: OVERBOOK    Patient is having the following symptoms: remove skin tag  for 1 months.    The patient is requesting an appointment for 04/16/19 with Alexi Romero.    Was an appointment offered for this call? Yes   If yes : Appointment type procedure              Date 04/25/19 does not want to wait     Preferred method for responding to this message: Telephone Call  What is your phone number ?[phone]    If we cannot reach you directly, may we leave a detailed response at the number you provided? Yes    Can this message wait until your PCP/provider returns, if unavailable today? Not applicable, pcp is in     [facility]

## 2019-04-17 NOTE — PROGRESS NOTES
SUBJECTIVE:   Purnima Fallon is a 38 year old female who presents to clinic today for the following   health issues:      Follow up on Lesion removal from inner  Labia  Has another larger lesion we missed when removed another lesion. Other biopsy site healed well. Patient hates pain, Wants 2 Lortab to take and return to have labial lesion removed.       IBS: Had gotten  Bentyl from another physician for IBS, and works somewhat, but has had increased bloating and pain lately. Wants to take probiotic-but what kind.  ?      Additional history: as documented    Reviewed  and updated as needed this visit by clinical staff         Reviewed and updated as needed this visit by Provider         Patient Active Problem List   Diagnosis     Migraines     Depression     ADHD (attention deficit hyperactivity disorder)     Cystic acne     S/P laparoscopic hysterectomy     Kidney stones     Ischiorectal abscess and fistula     ACP (advance care planning)     Mass of breast     Pilonidal cyst     H/O pilonidal cyst     Biliary dyskinesia     Chronic female pelvic pain--LAPAROSCOPY w LEFT SALPINGECTOMY--normal pelvis--NO Endometriosis--9/2018     Past Surgical History:   Procedure Laterality Date     CYSTECTOMY PILONIDAL N/A 9/18/2017    Procedure: CYSTECTOMY PILONIDAL;  PILONIDAL CYSTECTOMY ;  Surgeon: Cordell Stephens MD;  [facility]     ENDOSCOPY UPPER, COLONOSCOPY, COMBINED N/A 10/5/2018    Procedure: COMBINED ENDOSCOPY UPPER, COLONOSCOPY;  UPPER ENDOSCOPY with Biopsy  AND COLONOSCOPY;  Surgeon: Cordell Stephens MD;  [facility]     EXCISE LESION BUTTOCK(S) Left 6/8/2018    Procedure: EXCISE LESION BUTTOCK(S);  EXCISION OF GLUTEAL LEFT SKIN LESION;  Surgeon: Cordell Stephens MD;  [facility]     EXCISE MASS NECK Right 8/4/2015    Procedure: EXCISE MASS NECK;  Surgeon: Nasir Jones MD;  [facility]     INCISION AND DRAINAGE PERINEAL, COMBINED N/A 3/18/2015    Procedure: COMBINED INCISION AND  DRAINAGE PERINEAL;  Surgeon: Jay Torres DO;  [facility]     IRRIGATION AND DEBRIDEMENT GROIN N/A 2/7/2019    Procedure: IRRIGATION AND DEBRIDEMENT LEFT GROIN ABSCESS;  Surgeon: Cordell Stephens MD;  [facility]     LAPAROSCOPIC CHOLECYSTECTOMY N/A 11/20/2017    Procedure: LAPAROSCOPIC CHOLECYSTECTOMY;  LAPAROSCOPIC CHOLECYSTECTOMY;  Surgeon: Cordell Stephens MD;  [facility]     LAPAROSCOPIC HYSTERECTOMY SUPRACERVICAL, BILATERAL SALPINGO-OOPHORECTOMY, COMBINED  9/27/2013    Procedure: COMBINED LAPAROSCOPIC HYSTERECTOMY SUPRACERVICAL, SALPINGO-OOPHORECTOMY;  LAPAROSCOPIC SUPRACERVICAL HYSTERECTOMY AND RIGHT SALPINGO-OOPHORECTOMY, CYSTOSCOPY;  Surgeon: Denys Callahan MD;  [facility]     LAPAROSCOPY DIAGNOSTIC (GYN) N/A 9/6/2018    Procedure: LAPAROSCOPY DIAGNOSTIC (GYN);  LAPAROSCOPY WITH PERITONEAL BIOPSIES AND REMOVAL LEFT FALLOPIAN TUBE;  Surgeon: Suraj Whitaker MD;  [facility]     marsupialization of pilonidal cyst  2007     TUBAL LIGATION  2005       Social History     Tobacco Use     Smoking status: Current Every Day Smoker     Packs/day: 0.50     Years: 15.00     Pack years: 7.50     Types: Cigarettes     Start date: 1/1/2001     Smokeless tobacco: Never Used     Tobacco comment: Quitplan referral declined 6/21/18   Substance Use Topics     Alcohol use: No     Family History   Problem Relation Age of Onset     Diabetes Paternal Grandmother          Current Outpatient Medications   Medication Sig Dispense Refill     dicyclomine (BENTYL) 10 MG capsule Take 1 capsule (10 mg) by mouth 4 times daily as needed 240 capsule 11     HYDROcodone-acetaminophen (NORCO) 5-325 MG tablet Take 2 tabs   Before procedure. 2 tablet 0     hydrOXYzine (VISTARIL) 25 MG capsule Can take  25mg  Up to 3 times a day for anxiety 60 capsule 0     OMEPRAZOLE PO Take 20 mg by mouth every morning       valACYclovir (VALTREX) 500 MG tablet TAKE ONE TABLET BY MOUTH DAILY 90 tablet 1     No Known  "Allergies    Review of Systems   Constitutional: Negative for fatigue and fever.   Respiratory: Negative for cough and shortness of breath.    Gastrointestinal: Positive for abdominal pain and diarrhea. Negative for nausea and vomiting.        Feels bloated.     Genitourinary: Negative for difficulty urinating.   Musculoskeletal: Positive for arthralgias and myalgias.   Skin:        Has sebaceous cysts to inner thighs.  Has Right   labial skin tag.     Neurological: Negative for dizziness and headaches.   Psychiatric/Behavioral: Negative.    BP 96/60   Pulse 62   Temp 98.6  F (37  C) (Tympanic)   Resp 18   Ht 1.575 m (5' 2\")   Wt 54 kg (119 lb)   LMP 08/10/2013   SpO2 99%   BMI 21.77 kg/m    Physical Exam   Constitutional: She is oriented to person, place, and time.   Eyes: Pupils are equal, round, and reactive to light. Conjunctivae and EOM are normal.   Neck: Normal range of motion. Neck supple. No thyromegaly present.   Cardiovascular: Normal rate, regular rhythm, normal heart sounds and intact distal pulses.   No murmur heard.  Pulmonary/Chest: Effort normal and breath sounds normal.   Abdominal: Soft. Bowel sounds are normal. She exhibits no distension and no mass. There is no tenderness. There is no guarding.   Musculoskeletal: Normal range of motion. She exhibits no edema.   Lymphadenopathy:     She has no cervical adenopathy.   Neurological: She is alert and oriented to person, place, and time.   Skin: Capillary refill takes less than 2 seconds.   Is larger skin tag to right inner labia.     Psychiatric: She has a normal mood and affect.     ASSESSMENT / PLAN:  (L91.8) Skin tag  (primary encounter diagnosis)  Comment: Will reschedule and can take 2 lortab before procedure.    Plan : will come in Monday at 1130.        (Z91.89) At high risk for pain from procedure  Comment:GIve  2 Lortab 5/325mg to take prior to removal of small labial skin tag.    Plan: HYDROcodone-acetaminophen (NORCO) 5-325 MG      "    tablet      (K58.0) Irritable bowel syndrome with diarrhea  Comment:Is on Bentyl 10mg 3 times a day. Suggest taking 2 tabs(20mg) 4 times a day for now.  Suggest get probiotic with 3 different strains of probiotic in it.    Plan: Will see her Monday to see how working.      (L72.3) Sebaceous cysts  Comment: Has appt with Plastic surgeon for consideration of removal of skin area in inner thighs that keep producing cysts.

## 2019-04-18 ENCOUNTER — OFFICE VISIT (OUTPATIENT)
Dept: INTERNAL MEDICINE | Facility: OTHER | Age: 39
End: 2019-04-18
Attending: NURSE PRACTITIONER
Payer: COMMERCIAL

## 2019-04-18 VITALS
DIASTOLIC BLOOD PRESSURE: 60 MMHG | OXYGEN SATURATION: 99 % | HEART RATE: 62 BPM | TEMPERATURE: 98.6 F | SYSTOLIC BLOOD PRESSURE: 96 MMHG | RESPIRATION RATE: 18 BRPM | BODY MASS INDEX: 21.9 KG/M2 | HEIGHT: 62 IN | WEIGHT: 119 LBS

## 2019-04-18 DIAGNOSIS — Z91.89 AT HIGH RISK FOR PAIN FROM PROCEDURE: ICD-10-CM

## 2019-04-18 DIAGNOSIS — L72.3 SEBACEOUS CYST: ICD-10-CM

## 2019-04-18 DIAGNOSIS — K58.0 IRRITABLE BOWEL SYNDROME WITH DIARRHEA: ICD-10-CM

## 2019-04-18 DIAGNOSIS — L91.8 SKIN TAG: Primary | ICD-10-CM

## 2019-04-18 PROCEDURE — 99213 OFFICE O/P EST LOW 20 MIN: CPT | Performed by: NURSE PRACTITIONER

## 2019-04-18 RX ORDER — HYDROCODONE BITARTRATE AND ACETAMINOPHEN 5; 325 MG/1; MG/1
TABLET ORAL
Qty: 2 TABLET | Refills: 0 | Status: SHIPPED | OUTPATIENT
Start: 2019-04-18 | End: 2019-05-10

## 2019-04-18 ASSESSMENT — ENCOUNTER SYMPTOMS
FEVER: 0
MYALGIAS: 1
DIZZINESS: 0
DIFFICULTY URINATING: 0
FATIGUE: 0
DIARRHEA: 1
ABDOMINAL PAIN: 1
SHORTNESS OF BREATH: 0
COUGH: 0
NAUSEA: 0
VOMITING: 0
PSYCHIATRIC NEGATIVE: 1
ARTHRALGIAS: 1
HEADACHES: 0

## 2019-04-18 ASSESSMENT — PAIN SCALES - GENERAL: PAINLEVEL: NO PAIN (0)

## 2019-04-18 ASSESSMENT — MIFFLIN-ST. JEOR: SCORE: 1178.03

## 2019-04-18 NOTE — NURSING NOTE
"Chief Complaint   Patient presents with     Derm Problem     lesion removal       Initial BP 96/60   Pulse 62   Temp 98.6  F (37  C) (Tympanic)   Resp 18   Ht 1.575 m (5' 2\")   Wt 54 kg (119 lb)   LMP 08/10/2013   SpO2 99%   BMI 21.77 kg/m   Estimated body mass index is 21.77 kg/m  as calculated from the following:    Height as of this encounter: 1.575 m (5' 2\").    Weight as of this encounter: 54 kg (119 lb).  Medication Reconciliation: complete    Crystal Polo LPN  "

## 2019-04-18 NOTE — PATIENT INSTRUCTIONS
1. Take 2 Bentyl  4 times a day to see if helps  Stomach    2. Get probiotic with 3 different strains at least.

## 2019-04-18 NOTE — LETTER
My Depression Action Plan  Name: Purnima Fallon   Date of Birth 1980  Date: 4/17/2019    My doctor: Alexi Romero   My clinic: [facility] [place]  [address] [place] [address]  [phone]          GREEN    ZONE   Good Control    What it looks like:     Things are going generally well. You have normal up s and down s. You may even feel depressed from time to time, but bad moods usually last less than a day.   What you need to do:  1. Continue to care for yourself (see self care plan)  2. Check your depression survival kit and update it as needed  3. Follow your physician s recommendations including any medication.  4. Do not stop taking medication unless you consult with your physician first.           YELLOW         ZONE Getting Worse    What it looks like:     Depression is starting to interfere with your life.     It may be hard to get out of bed; you may be starting to isolate yourself from others.    Symptoms of depression are starting to last most all day and this has happened for several days.     You may have suicidal thoughts but they are not constant.   What you need to do:     1. Call your care team, your response to treatment will improve if you keep your care team informed of your progress. Yellow periods are signs an adjustment may need to be made.     2. Continue your self-care, even if you have to fake it!    3. Talk to someone in your support network    4. Open up your depression survival kit           RED    ZONE Medical Alert - Get Help    What it looks like:     Depression is seriously interfering with your life.     You may experience these or other symptoms: You can t get out of bed most days, can t work or engage in other necessary activities, you have trouble taking care of basic hygiene, or basic responsibilities, thoughts of suicide or death that will not go away, self-injurious behavior.     What you need to do:  1. Call your care team and request  a same-day appointment. If they are not available (weekends or after hours) call your local crisis line, emergency room or 911.            Depression Self Care Plan / Survival Kit    Self-Care for Depression  Here s the deal. Your body and mind are really not as separate as most people think.  What you do and think affects how you feel and how you feel influences what you do and think. This means if you do things that people who feel good do, it will help you feel better.  Sometimes this is all it takes.  There is also a place for medication and therapy depending on how severe your depression is, so be sure to consult with your medical provider and/ or Behavioral Health Consultant if your symptoms are worsening or not improving.     In order to better manage my stress, I will:    Exercise  Get some form of exercise, every day. This will help reduce pain and release endorphins, the  feel good  chemicals in your brain. This is almost as good as taking antidepressants!  This is not the same as joining a gym and then never going! (they count on that by the way ) It can be as simple as just going for a walk or doing some gardening, anything that will get you moving.      Hygiene   Maintain good hygiene (Get out of bed in the morning, Make your bed, Brush your teeth, Take a shower, and Get dressed like you were going to work, even if you are unemployed).  If your clothes don't fit try to get ones that do.    Diet  I will strive to eat foods that are good for me, drink plenty of water, and avoid excessive sugar, caffeine, alcohol, and other mood-altering substances.  Some foods that are helpful in depression are: complex carbohydrates, B vitamins, flaxseed, fish or fish oil, fresh fruits and vegetables.    Psychotherapy  I agree to participate in Individual Therapy (if recommended).    Medication  If prescribed medications, I agree to take them.  Missing doses can result in serious side effects.  I understand that drinking  alcohol, or other illicit drug use, may cause potential side effects.  I will not stop my medication abruptly without first discussing it with my provider.    Staying Connected With Others  I will stay in touch with my friends, family members, and my primary care provider/team.    Use your imagination  Be creative.  We all have a creative side; it doesn t matter if it s oil painting, sand castles, or mud pies! This will also kick up the endorphins.    Witness Beauty  (AKA stop and smell the roses) Take a look outside, even in mid-winter. Notice colors, textures. Watch the squirrels and birds.     Service to others  Be of service to others.  There is always someone else in need.  By helping others we can  get out of ourselves  and remember the really important things.  This also provides opportunities for practicing all the other parts of the program.    Humor  Laugh and be silly!  Adjust your TV habits for less news and crime-drama and more comedy.    Control your stress  Try breathing deep, massage therapy, biofeedback, and meditation. Find time to relax each day.     My support system    Clinic Contact:  Phone number:    Contact 1:  Phone number:    Contact 2:  Phone number:    Amish/:  Phone number:    Therapist:  Phone number:    Local crisis center:    Phone number:    Other community support:  Phone number:

## 2019-04-23 ENCOUNTER — OFFICE VISIT (OUTPATIENT)
Dept: PLASTIC SURGERY | Facility: CLINIC | Age: 39
End: 2019-04-23
Payer: COMMERCIAL

## 2019-04-23 VITALS — HEIGHT: 62 IN | BODY MASS INDEX: 21.9 KG/M2 | WEIGHT: 119 LBS

## 2019-04-23 DIAGNOSIS — L73.2 HIDRADENITIS SUPPURATIVA: Primary | ICD-10-CM

## 2019-04-23 ASSESSMENT — MIFFLIN-ST. JEOR: SCORE: 1178.03

## 2019-04-23 ASSESSMENT — PAIN SCALES - GENERAL: PAINLEVEL: NO PAIN (0)

## 2019-04-23 NOTE — NURSING NOTE
"Chief Complaint   Patient presents with     Consult     Pt here for consult        Vitals:    04/23/19 1401   Weight: 54 kg (119 lb)   Height: 1.575 m (5' 2\")       Body mass index is 21.77 kg/m .      SANDRINE BallT                    No vitals taken per provider  "

## 2019-04-23 NOTE — LETTER
4/23/2019       RE: Purnima Fallon  [address]  [facility] [address]     Dear Colleague,    Thank you for referring your patient, Purnima Fallon, to the [facility] PLASTIC AND RECONSTRUCTIVE SURGERY at [facility]. Please see a copy of my visit note below.    PLASTICS NEW    Purnima Fallon is a 38 year old female with medical history of Hydradenitis Suppurativa with ongoing wound care who presents to clinic for consult after referral from Dr. Stephens with Gen Surg in [place], [place]. Today, she has a skin growth along the left chin which has been tender but she complains that it appears smaller than it was yesterday.  On 02/07 (~2.5 months ago), she most recently had surgery on an abscess on the left groin which has healed with scarring. She is especially frustrated due to the scarring and repeated procedures for wound care. In the past, these skin growths have appeared on her forearm, groin, buttocks, and near the jaw line. The patient otherwise denies fever, chills, nausea, vomiting, or other infectious symptoms. Her disease does not involve the usual inframammary fold or axillae.    Saw a dermatologist in the past but was not helpful.     Of note, she is on medication for IBS and GERD-like inflammatory changes noted on upper endoscopy although she does not experience acid reflux.    SHx: ,  and lives with  and son in [place], [place]. Omnivore - minimal processed, fast food or pop, but does drink at least 1 sugar free Red Bull per day. Smokes 0.5 packs/day cigarettes since age 19. No EtOH use (gets violently ill after one drink). No drug use. Sleep - melatonin PRN, averages 7-8 hours. Exercise includes weights and Stairmaster.    Medical Hx: IBS. HS. No GERD, DM, HTN, healing or scarring problems.     Surgical Hx: Laparoscopic hysterectomy secondary to endometriosis. Right salpingo-oophorectomy. Lap cholecystectomy secondary to poor function on HIDA scan. Removal  "of large cyst on the right knee at age 13.     Family Hx: Paternal grandmother and father had history of HS.     PE: Small areas of scarring along the groin and buttocks bilaterally within the hair-bearing skin (which she shaves), secondary to HS. 1 cm diameter skin growth near the left jaw line. Photos deferred today. Darker skin and \"lanugo\".  Not much active disease or evidence of advanced tunneling or scarring.    A&P: The patient is a 38 year old female with history of Hydradenitis Suppurativa with recurrent disease requiring surgical I&D's. She presents today with the hope of finding a long-term solution to the recurring skin lesions. We discussed benefits/limitations of surgery and potential reasons for body system/physiologic etiologies for the skin lesions and we agreed on a referral to Dr. Nasim Jha, a Derm specialist here at [facility] who deals with HS.The patient is agreeable to this plan and I also offered she look into functional medicine for her IBS. Any surgical options would need to be camouflaged or hidden in natural creases so that they do not interfere with her body building competitions. Interestingly, she competes in public but would not show herself publicly otherwise. Any submission for PA would require a statement of medical necessity due to recurrent disease.    Total time = 30 minutes, greater than half spent discussing surgical options vs medical options.    Again, thank you for allowing me to participate in the care of your patient.      Sincerely,    Brandie Poe MD      "

## 2019-04-23 NOTE — PROGRESS NOTES
PLASTICS NEW    Purnima Fallon is a 38 year old female with medical history of Hydradenitis Suppurativa with ongoing wound care who presents to clinic for consult after referral from Dr. Stephens with Gen [facility] in [place], [place]. Today, she has a skin growth along the left chin which has been tender but she complains that it appears smaller than it was yesterday.  On 02/07 (~2.5 months ago), she most recently had surgery on an abscess on the left groin which has healed with scarring. She is especially frustrated due to the scarring and repeated procedures for wound care. In the past, these skin growths have appeared on her forearm, groin, buttocks, and near the jaw line. The patient otherwise denies fever, chills, nausea, vomiting, or other infectious symptoms. Her disease does not involve the usual inframammary fold or axillae.    Saw a dermatologist in the past but was not helpful.     Of note, she is on medication for IBS and GERD-like inflammatory changes noted on upper endoscopy although she does not experience acid reflux.    SHx: ,  and lives with  and son in [place], [place]. Omnivore - minimal processed, fast food or pop, but does drink at least 1 sugar free Red Bull per day. Smokes 0.5 packs/day cigarettes since age 19. No EtOH use (gets violently ill after one drink). No drug use. Sleep - melatonin PRN, averages 7-8 hours. Exercise includes weights and Stairmaster.    Medical Hx: IBS. HS. No GERD, DM, HTN, healing or scarring problems.     Surgical Hx: Laparoscopic hysterectomy secondary to endometriosis. Right salpingo-oophorectomy. Lap cholecystectomy secondary to poor function on HIDA scan. Removal of large cyst on the right knee at age 13.     Family Hx: Paternal grandmother and father had history of HS.     PE: Small areas of scarring along the groin and buttocks bilaterally within the hair-bearing skin (which she shaves), secondary to HS. 1 cm diameter skin growth near the left jaw  "line. Photos deferred today. Darker skin and \"lanugo\".  Not much active disease or evidence of advanced tunneling or scarring.    A&P: The patient is a 38 year old female with history of Hydradenitis Suppurativa with recurrent disease requiring surgical I&D's. She presents today with the hope of finding a long-term solution to the recurring skin lesions. We discussed benefits/limitations of surgery and potential reasons for body system/physiologic etiologies for the skin lesions and we agreed on a referral to Dr. Nasim Jha, a Derm specialist here at [facility] who deals with HS.The patient is agreeable to this plan and I also offered she look into functional medicine for her IBS. Any surgical options would need to be camouflaged or hidden in natural creases so that they do not interfere with her body building competitions. Interestingly, she competes in public but would not show herself publicly otherwise. Any submission for PA would require a statement of medical necessity due to recurrent disease.    Total time = 30 minutes, greater than half spent discussing surgical options vs medical options.      "

## 2019-04-24 ENCOUNTER — PATIENT OUTREACH (OUTPATIENT)
Dept: PLASTIC SURGERY | Facility: CLINIC | Age: 39
End: 2019-04-24

## 2019-04-24 ENCOUNTER — TELEPHONE (OUTPATIENT)
Dept: DERMATOLOGY | Facility: CLINIC | Age: 39
End: 2019-04-24

## 2019-04-24 DIAGNOSIS — L73.2 HIDRADENITIS SUPPURATIVA: Primary | ICD-10-CM

## 2019-04-24 NOTE — TELEPHONE ENCOUNTER
[facility] Call Center    Phone Message    May a detailed message be left on voicemail: yes    Reason for Call: Other: per pt- saw  and she is referring pt to  for HS, i do not see a referral in the system, just in her clinic note it states referred to  for HS, please review pts chart and call to schedule thanks!     Action Taken: Message routed to:  [facility] derm

## 2019-04-24 NOTE — PATIENT INSTRUCTIONS
Spoke with pt regarding what next steps would be to proceed with surgery. Discussed recommendations based on Dr. Poe's note from consultation including seeing Dermatology and having IBS worked up. Pt states she does plan to pursue these, but would like to proceed with the surgery to address existing scarring particularly in her groin area.   Pt inquires what she needs to do to start this process and is also wanting to know about more details of the surgery including; what surgery entails, how long it is, overnight stay, time off of work. Explained that her questions would be reviewed with Dr. Poe and we would contact her with additional details. YULISSA Hart  Spoke with pt regarding recommendation from Dr. Poe. Relayed per Dr. Poe we need her to see Nasim Comerb first. Then she needs to come back to clinic to discuss the details, take pictures and start a PA. Pt states understanding. States she called to schedule, but needs a referral to be placed. Explained referral would be placed and encouraged pt to call back if she has any further difficulty scheduling. YULISSA Hart\  Called pt and relayed per Dr. Poe pt will also need to quit smoking for at least one month prior to surgery. Pt states understanding and denies any additional questions. YULISSA Hart

## 2019-04-25 NOTE — TELEPHONE ENCOUNTER
Spoke with pt very adamant about seeing Peg as she was referred by another doctor and she only wants to see him         Yvonne Beckford on 4/25/2019 at 2:59 PM

## 2019-04-29 NOTE — TELEPHONE ENCOUNTER
[facility] Call [facility]    Phone Message    May a detailed message be left on voicemail: yes    Reason for Call: Other: The pt is returning the call to Derm to get an appt with Dr Jha. There were none that I could offer her. Thanks.    Action Taken: Message routed to:  [facility]

## 2019-05-09 NOTE — PROGRESS NOTES
SUBJECTIVE:   Purnima Fallon is a 38 year old female who presents to clinic today for the following   health issues:      Depression and Anxiety Follow-Up    Status since last visit: No change  On No meds.      Other associated symptoms:None    Complicating factors:     Significant life event: No     Current substance abuse: None    PHQ 8/30/2018 11/13/2018 12/13/2018   PHQ-9 Total Score 7 8 3   Q9: Thoughts of better off dead/self-harm past 2 weeks Not at all Not at all Not at all     WENDY-7 SCORE 8/30/2018 11/13/2018 12/13/2018   Total Score 10 21 4             SKIN TAGS Removal      Duration: follow up Has right labia skin tag we missed last attempt.  Wants off today      Description  Location: privates  Itching: no    Intensity:  mild    Accompanying signs and symptoms: None    History (similar episodes/previous evaluation): None    Precipitating or alleviating factors:  New exposures:  None  Recent travel: no      Therapies tried and outcome: removal of cyst/skin tag      Cysts to inner thighs. : And labia. Keep recurring. Saw Specilalist in [facility] for removal of thigh patches on inner thighs to stop recurrence. Will be seeing Hidradenitis Specialist on the 28th of June.  First. With anticipation of the surgery.  Wants pain med to allow pain relief when they flare. Knows to do hot packs.      Additional history: as documented    Reviewed  and updated as needed this visit by clinical staff         Reviewed and updated as needed this visit by Provider         Patient Active Problem List   Diagnosis     Migraines     Depression     ADHD (attention deficit hyperactivity disorder)     Cystic acne     S/P laparoscopic hysterectomy     Kidney stones     Ischiorectal abscess and fistula     ACP (advance care planning)     Mass of breast     Pilonidal cyst     H/O pilonidal cyst     Biliary dyskinesia     Chronic female pelvic pain--LAPAROSCOPY w LEFT SALPINGECTOMY--normal pelvis--NO Endometriosis--9/2018     Past  Surgical History:   Procedure Laterality Date     CYSTECTOMY PILONIDAL N/A 9/18/2017    Procedure: CYSTECTOMY PILONIDAL;  PILONIDAL CYSTECTOMY ;  Surgeon: Cordell Stephens MD;  Location: [facility]     ENDOSCOPY UPPER, COLONOSCOPY, COMBINED N/A 10/5/2018    Procedure: COMBINED ENDOSCOPY UPPER, COLONOSCOPY;  UPPER ENDOSCOPY with Biopsy  AND COLONOSCOPY;  Surgeon: Cordell Stephens MD;  Location: [facility]     EXCISE LESION BUTTOCK(S) Left 6/8/2018    Procedure: EXCISE LESION BUTTOCK(S);  EXCISION OF GLUTEAL LEFT SKIN LESION;  Surgeon: Cordell Stephens MD;  [facility]: [facility]     EXCISE MASS NECK Right 8/4/2015    Procedure: EXCISE MASS NECK;  Surgeon: Nasir Jones MD;  Location: [facility]     INCISION AND DRAINAGE PERINEAL, COMBINED N/A 3/18/2015    Procedure: COMBINED INCISION AND DRAINAGE PERINEAL;  Surgeon: Jay Torres DO;  [facility]     IRRIGATION AND DEBRIDEMENT GROIN N/A 2/7/2019    Procedure: IRRIGATION AND DEBRIDEMENT LEFT GROIN ABSCESS;  Surgeon: Cordell Stephens MD;  [facility]     LAPAROSCOPIC CHOLECYSTECTOMY N/A 11/20/2017    Procedure: LAPAROSCOPIC CHOLECYSTECTOMY;  LAPAROSCOPIC CHOLECYSTECTOMY;  Surgeon: Cordell Stephens MD;  [facility]     LAPAROSCOPIC HYSTERECTOMY SUPRACERVICAL, BILATERAL SALPINGO-OOPHORECTOMY, COMBINED  9/27/2013    Procedure: COMBINED LAPAROSCOPIC HYSTERECTOMY SUPRACERVICAL, SALPINGO-OOPHORECTOMY;  LAPAROSCOPIC SUPRACERVICAL HYSTERECTOMY AND RIGHT SALPINGO-OOPHORECTOMY, CYSTOSCOPY;  Surgeon: Denys Callahan MD;  Location: [facility]     LAPAROSCOPY DIAGNOSTIC (GYN) N/A 9/6/2018    Procedure: LAPAROSCOPY DIAGNOSTIC (GYN);  LAPAROSCOPY WITH PERITONEAL BIOPSIES AND REMOVAL LEFT FALLOPIAN TUBE;  Surgeon: Suraj Whitaker MD;  Location: [facility]     marsupialization of pilonidal cyst  2007     TUBAL LIGATION  2005       Social History     Tobacco Use     Smoking status: Current Every Day Smoker     Packs/day: 0.50     Years: 15.00     Pack years: 7.50      "Types: Cigarettes     Start date: 1/1/2001     Smokeless tobacco: Never Used     Tobacco comment: Quitplan referral declined 6/21/18   Substance Use Topics     Alcohol use: No     Family History   Problem Relation Age of Onset     Diabetes Paternal Grandmother          Current Outpatient Medications   Medication Sig Dispense Refill     dicyclomine (BENTYL) 10 MG capsule Take 2 capsules (20 mg) by mouth 4 times daily as needed 240 capsule 11     HYDROcodone-acetaminophen (NORCO) 5-325 MG tablet 1-2 tabs a day for pain 24 tablet 0     hydrOXYzine (VISTARIL) 25 MG capsule Can take  25mg  Up to 3 times a day for anxiety 60 capsule 0     OMEPRAZOLE PO Take 20 mg by mouth every morning       valACYclovir (VALTREX) 500 MG tablet TAKE ONE TABLET BY MOUTH DAILY 90 tablet 1     No Known Allergies      Review of Systems   Constitutional: Negative for fatigue and fever.   Respiratory: Negative for cough and shortness of breath.    Cardiovascular: Negative for chest pain, palpitations and leg swelling.   Gastrointestinal: Negative for abdominal pain, constipation and diarrhea.        Has IBS. On  Dicyclomine.     Genitourinary: Negative for difficulty urinating.        Gets sebaceous cysts on labia and inner thighs.   Has reddened area to left outer   labia today.     Musculoskeletal:        Has LOW pain tolerance!   Skin:        Has sebaceous cysts to inner thighs, and labia. Has one on Left jawline and left labia today.  Permit signed. Right inner labia skin tag identified.  Sprayed with freezone.  Washed with betadine and clipped off with sterile scissors.     Neurological: Negative for dizziness and headaches.   /60   Pulse 90   Temp 98.2  F (36.8  C) (Tympanic)   Resp 18   Ht 1.575 m (5' 2\")   Wt 54.4 kg (120 lb)   LMP 08/10/2013   SpO2 98%   BMI 21.95 kg/m    Physical Exam   Constitutional: She is oriented to person, place, and time. She appears well-developed and well-nourished. No distress.   Eyes: Pupils " are equal, round, and reactive to light. Conjunctivae and EOM are normal.   Abdominal: Soft. Bowel sounds are normal. She exhibits no mass. There is no tenderness.   Genitourinary:   Genitourinary Comments: Soft polyp to right labia. Sprayed with numbing spray. Washed with betadine ,  Cut with sterile scissors at  Base.  4x4 to area.     Musculoskeletal: Normal range of motion. She exhibits edema.   Neurological: She is alert and oriented to person, place, and time.     ASSESSMENT / PLAN:  (K58.1) Irritable bowel syndrome with constipation--diet,exercise,fluids,DICYCLOMINE--9/2018  CommentHas increased to 20mg Dicyclomine   4 times a day. Notes better control of cramping.    Plan: dicyclomine (BENTYL) 10 MG capsule        (Z91.89) At high risk for pain from procedure  Comment: Took Lortab before procedure today!  Given Lortab  #24 for pain related to sebaceous cysts. Until can get surgery.    Plan: HYDROcodone-acetaminophen (NORCO) 5-325 MG         tablet    (L73.2) Hidradenitis suppurativa  Comment: Soak in warm bathtub daily, use hot packs.  Plan: Has seen Dermatologist willing to remove skin from inner thighs, and will see HD Specialist on June 28th.          Subjective: Purnima Fallon a 38 year old female who presents today for lesion removal. The lesion(s) is/are located on the groin, number 1 and measures 2mm She, The patient reports the lesion is asymptomatic and denies other significant symptoms on ROS. Medications reviewed.    Pause for the cause has been completed prior to the prceedure.   2. Purnima was identified by both name and date of birth   3. The correct site was identified   4. Site was marked by provider    5. Written informed consent correct and signed or verbal authorization  to proceed was obtained   6. Verifed necessary supplies, equipment, and diagnostics are available    7. Time out was performed immediately prior to procedure    Objective: The lesion(s) is/are of the above mentioned size and  location and is/are typical. The area was prepped and appropriately anesthetized. Using the usual technique, excision with an iris scissors was performed. An appropriate dressing was applied. The procedure was well tolerated and without complications.     Assessment: skin tag    Plan: Follow up: The patient may return prn.. Wound care instructions provided.  Patient was instructed to be alert for any signs of cutaneous infection.

## 2019-05-10 ENCOUNTER — OFFICE VISIT (OUTPATIENT)
Dept: INTERNAL MEDICINE | Facility: OTHER | Age: 39
End: 2019-05-10
Attending: NURSE PRACTITIONER
Payer: COMMERCIAL

## 2019-05-10 VITALS
DIASTOLIC BLOOD PRESSURE: 60 MMHG | HEIGHT: 62 IN | RESPIRATION RATE: 18 BRPM | HEART RATE: 90 BPM | SYSTOLIC BLOOD PRESSURE: 104 MMHG | WEIGHT: 120 LBS | BODY MASS INDEX: 22.08 KG/M2 | OXYGEN SATURATION: 98 % | TEMPERATURE: 98.2 F

## 2019-05-10 DIAGNOSIS — K58.1 IRRITABLE BOWEL SYNDROME WITH CONSTIPATION: ICD-10-CM

## 2019-05-10 DIAGNOSIS — L73.2 HIDRADENITIS SUPPURATIVA: ICD-10-CM

## 2019-05-10 DIAGNOSIS — Z91.89 AT HIGH RISK FOR PAIN FROM PROCEDURE: ICD-10-CM

## 2019-05-10 PROCEDURE — 99213 OFFICE O/P EST LOW 20 MIN: CPT | Mod: 25 | Performed by: NURSE PRACTITIONER

## 2019-05-10 PROCEDURE — 11200 RMVL SKIN TAGS UP TO&INC 15: CPT | Performed by: NURSE PRACTITIONER

## 2019-05-10 RX ORDER — HYDROCODONE BITARTRATE AND ACETAMINOPHEN 5; 325 MG/1; MG/1
TABLET ORAL
Qty: 24 TABLET | Refills: 0 | Status: SHIPPED | OUTPATIENT
Start: 2019-05-10 | End: 2019-06-11

## 2019-05-10 RX ORDER — DICYCLOMINE HYDROCHLORIDE 10 MG/1
20 CAPSULE ORAL 4 TIMES DAILY PRN
Qty: 240 CAPSULE | Refills: 11 | COMMUNITY
Start: 2019-05-10 | End: 2019-06-25

## 2019-05-10 ASSESSMENT — MIFFLIN-ST. JEOR: SCORE: 1182.57

## 2019-05-10 ASSESSMENT — PAIN SCALES - GENERAL: PAINLEVEL: MILD PAIN (2)

## 2019-05-10 NOTE — NURSING NOTE
"Chief Complaint   Patient presents with     Derm Problem     removal of skin tags       Initial /60   Pulse 90   Temp 98.2  F (36.8  C) (Tympanic)   Resp 18   Ht 1.575 m (5' 2\")   Wt 54.4 kg (120 lb)   LMP 08/10/2013   SpO2 98%   BMI 21.95 kg/m   Estimated body mass index is 21.95 kg/m  as calculated from the following:    Height as of this encounter: 1.575 m (5' 2\").    Weight as of this encounter: 54.4 kg (120 lb).  Medication Reconciliation: complete    Crysatl Polo LPN  "

## 2019-05-10 NOTE — LETTER
My Depression Action Plan  Name: Purnima Fallon   Date of Birth 1980  Date: 5/9/2019    My doctor: Alexi Romero   My clinic: [facility] [place]  [address] [place] [address]  [phone]          GREEN    ZONE   Good Control    What it looks like:     Things are going generally well. You have normal up s and down s. You may even feel depressed from time to time, but bad moods usually last less than a day.   What you need to do:  1. Continue to care for yourself (see self care plan)  2. Check your depression survival kit and update it as needed  3. Follow your physician s recommendations including any medication.  4. Do not stop taking medication unless you consult with your physician first.           YELLOW         ZONE Getting Worse    What it looks like:     Depression is starting to interfere with your life.     It may be hard to get out of bed; you may be starting to isolate yourself from others.    Symptoms of depression are starting to last most all day and this has happened for several days.     You may have suicidal thoughts but they are not constant.   What you need to do:     1. Call your care team, your response to treatment will improve if you keep your care team informed of your progress. Yellow periods are signs an adjustment may need to be made.     2. Continue your self-care, even if you have to fake it!    3. Talk to someone in your support network    4. Open up your depression survival kit           RED    ZONE Medical Alert - Get Help    What it looks like:     Depression is seriously interfering with your life.     You may experience these or other symptoms: You can t get out of bed most days, can t work or engage in other necessary activities, you have trouble taking care of basic hygiene, or basic responsibilities, thoughts of suicide or death that will not go away, self-injurious behavior.     What you need to do:  1. Call your care team and request a  same-day appointment. If they are not available (weekends or after hours) call your local crisis line, emergency room or 911.            Depression Self Care Plan / Survival Kit    Self-Care for Depression  Here s the deal. Your body and mind are really not as separate as most people think.  What you do and think affects how you feel and how you feel influences what you do and think. This means if you do things that people who feel good do, it will help you feel better.  Sometimes this is all it takes.  There is also a place for medication and therapy depending on how severe your depression is, so be sure to consult with your medical provider and/ or Behavioral Health Consultant if your symptoms are worsening or not improving.     In order to better manage my stress, I will:    Exercise  Get some form of exercise, every day. This will help reduce pain and release endorphins, the  feel good  chemicals in your brain. This is almost as good as taking antidepressants!  This is not the same as joining a gym and then never going! (they count on that by the way ) It can be as simple as just going for a walk or doing some gardening, anything that will get you moving.      Hygiene   Maintain good hygiene (Get out of bed in the morning, Make your bed, Brush your teeth, Take a shower, and Get dressed like you were going to work, even if you are unemployed).  If your clothes don't fit try to get ones that do.    Diet  I will strive to eat foods that are good for me, drink plenty of water, and avoid excessive sugar, caffeine, alcohol, and other mood-altering substances.  Some foods that are helpful in depression are: complex carbohydrates, B vitamins, flaxseed, fish or fish oil, fresh fruits and vegetables.    Psychotherapy  I agree to participate in Individual Therapy (if recommended).    Medication  If prescribed medications, I agree to take them.  Missing doses can result in serious side effects.  I understand that drinking  alcohol, or other illicit drug use, may cause potential side effects.  I will not stop my medication abruptly without first discussing it with my provider.    Staying Connected With Others  I will stay in touch with my friends, family members, and my primary care provider/team.    Use your imagination  Be creative.  We all have a creative side; it doesn t matter if it s oil painting, sand castles, or mud pies! This will also kick up the endorphins.    Witness Beauty  (AKA stop and smell the roses) Take a look outside, even in mid-winter. Notice colors, textures. Watch the squirrels and birds.     Service to others  Be of service to others.  There is always someone else in need.  By helping others we can  get out of ourselves  and remember the really important things.  This also provides opportunities for practicing all the other parts of the program.    Humor  Laugh and be silly!  Adjust your TV habits for less news and crime-drama and more comedy.    Control your stress  Try breathing deep, massage therapy, biofeedback, and meditation. Find time to relax each day.     My support system    Clinic Contact:  Phone number:    Contact 1:  Phone number:    Contact 2:  Phone number:    Islam/:  Phone number:    Therapist:  Phone number:    Local crisis center:    Phone number:    Other community support:  Phone number:

## 2019-05-13 PROBLEM — L73.2 HIDRADENITIS SUPPURATIVA: Status: ACTIVE | Noted: 2019-05-13

## 2019-05-13 ASSESSMENT — ENCOUNTER SYMPTOMS
CONSTIPATION: 0
FATIGUE: 0
DIARRHEA: 0
HEADACHES: 0
FEVER: 0
SHORTNESS OF BREATH: 0
DIZZINESS: 0
DIFFICULTY URINATING: 0
COUGH: 0
PALPITATIONS: 0
ABDOMINAL PAIN: 0

## 2019-06-04 ENCOUNTER — TELEPHONE (OUTPATIENT)
Dept: INTERNAL MEDICINE | Facility: OTHER | Age: 39
End: 2019-06-04

## 2019-06-07 NOTE — PROGRESS NOTES
Subjective     Purnima Fallon is a 38 year old female who presents to clinic today for the following health issues:    HPI   Follow up on cysts Hidradenitis  Suppurativa.    Will be seeing Cyst Specialist on June 27th  .  Has one forming on Coccyx.  Has pain meds and warm compresses that help.  Antibiotics don't help  Has had several removed or lanced.       IBS  Bloating. : Would like to see a Nutritionist for IBS     Had gallbladder removed, but still bloating.      Migraines: No problems in a long time.          Patient Active Problem List   Diagnosis     Migraines     Depression     ADHD (attention deficit hyperactivity disorder)     Cystic acne     S/P laparoscopic hysterectomy     Kidney stones     Ischiorectal abscess and fistula     ACP (advance care planning)     Mass of breast     Pilonidal cyst     H/O pilonidal cyst     Biliary dyskinesia     Chronic female pelvic pain--LAPAROSCOPY w LEFT SALPINGECTOMY--normal pelvis--NO Endometriosis--9/2018     Hidradenitis suppurativa     Past Surgical History:   Procedure Laterality Date     CYSTECTOMY PILONIDAL N/A 9/18/2017    Procedure: CYSTECTOMY PILONIDAL;  PILONIDAL CYSTECTOMY ;  Surgeon: Cordell Stephens MD;  [facility]     ENDOSCOPY UPPER, COLONOSCOPY, COMBINED N/A 10/5/2018    Procedure: COMBINED ENDOSCOPY UPPER, COLONOSCOPY;  UPPER ENDOSCOPY with Biopsy  AND COLONOSCOPY;  Surgeon: Cordell Stephens MD;  [facility]     EXCISE LESION BUTTOCK(S) Left 6/8/2018    Procedure: EXCISE LESION BUTTOCK(S);  EXCISION OF GLUTEAL LEFT SKIN LESION;  Surgeon: Cordell Stephens MD;  [facility]     EXCISE MASS NECK Right 8/4/2015    Procedure: EXCISE MASS NECK;  Surgeon: Nasir Jones MD;  [facility]     INCISION AND DRAINAGE PERINEAL, COMBINED N/A 3/18/2015    Procedure: COMBINED INCISION AND DRAINAGE PERINEAL;  Surgeon: Jay Torres DO;  [facility]     IRRIGATION AND DEBRIDEMENT GROIN N/A 2/7/2019    Procedure: IRRIGATION AND  DEBRIDEMENT LEFT GROIN ABSCESS;  Surgeon: Cordell Stephens MD;  [facility]     LAPAROSCOPIC CHOLECYSTECTOMY N/A 11/20/2017    Procedure: LAPAROSCOPIC CHOLECYSTECTOMY;  LAPAROSCOPIC CHOLECYSTECTOMY;  Surgeon: Cordell Stephens MD;  [facility]     LAPAROSCOPIC HYSTERECTOMY SUPRACERVICAL, BILATERAL SALPINGO-OOPHORECTOMY, COMBINED  9/27/2013    Procedure: COMBINED LAPAROSCOPIC HYSTERECTOMY SUPRACERVICAL, SALPINGO-OOPHORECTOMY;  LAPAROSCOPIC SUPRACERVICAL HYSTERECTOMY AND RIGHT SALPINGO-OOPHORECTOMY, CYSTOSCOPY;  Surgeon: Denys Callahan MD;  [facility]     LAPAROSCOPY DIAGNOSTIC (GYN) N/A 9/6/2018    Procedure: LAPAROSCOPY DIAGNOSTIC (GYN);  LAPAROSCOPY WITH PERITONEAL BIOPSIES AND REMOVAL LEFT FALLOPIAN TUBE;  Surgeon: Suraj Whitaker MD;  [facility]     marsupialization of pilonidal cyst  2007     TUBAL LIGATION  2005       Social History     Tobacco Use     Smoking status: Current Every Day Smoker     Packs/day: 0.50     Years: 15.00     Pack years: 7.50     Types: Cigarettes     Start date: 1/1/2001     Smokeless tobacco: Never Used     Tobacco comment: Quitplan referral declined 6/21/18   Substance Use Topics     Alcohol use: No     Family History   Problem Relation Age of Onset     Diabetes Paternal Grandmother          Current Outpatient Medications   Medication Sig Dispense Refill     dicyclomine (BENTYL) 10 MG capsule Take 2 capsules (20 mg) by mouth 4 times daily as needed 240 capsule 11     HYDROcodone-acetaminophen (NORCO) 5-325 MG tablet 1-2 tabs a day for pain 24 tablet 0     hydrOXYzine (VISTARIL) 25 MG capsule Can take  25mg  Up to 3 times a day for anxiety 60 capsule 0     OMEPRAZOLE PO Take 20 mg by mouth every morning       valACYclovir (VALTREX) 500 MG tablet TAKE ONE TABLET BY MOUTH DAILY 90 tablet 0     No Known Allergies      Reviewed and updated as needed this visit by Provider       Review of Systems   Constitutional: Negative for fatigue and fever.   HENT: Negative.  "   Eyes: Negative.    Respiratory: Negative for cough and shortness of breath.    Cardiovascular: Negative for chest pain, palpitations and leg swelling.   Gastrointestinal: Positive for abdominal distention, abdominal pain and constipation. Negative for blood in stool, diarrhea, nausea, rectal pain and vomiting.        Has bloating  And Constipation.     Genitourinary: Negative for difficulty urinating.   Musculoskeletal: Negative for arthralgias and myalgias.   Skin: Negative for rash.        Has hidranitis Suppurativa Has 2 between legs, and one forming on coccyx now.       Neurological: Negative for dizziness and headaches.        Hx Migraines.     Psychiatric/Behavioral: Negative for dysphoric mood. The patient is not nervous/anxious.         Hx ADHD.     Vitals: BP 98/62   Pulse 68   Temp 98.5  F (36.9  C) (Tympanic)   Resp 18   Ht 1.575 m (5' 2\")   Wt 56.2 kg (124 lb)   LMP 08/10/2013   SpO2 99%   BMI 22.68 kg/m    BMI= Body mass index is 22.68 kg/m .  Physical Exam   Constitutional: She is oriented to person, place, and time.   Eyes: Pupils are equal, round, and reactive to light. Conjunctivae and EOM are normal.   Neck: Normal range of motion. Neck supple. No thyromegaly present.   Cardiovascular: Normal rate, regular rhythm and intact distal pulses.   No murmur heard.  Pulmonary/Chest: Effort normal and breath sounds normal.   Abdominal: Soft. Bowel sounds are normal. She exhibits no mass. There is no tenderness.   Musculoskeletal: Normal range of motion. She exhibits no edema.   Lymphadenopathy:     She has no cervical adenopathy.   Neurological: She is alert and oriented to person, place, and time.   Skin: Skin is warm and dry.   Psychiatric: She has a normal mood and affect.     ASSESSMENT / PLAN:  (K58.1) Irritable bowel syndrome with constipation  (primary encounter diagnosis)  Comment:  Referred to Nutritionist for IBS diet     Will add Mylicon 125mg BID to Bentyl 20mg QID,  Omeprozole 20mg   " Had  Hien for biliary diskenesia.     Plan: NUTRITION REFERRAL, simethicone (MYLICON) 125         MG chewable tablet           (Z72.0) Tobacco abuse  Comment:   Plan: Tobacco Cessation - Order to Satisfy Health         Maintenance         (Z71.6) Tobacco abuse counseling  Comment: Encouraged not to smoke.        (R14.0) Bloating symptom  Comment:Will try Mylicon 125mg  Had CT of abdomen and Pelvis on 8/2018  . And 2017. With no abnormal pathology.   May need an EGD , and Colonoscopy?    Plan: simethicone (MYLICON) 125 MG chewable tablet      (L73.2) Hidradenitis suppurativa  Comment: Saw Plastic surgeon [facility]-plan to do removal of skin between legs for sebaceous cysts.  Will see a cyst specialist on the 6/27th  Before this surgery.   Plan: Refill on Lortab 5/325  #60 .   for next month or so. And uses heating pad for cysts.

## 2019-06-11 ENCOUNTER — OFFICE VISIT (OUTPATIENT)
Dept: INTERNAL MEDICINE | Facility: OTHER | Age: 39
End: 2019-06-11
Attending: NURSE PRACTITIONER
Payer: COMMERCIAL

## 2019-06-11 VITALS
BODY MASS INDEX: 22.82 KG/M2 | DIASTOLIC BLOOD PRESSURE: 62 MMHG | TEMPERATURE: 98.5 F | SYSTOLIC BLOOD PRESSURE: 98 MMHG | RESPIRATION RATE: 18 BRPM | WEIGHT: 124 LBS | OXYGEN SATURATION: 99 % | HEART RATE: 68 BPM | HEIGHT: 62 IN

## 2019-06-11 DIAGNOSIS — Z72.0 TOBACCO ABUSE: ICD-10-CM

## 2019-06-11 DIAGNOSIS — K58.1 IRRITABLE BOWEL SYNDROME WITH CONSTIPATION: Primary | ICD-10-CM

## 2019-06-11 DIAGNOSIS — L73.2 HIDRADENITIS SUPPURATIVA: ICD-10-CM

## 2019-06-11 DIAGNOSIS — R14.0 BLOATING SYMPTOM: ICD-10-CM

## 2019-06-11 DIAGNOSIS — Z71.6 TOBACCO ABUSE COUNSELING: ICD-10-CM

## 2019-06-11 PROCEDURE — 99214 OFFICE O/P EST MOD 30 MIN: CPT | Performed by: NURSE PRACTITIONER

## 2019-06-11 RX ORDER — HYDROCODONE BITARTRATE AND ACETAMINOPHEN 5; 325 MG/1; MG/1
TABLET ORAL
Qty: 24 TABLET | Refills: 0 | COMMUNITY
Start: 2019-06-11 | End: 2019-10-10

## 2019-06-11 RX ORDER — ASPIRIN 81 MG
100 TABLET, DELAYED RELEASE (ENTERIC COATED) ORAL DAILY
Qty: 100 TABLET | Refills: 0 | COMMUNITY
Start: 2019-06-11 | End: 2019-10-10

## 2019-06-11 RX ORDER — SIMETHICONE 125 MG
125 TABLET,CHEWABLE ORAL 2 TIMES DAILY
Qty: 60 TABLET | Refills: 3 | Status: SHIPPED | OUTPATIENT
Start: 2019-06-11 | End: 2019-10-10

## 2019-06-11 RX ORDER — HYDROCODONE BITARTRATE AND ACETAMINOPHEN 5; 325 MG/1; MG/1
TABLET ORAL
Qty: 60 TABLET | Refills: 0 | Status: SHIPPED | OUTPATIENT
Start: 2019-06-11 | End: 2019-06-11

## 2019-06-11 ASSESSMENT — ENCOUNTER SYMPTOMS
EYES NEGATIVE: 1
FATIGUE: 0
DIARRHEA: 0
ABDOMINAL PAIN: 1
VOMITING: 0
DYSPHORIC MOOD: 0
MYALGIAS: 0
ARTHRALGIAS: 0
COUGH: 0
SHORTNESS OF BREATH: 0
RECTAL PAIN: 0
NAUSEA: 0
DIZZINESS: 0
HEADACHES: 0
BLOOD IN STOOL: 0
CONSTIPATION: 1
FEVER: 0
PALPITATIONS: 0
NERVOUS/ANXIOUS: 0
ABDOMINAL DISTENTION: 1
DIFFICULTY URINATING: 0

## 2019-06-11 ASSESSMENT — MIFFLIN-ST. JEOR: SCORE: 1200.71

## 2019-06-11 ASSESSMENT — PAIN SCALES - GENERAL: PAINLEVEL: NO PAIN (0)

## 2019-06-11 NOTE — NURSING NOTE
"Chief Complaint   Patient presents with     Derm Problem     cyst       Initial BP 98/62   Pulse 68   Temp 98.5  F (36.9  C) (Tympanic)   Resp 18   Ht 1.575 m (5' 2\")   Wt 56.2 kg (124 lb)   LMP 08/10/2013   SpO2 99%   BMI 22.68 kg/m   Estimated body mass index is 22.68 kg/m  as calculated from the following:    Height as of this encounter: 1.575 m (5' 2\").    Weight as of this encounter: 56.2 kg (124 lb).  Medication Reconciliation: complete    Crystal Polo LPN  "

## 2019-06-24 DIAGNOSIS — K58.1 IRRITABLE BOWEL SYNDROME WITH CONSTIPATION: ICD-10-CM

## 2019-06-24 NOTE — TELEPHONE ENCOUNTER
Bentyl  Last Written Prescription Date: 5/10/19  Last Fill Quantity: 240 # of Refills: 11  Last Office Visit: 6/11/19    Pt is unable to get a refill because medication was increased to 2 tabs qid.    Pt can be reached at [phone]

## 2019-06-25 RX ORDER — DICYCLOMINE HYDROCHLORIDE 10 MG/1
20 CAPSULE ORAL 4 TIMES DAILY PRN
Qty: 240 CAPSULE | Refills: 11 | Status: SHIPPED | OUTPATIENT
Start: 2019-06-25 | End: 2019-12-11

## 2019-06-27 ENCOUNTER — TELEPHONE (OUTPATIENT)
Dept: DERMATOLOGY | Facility: CLINIC | Age: 39
End: 2019-06-27

## 2019-06-27 ENCOUNTER — OFFICE VISIT (OUTPATIENT)
Dept: DERMATOLOGY | Facility: CLINIC | Age: 39
End: 2019-06-27
Payer: COMMERCIAL

## 2019-06-27 VITALS — HEART RATE: 67 BPM | DIASTOLIC BLOOD PRESSURE: 67 MMHG | SYSTOLIC BLOOD PRESSURE: 104 MMHG

## 2019-06-27 DIAGNOSIS — Z51.81 THERAPEUTIC DRUG MONITORING: ICD-10-CM

## 2019-06-27 DIAGNOSIS — L73.2 HIDRADENITIS SUPPURATIVA: Primary | ICD-10-CM

## 2019-06-27 DIAGNOSIS — L73.2 HIDRADENITIS SUPPURATIVA: ICD-10-CM

## 2019-06-27 LAB
ALBUMIN SERPL-MCNC: 4.7 G/DL (ref 3.4–5)
ALP SERPL-CCNC: 58 U/L (ref 40–150)
ALT SERPL W P-5'-P-CCNC: 34 U/L (ref 0–50)
ANION GAP SERPL CALCULATED.3IONS-SCNC: 2 MMOL/L (ref 3–14)
AST SERPL W P-5'-P-CCNC: 23 U/L (ref 0–45)
BILIRUB SERPL-MCNC: 0.3 MG/DL (ref 0.2–1.3)
BUN SERPL-MCNC: 14 MG/DL (ref 7–30)
CALCIUM SERPL-MCNC: 9.2 MG/DL (ref 8.5–10.1)
CHLORIDE SERPL-SCNC: 106 MMOL/L (ref 94–109)
CO2 SERPL-SCNC: 30 MMOL/L (ref 20–32)
CREAT SERPL-MCNC: 0.8 MG/DL (ref 0.52–1.04)
ERYTHROCYTE [DISTWIDTH] IN BLOOD BY AUTOMATED COUNT: 12.6 % (ref 10–15)
GFR SERPL CREATININE-BSD FRML MDRD: >90 ML/MIN/{1.73_M2}
GLUCOSE SERPL-MCNC: 70 MG/DL (ref 70–99)
HCT VFR BLD AUTO: 44.3 % (ref 35–47)
HGB BLD-MCNC: 14.9 G/DL (ref 11.7–15.7)
MCH RBC QN AUTO: 34.5 PG (ref 26.5–33)
MCHC RBC AUTO-ENTMCNC: 33.6 G/DL (ref 31.5–36.5)
MCV RBC AUTO: 103 FL (ref 78–100)
PLATELET # BLD AUTO: 245 10E9/L (ref 150–450)
POTASSIUM SERPL-SCNC: 4 MMOL/L (ref 3.4–5.3)
PROT SERPL-MCNC: 8 G/DL (ref 6.8–8.8)
RBC # BLD AUTO: 4.32 10E12/L (ref 3.8–5.2)
SODIUM SERPL-SCNC: 139 MMOL/L (ref 133–144)
WBC # BLD AUTO: 9.3 10E9/L (ref 4–11)

## 2019-06-27 ASSESSMENT — PAIN SCALES - GENERAL: PAINLEVEL: NO PAIN (0)

## 2019-06-27 NOTE — NURSING NOTE
Chief Complaint   Patient presents with     Derm Problem     Purnima is here today for consult for HS, patient states that she gets lesions all over her body      Brit Sheppard LPN

## 2019-06-27 NOTE — PATIENT INSTRUCTIONS
Start Humira 160 mg once, then 80 mg 2 weeks later, then 2 weeks later start 40 mg weekly (all subcutaneous).     Humira will provide training for how to give injections:  [email or web address]    Labs today and we will let you know the results.

## 2019-06-27 NOTE — LETTER
6/27/2019       RE: Purnima Fallon  [address]  [facility] [address]     Dear Colleague,    Thank you for referring your patient, Purnima Fallon, to the [facility] DERMATOLOGY at [facility]. Please see a copy of my visit note below.    [facility] Dermatology Note    Dermatology Clinic  [facility]  [address] [place] [id number]    Dermatology Problem List:  1.Hidradenitis suppurativa  - Prescribed Humira 06/27/2019    Encounter Date: Jun 27, 2019    CC:  Chief Complaint   Patient presents with     Derm Problem     Purnima is here today for consult for HS, patient states that she gets lesions all over her body      History of Present Illness:  Ms. Purnima Fallon is a 38 year old female who presents in consultation for her Hidradenitis suppurativa. This is her first visit with us at our dermatology clinic.    Flare occur usually in the groin and buttock area, with about 2-3 new lesions per month. No blood in stool or abdominal issues. Pt has been diagnosed with IBS. Averages around one bowel movement per week. No Hx of fistulas. She is not aware of anything associated with flares, such as diet or menstrual cycle. Pt has a partial hysterectomy several years ago. She sas use Minocycline and Clindamycin without success. 0.5 Pack a day smoker, has tired quitting in the past. Pt did not notice HS improvement when not smoking.    Past Medical History:   Patient Active Problem List   Diagnosis     Migraines     Depression     ADHD (attention deficit hyperactivity disorder)     Cystic acne     S/P laparoscopic hysterectomy     Kidney stones     Ischiorectal abscess and fistula     ACP (advance care planning)     Mass of breast     Pilonidal cyst     H/O pilonidal cyst     Biliary dyskinesia     Chronic female pelvic pain--LAPAROSCOPY w LEFT SALPINGECTOMY--normal pelvis--NO Endometriosis--9/2018     Hidradenitis  suppurativa     Past Medical History:   Diagnosis Date     Hidradenitis 2/16/2007     Ischiorectal abscess and fistula      Kidney stones 2008    x2 passed on her own     Nondependent tobacco use disorder 10/11/2012     Papanicolaou smear of cervix with low grade squamous intraepithelial lesion (LGSIL) 10/29/2008     S/p partial hysterectomy with remaining cervical stump 09/27/2013     Past Surgical History:   Procedure Laterality Date     CYSTECTOMY PILONIDAL N/A 9/18/2017    Procedure: CYSTECTOMY PILONIDAL;  PILONIDAL CYSTECTOMY ;  Surgeon: Cordell Stephens MD;  [facility]     ENDOSCOPY UPPER, COLONOSCOPY, COMBINED N/A 10/5/2018    Procedure: COMBINED ENDOSCOPY UPPER, COLONOSCOPY;  UPPER ENDOSCOPY with Biopsy  AND COLONOSCOPY;  Surgeon: Cordell Stephens MD;  [facility]     EXCISE LESION BUTTOCK(S) Left 6/8/2018    Procedure: EXCISE LESION BUTTOCK(S);  EXCISION OF GLUTEAL LEFT SKIN LESION;  Surgeon: Cordell Stephens MD;  [facility]     EXCISE MASS NECK Right 8/4/2015    Procedure: EXCISE MASS NECK;  Surgeon: Nasir Jones MD;  [facility]     INCISION AND DRAINAGE PERINEAL, COMBINED N/A 3/18/2015    Procedure: COMBINED INCISION AND DRAINAGE PERINEAL;  Surgeon: Jay Torres DO;  [facility]     IRRIGATION AND DEBRIDEMENT GROIN N/A 2/7/2019    Procedure: IRRIGATION AND DEBRIDEMENT LEFT GROIN ABSCESS;  Surgeon: Cordell Stephens MD;  [facility]     LAPAROSCOPIC CHOLECYSTECTOMY N/A 11/20/2017    Procedure: LAPAROSCOPIC CHOLECYSTECTOMY;  LAPAROSCOPIC CHOLECYSTECTOMY;  Surgeon: Cordell Stephens MD;  [facility]     LAPAROSCOPIC HYSTERECTOMY SUPRACERVICAL, BILATERAL SALPINGO-OOPHORECTOMY, COMBINED  9/27/2013    Procedure: COMBINED LAPAROSCOPIC HYSTERECTOMY SUPRACERVICAL, SALPINGO-OOPHORECTOMY;  LAPAROSCOPIC SUPRACERVICAL HYSTERECTOMY AND RIGHT SALPINGO-OOPHORECTOMY, CYSTOSCOPY;  Surgeon: Denys Callahan MD;  [facility]     LAPAROSCOPY DIAGNOSTIC (GYN) N/A  9/6/2018    Procedure: LAPAROSCOPY DIAGNOSTIC (GYN);  LAPAROSCOPY WITH PERITONEAL BIOPSIES AND REMOVAL LEFT FALLOPIAN TUBE;  Surgeon: Suraj Whitaker MD;  [facility]     marsupialization of pilonidal cyst  2007     TUBAL LIGATION  2005       Social History:  Patient reports that she has been smoking cigarettes.  She started smoking about 18 years ago. She has a 7.50 pack-year smoking history. She has never used smokeless tobacco. She reports that she does not drink alcohol or use drugs.    Family History:  Family History   Problem Relation Age of Onset     Diabetes Paternal Grandmother        Medications:  Current Outpatient Medications   Medication Sig Dispense Refill     dicyclomine (BENTYL) 10 MG capsule Take 2 capsules (20 mg) by mouth 4 times daily as needed 240 capsule 11     docusate sodium (COLACE) 100 MG tablet Take 1 tablet (100 mg) by mouth daily 100 tablet 0     hydrOXYzine (VISTARIL) 25 MG capsule Can take  25mg  Up to 3 times a day for anxiety 60 capsule 0     omeprazole (PRILOSEC) 20 MG DR capsule TAKE 1 CAPSULE(20 MG) BY MOUTH DAILY 30 capsule 0     OMEPRAZOLE PO Take 20 mg by mouth every morning       simethicone (MYLICON) 125 MG chewable tablet Take 1 tablet (125 mg) by mouth 2 times daily 60 tablet 3     valACYclovir (VALTREX) 500 MG tablet TAKE ONE TABLET BY MOUTH DAILY 90 tablet 0     HYDROcodone-acetaminophen (NORCO) 5-325 MG tablet 1-2 tabs a day for pain 24 tablet 0       No Known Allergies    Review of Systems:  -As per HPI  -Otherwise nml state of health. No other skin concerns.     Physical exam:  Vitals: /67 (BP Location: Right arm, Patient Position: Sitting, Cuff Size: Adult Regular)   Pulse 67   LMP 08/10/2013   GEN: This is a well developed, well-nourished female in no acute distress, in a pleasant mood.    SKIN: Focused examination of the groin and buttock area was performed.  - Some cribriform scarring scattered through out the arms  - 1 pink inflammatory nodule, L  medial upper thigh  - 1 pink papule in R inguinal fold  - Several scarred, almost cribriform, appearing papules and comedones in the groin.  - No other lesions of concern on areas examined.     Impression/Plan:  1. Hidradenitis suppurativa    Redman II (due to scarring)    Prescribed Humira 40mg weekly     Labs taken today. CBC, CMP, quantiferon gold, Hep B/C serology    Consider CO2 laser and Nd-YAG for HS    RTC in 3 months    CC Brandie Poe MD  [address] [facility] [address]  [place] [address] on close of this encounter.      Follow-up in 3 months, earlier for new or changing lesions.       Staff Involved:    Scribe Disclosure  I, Antwan Crenshaw, am serving as a scribe to document services personally performed by Dr. Nasim Jha, based on data collection and the provider's statements to me.     Nasim Jha MD, FAAD    Departments of Internal Medicine and Dermatology  [facility]  [phone]

## 2019-06-27 NOTE — PROGRESS NOTES
[facility] Dermatology Note    Dermatology Clinic  [facility]  [address] [place] [id number]    Dermatology Problem List:  1.Hidradenitis suppurativa  - Prescribed Humira 06/27/2019    Encounter Date: Jun 27, 2019    CC:  Chief Complaint   Patient presents with     Derm Problem     Purnima is here today for consult for HS, patient states that she gets lesions all over her body      History of Present Illness:  Ms. Purnima Fallon is a 38 year old female who presents in consultation for her Hidradenitis suppurativa. This is her first visit with us at our dermatology clinic.    Flare occur usually in the groin and buttock area, with about 2-3 new lesions per month. No blood in stool or abdominal issues. Pt has been diagnosed with IBS. Averages around one bowel movement per week. No Hx of fistulas. She is not aware of anything associated with flares, such as diet or menstrual cycle. Pt has a partial hysterectomy several years ago. She sas use Minocycline and Clindamycin without success. 0.5 Pack a day smoker, has tired quitting in the past. Pt did not notice HS improvement when not smoking.    Past Medical History:   Patient Active Problem List   Diagnosis     Migraines     Depression     ADHD (attention deficit hyperactivity disorder)     Cystic acne     S/P laparoscopic hysterectomy     Kidney stones     Ischiorectal abscess and fistula     ACP (advance care planning)     Mass of breast     Pilonidal cyst     H/O pilonidal cyst     Biliary dyskinesia     Chronic female pelvic pain--LAPAROSCOPY w LEFT SALPINGECTOMY--normal pelvis--NO Endometriosis--9/2018     Hidradenitis suppurativa     Past Medical History:   Diagnosis Date     Hidradenitis 2/16/2007     Ischiorectal abscess and fistula      Kidney stones 2008    x2 passed on her own     Nondependent tobacco use disorder 10/11/2012     Papanicolaou smear of cervix with low grade squamous  intraepithelial lesion (LGSIL) 10/29/2008     S/p partial hysterectomy with remaining cervical stump 09/27/2013     Past Surgical History:   Procedure Laterality Date     CYSTECTOMY PILONIDAL N/A 9/18/2017    Procedure: CYSTECTOMY PILONIDAL;  PILONIDAL CYSTECTOMY ;  Surgeon: Cordell Stephens MD;  [facility]     ENDOSCOPY UPPER, COLONOSCOPY, COMBINED N/A 10/5/2018    Procedure: COMBINED ENDOSCOPY UPPER, COLONOSCOPY;  UPPER ENDOSCOPY with Biopsy  AND COLONOSCOPY;  Surgeon: Cordell Stephens MD;  [facility]     EXCISE LESION BUTTOCK(S) Left 6/8/2018    Procedure: EXCISE LESION BUTTOCK(S);  EXCISION OF GLUTEAL LEFT SKIN LESION;  Surgeon: Cordell Stephens MD;  [facility]     EXCISE MASS NECK Right 8/4/2015    Procedure: EXCISE MASS NECK;  Surgeon: Nasir Jones MD;  [facility]     INCISION AND DRAINAGE PERINEAL, COMBINED N/A 3/18/2015    Procedure: COMBINED INCISION AND DRAINAGE PERINEAL;  Surgeon: Jay Torres DO;  [facility]     IRRIGATION AND DEBRIDEMENT GROIN N/A 2/7/2019    Procedure: IRRIGATION AND DEBRIDEMENT LEFT GROIN ABSCESS;  Surgeon: Cordell Stephens MD;  [facility]     LAPAROSCOPIC CHOLECYSTECTOMY N/A 11/20/2017    Procedure: LAPAROSCOPIC CHOLECYSTECTOMY;  LAPAROSCOPIC CHOLECYSTECTOMY;  Surgeon: Cordell Stephens MD;  [facility]     LAPAROSCOPIC HYSTERECTOMY SUPRACERVICAL, BILATERAL SALPINGO-OOPHORECTOMY, COMBINED  9/27/2013    Procedure: COMBINED LAPAROSCOPIC HYSTERECTOMY SUPRACERVICAL, SALPINGO-OOPHORECTOMY;  LAPAROSCOPIC SUPRACERVICAL HYSTERECTOMY AND RIGHT SALPINGO-OOPHORECTOMY, CYSTOSCOPY;  Surgeon: Denys Callahan MD;  [facility]     LAPAROSCOPY DIAGNOSTIC (GYN) N/A 9/6/2018    Procedure: LAPAROSCOPY DIAGNOSTIC (GYN);  LAPAROSCOPY WITH PERITONEAL BIOPSIES AND REMOVAL LEFT FALLOPIAN TUBE;  Surgeon: Suraj Whitaker MD;  [facility]     marsupialization of pilonidal cyst  2007     TUBAL LIGATION  2005       Social  History:  Patient reports that she has been smoking cigarettes.  She started smoking about 18 years ago. She has a 7.50 pack-year smoking history. She has never used smokeless tobacco. She reports that she does not drink alcohol or use drugs.    Family History:  Family History   Problem Relation Age of Onset     Diabetes Paternal Grandmother        Medications:  Current Outpatient Medications   Medication Sig Dispense Refill     dicyclomine (BENTYL) 10 MG capsule Take 2 capsules (20 mg) by mouth 4 times daily as needed 240 capsule 11     docusate sodium (COLACE) 100 MG tablet Take 1 tablet (100 mg) by mouth daily 100 tablet 0     hydrOXYzine (VISTARIL) 25 MG capsule Can take  25mg  Up to 3 times a day for anxiety 60 capsule 0     omeprazole (PRILOSEC) 20 MG DR capsule TAKE 1 CAPSULE(20 MG) BY MOUTH DAILY 30 capsule 0     OMEPRAZOLE PO Take 20 mg by mouth every morning       simethicone (MYLICON) 125 MG chewable tablet Take 1 tablet (125 mg) by mouth 2 times daily 60 tablet 3     valACYclovir (VALTREX) 500 MG tablet TAKE ONE TABLET BY MOUTH DAILY 90 tablet 0     HYDROcodone-acetaminophen (NORCO) 5-325 MG tablet 1-2 tabs a day for pain 24 tablet 0       No Known Allergies    Review of Systems:  -As per HPI  -Otherwise nml state of health. No other skin concerns.     Physical exam:  Vitals: /67 (BP Location: Right arm, Patient Position: Sitting, Cuff Size: Adult Regular)   Pulse 67   LMP 08/10/2013   GEN: This is a well developed, well-nourished female in no acute distress, in a pleasant mood.    SKIN: Focused examination of the groin and buttock area was performed.  - Some cribriform scarring scattered through out the arms  - 1 pink inflammatory nodule, L medial upper thigh  - 1 pink papule in R inguinal fold  - Several scarred, almost cribriform, appearing papules and comedones in the groin.  - No other lesions of concern on areas examined.     Impression/Plan:  1. Hidradenitis suppurativa    Redman II (due  to scarring)    Prescribed Humira 40mg weekly     Labs taken today. CBC, CMP, quantiferon gold, Hep B/C serology    Consider CO2 laser and Nd-YAG for HS    RTC in 3 months    CC Brandie Poe MD  [address] [place] [id number] on close of this encounter.      Follow-up in 3 months, earlier for new or changing lesions.       Staff Involved:    Scribe Disclosure  I, Antwan Crenshaw, am serving as a scribe to document services personally performed by Dr. Nasim Jha, based on data collection and the provider's statements to me.     Nasim Jha MD, FAAD    Departments of Internal Medicine and Dermatology  [facility]  [phone]

## 2019-06-28 ENCOUNTER — HOSPITAL ENCOUNTER (OUTPATIENT)
Dept: EDUCATION SERVICES | Facility: HOSPITAL | Age: 39
Discharge: HOME | End: 2019-06-28
Attending: NURSE PRACTITIONER | Admitting: NURSE PRACTITIONER
Payer: COMMERCIAL

## 2019-06-28 LAB
HBV CORE AB SERPL QL IA: NONREACTIVE
HBV SURFACE AB SERPL IA-ACNC: 0.97 M[IU]/ML
HBV SURFACE AG SERPL QL IA: NONREACTIVE
HCV AB SERPL QL IA: NONREACTIVE
HIV 1+2 AB+HIV1 P24 AG SERPL QL IA: NONREACTIVE

## 2019-06-28 PROCEDURE — 97802 MEDICAL NUTRITION INDIV IN: CPT | Performed by: DIETITIAN, REGISTERED

## 2019-06-28 NOTE — PROGRESS NOTES
"[facility]  Medical Nutrition Therapy    Visit Type: Initial Assessment    Purnima Fallon referred for MNT related to IBS    Patient accompanied by herself.    Nutrition Assessment:  Anthropometrics  Height: 5' 4\"    BMI:  22.68      Weight: 124lb    IBW (kg):  Female:   108-144lb        Nutrition History  Pt was diagnosed with IBS about a year ago. She has also had her gallbladder taken out around that time. Pt has tried gluten free and limiting spicy foods. She has noticed some more bloating with bread. Typically eats 1x a day, maybe more on the weekend. Will drink a protein shake after a workout.    Food Record  Breakfast- skips    Lunch- Skips    Dinner- subway, or pizza, or meat, potato and veg    Snacks- almonds, cashews    Beverages- sf hot chocolate, 3 bottles water, protein shake after workout    Physical Activity   Works out regularly      Nutrition Prescription  Energy:   1400-1700kcal   Protein:   45-55g       Nutrition Intervention:  - Reviewed general recommendations for IBS- low vs high fiber, eating small frequent meals  - Reviewed low FODMAP diet recommendations  - Discussed trying the lower fiber diet with increasing eating frequency first then trying low FODMAP. She may try incorporating some low FODMAP with lower fiber. Will plan to follow up in a month to see how her symptoms are doing.    Nutrition Goals:   Increase intake to at least 3 meals/snacks a day.   Keep food log with symptoms    Nutrition Follow Up / Monitoring:   diet recall, weight, labs    Nutrition Recommendations:   Try to work on increasing meals/snacks. Try lower fiber diet to see if there are relief. Begin working on Low FODMAP if low fiber was not successful.    Time spent with pt: 45 min    Patient to follow-up with RD in 4-6 weeks.  Patient has RD contact information to call/email if needed.                    "

## 2019-06-28 NOTE — TELEPHONE ENCOUNTER
Prior Authorization Approval    Authorization Effective Date: 6/28/2019  Authorization Expiration Date: 6/28/2020  Medication: Humira CF 80mg/ 0.8mL & 40mg/ 0.4mL pens   Approved Dose/Quantity: 40mg weekly maintenance dosing  Reference #: CMM key# [id number]   Insurance Company: [employer] [place] - Phone [phone] Fax [phone]  Expected CoPay:       CoPay Card Available: Yes    Foundation Assistance Needed:    Which Pharmacy is filling the prescription (Not needed for infusion/clinic administered): [facility] - [place], [place] - [address]  Pharmacy Notified: No  Patient Notified: No

## 2019-06-28 NOTE — TELEPHONE ENCOUNTER
Humira Complete enrollment form submitted for pt to obtain ancillary services and injection training (if desired) from [employer] program.

## 2019-06-28 NOTE — TELEPHONE ENCOUNTER
PA Initiation    Medication: Humira CF 80mg/ 0.8mL & 40mg/ 0.4mL pens (HS Start kit & weekly maintenance)  Insurance Company: [employer] - Phone [phone] Fax [phone]  Pharmacy Filling the Rx: [facility] - [place], [place] - [address]  Filling Pharmacy Phone: [phone]  Filling Pharmacy Fax:    Start Date: 6/28/2019    ** Pending labs

## 2019-06-28 NOTE — TELEPHONE ENCOUNTER
Prior Authorization Approval    Authorization Effective Date: 6/28/2019  Authorization Expiration Date: 6/28/2020  Medication: Humira CF 80mg/ 0.8mL & 40mg/ 0.4mL pens   Approved Dose/Quantity: HS Start kit & weekly maintenance  Reference #: CMM key# [id number]   Insurance Company: [employer] [place] - Phone [phone] Fax [phone]  Expected CoPay:   $0    CoPay Card Available: Yes    Foundation Assistance Needed:    Which Pharmacy is filling the prescription (Not needed for infusion/clinic administered): [facility] - [place], [place] - [address]  Pharmacy Notified: No - pending labs  Patient Notified: No - pending labs    ** Approval states maintenance dosing of 2 pens per month - writer to submit add'l PA for weekly maintenance dosing

## 2019-06-28 NOTE — TELEPHONE ENCOUNTER
Spoke to pt - she is already aware of most things (pharmacy info, injection training, prior auth approval) but told her we are waiting on labs to result. She will tentatively set up shipment for next week once Tb comes back.

## 2019-06-30 LAB
GAMMA INTERFERON BACKGROUND BLD IA-ACNC: 0.05 IU/ML
M TB IFN-G BLD-IMP: NEGATIVE
M TB IFN-G CD4+ BCKGRND COR BLD-ACNC: >10 IU/ML
MITOGEN IGNF BCKGRD COR BLD-ACNC: 0 IU/ML
MITOGEN IGNF BCKGRD COR BLD-ACNC: 0 IU/ML

## 2019-07-01 DIAGNOSIS — F41.9 ANXIETY: ICD-10-CM

## 2019-07-02 RX ORDER — HYDROXYZINE PAMOATE 25 MG/1
CAPSULE ORAL
Qty: 60 CAPSULE | Refills: 0 | Status: SHIPPED | OUTPATIENT
Start: 2019-07-02 | End: 2019-10-10

## 2019-07-15 DIAGNOSIS — R10.13 ABDOMINAL PAIN, EPIGASTRIC: ICD-10-CM

## 2019-09-04 DIAGNOSIS — B00.9 HERPES SIMPLEX VIRUS INFECTION: ICD-10-CM

## 2019-09-05 RX ORDER — VALACYCLOVIR HYDROCHLORIDE 500 MG/1
TABLET, FILM COATED ORAL
Qty: 90 TABLET | Refills: 0 | OUTPATIENT
Start: 2019-09-05

## 2019-09-05 RX ORDER — VALACYCLOVIR HYDROCHLORIDE 500 MG/1
TABLET, FILM COATED ORAL
Qty: 90 TABLET | Refills: 0 | Status: SHIPPED | OUTPATIENT
Start: 2019-09-05 | End: 2019-12-13

## 2019-09-30 ENCOUNTER — TELEPHONE (OUTPATIENT)
Dept: DERMATOLOGY | Facility: CLINIC | Age: 39
End: 2019-09-30

## 2019-09-30 NOTE — TELEPHONE ENCOUNTER
[facility] Call Center    Phone Message    May a detailed message be left on voicemail: yes    Reason for Call: Other: Pt looking to reschedule canceled appt from today. Providers schedule is exhausted. Per pt Some dates that would work for me at this point would be October 7th,8th,9th or 10th. October 15th,16th,17th or oct 24th or 25th or November 4th or 5th     Action Taken: Message routed to:  [facility]

## 2019-10-09 NOTE — PROGRESS NOTES
Subjective     Purnima Fallon is a 38 year old female who presents to clinic today for the following health issues:    HPI   Lump on breast       Duration: few months     Description (location/character/radiation): lump on right breast and lump on left breast    Intensity:  mild    Accompanying signs and symptoms: patient states sometimes its painful and bothers her. And sometimes it doesn't. Hard lump. Lumps present for a few months and not increased around menses    History (similar episodes/previous evaluation): None    Precipitating or alleviating factors: None    Therapies tried and outcome: None     Vaginal Symptoms      Duration: month     Description  vaginal discharge - white creamy curd-like    Intensity:  mild    Accompanying signs and symptoms (fever/dysuria/abdominal or back pain): None    History  Sexually active: yes, single partner, contraception not required  Possibility of pregnancy: No, tubes tied . Hysterectomy   Recent antibiotic use: no     Precipitating or alleviating factors: None    Therapies tried and outcome: Monistat   Outcome: didn't help .     Her  has had an affair on her. They have been  for 3 years.    Increased anxiety with marital problems. Her  has had a few affairs on her. She is going to start marriage counseling with him.     She's always had anxiety, but has increased with marital problems.     Patient Active Problem List   Diagnosis     Migraines     Depression     ADHD (attention deficit hyperactivity disorder)     Cystic acne     S/P laparoscopic hysterectomy     Kidney stones     Ischiorectal abscess and fistula     ACP (advance care planning)     Mass of breast     Pilonidal cyst     H/O pilonidal cyst     Biliary dyskinesia     Chronic female pelvic pain--LAPAROSCOPY w LEFT SALPINGECTOMY--normal pelvis--NO Endometriosis--9/2018     Hidradenitis suppurativa     Carpal tunnel syndrome     Malunion of fracture     Pain in joint, forearm     Past  Surgical History:   Procedure Laterality Date     CYSTECTOMY PILONIDAL N/A 9/18/2017    Procedure: CYSTECTOMY PILONIDAL;  PILONIDAL CYSTECTOMY ;  Surgeon: Cordell Stephens MD;  [facility]     ENDOSCOPY UPPER, COLONOSCOPY, COMBINED N/A 10/5/2018    Procedure: COMBINED ENDOSCOPY UPPER, COLONOSCOPY;  UPPER ENDOSCOPY with Biopsy  AND COLONOSCOPY;  Surgeon: Cordell Stephens MD;  [facility]     EXCISE LESION BUTTOCK(S) Left 6/8/2018    Procedure: EXCISE LESION BUTTOCK(S);  EXCISION OF GLUTEAL LEFT SKIN LESION;  Surgeon: Cordell Stephens MD;  [facility]     EXCISE MASS NECK Right 8/4/2015    Procedure: EXCISE MASS NECK;  Surgeon: Nasir Jones MD;  [facility]     INCISION AND DRAINAGE PERINEAL, COMBINED N/A 3/18/2015    Procedure: COMBINED INCISION AND DRAINAGE PERINEAL;  Surgeon: Jay Torres DO;  [facility]     IRRIGATION AND DEBRIDEMENT GROIN N/A 2/7/2019    Procedure: IRRIGATION AND DEBRIDEMENT LEFT GROIN ABSCESS;  Surgeon: Cordell Stephens MD;  [facility]     LAPAROSCOPIC CHOLECYSTECTOMY N/A 11/20/2017    Procedure: LAPAROSCOPIC CHOLECYSTECTOMY;  LAPAROSCOPIC CHOLECYSTECTOMY;  Surgeon: Cordell Stephens MD;  [facility]     LAPAROSCOPIC HYSTERECTOMY SUPRACERVICAL, BILATERAL SALPINGO-OOPHORECTOMY, COMBINED  9/27/2013    Procedure: COMBINED LAPAROSCOPIC HYSTERECTOMY SUPRACERVICAL, SALPINGO-OOPHORECTOMY;  LAPAROSCOPIC SUPRACERVICAL HYSTERECTOMY AND RIGHT SALPINGO-OOPHORECTOMY, CYSTOSCOPY;  Surgeon: Denys Callahan MD;  [facility]     LAPAROSCOPY DIAGNOSTIC (GYN) N/A 9/6/2018    Procedure: LAPAROSCOPY DIAGNOSTIC (GYN);  LAPAROSCOPY WITH PERITONEAL BIOPSIES AND REMOVAL LEFT FALLOPIAN TUBE;  Surgeon: Suraj Whitaker MD;  [facility]     marsupialization of pilonidal cyst  2007     TUBAL LIGATION  2005       Social History     Tobacco Use     Smoking status: Current Every Day Smoker     Packs/day: 0.50     Years: 15.00     Pack years: 7.50      Types: Cigarettes     Start date: 1/1/2001     Smokeless tobacco: Never Used     Tobacco comment: declined 10/10/19   Substance Use Topics     Alcohol use: No     Family History   Problem Relation Age of Onset     Diabetes Paternal Grandmother          Current Outpatient Medications   Medication Sig Dispense Refill     adalimumab (HUMIRA) 40 MG/0.8ML prefilled syringe kit 160 mg once, then 80 mg 2 weeks later, then 2 weeks later start 40 mg weekly (all subcutaneous) 6 Syringe 3     ALPRAZolam (XANAX) 0.5 MG tablet Take 1 tablet (0.5 mg) by mouth 2 times daily as needed for anxiety 30 tablet 0     dicyclomine (BENTYL) 10 MG capsule Take 2 capsules (20 mg) by mouth 4 times daily as needed 240 capsule 11     FLUoxetine (PROZAC) 10 MG capsule Take 1 capsule (10 mg) by mouth daily 30 capsule 1     omeprazole (PRILOSEC) 20 MG DR capsule TAKE 1 CAPSULE(20 MG) BY MOUTH DAILY 30 capsule 5     valACYclovir (VALTREX) 500 MG tablet TAKE ONE TABLET BY MOUTH DAILY 90 tablet 0     No Known Allergies    Reviewed and updated as needed this visit by Provider         Review of Systems   ROS COMP: Constitutional, HEENT, cardiovascular, pulmonary, gi and gu systems are negative, except as otherwise noted. +breast lump. +vaginal discharge. +anxiety. -suicidal thoughts.       Objective    /68 (BP Location: Left arm, Patient Position: Sitting, Cuff Size: Adult Regular)   Pulse 82   Temp 98.2  F (36.8  C)   Wt 49.4 kg (109 lb)   LMP 08/10/2013   SpO2 98%   BMI 19.94 kg/m    Body mass index is 19.94 kg/m .  Physical Exam   GENERAL: healthy, alert and no distress  RESP: lungs clear to auscultation - no rales, rhonchi or wheezes  BREAST: left breast around 3 o'clock with a pea size firm lump and right breast at 11 o'clock with a nickel size firm lump, tenderness or nipple discharge and no palpable axillary masses or adenopathy. Breast tissue feels fibrous  CV: regular rate and rhythm, normal S1 S2, no S3 or S4, no murmur, click or  rub, no peripheral edema and peripheral pulses strong  ABDOMEN: soft, nontender, no hepatosplenomegaly, no masses and bowel sounds normal   (female): deferred per her request  MS: no gross musculoskeletal defects noted, no edema  SKIN: no suspicious lesions or rashes  NEURO: Normal strength and tone, mentation intact and speech normal  PSYCH: mentation appears normal, affect normal/bright  LYMPH: no cervical, supraclavicular, axillary, or inguinal adenopathy    Diagnostic Test Results:  Labs reviewed in Epic  Results for orders placed or performed in visit on 10/10/19   Wet prep   Result Value Ref Range    Specimen Description Vagina     Wet Prep No Trichomonas seen     Wet Prep No yeast seen     Wet Prep No clue cells seen     Wet Prep No WBC's seen    GC/Chlamydia by PCR - [facility]   Result Value Ref Range    Specimen Source Urine     Neisseria gonorrhoreae PCR Not Detected NDET^Not Detected    Chlamydia Trachomatis PCR Not Detected NDET^Not Detected     PHQ-9 score:    PHQ-9 SCORE 10/10/2019   PHQ-9 Total Score 15       WENDY-7 SCORE 8/30/2018 11/13/2018 12/13/2018   Total Score 10 21 4       Assessment & Plan   Assessment      Plan  (N63.0) Lump or mass in breast  (primary encounter diagnosis)  Comment: palpable lump to left and right breast palpated  Plan: US Breast Bilateral Limited 1-3 Quadrants, MA         Diagnostic Bilateral w/Anastacio            (N89.8) Vaginal discharge  Comment: screen for STD's and wet prep  Plan: Wet prep, GC/Chlamydia by PCR - [facility]            (F41.1) WENDY (generalized anxiety disorder)  Comment: Trial Prozac. She has trialed Lexapro and Zoloft in the past per chart review. Xanax as needed. Aware to not drive or participate in activities that require alertness when taking  Plan: FLUoxetine (PROZAC) 10 MG capsule, ALPRAZolam         (XANAX) 0.5 MG tablet              Tobacco Cessation:   reports that she has been smoking cigarettes. She started smoking about 18 years ago. She has a 7.50  pack-year smoking history. She has never used smokeless tobacco.  Tobacco Cessation Action Plan: Information offered: Patient not interested at this time        See Patient Instructions    Return in about 6 weeks (around 11/21/2019). For medication management    Payton Levi NP  [facility]

## 2019-10-10 ENCOUNTER — OFFICE VISIT (OUTPATIENT)
Dept: FAMILY MEDICINE | Facility: OTHER | Age: 39
End: 2019-10-10
Attending: NURSE PRACTITIONER
Payer: COMMERCIAL

## 2019-10-10 VITALS
WEIGHT: 109 LBS | OXYGEN SATURATION: 98 % | BODY MASS INDEX: 19.94 KG/M2 | DIASTOLIC BLOOD PRESSURE: 68 MMHG | SYSTOLIC BLOOD PRESSURE: 100 MMHG | TEMPERATURE: 98.2 F | HEART RATE: 82 BPM

## 2019-10-10 DIAGNOSIS — N89.8 VAGINAL DISCHARGE: ICD-10-CM

## 2019-10-10 DIAGNOSIS — N63.0 LUMP OR MASS IN BREAST: Primary | ICD-10-CM

## 2019-10-10 DIAGNOSIS — F41.1 GAD (GENERALIZED ANXIETY DISORDER): ICD-10-CM

## 2019-10-10 LAB
C TRACH DNA SPEC QL PROBE+SIG AMP: NOT DETECTED
N GONORRHOEA DNA SPEC QL PROBE+SIG AMP: NOT DETECTED
SPECIMEN SOURCE: NORMAL
SPECIMEN SOURCE: NORMAL
WET PREP SPEC: NORMAL

## 2019-10-10 PROCEDURE — 87591 N.GONORRHOEAE DNA AMP PROB: CPT | Performed by: NURSE PRACTITIONER

## 2019-10-10 PROCEDURE — 99214 OFFICE O/P EST MOD 30 MIN: CPT | Performed by: NURSE PRACTITIONER

## 2019-10-10 PROCEDURE — 87210 SMEAR WET MOUNT SALINE/INK: CPT | Performed by: NURSE PRACTITIONER

## 2019-10-10 PROCEDURE — 87491 CHLMYD TRACH DNA AMP PROBE: CPT | Performed by: NURSE PRACTITIONER

## 2019-10-10 RX ORDER — FLUOXETINE 10 MG/1
10 CAPSULE ORAL DAILY
Qty: 30 CAPSULE | Refills: 1 | Status: SHIPPED | OUTPATIENT
Start: 2019-10-10 | End: 2019-11-21

## 2019-10-10 RX ORDER — ALPRAZOLAM 0.5 MG
0.5 TABLET ORAL 2 TIMES DAILY PRN
Qty: 30 TABLET | Refills: 0 | Status: SHIPPED | OUTPATIENT
Start: 2019-10-10 | End: 2019-11-21

## 2019-10-10 ASSESSMENT — PAIN SCALES - GENERAL: PAINLEVEL: NO PAIN (0)

## 2019-10-10 ASSESSMENT — PATIENT HEALTH QUESTIONNAIRE - PHQ9: SUM OF ALL RESPONSES TO PHQ QUESTIONS 1-9: 15

## 2019-10-10 NOTE — NURSING NOTE
"Chief Complaint   Patient presents with     Mass       Initial /68 (BP Location: Left arm, Patient Position: Sitting, Cuff Size: Adult Regular)   Pulse 82   Temp 98.2  F (36.8  C)   Wt 49.4 kg (109 lb)   LMP 08/10/2013   SpO2 98%   BMI 19.94 kg/m   Estimated body mass index is 19.94 kg/m  as calculated from the following:    Height as of 6/11/19: 1.575 m (5' 2\").    Weight as of this encounter: 49.4 kg (109 lb).  Medication Reconciliation: complete  Iraida La  "

## 2019-10-10 NOTE — PATIENT INSTRUCTIONS
Patient Education     Breast Lump, Uncertain Cause    A lump was found in your breast. Most breast lumps are not cancer. They may be caused by normal changes in the breast tissue due to hormone variations that occur with your menstrual cycle. Some women may form lumps that are painful and tender. Others may form lumps that are painless.  At this time, is not possible to be certain of the cause of your lump without further evaluation. This could include:    Another exam by your healthcare provider or a gynecologist    Imaging tests, such as a mammogram or ultrasound    Biopsy (procedure to remove small tissue samples from the breast lump)  Your healthcare provider will explain any additional testing that is needed. Be sure to get answers to any questions you may have.  Home care  Until a diagnosis is made, you may be advised to do the following:    If you are having breast pain:  ? Take an over-the-counter pain reliever, if directed to by your provider.  ? Wear a well-fitted bra or sports bra for extra support. If you have breast pain at night, try wearing the bra during sleep.  ? Apply a warm compress (towel soaked in warm water) to the breast. You may also use a hot water bottle.    Check your breasts each day. Keep a log of whether the lump seems to be changing in size or tenderness with your period. This can help your healthcare provider make the correct diagnosis.  Follow-up care  Follow up with your healthcare provider, or as directed. Keep all appointments. Also, prepare for any upcoming tests as directed.  When to seek medical advice  Call your healthcare provider right away if any of these occur:    Fever of 100.4 F (38 C) or higher    Redness or swelling of the breast    Discharge from the nipple    Visible changes in the skin over the nipple or breast    Lump grows larger, feels very hard, or has an irregular shape    New lumps form  Date Last Reviewed: 3/1/2017    6427-1819 [employer] [address]  [place] [address]. All rights reserved. This information is not intended as a substitute for professional medical care. Always follow your healthcare professional's instructions.

## 2019-10-14 ENCOUNTER — ANCILLARY PROCEDURE (OUTPATIENT)
Dept: MAMMOGRAPHY | Facility: OTHER | Age: 39
End: 2019-10-14
Attending: NURSE PRACTITIONER
Payer: COMMERCIAL

## 2019-10-14 ENCOUNTER — HOSPITAL ENCOUNTER (OUTPATIENT)
Dept: ULTRASOUND IMAGING | Facility: HOSPITAL | Age: 39
Discharge: HOME | End: 2019-10-14
Attending: NURSE PRACTITIONER | Admitting: NURSE PRACTITIONER
Payer: COMMERCIAL

## 2019-10-14 DIAGNOSIS — N63.0 LUMP OR MASS IN BREAST: ICD-10-CM

## 2019-10-14 PROCEDURE — G0279 TOMOSYNTHESIS, MAMMO: HCPCS | Mod: TC

## 2019-10-14 PROCEDURE — 77066 DX MAMMO INCL CAD BI: CPT | Mod: TC

## 2019-10-14 PROCEDURE — 76642 ULTRASOUND BREAST LIMITED: CPT | Mod: TC,50

## 2019-10-18 DIAGNOSIS — L73.2 HIDRADENITIS SUPPURATIVA: ICD-10-CM

## 2019-10-31 ENCOUNTER — OFFICE VISIT (OUTPATIENT)
Dept: DERMATOLOGY | Facility: CLINIC | Age: 39
End: 2019-10-31
Payer: COMMERCIAL

## 2019-10-31 VITALS — HEART RATE: 79 BPM | DIASTOLIC BLOOD PRESSURE: 73 MMHG | SYSTOLIC BLOOD PRESSURE: 113 MMHG

## 2019-10-31 DIAGNOSIS — L73.2 HIDRADENITIS SUPPURATIVA: ICD-10-CM

## 2019-10-31 ASSESSMENT — PAIN SCALES - GENERAL: PAINLEVEL: NO PAIN (0)

## 2019-10-31 NOTE — LETTER
10/31/2019       RE: Purnima Fallon  [address] [place]  [facility] [address]     Dear Colleague,    Thank you for referring your patient, Purnima Fallon, to the [facility] DERMATOLOGY at [facility]. Please see a copy of my visit note below.    [facility] Dermatology Note    Dermatology Clinic  [facility]  [address] [place] [id number]    Dermatology Problem List:  1.Hidradenitis suppurativa  - Prescribed Humira 06/27/2019--continues on 40 mg subcutaneous injections qWeek  - previous tx: minocycline, clindamycin   - smokes half a pack daily with plans to quit in the future    Encounter Date: Oct 31, 2019    CC:  Chief Complaint   Patient presents with     Derm Problem     Purnima is here for a follow up, states the Humira is helping.       History of Present Illness:  Ms. Purnima Fallon is a 38 year old female who presents in consultation for followup for her Hidradenitis suppurativa. The patient was last seen in clinic 6/27/19 for consultation for HS. At the time she was started on Humira 40 mg weekly. She had labs done for CBC, CMP, quantiferon gold, and Hep B/C serology. She was also being considered for CO2 laser and Nd-YAG for HS.    Today the patient reports that she is feeling well overall. She was started on Humira at the last visit, and she has noticed that her active lesions have cleared. She has some scarring at her inguinal area and buttocks. She is tolerating the Humira well. Today she denies any fevers, chills, or infections. Denies any reaction to the injection site. On occasion, she may feel a bit ill today after the Humira but this is transient and resolves quickly. Denies any lesions in the axilla.   Today she denies any active areas.    She is currently smoking half a pack daily and plans to quit in the future. No other skin related concerns at this time.      Past Medical History:   Patient  Active Problem List   Diagnosis     Migraines     Depression     ADHD (attention deficit hyperactivity disorder)     Cystic acne     S/P laparoscopic hysterectomy     Kidney stones     Ischiorectal abscess and fistula     ACP (advance care planning)     Mass of breast     Pilonidal cyst     H/O pilonidal cyst     Biliary dyskinesia     Chronic female pelvic pain--LAPAROSCOPY w LEFT SALPINGECTOMY--normal pelvis--NO Endometriosis--9/2018     Hidradenitis suppurativa     Carpal tunnel syndrome     Malunion of fracture     Pain in joint, forearm     Past Medical History:   Diagnosis Date     Hidradenitis 2/16/2007     Ischiorectal abscess and fistula      Kidney stones 2008    x2 passed on her own     Nondependent tobacco use disorder 10/11/2012     Papanicolaou smear of cervix with low grade squamous intraepithelial lesion (LGSIL) 10/29/2008     S/p partial hysterectomy with remaining cervical stump 09/27/2013     Past Surgical History:   Procedure Laterality Date     CYSTECTOMY PILONIDAL N/A 9/18/2017    Procedure: CYSTECTOMY PILONIDAL;  PILONIDAL CYSTECTOMY ;  Surgeon: Cordell Stephens MD;  [facility]     ENDOSCOPY UPPER, COLONOSCOPY, COMBINED N/A 10/5/2018    Procedure: COMBINED ENDOSCOPY UPPER, COLONOSCOPY;  UPPER ENDOSCOPY with Biopsy  AND COLONOSCOPY;  Surgeon: Cordell Stephens MD;  [facility]     EXCISE LESION BUTTOCK(S) Left 6/8/2018    Procedure: EXCISE LESION BUTTOCK(S);  EXCISION OF GLUTEAL LEFT SKIN LESION;  Surgeon: Cordell Stephens MD;  [facility]     EXCISE MASS NECK Right 8/4/2015    Procedure: EXCISE MASS NECK;  Surgeon: Nasir Jones MD;  [facility]     INCISION AND DRAINAGE PERINEAL, COMBINED N/A 3/18/2015    Procedure: COMBINED INCISION AND DRAINAGE PERINEAL;  Surgeon: Jay Torres DO;  [facility]     IRRIGATION AND DEBRIDEMENT GROIN N/A 2/7/2019    Procedure: IRRIGATION AND DEBRIDEMENT LEFT GROIN ABSCESS;  Surgeon: Cordell Stephens MD;   [facility]     LAPAROSCOPIC CHOLECYSTECTOMY N/A 11/20/2017    Procedure: LAPAROSCOPIC CHOLECYSTECTOMY;  LAPAROSCOPIC CHOLECYSTECTOMY;  Surgeon: Cordell Stephens MD;  [facility]     LAPAROSCOPIC HYSTERECTOMY SUPRACERVICAL, BILATERAL SALPINGO-OOPHORECTOMY, COMBINED  9/27/2013    Procedure: COMBINED LAPAROSCOPIC HYSTERECTOMY SUPRACERVICAL, SALPINGO-OOPHORECTOMY;  LAPAROSCOPIC SUPRACERVICAL HYSTERECTOMY AND RIGHT SALPINGO-OOPHORECTOMY, CYSTOSCOPY;  Surgeon: Denys Callahan MD;  [facility]     LAPAROSCOPY DIAGNOSTIC (GYN) N/A 9/6/2018    Procedure: LAPAROSCOPY DIAGNOSTIC (GYN);  LAPAROSCOPY WITH PERITONEAL BIOPSIES AND REMOVAL LEFT FALLOPIAN TUBE;  Surgeon: Suraj Whitaker MD;  [facility]     marsupialization of pilonidal cyst  2007     TUBAL LIGATION  2005       Social History:  Patient reports that she has been smoking cigarettes. She started smoking about 18 years ago. She has a 7.50 pack-year smoking history. She has never used smokeless tobacco. She reports that she does not drink alcohol or use drugs.    Family History:  Family History   Problem Relation Age of Onset     Diabetes Paternal Grandmother        Medications:  Current Outpatient Medications   Medication Sig Dispense Refill     adalimumab (HUMIRA) 40 MG/0.8ML prefilled syringe kit 160 mg once, then 80 mg 2 weeks later, then 2 weeks later start 40 mg weekly (all subcutaneous) 6 Syringe 3     ALPRAZolam (XANAX) 0.5 MG tablet Take 1 tablet (0.5 mg) by mouth 2 times daily as needed for anxiety 30 tablet 0     dicyclomine (BENTYL) 10 MG capsule Take 2 capsules (20 mg) by mouth 4 times daily as needed 240 capsule 11     FLUoxetine (PROZAC) 10 MG capsule Take 1 capsule (10 mg) by mouth daily 30 capsule 1     omeprazole (PRILOSEC) 20 MG DR capsule TAKE 1 CAPSULE(20 MG) BY MOUTH DAILY 30 capsule 5     valACYclovir (VALTREX) 500 MG tablet TAKE ONE TABLET BY MOUTH DAILY 90 tablet 0       No Known Allergies    Review of Systems:  -As per  HPI  -Otherwise nml state of health. No other skin concerns.     Physical exam:  Vitals: /73 (BP Location: Right arm, Patient Position: Chair, Cuff Size: Adult Regular)   Pulse 79   LMP 08/10/2013   GEN: This is a well developed, well-nourished female in no acute distress, in a pleasant mood.    SKIN: Focused examination of the groin and buttocks and axillas bilaterally, face, neck, and bilaterally upper extremities were performed.  - Bilateral inguinal region pt has several scarred papules, no sinus tracts, no subcutaneous fluctuant nodules   - buttocks, right inferior, there is a scarred papule   - Under the mandible there is another scarred papule  - Several scarred papules on bilateral thighs but otherwise clear.  - No other lesions of concern on areas examined.     Impression/Plan:  1. Hidradenitis suppurativa- In good control. Hurey II (due to scarring). Doing well and tolerating Humira. Will need repeat labs in about 6 months.    The patient is stable. Discussed transferring care to PCP if she is fine in 6 months. The pt will let us know who her new PCP is through [employer].    Refilled Humira 40mg weekly; per pt, she uses the citrate-free version.    CC Brandie Poe MD  [address] [facility] [address]  [place] [address] on close of this encounter.    Follow-up with Edie for a refill in 6 months. If no problems, pt can follow up with PCP, earlier with us for new or changing lesions.       Staff Involved:  Kathia Bush MD, PGY-4    Scribe Disclosure  I, Christel Bishop, am serving as a scribe to document services personally performed by Dr. Nasim Jha, based on data collection and the provider's statements to me.     Provider Disclosure:   The documentation recorded by the scribe accurately reflects the services I personally performed and the decisions made by me.    Nasim Jha MD, FAAD    Departments of Internal Medicine and Dermatology  [facility]  [place]  [phone]    Staff Physician Comments:   I saw and evaluated the patient with the resident and I agree with the assessment and plan.  I was present for the examination.    Nasim Jha MD, FAAD    Departments of Internal Medicine and Dermatology  [facility]  [phone]

## 2019-10-31 NOTE — NURSING NOTE
Dermatology Rooming Note    Purnima Fallon's goals for this visit include:   Chief Complaint   Patient presents with     Derm Problem     Purnima is here for a follow up, states the Humira is helping.       Ashleigh Crook LPN

## 2019-10-31 NOTE — PATIENT INSTRUCTIONS
Continue Humira injections weekly.  Return to clinic in 6 months, then can follow-up with your primary care physician if your HS is in good control.

## 2019-10-31 NOTE — PROGRESS NOTES
[facility] Dermatology Note    Dermatology Clinic  [facility]  [address] [place] [id number]    Dermatology Problem List:  1.Hidradenitis suppurativa  - Prescribed Humira 06/27/2019--continues on 40 mg subcutaneous injections qWeek  - previous tx: minocycline, clindamycin   - smokes half a pack daily with plans to quit in the future    Encounter Date: Oct 31, 2019    CC:  Chief Complaint   Patient presents with     Derm Problem     Purnima is here for a follow up, states the Humira is helping.       History of Present Illness:  Ms. Purnima Fallon is a 38 year old female who presents in consultation for followup for her Hidradenitis suppurativa. The patient was last seen in clinic 6/27/19 for consultation for HS. At the time she was started on Humira 40 mg weekly. She had labs done for CBC, CMP, quantiferon gold, and Hep B/C serology. She was also being considered for CO2 laser and Nd-YAG for HS.    Today the patient reports that she is feeling well overall. She was started on Humira at the last visit, and she has noticed that her active lesions have cleared. She has some scarring at her inguinal area and buttocks. She is tolerating the Humira well. Today she denies any fevers, chills, or infections. Denies any reaction to the injection site. On occasion, she may feel a bit ill today after the Humira but this is transient and resolves quickly. Denies any lesions in the axilla.   Today she denies any active areas.    She is currently smoking half a pack daily and plans to quit in the future. No other skin related concerns at this time.      Past Medical History:   Patient Active Problem List   Diagnosis     Migraines     Depression     ADHD (attention deficit hyperactivity disorder)     Cystic acne     S/P laparoscopic hysterectomy     Kidney stones     Ischiorectal abscess and fistula     ACP (advance care planning)     Mass of breast      Pilonidal cyst     H/O pilonidal cyst     Biliary dyskinesia     Chronic female pelvic pain--LAPAROSCOPY w LEFT SALPINGECTOMY--normal pelvis--NO Endometriosis--9/2018     Hidradenitis suppurativa     Carpal tunnel syndrome     Malunion of fracture     Pain in joint, forearm     Past Medical History:   Diagnosis Date     Hidradenitis 2/16/2007     Ischiorectal abscess and fistula      Kidney stones 2008    x2 passed on her own     Nondependent tobacco use disorder 10/11/2012     Papanicolaou smear of cervix with low grade squamous intraepithelial lesion (LGSIL) 10/29/2008     S/p partial hysterectomy with remaining cervical stump 09/27/2013     Past Surgical History:   Procedure Laterality Date     CYSTECTOMY PILONIDAL N/A 9/18/2017    Procedure: CYSTECTOMY PILONIDAL;  PILONIDAL CYSTECTOMY ;  Surgeon: Cordell Stephens MD;  [facility]     ENDOSCOPY UPPER, COLONOSCOPY, COMBINED N/A 10/5/2018    Procedure: COMBINED ENDOSCOPY UPPER, COLONOSCOPY;  UPPER ENDOSCOPY with Biopsy  AND COLONOSCOPY;  Surgeon: Cordell Stephens MD;  [facility]     EXCISE LESION BUTTOCK(S) Left 6/8/2018    Procedure: EXCISE LESION BUTTOCK(S);  EXCISION OF GLUTEAL LEFT SKIN LESION;  Surgeon: Cordell Stephens MD;  [facility]     EXCISE MASS NECK Right 8/4/2015    Procedure: EXCISE MASS NECK;  Surgeon: Nasir Jones MD;  [facility]     INCISION AND DRAINAGE PERINEAL, COMBINED N/A 3/18/2015    Procedure: COMBINED INCISION AND DRAINAGE PERINEAL;  Surgeon: Jay Torres, DO;  [facility]     IRRIGATION AND DEBRIDEMENT GROIN N/A 2/7/2019    Procedure: IRRIGATION AND DEBRIDEMENT LEFT GROIN ABSCESS;  Surgeon: Cordell Stephens MD;  [facility]     LAPAROSCOPIC CHOLECYSTECTOMY N/A 11/20/2017    Procedure: LAPAROSCOPIC CHOLECYSTECTOMY;  LAPAROSCOPIC CHOLECYSTECTOMY;  Surgeon: Cordell Stephens MD;  [facility]     LAPAROSCOPIC HYSTERECTOMY SUPRACERVICAL, BILATERAL SALPINGO-OOPHORECTOMY, COMBINED   9/27/2013    Procedure: COMBINED LAPAROSCOPIC HYSTERECTOMY SUPRACERVICAL, SALPINGO-OOPHORECTOMY;  LAPAROSCOPIC SUPRACERVICAL HYSTERECTOMY AND RIGHT SALPINGO-OOPHORECTOMY, CYSTOSCOPY;  Surgeon: Denys Callahan MD;  [facility]     LAPAROSCOPY DIAGNOSTIC (GYN) N/A 9/6/2018    Procedure: LAPAROSCOPY DIAGNOSTIC (GYN);  LAPAROSCOPY WITH PERITONEAL BIOPSIES AND REMOVAL LEFT FALLOPIAN TUBE;  Surgeon: Suraj Whitaker MD;  [facility]     marsupialization of pilonidal cyst  2007     TUBAL LIGATION  2005       Social History:  Patient reports that she has been smoking cigarettes. She started smoking about 18 years ago. She has a 7.50 pack-year smoking history. She has never used smokeless tobacco. She reports that she does not drink alcohol or use drugs.    Family History:  Family History   Problem Relation Age of Onset     Diabetes Paternal Grandmother        Medications:  Current Outpatient Medications   Medication Sig Dispense Refill     adalimumab (HUMIRA) 40 MG/0.8ML prefilled syringe kit 160 mg once, then 80 mg 2 weeks later, then 2 weeks later start 40 mg weekly (all subcutaneous) 6 Syringe 3     ALPRAZolam (XANAX) 0.5 MG tablet Take 1 tablet (0.5 mg) by mouth 2 times daily as needed for anxiety 30 tablet 0     dicyclomine (BENTYL) 10 MG capsule Take 2 capsules (20 mg) by mouth 4 times daily as needed 240 capsule 11     FLUoxetine (PROZAC) 10 MG capsule Take 1 capsule (10 mg) by mouth daily 30 capsule 1     omeprazole (PRILOSEC) 20 MG DR capsule TAKE 1 CAPSULE(20 MG) BY MOUTH DAILY 30 capsule 5     valACYclovir (VALTREX) 500 MG tablet TAKE ONE TABLET BY MOUTH DAILY 90 tablet 0       No Known Allergies    Review of Systems:  -As per HPI  -Otherwise nml state of health. No other skin concerns.     Physical exam:  Vitals: /73 (BP Location: Right arm, Patient Position: Chair, Cuff Size: Adult Regular)   Pulse 79   LMP 08/10/2013   GEN: This is a well developed, well-nourished female in no acute distress,  in a pleasant mood.    SKIN: Focused examination of the groin and buttocks and axillas bilaterally, face, neck, and bilaterally upper extremities were performed.  - Bilateral inguinal region pt has several scarred papules, no sinus tracts, no subcutaneous fluctuant nodules   - buttocks, right inferior, there is a scarred papule   - Under the mandible there is another scarred papule  - Several scarred papules on bilateral thighs but otherwise clear.  - No other lesions of concern on areas examined.     Impression/Plan:  1. Hidradenitis suppurativa- In good control. Hurey II (due to scarring). Doing well and tolerating Humira. Will need repeat labs in about 6 months.    The patient is stable. Discussed transferring care to PCP if she is fine in 6 months. The pt will let us know who her new PCP is through [employer].    Refilled Humira 40mg weekly; per pt, she uses the citrate-free version.    CC Brandie Poe MD  [address] [facility] [address]  [place] [address] on close of this encounter.    Follow-up with Edie for a refill in 6 months. If no problems, pt can follow up with PCP, earlier with us for new or changing lesions.       Staff Involved:  Kathia Bush MD, PGY-4    Scribe Disclosure  I, Christel Dario, am serving as a scribe to document services personally performed by Dr. Nasim Jha, based on data collection and the provider's statements to me.     Provider Disclosure:   The documentation recorded by the scribe accurately reflects the services I personally performed and the decisions made by me.    Nasim Jha MD, FAAD    Departments of Internal Medicine and Dermatology  [facility]  [phone]    Staff Physician Comments:   I saw and evaluated the patient with the resident and I agree with the assessment and plan.  I was present for the examination.    Nasim Jha MD, FAAD    Departments of Internal Medicine and Dermatology  [place]  [facility]  [phone]

## 2019-11-07 ENCOUNTER — HEALTH MAINTENANCE LETTER (OUTPATIENT)
Age: 39
End: 2019-11-07

## 2019-11-14 NOTE — PROGRESS NOTES
Subjective     Purnima Fallon is a 38 year old female who presents to clinic today for the following health issues:    HPI   Anxiety Follow-Up    How are you doing with your anxiety since your last visit? Improved    Are you having other symptoms that might be associated with anxiety? No    Have you had a significant life event? No     Are you feeling depressed? No    Do you have any concerns with your use of alcohol or other drugs? No     She quit taking Prozac as she didn't like how it made her feel. The Xanax is helping her anxiety    Her relationship with her  has improved with counseling    She takes Humira for hidradenitis suppurativa. She is inquiring if I could fill this for her. She seems dermatology every 6 months and would need to seen annually if Humira is filled locally.     Social History     Tobacco Use     Smoking status: Current Every Day Smoker     Packs/day: 0.50     Years: 15.00     Pack years: 7.50     Types: Cigarettes     Start date: 1/1/2001     Smokeless tobacco: Never Used     Tobacco comment: declined 11/21/19   Substance Use Topics     Alcohol use: No     Drug use: No     WENDY-7 SCORE 11/13/2018 12/13/2018 11/21/2019   Total Score 21 4 4     PHQ 11/13/2018 12/13/2018 10/10/2019   PHQ-9 Total Score 8 3 15   Q9: Thoughts of better off dead/self-harm past 2 weeks Not at all Not at all Not at all     WENDY-7  11/21/2019   1. Feeling nervous, anxious, or on edge 0   2. Not being able to stop or control worrying 0   3. Worrying too much about different things 0   4. Trouble relaxing 2   5. Being so restless that it is hard to sit still 2   6. Becoming easily annoyed or irritable 0   7. Feeling afraid, as if something awful might happen 0   WENDY-7 Total Score 4   If you checked any problems, how difficult have they made it for you to do your work, take care of things at home, or get along with other people? Somewhat difficult       WENDY-7 SCORE 11/13/2018 12/13/2018 11/21/2019   Total Score 21  4 4         Patient Active Problem List   Diagnosis     Migraines     Depression     ADHD (attention deficit hyperactivity disorder)     Cystic acne     S/P laparoscopic hysterectomy     Kidney stones     Ischiorectal abscess and fistula     ACP (advance care planning)     Mass of breast     Pilonidal cyst     H/O pilonidal cyst     Biliary dyskinesia     Chronic female pelvic pain--LAPAROSCOPY w LEFT SALPINGECTOMY--normal pelvis--NO Endometriosis--9/2018     Hidradenitis suppurativa     Carpal tunnel syndrome     Malunion of fracture     Pain in joint, forearm     Past Surgical History:   Procedure Laterality Date     CYSTECTOMY PILONIDAL N/A 9/18/2017    Procedure: CYSTECTOMY PILONIDAL;  PILONIDAL CYSTECTOMY ;  Surgeon: Cordell Stephens MD;  [facility]     ENDOSCOPY UPPER, COLONOSCOPY, COMBINED N/A 10/5/2018    Procedure: COMBINED ENDOSCOPY UPPER, COLONOSCOPY;  UPPER ENDOSCOPY with Biopsy  AND COLONOSCOPY;  Surgeon: Cordell Stephens MD;  [facility]     EXCISE LESION BUTTOCK(S) Left 6/8/2018    Procedure: EXCISE LESION BUTTOCK(S);  EXCISION OF GLUTEAL LEFT SKIN LESION;  Surgeon: Cordell Stephens MD;  [facility]     EXCISE MASS NECK Right 8/4/2015    Procedure: EXCISE MASS NECK;  Surgeon: Nasir Jones MD;  [facility]     INCISION AND DRAINAGE PERINEAL, COMBINED N/A 3/18/2015    Procedure: COMBINED INCISION AND DRAINAGE PERINEAL;  Surgeon: Jay Torres DO;  [facility]     IRRIGATION AND DEBRIDEMENT GROIN N/A 2/7/2019    Procedure: IRRIGATION AND DEBRIDEMENT LEFT GROIN ABSCESS;  Surgeon: Cordell Stephens MD;  [facility]     LAPAROSCOPIC CHOLECYSTECTOMY N/A 11/20/2017    Procedure: LAPAROSCOPIC CHOLECYSTECTOMY;  LAPAROSCOPIC CHOLECYSTECTOMY;  Surgeon: Cordell Stephens MD;  [facility]     LAPAROSCOPIC HYSTERECTOMY SUPRACERVICAL, BILATERAL SALPINGO-OOPHORECTOMY, COMBINED  9/27/2013    Procedure: COMBINED LAPAROSCOPIC HYSTERECTOMY SUPRACERVICAL,  SALPINGO-OOPHORECTOMY;  LAPAROSCOPIC SUPRACERVICAL HYSTERECTOMY AND RIGHT SALPINGO-OOPHORECTOMY, CYSTOSCOPY;  Surgeon: Denys Callahan MD;  [facility]     LAPAROSCOPY DIAGNOSTIC (GYN) N/A 9/6/2018    Procedure: LAPAROSCOPY DIAGNOSTIC (GYN);  LAPAROSCOPY WITH PERITONEAL BIOPSIES AND REMOVAL LEFT FALLOPIAN TUBE;  Surgeon: Suraj Whitaker MD;  [facility]     marsupialization of pilonidal cyst  2007     TUBAL LIGATION  2005       Social History     Tobacco Use     Smoking status: Current Every Day Smoker     Packs/day: 0.50     Years: 15.00     Pack years: 7.50     Types: Cigarettes     Start date: 1/1/2001     Smokeless tobacco: Never Used     Tobacco comment: declined 11/21/19   Substance Use Topics     Alcohol use: No     Family History   Problem Relation Age of Onset     Diabetes Paternal Grandmother          Current Outpatient Medications   Medication Sig Dispense Refill     adalimumab (HUMIRA) 40 MG/0.8ML prefilled syringe kit Inject 0.8 mLs (40 mg) Subcutaneous every 7 days 6 Syringe 3     ALPRAZolam (XANAX) 0.5 MG tablet Take 1 tablet (0.5 mg) by mouth nightly as needed for anxiety 30 tablet 0     dicyclomine (BENTYL) 10 MG capsule Take 2 capsules (20 mg) by mouth 4 times daily as needed 240 capsule 11     omeprazole (PRILOSEC) 20 MG DR capsule TAKE 1 CAPSULE(20 MG) BY MOUTH DAILY 30 capsule 5     valACYclovir (VALTREX) 500 MG tablet TAKE ONE TABLET BY MOUTH DAILY 90 tablet 0     No Known Allergies    Reviewed and updated as needed this visit by Provider         Review of Systems   ROS COMP: Constitutional, HEENT, cardiovascular, pulmonary, gi and gu systems are negative, except as otherwise noted. +anxiety      Objective    BP (!) 88/56   Pulse 78   Temp 97.9  F (36.6  C)   Wt 49.9 kg (110 lb)   LMP 08/10/2013   SpO2 100%   BMI 20.12 kg/m    Body mass index is 20.12 kg/m .  Physical Exam   GENERAL: healthy, alert and no distress  NECK: no adenopathy, no asymmetry, masses, or scars and thyroid  normal to palpation  RESP: lungs clear to auscultation - no rales, rhonchi or wheezes  CV: regular rate and rhythm, normal S1 S2, no S3 or S4, no murmur, click or rub, no peripheral edema and peripheral pulses strong  MS: no gross musculoskeletal defects noted, no edema  SKIN: no suspicious lesions or rashes  NEURO: Normal strength and tone, mentation intact and speech normal  PSYCH: mentation appears normal, affect normal/bright    Diagnostic Test Results:  Labs reviewed in Epic        Assessment & Plan   Assessment      Plan  (F41.1) WENDY (generalized anxiety disorder)  Comment: continue xanax. Try to decrease stressors in life  Plan: ALPRAZolam (XANAX) 0.5 MG tablet              Tobacco Cessation:   reports that she has been smoking cigarettes. She started smoking about 18 years ago. She has a 7.50 pack-year smoking history. She has never used smokeless tobacco.  Tobacco Cessation Action Plan: Information offered: Patient not interested at this time        See Patient Instructions    Return in about 1 month (around 12/21/2019) for Physical Exam fasting lab.    Payton Levi NP  [facility] - [facility]

## 2019-11-21 ENCOUNTER — OFFICE VISIT (OUTPATIENT)
Dept: FAMILY MEDICINE | Facility: OTHER | Age: 39
End: 2019-11-21
Attending: NURSE PRACTITIONER
Payer: COMMERCIAL

## 2019-11-21 VITALS
DIASTOLIC BLOOD PRESSURE: 56 MMHG | HEART RATE: 78 BPM | TEMPERATURE: 97.9 F | SYSTOLIC BLOOD PRESSURE: 88 MMHG | OXYGEN SATURATION: 100 % | BODY MASS INDEX: 20.12 KG/M2 | WEIGHT: 110 LBS

## 2019-11-21 DIAGNOSIS — F41.1 GAD (GENERALIZED ANXIETY DISORDER): ICD-10-CM

## 2019-11-21 PROCEDURE — 99213 OFFICE O/P EST LOW 20 MIN: CPT | Performed by: NURSE PRACTITIONER

## 2019-11-21 RX ORDER — ALPRAZOLAM 0.5 MG
0.5 TABLET ORAL
Qty: 30 TABLET | Refills: 0 | Status: SHIPPED | OUTPATIENT
Start: 2019-11-21 | End: 2019-12-11

## 2019-11-21 ASSESSMENT — ANXIETY QUESTIONNAIRES
GAD7 TOTAL SCORE: 4
2. NOT BEING ABLE TO STOP OR CONTROL WORRYING: NOT AT ALL
IF YOU CHECKED OFF ANY PROBLEMS ON THIS QUESTIONNAIRE, HOW DIFFICULT HAVE THESE PROBLEMS MADE IT FOR YOU TO DO YOUR WORK, TAKE CARE OF THINGS AT HOME, OR GET ALONG WITH OTHER PEOPLE: SOMEWHAT DIFFICULT
3. WORRYING TOO MUCH ABOUT DIFFERENT THINGS: NOT AT ALL
6. BECOMING EASILY ANNOYED OR IRRITABLE: NOT AT ALL
5. BEING SO RESTLESS THAT IT IS HARD TO SIT STILL: MORE THAN HALF THE DAYS
7. FEELING AFRAID AS IF SOMETHING AWFUL MIGHT HAPPEN: NOT AT ALL
1. FEELING NERVOUS, ANXIOUS, OR ON EDGE: NOT AT ALL

## 2019-11-21 ASSESSMENT — PATIENT HEALTH QUESTIONNAIRE - PHQ9: 5. POOR APPETITE OR OVEREATING: MORE THAN HALF THE DAYS

## 2019-11-21 ASSESSMENT — PAIN SCALES - GENERAL: PAINLEVEL: NO PAIN (0)

## 2019-11-21 NOTE — NURSING NOTE
"Chief Complaint   Patient presents with     Anxiety       Initial BP (!) 88/56   Pulse 78   Temp 97.9  F (36.6  C)   Wt 49.9 kg (110 lb)   LMP 08/10/2013   SpO2 100%   BMI 20.12 kg/m   Estimated body mass index is 20.12 kg/m  as calculated from the following:    Height as of 6/11/19: 1.575 m (5' 2\").    Weight as of this encounter: 49.9 kg (110 lb).  Medication Reconciliation: complete  Iraida La  "

## 2019-11-21 NOTE — PATIENT INSTRUCTIONS
Patient Education     Understanding Anxiety Disorders    Almost everyone gets nervous now and then. It s normal to have knots in your stomach before a test, or for your heart to race on a first date. But an anxiety disorder is much more than a case of nerves. In fact, its symptoms may be overwhelming. But treatment can relieve many of these symptoms. Talking to your healthcare provider is the first step.  What are anxiety disorders?  An anxiety disorder causes intense feelings of panic and fear. These feelings may arise for no apparent reason. And they tend to recur again and again. They may prevent you from coping with life and cause you great distress. As a result, you may avoid anything that triggers your fear. In extreme cases, you may never leave the house. Anxiety disorders may cause other symptoms, such as:    Obsessive thoughts you can t control    Constant nightmares or painful thoughts of the past    Nausea, sweating, and muscle tension    Trouble sleeping or concentrating  What causes anxiety disorders?  Anxiety disorders tend to run in families. For some people, childhood abuse or neglect may play a role. For others, stressful life events or trauma may trigger anxiety disorders. Anxiety can trigger low self-esteem and poor coping skills.  Common anxiety disorders    Panic disorder. This causes an intense fear of being in danger.    Phobias. These are extreme fears of certain objects, places, or events.    Obsessive-compulsive disorder. This causes you to have unwanted thoughts and urges. You also may perform certain actions over and over.    Posttraumatic stress disorder. This occurs in people who have survived a terrible ordeal. It can cause nightmares and flashbacks about the event.    Generalized anxiety disorder. This causes constant worry that can greatly disrupt your life.   Getting better  You may believe that nothing can help you. Or, you might fear what others may think. But most anxiety  symptoms can be eased. Having an anxiety disorder is nothing to be ashamed of. Most people do best with treatment that combines medicine and therapy. These aren t cures. But they can help you live a healthier life.  Date Last Reviewed: 2/1/2017 2000-2018 [employer]. [address] [place] [address], [place] [address]. All rights reserved. This information is not intended as a substitute for professional medical care. Always follow your healthcare professional's instructions.

## 2019-11-22 ASSESSMENT — ANXIETY QUESTIONNAIRES: GAD7 TOTAL SCORE: 4

## 2019-12-10 NOTE — PROGRESS NOTES
Subjective     Purnima Fallon is a 39 year old female who presents to clinic today for the following health issues:    HPI   Neck Pain      Duration: 2 weeks     Description:  Location: neck   Radiation: into the right neck, into the right shoulder, into the left neck and into the left shoulder. Mainly in right side of neck today    Intensity:  6/10    Accompanying signs and symptoms: right side of neck pain. Occasional headache. History of migraines. Denies numbness or tingling down extremities    History (similar episodes/previous evaluation): None. Denies injury or trauma. Woke up with a sore neck. Denies new bed pillows or bed    Precipitating or alleviating factors: None    Therapies tried and outcome: ibuprofen, heat, ice - help a little .       Patient Active Problem List   Diagnosis     Migraines     Depression     ADHD (attention deficit hyperactivity disorder)     Cystic acne     S/P laparoscopic hysterectomy     Kidney stones     Ischiorectal abscess and fistula     ACP (advance care planning)     Mass of breast     Pilonidal cyst     H/O pilonidal cyst     Biliary dyskinesia     Chronic female pelvic pain--LAPAROSCOPY w LEFT SALPINGECTOMY--normal pelvis--NO Endometriosis--9/2018     Hidradenitis suppurativa     Carpal tunnel syndrome     Malunion of fracture     Pain in joint, forearm     Past Surgical History:   Procedure Laterality Date     CYSTECTOMY PILONIDAL N/A 9/18/2017    Procedure: CYSTECTOMY PILONIDAL;  PILONIDAL CYSTECTOMY ;  Surgeon: Cordell Stephens MD;  [facility]     ENDOSCOPY UPPER, COLONOSCOPY, COMBINED N/A 10/5/2018    Procedure: COMBINED ENDOSCOPY UPPER, COLONOSCOPY;  UPPER ENDOSCOPY with Biopsy  AND COLONOSCOPY;  Surgeon: Cordell Stephens MD;  [facility]     EXCISE LESION BUTTOCK(S) Left 6/8/2018    Procedure: EXCISE LESION BUTTOCK(S);  EXCISION OF GLUTEAL LEFT SKIN LESION;  Surgeon: Cordell Stephens MD;  [facility]     EXCISE MASS NECK Right 8/4/2015     Procedure: EXCISE MASS NECK;  Surgeon: Nasir Jones MD;  [facility]     INCISION AND DRAINAGE PERINEAL, COMBINED N/A 3/18/2015    Procedure: COMBINED INCISION AND DRAINAGE PERINEAL;  Surgeon: Jay Torres DO;  [facility]     IRRIGATION AND DEBRIDEMENT GROIN N/A 2/7/2019    Procedure: IRRIGATION AND DEBRIDEMENT LEFT GROIN ABSCESS;  Surgeon: Cordell Stephens MD;  [facility]     LAPAROSCOPIC CHOLECYSTECTOMY N/A 11/20/2017    Procedure: LAPAROSCOPIC CHOLECYSTECTOMY;  LAPAROSCOPIC CHOLECYSTECTOMY;  Surgeon: Cordell Stephens MD;  [facility]     LAPAROSCOPIC HYSTERECTOMY SUPRACERVICAL, BILATERAL SALPINGO-OOPHORECTOMY, COMBINED  9/27/2013    Procedure: COMBINED LAPAROSCOPIC HYSTERECTOMY SUPRACERVICAL, SALPINGO-OOPHORECTOMY;  LAPAROSCOPIC SUPRACERVICAL HYSTERECTOMY AND RIGHT SALPINGO-OOPHORECTOMY, CYSTOSCOPY;  Surgeon: Denys Callahan MD;  [facility]     LAPAROSCOPY DIAGNOSTIC (GYN) N/A 9/6/2018    Procedure: LAPAROSCOPY DIAGNOSTIC (GYN);  LAPAROSCOPY WITH PERITONEAL BIOPSIES AND REMOVAL LEFT FALLOPIAN TUBE;  Surgeon: Suraj Whitaker MD;  [facility]     marsupialization of pilonidal cyst  2007     TUBAL LIGATION  2005       Social History     Tobacco Use     Smoking status: Current Every Day Smoker     Packs/day: 0.50     Years: 15.00     Pack years: 7.50     Types: Cigarettes     Start date: 1/1/2001     Smokeless tobacco: Never Used     Tobacco comment: declined 11/21/19   Substance Use Topics     Alcohol use: No     Family History   Problem Relation Age of Onset     Diabetes Paternal Grandmother          Current Outpatient Medications   Medication Sig Dispense Refill     adalimumab (HUMIRA) 40 MG/0.8ML prefilled syringe kit Inject 0.8 mLs (40 mg) Subcutaneous every 7 days 6 Syringe 3     cyclobenzaprine (FLEXERIL) 10 MG tablet Take 1 tablet (10 mg) by mouth 3 times daily as needed for muscle spasms 30 tablet 0     predniSONE (DELTASONE) 20 MG tablet Take two tablets (=  40mg) each day for 5 (five) days 10 tablet 0     valACYclovir (VALTREX) 500 MG tablet TAKE ONE TABLET BY MOUTH DAILY 90 tablet 0     No Known Allergies    Reviewed and updated as needed this visit by Provider      Review of Systems   ROS COMP: Constitutional, HEENT, cardiovascular, pulmonary, gi and gu systems are negative, except as otherwise noted. +right sided neck pain. Denies dizziness      Objective    /58   Pulse 86   Temp 98.5  F (36.9  C)   Wt 53.1 kg (117 lb)   LMP 08/10/2013   SpO2 96%   BMI 21.40 kg/m    Body mass index is 21.4 kg/m .  Physical Exam   GENERAL: healthy, alert and no distress  HENT: ear canals and TM's normal, nose and mouth without ulcers or lesions  NECK: no adenopathy, no asymmetry, masses, or scars and thyroid normal to palpation. +firmness and muscle spasm along trapezius muscle to right side of neck. PAIN IS REPRODUCIBLE  RESP: lungs clear to auscultation - no rales, rhonchi or wheezes  CV: regular rate and rhythm, normal S1 S2, no S3 or S4, no murmur, click or rub, no peripheral edema and peripheral pulses strong  MS: no gross musculoskeletal defects noted, no edema. NO TTP and step offs to spinal column  NEURO: Normal strength and tone, mentation intact and speech normal  PSYCH: mentation appears normal, affect normal/bright  SKIN: No rash, erythema, bruising, or warmth to the touch to posterior neck/back    Diagnostic Test Results:  Labs reviewed in Epic      Assessment & Plan   Assessment      Plan  (M62.838) Neck muscle spasm  (primary encounter diagnosis)  Comment: symptomatic care. Add flexeril and prednisone. Heat to neck. Stretch. Consider PT if not improving  Plan: cyclobenzaprine (FLEXERIL) 10 MG tablet,         predniSONE (DELTASONE) 20 MG tablet            See Patient Instructions    Return if symptoms worsen or fail to improve.    Payton Levi NP  [facility]

## 2019-12-10 NOTE — PROGRESS NOTES
SUBJECTIVE:   CC: Purnima Fallon is an 39 year old woman who presents for preventive health visit.     Healthy Habits:     Do you get at least three servings of calcium containing foods daily (dairy, green leafy vegetables, etc.)? yes  Amount of exercise or daily activities, outside of work: 5 day(s) per weekAnswers for HPI/ROS submitted by the patient on 12/16/2019   Annual Exam:  Frequency of exercise:: 4-5 days/week  Getting at least 3 servings of Calcium per day:: Yes  Diet:: Regular (no restrictions)  Taking medications regularly:: Yes  Medication side effects:: Not applicable  Bi-annual eye exam:: Yes  Dental care twice a year:: Yes  Sleep apnea or symptoms of sleep apnea:: None  Additional concerns today:: No  Duration of exercise:: Greater than 60 minutes      Today's PHQ-2 Score:   PHQ-2 ( 1999 [employer]) 12/16/2019   Q1: Little interest or pleasure in doing things 0   Q2: Feeling down, depressed or hopeless 0   PHQ-2 Score 0   Q1: Little interest or pleasure in doing things Not at all   Q2: Feeling down, depressed or hopeless Not at all   PHQ-2 Score 0       Abuse: Current or Past(Physical, Sexual or Emotional)- No  Do you feel safe in your environment? Yes        Social History     Tobacco Use     Smoking status: Current Every Day Smoker     Packs/day: 0.50     Years: 15.00     Pack years: 7.50     Types: Cigarettes     Start date: 1/1/2001     Smokeless tobacco: Never Used     Tobacco comment: declined 12/17/19   Substance Use Topics     Alcohol use: No     If you drink alcohol do you typically have >3 drinks per day or >7 drinks per week? No                     Reviewed orders with patient.  Reviewed health maintenance and updated orders accordingly - Yes  Patient Active Problem List   Diagnosis     Migraines     Depression     ADHD (attention deficit hyperactivity disorder)     Cystic acne     S/P laparoscopic hysterectomy     Kidney stones     Ischiorectal abscess and fistula     ACP (advance care  planning)     Mass of breast     Pilonidal cyst     H/O pilonidal cyst     Biliary dyskinesia     Chronic female pelvic pain--LAPAROSCOPY w LEFT SALPINGECTOMY--normal pelvis--NO Endometriosis--9/2018     Hidradenitis suppurativa     Carpal tunnel syndrome     Malunion of fracture     Pain in joint, forearm     Past Surgical History:   Procedure Laterality Date     CYSTECTOMY PILONIDAL N/A 9/18/2017    Procedure: CYSTECTOMY PILONIDAL;  PILONIDAL CYSTECTOMY ;  Surgeon: Cordell Stephens MD;  [facility]     ENDOSCOPY UPPER, COLONOSCOPY, COMBINED N/A 10/5/2018    Procedure: COMBINED ENDOSCOPY UPPER, COLONOSCOPY;  UPPER ENDOSCOPY with Biopsy  AND COLONOSCOPY;  Surgeon: Cordell Stephens MD;  [facility]     EXCISE LESION BUTTOCK(S) Left 6/8/2018    Procedure: EXCISE LESION BUTTOCK(S);  EXCISION OF GLUTEAL LEFT SKIN LESION;  Surgeon: Cordell Stephens MD;  [facility]     EXCISE MASS NECK Right 8/4/2015    Procedure: EXCISE MASS NECK;  Surgeon: Nasir Jones MD;  [facility]     INCISION AND DRAINAGE PERINEAL, COMBINED N/A 3/18/2015    Procedure: COMBINED INCISION AND DRAINAGE PERINEAL;  Surgeon: Jay Torres DO;  [facility]     IRRIGATION AND DEBRIDEMENT GROIN N/A 2/7/2019    Procedure: IRRIGATION AND DEBRIDEMENT LEFT GROIN ABSCESS;  Surgeon: Cordell Stephens MD;  [facility]     LAPAROSCOPIC CHOLECYSTECTOMY N/A 11/20/2017    Procedure: LAPAROSCOPIC CHOLECYSTECTOMY;  LAPAROSCOPIC CHOLECYSTECTOMY;  Surgeon: Cordell Stephens MD;  [facility]     LAPAROSCOPIC HYSTERECTOMY SUPRACERVICAL, BILATERAL SALPINGO-OOPHORECTOMY, COMBINED  9/27/2013    Procedure: COMBINED LAPAROSCOPIC HYSTERECTOMY SUPRACERVICAL, SALPINGO-OOPHORECTOMY;  LAPAROSCOPIC SUPRACERVICAL HYSTERECTOMY AND RIGHT SALPINGO-OOPHORECTOMY, CYSTOSCOPY;  Surgeon: Denys Callahan MD;  [facility]     LAPAROSCOPY DIAGNOSTIC (GYN) N/A 9/6/2018    Procedure: LAPAROSCOPY DIAGNOSTIC (GYN);  LAPAROSCOPY WITH  PERITONEAL BIOPSIES AND REMOVAL LEFT FALLOPIAN TUBE;  Surgeon: Suraj Whitaker MD;  [facility]     marsupialization of pilonidal cyst  2007     TUBAL LIGATION  2005       Social History     Tobacco Use     Smoking status: Current Every Day Smoker     Packs/day: 0.50     Years: 15.00     Pack years: 7.50     Types: Cigarettes     Start date: 1/1/2001     Smokeless tobacco: Never Used     Tobacco comment: declined 12/17/19   Substance Use Topics     Alcohol use: No     Family History   Problem Relation Age of Onset     Diabetes Paternal Grandmother          Current Outpatient Medications   Medication Sig Dispense Refill     adalimumab (HUMIRA) 40 MG/0.8ML prefilled syringe kit Inject 0.8 mLs (40 mg) Subcutaneous every 7 days 6 Syringe 3     cyclobenzaprine (FLEXERIL) 10 MG tablet Take 1 tablet (10 mg) by mouth 3 times daily as needed for muscle spasms 30 tablet 0     HYDROcodone-acetaminophen (NORCO) 5-325 MG tablet Take 1 tablet by mouth every 6 hours as needed for pain 20 tablet 0     valACYclovir (VALTREX) 500 MG tablet Take 1 tablet (500 mg) by mouth daily 90 tablet 0     No Known Allergies      She had a normal bilateral mammogram 10-    Pertinent mammograms are reviewed under the imaging tab.  History of abnormal Pap smear: YES - updated in Problem List and Health Maintenance accordingly    PAP / HPV Latest Ref Rng & Units 5/15/2018 3/22/2016   PAP - NIL NIL   HPV 16 DNA NEG:Negative Negative -   HPV 18 DNA NEG:Negative Negative -   OTHER HR HPV NEG:Negative Negative -     Reviewed and updated as needed this visit by clinical staff  Tobacco  Allergies  Meds  Med Hx  Surg Hx  Fam Hx  Soc Hx        Reviewed and updated as needed this visit by Provider  Allergies  Meds        Past Medical History:   Diagnosis Date     Hidradenitis 2/16/2007     Ischiorectal abscess and fistula      Kidney stones 2008    x2 passed on her own     Nondependent tobacco use disorder 10/11/2012     Papanicolaou  smear of cervix with low grade squamous intraepithelial lesion (LGSIL) 10/29/2008     S/p partial hysterectomy with remaining cervical stump 09/27/2013      Past Surgical History:   Procedure Laterality Date     CYSTECTOMY PILONIDAL N/A 9/18/2017    Procedure: CYSTECTOMY PILONIDAL;  PILONIDAL CYSTECTOMY ;  Surgeon: Cordell Stephens MD;  [facility]     ENDOSCOPY UPPER, COLONOSCOPY, COMBINED N/A 10/5/2018    Procedure: COMBINED ENDOSCOPY UPPER, COLONOSCOPY;  UPPER ENDOSCOPY with Biopsy  AND COLONOSCOPY;  Surgeon: Cordell Stephens MD;  [facility]     EXCISE LESION BUTTOCK(S) Left 6/8/2018    Procedure: EXCISE LESION BUTTOCK(S);  EXCISION OF GLUTEAL LEFT SKIN LESION;  Surgeon: Coredll Stephens MD;  [facility]     EXCISE MASS NECK Right 8/4/2015    Procedure: EXCISE MASS NECK;  Surgeon: Naisr Jones MD;  [facility]     INCISION AND DRAINAGE PERINEAL, COMBINED N/A 3/18/2015    Procedure: COMBINED INCISION AND DRAINAGE PERINEAL;  Surgeon: Jay Torres DO;  [facility]     IRRIGATION AND DEBRIDEMENT GROIN N/A 2/7/2019    Procedure: IRRIGATION AND DEBRIDEMENT LEFT GROIN ABSCESS;  Surgeon: Cordell Stephens MD;  [facility]     LAPAROSCOPIC CHOLECYSTECTOMY N/A 11/20/2017    Procedure: LAPAROSCOPIC CHOLECYSTECTOMY;  LAPAROSCOPIC CHOLECYSTECTOMY;  Surgeon: Cordell Stepehns MD;  [facility]     LAPAROSCOPIC HYSTERECTOMY SUPRACERVICAL, BILATERAL SALPINGO-OOPHORECTOMY, COMBINED  9/27/2013    Procedure: COMBINED LAPAROSCOPIC HYSTERECTOMY SUPRACERVICAL, SALPINGO-OOPHORECTOMY;  LAPAROSCOPIC SUPRACERVICAL HYSTERECTOMY AND RIGHT SALPINGO-OOPHORECTOMY, CYSTOSCOPY;  Surgeon: Denys Callahan MD;  [facility]     LAPAROSCOPY DIAGNOSTIC (GYN) N/A 9/6/2018    Procedure: LAPAROSCOPY DIAGNOSTIC (GYN);  LAPAROSCOPY WITH PERITONEAL BIOPSIES AND REMOVAL LEFT FALLOPIAN TUBE;  Surgeon: Suraj Whitaker MD;  [facility]     marsupialization of pilonidal cyst  2007     TUBAL  "LIGATION  2005       ROS:  CONSTITUTIONAL: NEGATIVE for fever, chills, change in weight  INTEGUMENTARU/SKIN: NEGATIVE for worrisome rashes, moles or lesions  EYES: NEGATIVE for vision changes or irritation  ENT: NEGATIVE for ear, mouth and throat problems  RESP: NEGATIVE for significant cough or SOB  BREAST: NEGATIVE for masses, tenderness or discharge  CV: NEGATIVE for chest pain, palpitations or peripheral edema  GI: NEGATIVE for nausea, abdominal pain, heartburn, or change in bowel habits  : NEGATIVE for unusual urinary or vaginal symptoms. +partial hysterectomy  MUSCULOSKELETAL: NEGATIVE for significant arthralgias or myalgia  NEURO: NEGATIVE for weakness, dizziness or paresthesias  ENDOCRINE: NEGATIVE for temperature intolerance, skin/hair changes  PSYCHIATRIC: NEGATIVE for changes in mood or affect    OBJECTIVE:   /66   Pulse 85   Temp 98.7  F (37.1  C)   Ht 1.603 m (5' 3.1\")   Wt 55.2 kg (121 lb 9.6 oz)   LMP 08/10/2013   SpO2 99%   BMI 21.47 kg/m    EXAM:  GENERAL: healthy, alert and no distress  EYES: Eyes grossly normal to inspection, PERRL and conjunctivae and sclerae normal  HENT: ear canals and TM's normal, nose and mouth without ulcers or lesions  NECK: no adenopathy, no asymmetry, masses, or scars and thyroid normal to palpation  RESP: lungs clear to auscultation - no rales, rhonchi or wheezes  BREAST: normal without masses, tenderness or nipple discharge and no palpable axillary masses or adenopathy  CV: regular rate and rhythm, normal S1 S2, no S3 or S4, no murmur, click or rub, no peripheral edema and peripheral pulses strong  ABDOMEN: soft, nontender, no hepatosplenomegaly, no masses and bowel sounds normal   (female): normal female external genitalia, normal urethral meatus, vaginal mucosa pink, moist, well rugated, and normal cervix/adnexa/uterus without masses or discharge  MS: no gross musculoskeletal defects noted, no edema  SKIN: no suspicious lesions or rashes  NEURO: " Normal strength and tone, mentation intact and speech normal  PSYCH: mentation appears normal, affect normal/bright  LYMPH: no cervical, supraclavicular, axillary, or inguinal adenopathy    Diagnostic Test Results:  Labs reviewed in Epic  Results for orders placed or performed in visit on 12/17/19 (from the past 24 hour(s))   Lipid Profile (Chol, Trig, HDL, LDL calc)   Result Value Ref Range    Cholesterol 137 <200 mg/dL    Triglycerides 41 <150 mg/dL    HDL Cholesterol 77 >49 mg/dL    LDL Cholesterol Calculated 52 <100 mg/dL    Non HDL Cholesterol 60 <130 mg/dL   CBC with platelets   Result Value Ref Range    WBC 16.5 (H) 4.0 - 11.0 10e9/L    RBC Count 4.04 3.8 - 5.2 10e12/L    Hemoglobin 13.8 11.7 - 15.7 g/dL    Hematocrit 39.3 35.0 - 47.0 %    MCV 97 78 - 100 fl    MCH 34.2 (H) 26.5 - 33.0 pg    MCHC 35.1 31.5 - 36.5 g/dL    RDW 13.9 10.0 - 15.0 %    Platelet Count 237 150 - 450 10e9/L   Basic metabolic panel   Result Value Ref Range    Sodium 140 133 - 144 mmol/L    Potassium 3.4 3.4 - 5.3 mmol/L    Chloride 106 94 - 109 mmol/L    Carbon Dioxide 28 20 - 32 mmol/L    Anion Gap 6 3 - 14 mmol/L    Glucose 74 70 - 99 mg/dL    Urea Nitrogen 13 7 - 30 mg/dL    Creatinine 0.80 0.52 - 1.04 mg/dL    GFR Estimate >90 >60 mL/min/[1.73_m2]    GFR Estimate If Black >90 >60 mL/min/[1.73_m2]    Calcium 9.1 8.5 - 10.1 mg/dL       ASSESSMENT/PLAN:   (Z00.00) Routine general medical examination at a health care facility  (primary encounter diagnosis)  Comment: last week she took a prednisone burst for neck pain that is causing WBC elevation. Added diff. Normal exam  Plan: Lipid Profile (Chol, Trig, HDL, LDL calc), CBC         with platelets, Basic metabolic panel, A Pap         thin layer screen with HPV - recommended for         age 30 -65, HPV High Risk Types DNA Cervical              COUNSELING:   Reviewed preventive health counseling, as reflected in patient instructions       Regular exercise       Healthy  "diet/nutrition    Estimated body mass index is 21.47 kg/m  as calculated from the following:    Height as of this encounter: 1.603 m (5' 3.1\").    Weight as of this encounter: 55.2 kg (121 lb 9.6 oz).         reports that she has been smoking cigarettes. She started smoking about 18 years ago. She has a 7.50 pack-year smoking history. She has never used smokeless tobacco.  Tobacco Cessation Action Plan: Information offered: Patient not interested at this time    Counseling Resources:  ATP IV Guidelines  Pooled Cohorts Equation Calculator  Breast Cancer Risk Calculator  FRAX Risk Assessment  ICSI Preventive Guidelines  Dietary Guidelines for Americans, 2010  USDA's MyPlate  ASA Prophylaxis  Lung CA Screening    Payton Levi NP  [facility] [place]  "

## 2019-12-11 ENCOUNTER — OFFICE VISIT (OUTPATIENT)
Dept: FAMILY MEDICINE | Facility: OTHER | Age: 39
End: 2019-12-11
Attending: NURSE PRACTITIONER
Payer: COMMERCIAL

## 2019-12-11 VITALS
HEART RATE: 86 BPM | WEIGHT: 117 LBS | BODY MASS INDEX: 21.4 KG/M2 | SYSTOLIC BLOOD PRESSURE: 102 MMHG | DIASTOLIC BLOOD PRESSURE: 58 MMHG | TEMPERATURE: 98.5 F | OXYGEN SATURATION: 96 %

## 2019-12-11 DIAGNOSIS — M62.838 NECK MUSCLE SPASM: Primary | ICD-10-CM

## 2019-12-11 PROCEDURE — 99214 OFFICE O/P EST MOD 30 MIN: CPT | Performed by: NURSE PRACTITIONER

## 2019-12-11 RX ORDER — CYCLOBENZAPRINE HCL 10 MG
10 TABLET ORAL 3 TIMES DAILY PRN
Qty: 30 TABLET | Refills: 0 | Status: SHIPPED | OUTPATIENT
Start: 2019-12-11 | End: 2020-02-27

## 2019-12-11 RX ORDER — PREDNISONE 20 MG/1
TABLET ORAL
Qty: 10 TABLET | Refills: 0 | Status: SHIPPED | OUTPATIENT
Start: 2019-12-11 | End: 2019-12-17

## 2019-12-11 ASSESSMENT — PAIN SCALES - GENERAL: PAINLEVEL: SEVERE PAIN (6)

## 2019-12-11 NOTE — PATIENT INSTRUCTIONS
Patient Education     Muscle Spasm  A muscle spasm is a sudden tightening of the muscle you can t control. This may be caused by strain, overworking the muscle, or injury. It can also be caused by dehydration, electrolyte imbalance, diabetes, alcohol use, and certain medicines. If it goes on long enough the muscle spasm causes pain. Common areas for muscle spasm are the legs, neck, and back.  Home care    Heat, massage, and stretching will help relax muscle spasm.    When the spasm is in your arm or leg, stretch the muscle passively. To do this, have someone bend or straighten the joint above or below the muscle until you feel the stretch on the sore muscle. You can stretch the muscle actively by moving the affected body part. This will stretch the muscle that is in spasm. For example, if the spasm is in your calf, bend the ankle so your toes point upward toward your knee. This will stretch your calf muscle.    You may use over-the-counter pain medicine to control pain, unless another medicine was prescribed. If you have chronic liver or kidney disease or ever had a stomach ulcer or gastrointestinal bleeding, talk with your healthcare provider before using these medicines.  Follow-up care  Follow up with your healthcare provider, or as advised.    When to seek medical advice  Call your healthcare provider right away if any of the following occur:    Fingers or toes become swollen, cold, blue, numb, or tingly    You develop weakness in the affected arm or leg    Pain increases and is not controlled by the above measures  Date Last Reviewed: 5/1/2018 2000-2018 [employer]. [address] [place] [address]. All rights reserved. This information is not intended as a substitute for professional medical care. Always follow your healthcare professional's instructions.    Take Flexeril as needed as directed. Do not drive or participate in activities that require alertness.   Take Prednisone as  ordered. Take in the morning.   Stretch neck.   Use heat to neck. Protect skin.   Follow up if not improving.

## 2019-12-13 ENCOUNTER — TELEPHONE (OUTPATIENT)
Dept: FAMILY MEDICINE | Facility: OTHER | Age: 39
End: 2019-12-13

## 2019-12-13 DIAGNOSIS — B00.9 HERPES SIMPLEX VIRUS INFECTION: ICD-10-CM

## 2019-12-13 DIAGNOSIS — M54.2 ACUTE NECK PAIN: Primary | ICD-10-CM

## 2019-12-13 RX ORDER — VALACYCLOVIR HYDROCHLORIDE 500 MG/1
500 TABLET, FILM COATED ORAL DAILY
Qty: 90 TABLET | Refills: 0 | Status: SHIPPED | OUTPATIENT
Start: 2019-12-13 | End: 2020-03-12

## 2019-12-13 RX ORDER — HYDROCODONE BITARTRATE AND ACETAMINOPHEN 5; 325 MG/1; MG/1
1 TABLET ORAL EVERY 6 HOURS PRN
Qty: 20 TABLET | Refills: 0 | Status: SHIPPED | OUTPATIENT
Start: 2019-12-13 | End: 2020-02-27

## 2019-12-13 NOTE — TELEPHONE ENCOUNTER
"Patient called and said that the prednisone is helping the shoulders and back. Neck and headaches are still hurting. Flexeril is too strong to use during the day for work. Patient is using at night for bed. Looking for something to have during the day.  Patient states she has taken toradol in the past and it did not help. Requesting hydrocodone as she has taken it in the past and it did not make her feel \"out of it\" but helped with the pain. Patient states \"I am just tired of this pain\". Also has a request in for valcyclovir to [facility] that is waiting for approval but she is no longer her provider. Wondering if you could sign for that as well? [facility].  "

## 2019-12-13 NOTE — TELEPHONE ENCOUNTER
Patient informed of hard copy of norco at the  in [place]. Notified not to take during work. May take at bedtime.

## 2019-12-17 ENCOUNTER — OFFICE VISIT (OUTPATIENT)
Dept: FAMILY MEDICINE | Facility: OTHER | Age: 39
End: 2019-12-17
Attending: NURSE PRACTITIONER
Payer: COMMERCIAL

## 2019-12-17 ENCOUNTER — APPOINTMENT (OUTPATIENT)
Dept: LAB | Facility: OTHER | Age: 39
End: 2019-12-17
Attending: NURSE PRACTITIONER
Payer: COMMERCIAL

## 2019-12-17 VITALS
WEIGHT: 121.6 LBS | TEMPERATURE: 98.7 F | HEART RATE: 85 BPM | BODY MASS INDEX: 21.55 KG/M2 | OXYGEN SATURATION: 99 % | SYSTOLIC BLOOD PRESSURE: 118 MMHG | DIASTOLIC BLOOD PRESSURE: 66 MMHG | HEIGHT: 63 IN

## 2019-12-17 DIAGNOSIS — Z00.00 ROUTINE GENERAL MEDICAL EXAMINATION AT A HEALTH CARE FACILITY: Primary | ICD-10-CM

## 2019-12-17 DIAGNOSIS — B37.31 YEAST INFECTION OF THE VAGINA: Primary | ICD-10-CM

## 2019-12-17 LAB
ANION GAP SERPL CALCULATED.3IONS-SCNC: 6 MMOL/L (ref 3–14)
BASOPHILS NFR BLD AUTO: 0 %
BUN SERPL-MCNC: 13 MG/DL (ref 7–30)
CALCIUM SERPL-MCNC: 9.1 MG/DL (ref 8.5–10.1)
CHLORIDE SERPL-SCNC: 106 MMOL/L (ref 94–109)
CHOLEST SERPL-MCNC: 137 MG/DL
CO2 SERPL-SCNC: 28 MMOL/L (ref 20–32)
CREAT SERPL-MCNC: 0.8 MG/DL (ref 0.52–1.04)
DIFFERENTIAL METHOD BLD: NORMAL
EOSINOPHIL NFR BLD AUTO: 0 %
ERYTHROCYTE [DISTWIDTH] IN BLOOD BY AUTOMATED COUNT: 13.9 % (ref 10–15)
GFR SERPL CREATININE-BSD FRML MDRD: >90 ML/MIN/{1.73_M2}
GLUCOSE SERPL-MCNC: 74 MG/DL (ref 70–99)
HCT VFR BLD AUTO: 39.3 % (ref 35–47)
HDLC SERPL-MCNC: 77 MG/DL
HGB BLD-MCNC: 13.8 G/DL (ref 11.7–15.7)
LDLC SERPL CALC-MCNC: 52 MG/DL
LYMPHOCYTES NFR BLD AUTO: 73 %
MCH RBC QN AUTO: 34.2 PG (ref 26.5–33)
MCHC RBC AUTO-ENTMCNC: 35.1 G/DL (ref 31.5–36.5)
MCV RBC AUTO: 97 FL (ref 78–100)
MONOCYTES NFR BLD AUTO: 2 %
NEUTROPHILS NFR BLD AUTO: 25 %
NONHDLC SERPL-MCNC: 60 MG/DL
PLATELET # BLD AUTO: 237 10E9/L (ref 150–450)
POTASSIUM SERPL-SCNC: 3.4 MMOL/L (ref 3.4–5.3)
RBC # BLD AUTO: 4.04 10E12/L (ref 3.8–5.2)
SODIUM SERPL-SCNC: 140 MMOL/L (ref 133–144)
SPECIMEN SOURCE: ABNORMAL
TRIGL SERPL-MCNC: 41 MG/DL
WBC # BLD AUTO: 16.5 10E9/L (ref 4–11)
WET PREP SPEC: ABNORMAL

## 2019-12-17 PROCEDURE — 99395 PREV VISIT EST AGE 18-39: CPT | Performed by: NURSE PRACTITIONER

## 2019-12-17 PROCEDURE — 87624 HPV HI-RISK TYP POOLED RSLT: CPT | Performed by: NURSE PRACTITIONER

## 2019-12-17 PROCEDURE — 80061 LIPID PANEL: CPT | Performed by: NURSE PRACTITIONER

## 2019-12-17 PROCEDURE — 80048 BASIC METABOLIC PNL TOTAL CA: CPT | Performed by: NURSE PRACTITIONER

## 2019-12-17 PROCEDURE — 85004 AUTOMATED DIFF WBC COUNT: CPT | Mod: 91 | Performed by: NURSE PRACTITIONER

## 2019-12-17 PROCEDURE — 87210 SMEAR WET MOUNT SALINE/INK: CPT | Performed by: NURSE PRACTITIONER

## 2019-12-17 PROCEDURE — 85027 COMPLETE CBC AUTOMATED: CPT | Performed by: NURSE PRACTITIONER

## 2019-12-17 PROCEDURE — G0123 SCREEN CERV/VAG THIN LAYER: HCPCS | Performed by: NURSE PRACTITIONER

## 2019-12-17 PROCEDURE — 36415 COLL VENOUS BLD VENIPUNCTURE: CPT | Performed by: NURSE PRACTITIONER

## 2019-12-17 RX ORDER — FLUCONAZOLE 150 MG/1
150 TABLET ORAL DAILY
Qty: 3 TABLET | Refills: 0 | Status: SHIPPED | OUTPATIENT
Start: 2019-12-17 | End: 2020-02-27

## 2019-12-17 ASSESSMENT — MIFFLIN-ST. JEOR: SCORE: 1202.28

## 2019-12-17 ASSESSMENT — PAIN SCALES - GENERAL: PAINLEVEL: NO PAIN (0)

## 2019-12-17 NOTE — LETTER
December 30, 2019      Purnima Landa  [address]  [place] [address]        Dear Purnima,   Pap normal, HPV negative.     Results for orders placed or performed in visit on 12/17/19   Lipid Profile (Chol, Trig, HDL, LDL calc)     Status: None   Result Value Ref Range    Cholesterol 137 <200 mg/dL    Triglycerides 41 <150 mg/dL    HDL Cholesterol 77 >49 mg/dL    LDL Cholesterol Calculated 52 <100 mg/dL    Non HDL Cholesterol 60 <130 mg/dL   CBC with platelets     Status: Abnormal   Result Value Ref Range    WBC 16.5 (H) 4.0 - 11.0 10e9/L    RBC Count 4.04 3.8 - 5.2 10e12/L    Hemoglobin 13.8 11.7 - 15.7 g/dL    Hematocrit 39.3 35.0 - 47.0 %    MCV 97 78 - 100 fl    MCH 34.2 (H) 26.5 - 33.0 pg    MCHC 35.1 31.5 - 36.5 g/dL    RDW 13.9 10.0 - 15.0 %    Platelet Count 237 150 - 450 10e9/L   Basic metabolic panel     Status: None   Result Value Ref Range    Sodium 140 133 - 144 mmol/L    Potassium 3.4 3.4 - 5.3 mmol/L    Chloride 106 94 - 109 mmol/L    Carbon Dioxide 28 20 - 32 mmol/L    Anion Gap 6 3 - 14 mmol/L    Glucose 74 70 - 99 mg/dL    Urea Nitrogen 13 7 - 30 mg/dL    Creatinine 0.80 0.52 - 1.04 mg/dL    GFR Estimate >90 >60 mL/min/[1.73_m2]    GFR Estimate If Black >90 >60 mL/min/[1.73_m2]    Calcium 9.1 8.5 - 10.1 mg/dL   A Pap thin layer screen with HPV - recommended for age 30 -65     Status: None   Result Value Ref Range    PAP NIL     Copath Report         Patient Name: PURNIMA LANDA  MR#: [id number]  Specimen #: [id number]  Collected: 12/17/2019  Received: 12/18/2019  Reported: 12/24/2019 10:55  Ordering Phy(s): JERMAIN PADGETT    For improved result formatting, select 'View Enhanced Report Format' under   Linked Documents section.    SPECIMEN/STAIN PROCESS:  Pap thin layer prep screening ([facility])       Pap-Cyto x 1, HPV ordered x 1    SOURCE: Cervical, endocervical  ----------------------------------------------------------------   Pap thin layer prep screening ([facility])  SPECIMEN  ADEQUACY:  Satisfactory for evaluation.  -Transformation zone component absent.    CYTOLOGIC INTERPRETATION:    Negative for intraepithelial lesion or malignancy         Organism(s):  -Fungal organisms morphologically consistent with Candida spp.    Electronically signed out by:  GIOVANNY Reynoso ( ASCP)    CLINICAL HISTORY:  LMP: 08/10/2013  A previous normal pap  Date of Last Pap: 05/15/2018,    Papanicolaou Test Limitations:  Cervical cytology is  a screening test with   limited sensitivity; regular  screening is critical for cancer prevention; Pap tests are primarily   effective for the diagnosis/prevention of  squamous cell carcinoma, not adenocarcinomas or other cancers.    COLLECTION SITE:  Client:  [facility]  Location: [facility] (B)    The technical component of this testing was completed at [facility], with the professional  component performed at [facility], [address] [place] [address] ([phone])       HPV High Risk Types DNA Cervical     Status: None   Result Value Ref Range    HPV Source [facility]     HPV 16 DNA Negative NEG^Negative    HPV 18 DNA Negative NEG^Negative    Other HR HPV Negative NEG^Negative    Final Diagnosis This patient's sample is negative for HPV DNA.     Specimen Description Cervical Cells    WBC Differential     Status: None   Result Value Ref Range    Diff Method Manual Differential     % Neutrophils 25.0 %    % Lymphocytes 73.0 %    % Monocytes 2.0 %    % Eosinophils 0.0 %    % Basophils 0.0 %   Wet prep     Status: Abnormal   Result Value Ref Range    Specimen Description Vagina     Wet Prep No Trichomonas seen     Wet Prep No clue cells seen     Wet Prep Budding yeast with pseudohyphae (A)     Wet Prep Few  WBC'S seen            Sincerely,      From the office of:  Payton Levi NP

## 2019-12-17 NOTE — Clinical Note
We need an order for a wet prep for this patient. Also there is an order for a CBCD, is that meant for future?

## 2019-12-24 LAB
COPATH REPORT: NORMAL
PAP: NORMAL

## 2020-01-09 DIAGNOSIS — F41.1 GAD (GENERALIZED ANXIETY DISORDER): Primary | ICD-10-CM

## 2020-01-09 NOTE — TELEPHONE ENCOUNTER
Alprazolam      Last Written Prescription Date:  New Script  Last Fill Quantity: 0,   # refills: 0  Last Office Visit: 12/17/19  Future Office visit:       Routing refill request to provider for review/approval because:

## 2020-01-10 RX ORDER — ALPRAZOLAM 0.5 MG
0.5 TABLET ORAL
Qty: 30 TABLET | Refills: 0 | Status: SHIPPED | OUTPATIENT
Start: 2020-01-10 | End: 2020-02-12

## 2020-01-10 NOTE — TELEPHONE ENCOUNTER
Script last written 11/21/19. Med discontinued by LPN on 12/11/19 for reason- none. Please advise. Thank you!

## 2020-02-12 DIAGNOSIS — F41.1 GAD (GENERALIZED ANXIETY DISORDER): ICD-10-CM

## 2020-02-12 RX ORDER — ALPRAZOLAM 0.5 MG
0.5 TABLET ORAL
Qty: 30 TABLET | Refills: 0 | Status: SHIPPED | OUTPATIENT
Start: 2020-02-12 | End: 2020-03-10

## 2020-02-12 NOTE — TELEPHONE ENCOUNTER
xanax      Last Written Prescription Date:  1/10/20  Last Fill Quantity: 30,   # refills: 0  Last Office Visit: 12/17/19  Future Office visit:       Routing refill request to provider for review/approval because:  Drug not on the [facility], P or [facility] refill protocol or controlled substance
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
## 2020-02-27 ENCOUNTER — MYC MEDICAL ADVICE (OUTPATIENT)
Dept: FAMILY MEDICINE | Facility: OTHER | Age: 40
End: 2020-02-27

## 2020-02-27 ENCOUNTER — OFFICE VISIT (OUTPATIENT)
Dept: FAMILY MEDICINE | Facility: OTHER | Age: 40
End: 2020-02-27
Attending: NURSE PRACTITIONER
Payer: COMMERCIAL

## 2020-02-27 VITALS
BODY MASS INDEX: 20.31 KG/M2 | TEMPERATURE: 98.7 F | SYSTOLIC BLOOD PRESSURE: 104 MMHG | WEIGHT: 115 LBS | DIASTOLIC BLOOD PRESSURE: 64 MMHG | OXYGEN SATURATION: 99 % | HEART RATE: 84 BPM

## 2020-02-27 DIAGNOSIS — A63.0 GENITAL WARTS DUE TO HPV (HUMAN PAPILLOMAVIRUS): ICD-10-CM

## 2020-02-27 DIAGNOSIS — F90.8 ATTENTION DEFICIT HYPERACTIVITY DISORDER (ADHD), OTHER TYPE: ICD-10-CM

## 2020-02-27 DIAGNOSIS — L73.2 HIDRADENITIS SUPPURATIVA: Primary | ICD-10-CM

## 2020-02-27 DIAGNOSIS — L73.2 HIDRADENITIS SUPPURATIVA: ICD-10-CM

## 2020-02-27 PROCEDURE — 99214 OFFICE O/P EST MOD 30 MIN: CPT | Performed by: NURSE PRACTITIONER

## 2020-02-27 RX ORDER — IMIQUIMOD 37.5 MG/G
CREAM TOPICAL DAILY
Qty: 7.5 G | Refills: 1 | Status: SHIPPED | OUTPATIENT
Start: 2020-02-27 | End: 2020-03-05

## 2020-02-27 RX ORDER — DEXTROAMPHETAMINE SACCHARATE, AMPHETAMINE ASPARTATE MONOHYDRATE, DEXTROAMPHETAMINE SULFATE AND AMPHETAMINE SULFATE 5; 5; 5; 5 MG/1; MG/1; MG/1; MG/1
20 CAPSULE, EXTENDED RELEASE ORAL DAILY
Qty: 30 CAPSULE | Refills: 0 | Status: SHIPPED | OUTPATIENT
Start: 2020-02-27 | End: 2020-02-28

## 2020-02-27 RX ORDER — DEXTROAMPHETAMINE SACCHARATE, AMPHETAMINE ASPARTATE MONOHYDRATE, DEXTROAMPHETAMINE SULFATE AND AMPHETAMINE SULFATE 5; 5; 5; 5 MG/1; MG/1; MG/1; MG/1
20 CAPSULE, EXTENDED RELEASE ORAL DAILY
Qty: 30 CAPSULE | Refills: 0 | Status: SHIPPED | OUTPATIENT
Start: 2020-03-29 | End: 2020-02-28

## 2020-02-27 RX ORDER — DEXTROAMPHETAMINE SACCHARATE, AMPHETAMINE ASPARTATE MONOHYDRATE, DEXTROAMPHETAMINE SULFATE AND AMPHETAMINE SULFATE 5; 5; 5; 5 MG/1; MG/1; MG/1; MG/1
20 CAPSULE, EXTENDED RELEASE ORAL DAILY
Qty: 30 CAPSULE | Refills: 0 | Status: SHIPPED | OUTPATIENT
Start: 2020-04-29 | End: 2020-02-28

## 2020-02-27 ASSESSMENT — PAIN SCALES - GENERAL: PAINLEVEL: NO PAIN (0)

## 2020-02-27 NOTE — NURSING NOTE
"Chief Complaint   Patient presents with     Recheck Medication     hidradenitis suppurativa (Humira)        Initial /64   Pulse 84   Temp 98.7  F (37.1  C)   Wt 52.2 kg (115 lb)   LMP 08/10/2013   SpO2 99%   BMI 20.31 kg/m   Estimated body mass index is 20.31 kg/m  as calculated from the following:    Height as of 12/17/19: 1.603 m (5' 3.1\").    Weight as of this encounter: 52.2 kg (115 lb).  Medication Reconciliation: complete  Iraida La  "

## 2020-02-27 NOTE — TELEPHONE ENCOUNTER
Patient is requesting that her medications be sent to [facility] in [place] instead of the [facility] mail  / specialty pharmacy in [place].   Thank you

## 2020-02-28 ENCOUNTER — TELEPHONE (OUTPATIENT)
Dept: FAMILY MEDICINE | Facility: OTHER | Age: 40
End: 2020-02-28

## 2020-02-28 DIAGNOSIS — A63.0 GENITAL WARTS DUE TO HPV (HUMAN PAPILLOMAVIRUS): Primary | ICD-10-CM

## 2020-02-28 RX ORDER — DEXTROAMPHETAMINE SACCHARATE, AMPHETAMINE ASPARTATE MONOHYDRATE, DEXTROAMPHETAMINE SULFATE AND AMPHETAMINE SULFATE 5; 5; 5; 5 MG/1; MG/1; MG/1; MG/1
20 CAPSULE, EXTENDED RELEASE ORAL DAILY
Qty: 30 CAPSULE | Refills: 0 | Status: SHIPPED | OUTPATIENT
Start: 2020-04-29 | End: 2020-05-13

## 2020-02-28 RX ORDER — DEXTROAMPHETAMINE SACCHARATE, AMPHETAMINE ASPARTATE MONOHYDRATE, DEXTROAMPHETAMINE SULFATE AND AMPHETAMINE SULFATE 5; 5; 5; 5 MG/1; MG/1; MG/1; MG/1
20 CAPSULE, EXTENDED RELEASE ORAL DAILY
Qty: 30 CAPSULE | Refills: 0 | Status: SHIPPED | OUTPATIENT
Start: 2020-02-28 | End: 2020-03-02

## 2020-02-28 RX ORDER — IMIQUIMOD 37.5 MG/G
CREAM TOPICAL DAILY
Qty: 7.5 G | Refills: 1 | Status: CANCELLED | OUTPATIENT
Start: 2020-02-28

## 2020-02-28 RX ORDER — DEXTROAMPHETAMINE SACCHARATE, AMPHETAMINE ASPARTATE MONOHYDRATE, DEXTROAMPHETAMINE SULFATE AND AMPHETAMINE SULFATE 5; 5; 5; 5 MG/1; MG/1; MG/1; MG/1
20 CAPSULE, EXTENDED RELEASE ORAL DAILY
Qty: 30 CAPSULE | Refills: 0 | Status: SHIPPED | OUTPATIENT
Start: 2020-03-29 | End: 2020-05-13

## 2020-02-28 NOTE — TELEPHONE ENCOUNTER
Received a PA from [facility] for Imiquimod Pump 3.75% cream. Submitted on CMM. Waiting for a response.

## 2020-03-02 DIAGNOSIS — F90.8 ATTENTION DEFICIT HYPERACTIVITY DISORDER (ADHD), OTHER TYPE: ICD-10-CM

## 2020-03-02 RX ORDER — DEXTROAMPHETAMINE SACCHARATE, AMPHETAMINE ASPARTATE MONOHYDRATE, DEXTROAMPHETAMINE SULFATE AND AMPHETAMINE SULFATE 5; 5; 5; 5 MG/1; MG/1; MG/1; MG/1
20 CAPSULE, EXTENDED RELEASE ORAL DAILY
Qty: 30 CAPSULE | Refills: 0 | Status: CANCELLED | OUTPATIENT
Start: 2020-04-29

## 2020-03-02 RX ORDER — DEXTROAMPHETAMINE SACCHARATE, AMPHETAMINE ASPARTATE MONOHYDRATE, DEXTROAMPHETAMINE SULFATE AND AMPHETAMINE SULFATE 5; 5; 5; 5 MG/1; MG/1; MG/1; MG/1
20 CAPSULE, EXTENDED RELEASE ORAL DAILY
Qty: 30 CAPSULE | Refills: 0 | Status: CANCELLED | OUTPATIENT
Start: 2020-03-29

## 2020-03-02 RX ORDER — DEXTROAMPHETAMINE SACCHARATE, AMPHETAMINE ASPARTATE MONOHYDRATE, DEXTROAMPHETAMINE SULFATE AND AMPHETAMINE SULFATE 5; 5; 5; 5 MG/1; MG/1; MG/1; MG/1
20 CAPSULE, EXTENDED RELEASE ORAL DAILY
Qty: 30 CAPSULE | Refills: 0 | Status: SHIPPED | OUTPATIENT
Start: 2020-03-02 | End: 2020-05-13

## 2020-03-02 NOTE — TELEPHONE ENCOUNTER
I called [facility] and they received for end of March and end of April refill. Nothing currently. I sent in a prescription so she could start taking it now.

## 2020-03-03 ENCOUNTER — TELEPHONE (OUTPATIENT)
Dept: FAMILY MEDICINE | Facility: OTHER | Age: 40
End: 2020-03-03

## 2020-03-03 NOTE — TELEPHONE ENCOUNTER
2:09 PM    Reason for Call: Phone Call    John with [facility] would like clarification on patients prescription of Humera.  She can be reached at [phone].  (Triage lines were all busy).    Desiree Ac

## 2020-03-03 NOTE — TELEPHONE ENCOUNTER
Called the below and they need to know if pt has been on the Humira before and they need to know if they should dispense pens or prefilled syringes.    Please call back the pharmacy with the info.      Martha Payan RN

## 2020-03-03 NOTE — TELEPHONE ENCOUNTER
Called number provided. Left message on pharmacist line about prefilled syringes.  Iraida ESPINAL LPN

## 2020-03-03 NOTE — TELEPHONE ENCOUNTER
Received a DENIAL from [facility] for Imiquimod pump 3.75% cream.    Forms scanned to Epic.

## 2020-03-05 RX ORDER — IMIQUIMOD 12.5 MG/.25G
CREAM TOPICAL
Qty: 24 PACKET | Refills: 1 | Status: SHIPPED | OUTPATIENT
Start: 2020-03-06 | End: 2021-01-25

## 2020-03-05 NOTE — TELEPHONE ENCOUNTER
I ordered Imiquimod cream 5% to be used 3 times a week and no longer than 16 weeks. She should not get this cream in her vagina. It is external use only. This was the alternative that insurance would cover. Please advise.

## 2020-03-10 DIAGNOSIS — F41.1 GAD (GENERALIZED ANXIETY DISORDER): ICD-10-CM

## 2020-03-10 DIAGNOSIS — B00.9 HERPES SIMPLEX VIRUS INFECTION: ICD-10-CM

## 2020-03-10 RX ORDER — ALPRAZOLAM 0.5 MG
0.5 TABLET ORAL
Qty: 30 TABLET | Refills: 0 | Status: SHIPPED | OUTPATIENT
Start: 2020-03-12 | End: 2020-04-09

## 2020-03-10 NOTE — TELEPHONE ENCOUNTER
xanax      Last Written Prescription Date:  2/12/20  Last Fill Quantity: 30,   # refills: 0  Last Office Visit: 2/27/20  Future Office visit:       Routing refill request to provider for review/approval because:  Drug not on the [facility], P or [facility] refill protocol or controlled substance

## 2020-03-11 DIAGNOSIS — L73.2 HIDRADENITIS SUPPURATIVA: ICD-10-CM

## 2020-03-12 DIAGNOSIS — L73.2 HIDRADENITIS SUPPURATIVA: Primary | ICD-10-CM

## 2020-03-12 RX ORDER — VALACYCLOVIR HYDROCHLORIDE 500 MG/1
500 TABLET, FILM COATED ORAL DAILY
Qty: 90 TABLET | Refills: 2 | Status: SHIPPED | OUTPATIENT
Start: 2020-03-12 | End: 2020-10-08

## 2020-03-12 NOTE — TELEPHONE ENCOUNTER
[facility] Call Center    Phone Message    May a detailed message be left on voicemail: yes     Reason for Call: Medication Refill Request    Has the patient contacted the pharmacy for the refill? Yes   Name of medication being requested: adalimumab (HUMIRA) 40 MG/0.8ML prefilled syringe kit     Provider who prescribed the medication: Nasim Jha MD  Pharmacy: [facility] - [place] [address]    Date medication is needed: ASAP - supposed to received and setup this afternoon.  Please get a Dr to sign off on this Rx.     Action Taken: Message routed to:  [facility] dermatology    Travel Screening: Not Applicable                                                                        
see order sent: 6 Syringe  3  2/27/2020   
anxious about surgery

## 2020-03-12 NOTE — TELEPHONE ENCOUNTER
Pharmacy called that patient  requesting script for humira pens( not syringes) . And we can fax new order to [phone] or call [phone] .    Please advise.orders pending.    Iraida ESPINAL LPN

## 2020-03-18 ENCOUNTER — TELEPHONE (OUTPATIENT)
Dept: FAMILY MEDICINE | Facility: OTHER | Age: 40
End: 2020-03-18

## 2020-03-18 NOTE — TELEPHONE ENCOUNTER
I submitted an urgent PA on [facility] for Humira Pen 40 mg/0.8 ml pen-injector kit. Waiting for a response.

## 2020-03-18 NOTE — TELEPHONE ENCOUNTER
Patient is expecting pens of Humira and she is getting prefilled syringes.  Can this be changed?  There has been a PA sent for syringes but it will have to be a PA for pens and it needs to be urgent because the patient is out.    [facility]    [phone]  Ext [phone]

## 2020-03-18 NOTE — TELEPHONE ENCOUNTER
Patient needs an Urgent PA for the Humira.  Patient is completely out.     Joaquín [phone]  Ext [phone]  He is also sending a fax.

## 2020-03-18 NOTE — TELEPHONE ENCOUNTER
Hospitalist Progress Note  Bipin Patel NP  Answering service: [phone] from in house phone  Cell: [phone]   Date of Service:  2019  NAME:  Michelle Irby. :  1941  MRN:  [id number]    Admission Summary:   Pt originally went to his PCP due to painful urination. His UA was negative so was prescribed tramadol for pain. He took the first dose of 50 mg tramadol the night before admit, felt dizzy and lightheaded and then associated with nausea and 2-3 episodes of vomiting. He came to the hospital for further evaluation.     Interval history / Subjective:   Conversation 1530:   Called to speak with Simone Humphreys about medical updates and to discuss plan of care. At start of conversation, Nicolas Adams says she is going to refuse pts discharge tomorrow. She sites many reason why she plans to refuse discharge (util at least  per her):  she is not ready for her dad to leave; she has a lot of work to catch up on at home, she needs to get all of his long term and disability paperwork done and completed before he leaves the hospital and she is assigned a different ;  and that she has not toured the facility yet (though she has been by to see the accepting facility she says it was a night and wants to go during the day). Attempted to have a discussion about medical readiness for discharge at earliest tomorrow and she adamantly refuses. In short, pt is terminal and is weak. Hope is to discharge him to SNF to regain some strength and then transition to LTC, whether or not he will need hospice soon will be determined but as we are pursuing skilled therapy then that must be reserved for future plans. She also sites lack of care in \"nursing facilities in general\" as reason for not going until Monday.  She is very emotional and labile and will be a challenge/barrier to pts discharge from hospital.  is aware Thanks!   of situation and is being challenged as well.     7/24:   0830: Pt awake, lying in bed. Feels well - woke up with a start when staff member awakened him this morning and still was feeling jittery from that. Other than that - reports he pain is controlled. Taking Norco 7.5/325 and he says he is satisfied with pain relief he has received from this. He is aware he is also on steroids for gout (been on since 7/21)    1330: Pt visited once again as he complained that his tongue felt swollen. Upon entering room, noted pt was sleeping soundly. Awoken with a small jump when name was called. MM dry and pt had been mouth breathing. Tongue and oral cavity examined. Tongue does not appear swollen, no redness or white patches noted to tongue, other MM. Speech is clear and not slurred or thick. VSS. Encouraged pt to alert nurse if he felt any differently. Assessment & Plan:     Sepsis (POA, now recurred) improved:   - Fever, HR, suspect urinary source from recent instrumentation vs. Malignancy  - WBC normalized, afebrile  - repeat UA with large leuks >100 WBC 1+ bacteria. No growth on urine culture  - blood culture with no growth x 4 days  - procalcitonin negative   - continue levofloxacin (today is day 5) for potential easy transition to PO. Pharmacy dosing every 48 hours. Pain: Likely secondary to metastatic disease, L hip pain: Improved  - Pt having itching with dilaudid, discussed pain regimen with pt and daughter in detail, switched to [facility] 7/22 with good results. - Pt satisfied with pain control per him this am on rounds  - started on prednisone this weekend for suspected gout in feet, pt reports less pain in feet now, tapered prednisone down to 20 mg starting tomorrow (has had 4 days of 40 mg)    Hyperkalemia: Resolved  - s/p kayexalate  - takes veltassa (missed dose PTA), this has been resumed    JASIEL on CKD stage 5, Metabolic acidosis:  - Cr: rising, 6.57 7/23,  was 4.96 in 2/2019.  Likely has progression of kidney disease  - s/p bicarb gtt and now on NS (to continue through d/c)  - Nephrology consulted and following  - Poor dialysis candidate long term, and patient does not want it   - Dr. Rona Shultz discussed plan of care with pts daughter last evening  - spoke with Dr. Rona Shultz this am, clear for d/c when bed available - and recommended  STOPPING labs     UTI:bladder outlet obstruction  Hx of b/l hydronephrosis due to prostate cancer with stent placement in 2018:  - UA shows >100 WBCs with 2+ bacteria, he has ureteral stents  - 7/18: s/p bilateral ureteral stent replacement (difficult) and clot removal  - urine cultures x 2 negative     Dizziness/lightheadedness: Resolved    Nausea/vomiting: resolved  - side effects due to tramadol, discussed with the patient  - prn zofran     Hx of metastatic prostate cancer: on casodex     Hx of CAD s/p CABG: on aspirin and metoprolol     Hx HTN: on home metoprolol, BP normal    Code status: Full  DVT prophylaxis: SCDs  Care Plan discussed with: patient, nurse, nephrology and   Disposition: SNF pending, will likely need LTC and possible transition to Hospice    Pt is medically ready for discharge - daughter is declining this based on multiple factors listed above. Hospital Problems  Date Reviewed: 7/16/2018          Codes Class Noted POA    * (Principal) [facility]) [facility] ICD-10-CM: N17.9  ICD-9-CM: 584.9  7/17/2019 Unknown        Coronary artery disease involving native coronary artery of native heart without angina pectoris ICD-10-CM: I25.10  ICD-9-CM: 414.01  8/31/2018 Yes        Essential hypertension ICD-10-CM: I10  ICD-9-CM: 401.9  8/31/2018 Yes        Prostate cancer ([facility] ICD-10-CM: C61  ICD-9-CM: 185  8/31/2018 Yes        Hyperkalemia ICD-10-CM: E87.5  ICD-9-CM: 276.7  6/5/2018 Yes            Review of Systems:   Denied HA. No chest pain or pressure. No respiratory or GI complaints. + urinating.  No pain this am.     Vital Signs:    Last 24hrs VS reviewed since prior progress note. Most recent are:  Visit Vitals  /64 (BP 1 Location: Right arm, BP Patient Position: At rest)   Pulse 62   Temp 98 °F (36.7 °C)   Resp 18   Ht 5' 11\" (1.803 m)   Wt 110.2 kg (243 lb)   SpO2 95%   BMI 33.89 kg/m²       Intake/Output Summary (Last 24 hours) at 7/24/2019 1503  Last data filed at 7/24/2019 0813  Gross per 24 hour   Intake 840 ml   Output 1800 ml   Net -960 ml     Physical Examination:         Constitutional:  Cooperative, pleasant. NAD.    ENT:  MMM     Resp:  CTA bilaterally. On RA   CV:  Regular rhythm, normal rate, no murmurs. GI:  Soft, non distended, tender. Normoactive bowel sounds. +BM   :  Urine light and clear    Musculoskeletal:  Mild LE edema, warm, 2+ pulses throughout    Neurologic:  Moves all extremities. AAOx3. Psych: Calm and cooperative       Data Review:   Review and/or order of clinical lab test  Review and/or order of tests in the radiology section of CPT  Review and/or order of tests in the medicine section of CPT    Labs:     Recent Labs     07/22/19  0435   WBC 8.7   HGB 9.2*   HCT 30.2*        Recent Labs     07/23/19  0345 07/22/19  0435    139   K 4.5 4.8    104   CO2 23 22   BUN 72* 66*   CREA 6.57* 7.08*   * 78   CA 6.8* 6.7*   MG  --  1.8   PHOS 4.1 5.6*     Recent Labs     07/23/19  0345   ALB 2.0*     No results for input(s): INR, PTP, APTT in the last 72 hours. No lab exists for component: INREXT, INREXT   No results for input(s): FE, TIBC, PSAT, FERR in the last 72 hours. No results found for: FOL, RBCF   No results for input(s): PH, PCO2, PO2 in the last 72 hours. No results for input(s): CPK, CKNDX, TROIQ in the last 72 hours.     No lab exists for component: CPKMB  Lab Results   Component Value Date/Time    Cholesterol, total 85 10/07/2013 12:00 AM    HDL Cholesterol 5 10/07/2013 12:00 AM    LDL, calculated 39.6 10/07/2013 12:00 AM    Triglyceride 202 (H) 10/07/2013 12:00 AM CHOL/HDL Ratio 17.0 (H) 10/07/2013 12:00 AM     Lab Results   Component Value Date/Time    Glucose (POC) 105 (H) 07/17/2019 06:48 AM    Glucose (POC) 105 (H) 07/17/2019 05:56 AM    Glucose (POC) 137 (H) 07/18/2018 05:06 PM    Glucose (POC) 100 07/17/2018 06:07 AM    Glucose (POC) 59 (L) 06/20/2018 05:03 PM    Glucose (POC) 101 (H) 06/20/2018 03:14 PM    Glucose (POC) 83 06/10/2018 06:26 AM    Glucose (POC) 108 (H) 06/09/2018 10:08 PM    Glucose (POC) 138 (H) 06/09/2018 06:30 PM     Lab Results   Component Value Date/Time    Color YELLOW/STRAW 07/20/2019 10:40 AM    Appearance CLOUDY (A) 07/20/2019 10:40 AM    Specific gravity 1.007 07/20/2019 10:40 AM    Specific gravity 1.015 02/15/2019 04:01 PM    pH (UA) 8.0 07/20/2019 10:40 AM    Protein 100 (A) 07/20/2019 10:40 AM    Glucose NEGATIVE  07/20/2019 10:40 AM    Ketone NEGATIVE  07/20/2019 10:40 AM    Bilirubin NEGATIVE  07/20/2019 10:40 AM    Urobilinogen 0.2 07/20/2019 10:40 AM    Nitrites NEGATIVE  07/20/2019 10:40 AM    Leukocyte Esterase LARGE (A) 07/20/2019 10:40 AM    Epithelial cells FEW 07/20/2019 10:40 AM    Bacteria 1+ (A) 07/20/2019 10:40 AM    WBC >100 (H) 07/20/2019 10:40 AM    RBC 20-50 07/20/2019 10:40 AM     Medications Reviewed:     Current Facility-Administered Medications   Medication Dose Route Frequency    HYDROcodone-acetaminophen (NORCO) 7.5-325 mg per tablet 1 Tab  1 Tab Oral Q4H PRN    miconazole (SECURA) 2 % extra thick cream   Topical Q12H    predniSONE (DELTASONE) tablet 40 mg  40 mg Oral DAILY WITH BREAKFAST    levoFLOXacin (LEVAQUIN) 500 mg in D5W IVPB  500 mg IntraVENous Q48H    naloxone (NARCAN) injection 0.4 mg  0.4 mg IntraVENous EVERY 2 MINUTES AS NEEDED    polyethylene glycol (MIRALAX) packet 17 g  17 g Oral DAILY    allopurinol (ZYLOPRIM) tablet 100 mg  100 mg Oral DAILY    aspirin chewable tablet 81 mg  81 mg Oral DAILY    ferrous sulfate tablet 325 mg  325 mg Oral ACB&D    mirtazapine (REMERON) tablet 15 mg  15 mg Oral QHS    patiromer calcium sorbitex (VELTASSA) powder 8.4 g  8.4 g Oral DAILY    metoprolol tartrate (LOPRESSOR) tablet 25 mg  25 mg Oral BID    ondansetron (ZOFRAN) injection 4 mg  4 mg IntraVENous Q6H PRN    acetaminophen (TYLENOL) tablet 650 mg  650 mg Oral Q6H PRN    lidocaine (LIDODERM) 5 % patch 1 Patch  1 Patch TransDERmal Q24H   ______________________________________________________________________  EXPECTED LENGTH OF STAY: 5d 16h  ACTUAL LENGTH OF STAY:          Maggie 49, NP

## 2020-03-19 NOTE — TELEPHONE ENCOUNTER
Received an APPROVAL from [facility] for Humira Pen 40 mg/0.8 ml pen-injector kit.  Effective 03/17/2020 to 03/17/2021. Forms scanned to [facility].

## 2020-03-20 ENCOUNTER — TELEPHONE (OUTPATIENT)
Dept: FAMILY MEDICINE | Facility: OTHER | Age: 40
End: 2020-03-20

## 2020-03-23 NOTE — ED NOTES
Condition stable, understands discharge instructions.  Prescription x 1 e-scribed to Pharmacy of choice and prescription x 1 given to patient.  Discharged home.   Normal

## 2020-03-24 ENCOUNTER — TELEPHONE (OUTPATIENT)
Dept: FAMILY MEDICINE | Facility: OTHER | Age: 40
End: 2020-03-24

## 2020-03-24 NOTE — TELEPHONE ENCOUNTER
Spoke with insurance company on phone to verify humira medication. Verbals given from pcp to do CF injections due to patient preference .     Iraida ESPINAL LPN

## 2020-04-09 DIAGNOSIS — F41.1 GAD (GENERALIZED ANXIETY DISORDER): ICD-10-CM

## 2020-04-09 RX ORDER — ALPRAZOLAM 0.5 MG
0.5 TABLET ORAL
Qty: 30 TABLET | Refills: 0 | Status: SHIPPED | OUTPATIENT
Start: 2020-04-09 | End: 2020-05-07

## 2020-04-09 NOTE — TELEPHONE ENCOUNTER
ALPRAZolam (XANAX) 0.5 MG tablet         Last Written Prescription Date:  3/12/20  Last Fill Quantity: 30,   # refills: 0  Last Office Visit: 2/27/20  Future Office visit:       Routing refill request to provider for review/approval because:

## 2020-04-13 ENCOUNTER — TELEPHONE (OUTPATIENT)
Dept: FAMILY MEDICINE | Facility: OTHER | Age: 40
End: 2020-04-13

## 2020-04-13 NOTE — TELEPHONE ENCOUNTER
[facility] pharmacy calling in regards to the [employer] order. States that she had been through them previously then left and went to another pharmacy and then returned. She was on Humira CF before and that is what they sent her. She would like a new order for Humira CF to be sent instead as that is what they sent her. States it is better and takes less with this type. Please call [phone] with questions and verification. [phone] fax number. Ashley A. Lechevalier, LPN on 4/13/2020 at 10:44 AM

## 2020-05-07 ENCOUNTER — MYC MEDICAL ADVICE (OUTPATIENT)
Dept: FAMILY MEDICINE | Facility: OTHER | Age: 40
End: 2020-05-07

## 2020-05-29 ENCOUNTER — MYC MEDICAL ADVICE (OUTPATIENT)
Dept: FAMILY MEDICINE | Facility: OTHER | Age: 40
End: 2020-05-29

## 2020-05-29 DIAGNOSIS — M65.319 TRIGGER FINGER OF THUMB, UNSPECIFIED LATERALITY: Primary | ICD-10-CM

## 2020-06-14 ENCOUNTER — MYC MEDICAL ADVICE (OUTPATIENT)
Dept: FAMILY MEDICINE | Facility: OTHER | Age: 40
End: 2020-06-14

## 2020-06-14 DIAGNOSIS — F90.8 ATTENTION DEFICIT HYPERACTIVITY DISORDER (ADHD), OTHER TYPE: ICD-10-CM

## 2020-06-15 NOTE — TELEPHONE ENCOUNTER
adderall  Last Written Prescription Date: 5/13/20  Last Fill Quantity: 30 # of Refills: 0  Last Office Visit: 5/13/20

## 2020-06-15 NOTE — PROGRESS NOTES
New Patient Visit:     Patient Profile: 39-year old, RHD, Current Smoker, [employer] Employee, Patient of Payton Levi NP.     Chief Complaint: Right Hand Trigger Finger    HPI: Patient sent a MyChart message to her PCP on 5/29/2020 with a CC of a right trigger thumb. She has lost strength in the thumb and has a snapping/locking sensation, as well as throbbing pain. She had tried ice/heat and antiinflammatories without relief. An orthopedics referral was placed for an in-person evaluation.     Subjective:   Location: RIGHT and LEFT Trigger Thumb  Quality: popping, locking, pain  Severity: 5/10  Duration: 2 months  Modifying Factors: worse at all times  Associated Signs and Symptoms: none    Review Of Systems  Skin: positive for A1c pulley lump  Eyes: negative  Ears/Nose/Throat: negative  Respiratory: No shortness of breath, dyspnea on exertion, cough, or hemoptysis  Cardiovascular: negative  Gastrointestinal: negative  Genitourinary: negative  Musculoskeletal: positive for joint pain and trigger finger  Neurologic: negative  Psychiatric: negative  Hematologic/Lymphatic/Immunologic: negative  Endocrine: negative for, thyroid disorder and diabetes    Prior Fracture Hx: none to thumbs  Prior Surgical Hx: carpal tunnel - Dr. Murillo in past    No Known Allergies  Past Medical History:   Diagnosis Date     Hidradenitis 2/16/2007     Ischiorectal abscess and fistula      Kidney stones 2008    x2 passed on her own     Nondependent tobacco use disorder 10/11/2012     Papanicolaou smear of cervix with low grade squamous intraepithelial lesion (LGSIL) 10/29/2008     S/p partial hysterectomy with remaining cervical stump 09/27/2013     Past Surgical History:   Procedure Laterality Date     CYSTECTOMY PILONIDAL N/A 9/18/2017    Procedure: CYSTECTOMY PILONIDAL;  PILONIDAL CYSTECTOMY ;  Surgeon: Cordell Stephens MD;  Location: [facility]     ENDOSCOPY UPPER, COLONOSCOPY, COMBINED N/A 10/5/2018    Procedure: COMBINED  ENDOSCOPY UPPER, COLONOSCOPY;  UPPER ENDOSCOPY with Biopsy  AND COLONOSCOPY;  Surgeon: Cordell Stephens MD;  Location: [facility]     EXCISE LESION BUTTOCK(S) Left 6/8/2018    Procedure: EXCISE LESION BUTTOCK(S);  EXCISION OF GLUTEAL LEFT SKIN LESION;  Surgeon: Cordell Stephens MD;  Location: [facility]     EXCISE MASS NECK Right 8/4/2015    Procedure: EXCISE MASS NECK;  Surgeon: Nasir Jones MD;  Location: [facility]     INCISION AND DRAINAGE PERINEAL, COMBINED N/A 3/18/2015    Procedure: COMBINED INCISION AND DRAINAGE PERINEAL;  Surgeon: Jay Torres DO;  Location: [facility]     IRRIGATION AND DEBRIDEMENT GROIN N/A 2/7/2019    Procedure: IRRIGATION AND DEBRIDEMENT LEFT GROIN ABSCESS;  Surgeon: Cordell Stephens MD;  [facility]     LAPAROSCOPIC CHOLECYSTECTOMY N/A 11/20/2017    Procedure: LAPAROSCOPIC CHOLECYSTECTOMY;  LAPAROSCOPIC CHOLECYSTECTOMY;  Surgeon: Cordell Stephens MD;  [facility]     LAPAROSCOPIC HYSTERECTOMY SUPRACERVICAL, BILATERAL SALPINGO-OOPHORECTOMY, COMBINED  9/27/2013    Procedure: COMBINED LAPAROSCOPIC HYSTERECTOMY SUPRACERVICAL, SALPINGO-OOPHORECTOMY;  LAPAROSCOPIC SUPRACERVICAL HYSTERECTOMY AND RIGHT SALPINGO-OOPHORECTOMY, CYSTOSCOPY;  Surgeon: Denys Callahan MD;  Location: [facility]     LAPAROSCOPY DIAGNOSTIC (GYN) N/A 9/6/2018    Procedure: LAPAROSCOPY DIAGNOSTIC (GYN);  LAPAROSCOPY WITH PERITONEAL BIOPSIES AND REMOVAL LEFT FALLOPIAN TUBE;  Surgeon: Suraj Whitaker MD;  Location: [facility]     marsupialization of pilonidal cyst  2007     TUBAL LIGATION  2005     Current Outpatient Medications   Medication     adalimumab (HUMIRA PEN) 40 MG/0.8ML pen kit     amphetamine-dextroamphetamine (ADDERALL XR) 20 MG 24 hr capsule     [START ON 6/27/2020] amphetamine-dextroamphetamine (ADDERALL XR) 20 MG 24 hr capsule     [START ON 7/27/2020] amphetamine-dextroamphetamine (ADDERALL XR) 20 MG 24 hr capsule     imiquimod (ALDARA) 5 % external cream     valACYclovir (VALTREX) 500 MG tablet      No current facility-administered medications for this visit.      Social History     Socioeconomic History     Marital status:      Spouse name: Not on file     Number of children: Not on file     Years of education: Not on file     Highest education level: Not on file   Occupational History     Not on file   Social Needs     Financial resource strain: Not on file     Food insecurity     Worry: Not on file     Inability: Not on file     Transportation needs     Medical: Not on file     Non-medical: Not on file   Tobacco Use     Smoking status: Current Every Day Smoker     Packs/day: 0.50     Years: 15.00     Pack years: 7.50     Types: Cigarettes     Start date: 1/1/2001     Smokeless tobacco: Never Used     Tobacco comment: declined 2/27/2020   Substance and Sexual Activity     Alcohol use: No     Drug use: No     Sexual activity: Yes     Partners: Male     Birth control/protection: Condom   Lifestyle     Physical activity     Days per week: Not on file     Minutes per session: Not on file     Stress: Not on file   Relationships     Social connections     Talks on phone: Not on file     Gets together: Not on file     Attends Congregational service: Not on file     Active member of club or organization: Not on file     Attends meetings of clubs or organizations: Not on file     Relationship status: Not on file     Intimate partner violence     Fear of current or ex partner: Not on file     Emotionally abused: Not on file     Physically abused: Not on file     Forced sexual activity: Not on file   Other Topics Concern     Parent/sibling w/ CABG, MI or angioplasty before 65F 55M? No      Service Not Asked     Blood Transfusions Yes     Comment: PERMITS     Caffeine Concern Yes     Comment: SODA - NOT DAILY     Occupational Exposure Not Asked     Hobby Hazards Not Asked     Sleep Concern Not Asked     Stress Concern Not Asked     Weight Concern Not Asked     Special Diet Not Asked     Back Care Not Asked  "    Exercise Not Asked     Bike Helmet Not Asked     Seat Belt Not Asked     Self-Exams Not Asked   Social History Narrative    9/14  Purnima lives with her two boys.  She has a boyfriend who doesn't live with her.  She is an .   She has been a  for 4 years.       Family History   Problem Relation Age of Onset     Diabetes Paternal Grandmother      Objective: /72 (Cuff Size: Adult Regular)   Pulse 83   Temp 97.8  F (36.6  C) (Tympanic)   Ht 1.575 m (5' 2\")   Wt 51.1 kg (112 lb 9.6 oz)   LMP 08/10/2013   SpO2 99%   BMI 20.59 kg/m    Purnima is a pleasant, 39-year old. Head is normocephalic and atraumatic, sclerae are clear. Patient hears me normally, trachea midline, chest excursion is normal. Respiratory efforts are non-labored, all lung fields are clear to auscultation, there are no rales, rhonchi or wheezes. Cardiac: normal S1 and S2, regular rate/rhythm, there are no audible cardiac murmurs or gallops. Patient is alert and oriented and expresses proper mood and affect. Gait is normal.     PHYSICAL EXAMINATION:  Inspection: no signs of edema, bony deformity or skin abnormalities noted.    Palpation: TTP bilateral thumb A1 pulley. Snuffbox TTP none.     ROM:    Thumb adduction/abduction/flexion/extension: ROM is full, fluid and intact   Finger flexion/extension (MCP, DIP, PIP): ROM is full, fluid and intact   Abduction/Adduction (2-5th MCP joint): ROM is full, fluid and intact   Opposition: intact     strength: intact   Triggering: bilateral thumbs    Special testing: Preston exam normal    Neurovascular status: sensation and distal pulses intact.     Imaging; plain films of the right hand obtained today were negative for fracture or dislocation. Joint spaces normal in appearance.     Impression:   #1. Trigger Digit - Bilateral Thumb  #2. Tobacco Abuse - Not Interested in QuitPlan    Plan: Discussed anatomy, physiology and typical treatment plans for the above trigger digit " pattern. Surgical intervention is recommended and will consist of: bilateral trigger thumb release. Patient will require a preoperative H&P, preoperative COVID test 72-hours prior to procedure and a two-week postoperative visit with the orthopedics department. All risks and benefits of surgical intervention were discussed at length with patient. Allergies review and preoperative orders (antibiotics, etc.). are to be placed.     Postoperative Pain Management Plan: TBD  Postoperative Nausea Plan: If needed (Zofran)  Pharmacy of Choice: [facility] [place]    Patient is in agreement and understanding of the above treatment plan. All questions and concerns were addressed and answered to patient's satisfaction. If questions/concerns arise before next visit they may contact the office at any point in time and we would be happy to assist them. Patient will follow up with orthopedics 7/10/2020.     Alma Peterson PA-C  Orthopedic Physician Assistant  [facility]

## 2020-06-16 RX ORDER — DEXTROAMPHETAMINE SACCHARATE, AMPHETAMINE ASPARTATE, DEXTROAMPHETAMINE SULFATE AND AMPHETAMINE SULFATE 2.5; 2.5; 2.5; 2.5 MG/1; MG/1; MG/1; MG/1
10 TABLET ORAL DAILY
Qty: 30 TABLET | Refills: 0 | Status: SHIPPED | OUTPATIENT
Start: 2020-06-16 | End: 2020-07-07

## 2020-06-17 ENCOUNTER — PREP FOR PROCEDURE (OUTPATIENT)
Dept: ORTHOPEDICS | Facility: OTHER | Age: 40
End: 2020-06-17

## 2020-06-17 ENCOUNTER — OFFICE VISIT (OUTPATIENT)
Dept: ORTHOPEDICS | Facility: OTHER | Age: 40
End: 2020-06-17
Attending: FAMILY MEDICINE
Payer: COMMERCIAL

## 2020-06-17 VITALS
SYSTOLIC BLOOD PRESSURE: 114 MMHG | DIASTOLIC BLOOD PRESSURE: 72 MMHG | OXYGEN SATURATION: 99 % | HEIGHT: 62 IN | WEIGHT: 112.6 LBS | BODY MASS INDEX: 20.72 KG/M2 | TEMPERATURE: 97.8 F | HEART RATE: 83 BPM

## 2020-06-17 DIAGNOSIS — M65.311 TRIGGER THUMB OF BOTH THUMBS: Primary | ICD-10-CM

## 2020-06-17 DIAGNOSIS — M65.312 TRIGGER THUMB OF LEFT HAND: ICD-10-CM

## 2020-06-17 DIAGNOSIS — M65.312 TRIGGER THUMB OF BOTH THUMBS: Primary | ICD-10-CM

## 2020-06-17 DIAGNOSIS — M65.311 TRIGGER FINGER OF RIGHT THUMB: Primary | ICD-10-CM

## 2020-06-17 DIAGNOSIS — M65.319 TRIGGER FINGER OF THUMB, UNSPECIFIED LATERALITY: Primary | ICD-10-CM

## 2020-06-17 DIAGNOSIS — M65.319 TRIGGER FINGER OF THUMB, UNSPECIFIED LATERALITY: ICD-10-CM

## 2020-06-17 PROCEDURE — 99203 OFFICE O/P NEW LOW 30 MIN: CPT | Performed by: PHYSICIAN ASSISTANT

## 2020-06-17 PROCEDURE — 73130 X-RAY EXAM OF HAND: CPT | Mod: TC

## 2020-06-17 RX ORDER — KETOROLAC TROMETHAMINE 10 MG/1
10 TABLET, FILM COATED ORAL EVERY 6 HOURS PRN
Qty: 15 TABLET | Refills: 0 | Status: SHIPPED | OUTPATIENT
Start: 2020-06-17 | End: 2020-07-07

## 2020-06-17 ASSESSMENT — MIFFLIN-ST. JEOR: SCORE: 1144

## 2020-06-17 ASSESSMENT — PAIN SCALES - GENERAL: PAINLEVEL: SEVERE PAIN (6)

## 2020-06-17 NOTE — NURSING NOTE
"Chief Complaint   Patient presents with     Consult     Trigger finger of thumb, unspecified laterality; Referred by Dr. Boo       Initial /72 (Cuff Size: Adult Regular)   Pulse 83   Temp 97.8  F (36.6  C) (Tympanic)   Ht 1.575 m (5' 2\")   Wt 51.1 kg (112 lb 9.6 oz)   LMP 08/10/2013   SpO2 99%   BMI 20.59 kg/m   Estimated body mass index is 20.59 kg/m  as calculated from the following:    Height as of this encounter: 1.575 m (5' 2\").    Weight as of this encounter: 51.1 kg (112 lb 9.6 oz).  Medication Reconciliation: complete  Alison Luna LPN    "

## 2020-06-19 ENCOUNTER — MYC MEDICAL ADVICE (OUTPATIENT)
Dept: FAMILY MEDICINE | Facility: OTHER | Age: 40
End: 2020-06-19

## 2020-06-19 DIAGNOSIS — M65.311 TRIGGER THUMB OF BOTH THUMBS: Primary | ICD-10-CM

## 2020-06-19 DIAGNOSIS — M65.312 TRIGGER THUMB OF BOTH THUMBS: Primary | ICD-10-CM

## 2020-06-19 DIAGNOSIS — Z41.9 ELECTIVE SURGERY: Primary | ICD-10-CM

## 2020-06-19 RX ORDER — HYDROCODONE BITARTRATE AND ACETAMINOPHEN 5; 325 MG/1; MG/1
1 TABLET ORAL EVERY 6 HOURS PRN
Qty: 20 TABLET | Refills: 0 | Status: SHIPPED | OUTPATIENT
Start: 2020-06-19 | End: 2020-07-17

## 2020-06-19 RX ORDER — CEFAZOLIN SODIUM 2 G/100ML
2 INJECTION, SOLUTION INTRAVENOUS
Status: CANCELLED | OUTPATIENT
Start: 2020-07-10

## 2020-06-19 NOTE — PROGRESS NOTES
Preoperative orders placed.     Alma Peterson PA-C  Orthopedic Physician Assistant  [facility]

## 2020-06-19 NOTE — TELEPHONE ENCOUNTER
I sent Lortab into pharmacy for Purnima. Please direct her to take at night and use Ibuprofen or Toradol during the day. She should not drive or participate in activites that require alertness when taking Lortab. Thanks!

## 2020-07-06 ENCOUNTER — ANESTHESIA EVENT (OUTPATIENT)
Dept: SURGERY | Facility: HOSPITAL | Age: 40
End: 2020-07-06
Payer: COMMERCIAL

## 2020-07-06 ENCOUNTER — MYC REFILL (OUTPATIENT)
Dept: FAMILY MEDICINE | Facility: OTHER | Age: 40
End: 2020-07-06

## 2020-07-06 DIAGNOSIS — M65.311 TRIGGER THUMB OF BOTH THUMBS: ICD-10-CM

## 2020-07-06 DIAGNOSIS — M65.312 TRIGGER THUMB OF BOTH THUMBS: ICD-10-CM

## 2020-07-06 DIAGNOSIS — F90.8 ATTENTION DEFICIT HYPERACTIVITY DISORDER (ADHD), OTHER TYPE: ICD-10-CM

## 2020-07-06 DIAGNOSIS — B00.9 HERPES SIMPLEX VIRUS INFECTION: ICD-10-CM

## 2020-07-06 RX ORDER — VALACYCLOVIR HYDROCHLORIDE 500 MG/1
500 TABLET, FILM COATED ORAL DAILY
Qty: 90 TABLET | Refills: 2 | Status: CANCELLED | OUTPATIENT
Start: 2020-07-06

## 2020-07-06 RX ORDER — HYDROCODONE BITARTRATE AND ACETAMINOPHEN 5; 325 MG/1; MG/1
1 TABLET ORAL EVERY 6 HOURS PRN
Qty: 20 TABLET | Refills: 0 | Status: CANCELLED | OUTPATIENT
Start: 2020-07-06

## 2020-07-06 RX ORDER — FENTANYL CITRATE 50 UG/ML
25-50 INJECTION, SOLUTION INTRAMUSCULAR; INTRAVENOUS
Status: CANCELLED | OUTPATIENT
Start: 2020-07-06

## 2020-07-06 RX ORDER — DEXTROAMPHETAMINE SACCHARATE, AMPHETAMINE ASPARTATE MONOHYDRATE, DEXTROAMPHETAMINE SULFATE AND AMPHETAMINE SULFATE 5; 5; 5; 5 MG/1; MG/1; MG/1; MG/1
20 CAPSULE, EXTENDED RELEASE ORAL DAILY
Qty: 30 CAPSULE | Refills: 0 | Status: CANCELLED | OUTPATIENT
Start: 2020-07-06

## 2020-07-06 ASSESSMENT — LIFESTYLE VARIABLES: TOBACCO_USE: 1

## 2020-07-06 NOTE — PROGRESS NOTES
Postoperative Visit:    Chief Complaint: Trigger Digit Releases    Procedure: Bilateral Trigger Thumb Release on 7/10/2020 with Dr. Murillo    Patient Profile: 39-year old, RHD, Current Smoker, [employer] Employee, Patient of Payton Levi NP.      HPI: Patient sent a Gogiro[employer] message to her PCP on 5/29/2020 with a CC of a right trigger thumb. She has lost strength in the thumb and has a snapping/locking sensation, as well as throbbing pain. She had tried ice/heat and antiinflammatories without relief. An orthopedics referral was placed for an in-person evaluation.     Subjective:   Location: Bilateral Thumbs  Quality: stiff  Severity: 0/10  Palliative: rest, ice, pain management regimen  Provocative: overuse, bending thumb  Associated Signs and Symptoms: denies NTB. No signs of postoperative infection or complication.     Objective: /60 (BP Location: Left arm, Patient Position: Sitting, Cuff Size: Adult Regular)   Pulse 79   Temp 98  F (36.7  C) (Tympanic)   Wt 50.8 kg (112 lb)   LMP 08/10/2013   SpO2 99%   BMI 19.84 kg/m    Purnima is a pleasant, 39-year old. Head is normocephalic and atraumatic, sclerae are clear. Patient hears me normally, trachea midline, chest excursion is normal. Respiratory efforts are non-labored. Cardiac: there are no audible cardiac murmurs or gallops. Patient is alert and oriented and expresses proper mood and affect. Gait is normal.    Bilateral Wrist/hand; dressing is off. Sutures from left thumb removed in the office today. Skin/Incision sites are C/D/I. TTP incision site. ROM: of bilateral thumbs in flexion, extension, adduction, abduction - minimal due to patient discomfort. Strength not assessed due to acute postoperative status. Neurovascular status intact, distal pulses 2+, light touch sensation intact.     Imaging; no new imaging    Impression:   #1. Hand Surgery  #2. Postoperative Status  #3. Tobacco Abuse - Not Interested in QuitPlan    Plan: Continue to keep  incision sites covered until completely sealed over. Patient will RTC to see TYLER Blair on 7/27/2020 for right thumb suture removal. Pain should be well managed by OTC analgesics at this time. Work/School status - no new paperwork needed. Patient may use heat, RICE protocol and OTC analgesics PRN for pain and swelling control. Patient is in agreement and understanding of the above treatment plan. All questions and concerns were addressed and answered to patient's satisfaction. If questions/concerns arise before next visit they may contact the office at any point in time and we would be happy to assist them. Patient will follow up with orthopedics 7/27 nurse only and 2-week for orthopedic follow up.     Alma Peterson PA-C  Orthopedic Physician Assistant  [facility]

## 2020-07-06 NOTE — ANESTHESIA PREPROCEDURE EVALUATION
Anesthesia Pre-Procedure Evaluation    Patient: Purnima Fallon   MRN: [id number] : 1980          Preoperative Diagnosis: Trigger thumb of both thumbs [M65.311, M65.312]    Procedure(s):  RELEASE BILATERAL TRIGGER THUMBS    Past Medical History:   Diagnosis Date     Hidradenitis 2007     Ischiorectal abscess and fistula      Kidney stones 2008    x2 passed on her own     Nondependent tobacco use disorder 10/11/2012     Papanicolaou smear of cervix with low grade squamous intraepithelial lesion (LGSIL) 10/29/2008     S/p partial hysterectomy with remaining cervical stump 2013     Past Surgical History:   Procedure Laterality Date     CYSTECTOMY PILONIDAL N/A 2017    Procedure: CYSTECTOMY PILONIDAL;  PILONIDAL CYSTECTOMY ;  Surgeon: Cordell Stephens MD;  [facility]     ENDOSCOPY UPPER, COLONOSCOPY, COMBINED N/A 10/5/2018    Procedure: COMBINED ENDOSCOPY UPPER, COLONOSCOPY;  UPPER ENDOSCOPY with Biopsy  AND COLONOSCOPY;  Surgeon: Cordell Stephens MD;  [facility]     EXCISE LESION BUTTOCK(S) Left 2018    Procedure: EXCISE LESION BUTTOCK(S);  EXCISION OF GLUTEAL LEFT SKIN LESION;  Surgeon: Cordell Stephens MD;  [facility]     EXCISE MASS NECK Right 2015    Procedure: EXCISE MASS NECK;  Surgeon: Nasir Jones MD;  [facility]     INCISION AND DRAINAGE PERINEAL, COMBINED N/A 3/18/2015    Procedure: COMBINED INCISION AND DRAINAGE PERINEAL;  Surgeon: Jay Torres DO;  [facility]     IRRIGATION AND DEBRIDEMENT GROIN N/A 2019    Procedure: IRRIGATION AND DEBRIDEMENT LEFT GROIN ABSCESS;  Surgeon: Cordell Stephens MD;  [facility]     LAPAROSCOPIC CHOLECYSTECTOMY N/A 2017    Procedure: LAPAROSCOPIC CHOLECYSTECTOMY;  LAPAROSCOPIC CHOLECYSTECTOMY;  Surgeon: Cordell Stephens MD;  [facility]     LAPAROSCOPIC HYSTERECTOMY SUPRACERVICAL, BILATERAL SALPINGO-OOPHORECTOMY, COMBINED  2013    Procedure: COMBINED LAPAROSCOPIC  HYSTERECTOMY SUPRACERVICAL, SALPINGO-OOPHORECTOMY;  LAPAROSCOPIC SUPRACERVICAL HYSTERECTOMY AND RIGHT SALPINGO-OOPHORECTOMY, CYSTOSCOPY;  Surgeon: Denys Callahan MD;  [facility]     LAPAROSCOPY DIAGNOSTIC (GYN) N/A 9/6/2018    Procedure: LAPAROSCOPY DIAGNOSTIC (GYN);  LAPAROSCOPY WITH PERITONEAL BIOPSIES AND REMOVAL LEFT FALLOPIAN TUBE;  Surgeon: Suraj Whitaker MD;  [facility]     marsupialization of pilonidal cyst  2007     TUBAL LIGATION  2005       Anesthesia Evaluation     . Pt has had prior anesthetic.     No history of anesthetic complications          ROS/MED HX    ENT/Pulmonary:     (+)tobacco use, Current use 0.5 packs/day  , . .    Neurologic:     (+)migraines,     Cardiovascular:  - neg cardiovascular ROS       METS/Exercise Tolerance:  >4 METS   Hematologic:  - neg hematologic  ROS       Musculoskeletal:  - neg musculoskeletal ROS       GI/Hepatic: Comment: Ischiorectal abscess and fistula - neg GI/hepatic ROS       Renal/Genitourinary: Comment: Hidradenitis    (+) chronic renal disease, Nephrolithiasis ,       Endo:  - neg endo ROS       Psychiatric:     (+) psychiatric history depression and other (comment) (ADHD)      Infectious Disease:  - neg infectious disease ROS       Malignancy:      - no malignancy   Other:    - neg other ROS                      Physical Exam  Normal systems: cardiovascular, pulmonary and dental    Airway   Mallampati: I  TM distance: >3 FB  Neck ROM: full    Dental     Cardiovascular   Rhythm and rate: regular and normal      Pulmonary    breath sounds clear to auscultation            Lab Results   Component Value Date    WBC 16.5 (H) 12/17/2019    HGB 13.8 12/17/2019    HCT 39.3 12/17/2019     12/17/2019    CRP <2.9 08/20/2018     12/17/2019    POTASSIUM 3.4 12/17/2019    CHLORIDE 106 12/17/2019    CO2 28 12/17/2019    BUN 13 12/17/2019    CR 0.80 12/17/2019    GLC 74 12/17/2019    ANDRÉS 9.1 12/17/2019    ALBUMIN 4.7 06/27/2019    PROTTOTAL 8.0  "06/27/2019    ALT 34 06/27/2019    AST 23 06/27/2019    ALKPHOS 58 06/27/2019    BILITOTAL 0.3 06/27/2019    LIPASE 163 11/29/2017    AMYLASE 349 (H) 06/27/2017    TSH 2.12 05/15/2018    HCG Negative 09/30/2014    HCGS Negative 07/22/2013       Preop Vitals  BP Readings from Last 3 Encounters:   06/17/20 114/72   02/27/20 104/64   12/17/19 118/66    Pulse Readings from Last 3 Encounters:   06/17/20 83   02/27/20 84   12/17/19 85      Resp Readings from Last 3 Encounters:   06/11/19 18   05/10/19 18   04/18/19 18    SpO2 Readings from Last 3 Encounters:   06/17/20 99%   02/27/20 99%   12/17/19 99%      Temp Readings from Last 1 Encounters:   06/17/20 97.8  F (36.6  C) (Tympanic)    Ht Readings from Last 1 Encounters:   06/17/20 1.575 m (5' 2\")      Wt Readings from Last 1 Encounters:   06/17/20 51.1 kg (112 lb 9.6 oz)    Estimated body mass index is 20.59 kg/m  as calculated from the following:    Height as of 6/17/20: 1.575 m (5' 2\").    Weight as of 6/17/20: 51.1 kg (112 lb 9.6 oz).       Anesthesia Plan      History & Physical Review  History and physical reviewed and following examination; no interval change.    ASA Status:  3 .    NPO Status:  > 8 hours    Plan for General with Intravenous and Propofol induction. Maintenance will be Inhalation.    PONV prophylaxis:  Ondansetron (or other 5HT-3) and Dexamethasone or Solumedrol         Postoperative Care  Postoperative pain management:  IV analgesics.      Consents  Anesthetic plan, risks, benefits and alternatives discussed with:  Patient..                 ALONDRA Latham CRNA  "

## 2020-07-06 NOTE — PROGRESS NOTES
[facility] [place]  [address]  [place] [address]  [phone]  Dept: [phone]    PRE-OP EVALUATION:  Today's date: 2020    Purnima Fallon (: 1980) presents for pre-operative evaluation assessment as requested by Dr. Murillo.  She requires evaluation and anesthesia risk assessment prior to undergoing surgery/procedure for treatment of bilateral trigger thumbs .    Proposed Surgery/ Procedure: Bilateral trigger thumbs  Date of Surgery/ Procedure: 07/10/2020  Time of Surgery/ Procedure: unknown  Hospital/Surgical Facility: [facility]  Primary Physician: Payton Levi  Type of Anesthesia Anticipated: to be determined    Patient has a Health Care Directive or Living Will:  YES will try remember to bring it with for surgery    1. NO - Do you have a history of heart attack, stroke, stent, bypass or surgery on an artery in the head, neck, heart or legs?  2. NO - Do you ever have any pain or discomfort in your chest?  3. NO - Do you have a history of  Heart Failure?  4. NO - Are you troubled by shortness of breath when: walking on the level, up a slight hill or at night?  5. NO - Do you currently have a cold, bronchitis or other respiratory infection?  6. NO - Do you have a cough, shortness of breath or wheezing?  7. NO - Do you sometimes get pains in the calves of your legs when you walk?  8. NO - Do you or anyone in your family have previous history of blood clots?  9. NO - Do you or does anyone in your family have a serious bleeding problem such as prolonged bleeding following surgeries or cuts?  10. YES - HAVE YOU EVER HAD PROBLEMS WITH ANEMIA OR BEEN TOLD TO TAKE IRON PILLS? While she was pregnant years ago  11. NO - Have you had any abnormal blood loss such as black, tarry or bloody stools, or abnormal vaginal bleeding?  12. NO - Have you ever had a blood transfusion?  13. NO - Have you or any of your relatives ever had problems with anesthesia?  14. NO - Do you have sleep apnea,  excessive snoring or daytime drowsiness?  15. NO - Do you have any prosthetic heart valves?  16. NO - Do you have prosthetic joints?  17. NO - Is there any chance that you may be pregnant?    METS 4+    HPI:     HPI related to upcoming procedure: Bilateral thumb pain for a couple of months      See problem list for active medical problems.  Problems all longstanding and stable, except as noted/documented.  See ROS for pertinent symptoms related to these conditions.      MEDICAL HISTORY:     Patient Active Problem List    Diagnosis Date Noted     Trigger thumb of both thumbs 06/17/2020     Priority: Medium     Added automatically from request for surgery [id number]       Hidradenitis suppurativa 05/13/2019     Priority: Medium     Chronic female pelvic pain--LAPAROSCOPY w LEFT SALPINGECTOMY--normal pelvis--NO Endometriosis--9/2018 09/24/2018     Priority: Medium     Biliary dyskinesia 10/06/2017     Priority: Medium     H/O pilonidal cyst 09/26/2017     Priority: Medium     Pilonidal cyst 09/12/2017     Priority: Medium     ACP (advance care planning) 08/05/2016     Priority: Medium     Advance Care Planning 8/5/2016: ACP Review of Chart / Resources Provided:  Reviewed chart for advance care plan.  Purnima Del Real has no plan or code status on file. Discussed available resources and provided with information. Confirmed code status reflects current choices pending further ACP discussions.  Confirmed/documented legally designated decision makers.  Added by Purnima Mireles             Ischiorectal abscess and fistula      Priority: Medium     Kidney stones      Priority: Medium     x2 passed on her own       S/P laparoscopic hysterectomy 09/27/2013     Priority: Medium     Cystic acne 07/23/2013     Priority: Medium     Migraines      Priority: Medium     Depression      Priority: Medium     ADHD (attention deficit hyperactivity disorder)      Priority: Medium     Mass of breast 02/02/2012     Priority: Medium     Malunion  of fracture 05/17/2007     Priority: Medium     Pain in joint, forearm 05/17/2007     Priority: Medium     Overview:   IMO Update 10/11       Carpal tunnel syndrome 08/23/2006     Priority: Medium      Past Medical History:   Diagnosis Date     Hidradenitis 2/16/2007     Ischiorectal abscess and fistula      Kidney stones 2008    x2 passed on her own     Nondependent tobacco use disorder 10/11/2012     Papanicolaou smear of cervix with low grade squamous intraepithelial lesion (LGSIL) 10/29/2008     S/p partial hysterectomy with remaining cervical stump 09/27/2013     Past Surgical History:   Procedure Laterality Date     CYSTECTOMY PILONIDAL N/A 9/18/2017    Procedure: CYSTECTOMY PILONIDAL;  PILONIDAL CYSTECTOMY ;  Surgeon: Cordell Stephens MD;  [facility]     ENDOSCOPY UPPER, COLONOSCOPY, COMBINED N/A 10/5/2018    Procedure: COMBINED ENDOSCOPY UPPER, COLONOSCOPY;  UPPER ENDOSCOPY with Biopsy  AND COLONOSCOPY;  Surgeon: Cordell Stephens MD;  [facility]     EXCISE LESION BUTTOCK(S) Left 6/8/2018    Procedure: EXCISE LESION BUTTOCK(S);  EXCISION OF GLUTEAL LEFT SKIN LESION;  Surgeon: Cordell Stephens MD;  [facility]     EXCISE MASS NECK Right 8/4/2015    Procedure: EXCISE MASS NECK;  Surgeon: Nasir Jones MD;  [facility]     INCISION AND DRAINAGE PERINEAL, COMBINED N/A 3/18/2015    Procedure: COMBINED INCISION AND DRAINAGE PERINEAL;  Surgeon: Jay Torres DO;  [facility]     IRRIGATION AND DEBRIDEMENT GROIN N/A 2/7/2019    Procedure: IRRIGATION AND DEBRIDEMENT LEFT GROIN ABSCESS;  Surgeon: Cordell Stephens MD;  [facility]     LAPAROSCOPIC CHOLECYSTECTOMY N/A 11/20/2017    Procedure: LAPAROSCOPIC CHOLECYSTECTOMY;  LAPAROSCOPIC CHOLECYSTECTOMY;  Surgeon: Cordell Stephens MD;  [facility]     LAPAROSCOPIC HYSTERECTOMY SUPRACERVICAL, BILATERAL SALPINGO-OOPHORECTOMY, COMBINED  9/27/2013    Procedure: COMBINED LAPAROSCOPIC HYSTERECTOMY SUPRACERVICAL,  SALPINGO-OOPHORECTOMY;  LAPAROSCOPIC SUPRACERVICAL HYSTERECTOMY AND RIGHT SALPINGO-OOPHORECTOMY, CYSTOSCOPY;  Surgeon: Denys Callahan MD;  [facility]     LAPAROSCOPY DIAGNOSTIC (GYN) N/A 9/6/2018    Procedure: LAPAROSCOPY DIAGNOSTIC (GYN);  LAPAROSCOPY WITH PERITONEAL BIOPSIES AND REMOVAL LEFT FALLOPIAN TUBE;  Surgeon: Suraj Whitaker MD;  [facility]     marsupialization of pilonidal cyst  2007     TUBAL LIGATION  2005     Current Outpatient Medications   Medication Sig Dispense Refill     adalimumab (HUMIRA PEN) 40 MG/0.8ML pen kit Inject 0.8 mLs (40 mg) Subcutaneous every 7 days 6 pen 1     ALPRAZolam (XANAX) 0.5 MG tablet        amphetamine-dextroamphetamine (ADDERALL XR) 20 MG 24 hr capsule Take 1 capsule (20 mg) by mouth daily 30 capsule 0     [START ON 7/27/2020] amphetamine-dextroamphetamine (ADDERALL XR) 20 MG 24 hr capsule Take 1 capsule (20 mg) by mouth daily 30 capsule 0     [START ON 7/15/2020] amphetamine-dextroamphetamine (ADDERALL) 10 MG tablet Take 1 tablet (10 mg) by mouth daily 30 tablet 0     HYDROcodone-acetaminophen (NORCO) 5-325 MG tablet Take 1 tablet by mouth every 6 hours as needed for severe pain 10 tablet 0     HYDROcodone-acetaminophen (NORCO) 5-325 MG tablet Take 1 tablet by mouth every 6 hours as needed for pain 20 tablet 0     imiquimod (ALDARA) 5 % external cream Apply topically three times a week Do not use longer than 16 weeks. 24 packet 1     valACYclovir (VALTREX) 500 MG tablet Take 1 tablet (500 mg) by mouth daily 90 tablet 2     OTC products: none    No Known Allergies   Latex Allergy: NO    Social History     Tobacco Use     Smoking status: Current Every Day Smoker     Packs/day: 0.50     Years: 15.00     Pack years: 7.50     Types: Cigarettes     Start date: 1/1/2001     Smokeless tobacco: Never Used     Tobacco comment: declined 2/27/2020   Substance Use Topics     Alcohol use: No     History   Drug Use No       REVIEW OF SYSTEMS:   CONSTITUTIONAL: NEGATIVE for  "fever, chills, change in weight  INTEGUMENTARY/SKIN: NEGATIVE for worrisome rashes, moles or lesions  EYES: NEGATIVE for vision changes or irritation  ENT/MOUTH: NEGATIVE for ear, mouth and throat problems  RESP: NEGATIVE for significant cough or SOB  BREAST: NEGATIVE for masses, tenderness or discharge  CV: NEGATIVE for chest pain, palpitations or peripheral edema  GI: NEGATIVE for nausea, abdominal pain, heartburn, or change in bowel habits  : NEGATIVE for frequency, dysuria, or hematuria  MUSCULOSKELETAL: NEGATIVE for significant arthralgias or myalgia  NEURO: NEGATIVE for weakness, dizziness or paresthesias  ENDOCRINE: NEGATIVE for temperature intolerance, skin/hair changes  HEME: NEGATIVE for bleeding problems  PSYCHIATRIC: NEGATIVE for changes in mood or affect    EXAM:   /62   Pulse 82   Temp 98.3  F (36.8  C) (Tympanic)   Ht 1.6 m (5' 3\")   Wt 51.7 kg (114 lb)   LMP 08/10/2013   SpO2 98%   BMI 20.19 kg/m      GENERAL APPEARANCE: healthy, alert and no distress     EYES: EOMI, PERRL     HENT: ear canals and TM's normal and nose and mouth without ulcers or lesions     NECK: no adenopathy, no asymmetry, masses, or scars and thyroid normal to palpation     RESP: lungs clear to auscultation - no rales, rhonchi or wheezes     CV: regular rates and rhythm, normal S1 S2, no S3 or S4 and no murmur, click or rub     ABDOMEN:  soft, nontender, no HSM or masses and bowel sounds normal     MS: extremities normal- no gross deformities noted, no evidence of inflammation in joints, FROM in all extremities.     SKIN: no suspicious lesions or rashes     NEURO: Normal strength and tone, sensory exam grossly normal, mentation intact and speech normal     PSYCH: mentation appears normal. and affect normal/bright     LYMPHATICS: No cervical adenopathy    DIAGNOSTICS:   Labs Resulted Today:     Results for orders placed or performed in visit on 07/07/20   CBC with platelets and differential     Status: Abnormal "   Result Value Ref Range    WBC 9.0 4.0 - 11.0 10e9/L    RBC Count 3.91 3.8 - 5.2 10e12/L    Hemoglobin 13.3 11.7 - 15.7 g/dL    Hematocrit 38.1 35.0 - 47.0 %    MCV 97 78 - 100 fl    MCH 34.0 (H) 26.5 - 33.0 pg    MCHC 34.9 31.5 - 36.5 g/dL    RDW 12.6 10.0 - 15.0 %    Platelet Count 250 150 - 450 10e9/L    Diff Method Automated Method     % Neutrophils 41.1 %    % Lymphocytes 48.8 %    % Monocytes 7.9 %    % Eosinophils 1.2 %    % Basophils 0.7 %    % Immature Granulocytes 0.3 %    Nucleated RBCs 0 0 /100    Absolute Neutrophil 3.7 1.6 - 8.3 10e9/L    Absolute Lymphocytes 4.4 0.8 - 5.3 10e9/L    Absolute Monocytes 0.7 0.0 - 1.3 10e9/L    Absolute Eosinophils 0.1 0.0 - 0.7 10e9/L    Absolute Basophils 0.1 0.0 - 0.2 10e9/L    Abs Immature Granulocytes 0.0 0 - 0.4 10e9/L    Absolute Nucleated RBC 0.0    Comprehensive metabolic panel     Status: Abnormal   Result Value Ref Range    Sodium 138 133 - 144 mmol/L    Potassium 4.6 3.4 - 5.3 mmol/L    Chloride 111 (H) 94 - 109 mmol/L    Carbon Dioxide 26 20 - 32 mmol/L    Anion Gap 1 (L) 3 - 14 mmol/L    Glucose 94 70 - 99 mg/dL    Urea Nitrogen 19 7 - 30 mg/dL    Creatinine 0.82 0.52 - 1.04 mg/dL    GFR Estimate 90 >60 mL/min/[1.73_m2]    GFR Estimate If Black >90 >60 mL/min/[1.73_m2]    Calcium 9.0 8.5 - 10.1 mg/dL    Bilirubin Total 0.4 0.2 - 1.3 mg/dL    Albumin 3.9 3.4 - 5.0 g/dL    Protein Total 7.1 6.8 - 8.8 g/dL    Alkaline Phosphatase 38 (L) 40 - 150 U/L    ALT 34 0 - 50 U/L    AST 26 0 - 45 U/L   UA reflex to Microscopic and Culture - [place]     Status: Abnormal    Specimen: Midstream Urine   Result Value Ref Range    Color Urine Yellow     Appearance Urine Cloudy     Glucose Urine Negative NEG^Negative mg/dL    Bilirubin Urine Negative NEG^Negative    Ketones Urine Negative NEG^Negative mg/dL    Specific Gravity Urine 1.027 1.003 - 1.035    Blood Urine Negative NEG^Negative    pH Urine 8.0 4.7 - 8.0 pH    Protein Albumin Urine 10 (A) NEG^Negative mg/dL     Urobilinogen mg/dL Normal 0.0 - 2.0 mg/dL    Nitrite Urine Negative NEG^Negative    Leukocyte Esterase Urine Negative NEG^Negative    Source Midstream Urine     RBC Urine 0 0 - 2 /HPF    WBC Urine 0 0 - 5 /HPF    Bacteria Urine None (A) NEG^Negative /HPF    Squamous Epithelial /HPF Urine 1 0 - 1 /HPF    Amorphous Crystals Moderate (A) NEG^Negative /HPF       IMPRESSION:     Reason for surgery/procedure: Bilateral thumb pain from overuse injury    Diagnosis/reason for consult: Pre-op    The proposed surgical procedure is considered INTERMEDIATE risk.    REVISED CARDIAC RISK INDEX  The patient has the following serious cardiovascular risks for perioperative complications such as (MI, PE, VFib and 3  AV Block):  No serious cardiac risks  INTERPRETATION: 1 risks: Class II (low risk - 0.9% complication rate)    The patient has the following additional risks for perioperative complications:  No identified additional risks    (Z01.818) Preop general physical exam  (primary encounter diagnosis)  Comment: labs, urine, and exam  Plan: CBC with platelets and differential,         Comprehensive metabolic panel, UA reflex to         Microscopic and Culture - [place]            (M65.311,  M65.312) Trigger thumb of both thumbs  Comment: short fill of Norco prior to surgery  Plan: HYDROcodone-acetaminophen (NORCO) 5-325 MG         tablet              RECOMMENDATIONS:     I will give her a short fill of Norco prior to her surgery. Further pain medications per orthopedics.    --Patient is to take all scheduled medications on the day of surgery EXCEPT for modifications listed below.    Hold all am medications    APPROVAL GIVEN to proceed with proposed procedure, without further diagnostic evaluation       Signed Electronically by: Payton Levi NP    Copy of this evaluation report is provided to requesting physician.    [facility] Preop Guidelines    Revised Cardiac Risk Index

## 2020-07-07 ENCOUNTER — OFFICE VISIT (OUTPATIENT)
Dept: FAMILY MEDICINE | Facility: OTHER | Age: 40
End: 2020-07-07
Attending: NURSE PRACTITIONER
Payer: COMMERCIAL

## 2020-07-07 ENCOUNTER — OFFICE VISIT (OUTPATIENT)
Dept: FAMILY MEDICINE | Facility: OTHER | Age: 40
End: 2020-07-07
Attending: ORTHOPAEDIC SURGERY
Payer: COMMERCIAL

## 2020-07-07 VITALS
TEMPERATURE: 98.3 F | SYSTOLIC BLOOD PRESSURE: 104 MMHG | BODY MASS INDEX: 20.2 KG/M2 | HEART RATE: 82 BPM | OXYGEN SATURATION: 98 % | HEIGHT: 63 IN | WEIGHT: 114 LBS | DIASTOLIC BLOOD PRESSURE: 62 MMHG

## 2020-07-07 DIAGNOSIS — M65.311 TRIGGER THUMB OF BOTH THUMBS: ICD-10-CM

## 2020-07-07 DIAGNOSIS — Z01.818 PRE-OP EXAM: Primary | ICD-10-CM

## 2020-07-07 DIAGNOSIS — F90.8 ATTENTION DEFICIT HYPERACTIVITY DISORDER (ADHD), OTHER TYPE: ICD-10-CM

## 2020-07-07 DIAGNOSIS — Z41.9 ELECTIVE SURGERY: ICD-10-CM

## 2020-07-07 DIAGNOSIS — M65.312 TRIGGER THUMB OF BOTH THUMBS: ICD-10-CM

## 2020-07-07 DIAGNOSIS — Z01.818 PREOP GENERAL PHYSICAL EXAM: Primary | ICD-10-CM

## 2020-07-07 LAB
ALBUMIN SERPL-MCNC: 3.9 G/DL (ref 3.4–5)
ALBUMIN UR-MCNC: 10 MG/DL
ALP SERPL-CCNC: 38 U/L (ref 40–150)
ALT SERPL W P-5'-P-CCNC: 34 U/L (ref 0–50)
AMORPH CRY #/AREA URNS HPF: ABNORMAL /HPF
ANION GAP SERPL CALCULATED.3IONS-SCNC: 1 MMOL/L (ref 3–14)
APPEARANCE UR: ABNORMAL
AST SERPL W P-5'-P-CCNC: 26 U/L (ref 0–45)
BACTERIA #/AREA URNS HPF: ABNORMAL /HPF
BASOPHILS # BLD AUTO: 0.1 10E9/L (ref 0–0.2)
BASOPHILS NFR BLD AUTO: 0.7 %
BILIRUB SERPL-MCNC: 0.4 MG/DL (ref 0.2–1.3)
BILIRUB UR QL STRIP: NEGATIVE
BUN SERPL-MCNC: 19 MG/DL (ref 7–30)
CALCIUM SERPL-MCNC: 9 MG/DL (ref 8.5–10.1)
CHLORIDE SERPL-SCNC: 111 MMOL/L (ref 94–109)
CO2 SERPL-SCNC: 26 MMOL/L (ref 20–32)
COLOR UR AUTO: YELLOW
CREAT SERPL-MCNC: 0.82 MG/DL (ref 0.52–1.04)
DIFFERENTIAL METHOD BLD: ABNORMAL
EOSINOPHIL # BLD AUTO: 0.1 10E9/L (ref 0–0.7)
EOSINOPHIL NFR BLD AUTO: 1.2 %
ERYTHROCYTE [DISTWIDTH] IN BLOOD BY AUTOMATED COUNT: 12.6 % (ref 10–15)
GFR SERPL CREATININE-BSD FRML MDRD: 90 ML/MIN/{1.73_M2}
GLUCOSE SERPL-MCNC: 94 MG/DL (ref 70–99)
GLUCOSE UR STRIP-MCNC: NEGATIVE MG/DL
HCT VFR BLD AUTO: 38.1 % (ref 35–47)
HGB BLD-MCNC: 13.3 G/DL (ref 11.7–15.7)
HGB UR QL STRIP: NEGATIVE
IMM GRANULOCYTES # BLD: 0 10E9/L (ref 0–0.4)
IMM GRANULOCYTES NFR BLD: 0.3 %
KETONES UR STRIP-MCNC: NEGATIVE MG/DL
LEUKOCYTE ESTERASE UR QL STRIP: NEGATIVE
LYMPHOCYTES # BLD AUTO: 4.4 10E9/L (ref 0.8–5.3)
LYMPHOCYTES NFR BLD AUTO: 48.8 %
MCH RBC QN AUTO: 34 PG (ref 26.5–33)
MCHC RBC AUTO-ENTMCNC: 34.9 G/DL (ref 31.5–36.5)
MCV RBC AUTO: 97 FL (ref 78–100)
MONOCYTES # BLD AUTO: 0.7 10E9/L (ref 0–1.3)
MONOCYTES NFR BLD AUTO: 7.9 %
NEUTROPHILS # BLD AUTO: 3.7 10E9/L (ref 1.6–8.3)
NEUTROPHILS NFR BLD AUTO: 41.1 %
NITRATE UR QL: NEGATIVE
NRBC # BLD AUTO: 0 10*3/UL
NRBC BLD AUTO-RTO: 0 /100
PH UR STRIP: 8 PH (ref 4.7–8)
PLATELET # BLD AUTO: 250 10E9/L (ref 150–450)
POTASSIUM SERPL-SCNC: 4.6 MMOL/L (ref 3.4–5.3)
PROT SERPL-MCNC: 7.1 G/DL (ref 6.8–8.8)
RBC # BLD AUTO: 3.91 10E12/L (ref 3.8–5.2)
RBC #/AREA URNS AUTO: 0 /HPF (ref 0–2)
SODIUM SERPL-SCNC: 138 MMOL/L (ref 133–144)
SOURCE: ABNORMAL
SP GR UR STRIP: 1.03 (ref 1–1.03)
SQUAMOUS #/AREA URNS AUTO: 1 /HPF (ref 0–1)
UROBILINOGEN UR STRIP-MCNC: NORMAL MG/DL (ref 0–2)
WBC # BLD AUTO: 9 10E9/L (ref 4–11)
WBC #/AREA URNS AUTO: 0 /HPF (ref 0–5)

## 2020-07-07 PROCEDURE — 81001 URINALYSIS AUTO W/SCOPE: CPT | Performed by: NURSE PRACTITIONER

## 2020-07-07 PROCEDURE — 80053 COMPREHEN METABOLIC PANEL: CPT | Performed by: NURSE PRACTITIONER

## 2020-07-07 PROCEDURE — 99214 OFFICE O/P EST MOD 30 MIN: CPT | Performed by: NURSE PRACTITIONER

## 2020-07-07 PROCEDURE — 36415 COLL VENOUS BLD VENIPUNCTURE: CPT | Performed by: NURSE PRACTITIONER

## 2020-07-07 PROCEDURE — 85025 COMPLETE CBC W/AUTO DIFF WBC: CPT | Performed by: NURSE PRACTITIONER

## 2020-07-07 PROCEDURE — U0003 INFECTIOUS AGENT DETECTION BY NUCLEIC ACID (DNA OR RNA); SEVERE ACUTE RESPIRATORY SYNDROME CORONAVIRUS 2 (SARS-COV-2) (CORONAVIRUS DISEASE [COVID-19]), AMPLIFIED PROBE TECHNIQUE, MAKING USE OF HIGH THROUGHPUT TECHNOLOGIES AS DESCRIBED BY CMS-2020-01-R: HCPCS | Performed by: PHYSICIAN ASSISTANT

## 2020-07-07 RX ORDER — DEXTROAMPHETAMINE SACCHARATE, AMPHETAMINE ASPARTATE, DEXTROAMPHETAMINE SULFATE AND AMPHETAMINE SULFATE 2.5; 2.5; 2.5; 2.5 MG/1; MG/1; MG/1; MG/1
10 TABLET ORAL DAILY
Qty: 30 TABLET | Refills: 0 | Status: SHIPPED | OUTPATIENT
Start: 2020-07-15 | End: 2020-08-11

## 2020-07-07 RX ORDER — HYDROCODONE BITARTRATE AND ACETAMINOPHEN 5; 325 MG/1; MG/1
1 TABLET ORAL EVERY 6 HOURS PRN
Qty: 10 TABLET | Refills: 0 | Status: ON HOLD | OUTPATIENT
Start: 2020-07-07 | End: 2020-07-10

## 2020-07-07 RX ORDER — ALPRAZOLAM 0.5 MG
TABLET ORAL
Status: ON HOLD | COMMUNITY
Start: 2020-05-07 | End: 2020-07-10

## 2020-07-07 ASSESSMENT — MIFFLIN-ST. JEOR: SCORE: 1166.23

## 2020-07-07 ASSESSMENT — PAIN SCALES - GENERAL: PAINLEVEL: SEVERE PAIN (7)

## 2020-07-07 NOTE — NURSING NOTE
"Chief Complaint   Patient presents with     Pre-Op Exam       Initial /62   Pulse 82   Temp 98.3  F (36.8  C) (Tympanic)   Ht 1.6 m (5' 3\")   Wt 51.7 kg (114 lb)   LMP 08/10/2013   SpO2 98%   BMI 20.19 kg/m   Estimated body mass index is 20.19 kg/m  as calculated from the following:    Height as of this encounter: 1.6 m (5' 3\").    Weight as of this encounter: 51.7 kg (114 lb).  Medication Reconciliation: complete  Iraida La  "

## 2020-07-07 NOTE — PROGRESS NOTES
Subjective     Purnima Fallon is a 39 year old female who presents to clinic today for the following health issues:    HPI   Medication Followup of adderal 10mg & 20 mg    Taking Medication as prescribed: yes    Side Effects:  Feels like the second dose doesn't work until 8 pm and has trouble sleeping. a    Medication Helping Symptoms:  yes       Patient Active Problem List   Diagnosis     Migraines     Depression     ADHD (attention deficit hyperactivity disorder)     Cystic acne     S/P laparoscopic hysterectomy     Kidney stones     Ischiorectal abscess and fistula     ACP (advance care planning)     Mass of breast     Pilonidal cyst     H/O pilonidal cyst     Biliary dyskinesia     Chronic female pelvic pain--LAPAROSCOPY w LEFT SALPINGECTOMY--normal pelvis--NO Endometriosis--9/2018     Hidradenitis suppurativa     Carpal tunnel syndrome     Malunion of fracture     Pain in joint, forearm     Trigger thumb of both thumbs     Past Surgical History:   Procedure Laterality Date     CYSTECTOMY PILONIDAL N/A 9/18/2017    Procedure: CYSTECTOMY PILONIDAL;  PILONIDAL CYSTECTOMY ;  Surgeon: Cordell Stephens MD;  [facility]: [facility]     ENDOSCOPY UPPER, COLONOSCOPY, COMBINED N/A 10/5/2018    Procedure: COMBINED ENDOSCOPY UPPER, COLONOSCOPY;  UPPER ENDOSCOPY with Biopsy  AND COLONOSCOPY;  Surgeon: Cordell Stephens MD;  [facility]     EXCISE LESION BUTTOCK(S) Left 6/8/2018    Procedure: EXCISE LESION BUTTOCK(S);  EXCISION OF GLUTEAL LEFT SKIN LESION;  Surgeon: Cordell Stephens MD;  [facility]     EXCISE MASS NECK Right 8/4/2015    Procedure: EXCISE MASS NECK;  Surgeon: Nasir Jones MD;  [facility]     INCISION AND DRAINAGE PERINEAL, COMBINED N/A 3/18/2015    Procedure: COMBINED INCISION AND DRAINAGE PERINEAL;  Surgeon: Jay Torres DO;  [facility]     IRRIGATION AND DEBRIDEMENT GROIN N/A 2/7/2019    Procedure: IRRIGATION AND DEBRIDEMENT LEFT GROIN ABSCESS;  Surgeon: Cordell Stephens  MD JULITO;  [facility]     LAPAROSCOPIC CHOLECYSTECTOMY N/A 11/20/2017    Procedure: LAPAROSCOPIC CHOLECYSTECTOMY;  LAPAROSCOPIC CHOLECYSTECTOMY;  Surgeon: Cordell Stephens MD;  [facility]     LAPAROSCOPIC HYSTERECTOMY SUPRACERVICAL, BILATERAL SALPINGO-OOPHORECTOMY, COMBINED  9/27/2013    Procedure: COMBINED LAPAROSCOPIC HYSTERECTOMY SUPRACERVICAL, SALPINGO-OOPHORECTOMY;  LAPAROSCOPIC SUPRACERVICAL HYSTERECTOMY AND RIGHT SALPINGO-OOPHORECTOMY, CYSTOSCOPY;  Surgeon: Denys Callahan MD;  [facility]     LAPAROSCOPY DIAGNOSTIC (GYN) N/A 9/6/2018    Procedure: LAPAROSCOPY DIAGNOSTIC (GYN);  LAPAROSCOPY WITH PERITONEAL BIOPSIES AND REMOVAL LEFT FALLOPIAN TUBE;  Surgeon: Suraj Whitaker MD;  [facility]: [facility]     marsupialization of pilonidal cyst  2007     TUBAL LIGATION  2005       Social History     Tobacco Use     Smoking status: Current Every Day Smoker     Packs/day: 0.50     Years: 15.00     Pack years: 7.50     Types: Cigarettes     Start date: 1/1/2001     Smokeless tobacco: Never Used     Tobacco comment: declined 2/27/2020   Substance Use Topics     Alcohol use: No     Family History   Problem Relation Age of Onset     Diabetes Paternal Grandmother          Current Outpatient Medications   Medication Sig Dispense Refill     adalimumab (HUMIRA PEN) 40 MG/0.8ML pen kit Inject 0.8 mLs (40 mg) Subcutaneous every 7 days 6 pen 1     ALPRAZolam (XANAX) 0.5 MG tablet        amphetamine-dextroamphetamine (ADDERALL XR) 20 MG 24 hr capsule Take 1 capsule (20 mg) by mouth daily 30 capsule 0     [START ON 7/27/2020] amphetamine-dextroamphetamine (ADDERALL XR) 20 MG 24 hr capsule Take 1 capsule (20 mg) by mouth daily 30 capsule 0     [START ON 7/15/2020] amphetamine-dextroamphetamine (ADDERALL) 10 MG tablet Take 1 tablet (10 mg) by mouth daily 30 tablet 0     HYDROcodone-acetaminophen (NORCO) 5-325 MG tablet Take 1 tablet by mouth every 6 hours as needed for severe pain 10 tablet 0     HYDROcodone-acetaminophen  "(NORCO) 5-325 MG tablet Take 1 tablet by mouth every 6 hours as needed for pain 20 tablet 0     imiquimod (ALDARA) 5 % external cream Apply topically three times a week Do not use longer than 16 weeks. 24 packet 1     valACYclovir (VALTREX) 500 MG tablet Take 1 tablet (500 mg) by mouth daily 90 tablet 2     No Known Allergies      Reviewed and updated as needed this visit by Provider  Allergies  Meds         Review of Systems   Constitutional, HEENT, cardiovascular, pulmonary, gi and gu systems are negative, except as otherwise noted. +ADHD      Objective    /62   Pulse 82   Temp 98.3  F (36.8  C) (Tympanic)   Ht 1.6 m (5' 3\")   Wt 51.7 kg (114 lb)   LMP 08/10/2013   SpO2 98%   BMI 20.19 kg/m    Body mass index is 20.19 kg/m .  Physical Exam   GENERAL: healthy, alert and no distress  NECK: no adenopathy, no asymmetry, masses, or scars and thyroid normal to palpation  RESP: lungs clear to auscultation - no rales, rhonchi or wheezes  CV: regular rate and rhythm, normal S1 S2, no S3 or S4, no murmur, click or rub, no peripheral edema and peripheral pulses strong  ABDOMEN: soft, nontender, no hepatosplenomegaly, no masses and bowel sounds normal  MS: no gross musculoskeletal defects noted, no edema  PSYCH: mentation appears normal, affect normal/bright    Diagnostic Test Results:  Labs reviewed in Epic        Assessment & Plan   Assessment      Plan    (F90.8) Attention deficit hyperactivity disorder (ADHD), other type  Comment: Stable. RF. Discussed decreased 20 mg XR to 10 mg and she would like to monitor for a while prior to med change  Plan: amphetamine-dextroamphetamine (ADDERALL) 10 MG         tablet              See Patient Instructions    Return in about 3 months (around 10/7/2020) for Med check.    Payton Levi NP  [facility] [place]          "

## 2020-07-08 LAB
SARS-COV-2 RNA SPEC QL NAA+PROBE: NOT DETECTED
SPECIMEN SOURCE: NORMAL

## 2020-07-10 ENCOUNTER — ANESTHESIA (OUTPATIENT)
Dept: SURGERY | Facility: HOSPITAL | Age: 40
End: 2020-07-10
Payer: COMMERCIAL

## 2020-07-10 ENCOUNTER — HOSPITAL ENCOUNTER (OUTPATIENT)
Facility: HOSPITAL | Age: 40
Discharge: HOME | End: 2020-07-10
Attending: ORTHOPAEDIC SURGERY | Admitting: ORTHOPAEDIC SURGERY
Payer: COMMERCIAL

## 2020-07-10 VITALS
WEIGHT: 112 LBS | SYSTOLIC BLOOD PRESSURE: 118 MMHG | BODY MASS INDEX: 19.84 KG/M2 | RESPIRATION RATE: 16 BRPM | OXYGEN SATURATION: 100 % | HEART RATE: 66 BPM | TEMPERATURE: 97.5 F | HEIGHT: 63 IN | DIASTOLIC BLOOD PRESSURE: 81 MMHG

## 2020-07-10 DIAGNOSIS — M65.311 TRIGGER THUMB OF BOTH THUMBS: ICD-10-CM

## 2020-07-10 DIAGNOSIS — Z98.890 H/O HAND SURGERY: Primary | ICD-10-CM

## 2020-07-10 DIAGNOSIS — G89.18 POSTOPERATIVE PAIN: ICD-10-CM

## 2020-07-10 DIAGNOSIS — M65.312 TRIGGER THUMB OF BOTH THUMBS: ICD-10-CM

## 2020-07-10 DIAGNOSIS — Z41.9 ELECTIVE SURGERY: ICD-10-CM

## 2020-07-10 PROCEDURE — 40000306 ZZH STATISTIC PRE PROC ASSESS II: Performed by: ORTHOPAEDIC SURGERY

## 2020-07-10 PROCEDURE — 71000027 ZZH RECOVERY PHASE 2 EACH 15 MINS: Performed by: ORTHOPAEDIC SURGERY

## 2020-07-10 PROCEDURE — 25800030 ZZH RX IP 258 OP 636: Performed by: NURSE ANESTHETIST, CERTIFIED REGISTERED

## 2020-07-10 PROCEDURE — 27210794 ZZH OR GENERAL SUPPLY STERILE: Performed by: ORTHOPAEDIC SURGERY

## 2020-07-10 PROCEDURE — 37000009 ZZH ANESTHESIA TECHNICAL FEE, EACH ADDTL 15 MIN: Performed by: ORTHOPAEDIC SURGERY

## 2020-07-10 PROCEDURE — 71000014 ZZH RECOVERY PHASE 1 LEVEL 2 FIRST HR: Performed by: ORTHOPAEDIC SURGERY

## 2020-07-10 PROCEDURE — 37000008 ZZH ANESTHESIA TECHNICAL FEE, 1ST 30 MIN: Performed by: ORTHOPAEDIC SURGERY

## 2020-07-10 PROCEDURE — 36000052 ZZH SURGERY LEVEL 2 EA 15 ADDTL MIN: Performed by: ORTHOPAEDIC SURGERY

## 2020-07-10 PROCEDURE — 26055 INCISE FINGER TENDON SHEATH: CPT | Mod: FA | Performed by: ORTHOPAEDIC SURGERY

## 2020-07-10 PROCEDURE — 25000566 ZZH SEVOFLURANE, EA 15 MIN: Performed by: NURSE ANESTHETIST, CERTIFIED REGISTERED

## 2020-07-10 PROCEDURE — 36000050 ZZH SURGERY LEVEL 2 1ST 30 MIN: Performed by: ORTHOPAEDIC SURGERY

## 2020-07-10 PROCEDURE — 25000128 H RX IP 250 OP 636: Performed by: NURSE ANESTHETIST, CERTIFIED REGISTERED

## 2020-07-10 PROCEDURE — 25000125 ZZHC RX 250: Performed by: ORTHOPAEDIC SURGERY

## 2020-07-10 PROCEDURE — 25000132 ZZH RX MED GY IP 250 OP 250 PS 637: Performed by: PHYSICIAN ASSISTANT

## 2020-07-10 PROCEDURE — 26055 INCISE FINGER TENDON SHEATH: CPT | Performed by: NURSE ANESTHETIST, CERTIFIED REGISTERED

## 2020-07-10 RX ORDER — FENTANYL CITRATE 50 UG/ML
25-50 INJECTION, SOLUTION INTRAMUSCULAR; INTRAVENOUS
Status: DISCONTINUED | OUTPATIENT
Start: 2020-07-10 | End: 2020-07-10 | Stop reason: HOSPADM

## 2020-07-10 RX ORDER — HYDROMORPHONE HYDROCHLORIDE 1 MG/ML
.3-.5 INJECTION, SOLUTION INTRAMUSCULAR; INTRAVENOUS; SUBCUTANEOUS EVERY 10 MIN PRN
Status: DISCONTINUED | OUTPATIENT
Start: 2020-07-10 | End: 2020-07-10 | Stop reason: HOSPADM

## 2020-07-10 RX ORDER — ONDANSETRON 4 MG/1
4 TABLET, ORALLY DISINTEGRATING ORAL
Status: DISCONTINUED | OUTPATIENT
Start: 2020-07-10 | End: 2020-07-10 | Stop reason: HOSPADM

## 2020-07-10 RX ORDER — PROPOFOL 10 MG/ML
INJECTION, EMULSION INTRAVENOUS PRN
Status: DISCONTINUED | OUTPATIENT
Start: 2020-07-10 | End: 2020-07-10

## 2020-07-10 RX ORDER — DEXAMETHASONE SODIUM PHOSPHATE 10 MG/ML
INJECTION, SOLUTION INTRAMUSCULAR; INTRAVENOUS PRN
Status: DISCONTINUED | OUTPATIENT
Start: 2020-07-10 | End: 2020-07-10

## 2020-07-10 RX ORDER — NALOXONE HYDROCHLORIDE 0.4 MG/ML
.1-.4 INJECTION, SOLUTION INTRAMUSCULAR; INTRAVENOUS; SUBCUTANEOUS
Status: DISCONTINUED | OUTPATIENT
Start: 2020-07-10 | End: 2020-07-10 | Stop reason: HOSPADM

## 2020-07-10 RX ORDER — CEFAZOLIN SODIUM 1 G/3ML
1 INJECTION, POWDER, FOR SOLUTION INTRAMUSCULAR; INTRAVENOUS SEE ADMIN INSTRUCTIONS
Status: DISCONTINUED | OUTPATIENT
Start: 2020-07-10 | End: 2020-07-10 | Stop reason: HOSPADM

## 2020-07-10 RX ORDER — ONDANSETRON 4 MG/1
4 TABLET, ORALLY DISINTEGRATING ORAL EVERY 30 MIN PRN
Status: DISCONTINUED | OUTPATIENT
Start: 2020-07-10 | End: 2020-07-10 | Stop reason: HOSPADM

## 2020-07-10 RX ORDER — ONDANSETRON 2 MG/ML
INJECTION INTRAMUSCULAR; INTRAVENOUS PRN
Status: DISCONTINUED | OUTPATIENT
Start: 2020-07-10 | End: 2020-07-10

## 2020-07-10 RX ORDER — HYDROCODONE BITARTRATE AND ACETAMINOPHEN 5; 325 MG/1; MG/1
1-2 TABLET ORAL EVERY 4 HOURS PRN
Qty: 10 TABLET | Refills: 0 | Status: SHIPPED | OUTPATIENT
Start: 2020-07-10 | End: 2020-10-08

## 2020-07-10 RX ORDER — MEPERIDINE HYDROCHLORIDE 25 MG/ML
12.5 INJECTION INTRAMUSCULAR; INTRAVENOUS; SUBCUTANEOUS
Status: DISCONTINUED | OUTPATIENT
Start: 2020-07-10 | End: 2020-07-10 | Stop reason: HOSPADM

## 2020-07-10 RX ORDER — SODIUM CHLORIDE, SODIUM LACTATE, POTASSIUM CHLORIDE, CALCIUM CHLORIDE 600; 310; 30; 20 MG/100ML; MG/100ML; MG/100ML; MG/100ML
INJECTION, SOLUTION INTRAVENOUS CONTINUOUS
Status: DISCONTINUED | OUTPATIENT
Start: 2020-07-10 | End: 2020-07-10 | Stop reason: HOSPADM

## 2020-07-10 RX ORDER — ONDANSETRON 2 MG/ML
4 INJECTION INTRAMUSCULAR; INTRAVENOUS EVERY 30 MIN PRN
Status: DISCONTINUED | OUTPATIENT
Start: 2020-07-10 | End: 2020-07-10 | Stop reason: HOSPADM

## 2020-07-10 RX ORDER — ALBUTEROL SULFATE 0.83 MG/ML
2.5 SOLUTION RESPIRATORY (INHALATION) EVERY 4 HOURS PRN
Status: DISCONTINUED | OUTPATIENT
Start: 2020-07-10 | End: 2020-07-10 | Stop reason: HOSPADM

## 2020-07-10 RX ORDER — CEFAZOLIN SODIUM 2 G/100ML
2 INJECTION, SOLUTION INTRAVENOUS
Status: DISCONTINUED | OUTPATIENT
Start: 2020-07-10 | End: 2020-07-10 | Stop reason: HOSPADM

## 2020-07-10 RX ORDER — HYDROCODONE BITARTRATE AND ACETAMINOPHEN 5; 325 MG/1; MG/1
1 TABLET ORAL
Status: COMPLETED | OUTPATIENT
Start: 2020-07-10 | End: 2020-07-10

## 2020-07-10 RX ORDER — ONDANSETRON 4 MG/1
4 TABLET, ORALLY DISINTEGRATING ORAL EVERY 8 HOURS PRN
Qty: 10 TABLET | Refills: 0 | Status: SHIPPED | OUTPATIENT
Start: 2020-07-10 | End: 2021-01-25

## 2020-07-10 RX ORDER — LIDOCAINE 40 MG/G
CREAM TOPICAL
Status: DISCONTINUED | OUTPATIENT
Start: 2020-07-10 | End: 2020-07-10 | Stop reason: HOSPADM

## 2020-07-10 RX ORDER — FENTANYL CITRATE 50 UG/ML
INJECTION, SOLUTION INTRAMUSCULAR; INTRAVENOUS PRN
Status: DISCONTINUED | OUTPATIENT
Start: 2020-07-10 | End: 2020-07-10

## 2020-07-10 RX ADMIN — FENTANYL CITRATE 25 MCG: 50 INJECTION, SOLUTION INTRAMUSCULAR; INTRAVENOUS at 14:55

## 2020-07-10 RX ADMIN — PROPOFOL 10 MG: 10 INJECTION, EMULSION INTRAVENOUS at 15:17

## 2020-07-10 RX ADMIN — FENTANYL CITRATE 75 MCG: 50 INJECTION, SOLUTION INTRAMUSCULAR; INTRAVENOUS at 14:57

## 2020-07-10 RX ADMIN — SODIUM CHLORIDE, POTASSIUM CHLORIDE, SODIUM LACTATE AND CALCIUM CHLORIDE: 600; 310; 30; 20 INJECTION, SOLUTION INTRAVENOUS at 14:00

## 2020-07-10 RX ADMIN — DEXAMETHASONE SODIUM PHOSPHATE 10 MG: 10 INJECTION, SOLUTION INTRAMUSCULAR; INTRAVENOUS at 15:00

## 2020-07-10 RX ADMIN — MIDAZOLAM 2 MG: 1 INJECTION INTRAMUSCULAR; INTRAVENOUS at 14:43

## 2020-07-10 RX ADMIN — ONDANSETRON 4 MG: 2 INJECTION INTRAMUSCULAR; INTRAVENOUS at 15:00

## 2020-07-10 RX ADMIN — PROPOFOL 30 MG: 10 INJECTION, EMULSION INTRAVENOUS at 15:10

## 2020-07-10 RX ADMIN — PROPOFOL 120 MG: 10 INJECTION, EMULSION INTRAVENOUS at 14:55

## 2020-07-10 RX ADMIN — HYDROCODONE BITARTRATE AND ACETAMINOPHEN 1 TABLET: 5; 325 TABLET ORAL at 16:30

## 2020-07-10 RX ADMIN — FENTANYL CITRATE 50 MCG: 50 INJECTION, SOLUTION INTRAMUSCULAR; INTRAVENOUS at 15:02

## 2020-07-10 RX ADMIN — PROPOFOL 80 MG: 10 INJECTION, EMULSION INTRAVENOUS at 14:58

## 2020-07-10 ASSESSMENT — MIFFLIN-ST. JEOR: SCORE: 1157.16

## 2020-07-10 NOTE — DISCHARGE INSTRUCTIONS
TRIGGER FINGER/THUMB RELEASE   POST-OP CARE INSTRUCTIONS     Please read the instructions outlined below. These instructions will be helpful to you throughout the initial phase of your postoperative course.     POSTOPERATIVE VISIT:  *You were set up with a postoperative visit with the orthopedics department approximately 2-weeks after your procedure. Your appointment will be listed on your discharge summary today - and you will see either Dr. Murillo and/or Alma STONER in the office.     AFTER THE PROCEDURE IT IS COMMON TO EXPERIENCE   *You may experience numbness in your hand and fingers for several hours (even a day or so) after your procedure as the local anesthetic wears off.    *Mild discomfort at the surgical site.   *Mild swelling/stiffness in the hand and fingers.     HOME CARE INSTRUCTIONS   ACTIVITY   *Have a responsible person stay with you until tomorrow.   *Do not operate hazardous machinery for 24 hours after surgery.   *Light use is okay immediately. Our guideline is to use your operative arm/hand as a helper extremity. Anything heavier than a glass of water/milk is too heavy.   *Avoid repetitive activities involving your wrist/finger such as: typing, pushing/pulling motions.   *Keep fingers moving using gentle, but full range of motion.   *Keep wrist moving with gentle range of motion.   *Do not use any kind of wrist or hand splint.   *Keep hand elevated for 3 days, or as much as you need to, to help with pain control and swelling of the hand/extremity.   *Your bruising/swelling will likely follow the path of gravity, by this, we mean that it may work it's way down your arm, reaching your fingertips last. If you are having numbness, your fingertips are often the last spot to regain sensation.     DIET   *Resume normal diet as tolerated.   *Do not drink alcoholic beverages for 24 hours after your surgery     MEDICATIONS   *Follow the medication recommendations outlined by your surgeon   *To relieve your  "pain, take medication as prescribed by your surgeon.   *Pain medication may cause dizziness or weakness so use caution.   *If you begin to experience fever, chills, rash or other side effects to the medication, please do not use it.     DRESSING/WOUND CARE   *You will be sent home with a ace/gauze dressing over your hand and wrist that you can leave in place for 3 days, and then remove. You may clean the wound and apply Band-Aids or clean gauze and tape.   *NOTE: If your dressing is too tight, you may remove the dressing and reapply it in a looser fashion (prior to day 3).   *A small amount of bleeding is expected after surgery. However, if your bandage becomes soaked with blood, reinforce with additional dressings, do not remove the original dressing, and call your surgeon for further instructions.   *Depending which type of sutures you receive, your sutures if under the skin will dissolve/absorb on their own, if you received sutures that are above the skin, we will remove them at your first post-operative visit to the clinic in approximately 10-14 days.  *Elevating your hand above the level of your heart will help control the pain and swelling.     BATHING INSTRUCTIONS   *Keep wound clean and dry at all times. Cover your hand with a plastic bag to shower before your dressing is removed. Once the dressing is removed, you may shower without covering as long as the wound is sealed and dry (without drainage).  No soaking your incision(s).      IMPORTANT OBSERVATIONS  Check C-M-S of operative leg every 4 hours while you are awake for the first 48 hours:    * C: color - should appear normal/pink   * M: motion - able to move finger tips and toes.    * S: sensation - able to \"feel\": no numbness, tingling  If you experience changes in C-M-S and/or in severe pain, you may remove the elastic bandages and reapply to - tight enough to provide support for the gauze dressing but loose enough to improve C-M-S. The gauze dressing " should NOT be removed when re wrapping the elastic bandage.     SPECIAL INSTRUCTIONS   If you smoke, you are instructed to quit. Also, avoid second hand smoke.   *The use of tobacco increases the risk of wound infection, delayed wound healing, and delayed recovery.     WHAT TO WATCH FOR & WHAT TO DO   Call your surgeon if you have any of the following symptoms:  **Temperature of 101 degrees F or over.   **Pain or bleeding that is not controlled.   **Infected drainage (pus).   **Report any swelling of the operative arm or hand.   **Redness or excessive swelling around the wound area.     SEEK IMMEDIATE MEDICAL CARE IF   **You develop a reaction or have serious side effects to medicines you were given.   **You have uncontrolled bleeding.   **The wound breaks open after the stitches have been removed.   **Call 911 and go to the nearest hospital, if you have shortness of breath or chest pain.

## 2020-07-10 NOTE — OP NOTE
Preoperative diagnosis: Bilateral trigger thumbs    Postoperative diagnosis: Bilateral trigger thumbs    Procedure performed: Release of triggering digits, bilateral thumbs    Anesthetic utilized: General anesthesia by LMA augmented with local anesthesia in both thumbs    Description of procedure after the patient had been anesthetized, both upper extremities were prepped and draped.  Correct patient and correct procedure was confirmed with the operative crew.  The left hand was addressed first by making a small incision in the crease at the base of the thumb.  Blunt sharp dissection was used to expose the annular pulley at the base of the thumb.  This was opened under direct visualization correcting the snapping and popping at the IP joint of the thumb.  Local anesthetic was injected around the wound and the wound was closed with Monocryl suture.  Attention was then turned to the right hand.  Small incision was made in the crease at the base of the thumb and blunt sharp dissection used to expose the annular pulley of the flexor tendon to the thumb.  This was sharply opened with a pair of tenotomy scissors, correcting the snapping popping and locking at the IP joint.  No other abnormalities were noted.  This wound was then closed and dressed as well.  Patient was then taken to the recovery room to complete their recovery from the anesthesia.

## 2020-07-10 NOTE — ANESTHESIA POSTPROCEDURE EVALUATION
Patient: Purnima Fallon    Procedure(s):  RELEASE BILATERAL TRIGGER THUMBS    Diagnosis:Trigger thumb of both thumbs [M65.311, M65.312]  Diagnosis Additional Information: No value filed.    Anesthesia Type:  General    Note:  Anesthesia Post Evaluation    Patient location during evaluation: Phase 2  Patient participation: Able to fully participate in evaluation  Level of consciousness: awake and alert  Pain management: adequate  Airway patency: patent  Cardiovascular status: acceptable  Respiratory status: acceptable  Hydration status: acceptable  PONV: none     Anesthetic complications: None          Last vitals:  Vitals:    07/10/20 1555 07/10/20 1600 07/10/20 1605   BP: 111/76 108/73 107/78   Pulse: 59 56 58   Temp:      SpO2: 100% 100% 99%         Electronically Signed By: ALONDRA Heath CRNA  July 10, 2020  4:10 PM

## 2020-07-10 NOTE — ANESTHESIA CARE TRANSFER NOTE
Patient: Purnima Fallon    Procedure(s):  RELEASE BILATERAL TRIGGER THUMBS    Diagnosis: Trigger thumb of both thumbs [M65.311, M65.312]  Diagnosis Additional Information: No value filed.    Anesthesia Type:   General     Note:  Airway :Room Air  Patient transferred to:PACU  Handoff Report: Identifed the Patient, Identified the Reponsible Provider, Reviewed the pertinent medical history, Discussed the surgical course, Reviewed Intra-OP anesthesia mangement and issues during anesthesia, Set expectations for post-procedure period and Allowed opportunity for questions and acknowledgement of understanding      Vitals: (Last set prior to Anesthesia Care Transfer)    CRNA VITALS  7/10/2020 1506 - 7/10/2020 1547      7/10/2020             Resp Rate (set):  8                Electronically Signed By: ALONDRA Heath CRNA  July 10, 2020  3:47 PM

## 2020-07-17 ENCOUNTER — MYC REFILL (OUTPATIENT)
Dept: FAMILY MEDICINE | Facility: OTHER | Age: 40
End: 2020-07-17

## 2020-07-17 DIAGNOSIS — M65.312 TRIGGER THUMB OF BOTH THUMBS: ICD-10-CM

## 2020-07-17 DIAGNOSIS — M65.311 TRIGGER THUMB OF BOTH THUMBS: ICD-10-CM

## 2020-07-18 NOTE — TELEPHONE ENCOUNTER
HYDROcodone-acetaminophen (NORCO) 5-325 MG tablet   Last Written Prescription Date: 7/10/20  Last Fill Quantity: 20 # of Refills: 0  Last Office Visit: 7/7/20

## 2020-07-19 RX ORDER — HYDROCODONE BITARTRATE AND ACETAMINOPHEN 5; 325 MG/1; MG/1
1 TABLET ORAL EVERY 6 HOURS PRN
Qty: 20 TABLET | Refills: 0 | Status: SHIPPED | OUTPATIENT
Start: 2020-07-19 | End: 2020-08-05

## 2020-07-22 ENCOUNTER — OFFICE VISIT (OUTPATIENT)
Dept: ORTHOPEDICS | Facility: OTHER | Age: 40
End: 2020-07-22
Attending: PHYSICIAN ASSISTANT
Payer: COMMERCIAL

## 2020-07-22 VITALS
DIASTOLIC BLOOD PRESSURE: 60 MMHG | BODY MASS INDEX: 19.84 KG/M2 | HEART RATE: 79 BPM | SYSTOLIC BLOOD PRESSURE: 110 MMHG | WEIGHT: 112 LBS | OXYGEN SATURATION: 99 % | TEMPERATURE: 98 F

## 2020-07-22 DIAGNOSIS — Z98.890 POSTOPERATIVE STATE: ICD-10-CM

## 2020-07-22 DIAGNOSIS — Z72.0 TOBACCO ABUSE: ICD-10-CM

## 2020-07-22 DIAGNOSIS — Z98.890 H/O HAND SURGERY: Primary | ICD-10-CM

## 2020-07-22 PROCEDURE — 99024 POSTOP FOLLOW-UP VISIT: CPT | Performed by: PHYSICIAN ASSISTANT

## 2020-07-22 ASSESSMENT — PAIN SCALES - GENERAL: PAINLEVEL: NO PAIN (0)

## 2020-07-22 NOTE — NURSING NOTE
"Chief Complaint   Patient presents with     Surgical Followup       Initial /60 (BP Location: Left arm, Patient Position: Sitting, Cuff Size: Adult Regular)   Pulse 79   Temp 98  F (36.7  C) (Tympanic)   Wt 50.8 kg (112 lb)   LMP 08/10/2013   SpO2 99%   BMI 19.84 kg/m   Estimated body mass index is 19.84 kg/m  as calculated from the following:    Height as of 7/10/20: 1.6 m (5' 3\").    Weight as of this encounter: 50.8 kg (112 lb).  Medication Reconciliation: complete    Rossy Ferrari LPN    "

## 2020-07-27 ENCOUNTER — ALLIED HEALTH/NURSE VISIT (OUTPATIENT)
Dept: ORTHOPEDICS | Facility: OTHER | Age: 40
End: 2020-07-27
Attending: NURSE PRACTITIONER
Payer: COMMERCIAL

## 2020-07-27 DIAGNOSIS — M65.312 TRIGGER THUMB OF BOTH THUMBS: Primary | ICD-10-CM

## 2020-07-27 DIAGNOSIS — M65.311 TRIGGER THUMB OF BOTH THUMBS: Primary | ICD-10-CM

## 2020-08-03 NOTE — PROGRESS NOTES
Postoperative Visit:    Chief Complaint: Trigger Digit Releases     Procedure: Bilateral Trigger Thumb Release on 7/10/2020 with Dr. Murillo     Patient Profile: 39-year old, RHD, Current Smoker, [employer] Employee, Patient of Payton Levi NP.      HPI: Patient sent a MyChart message to her PCP on 5/29/2020 with a CC of a right trigger thumb. She has lost strength in the thumb and has a snapping/locking sensation, as well as throbbing pain. She had tried ice/heat and antiinflammatories without relief. An orthopedics referral was placed for an in-person evaluation.     7/22 - not all sutures ready to remove. Patient set up for nurse only on 7/27. Otherwise she was doing well.     Subjective:   Location: Bilateral Hands  Quality: stiff  Severity: 5/10 on right, 1/10 on left  Palliative: Ibuprofen 800mg PO at night, rest  Provocative: overuse throughout the day time (RHD)  Associated Signs and Symptoms: swelling right thumb    Objective: /70   Pulse 81   Temp 98  F (36.7  C)   Wt 50.8 kg (112 lb)   LMP 08/10/2013   SpO2 100%   BMI 19.84 kg/m    Purnima is a pleasant, 39-year old. Head is normocephalic and atraumatic, sclerae are clear. Patient hears me normally, trachea midline, chest excursion is normal. Respiratory efforts are non-labored. Cardiac: there are no audible cardiac murmurs or gallops. Patient is alert and oriented and expresses proper mood and affect. Gait is normal.    Bilateral Wrist/hand; dressing is off. Skin/Incision sites are C/D/I. There is mild edema over A1 pulley location right thumb, there is no erythema or warmth noted, no signs of infection. Left thumb is completely normal to exam. TTP A1 pulley right only, left NTTP. ROM: flexion normal, extension normal, radial deviation normal, ulnar deviation normal - bilaterally. Distal finger MCP, DIP and PIP ROM is normal on left, 2 degree lack at MCP on right thumb only due to edema. Strength not assessed due to acute  postoperative status. Neurovascular status intact, distal pulses 2+, light touch sensation intact.     Imaging; no new imaging    Impression:   #1. Hand Surgery  #2. Postoperative Status  #3. Tobacco Abuse - Not Interested in QuitPlan     Plan: Continue with RICE protocol, ice and massage to scar tissue of A1 pulley on right thumb. Ibuprofen 800mg OK at night time. PCP to manage longer term pain as needed. Continue to work on swelling control - techniques given. Work/School status - N/A. Patient may use heat, RICE protocol and OTC analgesics PRN for pain and swelling control. Patient is in agreement and understanding of the above treatment plan. All questions and concerns were addressed and answered to patient's satisfaction. If questions/concerns arise before next visit they may contact the office at any point in time and we would be happy to assist them. Patient will follow up with orthopedics PRN.    Alma Peterson PA-C  Orthopedic Physician Assistant  [facility]

## 2020-08-05 ENCOUNTER — MYC REFILL (OUTPATIENT)
Dept: FAMILY MEDICINE | Facility: OTHER | Age: 40
End: 2020-08-05

## 2020-08-05 ENCOUNTER — OFFICE VISIT (OUTPATIENT)
Dept: ORTHOPEDICS | Facility: OTHER | Age: 40
End: 2020-08-05
Attending: PHYSICIAN ASSISTANT
Payer: COMMERCIAL

## 2020-08-05 VITALS
HEART RATE: 81 BPM | DIASTOLIC BLOOD PRESSURE: 70 MMHG | BODY MASS INDEX: 19.84 KG/M2 | OXYGEN SATURATION: 100 % | SYSTOLIC BLOOD PRESSURE: 102 MMHG | WEIGHT: 112 LBS | TEMPERATURE: 98 F

## 2020-08-05 DIAGNOSIS — Z72.0 TOBACCO ABUSE: ICD-10-CM

## 2020-08-05 DIAGNOSIS — Z98.890 POSTOPERATIVE STATE: ICD-10-CM

## 2020-08-05 DIAGNOSIS — Z98.890 H/O HAND SURGERY: Primary | ICD-10-CM

## 2020-08-05 DIAGNOSIS — M65.311 TRIGGER THUMB OF BOTH THUMBS: ICD-10-CM

## 2020-08-05 DIAGNOSIS — M65.312 TRIGGER THUMB OF BOTH THUMBS: ICD-10-CM

## 2020-08-05 PROCEDURE — 99024 POSTOP FOLLOW-UP VISIT: CPT | Performed by: PHYSICIAN ASSISTANT

## 2020-08-05 ASSESSMENT — PAIN SCALES - GENERAL: PAINLEVEL: MODERATE PAIN (5)

## 2020-08-05 NOTE — TELEPHONE ENCOUNTER
Norco       Last Written Prescription Date:  7/19/2020  Last Fill Quantity: 20,   # refills: 0  Last Office Visit: 8/5/2020  Future Office visit:       Routing refill request to provider for review/approval because:  Drug not on the [facility], [facility] or [facility] refill protocol or controlled substance

## 2020-08-05 NOTE — NURSING NOTE
"Chief Complaint   Patient presents with     Surgical Followup       Initial /70   Pulse 81   Temp 98  F (36.7  C)   Wt 50.8 kg (112 lb)   LMP 08/10/2013   SpO2 100%   BMI 19.84 kg/m   Estimated body mass index is 19.84 kg/m  as calculated from the following:    Height as of 7/10/20: 1.6 m (5' 3\").    Weight as of this encounter: 50.8 kg (112 lb).  Medication Reconciliation: complete  Rossy Ferrari LPN  "

## 2020-08-06 RX ORDER — HYDROCODONE BITARTRATE AND ACETAMINOPHEN 5; 325 MG/1; MG/1
1 TABLET ORAL EVERY 6 HOURS PRN
Qty: 20 TABLET | Refills: 0 | Status: SHIPPED | OUTPATIENT
Start: 2020-08-06 | End: 2020-10-08

## 2020-08-11 ENCOUNTER — MYC REFILL (OUTPATIENT)
Dept: FAMILY MEDICINE | Facility: OTHER | Age: 40
End: 2020-08-11

## 2020-08-11 DIAGNOSIS — F90.8 ATTENTION DEFICIT HYPERACTIVITY DISORDER (ADHD), OTHER TYPE: ICD-10-CM

## 2020-08-11 NOTE — TELEPHONE ENCOUNTER
Adderall       Last Written Prescription Date:  7-15-20  Last Fill Quantity: 30,   # refills: 0  Last Office Visit: 8-5-20  Future Office visit:

## 2020-08-12 RX ORDER — DEXTROAMPHETAMINE SACCHARATE, AMPHETAMINE ASPARTATE, DEXTROAMPHETAMINE SULFATE AND AMPHETAMINE SULFATE 2.5; 2.5; 2.5; 2.5 MG/1; MG/1; MG/1; MG/1
10 TABLET ORAL DAILY
Qty: 30 TABLET | Refills: 0 | Status: SHIPPED | OUTPATIENT
Start: 2020-08-12 | End: 2020-08-24

## 2020-08-24 ENCOUNTER — MYC REFILL (OUTPATIENT)
Dept: FAMILY MEDICINE | Facility: OTHER | Age: 40
End: 2020-08-24

## 2020-08-24 DIAGNOSIS — F90.8 ATTENTION DEFICIT HYPERACTIVITY DISORDER (ADHD), OTHER TYPE: ICD-10-CM

## 2020-08-28 RX ORDER — DEXTROAMPHETAMINE SACCHARATE, AMPHETAMINE ASPARTATE, DEXTROAMPHETAMINE SULFATE AND AMPHETAMINE SULFATE 2.5; 2.5; 2.5; 2.5 MG/1; MG/1; MG/1; MG/1
10 TABLET ORAL DAILY
Qty: 30 TABLET | Refills: 0 | Status: SHIPPED | OUTPATIENT
Start: 2020-08-28 | End: 2020-10-08

## 2020-08-28 RX ORDER — DEXTROAMPHETAMINE SACCHARATE, AMPHETAMINE ASPARTATE MONOHYDRATE, DEXTROAMPHETAMINE SULFATE AND AMPHETAMINE SULFATE 5; 5; 5; 5 MG/1; MG/1; MG/1; MG/1
20 CAPSULE, EXTENDED RELEASE ORAL DAILY
Qty: 30 CAPSULE | Refills: 0 | Status: SHIPPED | OUTPATIENT
Start: 2020-08-28 | End: 2020-09-25

## 2020-09-25 ENCOUNTER — MYC REFILL (OUTPATIENT)
Dept: FAMILY MEDICINE | Facility: OTHER | Age: 40
End: 2020-09-25

## 2020-09-25 DIAGNOSIS — F90.8 ATTENTION DEFICIT HYPERACTIVITY DISORDER (ADHD), OTHER TYPE: ICD-10-CM

## 2020-09-25 NOTE — TELEPHONE ENCOUNTER
Adderall      Last Written Prescription Date:  8/28/2020  Last Fill Quantity: 30,   # refills: 0  Last Office Visit: 7/7/2020  Future Office visit:

## 2020-09-27 RX ORDER — DEXTROAMPHETAMINE SACCHARATE, AMPHETAMINE ASPARTATE MONOHYDRATE, DEXTROAMPHETAMINE SULFATE AND AMPHETAMINE SULFATE 5; 5; 5; 5 MG/1; MG/1; MG/1; MG/1
20 CAPSULE, EXTENDED RELEASE ORAL DAILY
Qty: 30 CAPSULE | Refills: 0 | Status: SHIPPED | OUTPATIENT
Start: 2020-09-27 | End: 2020-11-24

## 2020-09-28 DIAGNOSIS — L73.2 HIDRADENITIS SUPPURATIVA: ICD-10-CM

## 2020-09-28 NOTE — TELEPHONE ENCOUNTER
paoloira      Last Written Prescription Date:  3/13/20  Last Fill Quantity: 6,   # refills: 1  Last Office Visit: 7/7/20  Future Office visit:       Routing refill request to provider for review/approval because:  Drug not on the [facility], [facility] or [facility] refill protocol or controlled substance

## 2020-10-07 NOTE — PROGRESS NOTES
Subjective     Purnima Fallon is a 39 year old female who presents to clinic today for the following health issues:    HPI         Medication Followup of adderall 10mg    Taking Medication as prescribed: yes    Side Effects:  None    Medication Helping Symptoms:  Yes    She would like a slight increase to help concentration     Depression and Anxiety Follow-Up    How are you doing with your depression since your last visit? No change    How are you doing with your anxiety since your last visit?  Improved ,doesn't seem as bad. Has some once in a while .     Are you having other symptoms that might be associated with depression or anxiety? No    Have you had a significant life event? No     Do you have any concerns with your use of alcohol or other drugs? No     She has worked things out with her  and things are going well.     She would like to do nicotine patches to help her quit smoking. She has been on them in the past and they seemed to help.     Social History     Tobacco Use     Smoking status: Current Every Day Smoker     Packs/day: 0.50     Years: 15.00     Pack years: 7.50     Types: Cigarettes     Start date: 1/1/2001     Smokeless tobacco: Never Used     Tobacco comment: declined 2/27/2020   Substance Use Topics     Alcohol use: No     Drug use: No     PHQ 12/13/2018 10/10/2019 5/13/2020   PHQ-9 Total Score 3 15 0   Q9: Thoughts of better off dead/self-harm past 2 weeks Not at all Not at all Not at all     WENDY-7 SCORE 11/13/2018 12/13/2018 11/21/2019   Total Score 21 4 4         Musculoskeletal problem/pain  Onset/Duration: since about July 2020  Description  Location: elbow - right  Joint Swelling: YES  Redness: no  Pain: YES  Warmth: no  Intensity:  5/10  Progression of Symptoms:  worsening  Accompanying signs and symptoms:   Fevers: no  Numbness/tingling/weakness: YES- weakness in her hand , or lifting. Achy feeling.     History  Trauma to the area: no  Recent illness:  no  Previous similar  problem: no  Previous evaluation:  no  Precipitating or alleviating factors:  Aggravating factors include: lifting, exercise and overuse  Therapies tried and outcome: rest/inactivity, heat, ice, support wrap and Ibuprofen.    Review of Systems   Constitutional, HEENT, cardiovascular, pulmonary, gi and gu systems are negative, except as otherwise noted.      Objective    /54   Pulse 82   Temp 98.3  F (36.8  C)   Wt 52.6 kg (116 lb)   LMP 08/10/2013   SpO2 99%   BMI 20.55 kg/m    Body mass index is 20.55 kg/m .  Physical Exam   GENERAL: healthy, alert and no distress  NECK: no adenopathy, no asymmetry, masses, or scars and thyroid normal to palpation  RESP: lungs clear to auscultation - no rales, rhonchi or wheezes  CV: regular rate and rhythm, normal S1 S2, no S3 or S4, no murmur, click or rub, no peripheral edema and peripheral pulses strong  MS: no gross musculoskeletal defects noted, no edema. CMS and ROM intact to right upper extremity. Right radial pulse +2. TTP and inflammation to right lateral epicondyle region. No erythema, bruising, rash, or warmth to the touch to right elbow  SKIN: no suspicious lesions or rashes  NEURO: Normal strength and tone, mentation intact and speech normal  PSYCH: mentation appears normal, affect normal/bright        Assessment & Plan     (F17.200) Nicotine dependence, uncomplicated, unspecified nicotine product type  (primary encounter diagnosis)  Comment: trial nicotine patches. Continue to encourage tobacco cessation  Plan: nicotine (NICODERM CQ) 14 MG/24HR 24 hr patch            (B00.9) Herpes simplex virus infection  Comment: RF  Plan: valACYclovir (VALTREX) 500 MG tablet            (F90.8) Attention deficit hyperactivity disorder (ADHD), other type  Comment: increase to 15 mg daily  Plan: amphetamine-dextroamphetamine (ADDERALL) 10 MG         tablet, amphetamine-dextroamphetamine         (ADDERALL) 10 MG tablet                    (M77.11) Right lateral  epicondylitis  Comment: elbow strap. Ibuprofen. Ice to right elbow  Plan: follow up if not improving      See Patient Instructions    Return in about 3 months (around 1/8/2021).    Payton Levi NP  [facility]

## 2020-10-08 ENCOUNTER — OFFICE VISIT (OUTPATIENT)
Dept: FAMILY MEDICINE | Facility: OTHER | Age: 40
End: 2020-10-08
Attending: NURSE PRACTITIONER
Payer: COMMERCIAL

## 2020-10-08 ENCOUNTER — MYC MEDICAL ADVICE (OUTPATIENT)
Dept: FAMILY MEDICINE | Facility: OTHER | Age: 40
End: 2020-10-08

## 2020-10-08 VITALS
WEIGHT: 116 LBS | HEART RATE: 82 BPM | BODY MASS INDEX: 20.55 KG/M2 | TEMPERATURE: 98.3 F | SYSTOLIC BLOOD PRESSURE: 112 MMHG | OXYGEN SATURATION: 99 % | DIASTOLIC BLOOD PRESSURE: 54 MMHG

## 2020-10-08 DIAGNOSIS — B00.9 HERPES SIMPLEX VIRUS INFECTION: ICD-10-CM

## 2020-10-08 DIAGNOSIS — F90.8 ATTENTION DEFICIT HYPERACTIVITY DISORDER (ADHD), OTHER TYPE: ICD-10-CM

## 2020-10-08 DIAGNOSIS — F17.200 NICOTINE DEPENDENCE, UNCOMPLICATED, UNSPECIFIED NICOTINE PRODUCT TYPE: Primary | ICD-10-CM

## 2020-10-08 DIAGNOSIS — M77.11 RIGHT LATERAL EPICONDYLITIS: ICD-10-CM

## 2020-10-08 PROCEDURE — 99214 OFFICE O/P EST MOD 30 MIN: CPT | Performed by: NURSE PRACTITIONER

## 2020-10-08 RX ORDER — DEXTROAMPHETAMINE SACCHARATE, AMPHETAMINE ASPARTATE, DEXTROAMPHETAMINE SULFATE AND AMPHETAMINE SULFATE 2.5; 2.5; 2.5; 2.5 MG/1; MG/1; MG/1; MG/1
10 TABLET ORAL DAILY
Qty: 30 TABLET | Refills: 0 | Status: SHIPPED | OUTPATIENT
Start: 2020-10-08 | End: 2020-11-08

## 2020-10-08 RX ORDER — VALACYCLOVIR HYDROCHLORIDE 500 MG/1
500 TABLET, FILM COATED ORAL DAILY
Qty: 90 TABLET | Refills: 2 | Status: SHIPPED | OUTPATIENT
Start: 2020-10-08 | End: 2021-07-01

## 2020-10-08 RX ORDER — NICOTINE 21 MG/24HR
1 PATCH, TRANSDERMAL 24 HOURS TRANSDERMAL EVERY 24 HOURS
Qty: 28 PATCH | Refills: 1 | Status: SHIPPED | OUTPATIENT
Start: 2020-10-08 | End: 2020-12-04

## 2020-10-08 RX ORDER — DEXTROAMPHETAMINE SACCHARATE, AMPHETAMINE ASPARTATE, DEXTROAMPHETAMINE SULFATE AND AMPHETAMINE SULFATE 2.5; 2.5; 2.5; 2.5 MG/1; MG/1; MG/1; MG/1
5 TABLET ORAL DAILY
Qty: 15 TABLET | Refills: 0 | Status: SHIPPED | OUTPATIENT
Start: 2020-10-08 | End: 2020-11-08

## 2020-10-08 RX ORDER — IBUPROFEN 800 MG/1
800 TABLET, FILM COATED ORAL EVERY 8 HOURS PRN
Qty: 30 TABLET | Refills: 0 | Status: SHIPPED | OUTPATIENT
Start: 2020-10-08 | End: 2020-11-10

## 2020-10-08 ASSESSMENT — PAIN SCALES - GENERAL: PAINLEVEL: MODERATE PAIN (5)

## 2020-10-10 ENCOUNTER — MYC REFILL (OUTPATIENT)
Dept: FAMILY MEDICINE | Facility: OTHER | Age: 40
End: 2020-10-10

## 2020-10-10 RX ORDER — DEXTROAMPHETAMINE SACCHARATE, AMPHETAMINE ASPARTATE, DEXTROAMPHETAMINE SULFATE AND AMPHETAMINE SULFATE 2.5; 2.5; 2.5; 2.5 MG/1; MG/1; MG/1; MG/1
5 TABLET ORAL DAILY
Qty: 15 TABLET | Refills: 0 | Status: CANCELLED | OUTPATIENT
Start: 2020-10-10

## 2020-10-12 ENCOUNTER — TELEPHONE (OUTPATIENT)
Dept: FAMILY MEDICINE | Facility: OTHER | Age: 40
End: 2020-10-12

## 2020-10-12 DIAGNOSIS — F90.8 ATTENTION DEFICIT HYPERACTIVITY DISORDER (ADHD), OTHER TYPE: Primary | ICD-10-CM

## 2020-10-12 RX ORDER — DEXTROAMPHETAMINE SACCHARATE, AMPHETAMINE ASPARTATE, DEXTROAMPHETAMINE SULFATE AND AMPHETAMINE SULFATE 1.25; 1.25; 1.25; 1.25 MG/1; MG/1; MG/1; MG/1
5 TABLET ORAL DAILY
Qty: 30 TABLET | Refills: 0 | Status: SHIPPED | OUTPATIENT
Start: 2020-10-12 | End: 2020-11-08

## 2020-10-12 NOTE — TELEPHONE ENCOUNTER
[facility] confirmed per chart review that they received it 10-8-20 at 4:16. Please let us know if they are telling you that they haven't received it. Thanks!  Holli

## 2020-10-12 NOTE — TELEPHONE ENCOUNTER
Jose at [facility] in [place] called needing the Adderall 5mg to be sent because insurance won't pay for 2 10mg scripts.  Call him if questions [phone]    amphetamine-dextroamphetamine (ADDERALL) 10 MG tablet      Last Written Prescription Date:  10/8/20-11/7/20  Last Fill Quantity: 15,   # refills: 0  Last Office Visit: 10/8/20  Future Office visit:       Routing refill request to provider for review/approval because:  Drug not on the [facility], [facility] or [facility] refill protocol or controlled substance      Sig - Route: Take 0.5 tablets (5 mg) by mouth daily - Oral

## 2020-10-13 NOTE — TELEPHONE ENCOUNTER
Spoke with [facility] they have the 5 mg over there now. Will notify patient via mychart.     TYLER Khoury

## 2020-10-20 ENCOUNTER — MYC MEDICAL ADVICE (OUTPATIENT)
Dept: FAMILY MEDICINE | Facility: OTHER | Age: 40
End: 2020-10-20

## 2020-10-20 DIAGNOSIS — M77.11 RIGHT LATERAL EPICONDYLITIS: Primary | ICD-10-CM

## 2020-10-20 RX ORDER — METHYLPREDNISOLONE 4 MG
TABLET, DOSE PACK ORAL
Qty: 21 TABLET | Refills: 0 | Status: SHIPPED | OUTPATIENT
Start: 2020-10-20 | End: 2021-01-25

## 2020-10-20 RX ORDER — METHYLPREDNISOLONE 4 MG
TABLET, DOSE PACK ORAL
Qty: 21 TABLET | Refills: 0 | Status: CANCELLED | OUTPATIENT
Start: 2020-10-20

## 2020-10-20 NOTE — TELEPHONE ENCOUNTER
Marcos Felton,   I can call in a medrol dose steroid pack for you or I can put a referral for you to see orthopedics for a steroid injection. I am happy to do either. Just let us know what you would like. Thanks!  Holli

## 2020-11-05 ENCOUNTER — MYC MEDICAL ADVICE (OUTPATIENT)
Dept: FAMILY MEDICINE | Facility: OTHER | Age: 40
End: 2020-11-05

## 2020-11-05 DIAGNOSIS — M77.11 RIGHT LATERAL EPICONDYLITIS: Primary | ICD-10-CM

## 2020-11-06 ENCOUNTER — MYC MEDICAL ADVICE (OUTPATIENT)
Dept: FAMILY MEDICINE | Facility: OTHER | Age: 40
End: 2020-11-06

## 2020-11-06 DIAGNOSIS — F90.8 ATTENTION DEFICIT HYPERACTIVITY DISORDER (ADHD), OTHER TYPE: ICD-10-CM

## 2020-11-08 RX ORDER — DEXTROAMPHETAMINE SACCHARATE, AMPHETAMINE ASPARTATE, DEXTROAMPHETAMINE SULFATE AND AMPHETAMINE SULFATE 3.75; 3.75; 3.75; 3.75 MG/1; MG/1; MG/1; MG/1
15 TABLET ORAL DAILY
Qty: 30 TABLET | Refills: 0 | Status: SHIPPED | OUTPATIENT
Start: 2020-11-08 | End: 2020-12-06

## 2020-11-13 ENCOUNTER — TRANSFERRED RECORDS (OUTPATIENT)
Dept: HEALTH INFORMATION MANAGEMENT | Facility: CLINIC | Age: 40
End: 2020-11-13

## 2020-11-23 ENCOUNTER — MYC REFILL (OUTPATIENT)
Dept: FAMILY MEDICINE | Facility: OTHER | Age: 40
End: 2020-11-23

## 2020-11-23 ENCOUNTER — MYC MEDICAL ADVICE (OUTPATIENT)
Dept: FAMILY MEDICINE | Facility: OTHER | Age: 40
End: 2020-11-23

## 2020-11-23 DIAGNOSIS — F90.8 ATTENTION DEFICIT HYPERACTIVITY DISORDER (ADHD), OTHER TYPE: ICD-10-CM

## 2020-11-23 RX ORDER — DEXTROAMPHETAMINE SACCHARATE, AMPHETAMINE ASPARTATE MONOHYDRATE, DEXTROAMPHETAMINE SULFATE AND AMPHETAMINE SULFATE 5; 5; 5; 5 MG/1; MG/1; MG/1; MG/1
20 CAPSULE, EXTENDED RELEASE ORAL DAILY
Qty: 30 CAPSULE | Refills: 0 | Status: CANCELLED | OUTPATIENT
Start: 2020-11-23

## 2020-11-23 NOTE — TELEPHONE ENCOUNTER
adderall      Last Written Prescription Date:  11/8/20  Last Fill Quantity: 30,   # refills: 0  Last Office Visit: 10/8/20  Future Office visit:       Routing refill request to provider for review/approval because:  Drug not on the [facility], P or [facility] refill protocol or controlled substance

## 2020-11-24 ENCOUNTER — MYC REFILL (OUTPATIENT)
Dept: FAMILY MEDICINE | Facility: OTHER | Age: 40
End: 2020-11-24

## 2020-11-24 DIAGNOSIS — F90.8 ATTENTION DEFICIT HYPERACTIVITY DISORDER (ADHD), OTHER TYPE: ICD-10-CM

## 2020-11-24 RX ORDER — DEXTROAMPHETAMINE SACCHARATE, AMPHETAMINE ASPARTATE MONOHYDRATE, DEXTROAMPHETAMINE SULFATE AND AMPHETAMINE SULFATE 5; 5; 5; 5 MG/1; MG/1; MG/1; MG/1
20 CAPSULE, EXTENDED RELEASE ORAL DAILY
Qty: 30 CAPSULE | Refills: 0 | Status: SHIPPED | OUTPATIENT
Start: 2020-11-24 | End: 2020-12-22

## 2020-11-24 RX ORDER — DEXTROAMPHETAMINE SACCHARATE, AMPHETAMINE ASPARTATE MONOHYDRATE, DEXTROAMPHETAMINE SULFATE AND AMPHETAMINE SULFATE 5; 5; 5; 5 MG/1; MG/1; MG/1; MG/1
20 CAPSULE, EXTENDED RELEASE ORAL DAILY
Qty: 30 CAPSULE | Refills: 0 | Status: CANCELLED | OUTPATIENT
Start: 2020-11-24

## 2020-11-24 NOTE — TELEPHONE ENCOUNTER
Adderall 20 mg      Last Written Prescription Date:  10/25/2020  Last Fill Quantity: 30,   # refills: 0  Last Office Visit: 10/8/2020  Future Office visit:       Routing refill request to provider for review/approval because:

## 2020-12-04 DIAGNOSIS — F17.200 NICOTINE DEPENDENCE, UNCOMPLICATED, UNSPECIFIED NICOTINE PRODUCT TYPE: ICD-10-CM

## 2020-12-04 RX ORDER — NICOTINE 21 MG/24HR
1 PATCH, TRANSDERMAL 24 HOURS TRANSDERMAL EVERY 24 HOURS
Qty: 28 PATCH | Refills: 1 | Status: SHIPPED | OUTPATIENT
Start: 2020-12-04 | End: 2021-09-02

## 2020-12-04 NOTE — TELEPHONE ENCOUNTER
Nicotine patch      Last Written Prescription Date:  10/08/2020  Last Fill Quantity: 28,   # refills: 1  Last Office Visit: 10/08/2020  Future Office visit:

## 2020-12-06 ENCOUNTER — HEALTH MAINTENANCE LETTER (OUTPATIENT)
Age: 40
End: 2020-12-06

## 2020-12-06 ENCOUNTER — MYC REFILL (OUTPATIENT)
Dept: FAMILY MEDICINE | Facility: OTHER | Age: 40
End: 2020-12-06

## 2020-12-06 DIAGNOSIS — F90.8 ATTENTION DEFICIT HYPERACTIVITY DISORDER (ADHD), OTHER TYPE: ICD-10-CM

## 2020-12-07 RX ORDER — DEXTROAMPHETAMINE SACCHARATE, AMPHETAMINE ASPARTATE, DEXTROAMPHETAMINE SULFATE AND AMPHETAMINE SULFATE 3.75; 3.75; 3.75; 3.75 MG/1; MG/1; MG/1; MG/1
15 TABLET ORAL DAILY
Qty: 30 TABLET | Refills: 0 | Status: SHIPPED | OUTPATIENT
Start: 2020-12-07 | End: 2021-01-05

## 2020-12-07 NOTE — TELEPHONE ENCOUNTER
adderall 15mg       Last Written Prescription Date:  11/8/2020  Last Fill Quantity: 30,   # refills: 0  Last Office Visit: 10/8/2020  Future Office visit:       Routing refill request to provider for review/approval because:

## 2020-12-09 ENCOUNTER — MYC REFILL (OUTPATIENT)
Dept: FAMILY MEDICINE | Facility: OTHER | Age: 40
End: 2020-12-09

## 2020-12-09 DIAGNOSIS — M77.11 RIGHT LATERAL EPICONDYLITIS: ICD-10-CM

## 2020-12-09 RX ORDER — IBUPROFEN 800 MG/1
800 TABLET, FILM COATED ORAL EVERY 8 HOURS PRN
Qty: 90 TABLET | Refills: 0 | Status: SHIPPED | OUTPATIENT
Start: 2020-12-09 | End: 2021-06-17

## 2020-12-09 NOTE — TELEPHONE ENCOUNTER
Ibuprofen 800mg      Last Written Prescription Date:  11/13/20  Last Fill Quantity: 90,   # refills: 0  Last Office Visit: 10/8/20  Future Office visit:       Routing refill request to provider for review/approval because:

## 2020-12-10 ENCOUNTER — MYC MEDICAL ADVICE (OUTPATIENT)
Dept: FAMILY MEDICINE | Facility: OTHER | Age: 40
End: 2020-12-10

## 2020-12-10 ENCOUNTER — E-VISIT (OUTPATIENT)
Dept: FAMILY MEDICINE | Facility: OTHER | Age: 40
End: 2020-12-10
Payer: COMMERCIAL

## 2020-12-10 DIAGNOSIS — M25.521 RIGHT ELBOW PAIN: Primary | ICD-10-CM

## 2020-12-10 PROCEDURE — 99421 OL DIG E/M SVC 5-10 MIN: CPT | Performed by: NURSE PRACTITIONER

## 2020-12-10 RX ORDER — INDOMETHACIN 50 MG/1
50 CAPSULE ORAL
Qty: 60 CAPSULE | Refills: 0 | Status: SHIPPED | OUTPATIENT
Start: 2020-12-10 | End: 2021-01-26

## 2020-12-10 NOTE — TELEPHONE ENCOUNTER
"Patient refuses the medication you advise.  Said she is not a drug seeker and needs something for the pain. She sits at the computer for 8 hours and her arm hurts worse at night.    She stated 'I sent Dr. Vega a message, he can look in my chart I don't ask for pain medication. I advised patient that the indocin is a antiinflammatory. She said she needs something for pain.\"    Renea  "

## 2020-12-10 NOTE — TELEPHONE ENCOUNTER
Sent and referred to Dr. Martinez who I think does very good work all around.  Thanks.  Yovani Vega MD

## 2020-12-10 NOTE — TELEPHONE ENCOUNTER
I am sorry but I would not use any narcotics for tendonitis pain.  That is frowned on.      Recommend indocin 50 tid prn.  If she accepts, send 60.  This has more antiinflammatory properties than many other nsaids.  Thanks.  Yovani Vega MD

## 2020-12-10 NOTE — TELEPHONE ENCOUNTER
In no way do I think she is a drug seeker.  Not at all.  It is just not appropriate care to give narcotic pain medicine for a tendonitis.  The indocin is a stronger antiinflammatory in general, and if the steroids are undesirable to her I thought this might help.  Sorry for the misunderstanding.  Please let her know I do not think she is a drug seeker, but simply would not use narcotics in a patient for tendonitis.  Thanks.  Yovani Vega MD

## 2020-12-10 NOTE — TELEPHONE ENCOUNTER
Spoke with patient, she said she is willing to try the indocin as long as it's not a steroid. I advised her it was an non steroid antiinflammatory.   She would also like a referral to orthopedics but refuses to see Dr. Crabtree at .    Rx & Ortho referral are pending your approval and signature.    Renea TOLBERT LPN

## 2020-12-22 ENCOUNTER — MYC REFILL (OUTPATIENT)
Dept: FAMILY MEDICINE | Facility: OTHER | Age: 40
End: 2020-12-22

## 2020-12-22 DIAGNOSIS — F90.8 ATTENTION DEFICIT HYPERACTIVITY DISORDER (ADHD), OTHER TYPE: ICD-10-CM

## 2020-12-22 NOTE — TELEPHONE ENCOUNTER
amphetamine-dextroamphetamine (ADDERALL XR) 20 MG 24 hr     Last Written Prescription Date:  11/24/20  Last Fill Quantity: 30,   # refills: 0  Last Office Visit: 12/10/20  Future Office visit:       Routing refill request to provider for review/approval

## 2020-12-23 RX ORDER — DEXTROAMPHETAMINE SACCHARATE, AMPHETAMINE ASPARTATE MONOHYDRATE, DEXTROAMPHETAMINE SULFATE AND AMPHETAMINE SULFATE 5; 5; 5; 5 MG/1; MG/1; MG/1; MG/1
20 CAPSULE, EXTENDED RELEASE ORAL DAILY
Qty: 30 CAPSULE | Refills: 0 | Status: SHIPPED | OUTPATIENT
Start: 2020-12-23 | End: 2021-01-23

## 2021-01-06 ENCOUNTER — MYC REFILL (OUTPATIENT)
Dept: FAMILY MEDICINE | Facility: OTHER | Age: 41
End: 2021-01-06

## 2021-01-06 DIAGNOSIS — F90.8 ATTENTION DEFICIT HYPERACTIVITY DISORDER (ADHD), OTHER TYPE: ICD-10-CM

## 2021-01-07 RX ORDER — DEXTROAMPHETAMINE SACCHARATE, AMPHETAMINE ASPARTATE, DEXTROAMPHETAMINE SULFATE AND AMPHETAMINE SULFATE 3.75; 3.75; 3.75; 3.75 MG/1; MG/1; MG/1; MG/1
15 TABLET ORAL DAILY
Qty: 30 TABLET | Refills: 0 | OUTPATIENT
Start: 2021-01-07

## 2021-01-23 ENCOUNTER — MYC REFILL (OUTPATIENT)
Dept: FAMILY MEDICINE | Facility: OTHER | Age: 41
End: 2021-01-23

## 2021-01-23 DIAGNOSIS — F90.8 ATTENTION DEFICIT HYPERACTIVITY DISORDER (ADHD), OTHER TYPE: ICD-10-CM

## 2021-01-25 RX ORDER — DEXTROAMPHETAMINE SACCHARATE, AMPHETAMINE ASPARTATE MONOHYDRATE, DEXTROAMPHETAMINE SULFATE AND AMPHETAMINE SULFATE 5; 5; 5; 5 MG/1; MG/1; MG/1; MG/1
20 CAPSULE, EXTENDED RELEASE ORAL DAILY
Qty: 30 CAPSULE | Refills: 0 | Status: SHIPPED | OUTPATIENT
Start: 2021-01-25 | End: 2021-02-25

## 2021-01-25 RX ORDER — ADALIMUMAB 40MG/0.4ML
KIT SUBCUTANEOUS
COMMUNITY
Start: 2020-11-18 | End: 2022-02-11

## 2021-01-25 NOTE — PROGRESS NOTES
Purnima is a 40 year old who is being evaluated via a billable telephone visit.      What phone number would you like to be contacted at? [phone]  How would you like to obtain your AVS? Radhahart     Assessment & Plan     Trial Wellbutrin. She will follow up in 6 weeks via telephone visit for med check.     (F41.1) WENDY (generalized anxiety disorder)  (primary encounter diagnosis)  Comment: trial Wellbutrin. 6 week telephone visit for med check  Plan: buPROPion (WELLBUTRIN XL) 150 MG 24 hr tablet            (L73.2) Hidradenitis suppurativa  Comment: RF  Plan: adalimumab (HUMIRA PEN) 40 MG/0.8ML pen kit            (F90.8) Attention deficit hyperactivity disorder (ADHD), other type  Comment: RF  Plan: amphetamine-dextroamphetamine (ADDERALL) 15 MG         tablet               Tobacco Cessation:   reports that she has been smoking cigarettes. She started smoking about 20 years ago. She has a 7.50 pack-year smoking history. She has never used smokeless tobacco.   Trial Wellbutrin       See Patient Instructions    Return in about 6 weeks (around 3/9/2021) for using a phone visit for med check.    Payton Levi, NP  [facility]     Purnima is a 40 year old who consents for a telephone call today    HPI       Medication Followup of adderall   Taking Medication as prescribed: Answers for HPI/ROS submitted by the patient on 1/26/2021         Side Effects:  None    Medication Helping Symptoms:  NO     If you checked off any problems, how difficult have these problems made it for you to do your work, take care of things at home, or get along with other people?: Somewhat difficult  PHQ9 TOTAL SCORE: 11  WENDY 7 TOTAL SCORE: 8    Purnima would like to try Wellbutrin to help with anxiety, depression, and tobacco cessation.     She feels that she has been more anxious. Her  and her have been having some marital trouble. Her  is not currently living with her.     Review for Humira  PENS -      Review of Systems   Constitutional, HEENT, cardiovascular, pulmonary, gi and gu systems are negative, except as otherwise noted.Denies SI/HI thoughts      Objective           Vitals:  No vitals were obtained today due to virtual visit.    Physical Exam   healthy, alert and no distress  PSYCH: Alert and oriented times 3; coherent speech, normal   rate and volume, able to articulate logical thoughts, able   to abstract reason, no tangential thoughts, no hallucinations   or delusions  Her affect is normal  RESP: No cough, no audible wheezing, able to talk in full sentences  Remainder of exam unable to be completed due to telephone visits    Phone call duration: 11 minutes  Start: 1445  End: 1454

## 2021-01-25 NOTE — TELEPHONE ENCOUNTER
adderall 20 mg      Last Written Prescription Date:  12-  Last Fill Quantity: 30,   # refills: 0  Last Office Visit: 10-8-2020  Future Office visit:    Next 5 appointments (look out 90 days)    Jan 27, 2021  8:15 AM  (Arrive by 8:00 AM)  SHORT with Payton Levi NP  [facility] ([facility] ) [address]  [facility] [address]  [phone]           Routing refill request to provider for review/approval because:

## 2021-01-26 ENCOUNTER — VIRTUAL VISIT (OUTPATIENT)
Dept: FAMILY MEDICINE | Facility: OTHER | Age: 41
End: 2021-01-26
Attending: NURSE PRACTITIONER
Payer: COMMERCIAL

## 2021-01-26 DIAGNOSIS — L73.2 HIDRADENITIS SUPPURATIVA: ICD-10-CM

## 2021-01-26 DIAGNOSIS — F90.8 ATTENTION DEFICIT HYPERACTIVITY DISORDER (ADHD), OTHER TYPE: ICD-10-CM

## 2021-01-26 DIAGNOSIS — F41.1 GAD (GENERALIZED ANXIETY DISORDER): Primary | ICD-10-CM

## 2021-01-26 PROCEDURE — 99214 OFFICE O/P EST MOD 30 MIN: CPT | Mod: 95 | Performed by: NURSE PRACTITIONER

## 2021-01-26 RX ORDER — DEXTROAMPHETAMINE SACCHARATE, AMPHETAMINE ASPARTATE, DEXTROAMPHETAMINE SULFATE AND AMPHETAMINE SULFATE 3.75; 3.75; 3.75; 3.75 MG/1; MG/1; MG/1; MG/1
15 TABLET ORAL DAILY
Qty: 30 TABLET | Refills: 0 | Status: SHIPPED | OUTPATIENT
Start: 2021-02-05 | End: 2021-03-03

## 2021-01-26 RX ORDER — BUPROPION HYDROCHLORIDE 150 MG/1
300 TABLET ORAL EVERY MORNING
Qty: 60 TABLET | Refills: 1 | Status: SHIPPED | OUTPATIENT
Start: 2021-01-26 | End: 2021-03-25

## 2021-01-26 ASSESSMENT — PATIENT HEALTH QUESTIONNAIRE - PHQ9
10. IF YOU CHECKED OFF ANY PROBLEMS, HOW DIFFICULT HAVE THESE PROBLEMS MADE IT FOR YOU TO DO YOUR WORK, TAKE CARE OF THINGS AT HOME, OR GET ALONG WITH OTHER PEOPLE: SOMEWHAT DIFFICULT
SUM OF ALL RESPONSES TO PHQ QUESTIONS 1-9: 11
SUM OF ALL RESPONSES TO PHQ QUESTIONS 1-9: 11

## 2021-01-26 ASSESSMENT — ANXIETY QUESTIONNAIRES
5. BEING SO RESTLESS THAT IT IS HARD TO SIT STILL: NOT AT ALL
7. FEELING AFRAID AS IF SOMETHING AWFUL MIGHT HAPPEN: NOT AT ALL
1. FEELING NERVOUS, ANXIOUS, OR ON EDGE: MORE THAN HALF THE DAYS
3. WORRYING TOO MUCH ABOUT DIFFERENT THINGS: MORE THAN HALF THE DAYS
2. NOT BEING ABLE TO STOP OR CONTROL WORRYING: MORE THAN HALF THE DAYS
GAD7 TOTAL SCORE: 8
4. TROUBLE RELAXING: NOT AT ALL
GAD7 TOTAL SCORE: 8
7. FEELING AFRAID AS IF SOMETHING AWFUL MIGHT HAPPEN: NOT AT ALL
GAD7 TOTAL SCORE: 8
6. BECOMING EASILY ANNOYED OR IRRITABLE: MORE THAN HALF THE DAYS

## 2021-01-26 NOTE — NURSING NOTE
"Chief Complaint   Patient presents with     A.D.H.D       Initial LMP 08/10/2013  Estimated body mass index is 20.55 kg/m  as calculated from the following:    Height as of 7/10/20: 1.6 m (5' 3\").    Weight as of 10/8/20: 52.6 kg (116 lb).  Medication Reconciliation: complete  Iraida La  "

## 2021-01-27 ASSESSMENT — ANXIETY QUESTIONNAIRES: GAD7 TOTAL SCORE: 8

## 2021-01-27 ASSESSMENT — PATIENT HEALTH QUESTIONNAIRE - PHQ9: SUM OF ALL RESPONSES TO PHQ QUESTIONS 1-9: 11

## 2021-02-14 ENCOUNTER — HEALTH MAINTENANCE LETTER (OUTPATIENT)
Age: 41
End: 2021-02-14

## 2021-02-15 ENCOUNTER — MYC REFILL (OUTPATIENT)
Dept: FAMILY MEDICINE | Facility: OTHER | Age: 41
End: 2021-02-15

## 2021-02-15 ENCOUNTER — TELEPHONE (OUTPATIENT)
Dept: FAMILY MEDICINE | Facility: OTHER | Age: 41
End: 2021-02-15

## 2021-02-15 DIAGNOSIS — F41.8 SITUATIONAL ANXIETY: ICD-10-CM

## 2021-02-15 RX ORDER — LORAZEPAM 0.5 MG/1
0.5 TABLET ORAL DAILY PRN
Qty: 10 TABLET | Refills: 0 | Status: CANCELLED | OUTPATIENT
Start: 2021-02-15

## 2021-02-15 NOTE — TELEPHONE ENCOUNTER
Was asked to do a PA from Nursing for Humira Pen 40mg/0.8ML pen-injector kit. Submitted on CMM. Waiting for a response.

## 2021-02-17 NOTE — TELEPHONE ENCOUNTER
Received an APPROVAL From [facility] for Humira Pen 40mg/0.8ml pen-injector kit. There is currently an authorization in place for this medication, effective 03/17/2020 through 03/17/2021. A new authorization has been entered to allow for continuation of therapy, effective 03/18/2021 through 03/18/2022. Forms scanned to [facility].

## 2021-02-25 ENCOUNTER — MYC REFILL (OUTPATIENT)
Dept: FAMILY MEDICINE | Facility: OTHER | Age: 41
End: 2021-02-25

## 2021-02-25 DIAGNOSIS — F41.1 GAD (GENERALIZED ANXIETY DISORDER): ICD-10-CM

## 2021-02-25 DIAGNOSIS — F41.8 SITUATIONAL ANXIETY: ICD-10-CM

## 2021-02-25 DIAGNOSIS — F90.8 ATTENTION DEFICIT HYPERACTIVITY DISORDER (ADHD), OTHER TYPE: ICD-10-CM

## 2021-02-25 RX ORDER — ALPRAZOLAM 0.5 MG
TABLET ORAL
Qty: 15 TABLET | OUTPATIENT
Start: 2021-02-25

## 2021-02-25 RX ORDER — DEXTROAMPHETAMINE SACCHARATE, AMPHETAMINE ASPARTATE MONOHYDRATE, DEXTROAMPHETAMINE SULFATE AND AMPHETAMINE SULFATE 5; 5; 5; 5 MG/1; MG/1; MG/1; MG/1
20 CAPSULE, EXTENDED RELEASE ORAL DAILY
Qty: 30 CAPSULE | Refills: 0 | Status: SHIPPED | OUTPATIENT
Start: 2021-02-25 | End: 2021-03-25

## 2021-02-25 RX ORDER — LORAZEPAM 0.5 MG/1
TABLET ORAL
Qty: 10 TABLET | Refills: 0 | Status: SHIPPED | OUTPATIENT
Start: 2021-02-25 | End: 2021-03-08

## 2021-02-25 RX ORDER — LORAZEPAM 0.5 MG/1
0.5 TABLET ORAL DAILY PRN
Qty: 10 TABLET | Refills: 0 | OUTPATIENT
Start: 2021-02-25

## 2021-02-25 NOTE — TELEPHONE ENCOUNTER
LORazepam (ATIVAN) 0.5 MG tablet      Last Written Prescription Date:  2/16/21  Last Fill Quantity: 10,   # refills: 0  Last Office Visit: 1/26/21 virtual  Future Office visit:       Routing refill request to provider for review/approval because:  Drug not on the [facility], P or [facility] refill protocol or controlled substance   Too soon? Please advise. Thank you!

## 2021-02-25 NOTE — TELEPHONE ENCOUNTER
amphetamine-dextroamphetamine (ADDERALL XR) 20 MG 24 hr capsule      Last Written Prescription Date:  1/25/21  Last Fill Quantity: 30,   # refills: 0  Last Office Visit: 1/26/21  Future Office visit:       Routing refill request to provider for review/approval because:    Drug not on the [facility], P or [facility] refill protocol or controlled substance

## 2021-03-08 ENCOUNTER — MYC REFILL (OUTPATIENT)
Dept: FAMILY MEDICINE | Facility: OTHER | Age: 41
End: 2021-03-08

## 2021-03-08 DIAGNOSIS — F41.8 SITUATIONAL ANXIETY: ICD-10-CM

## 2021-03-08 RX ORDER — LORAZEPAM 0.5 MG/1
TABLET ORAL
Qty: 10 TABLET | OUTPATIENT
Start: 2021-03-08

## 2021-03-08 NOTE — TELEPHONE ENCOUNTER
LORazepam (ATIVAN) 0.5 MG tablet      Last Written Prescription Date:  2/25/21  Last Fill Quantity: 10,   # refills: 0  Last Office Visit: 1/26/21 virtual  Future Office visit:       Routing refill request to provider for review/approval because:  Drug not on the [facility], P or [facility] refill protocol or controlled substance    Too soon? Per , last filled as stated above. Please advise. Thank you!

## 2021-03-09 RX ORDER — LORAZEPAM 0.5 MG/1
TABLET ORAL
Qty: 10 TABLET | Refills: 0 | Status: SHIPPED | OUTPATIENT
Start: 2021-03-09 | End: 2021-03-18

## 2021-03-18 ENCOUNTER — MYC REFILL (OUTPATIENT)
Dept: FAMILY MEDICINE | Facility: OTHER | Age: 41
End: 2021-03-18

## 2021-03-18 DIAGNOSIS — F41.8 SITUATIONAL ANXIETY: ICD-10-CM

## 2021-03-18 NOTE — TELEPHONE ENCOUNTER
Ativan 0.5mg      Last Written Prescription Date:  3/9/21  Last Fill Quantity: 10,   # refills: 0  Last Office Visit: 1/26/21  Future Office visit:       Routing refill request to provider for review/approval because:

## 2021-03-19 RX ORDER — LORAZEPAM 0.5 MG/1
TABLET ORAL
Qty: 10 TABLET | Refills: 0 | Status: SHIPPED | OUTPATIENT
Start: 2021-03-19 | End: 2021-03-25

## 2021-03-23 RX ORDER — BUPROPION HYDROCHLORIDE 300 MG/1
300 TABLET ORAL EVERY MORNING
COMMUNITY
Start: 2021-02-25 | End: 2021-03-24

## 2021-03-23 NOTE — PROGRESS NOTES
Assessment & Plan     Purnima is in the process of going through a stressful divorce. She is having a difficult time concentrating at work. She inquired about an increase in Adderall and Ativan.     (F90.8) Attention deficit hyperactivity disorder (ADHD), other type  Comment: increase adderall noon dose from 15 mg to 20 mg  Plan: amphetamine-dextroamphetamine (ADDERALL) 20 MG         tablet, amphetamine-dextroamphetamine (ADDERALL        XR) 20 MG 24 hr capsule            (F41.8) Situational anxiety  Comment:  Increase to 1 mg prn as directed. She is in the middle of a stressful divorce. She was educated to not drive or participate in activities that require alertness when taking   Plan: LORazepam (ATIVAN) 1 MG tablet             Tobacco Cessation:   reports that she has been smoking cigarettes. She started smoking about 20 years ago. She has a 7.50 pack-year smoking history. She has never used smokeless tobacco.  Tobacco Cessation Action Plan: wellbutrin has helped her smoke less    See Patient Instructions    Return in about 3 months (around 6/25/2021) for Med check.    Payton Levi, NP  [facility] [place]    Subjective   Purnima is a 40 year old who presents for the following health issues     HPI     Medication Followup of Lorazepam     Taking Medication as prescribed: yes    Side Effects:  None    Medication Helping Symptoms:  NO     Medication Followup of adderal     Taking Medication as prescribed: yes    Side Effects:  None    Medication Helping Symptoms:  yes     Depression and Anxiety Follow-Up    How are you doing with your depression since your last visit? Improved a little , feels like the Wellbutrin has helped some .     How are you doing with your anxiety since your last visit?  Worsened , states she has bad anxiety . Has a lot going on in her life right now.     Are you having other symptoms that might be associated with depression or anxiety? Yes:  troubles sleeping     Have you  had a significant life event? Relationship Concerns and Financial Concerns- going through a divorce currently      Do you have any concerns with your use of alcohol or other drugs? No    Social History     Tobacco Use     Smoking status: Current Every Day Smoker     Packs/day: 0.50     Years: 15.00     Pack years: 7.50     Types: Cigarettes     Start date: 1/1/2001     Smokeless tobacco: Never Used     Tobacco comment: declined 2/27/2020   Substance Use Topics     Alcohol use: No     Drug use: No     PHQ 5/13/2020 1/26/2021 3/25/2021   PHQ-9 Total Score 0 11 12   Q9: Thoughts of better off dead/self-harm past 2 weeks Not at all Not at all Not at all     WENDY-7 SCORE 11/21/2019 1/26/2021 3/25/2021   Total Score - 8 (mild anxiety) -   Total Score 4 8 14     Last PHQ-9 3/25/2021   1.  Little interest or pleasure in doing things 2   2.  Feeling down, depressed, or hopeless 2   3.  Trouble falling or staying asleep, or sleeping too much 2   4.  Feeling tired or having little energy 2   5.  Poor appetite or overeating 0   6.  Feeling bad about yourself 2   7.  Trouble concentrating 2   8.  Moving slowly or restless 0   Q9: Thoughts of better off dead/self-harm past 2 weeks 0   PHQ-9 Total Score 12   Difficulty at work, home, or with people Somewhat difficult     WENDY-7  3/25/2021   1. Feeling nervous, anxious, or on edge 3   2. Not being able to stop or control worrying 3   3. Worrying too much about different things 3   4. Trouble relaxing 3   5. Being so restless that it is hard to sit still 0   6. Becoming easily annoyed or irritable 2   7. Feeling afraid, as if something awful might happen 0   WENDY-7 Total Score 14   If you checked any problems, how difficult have they made it for you to do your work, take care of things at home, or get along with other people? Somewhat difficult         Review of Systems   Constitutional, HEENT, cardiovascular, pulmonary, gi and gu systems are negative, except as otherwise noted.       Objective    /78 (BP Location: Left arm, Patient Position: Sitting, Cuff Size: Adult Regular)   Pulse 89   Temp 98.7  F (37.1  C) (Tympanic)   Wt 49.9 kg (110 lb)   LMP 08/10/2013   SpO2 98%   BMI 19.49 kg/m    Body mass index is 19.49 kg/m .  Physical Exam   GENERAL: healthy, alert and no distress  NECK: no adenopathy, no asymmetry, masses, or scars and thyroid normal to palpation  RESP: lungs clear to auscultation - no rales, rhonchi or wheezes  CV: regular rate and rhythm, normal S1 S2, no S3 or S4, no murmur, click or rub, no peripheral edema and peripheral pulses strong  MS: no gross musculoskeletal defects noted, no edema  SKIN: no suspicious lesions or rashes  NEURO: Normal strength and tone, mentation intact and speech normal  PSYCH: mentation appears normal, affect normal/bright

## 2021-03-24 DIAGNOSIS — F41.1 GAD (GENERALIZED ANXIETY DISORDER): Primary | ICD-10-CM

## 2021-03-24 RX ORDER — BUPROPION HYDROCHLORIDE 300 MG/1
TABLET ORAL
Qty: 30 TABLET | Refills: 2 | Status: SHIPPED | OUTPATIENT
Start: 2021-03-24 | End: 2021-06-16

## 2021-03-25 ENCOUNTER — OFFICE VISIT (OUTPATIENT)
Dept: FAMILY MEDICINE | Facility: OTHER | Age: 41
End: 2021-03-25
Attending: NURSE PRACTITIONER
Payer: COMMERCIAL

## 2021-03-25 VITALS
TEMPERATURE: 98.7 F | HEART RATE: 89 BPM | OXYGEN SATURATION: 98 % | DIASTOLIC BLOOD PRESSURE: 78 MMHG | BODY MASS INDEX: 19.49 KG/M2 | SYSTOLIC BLOOD PRESSURE: 106 MMHG | WEIGHT: 110 LBS

## 2021-03-25 DIAGNOSIS — F41.8 SITUATIONAL ANXIETY: ICD-10-CM

## 2021-03-25 DIAGNOSIS — F90.8 ATTENTION DEFICIT HYPERACTIVITY DISORDER (ADHD), OTHER TYPE: ICD-10-CM

## 2021-03-25 PROCEDURE — 99214 OFFICE O/P EST MOD 30 MIN: CPT | Performed by: NURSE PRACTITIONER

## 2021-03-25 RX ORDER — LORAZEPAM 1 MG/1
1 TABLET ORAL EVERY 6 HOURS PRN
Qty: 10 TABLET | Refills: 0 | COMMUNITY
Start: 2021-03-25 | End: 2021-03-29

## 2021-03-25 RX ORDER — DEXTROAMPHETAMINE SACCHARATE, AMPHETAMINE ASPARTATE, DEXTROAMPHETAMINE SULFATE AND AMPHETAMINE SULFATE 5; 5; 5; 5 MG/1; MG/1; MG/1; MG/1
20 TABLET ORAL DAILY
Qty: 30 TABLET | Refills: 0 | Status: SHIPPED | OUTPATIENT
Start: 2021-03-25 | End: 2021-04-22

## 2021-03-25 RX ORDER — DEXTROAMPHETAMINE SACCHARATE, AMPHETAMINE ASPARTATE MONOHYDRATE, DEXTROAMPHETAMINE SULFATE AND AMPHETAMINE SULFATE 5; 5; 5; 5 MG/1; MG/1; MG/1; MG/1
20 CAPSULE, EXTENDED RELEASE ORAL DAILY
Qty: 30 CAPSULE | Refills: 0 | Status: SHIPPED | OUTPATIENT
Start: 2021-03-25 | End: 2021-04-22

## 2021-03-25 ASSESSMENT — ANXIETY QUESTIONNAIRES
3. WORRYING TOO MUCH ABOUT DIFFERENT THINGS: NEARLY EVERY DAY
1. FEELING NERVOUS, ANXIOUS, OR ON EDGE: NEARLY EVERY DAY
6. BECOMING EASILY ANNOYED OR IRRITABLE: MORE THAN HALF THE DAYS
7. FEELING AFRAID AS IF SOMETHING AWFUL MIGHT HAPPEN: NOT AT ALL
GAD7 TOTAL SCORE: 14
5. BEING SO RESTLESS THAT IT IS HARD TO SIT STILL: NOT AT ALL
IF YOU CHECKED OFF ANY PROBLEMS ON THIS QUESTIONNAIRE, HOW DIFFICULT HAVE THESE PROBLEMS MADE IT FOR YOU TO DO YOUR WORK, TAKE CARE OF THINGS AT HOME, OR GET ALONG WITH OTHER PEOPLE: SOMEWHAT DIFFICULT
2. NOT BEING ABLE TO STOP OR CONTROL WORRYING: NEARLY EVERY DAY

## 2021-03-25 ASSESSMENT — PATIENT HEALTH QUESTIONNAIRE - PHQ9
5. POOR APPETITE OR OVEREATING: NEARLY EVERY DAY
SUM OF ALL RESPONSES TO PHQ QUESTIONS 1-9: 12

## 2021-03-25 ASSESSMENT — PAIN SCALES - GENERAL: PAINLEVEL: NO PAIN (0)

## 2021-03-26 ASSESSMENT — ANXIETY QUESTIONNAIRES: GAD7 TOTAL SCORE: 14

## 2021-03-26 NOTE — PATIENT INSTRUCTIONS
Patient Education     Anxiety Reaction  Anxiety is the feeling we all get when we think something bad might happen. It is a normal response to stress and normally causes only a mild reaction. When anxiety becomes more severe, it can interfere with daily life. In some cases, you may not even be aware of what you re anxious about. There may also be a genetic link. Or it may be a learned behavior in the home.   Both psychological and physical triggers cause stress reaction. It's often a response to fear or emotional stress, real or imagined. This stress may come from home, family, work, or social relationships.   During an anxiety reaction, you may feel:    Helpless    Nervous    Depressed    Grouchy  Your body may show signs of anxiety in many ways. You may experience:    Dry mouth    Shakiness    Dizziness    Weakness    Trouble breathing    Breathing fast (hyperventilating)    Chest pressure    Sweating    Headache    Nausea    Diarrhea    Tiredness    Inability to sleep    Sexual problems  Home care    Try to find the sources of stress in your life. They may not be obvious. These may include:  ? Daily hassles of life (such as traffic jams, missed appointments, or car troubles)  ? Major life changes, both good (new baby or job promotion) and bad (loss of job or loss of loved one)  ? Overload (feeling that you have too many responsibilities and can't take care of all of them at once)  ? Feeling helpless or feeling that your problems can't be solved    Notice how your body reacts to stress. Learn to listen to your body signals. This will help you take action before the stress becomes severe.    When you can, do something about the source of your stress. (Avoid hassles, limit the amount of change that happens in your life at one time, and take a break when you feel overloaded).    Unfortunately, many stressful situations can't be avoided. It is necessary to learn how to better manage stress. There are many proven  methods that will reduce your anxiety. These include simple things such as exercise, good nutrition, and adequate rest. Also, there are certain techniques that are helpful:  ? Relaxation  ? Breathing exercises  ? Visualization  ? Biofeedback  ? Meditation  For more information about this, talk with your healthcare provider. Or check online or at your local library or bookstore. You'll find many books and audiobooks on this subject.   Follow-up care  If you feel your anxiety is not responding to self-help measures, call your healthcare provider or make an appointment with a counselor. You may need short-term psychological counseling or medicine to help you manage stress.   Call 911  Call 911 if any of these happen:     Trouble breathing    Confusion    Drowsiness or trouble waking up    Fainting or loss of consciousness    Rapid heart rate    Seizure    New chest pain that becomes more severe, lasts longer, or spreads into your shoulder, arm, neck, jaw, or back  When to get medical advice  Call your healthcare provider right away if any of these happen:    Your symptoms get worse    Severe headache not eased by rest and mild pain reliever  [employer] last reviewed this educational content on 4/1/2020 2000-2020 [employer]. All rights reserved. This information is not intended as a substitute for professional medical care. Always follow your healthcare professional's instructions.    Patient Education     Attention-Deficit/Hyperactivity Disorder (ADHD) in Adults  You ve always had trouble concentrating. Your mind wanders, and it s hard to finish tasks. As a result, you didn t do well in school. And now, you often struggle with your job. Sometimes this makes you hathaway or depressed. These may be symptoms of attention-deficit/hyperactivity disorder (ADHD). To find out more, talk to your healthcare provider. He or she can offer guidance and support.   Symptoms of ADHD in adults  For an adult to be diagnosed  with ADHD, the symptoms must have been present since childhood. The symptoms may include:     Trouble thinking things through    Low self-esteem    Depression    Trouble holding a job    Memory problems    Problems with a marriage or relationship    Lack of discipline    Trouble finishing tasks or projects  What is ADHD?  Attention-deficit/hyperactivity disorder makes it hard to focus your mind. You may daydream a lot. And you may be restless much of the time. As a result, you may have trouble with detailed or boring work. And it may be hard to stick with one project for very long. You also may forget things. Or, you may miss key points during a lecture or meeting. You may even have trouble sitting through a movie or concert. At times, you may feel frustrated or angry. This can affect your relationships with others.   Who does it affect?  ADHD starts in childhood. Sometimes, your symptoms may improve as you get older. But they also may persist into your adult years. ADHD is often thought of as a  kid s problem.  That s why it s often missed in adults. In fact, many parents learn they have ADHD when their children are diagnosed.   What causes it?  The exact cause of ADHD isn t known. The disorder does run in families. Having one parent with ADHD makes it more likely you ll have it too. And the part of your brain that controls attention may be involved. Certain brain chemicals that are out of balance may also play a role.   What can be done?  The first step is finding out if you have ADHD. Your doctor will use special guidelines to diagnose the disorder. Most adults with ADHD are greatly helped by some combination of medicine, therapy and coaching.   To learn more    Children and Adults with Attention-Deficit/Hyperactivity Disorder (LEODAN) https://leodan.org/    Attention Deficit Disorder Association (ADDA) https://add.org/    [employer] last reviewed this educational content on 4/1/2020 2000-2020 [employer]. All rights reserved. This information is not intended as a substitute for professional medical care. Always follow your healthcare professional's instructions.

## 2021-03-28 ENCOUNTER — MYC REFILL (OUTPATIENT)
Dept: FAMILY MEDICINE | Facility: OTHER | Age: 41
End: 2021-03-28

## 2021-03-28 DIAGNOSIS — F41.8 SITUATIONAL ANXIETY: ICD-10-CM

## 2021-03-29 NOTE — TELEPHONE ENCOUNTER
ativan      Last Written Prescription Date:  3/25/21  Last Fill Quantity: 10,   # refills: 0  Last Office Visit: 3/25/21  Future Office visit:

## 2021-03-30 ENCOUNTER — MYC MEDICAL ADVICE (OUTPATIENT)
Dept: FAMILY MEDICINE | Facility: OTHER | Age: 41
End: 2021-03-30

## 2021-03-30 RX ORDER — LORAZEPAM 1 MG/1
1 TABLET ORAL EVERY 6 HOURS PRN
Qty: 10 TABLET | Refills: 0 | OUTPATIENT
Start: 2021-03-30

## 2021-04-11 ENCOUNTER — HEALTH MAINTENANCE LETTER (OUTPATIENT)
Age: 41
End: 2021-04-11

## 2021-04-13 ENCOUNTER — MYC REFILL (OUTPATIENT)
Dept: FAMILY MEDICINE | Facility: OTHER | Age: 41
End: 2021-04-13

## 2021-04-13 DIAGNOSIS — F41.8 SITUATIONAL ANXIETY: ICD-10-CM

## 2021-04-14 RX ORDER — LORAZEPAM 1 MG/1
1 TABLET ORAL EVERY 6 HOURS PRN
Qty: 20 TABLET | Refills: 0 | Status: SHIPPED | OUTPATIENT
Start: 2021-04-14 | End: 2021-04-29

## 2021-04-14 NOTE — TELEPHONE ENCOUNTER
ATIVAN      Last Written Prescription Date:  3-3-2021  Last Fill Quantity: 20,   # refills: 0  Last Office Visit: 3-  Future Office visit:       Routing refill request to provider for review/approval because:  Drug not on the [facility], P or [facility] refill protocol or controlled substance

## 2021-04-22 ENCOUNTER — MYC REFILL (OUTPATIENT)
Dept: FAMILY MEDICINE | Facility: OTHER | Age: 41
End: 2021-04-22

## 2021-04-22 DIAGNOSIS — F90.8 ATTENTION DEFICIT HYPERACTIVITY DISORDER (ADHD), OTHER TYPE: ICD-10-CM

## 2021-04-23 RX ORDER — DEXTROAMPHETAMINE SACCHARATE, AMPHETAMINE ASPARTATE MONOHYDRATE, DEXTROAMPHETAMINE SULFATE AND AMPHETAMINE SULFATE 5; 5; 5; 5 MG/1; MG/1; MG/1; MG/1
20 CAPSULE, EXTENDED RELEASE ORAL DAILY
Qty: 30 CAPSULE | Refills: 0 | Status: SHIPPED | OUTPATIENT
Start: 2021-04-23 | End: 2021-05-20

## 2021-04-23 RX ORDER — DEXTROAMPHETAMINE SACCHARATE, AMPHETAMINE ASPARTATE, DEXTROAMPHETAMINE SULFATE AND AMPHETAMINE SULFATE 5; 5; 5; 5 MG/1; MG/1; MG/1; MG/1
20 TABLET ORAL DAILY
Qty: 30 TABLET | Refills: 0 | Status: SHIPPED | OUTPATIENT
Start: 2021-04-23 | End: 2021-05-20

## 2021-04-23 NOTE — TELEPHONE ENCOUNTER
Adderall XR 20 mg      Last Written Prescription Date:  3/25/21  Last Fill Quantity: 30,   # refills: 0  Last Office Visit: 3/25/21  Future Office visit:       Routing refill request to provider for review/approval because:

## 2021-04-29 ENCOUNTER — MYC REFILL (OUTPATIENT)
Dept: FAMILY MEDICINE | Facility: OTHER | Age: 41
End: 2021-04-29

## 2021-04-29 DIAGNOSIS — F41.8 SITUATIONAL ANXIETY: ICD-10-CM

## 2021-04-30 RX ORDER — LORAZEPAM 1 MG/1
1 TABLET ORAL EVERY 6 HOURS PRN
Qty: 20 TABLET | Refills: 0 | Status: SHIPPED | OUTPATIENT
Start: 2021-04-30 | End: 2021-05-16

## 2021-04-30 NOTE — TELEPHONE ENCOUNTER
LORazepam (ATIVAN) 1 MG tablet   Last Written Prescription Date:  04/14/2021  Last Fill Quantity: 20,   # refills: 0  Last Office Visit: 03/25/2021  Future Office visit:       Routing refill request to provider for review/approval because:  Drug not on the [facility], [facility] or [facility] refill protocol or controlled substance

## 2021-06-05 ENCOUNTER — MYC REFILL (OUTPATIENT)
Dept: FAMILY MEDICINE | Facility: OTHER | Age: 41
End: 2021-06-05

## 2021-06-05 DIAGNOSIS — F41.8 SITUATIONAL ANXIETY: ICD-10-CM

## 2021-06-07 NOTE — TELEPHONE ENCOUNTER
LORazepam (ATIVAN) 1 MG tablet      Last Written Prescription Date:  5/18/21  Last Fill Quantity: 20,   # refills: 0  Last Office Visit: 3/25/21  Future Office visit:       Routing refill request to provider for review/approval because:  Drug not on the [facility], P or [facility] refill protocol or controlled substance

## 2021-06-08 RX ORDER — LORAZEPAM 1 MG/1
.5-1 TABLET ORAL EVERY 6 HOURS PRN
Qty: 20 TABLET | Refills: 0 | Status: SHIPPED | OUTPATIENT
Start: 2021-06-08 | End: 2021-07-02

## 2021-06-16 ENCOUNTER — MYC REFILL (OUTPATIENT)
Dept: FAMILY MEDICINE | Facility: OTHER | Age: 41
End: 2021-06-16

## 2021-06-16 DIAGNOSIS — F90.8 ATTENTION DEFICIT HYPERACTIVITY DISORDER (ADHD), OTHER TYPE: ICD-10-CM

## 2021-06-16 DIAGNOSIS — F41.1 GAD (GENERALIZED ANXIETY DISORDER): ICD-10-CM

## 2021-06-16 RX ORDER — BUPROPION HYDROCHLORIDE 300 MG/1
TABLET ORAL
Qty: 30 TABLET | Refills: 2 | Status: SHIPPED | OUTPATIENT
Start: 2021-06-16 | End: 2021-09-02

## 2021-06-16 NOTE — TELEPHONE ENCOUNTER
Wellbutrin      Last Written Prescription Date:  3.24.21  Last Fill Quantity: 30,   # refills: 2  Last Office Visit: 3.25.21

## 2021-06-16 NOTE — TELEPHONE ENCOUNTER
Adderall XR 20 mg      Last Written Prescription Date:  5.20.21  Last Fill Quantity: 30,   # refills: 0    Adderall 20 mg      Last Written Prescription Date:  5.22.21  Last Fill Quantity: 30,   # refills: 0  Last Office Visit: 3.25.21

## 2021-06-17 ENCOUNTER — MYC REFILL (OUTPATIENT)
Dept: FAMILY MEDICINE | Facility: OTHER | Age: 41
End: 2021-06-17

## 2021-06-17 DIAGNOSIS — M77.11 RIGHT LATERAL EPICONDYLITIS: ICD-10-CM

## 2021-06-17 DIAGNOSIS — F90.8 ATTENTION DEFICIT HYPERACTIVITY DISORDER (ADHD), OTHER TYPE: ICD-10-CM

## 2021-06-17 RX ORDER — IBUPROFEN 800 MG/1
800 TABLET, FILM COATED ORAL EVERY 8 HOURS PRN
Qty: 90 TABLET | Refills: 0 | Status: SHIPPED | OUTPATIENT
Start: 2021-06-17 | End: 2021-08-12

## 2021-06-17 RX ORDER — DEXTROAMPHETAMINE SACCHARATE, AMPHETAMINE ASPARTATE MONOHYDRATE, DEXTROAMPHETAMINE SULFATE AND AMPHETAMINE SULFATE 5; 5; 5; 5 MG/1; MG/1; MG/1; MG/1
20 CAPSULE, EXTENDED RELEASE ORAL DAILY
Qty: 30 CAPSULE | Refills: 0 | Status: CANCELLED | OUTPATIENT
Start: 2021-06-17

## 2021-06-17 RX ORDER — DEXTROAMPHETAMINE SACCHARATE, AMPHETAMINE ASPARTATE, DEXTROAMPHETAMINE SULFATE AND AMPHETAMINE SULFATE 5; 5; 5; 5 MG/1; MG/1; MG/1; MG/1
20 TABLET ORAL DAILY
Qty: 30 TABLET | Refills: 0 | Status: CANCELLED | OUTPATIENT
Start: 2021-06-17

## 2021-06-18 RX ORDER — DEXTROAMPHETAMINE SACCHARATE, AMPHETAMINE ASPARTATE, DEXTROAMPHETAMINE SULFATE AND AMPHETAMINE SULFATE 5; 5; 5; 5 MG/1; MG/1; MG/1; MG/1
20 TABLET ORAL DAILY
Qty: 30 TABLET | Refills: 0 | Status: SHIPPED | OUTPATIENT
Start: 2021-06-18 | End: 2021-07-15

## 2021-06-18 RX ORDER — DEXTROAMPHETAMINE SACCHARATE, AMPHETAMINE ASPARTATE MONOHYDRATE, DEXTROAMPHETAMINE SULFATE AND AMPHETAMINE SULFATE 5; 5; 5; 5 MG/1; MG/1; MG/1; MG/1
20 CAPSULE, EXTENDED RELEASE ORAL DAILY
Qty: 30 CAPSULE | Refills: 0 | Status: SHIPPED | OUTPATIENT
Start: 2021-06-18 | End: 2021-07-15

## 2021-07-01 DIAGNOSIS — B00.9 HERPES SIMPLEX VIRUS INFECTION: ICD-10-CM

## 2021-07-01 RX ORDER — VALACYCLOVIR HYDROCHLORIDE 500 MG/1
500 TABLET, FILM COATED ORAL DAILY
Qty: 90 TABLET | Refills: 2 | Status: SHIPPED | OUTPATIENT
Start: 2021-07-01 | End: 2022-03-31

## 2021-07-01 NOTE — TELEPHONE ENCOUNTER
Valacyclovir 500 mg      Last Written Prescription Date:  10/08/20  Last Fill Quantity: 90,   # refills: 2  Last Office Visit: 3/25/21  Future Office visit:       Routing refill request to provider for review/approval because:  Drug not on the [facility], P or [facility] refill protocol or controlled substance

## 2021-07-02 ENCOUNTER — MYC REFILL (OUTPATIENT)
Dept: FAMILY MEDICINE | Facility: OTHER | Age: 41
End: 2021-07-02

## 2021-07-02 DIAGNOSIS — F41.8 SITUATIONAL ANXIETY: ICD-10-CM

## 2021-07-02 RX ORDER — LORAZEPAM 1 MG/1
.5-1 TABLET ORAL EVERY 6 HOURS PRN
Qty: 20 TABLET | Refills: 0 | Status: SHIPPED | OUTPATIENT
Start: 2021-07-02 | End: 2021-09-02

## 2021-07-02 NOTE — TELEPHONE ENCOUNTER
Ativan 1mg      Last Written Prescription Date:  6/8/21  Last Fill Quantity: 20,   # refills: 0  Last Office Visit: 3/25/21  Future Office visit:    Next 5 appointments (look out 90 days)    Jul 14, 2021  9:15 AM  (Arrive by 9:00 AM)  SHORT with Payton Levi NP  [facility] ([facility] ) [address] [place] [address]  [phone]   Aug 03, 2021  1:45 PM  (Arrive by 1:30 PM)  PHYSICAL with Payton Levi NP  [facility] ([facility] ) [address]  [place] [address]  [phone]           Routing refill request to provider for review/approval because:

## 2021-07-15 ENCOUNTER — MYC REFILL (OUTPATIENT)
Dept: FAMILY MEDICINE | Facility: OTHER | Age: 41
End: 2021-07-15

## 2021-07-15 DIAGNOSIS — F90.8 ATTENTION DEFICIT HYPERACTIVITY DISORDER (ADHD), OTHER TYPE: ICD-10-CM

## 2021-07-15 RX ORDER — DEXTROAMPHETAMINE SACCHARATE, AMPHETAMINE ASPARTATE, DEXTROAMPHETAMINE SULFATE AND AMPHETAMINE SULFATE 5; 5; 5; 5 MG/1; MG/1; MG/1; MG/1
20 TABLET ORAL DAILY
Qty: 30 TABLET | Refills: 0 | Status: SHIPPED | OUTPATIENT
Start: 2021-07-15 | End: 2021-08-12

## 2021-07-15 RX ORDER — DEXTROAMPHETAMINE SACCHARATE, AMPHETAMINE ASPARTATE MONOHYDRATE, DEXTROAMPHETAMINE SULFATE AND AMPHETAMINE SULFATE 5; 5; 5; 5 MG/1; MG/1; MG/1; MG/1
20 CAPSULE, EXTENDED RELEASE ORAL DAILY
Qty: 30 CAPSULE | Refills: 0 | Status: SHIPPED | OUTPATIENT
Start: 2021-07-15 | End: 2021-08-12

## 2021-07-15 NOTE — TELEPHONE ENCOUNTER
adderall xr 20mg       Last Written Prescription Date:  6/18/21  Last Fill Quantity: 30,   # refills: 0  Last Office Visit: 3/25/221  Future Office visit:    Next 5 appointments (look out 90 days)    Aug 03, 2021  1:45 PM  (Arrive by 1:30 PM)  [facility] [address] [place] [address]  [phone]         adderall 20mg       Last Written Prescription Date:  6/18/21  Last Fill Quantity: 30,   # refills: 0  Last Office Visit: 3/25/21  Future Office visit:    Next 5 appointments (look out 90 days)    Aug 03, 2021  1:45 PM  (Arrive by 1:30 PM)  [facility]  [facility] [address]  [place] [address]  [phone]

## 2021-08-04 NOTE — PROGRESS NOTES
SUBJECTIVE:   CC: Purnima Fallon is an 40 year old woman who presents for preventive health visit.       Patient has been advised of split billing requirements and indicates understanding:   Healthy Habits:     Getting at least 3 servings of Calcium per day:  Yes    Bi-annual eye exam:  Yes    Dental care twice a year:  Yes    Diet:  Regular (no restrictions)    Frequency of exercise:  2-3 days/week    Duration of exercise:  45-60 minutes    Taking medications regularly:  Yes    Medication side effects:  Not applicable    PHQ-2 Total Score: 1    Additional concerns today:  No      Today's PHQ-2 Score:   PHQ-2 ( 1999 [employer]) 8/31/2021   Q1: Little interest or pleasure in doing things 1   Q2: Feeling down, depressed or hopeless 0   PHQ-2 Score 1   Q1: Little interest or pleasure in doing things Several days   Q2: Feeling down, depressed or hopeless Not at all   PHQ-2 Score 1       Abuse: Current or Past (Physical, Sexual or Emotional) - No  Do you feel safe in your environment? Yes    Have you ever done Advance Care Planning? (For example, a Health Directive, POLST, or a discussion with a medical provider or your loved ones about your wishes): Yes, patient states has an Advance Care Planning document and will bring a copy to the clinic.       She reports a 17 pound weight gain in the past 3 weeks. She feels abdominal bloating and left sided pelvic discomfort. Denies change in diet or bowel habits.     She feels her afternoon dose of Adderall is wearing off too quick. When she is done with work she feeling fatigued and unmotivated. She feels an increase in a\Adderall dosing may be helpful.     Social History     Tobacco Use     Smoking status: Current Every Day Smoker     Packs/day: 0.50     Years: 15.00     Pack years: 7.50     Types: Cigarettes     Start date: 1/1/2001     Smokeless tobacco: Never Used     Tobacco comment: declined 2/27/2020   Substance Use Topics     Alcohol use: No     If you drink alcohol do  you typically have >3 drinks per day or >7 drinks per week? No    Alcohol Use 9/2/2021   Prescreen: >3 drinks/day or >7 drinks/week? -   Prescreen: >3 drinks/day or >7 drinks/week? No       Reviewed orders with patient.  Reviewed health maintenance and updated orders accordingly - Yes  BP Readings from Last 3 Encounters:   09/02/21 100/60   03/25/21 106/78   10/08/20 112/54    Wt Readings from Last 3 Encounters:   09/02/21 57.6 kg (127 lb)   03/25/21 49.9 kg (110 lb)   10/08/20 52.6 kg (116 lb)                  Patient Active Problem List   Diagnosis     Migraines     Depression     ADHD (attention deficit hyperactivity disorder)     Cystic acne     S/P laparoscopic hysterectomy     Kidney stones     Ischiorectal abscess and fistula     ACP (advance care planning)     Mass of breast     Pilonidal cyst     H/O pilonidal cyst     Biliary dyskinesia     Chronic female pelvic pain--LAPAROSCOPY w LEFT SALPINGECTOMY--normal pelvis--NO Endometriosis--9/2018     Hidradenitis suppurativa     Carpal tunnel syndrome     Malunion of fracture     Pain in joint, forearm     Trigger thumb of both thumbs     Past Surgical History:   Procedure Laterality Date     CYSTECTOMY PILONIDAL N/A 9/18/2017    Procedure: CYSTECTOMY PILONIDAL;  PILONIDAL CYSTECTOMY ;  Surgeon: Cordell Stephens MD;  [facility]     ENDOSCOPY UPPER, COLONOSCOPY, COMBINED N/A 10/5/2018    Procedure: COMBINED ENDOSCOPY UPPER, COLONOSCOPY;  UPPER ENDOSCOPY with Biopsy  AND COLONOSCOPY;  Surgeon: Cordell Stephens MD;  [facility]     EXCISE LESION BUTTOCK(S) Left 6/8/2018    Procedure: EXCISE LESION BUTTOCK(S);  EXCISION OF GLUTEAL LEFT SKIN LESION;  Surgeon: Cordell Stephens MD;  [facility]     EXCISE MASS NECK Right 8/4/2015    Procedure: EXCISE MASS NECK;  Surgeon: Nasir Jones MD;  [facility]     INCISION AND DRAINAGE PERINEAL, COMBINED N/A 3/18/2015    Procedure: COMBINED INCISION AND DRAINAGE PERINEAL;  Surgeon: Brian  Jay VILA DO;  [facility]     IRRIGATION AND DEBRIDEMENT GROIN N/A 2/7/2019    Procedure: IRRIGATION AND DEBRIDEMENT LEFT GROIN ABSCESS;  Surgeon: Cordell Stephens MD;  [facility]     LAPAROSCOPIC CHOLECYSTECTOMY N/A 11/20/2017    Procedure: LAPAROSCOPIC CHOLECYSTECTOMY;  LAPAROSCOPIC CHOLECYSTECTOMY;  Surgeon: Cordell Stephens MD;  [facility]     LAPAROSCOPIC HYSTERECTOMY SUPRACERVICAL, BILATERAL SALPINGO-OOPHORECTOMY, COMBINED  9/27/2013    Procedure: COMBINED LAPAROSCOPIC HYSTERECTOMY SUPRACERVICAL, SALPINGO-OOPHORECTOMY;  LAPAROSCOPIC SUPRACERVICAL HYSTERECTOMY AND RIGHT SALPINGO-OOPHORECTOMY, CYSTOSCOPY;  Surgeon: Denys Callahan MD;  [facility]     LAPAROSCOPY DIAGNOSTIC (GYN) N/A 9/6/2018    Procedure: LAPAROSCOPY DIAGNOSTIC (GYN);  LAPAROSCOPY WITH PERITONEAL BIOPSIES AND REMOVAL LEFT FALLOPIAN TUBE;  Surgeon: Suraj Whitaker MD;  [facility]     marsupialization of pilonidal cyst  2007     RELEASE TRIGGER FINGER BILATERAL Bilateral 7/10/2020    Procedure: RELEASE BILATERAL TRIGGER THUMBS;  Surgeon: Vince Murillo DO;  [facility]     TUBAL LIGATION  2005       Social History     Tobacco Use     Smoking status: Current Every Day Smoker     Packs/day: 0.50     Years: 15.00     Pack years: 7.50     Types: Cigarettes     Start date: 1/1/2001     Smokeless tobacco: Never Used     Tobacco comment: declined 2/27/2020   Substance Use Topics     Alcohol use: No     Family History   Problem Relation Age of Onset     Diabetes Paternal Grandmother          Current Outpatient Medications   Medication Sig Dispense Refill     amphetamine-dextroamphetamine (ADDERALL XR) 20 MG 24 hr capsule Take 1 capsule (20 mg) by mouth daily 30 capsule 0     amphetamine-dextroamphetamine (ADDERALL) 10 MG tablet Take 1 tablet (10 mg) by mouth daily for 10 days 10 tablet 0     amphetamine-dextroamphetamine (ADDERALL) 20 MG tablet Take 1 tablet (20 mg) by mouth daily 30 tablet 0     buPROPion  (WELLBUTRIN XL) 300 MG 24 hr tablet Take 1 tablet (300 mg) by mouth every morning 90 tablet 1     HUMIRA *CF* PEN 40 MG/0.4ML pen kit        ibuprofen (ADVIL/MOTRIN) 800 MG tablet Take 1 tablet (800 mg) by mouth every 8 hours as needed for moderate pain 90 tablet 0     valACYclovir (VALTREX) 500 MG tablet Take 1 tablet (500 mg) by mouth daily 90 tablet 2       Breast Cancer Screening:    Breast CA Risk Assessment (FHS-7) 8/14/2021   Do you have a family history of breast, colon, or ovarian cancer? No / Unknown       Mammogram Screening - Offered annual screening and updated Health Maintenance for mutual plan based on risk factor consideration    Pertinent mammograms are reviewed under the imaging tab.    History of abnormal Pap smear: YES - updated in Problem List and Health Maintenance accordingly    PAP / HPV Latest Ref Rng & Units 12/17/2019 5/15/2018 3/22/2016   PAP (Historical) - NIL NIL NIL   HPV16 NEG:Negative Negative Negative -   HPV18 NEG:Negative Negative Negative -   HRHPV NEG:Negative Negative Negative -     Reviewed and updated as needed this visit by clinical staff  Tobacco  Allergies  Meds  Problems  Med Hx  Surg Hx  Fam Hx  Soc Hx          Reviewed and updated as needed this visit by Provider   Allergies  Meds  Problems            Past Medical History:   Diagnosis Date     Hidradenitis 2/16/2007     Ischiorectal abscess and fistula      Kidney stones 2008    x2 passed on her own     Nondependent tobacco use disorder 10/11/2012     Papanicolaou smear of cervix with low grade squamous intraepithelial lesion (LGSIL) 10/29/2008     S/p partial hysterectomy with remaining cervical stump 09/27/2013      Past Surgical History:   Procedure Laterality Date     CYSTECTOMY PILONIDAL N/A 9/18/2017    Procedure: CYSTECTOMY PILONIDAL;  PILONIDAL CYSTECTOMY ;  Surgeon: Cordell Stephens MD;  [facility]     ENDOSCOPY UPPER, COLONOSCOPY, COMBINED N/A 10/5/2018    Procedure: COMBINED ENDOSCOPY  UPPER, COLONOSCOPY;  UPPER ENDOSCOPY with Biopsy  AND COLONOSCOPY;  Surgeon: Cordell Stephens MD;  [facility]     EXCISE LESION BUTTOCK(S) Left 6/8/2018    Procedure: EXCISE LESION BUTTOCK(S);  EXCISION OF GLUTEAL LEFT SKIN LESION;  Surgeon: Cordell Stephens MD;  [facility]     EXCISE MASS NECK Right 8/4/2015    Procedure: EXCISE MASS NECK;  Surgeon: Nasir Jones MD;  [facility]     INCISION AND DRAINAGE PERINEAL, COMBINED N/A 3/18/2015    Procedure: COMBINED INCISION AND DRAINAGE PERINEAL;  Surgeon: Jay Torres DO;  [facility]     IRRIGATION AND DEBRIDEMENT GROIN N/A 2/7/2019    Procedure: IRRIGATION AND DEBRIDEMENT LEFT GROIN ABSCESS;  Surgeon: Cordell Stephens MD;  [facility]     LAPAROSCOPIC CHOLECYSTECTOMY N/A 11/20/2017    Procedure: LAPAROSCOPIC CHOLECYSTECTOMY;  LAPAROSCOPIC CHOLECYSTECTOMY;  Surgeon: Cordell Stephens MD;  [facility]     LAPAROSCOPIC HYSTERECTOMY SUPRACERVICAL, BILATERAL SALPINGO-OOPHORECTOMY, COMBINED  9/27/2013    Procedure: COMBINED LAPAROSCOPIC HYSTERECTOMY SUPRACERVICAL, SALPINGO-OOPHORECTOMY;  LAPAROSCOPIC SUPRACERVICAL HYSTERECTOMY AND RIGHT SALPINGO-OOPHORECTOMY, CYSTOSCOPY;  Surgeon: Denys Callahan MD;  [facility]     LAPAROSCOPY DIAGNOSTIC (GYN) N/A 9/6/2018    Procedure: LAPAROSCOPY DIAGNOSTIC (GYN);  LAPAROSCOPY WITH PERITONEAL BIOPSIES AND REMOVAL LEFT FALLOPIAN TUBE;  Surgeon: Suraj Whitaker MD;  [facility]     marsupialization of pilonidal cyst  2007     RELEASE TRIGGER FINGER BILATERAL Bilateral 7/10/2020    Procedure: RELEASE BILATERAL TRIGGER THUMBS;  Surgeon: Vince Murillo DO;  [facility]     TUBAL LIGATION  2005       Review of Systems  CONSTITUTIONAL: NEGATIVE for fever, chills, change in weight  INTEGUMENTARY/SKIN: NEGATIVE for worrisome rashes, moles or lesions  EYES: NEGATIVE for vision changes or irritation  ENT: NEGATIVE for ear, mouth and throat problems  RESP: NEGATIVE for  "significant cough or SOB  BREAST: NEGATIVE for masses, tenderness or discharge  CV: NEGATIVE for chest pain, palpitations or peripheral edema  GI: NEGATIVE for nausea, abdominal pain, heartburn, or change in bowel habits. +left sided pelvic pain and abdominal bloating  : NEGATIVE for unusual urinary or vaginal symptoms. No vaginal bleeding.  MUSCULOSKELETAL: NEGATIVE for significant arthralgias or myalgia  NEURO: NEGATIVE for weakness, dizziness or paresthesias  ENDOCRINE: NEGATIVE for temperature intolerance, skin/hair changes  PSYCHIATRIC: NEGATIVE for changes in mood or affect      OBJECTIVE:   /60   Pulse 90   Temp 99  F (37.2  C) (Tympanic)   Resp 16   Ht 1.588 m (5' 2.5\")   Wt 57.6 kg (127 lb)   LMP 08/10/2013   SpO2 99%   BMI 22.86 kg/m    Physical Exam  GENERAL: healthy, alert and no distress  EYES: Eyes grossly normal to inspection, PERRL and conjunctivae and sclerae normal  HENT: ear canals and TM's normal, nose and mouth without ulcers or lesions  NECK: no adenopathy, no asymmetry, masses, or scars and thyroid normal to palpation  RESP: lungs clear to auscultation - no rales, rhonchi or wheezes  BREAST: normal without masses, tenderness or nipple discharge and no palpable axillary masses or adenopathy  CV: regular rate and rhythm, normal S1 S2, no S3 or S4, no murmur, click or rub, no peripheral edema and peripheral pulses strong  ABDOMEN: soft, nontender, no hepatosplenomegaly, no masses and bowel sounds normal  MS: no gross musculoskeletal defects noted, no edema  SKIN: no suspicious lesions or rashes  : Normal female genitalia. Cervix with no motion tenderness. No discharge. No masses palpated. +left pelvic discomfort with bimanual exam   NEURO: Normal strength and tone, mentation intact and speech normal  PSYCH: mentation appears normal, affect normal/bright    Diagnostic Test Results:  Labs reviewed in Epic    ASSESSMENT/PLAN:     Pelvic ultrasound ordered for bloating, weight gain, " "and left pelvic discomfort. If pelvic ultrasound looks ok, consider abdominal/pelvic CT.     Increased afternoon dose of Adderall to 30 mg. If she feels like increase is not effective, I discussed with her that we could try Adderall XR 20 MG BID. She is happy with this plan.     Discussed anxiety options and she will continue with current regime at this time.         ICD-10-CM    1. Routine general medical examination at a health care facility  Z00.00 TDAP VACCINE (Adacel, Boostrix)  [[id number]]     A pap thin layer screen with  HPV - recommended age 30 - 65 years     Comprehensive metabolic panel     CBC with platelets     TSH with free T4 reflex     Comprehensive metabolic panel     CBC with platelets     TSH with free T4 reflex     CBC with platelets and differential     A pap thin layer screen with  HPV - recommended age 30 - 65 years     CBC with platelets and differential   2. WENDY (generalized anxiety disorder)  F41.1 buPROPion (WELLBUTRIN XL) 300 MG 24 hr tablet   3. Right lateral epicondylitis  M77.11    4. Attention deficit hyperactivity disorder (ADHD), other type  F90.8 amphetamine-dextroamphetamine (ADDERALL) 10 MG tablet   5. Bloating  R14.0 US Pelvic Complete with Transvaginal     CBC with platelets and differential     CBC with platelets and differential       Patient has been advised of split billing requirements and indicates understanding:   COUNSELING:  Reviewed preventive health counseling, as reflected in patient instructions       Regular exercise       Healthy diet/nutrition       Safe sex practices/STD prevention   TDAP immunization     Estimated body mass index is 22.86 kg/m  as calculated from the following:    Height as of this encounter: 1.588 m (5' 2.5\").    Weight as of this encounter: 57.6 kg (127 lb).    Weight management plan noted, stable and monitoring    She reports that she has been smoking cigarettes. She started smoking about 20 years ago. She has a 7.50 pack-year smoking " history. She has never used smokeless tobacco.  Tobacco Cessation Action Plan:   Information offered: Patient not interested at this time      Counseling Resources:  ATP IV Guidelines  Pooled Cohorts Equation Calculator  Breast Cancer Risk Calculator  BRCA-Related Cancer Risk Assessment: FHS-7 Tool  FRAX Risk Assessment  ICSI Preventive Guidelines  Dietary Guidelines for Americans, 2010  USDA's MyPlate  ASA Prophylaxis  Lung CA Screening    Payton Levi NP  [facility]

## 2021-09-02 ENCOUNTER — OFFICE VISIT (OUTPATIENT)
Dept: FAMILY MEDICINE | Facility: OTHER | Age: 41
End: 2021-09-02
Attending: NURSE PRACTITIONER
Payer: COMMERCIAL

## 2021-09-02 VITALS
HEART RATE: 90 BPM | OXYGEN SATURATION: 99 % | DIASTOLIC BLOOD PRESSURE: 60 MMHG | HEIGHT: 63 IN | RESPIRATION RATE: 16 BRPM | SYSTOLIC BLOOD PRESSURE: 100 MMHG | WEIGHT: 127 LBS | TEMPERATURE: 99 F | BODY MASS INDEX: 22.5 KG/M2

## 2021-09-02 DIAGNOSIS — R14.0 BLOATING: ICD-10-CM

## 2021-09-02 DIAGNOSIS — F90.8 ATTENTION DEFICIT HYPERACTIVITY DISORDER (ADHD), OTHER TYPE: ICD-10-CM

## 2021-09-02 DIAGNOSIS — F41.1 GAD (GENERALIZED ANXIETY DISORDER): ICD-10-CM

## 2021-09-02 DIAGNOSIS — R14.0 ABDOMINAL BLOATING: Primary | ICD-10-CM

## 2021-09-02 DIAGNOSIS — M77.11 RIGHT LATERAL EPICONDYLITIS: ICD-10-CM

## 2021-09-02 DIAGNOSIS — Z00.00 ROUTINE GENERAL MEDICAL EXAMINATION AT A HEALTH CARE FACILITY: Primary | ICD-10-CM

## 2021-09-02 LAB
ALBUMIN SERPL-MCNC: 3.9 G/DL (ref 3.4–5)
ALP SERPL-CCNC: 51 U/L (ref 40–150)
ALT SERPL W P-5'-P-CCNC: 42 U/L (ref 0–50)
ANION GAP SERPL CALCULATED.3IONS-SCNC: 6 MMOL/L (ref 3–14)
AST SERPL W P-5'-P-CCNC: 23 U/L (ref 0–45)
BASOPHILS # BLD AUTO: 0.1 10E3/UL (ref 0–0.2)
BASOPHILS NFR BLD AUTO: 0 %
BILIRUB SERPL-MCNC: 0.2 MG/DL (ref 0.2–1.3)
BUN SERPL-MCNC: 20 MG/DL (ref 7–30)
CALCIUM SERPL-MCNC: 9.3 MG/DL (ref 8.5–10.1)
CHLORIDE BLD-SCNC: 110 MMOL/L (ref 94–109)
CO2 SERPL-SCNC: 26 MMOL/L (ref 20–32)
CREAT SERPL-MCNC: 1.1 MG/DL (ref 0.52–1.04)
EOSINOPHIL # BLD AUTO: 0.1 10E3/UL (ref 0–0.7)
EOSINOPHIL NFR BLD AUTO: 1 %
ERYTHROCYTE [DISTWIDTH] IN BLOOD BY AUTOMATED COUNT: 14.6 % (ref 10–15)
ERYTHROCYTE [DISTWIDTH] IN BLOOD BY AUTOMATED COUNT: 14.6 % (ref 10–15)
GFR SERPL CREATININE-BSD FRML MDRD: 63 ML/MIN/1.73M2
GLUCOSE BLD-MCNC: 77 MG/DL (ref 70–99)
HCT VFR BLD AUTO: 40.1 % (ref 35–47)
HCT VFR BLD AUTO: 40.1 % (ref 35–47)
HGB BLD-MCNC: 13.6 G/DL (ref 11.7–15.7)
HGB BLD-MCNC: 13.7 G/DL (ref 11.7–15.7)
LYMPHOCYTES # BLD AUTO: 5.3 10E3/UL (ref 0.8–5.3)
LYMPHOCYTES NFR BLD AUTO: 41 %
MCH RBC QN AUTO: 35.1 PG (ref 26.5–33)
MCH RBC QN AUTO: 35.4 PG (ref 26.5–33)
MCHC RBC AUTO-ENTMCNC: 33.9 G/DL (ref 31.5–36.5)
MCHC RBC AUTO-ENTMCNC: 34.2 G/DL (ref 31.5–36.5)
MCV RBC AUTO: 103 FL (ref 78–100)
MCV RBC AUTO: 104 FL (ref 78–100)
MONOCYTES # BLD AUTO: 1.1 10E3/UL (ref 0–1.3)
MONOCYTES NFR BLD AUTO: 8 %
NEUTROPHILS # BLD AUTO: 6.4 10E3/UL (ref 1.6–8.3)
NEUTROPHILS NFR BLD AUTO: 50 %
PLATELET # BLD AUTO: 269 10E3/UL (ref 150–450)
PLATELET # BLD AUTO: 273 10E3/UL (ref 150–450)
POTASSIUM BLD-SCNC: 4 MMOL/L (ref 3.4–5.3)
PROT SERPL-MCNC: 7.2 G/DL (ref 6.8–8.8)
RBC # BLD AUTO: 3.87 10E6/UL (ref 3.8–5.2)
RBC # BLD AUTO: 3.88 10E6/UL (ref 3.8–5.2)
SODIUM SERPL-SCNC: 142 MMOL/L (ref 133–144)
TSH SERPL DL<=0.005 MIU/L-ACNC: 1.49 MU/L (ref 0.4–4)
WBC # BLD AUTO: 12.7 10E3/UL (ref 4–11)
WBC # BLD AUTO: 12.9 10E3/UL (ref 4–11)

## 2021-09-02 PROCEDURE — G0145 SCR C/V CYTO,THINLAYER,RESCR: HCPCS | Performed by: NURSE PRACTITIONER

## 2021-09-02 PROCEDURE — 87624 HPV HI-RISK TYP POOLED RSLT: CPT | Performed by: NURSE PRACTITIONER

## 2021-09-02 PROCEDURE — 36415 COLL VENOUS BLD VENIPUNCTURE: CPT | Performed by: NURSE PRACTITIONER

## 2021-09-02 PROCEDURE — 90471 IMMUNIZATION ADMIN: CPT | Performed by: NURSE PRACTITIONER

## 2021-09-02 PROCEDURE — 90715 TDAP VACCINE 7 YRS/> IM: CPT | Performed by: NURSE PRACTITIONER

## 2021-09-02 PROCEDURE — 99395 PREV VISIT EST AGE 18-39: CPT | Mod: 25 | Performed by: NURSE PRACTITIONER

## 2021-09-02 PROCEDURE — 80050 GENERAL HEALTH PANEL: CPT | Performed by: NURSE PRACTITIONER

## 2021-09-02 RX ORDER — DEXTROAMPHETAMINE SACCHARATE, AMPHETAMINE ASPARTATE, DEXTROAMPHETAMINE SULFATE AND AMPHETAMINE SULFATE 2.5; 2.5; 2.5; 2.5 MG/1; MG/1; MG/1; MG/1
10 TABLET ORAL DAILY
Qty: 10 TABLET | Refills: 0 | Status: SHIPPED | OUTPATIENT
Start: 2021-09-02 | End: 2021-09-11

## 2021-09-02 RX ORDER — BUPROPION HYDROCHLORIDE 300 MG/1
300 TABLET ORAL EVERY MORNING
Qty: 90 TABLET | Refills: 1 | Status: SHIPPED | OUTPATIENT
Start: 2021-09-02 | End: 2021-09-15

## 2021-09-02 ASSESSMENT — ANXIETY QUESTIONNAIRES
5. BEING SO RESTLESS THAT IT IS HARD TO SIT STILL: NOT AT ALL
1. FEELING NERVOUS, ANXIOUS, OR ON EDGE: SEVERAL DAYS
3. WORRYING TOO MUCH ABOUT DIFFERENT THINGS: SEVERAL DAYS
2. NOT BEING ABLE TO STOP OR CONTROL WORRYING: NOT AT ALL
IF YOU CHECKED OFF ANY PROBLEMS ON THIS QUESTIONNAIRE, HOW DIFFICULT HAVE THESE PROBLEMS MADE IT FOR YOU TO DO YOUR WORK, TAKE CARE OF THINGS AT HOME, OR GET ALONG WITH OTHER PEOPLE: NOT DIFFICULT AT ALL
GAD7 TOTAL SCORE: 3
4. TROUBLE RELAXING: SEVERAL DAYS
7. FEELING AFRAID AS IF SOMETHING AWFUL MIGHT HAPPEN: NOT AT ALL
6. BECOMING EASILY ANNOYED OR IRRITABLE: NOT AT ALL

## 2021-09-02 ASSESSMENT — PAIN SCALES - GENERAL: PAINLEVEL: NO PAIN (0)

## 2021-09-02 ASSESSMENT — PATIENT HEALTH QUESTIONNAIRE - PHQ9: SUM OF ALL RESPONSES TO PHQ QUESTIONS 1-9: 7

## 2021-09-02 ASSESSMENT — MIFFLIN-ST. JEOR: SCORE: 1212.26

## 2021-09-02 NOTE — NURSING NOTE
"Chief Complaint   Patient presents with     Physical       Initial /60   Pulse 90   Temp 99  F (37.2  C) (Tympanic)   Resp 16   Ht 1.588 m (5' 2.5\")   Wt 57.6 kg (127 lb)   LMP 08/10/2013   SpO2 99%   BMI 22.86 kg/m   Estimated body mass index is 22.86 kg/m  as calculated from the following:    Height as of this encounter: 1.588 m (5' 2.5\").    Weight as of this encounter: 57.6 kg (127 lb).  Medication Reconciliation: complete  Crystal Polo LPN  "

## 2021-09-03 ASSESSMENT — ANXIETY QUESTIONNAIRES: GAD7 TOTAL SCORE: 3

## 2021-09-08 LAB
BKR LAB AP GYN ADEQUACY: NORMAL
BKR LAB AP GYN INTERPRETATION: NORMAL
BKR LAB AP HPV REFLEX: NORMAL
BKR LAB AP PREVIOUS ABNORMAL: NORMAL
PATH REPORT.COMMENTS IMP SPEC: NORMAL
PATH REPORT.RELEVANT HX SPEC: NORMAL

## 2021-09-09 LAB
HUMAN PAPILLOMA VIRUS 16 DNA: NEGATIVE
HUMAN PAPILLOMA VIRUS 18 DNA: NEGATIVE
HUMAN PAPILLOMA VIRUS FINAL DIAGNOSIS: NORMAL
HUMAN PAPILLOMA VIRUS OTHER HR: NEGATIVE

## 2021-09-10 ENCOUNTER — MYC MEDICAL ADVICE (OUTPATIENT)
Dept: FAMILY MEDICINE | Facility: OTHER | Age: 41
End: 2021-09-10

## 2021-09-14 DIAGNOSIS — F41.1 GAD (GENERALIZED ANXIETY DISORDER): ICD-10-CM

## 2021-09-15 RX ORDER — BUPROPION HYDROCHLORIDE 300 MG/1
TABLET ORAL
Qty: 30 TABLET | Refills: 5 | Status: SHIPPED | OUTPATIENT
Start: 2021-09-15 | End: 2022-04-08

## 2021-09-17 ENCOUNTER — HOSPITAL ENCOUNTER (OUTPATIENT)
Dept: ULTRASOUND IMAGING | Facility: HOSPITAL | Age: 41
Discharge: HOME | End: 2021-09-17
Attending: NURSE PRACTITIONER | Admitting: NURSE PRACTITIONER
Payer: COMMERCIAL

## 2021-09-17 DIAGNOSIS — R14.0 BLOATING: ICD-10-CM

## 2021-09-17 PROCEDURE — 76830 TRANSVAGINAL US NON-OB: CPT

## 2021-09-20 ENCOUNTER — MYC MEDICAL ADVICE (OUTPATIENT)
Dept: FAMILY MEDICINE | Facility: OTHER | Age: 41
End: 2021-09-20

## 2021-09-20 DIAGNOSIS — R14.0 ABDOMINAL BLOATING: Primary | ICD-10-CM

## 2021-09-22 NOTE — RESULT ENCOUNTER NOTE
Pt states that she is still feeling bloated and would like an abdominal CT - also states that she is very claustrophobic and would appreciate an Rx for relaxation for procedure

## 2021-09-23 NOTE — TELEPHONE ENCOUNTER
I have ordered an abdominal/pelvic CT. Please let her know this is ordered and the CT machine is a wide open scan and she will not be closed in.

## 2021-09-25 ENCOUNTER — HEALTH MAINTENANCE LETTER (OUTPATIENT)
Age: 41
End: 2021-09-25

## 2021-10-05 ENCOUNTER — HOSPITAL ENCOUNTER (OUTPATIENT)
Dept: CT IMAGING | Facility: HOSPITAL | Age: 41
Discharge: HOME | End: 2021-10-05
Attending: NURSE PRACTITIONER | Admitting: NURSE PRACTITIONER
Payer: COMMERCIAL

## 2021-10-05 DIAGNOSIS — R14.0 ABDOMINAL BLOATING: ICD-10-CM

## 2021-10-05 PROCEDURE — 255N000002 HC RX 255 OP 636: Performed by: RADIOLOGY

## 2021-10-05 PROCEDURE — 74177 CT ABD & PELVIS W/CONTRAST: CPT

## 2021-10-05 RX ORDER — IOPAMIDOL 612 MG/ML
100 INJECTION, SOLUTION INTRAVASCULAR ONCE
Status: COMPLETED | OUTPATIENT
Start: 2021-10-05 | End: 2021-10-05

## 2021-10-05 RX ADMIN — IOPAMIDOL 77 ML: 612 INJECTION, SOLUTION INTRAVENOUS at 16:01

## 2021-10-10 ENCOUNTER — MYC REFILL (OUTPATIENT)
Dept: FAMILY MEDICINE | Facility: OTHER | Age: 41
End: 2021-10-10

## 2021-10-10 DIAGNOSIS — F90.8 ATTENTION DEFICIT HYPERACTIVITY DISORDER (ADHD), OTHER TYPE: ICD-10-CM

## 2021-10-11 DIAGNOSIS — F41.8 SITUATIONAL ANXIETY: ICD-10-CM

## 2021-10-11 RX ORDER — DEXTROAMPHETAMINE SACCHARATE, AMPHETAMINE ASPARTATE, DEXTROAMPHETAMINE SULFATE AND AMPHETAMINE SULFATE 7.5; 7.5; 7.5; 7.5 MG/1; MG/1; MG/1; MG/1
30 TABLET ORAL DAILY
Qty: 30 TABLET | Refills: 0 | Status: SHIPPED | OUTPATIENT
Start: 2021-10-11 | End: 2021-11-08

## 2021-10-11 RX ORDER — DEXTROAMPHETAMINE SACCHARATE, AMPHETAMINE ASPARTATE MONOHYDRATE, DEXTROAMPHETAMINE SULFATE AND AMPHETAMINE SULFATE 5; 5; 5; 5 MG/1; MG/1; MG/1; MG/1
20 CAPSULE, EXTENDED RELEASE ORAL DAILY
Qty: 30 CAPSULE | Refills: 0 | Status: SHIPPED | OUTPATIENT
Start: 2021-10-11 | End: 2021-11-08

## 2021-10-11 NOTE — TELEPHONE ENCOUNTER
Adderall 20 and 30 mg      Last Written Prescription Date:  9/11/21  Last Fill Quantity: 30,   # refills: 0  Last Office Visit: 9/2/21  Future Office visit:       Routing refill request to provider for review/approval because:

## 2021-10-12 RX ORDER — LORAZEPAM 1 MG/1
TABLET ORAL
Qty: 20 TABLET | Refills: 0 | Status: SHIPPED | OUTPATIENT
Start: 2021-10-12 | End: 2021-11-08

## 2021-10-12 NOTE — TELEPHONE ENCOUNTER
Ativan      Last Written Prescription Date:  7.2.21  Last Fill Quantity: #20,   # refills: 0  Last Office Visit: 9.2.21  Future Office visit:       Routing refill request to provider for review/approval because:  Drug not on the G, P or [facility] refill protocol or controlled substance

## 2021-10-29 ENCOUNTER — VIRTUAL VISIT (OUTPATIENT)
Dept: FAMILY MEDICINE | Facility: OTHER | Age: 41
End: 2021-10-29
Attending: NURSE PRACTITIONER
Payer: COMMERCIAL

## 2021-10-29 DIAGNOSIS — R19.4 CHANGE IN BOWEL HABIT: ICD-10-CM

## 2021-10-29 DIAGNOSIS — J20.9 ACUTE BRONCHITIS WITH SYMPTOMS > 10 DAYS: Primary | ICD-10-CM

## 2021-10-29 DIAGNOSIS — R14.0 ABDOMINAL BLOATING: ICD-10-CM

## 2021-10-29 PROCEDURE — 99213 OFFICE O/P EST LOW 20 MIN: CPT | Performed by: NURSE PRACTITIONER

## 2021-10-29 RX ORDER — AZITHROMYCIN 250 MG/1
TABLET, FILM COATED ORAL
Qty: 6 TABLET | Refills: 0 | Status: SHIPPED | OUTPATIENT
Start: 2021-10-29 | End: 2022-02-10

## 2021-10-29 NOTE — NURSING NOTE
"Chief Complaint   Patient presents with     Results     has a continuous cough       Initial LMP 08/10/2013  Estimated body mass index is 22.86 kg/m  as calculated from the following:    Height as of 9/2/21: 1.588 m (5' 2.5\").    Weight as of 9/2/21: 57.6 kg (127 lb).  Medication Reconciliation: complete  Crystal Polo LPN    "

## 2021-10-29 NOTE — PROGRESS NOTES
Purnima is a 40 year old who is being evaluated via a billable telephone visit.      What phone number would you like to be contacted at? [phone]  How would you like to obtain your AVS? MyChart    Assessment & Plan     (J20.9) Acute bronchitis with symptoms > 10 days  (primary encounter diagnosis)  Comment: treat for bronchitis. Add Zyrtec 10 mg nightly for 2 weeks. Increase fluid intake   Plan: azithromycin (ZITHROMAX Z-SHAHLA) 250 MG tablet            (R14.0) Abdominal bloating  Comment: ref to gastro for colonoscopy   Plan: Adult Gastro Ref - Procedure Only            (R19.4) Change in bowel habit  Comment: ref to gastro for colonoscopy   Plan: Adult Gastro Ref - Procedure Only               See Patient Instructions    Return if symptoms worsen or fail to improve.    Payton Levi NP  [facility]      Purnima is a 40 year old who presents for the following health issues     HPI     Acute Illness  Acute illness concerns: cough, med related? Discuss labs  Onset/Duration: 1 month  Symptoms:  Fever: no  Chills/Sweats: no  Headache (location?): no  Sinus Pressure: no  Conjunctivitis:  no  Ear Pain: no  Rhinorrhea: no  Congestion: no  Sore Throat: no  Cough: YES-barking and productive, does not look at the phelgm  Wheeze: yes  Decreased Appetite: no  Nausea: no  Vomiting: no  Diarrhea: no  Dysuria/Freq.: no  Dysuria or Hematuria: no  Fatigue/Achiness: no  Sick/Strep Exposure: no  Therapies tried and outcome: None    Discuss labs    Abdominal bloating a little better. Still not resolved.     Review of Systems   Constitutional, HEENT, cardiovascular, pulmonary, gi and gu systems are negative, except as otherwise noted.      Objective           Vitals:  No vitals were obtained today due to virtual visit.    Physical Exam   healthy, alert and no distress  PSYCH: Alert and oriented times 3; coherent speech, normal   rate and volume, able to articulate logical thoughts, able   to  abstract reason, no tangential thoughts, no hallucinations   or delusions  Her affect is normal  RESP: No cough, no audible wheezing, able to talk in full sentences  Remainder of exam unable to be completed due to telephone visits      Phone call duration: 9 minutes  Start: 10:32  End: 10:41

## 2021-10-29 NOTE — PATIENT INSTRUCTIONS

## 2021-11-08 ENCOUNTER — MYC REFILL (OUTPATIENT)
Dept: FAMILY MEDICINE | Facility: OTHER | Age: 41
End: 2021-11-08
Payer: COMMERCIAL

## 2021-11-08 DIAGNOSIS — F41.8 SITUATIONAL ANXIETY: ICD-10-CM

## 2021-11-09 RX ORDER — LORAZEPAM 1 MG/1
TABLET ORAL
Qty: 20 TABLET | Refills: 0 | Status: SHIPPED | OUTPATIENT
Start: 2021-11-09 | End: 2021-12-06

## 2021-11-09 NOTE — TELEPHONE ENCOUNTER
Ativan      Last Written Prescription Date:  10.13.21  Last Fill Quantity: #20,   # refills: 0  Last Office Visit: 10.29.21  Future Office visit:       Routing refill request to provider for review/approval because:  Drug not on the G, P or [facility] refill protocol or controlled substance

## 2021-11-20 ENCOUNTER — HEALTH MAINTENANCE LETTER (OUTPATIENT)
Age: 41
End: 2021-11-20

## 2021-12-08 ENCOUNTER — MYC REFILL (OUTPATIENT)
Dept: FAMILY MEDICINE | Facility: OTHER | Age: 41
End: 2021-12-08
Payer: COMMERCIAL

## 2021-12-08 DIAGNOSIS — F90.8 ATTENTION DEFICIT HYPERACTIVITY DISORDER (ADHD), OTHER TYPE: ICD-10-CM

## 2021-12-08 DIAGNOSIS — M77.11 RIGHT LATERAL EPICONDYLITIS: ICD-10-CM

## 2021-12-08 DIAGNOSIS — F41.8 SITUATIONAL ANXIETY: ICD-10-CM

## 2021-12-08 RX ORDER — DEXTROAMPHETAMINE SACCHARATE, AMPHETAMINE ASPARTATE, DEXTROAMPHETAMINE SULFATE AND AMPHETAMINE SULFATE 7.5; 7.5; 7.5; 7.5 MG/1; MG/1; MG/1; MG/1
30 TABLET ORAL DAILY
Qty: 30 TABLET | Refills: 0 | Status: CANCELLED | OUTPATIENT
Start: 2021-12-08

## 2021-12-08 RX ORDER — DEXTROAMPHETAMINE SACCHARATE, AMPHETAMINE ASPARTATE MONOHYDRATE, DEXTROAMPHETAMINE SULFATE AND AMPHETAMINE SULFATE 5; 5; 5; 5 MG/1; MG/1; MG/1; MG/1
20 CAPSULE, EXTENDED RELEASE ORAL DAILY
Qty: 30 CAPSULE | Refills: 0 | Status: CANCELLED | OUTPATIENT
Start: 2021-12-08

## 2021-12-08 RX ORDER — IBUPROFEN 800 MG/1
800 TABLET, FILM COATED ORAL EVERY 8 HOURS PRN
Qty: 90 TABLET | Refills: 0 | Status: CANCELLED | OUTPATIENT
Start: 2021-12-08

## 2021-12-08 RX ORDER — LORAZEPAM 1 MG/1
TABLET ORAL
Qty: 20 TABLET | Refills: 0 | Status: CANCELLED | OUTPATIENT
Start: 2021-12-08

## 2022-01-08 ENCOUNTER — TELEPHONE (OUTPATIENT)
Dept: NURSING | Facility: CLINIC | Age: 42
End: 2022-01-08

## 2022-01-08 NOTE — TELEPHONE ENCOUNTER
Controlled substance request:     Refill request for Adderall. Pt would like her prescription transferred to a different pharmacy but the pharmacy is unable to do so for patient per patient's report.     Pt will either wait until Monday or go to [facility] [place] if needed before then.     Mary Carmen Rashid RN  [facility] Nurse Advisor 12:01 PM 1/8/2022

## 2022-01-11 ENCOUNTER — TELEPHONE (OUTPATIENT)
Dept: FAMILY MEDICINE | Facility: OTHER | Age: 42
End: 2022-01-11
Payer: COMMERCIAL

## 2022-01-11 DIAGNOSIS — L73.2 HIDRADENITIS SUPPURATIVA: Primary | ICD-10-CM

## 2022-01-11 NOTE — TELEPHONE ENCOUNTER
adalimumab (HUMIRA PEN) 40 MG/0.8ML pen kit       Last Written Prescription Date:  01/26/21 - 09/02/21  Last Fill Quantity: 6 each,   # refills: 11  Last Office Visit: 09/02/21  Future Office visit:       Routing refill request to provider for review/approval because:

## 2022-01-17 ENCOUNTER — VIRTUAL VISIT (OUTPATIENT)
Dept: FAMILY MEDICINE | Facility: OTHER | Age: 42
End: 2022-01-17
Attending: NURSE PRACTITIONER
Payer: COMMERCIAL

## 2022-01-17 DIAGNOSIS — M54.2 NECK PAIN: ICD-10-CM

## 2022-01-17 DIAGNOSIS — M54.41 ACUTE BILATERAL LOW BACK PAIN WITH RIGHT-SIDED SCIATICA: Primary | ICD-10-CM

## 2022-01-17 PROCEDURE — 99213 OFFICE O/P EST LOW 20 MIN: CPT | Mod: 95 | Performed by: NURSE PRACTITIONER

## 2022-01-17 RX ORDER — HYDROCODONE BITARTRATE AND ACETAMINOPHEN 5; 325 MG/1; MG/1
1 TABLET ORAL EVERY 6 HOURS PRN
Qty: 10 TABLET | Refills: 0 | Status: SHIPPED | OUTPATIENT
Start: 2022-01-17 | End: 2022-01-20

## 2022-01-17 RX ORDER — PREDNISONE 20 MG/1
TABLET ORAL
Qty: 10 TABLET | Refills: 0 | Status: SHIPPED | OUTPATIENT
Start: 2022-01-17 | End: 2022-02-10

## 2022-01-17 NOTE — NURSING NOTE
"Chief Complaint   Patient presents with     Back Pain       Initial LMP 08/10/2013  Estimated body mass index is 22.86 kg/m  as calculated from the following:    Height as of 9/2/21: 1.588 m (5' 2.5\").    Weight as of 9/2/21: 57.6 kg (127 lb).  Medication Reconciliation: complete  Crystal Polo LPN    "

## 2022-01-17 NOTE — PROGRESS NOTES
Purnima is a 41 year old who is being evaluated via a billable video visit.      How would you like to obtain your AVS? MyChart  If the video visit is dropped, the invitation should be resent by: Text to cell phone: [phone]  Will anyone else be joining your video visit? No    Video Start Time: 1515      Assessment & Plan     (M54.41) Acute bilateral low back pain with right-sided sciatica  (primary encounter diagnosis)  Comment:  treat with Prednisone and a short fill of Norco. She was strongly educated to not mix Ativan with Norco. She is aware that it can cause severe respiratory depression that can lead to death. Supportive care also discussed   Plan: predniSONE (DELTASONE) 20 MG tablet,         HYDROcodone-acetaminophen (NORCO) 5-325 MG         tablet            (M54.2) Neck pain  Comment: treat with Prednisone and a short fill of Norco. She was strongly educated to not mix Ativan with Norco. She is aware that it can cause severe respiratory depression that can lead to death. Supportive care also discussed   Plan: predniSONE (DELTASONE) 20 MG tablet,         HYDROcodone-acetaminophen (NORCO) 5-325 MG         tablet             See Patient Instructions    Return if symptoms worsen or fail to improve.    Payton Levi NP  [facility]   Purnima is a 41 year old who presents for the following health issues     HPI     Concern - back, neck pain  Onset: 2 weeks ago  Description: very painful, achey. Denies stiff neck   Intensity: 8/10  Progression of Symptoms:  worsening and constant  Accompanying Signs & Symptoms: no  Previous history of similar problem: no  Precipitating factors:        Worsened by: nothing  Alleviating factors:        Improved by: nothing  Therapies tried and outcome: heat, stretching, tylenol.   She thinks it started after she was shoveling.       Review of Systems   Constitutional, HEENT, cardiovascular, pulmonary, gi and gu systems are negative, except  as otherwise noted.      Objective           Vitals:  No vitals were obtained today due to virtual visit.    Physical Exam   GENERAL: Healthy, alert and no distress  EYES: Eyes grossly normal to inspection.  No discharge or erythema, or obvious scleral/conjunctival abnormalities.  RESP: No audible wheeze, cough, or visible cyanosis.  No visible retractions or increased work of breathing.    SKIN: Visible skin clear. No significant rash, abnormal pigmentation or lesions.  NEURO: Cranial nerves grossly intact.  Mentation and speech appropriate for age.  PSYCH: Mentation appears normal, affect normal/bright, judgement and insight intact, normal speech and appearance well-groomed.       Video-Visit Details    Type of service:  Video Visit    Video End Time:1522    Originating Location (pt. Location): Other work    Distant Location (provider location):  [facility]     Platform used for Video Visit: Phuong

## 2022-01-17 NOTE — PATIENT INSTRUCTIONS
Patient Education     Possible Causes of Low Back or Leg Pain    BIG: The symptoms in your back or leg may be due to pressure on a nerve. This pressure may be caused by a damaged disk or by abnormal bone growth. Either way, you may feel pain, burning, tingling, or numbness. If you have pressure on a nerve that connects to the sciatic nerve, pain may shoot down your leg.    Pressure from the disk  Constant wear and tear can weaken a disk over time and cause back pain. The disk can then be damaged by a sudden movement or injury. If its soft center starts to bulge, the disk may press on a nerve. Or the outside of the disk may tear, and the soft center may squeeze through and pinch a nerve.    Pressure from bone  As a disk wears out, the vertebrae right above and below the disk start to touch. This can put pressure on a nerve. Often, abnormal bone (called bone spurs) grows where the vertebrae rub against each other. This can cause the foramen or the spinal canal to narrow (called stenosis) and press against a nerve.  [employer] last reviewed this educational content on 3/1/2018    2555-3233 [employer]. All rights reserved. This information is not intended as a substitute for professional medical care. Always follow your healthcare professional's instructions.

## 2022-01-19 NOTE — TELEPHONE ENCOUNTER
Franci from [facility] is calling on Rachael and the PA. It is showing the PA is archived.    Please call [phone] extension # [phone].      Martha Payan RN

## 2022-01-19 NOTE — TELEPHONE ENCOUNTER
Not sure what is going on with this as it looks like [employer] was working on this. I will go ahead and start a new PA.

## 2022-01-20 ENCOUNTER — TELEPHONE (OUTPATIENT)
Dept: FAMILY MEDICINE | Facility: OTHER | Age: 42
End: 2022-01-20
Payer: COMMERCIAL

## 2022-01-20 NOTE — TELEPHONE ENCOUNTER
Received a PA from [facility] for  Humira 40MG/0.8ML syringe kit. Submitted on CM. Received an instant APPROVAL. Effective 01/01/2022 to 01/20/2023. Forms scanned to Epic.

## 2022-01-24 ENCOUNTER — TELEPHONE (OUTPATIENT)
Dept: FAMILY MEDICINE | Facility: OTHER | Age: 42
End: 2022-01-24
Payer: COMMERCIAL

## 2022-01-25 ENCOUNTER — HOSPITAL ENCOUNTER (OUTPATIENT)
Dept: GENERAL RADIOLOGY | Facility: HOSPITAL | Age: 42
Discharge: HOME | End: 2022-01-25
Attending: NURSE PRACTITIONER | Admitting: NURSE PRACTITIONER
Payer: COMMERCIAL

## 2022-01-25 ENCOUNTER — TELEPHONE (OUTPATIENT)
Dept: FAMILY MEDICINE | Facility: OTHER | Age: 42
End: 2022-01-25
Payer: COMMERCIAL

## 2022-01-25 DIAGNOSIS — L73.2 HIDRADENITIS SUPPURATIVA: ICD-10-CM

## 2022-01-25 DIAGNOSIS — M25.512 ACUTE PAIN OF LEFT SHOULDER: ICD-10-CM

## 2022-01-25 PROCEDURE — 73030 X-RAY EXAM OF SHOULDER: CPT | Mod: LT

## 2022-02-04 ENCOUNTER — TELEPHONE (OUTPATIENT)
Dept: FAMILY MEDICINE | Facility: OTHER | Age: 42
End: 2022-02-04
Payer: COMMERCIAL

## 2022-02-08 ENCOUNTER — TRANSFERRED RECORDS (OUTPATIENT)
Dept: HEALTH INFORMATION MANAGEMENT | Facility: CLINIC | Age: 42
End: 2022-02-08

## 2022-02-10 ENCOUNTER — HOSPITAL ENCOUNTER (OUTPATIENT)
Dept: CT IMAGING | Facility: HOSPITAL | Age: 42
Discharge: HOME | End: 2022-02-10
Attending: NURSE PRACTITIONER | Admitting: NURSE PRACTITIONER
Payer: COMMERCIAL

## 2022-02-10 ENCOUNTER — OFFICE VISIT (OUTPATIENT)
Dept: FAMILY MEDICINE | Facility: OTHER | Age: 42
End: 2022-02-10
Attending: NURSE PRACTITIONER
Payer: COMMERCIAL

## 2022-02-10 VITALS
DIASTOLIC BLOOD PRESSURE: 70 MMHG | TEMPERATURE: 98.1 F | RESPIRATION RATE: 18 BRPM | BODY MASS INDEX: 20.79 KG/M2 | OXYGEN SATURATION: 98 % | SYSTOLIC BLOOD PRESSURE: 104 MMHG | WEIGHT: 115.5 LBS | HEART RATE: 82 BPM

## 2022-02-10 DIAGNOSIS — R10.9 FLANK PAIN: Primary | ICD-10-CM

## 2022-02-10 DIAGNOSIS — R82.90 ABNORMAL URINE FINDINGS: ICD-10-CM

## 2022-02-10 DIAGNOSIS — B96.89 BV (BACTERIAL VAGINOSIS): ICD-10-CM

## 2022-02-10 DIAGNOSIS — Z87.442 HISTORY OF KIDNEY STONES: ICD-10-CM

## 2022-02-10 DIAGNOSIS — R19.04 LEFT LOWER QUADRANT ABDOMINAL MASS: Primary | ICD-10-CM

## 2022-02-10 DIAGNOSIS — N76.0 BV (BACTERIAL VAGINOSIS): ICD-10-CM

## 2022-02-10 DIAGNOSIS — R10.9 FLANK PAIN: ICD-10-CM

## 2022-02-10 LAB
ALBUMIN UR-MCNC: NEGATIVE MG/DL
AMORPH CRY #/AREA URNS HPF: ABNORMAL /HPF
APPEARANCE UR: ABNORMAL
BACTERIA #/AREA URNS HPF: ABNORMAL /HPF
BASOPHILS # BLD AUTO: 0 10E3/UL (ref 0–0.2)
BASOPHILS NFR BLD AUTO: 0 %
BILIRUB UR QL STRIP: NEGATIVE
CLUE CELLS: PRESENT
COLOR UR AUTO: YELLOW
EOSINOPHIL # BLD AUTO: 0.1 10E3/UL (ref 0–0.7)
EOSINOPHIL NFR BLD AUTO: 1 %
ERYTHROCYTE [DISTWIDTH] IN BLOOD BY AUTOMATED COUNT: 13.3 % (ref 10–15)
GLUCOSE UR STRIP-MCNC: NEGATIVE MG/DL
HCT VFR BLD AUTO: 41.2 % (ref 35–47)
HGB BLD-MCNC: 13.7 G/DL (ref 11.7–15.7)
HGB UR QL STRIP: ABNORMAL
KETONES UR STRIP-MCNC: NEGATIVE MG/DL
LEUKOCYTE ESTERASE UR QL STRIP: NEGATIVE
LYMPHOCYTES # BLD AUTO: 4.7 10E3/UL (ref 0.8–5.3)
LYMPHOCYTES NFR BLD AUTO: 45 %
MCH RBC QN AUTO: 33.7 PG (ref 26.5–33)
MCHC RBC AUTO-ENTMCNC: 33.3 G/DL (ref 31.5–36.5)
MCV RBC AUTO: 101 FL (ref 78–100)
MONOCYTES # BLD AUTO: 0.7 10E3/UL (ref 0–1.3)
MONOCYTES NFR BLD AUTO: 7 %
NEUTROPHILS # BLD AUTO: 5 10E3/UL (ref 1.6–8.3)
NEUTROPHILS NFR BLD AUTO: 47 %
NITRATE UR QL: NEGATIVE
PH UR STRIP: 7.5 [PH] (ref 5–7)
PLATELET # BLD AUTO: 275 10E3/UL (ref 150–450)
RBC # BLD AUTO: 4.07 10E6/UL (ref 3.8–5.2)
RBC #/AREA URNS AUTO: ABNORMAL /HPF
SP GR UR STRIP: 1.02 (ref 1–1.03)
SQUAMOUS #/AREA URNS AUTO: ABNORMAL /LPF
TRICHOMONAS, WET PREP: ABNORMAL
UROBILINOGEN UR STRIP-ACNC: 0.2 E.U./DL
WBC # BLD AUTO: 10.5 10E3/UL (ref 4–11)
WBC #/AREA URNS AUTO: ABNORMAL /HPF
WBC'S/HIGH POWER FIELD, WET PREP: ABNORMAL
YEAST, WET PREP: ABNORMAL

## 2022-02-10 PROCEDURE — 87086 URINE CULTURE/COLONY COUNT: CPT | Performed by: NURSE PRACTITIONER

## 2022-02-10 PROCEDURE — 83690 ASSAY OF LIPASE: CPT | Performed by: NURSE PRACTITIONER

## 2022-02-10 PROCEDURE — 99214 OFFICE O/P EST MOD 30 MIN: CPT | Performed by: NURSE PRACTITIONER

## 2022-02-10 PROCEDURE — 87210 SMEAR WET MOUNT SALINE/INK: CPT | Performed by: NURSE PRACTITIONER

## 2022-02-10 PROCEDURE — 81001 URINALYSIS AUTO W/SCOPE: CPT | Performed by: NURSE PRACTITIONER

## 2022-02-10 PROCEDURE — 87591 N.GONORRHOEAE DNA AMP PROB: CPT | Performed by: NURSE PRACTITIONER

## 2022-02-10 PROCEDURE — 85025 COMPLETE CBC W/AUTO DIFF WBC: CPT | Performed by: NURSE PRACTITIONER

## 2022-02-10 PROCEDURE — 80053 COMPREHEN METABOLIC PANEL: CPT | Performed by: NURSE PRACTITIONER

## 2022-02-10 PROCEDURE — 86140 C-REACTIVE PROTEIN: CPT | Performed by: NURSE PRACTITIONER

## 2022-02-10 PROCEDURE — 87186 SC STD MICRODIL/AGAR DIL: CPT | Performed by: NURSE PRACTITIONER

## 2022-02-10 PROCEDURE — 74176 CT ABD & PELVIS W/O CONTRAST: CPT

## 2022-02-10 PROCEDURE — 36415 COLL VENOUS BLD VENIPUNCTURE: CPT | Performed by: NURSE PRACTITIONER

## 2022-02-10 PROCEDURE — 87491 CHLMYD TRACH DNA AMP PROBE: CPT | Performed by: NURSE PRACTITIONER

## 2022-02-10 RX ORDER — KETOROLAC TROMETHAMINE 30 MG/ML
30 INJECTION, SOLUTION INTRAMUSCULAR; INTRAVENOUS ONCE
Status: COMPLETED | OUTPATIENT
Start: 2022-02-10 | End: 2022-02-10

## 2022-02-10 RX ORDER — HYDROCODONE BITARTRATE AND ACETAMINOPHEN 5; 325 MG/1; MG/1
1 TABLET ORAL EVERY 6 HOURS PRN
Qty: 20 TABLET | Refills: 0 | Status: SHIPPED | OUTPATIENT
Start: 2022-02-10 | End: 2022-02-20

## 2022-02-10 RX ORDER — METRONIDAZOLE 7.5 MG/G
1 GEL VAGINAL DAILY
Qty: 25 G | Refills: 0 | Status: SHIPPED | OUTPATIENT
Start: 2022-02-10 | End: 2022-02-15

## 2022-02-10 RX ADMIN — KETOROLAC TROMETHAMINE 30 MG: 30 INJECTION, SOLUTION INTRAMUSCULAR; INTRAVENOUS at 14:42

## 2022-02-10 ASSESSMENT — PAIN SCALES - GENERAL: PAINLEVEL: EXTREME PAIN (8)

## 2022-02-10 NOTE — PATIENT INSTRUCTIONS
Patient Education     Bacterial Vaginosis    You have a vaginal infection called bacterial vaginosis (BV). Both good and bad bacteria are present in a healthy vagina. BV occurs when these bacteria get out of balance. The number of bad bacteria increase. And the number of good bacteria decrease. BV is linked with sexual activity, but it's not a sexually transmitted infection (STI).   BV may or may not cause symptoms. If symptoms do occur, they can include:     Thin, gray, milky-white, or sometimes green discharge    Unpleasant odor or  fishy  smell    Itching, burning, or pain in or around the vagina  It is not known what causes BV, but certain factors can make the problem more likely. These can include:     Douching    Spermicides    Use of antibiotics    Change in hormone levels with pregnancy, breastfeeding, or menopause    Having sex with a new partner    Having sex with more than one partner  BV will sometimes go away on its own. But treatment is often advised. This is because untreated BV can raise the risk of more serious health problems such as:     Pelvic inflammatory disease (PID)     delivery (giving birth to a baby early if you re pregnant)    HIV and some other sexually transmitted infections (STIs)    Infection after surgery on the reproductive organs  Home care  General care    BV is most often treated with medicines called antibiotics. These may be given as pills or as a vaginal cream. If antibiotics are prescribed, be sure to use them exactly as directed. And complete all of the medicine, even if your symptoms go away.    Don't douche or having sex during treatment.    If you have sex with a female partner, ask your healthcare provider if she should also be treated.  Prevention    Don't douche.    Don't have sex. If you do have sex, then take steps to lower your risk:  ? Use condoms when having sex.  ? Limit the number of sex partners you have.    Follow-up care  Follow up with your healthcare  provider, or as advised.   When to get medical advice  Call your healthcare provider right away if:     You have a fever of 100.4 F (38 C) or higher, or as directed by your provider.    Your symptoms get worse, or they don t go away within a few days of starting treatment.    You have new pain in the lower belly or pelvic region.    You have side effects that bother you or a reaction to the pills or cream you re prescribed.    You or any of your sex partners have new symptoms, such as a rash, joint pain, or sores.  [employer] last reviewed this educational content on 6/1/2020 2000-2021 [employer]. All rights reserved. This information is not intended as a substitute for professional medical care. Always follow your healthcare professional's instructions.

## 2022-02-10 NOTE — PROGRESS NOTES
Assessment & Plan     History of kidney stones. Wet prep with BV. Urine sent for culture. Awaiting GC/Chlamydia. To hospital admitting for ABD/Pelvis CT to rule out kidney stone. Await result for further recommendation/management.     (R10.9) Flank pain  (primary encounter diagnosis)  Comment: check labs, UA, and abd/pelvis CT. Treat with Toradol   Plan: CBC with platelets and differential, *UA reflex        to Microscopic and Culture - [facility] [place],         Comprehensive metabolic panel, CRP,         inflammation, Urine Microscopic, GC/Chlamydia         by PCR - [facility], Wet prep, Lipase, CT Abdomen         Pelvis w/o Contrast, ketorolac (TORADOL)         injection 30 mg            (Z87.442) History of kidney stones  Comment: check CT today. Ordered as stat  Plan: CT Abdomen Pelvis w/o Contras            (N76.0,  B96.89) BV (bacterial vaginosis)  Comment: treat with Metrogel   Plan: metroNIDAZOLE (METROGEL) 0.75 % vaginal gel            (R82.90) Abnormal urine findings  Comment: add urine culture   Plan: Urine Culture Aerobic Bacterial               See Patient Instructions    Return if symptoms worsen or fail to improve.    Payton Levi NP  [facility]    Mariah Felton is a 41 year old who presents for the following health issues     HPI     ABDOMINAL and FLANK   PAIN     Onset: 2 days     Description:   Character: Sharp  Location: left lower quadrant left flank pelvic region  Radiation: Back and Pelvic region    Intensity: severe    Progression of Symptoms:  same    Accompanying Signs & Symptoms:  Fever/Chills?: no   Gas/Bloating: YES- Bloating  Nausea: YES  Vomitting: no   Diarrhea?: no   Constipation:YES  Dysuria or Hematuria: YES-    History:   Trauma: no   Previous similar pain: YES   Previous tests done: none    Precipitating factors:   Does the pain change with:     Food: no      BM: no     Urination: no     Alleviating factors:  None    Therapies Tried and outcome:  heating pad    LMP:  not applicable    Some vaginal discharge         Review of Systems   Constitutional, HEENT, cardiovascular, pulmonary, gi and gu systems are negative, except as otherwise noted.      Objective    /70   Pulse 82   Temp 98.1  F (36.7  C)   Resp 18   Wt 52.4 kg (115 lb 8 oz)   LMP 08/10/2013   SpO2 98%   BMI 20.79 kg/m    Body mass index is 20.79 kg/m .  Physical Exam   GENERAL: healthy, alert and no distress  RESP: lungs clear to auscultation - no rales, rhonchi or wheezes  CV: regular rate and rhythm, normal S1 S2, no S3 or S4, no murmur, click or rub, no peripheral edema and peripheral pulses strong  ABDOMEN: soft, no hepatosplenomegaly, no masses and bowel sounds normal. +TTP to left flank and left lower pelvic region  MS: no gross musculoskeletal defects noted, no edema  SKIN: no suspicious lesions or rashes  PSYCH: mentation appears normal, affect normal/bright

## 2022-02-10 NOTE — NURSING NOTE
"Chief Complaint   Patient presents with     Back Pain       Initial /70   Pulse 82   Temp 98.1  F (36.7  C)   Resp 18   Wt 52.4 kg (115 lb 8 oz)   LMP 08/10/2013   SpO2 98%   BMI 20.79 kg/m   Estimated body mass index is 20.79 kg/m  as calculated from the following:    Height as of 9/2/21: 1.588 m (5' 2.5\").    Weight as of this encounter: 52.4 kg (115 lb 8 oz).  Medication Reconciliation: adan Lagunas  "

## 2022-02-11 ENCOUNTER — APPOINTMENT (OUTPATIENT)
Dept: CT IMAGING | Facility: HOSPITAL | Age: 42
End: 2022-02-11
Attending: NURSE PRACTITIONER
Payer: COMMERCIAL

## 2022-02-11 ENCOUNTER — MYC MEDICAL ADVICE (OUTPATIENT)
Dept: FAMILY MEDICINE | Facility: OTHER | Age: 42
End: 2022-02-11

## 2022-02-11 ENCOUNTER — ANESTHESIA (OUTPATIENT)
Dept: SURGERY | Facility: HOSPITAL | Age: 42
End: 2022-02-11
Payer: COMMERCIAL

## 2022-02-11 ENCOUNTER — APPOINTMENT (OUTPATIENT)
Dept: ULTRASOUND IMAGING | Facility: HOSPITAL | Age: 42
End: 2022-02-11
Attending: NURSE PRACTITIONER
Payer: COMMERCIAL

## 2022-02-11 ENCOUNTER — HOSPITAL ENCOUNTER (EMERGENCY)
Facility: HOSPITAL | Age: 42
Discharge: HOME | End: 2022-02-12
Attending: NURSE PRACTITIONER | Admitting: OBSTETRICS & GYNECOLOGY
Payer: COMMERCIAL

## 2022-02-11 ENCOUNTER — TELEPHONE (OUTPATIENT)
Dept: FAMILY MEDICINE | Facility: OTHER | Age: 42
End: 2022-02-11
Payer: COMMERCIAL

## 2022-02-11 ENCOUNTER — ANESTHESIA EVENT (OUTPATIENT)
Dept: SURGERY | Facility: HOSPITAL | Age: 42
End: 2022-02-11
Payer: COMMERCIAL

## 2022-02-11 DIAGNOSIS — N83.202 CYST OF LEFT OVARY: ICD-10-CM

## 2022-02-11 DIAGNOSIS — N83.512 TORSION OF LEFT OVARY: ICD-10-CM

## 2022-02-11 LAB
ALBUMIN SERPL-MCNC: 3.8 G/DL (ref 3.4–5)
ALBUMIN SERPL-MCNC: 4 G/DL (ref 3.4–5)
ALBUMIN UR-MCNC: NEGATIVE MG/DL
ALP SERPL-CCNC: 100 U/L (ref 40–150)
ALP SERPL-CCNC: 53 U/L (ref 40–150)
ALT SERPL W P-5'-P-CCNC: 338 U/L (ref 0–50)
ALT SERPL W P-5'-P-CCNC: 35 U/L (ref 0–50)
ANION GAP SERPL CALCULATED.3IONS-SCNC: 5 MMOL/L (ref 3–14)
ANION GAP SERPL CALCULATED.3IONS-SCNC: 6 MMOL/L (ref 3–14)
APAP SERPL-MCNC: 4 MG/L (ref 10–30)
APPEARANCE UR: ABNORMAL
AST SERPL W P-5'-P-CCNC: 19 U/L (ref 0–45)
AST SERPL W P-5'-P-CCNC: 457 U/L (ref 0–45)
BACTERIA #/AREA URNS HPF: ABNORMAL /HPF
BASOPHILS # BLD AUTO: 0 10E3/UL (ref 0–0.2)
BASOPHILS NFR BLD AUTO: 0 %
BILIRUB SERPL-MCNC: 0.2 MG/DL (ref 0.2–1.3)
BILIRUB SERPL-MCNC: 0.7 MG/DL (ref 0.2–1.3)
BILIRUB UR QL STRIP: NEGATIVE
BUN SERPL-MCNC: 21 MG/DL (ref 7–30)
BUN SERPL-MCNC: 23 MG/DL (ref 7–30)
C TRACH DNA SPEC QL PROBE+SIG AMP: NEGATIVE
CALCIUM SERPL-MCNC: 8.6 MG/DL (ref 8.5–10.1)
CALCIUM SERPL-MCNC: 9.2 MG/DL (ref 8.5–10.1)
CHLORIDE BLD-SCNC: 106 MMOL/L (ref 94–109)
CHLORIDE BLD-SCNC: 109 MMOL/L (ref 94–109)
CO2 SERPL-SCNC: 25 MMOL/L (ref 20–32)
CO2 SERPL-SCNC: 28 MMOL/L (ref 20–32)
COLOR UR AUTO: YELLOW
CREAT SERPL-MCNC: 0.79 MG/DL (ref 0.52–1.04)
CREAT SERPL-MCNC: 0.9 MG/DL (ref 0.52–1.04)
CRP SERPL-MCNC: <2.9 MG/L (ref 0–8)
EOSINOPHIL # BLD AUTO: 0 10E3/UL (ref 0–0.7)
EOSINOPHIL NFR BLD AUTO: 0 %
ERYTHROCYTE [DISTWIDTH] IN BLOOD BY AUTOMATED COUNT: 13.2 % (ref 10–15)
FLUAV RNA SPEC QL NAA+PROBE: NEGATIVE
FLUBV RNA RESP QL NAA+PROBE: NEGATIVE
GFR SERPL CREATININE-BSD FRML MDRD: 82 ML/MIN/1.73M2
GFR SERPL CREATININE-BSD FRML MDRD: >90 ML/MIN/1.73M2
GLUCOSE BLD-MCNC: 142 MG/DL (ref 70–99)
GLUCOSE BLD-MCNC: 64 MG/DL (ref 70–99)
GLUCOSE UR STRIP-MCNC: 30 MG/DL
HCT VFR BLD AUTO: 38.6 % (ref 35–47)
HGB BLD-MCNC: 13.1 G/DL (ref 11.7–15.7)
HGB UR QL STRIP: NEGATIVE
HOLD SPECIMEN: NORMAL
IMM GRANULOCYTES # BLD: 0 10E3/UL
IMM GRANULOCYTES NFR BLD: 0 %
KETONES UR STRIP-MCNC: NEGATIVE MG/DL
LEUKOCYTE ESTERASE UR QL STRIP: NEGATIVE
LIPASE SERPL-CCNC: 96 U/L (ref 73–393)
LYMPHOCYTES # BLD AUTO: 3.1 10E3/UL (ref 0.8–5.3)
LYMPHOCYTES NFR BLD AUTO: 29 %
MCH RBC QN AUTO: 33.9 PG (ref 26.5–33)
MCHC RBC AUTO-ENTMCNC: 33.9 G/DL (ref 31.5–36.5)
MCV RBC AUTO: 100 FL (ref 78–100)
MONOCYTES # BLD AUTO: 0.9 10E3/UL (ref 0–1.3)
MONOCYTES NFR BLD AUTO: 9 %
MUCOUS THREADS #/AREA URNS LPF: PRESENT /LPF
N GONORRHOEA DNA SPEC QL NAA+PROBE: NEGATIVE
NEUTROPHILS # BLD AUTO: 6.6 10E3/UL (ref 1.6–8.3)
NEUTROPHILS NFR BLD AUTO: 62 %
NITRATE UR QL: POSITIVE
NRBC # BLD AUTO: 0 10E3/UL
NRBC BLD AUTO-RTO: 0 /100
PH UR STRIP: 7 [PH] (ref 4.7–8)
PLATELET # BLD AUTO: 246 10E3/UL (ref 150–450)
POTASSIUM BLD-SCNC: 3.3 MMOL/L (ref 3.4–5.3)
POTASSIUM BLD-SCNC: 3.6 MMOL/L (ref 3.4–5.3)
PROT SERPL-MCNC: 6.8 G/DL (ref 6.8–8.8)
PROT SERPL-MCNC: 7 G/DL (ref 6.8–8.8)
RBC # BLD AUTO: 3.87 10E6/UL (ref 3.8–5.2)
RBC URINE: 2 /HPF
RSV RNA SPEC NAA+PROBE: NEGATIVE
SARS-COV-2 RNA RESP QL NAA+PROBE: NEGATIVE
SODIUM SERPL-SCNC: 139 MMOL/L (ref 133–144)
SODIUM SERPL-SCNC: 140 MMOL/L (ref 133–144)
SP GR UR STRIP: 1.02 (ref 1–1.03)
SQUAMOUS EPITHELIAL: 0 /HPF
UROBILINOGEN UR STRIP-MCNC: 2 MG/DL
WBC # BLD AUTO: 10.7 10E3/UL (ref 4–11)
WBC URINE: 4 /HPF

## 2022-02-11 PROCEDURE — 250N000011 HC RX IP 250 OP 636: Performed by: NURSE PRACTITIONER

## 2022-02-11 PROCEDURE — 87086 URINE CULTURE/COLONY COUNT: CPT | Performed by: NURSE PRACTITIONER

## 2022-02-11 PROCEDURE — 44970 LAPAROSCOPY APPENDECTOMY: CPT | Mod: 59 | Performed by: OBSTETRICS & GYNECOLOGY

## 2022-02-11 PROCEDURE — 87637 SARSCOV2&INF A&B&RSV AMP PRB: CPT | Performed by: NURSE PRACTITIONER

## 2022-02-11 PROCEDURE — 96376 TX/PRO/DX INJ SAME DRUG ADON: CPT

## 2022-02-11 PROCEDURE — 272N000001 HC OR GENERAL SUPPLY STERILE: Performed by: OBSTETRICS & GYNECOLOGY

## 2022-02-11 PROCEDURE — 93976 VASCULAR STUDY: CPT

## 2022-02-11 PROCEDURE — 250N000011 HC RX IP 250 OP 636: Performed by: NURSE ANESTHETIST, CERTIFIED REGISTERED

## 2022-02-11 PROCEDURE — 80143 DRUG ASSAY ACETAMINOPHEN: CPT | Performed by: NURSE PRACTITIONER

## 2022-02-11 PROCEDURE — 99285 EMERGENCY DEPT VISIT HI MDM: CPT | Performed by: NURSE PRACTITIONER

## 2022-02-11 PROCEDURE — 58661 LAPAROSCOPY REMOVE ADNEXA: CPT | Mod: LT | Performed by: OBSTETRICS & GYNECOLOGY

## 2022-02-11 PROCEDURE — 250N000009 HC RX 250: Performed by: NURSE ANESTHETIST, CERTIFIED REGISTERED

## 2022-02-11 PROCEDURE — 99285 EMERGENCY DEPT VISIT HI MDM: CPT | Mod: 25

## 2022-02-11 PROCEDURE — 88304 TISSUE EXAM BY PATHOLOGIST: CPT | Mod: 26

## 2022-02-11 PROCEDURE — 360N000076 HC SURGERY LEVEL 3, PER MIN: Performed by: OBSTETRICS & GYNECOLOGY

## 2022-02-11 PROCEDURE — 258N000003 HC RX IP 258 OP 636: Performed by: NURSE ANESTHETIST, CERTIFIED REGISTERED

## 2022-02-11 PROCEDURE — 85025 COMPLETE CBC W/AUTO DIFF WBC: CPT | Performed by: NURSE PRACTITIONER

## 2022-02-11 PROCEDURE — C9803 HOPD COVID-19 SPEC COLLECT: HCPCS

## 2022-02-11 PROCEDURE — 81001 URINALYSIS AUTO W/SCOPE: CPT | Performed by: NURSE PRACTITIONER

## 2022-02-11 PROCEDURE — 58661 LAPAROSCOPY REMOVE ADNEXA: CPT | Performed by: NURSE ANESTHETIST, CERTIFIED REGISTERED

## 2022-02-11 PROCEDURE — 96375 TX/PRO/DX INJ NEW DRUG ADDON: CPT

## 2022-02-11 PROCEDURE — 74177 CT ABD & PELVIS W/CONTRAST: CPT

## 2022-02-11 PROCEDURE — 370N000017 HC ANESTHESIA TECHNICAL FEE, PER MIN: Performed by: OBSTETRICS & GYNECOLOGY

## 2022-02-11 PROCEDURE — 80053 COMPREHEN METABOLIC PANEL: CPT | Performed by: NURSE PRACTITIONER

## 2022-02-11 PROCEDURE — 88305 TISSUE EXAM BY PATHOLOGIST: CPT | Mod: TC | Performed by: OBSTETRICS & GYNECOLOGY

## 2022-02-11 PROCEDURE — 36415 COLL VENOUS BLD VENIPUNCTURE: CPT | Performed by: NURSE PRACTITIONER

## 2022-02-11 PROCEDURE — 96374 THER/PROPH/DIAG INJ IV PUSH: CPT

## 2022-02-11 PROCEDURE — 710N000010 HC RECOVERY PHASE 1, LEVEL 2, PER MIN: Performed by: OBSTETRICS & GYNECOLOGY

## 2022-02-11 PROCEDURE — 88305 TISSUE EXAM BY PATHOLOGIST: CPT | Mod: 26

## 2022-02-11 PROCEDURE — 250N000011 HC RX IP 250 OP 636: Performed by: OBSTETRICS & GYNECOLOGY

## 2022-02-11 PROCEDURE — 250N000025 HC SEVOFLURANE, PER MIN: Performed by: OBSTETRICS & GYNECOLOGY

## 2022-02-11 RX ORDER — PROPOFOL 10 MG/ML
INJECTION, EMULSION INTRAVENOUS PRN
Status: DISCONTINUED | OUTPATIENT
Start: 2022-02-11 | End: 2022-02-12

## 2022-02-11 RX ORDER — CEFOXITIN SODIUM 1 G/50ML
1 INJECTION, SOLUTION INTRAVENOUS EVERY 6 HOURS
Status: DISCONTINUED | OUTPATIENT
Start: 2022-02-11 | End: 2022-02-12

## 2022-02-11 RX ORDER — IOPAMIDOL 755 MG/ML
56 INJECTION, SOLUTION INTRAVASCULAR ONCE
Status: COMPLETED | OUTPATIENT
Start: 2022-02-11 | End: 2022-02-11

## 2022-02-11 RX ORDER — HYDROMORPHONE HYDROCHLORIDE 1 MG/ML
0.5 INJECTION, SOLUTION INTRAMUSCULAR; INTRAVENOUS; SUBCUTANEOUS
Status: COMPLETED | OUTPATIENT
Start: 2022-02-11 | End: 2022-02-11

## 2022-02-11 RX ORDER — SODIUM CHLORIDE, SODIUM LACTATE, POTASSIUM CHLORIDE, CALCIUM CHLORIDE 600; 310; 30; 20 MG/100ML; MG/100ML; MG/100ML; MG/100ML
INJECTION, SOLUTION INTRAVENOUS CONTINUOUS PRN
Status: DISCONTINUED | OUTPATIENT
Start: 2022-02-11 | End: 2022-02-12

## 2022-02-11 RX ORDER — LIDOCAINE HYDROCHLORIDE 20 MG/ML
INJECTION, SOLUTION INFILTRATION; PERINEURAL PRN
Status: DISCONTINUED | OUTPATIENT
Start: 2022-02-11 | End: 2022-02-12

## 2022-02-11 RX ORDER — FENTANYL CITRATE 50 UG/ML
INJECTION, SOLUTION INTRAMUSCULAR; INTRAVENOUS PRN
Status: DISCONTINUED | OUTPATIENT
Start: 2022-02-11 | End: 2022-02-12

## 2022-02-11 RX ORDER — ONDANSETRON 2 MG/ML
4 INJECTION INTRAMUSCULAR; INTRAVENOUS EVERY 30 MIN PRN
Status: DISCONTINUED | OUTPATIENT
Start: 2022-02-11 | End: 2022-02-12 | Stop reason: HOSPADM

## 2022-02-11 RX ORDER — DEXAMETHASONE SODIUM PHOSPHATE 10 MG/ML
INJECTION, SOLUTION INTRAMUSCULAR; INTRAVENOUS PRN
Status: DISCONTINUED | OUTPATIENT
Start: 2022-02-11 | End: 2022-02-12

## 2022-02-11 RX ORDER — MORPHINE SULFATE 4 MG/ML
4 INJECTION, SOLUTION INTRAMUSCULAR; INTRAVENOUS
Status: COMPLETED | OUTPATIENT
Start: 2022-02-11 | End: 2022-02-11

## 2022-02-11 RX ORDER — ONDANSETRON 2 MG/ML
4 INJECTION INTRAMUSCULAR; INTRAVENOUS ONCE
Status: COMPLETED | OUTPATIENT
Start: 2022-02-11 | End: 2022-02-11

## 2022-02-11 RX ORDER — GLYCOPYRROLATE 0.2 MG/ML
INJECTION, SOLUTION INTRAMUSCULAR; INTRAVENOUS PRN
Status: DISCONTINUED | OUTPATIENT
Start: 2022-02-11 | End: 2022-02-12

## 2022-02-11 RX ORDER — KETAMINE HYDROCHLORIDE 10 MG/ML
INJECTION INTRAMUSCULAR; INTRAVENOUS PRN
Status: DISCONTINUED | OUTPATIENT
Start: 2022-02-11 | End: 2022-02-12

## 2022-02-11 RX ADMIN — SODIUM CHLORIDE, POTASSIUM CHLORIDE, SODIUM LACTATE AND CALCIUM CHLORIDE: 600; 310; 30; 20 INJECTION, SOLUTION INTRAVENOUS at 22:15

## 2022-02-11 RX ADMIN — FENTANYL CITRATE 100 MCG: 50 INJECTION, SOLUTION INTRAMUSCULAR; INTRAVENOUS at 21:23

## 2022-02-11 RX ADMIN — FENTANYL CITRATE 50 MCG: 50 INJECTION, SOLUTION INTRAMUSCULAR; INTRAVENOUS at 22:06

## 2022-02-11 RX ADMIN — ONDANSETRON 4 MG: 2 INJECTION INTRAMUSCULAR; INTRAVENOUS at 15:00

## 2022-02-11 RX ADMIN — HYDROMORPHONE HYDROCHLORIDE 0.5 MG: 1 INJECTION, SOLUTION INTRAMUSCULAR; INTRAVENOUS; SUBCUTANEOUS at 16:01

## 2022-02-11 RX ADMIN — FENTANYL CITRATE 50 MCG: 50 INJECTION, SOLUTION INTRAMUSCULAR; INTRAVENOUS at 22:04

## 2022-02-11 RX ADMIN — FENTANYL CITRATE 50 MCG: 50 INJECTION, SOLUTION INTRAMUSCULAR; INTRAVENOUS at 22:55

## 2022-02-11 RX ADMIN — FENTANYL CITRATE 50 MCG: 50 INJECTION, SOLUTION INTRAMUSCULAR; INTRAVENOUS at 22:23

## 2022-02-11 RX ADMIN — SODIUM CHLORIDE, POTASSIUM CHLORIDE, SODIUM LACTATE AND CALCIUM CHLORIDE: 600; 310; 30; 20 INJECTION, SOLUTION INTRAVENOUS at 23:33

## 2022-02-11 RX ADMIN — ONDANSETRON 4 MG: 2 INJECTION INTRAMUSCULAR; INTRAVENOUS at 18:52

## 2022-02-11 RX ADMIN — Medication 100 MG: at 21:38

## 2022-02-11 RX ADMIN — GLYCOPYRROLATE 0.1 MG: 0.2 INJECTION, SOLUTION INTRAMUSCULAR; INTRAVENOUS at 23:15

## 2022-02-11 RX ADMIN — CEFOXITIN SODIUM 1 G: 1 INJECTION, SOLUTION INTRAVENOUS at 21:41

## 2022-02-11 RX ADMIN — ROCURONIUM BROMIDE 10 MG: 10 INJECTION INTRAVENOUS at 21:57

## 2022-02-11 RX ADMIN — DEXAMETHASONE SODIUM PHOSPHATE 10 MG: 10 INJECTION, SOLUTION INTRAMUSCULAR; INTRAVENOUS at 21:49

## 2022-02-11 RX ADMIN — KETAMINE HYDROCHLORIDE 30 MG: 10 INJECTION, SOLUTION INTRAMUSCULAR; INTRAVENOUS at 22:53

## 2022-02-11 RX ADMIN — LIDOCAINE HYDROCHLORIDE 40 MG: 20 INJECTION, SOLUTION INFILTRATION; PERINEURAL at 21:38

## 2022-02-11 RX ADMIN — FENTANYL CITRATE 50 MCG: 50 INJECTION, SOLUTION INTRAMUSCULAR; INTRAVENOUS at 22:22

## 2022-02-11 RX ADMIN — PROPOFOL 160 MG: 10 INJECTION, EMULSION INTRAVENOUS at 21:38

## 2022-02-11 RX ADMIN — HYDROMORPHONE HYDROCHLORIDE 0.5 MG: 1 INJECTION, SOLUTION INTRAMUSCULAR; INTRAVENOUS; SUBCUTANEOUS at 17:18

## 2022-02-11 RX ADMIN — GLYCOPYRROLATE 0.1 MG: 0.2 INJECTION, SOLUTION INTRAMUSCULAR; INTRAVENOUS at 23:12

## 2022-02-11 RX ADMIN — FENTANYL CITRATE 50 MCG: 50 INJECTION, SOLUTION INTRAMUSCULAR; INTRAVENOUS at 22:56

## 2022-02-11 RX ADMIN — ROCURONIUM BROMIDE 20 MG: 10 INJECTION INTRAVENOUS at 21:38

## 2022-02-11 RX ADMIN — IOPAMIDOL 56 ML: 755 INJECTION, SOLUTION INTRAVENOUS at 16:05

## 2022-02-11 RX ADMIN — SODIUM CHLORIDE, POTASSIUM CHLORIDE, SODIUM LACTATE AND CALCIUM CHLORIDE: 600; 310; 30; 20 INJECTION, SOLUTION INTRAVENOUS at 21:17

## 2022-02-11 RX ADMIN — MIDAZOLAM 2 MG: 1 INJECTION INTRAMUSCULAR; INTRAVENOUS at 21:23

## 2022-02-11 RX ADMIN — MORPHINE SULFATE 4 MG: 4 INJECTION, SOLUTION INTRAMUSCULAR; INTRAVENOUS at 15:02

## 2022-02-11 SDOH — HEALTH STABILITY: MENTAL HEALTH: CURRENT SMOKER: 1

## 2022-02-11 ASSESSMENT — ENCOUNTER SYMPTOMS
RESPIRATORY NEGATIVE: 1
CARDIOVASCULAR NEGATIVE: 1
NEUROLOGICAL NEGATIVE: 1
HEMATOLOGIC/LYMPHATIC NEGATIVE: 1
ALLERGIC/IMMUNOLOGIC NEGATIVE: 1
NAUSEA: 1
ABDOMINAL PAIN: 1
PSYCHIATRIC NEGATIVE: 1
MUSCULOSKELETAL NEGATIVE: 1
CONSTITUTIONAL NEGATIVE: 1
EYES NEGATIVE: 1
ENDOCRINE NEGATIVE: 1

## 2022-02-11 ASSESSMENT — LIFESTYLE VARIABLES: TOBACCO_USE: 1

## 2022-02-11 ASSESSMENT — MIFFLIN-ST. JEOR: SCORE: 1181.18

## 2022-02-11 NOTE — ED TRIAGE NOTES
Pt presents with c/o flank pain. Pt has a mass in her abdomen and a kidney stone. Pt was supposed to have a CT scan but is unable to lay down on the table d/t pain.

## 2022-02-11 NOTE — DISCHARGE INSTRUCTIONS
Post-Anesthesia Patient Instructions    IMMEDIATELY FOLLOWING SURGERY:  Do not drive or operate machinery for the first twenty four hours after surgery.  Do not make any important decisions for twenty four hours after surgery or while taking narcotic pain medications or sedatives.  If you develop intractable nausea and vomiting or a severe headache please notify your doctor immediately.    FOLLOW-UP:  Please make an appointment with your surgeon as instructed. You do not need to follow up with anesthesia unless specifically instructed to do so.    WOUND CARE INSTRUCTIONS (if applicable):  Keep a dry clean dressing on the anesthesia/puncture wound site if there is drainage.  Once the wound has quit draining you may leave it open to air.  Generally you should leave the bandage intact for twenty four hours unless there is drainage.  If the epidural site drains for more than 36-48 hours please call the anesthesia department.    QUESTIONS?:  Please feel free to call your physician or the hospital  if you have any questions, and they will be happy to assist you.   What to expect when you have contrast    During your exam, we will inject  contrast  into your vein or artery. (Contrast is a clear liquid with iodine in it. It shows up on X-rays.)    You may feel warm or hot. You may have a metal taste in your mouth and a slight upset stomach. You may also feel pressure near the kidneys and bladder. These effects will last about 1 to 3 minutes.    Please tell us if you have:    Sneezing     Itching    Hives     Swelling in the face    A hoarse voice    Breathing problems    Other new symptoms    Serious problems are rare.  They may include:    Irregular heartbeat     Seizures    Kidney failure              Tissue damage    Shock      Death    If you have any problems during the exam, we  will treat them right away.    When you get home    Call your hospital if you have any new symptoms in the next 2 days, like  hives or swelling. (Phone numbers are at the bottom of this page.) Or call your family doctor.     If you have wheezing or trouble breathing, call 911.    Self-care  -Drink at least 4 extra glasses of water today.   This reduces the stress on your kidneys.  -Keep taking your regular medicines.    The contrast will pass out of your body in your  Urine(pee). This will happen in the next 24 hours. You  will not feel this. Your urine will not  change color.    If you have kidney problems or take metformin    Drink 4 to 8 large glasses of water for the next  2 days, if you are not on a fluid restriction.    ?If you take metformin (Glucophage or Glucovance) for diabetes, keep taking it.      ?Your kidney function tests are abnormal.  If you take Metformin, do not take it for 48 hours. Please go to your clinic for a blood test within 3 days after your exam before the restarting this medicine.     (Note to provider:please give patient prescription for lab tests.)    ?Special instructions: -    I have read and understand the above information.    Patient Sign Here:______________________________________Date:________Time:______    Staff Sign Here:________________________________________Date:_______Time:______      Radiology Departments:     ?[facility]: [phone] ?[place]: [phone]     ?[place]: [phone] ?[facility]:[phone]      ?[place]: [phone]  ?[place]: [phone]  ?[place]:[phone]    ?[facility] [place]:[phone]  ?[facility] [place]:[phone]

## 2022-02-11 NOTE — TELEPHONE ENCOUNTER
Spoke with patient, she stated the reason that she did not do her CT was she is unable to lay that long. Mentioned to there that PCP would like her to go to ER today. Patient agreed

## 2022-02-11 NOTE — ED PROVIDER NOTES
History     Chief Complaint   Patient presents with     Flank Pain     HPI   History of presenting illness presented by patient.    Purnima Fallon is a 41 year old female presents for evaluation of increased abdominal pain that started yesterday.  Yesterday she was seen by her primary care provider, a UA resulted in amorphous crystals in urine and a CT of the abdomen/pelvis was obtained and the impression came back at Limited evaluation suggesting the presence of a 7.3 x 4.0 x 6.5 cm heterogeneously dense, possibly cystic mass in the left hemipelvis.  CT results recommending abdomen and pelvis with IV and oral contrast.  At that time also a single small left renal calculus without evidence of ureter or bladder calculus.  She was scheduled to have this CT abdomen pelvis with oral and IV contrast, but developed so much pain that she did not feel she was going to be able to lay on the scanner bed for a long period of time due to her pain.  She currently rates her pain a 10/10 and her hydrocodone that she received yesterday is not helping her.  She did take 2 without relief of pain.     Allergies:  No Known Allergies    Problem List:    Patient Active Problem List    Diagnosis Date Noted     Trigger thumb of both thumbs 06/17/2020     Priority: Medium     Added automatically from request for surgery [id number]       Hidradenitis suppurativa 05/13/2019     Priority: Medium     Chronic female pelvic pain--LAPAROSCOPY w LEFT SALPINGECTOMY--normal pelvis--NO Endometriosis--9/2018 09/24/2018     Priority: Medium     Biliary dyskinesia 10/06/2017     Priority: Medium     H/O pilonidal cyst 09/26/2017     Priority: Medium     Pilonidal cyst 09/12/2017     Priority: Medium     ACP (advance care planning) 08/05/2016     Priority: Medium     Advance Care Planning 8/5/2016: ACP Review of Chart / Resources Provided:  Reviewed chart for advance care plan.  Purnima Del Real has no plan or code status on file. Discussed available  resources and provided with information. Confirmed code status reflects current choices pending further ACP discussions.  Confirmed/documented legally designated decision makers.  Added by Purnima Mireles             Ischiorectal abscess and fistula      Priority: Medium     Kidney stones      Priority: Medium     x2 passed on her own       S/P laparoscopic hysterectomy 09/27/2013     Priority: Medium     Cystic acne 07/23/2013     Priority: Medium     Migraines      Priority: Medium     Depression      Priority: Medium     ADHD (attention deficit hyperactivity disorder)      Priority: Medium     Mass of breast 02/02/2012     Priority: Medium     Malunion of fracture 05/17/2007     Priority: Medium     Pain in joint, forearm 05/17/2007     Priority: Medium     Overview:   IMO Update 10/11       Carpal tunnel syndrome 08/23/2006     Priority: Medium        Past Medical History:    Past Medical History:   Diagnosis Date     Hidradenitis 2/16/2007     Ischiorectal abscess and fistula      Kidney stones 2008     Nondependent tobacco use disorder 10/11/2012     Papanicolaou smear of cervix with low grade squamous intraepithelial lesion (LGSIL) 10/29/2008     S/p partial hysterectomy with remaining cervical stump 09/27/2013       Past Surgical History:    Past Surgical History:   Procedure Laterality Date     CYSTECTOMY PILONIDAL N/A 9/18/2017    Procedure: CYSTECTOMY PILONIDAL;  PILONIDAL CYSTECTOMY ;  Surgeon: Cordell Stephens MD;  Location: [facility]     ENDOSCOPY UPPER, COLONOSCOPY, COMBINED N/A 10/5/2018    Procedure: COMBINED ENDOSCOPY UPPER, COLONOSCOPY;  UPPER ENDOSCOPY with Biopsy  AND COLONOSCOPY;  Surgeon: Cordell Stephens MD;  Location: [facility]     EXCISE LESION BUTTOCK(S) Left 6/8/2018    Procedure: EXCISE LESION BUTTOCK(S);  EXCISION OF GLUTEAL LEFT SKIN LESION;  Surgeon: Cordell Stephens MD;  Location[facility]     EXCISE MASS NECK Right 8/4/2015    Procedure: EXCISE MASS NECK;  Surgeon: Nasir Jones  MD RADAMES;  Location: [facility]     INCISION AND DRAINAGE PERINEAL, COMBINED N/A 3/18/2015    Procedure: COMBINED INCISION AND DRAINAGE PERINEAL;  Surgeon: Jay Torres DO;  Location: [facility]     IRRIGATION AND DEBRIDEMENT GROIN N/A 2/7/2019    Procedure: IRRIGATION AND DEBRIDEMENT LEFT GROIN ABSCESS;  Surgeon: Cordell Stephens MD;  [facility]     LAPAROSCOPIC CHOLECYSTECTOMY N/A 11/20/2017    Procedure: LAPAROSCOPIC CHOLECYSTECTOMY;  LAPAROSCOPIC CHOLECYSTECTOMY;  Surgeon: Cordell Stephens MD;  [facility]     LAPAROSCOPIC HYSTERECTOMY SUPRACERVICAL, BILATERAL SALPINGO-OOPHORECTOMY, COMBINED  9/27/2013    Procedure: COMBINED LAPAROSCOPIC HYSTERECTOMY SUPRACERVICAL, SALPINGO-OOPHORECTOMY;  LAPAROSCOPIC SUPRACERVICAL HYSTERECTOMY AND RIGHT SALPINGO-OOPHORECTOMY, CYSTOSCOPY;  Surgeon: Denys Callahan MD;  Location: [facility]     LAPAROSCOPY DIAGNOSTIC (GYN) N/A 9/6/2018    Procedure: LAPAROSCOPY DIAGNOSTIC (GYN);  LAPAROSCOPY WITH PERITONEAL BIOPSIES AND REMOVAL LEFT FALLOPIAN TUBE;  Surgeon: Suraj Whitaker MD;  Location: [facility]     marsupialization of pilonidal cyst  2007     RELEASE TRIGGER FINGER BILATERAL Bilateral 7/10/2020    Procedure: RELEASE BILATERAL TRIGGER THUMBS;  Surgeon: Vince Murillo DO;  Location: [facility]     TUBAL LIGATION  2005       Family History:    Family History   Problem Relation Age of Onset     Diabetes Paternal Grandmother        Social History:  Marital Status:   [4]  Social History     Tobacco Use     Smoking status: Current Every Day Smoker     Packs/day: 0.50     Years: 15.00     Pack years: 7.50     Types: Cigarettes     Start date: 1/1/2001     Smokeless tobacco: Never Used     Tobacco comment: declined 2/27/2020   Substance Use Topics     Alcohol use: No     Drug use: No        Medications:    No current outpatient medications on file.        Review of Systems   Constitutional: Negative.    HENT: Negative.    Eyes: Negative.    Respiratory: Negative.   "  Cardiovascular: Negative.    Gastrointestinal: Positive for abdominal pain and nausea.   Endocrine: Negative.    Genitourinary: Negative.    Musculoskeletal: Negative.    Skin: Negative.    Allergic/Immunologic: Negative.    Neurological: Negative.    Hematological: Negative.    Psychiatric/Behavioral: Negative.        Physical Exam   BP: 111/75  Pulse: 76  Temp: 97.9  F (36.6  C)  Resp: 16  Height: 160 cm (5' 3\")  Weight: 54.2 kg (119 lb 8 oz)  SpO2: 100 %      Physical Exam  Vitals and nursing note reviewed.   Constitutional:       Appearance: Normal appearance. She is normal weight.   HENT:      Head: Normocephalic and atraumatic.      Nose: Nose normal.      Mouth/Throat:      Mouth: Mucous membranes are moist.      Pharynx: Oropharynx is clear.   Eyes:      Extraocular Movements: Extraocular movements intact.      Conjunctiva/sclera: Conjunctivae normal.      Pupils: Pupils are equal, round, and reactive to light.   Cardiovascular:      Rate and Rhythm: Normal rate and regular rhythm.      Pulses: Normal pulses.      Heart sounds: Normal heart sounds.   Pulmonary:      Effort: Pulmonary effort is normal.      Breath sounds: Normal breath sounds.   Abdominal:      General: Abdomen is flat. Bowel sounds are normal.      Palpations: Abdomen is soft.   Musculoskeletal:         General: Normal range of motion.      Cervical back: Normal range of motion and neck supple.   Skin:     General: Skin is warm.   Neurological:      General: No focal deficit present.      Mental Status: She is alert and oriented to person, place, and time.   Psychiatric:         Mood and Affect: Mood normal.         Behavior: Behavior normal.         Thought Content: Thought content normal.         Judgment: Judgment normal.         ED Course                      Results for orders placed or performed during the hospital encounter of 02/11/22 (from the past 24 hour(s))   CBC with platelets differential    Narrative    The following orders " were created for panel order CBC with platelets differential.  Procedure                               Abnormality         Status                     ---------                               -----------         ------                     CBC with platelets and d...[[id number]]  Abnormal            Final result                 Please view results for these tests on the individual orders.   Comprehensive metabolic panel   Result Value Ref Range    Sodium 140 133 - 144 mmol/L    Potassium 3.6 3.4 - 5.3 mmol/L    Chloride 109 94 - 109 mmol/L    Carbon Dioxide (CO2) 25 20 - 32 mmol/L    Anion Gap 6 3 - 14 mmol/L    Urea Nitrogen 21 7 - 30 mg/dL    Creatinine 0.79 0.52 - 1.04 mg/dL    Calcium 8.6 8.5 - 10.1 mg/dL    Glucose 142 (H) 70 - 99 mg/dL    Alkaline Phosphatase 100 40 - 150 U/L     (H) 0 - 45 U/L     (H) 0 - 50 U/L    Protein Total 6.8 6.8 - 8.8 g/dL    Albumin 3.8 3.4 - 5.0 g/dL    Bilirubin Total 0.7 0.2 - 1.3 mg/dL    GFR Estimate >90 >60 mL/min/1.73m2   CBC with platelets and differential   Result Value Ref Range    WBC Count 10.7 4.0 - 11.0 10e3/uL    RBC Count 3.87 3.80 - 5.20 10e6/uL    Hemoglobin 13.1 11.7 - 15.7 g/dL    Hematocrit 38.6 35.0 - 47.0 %     78 - 100 fL    MCH 33.9 (H) 26.5 - 33.0 pg    MCHC 33.9 31.5 - 36.5 g/dL    RDW 13.2 10.0 - 15.0 %    Platelet Count 246 150 - 450 10e3/uL    % Neutrophils 62 %    % Lymphocytes 29 %    % Monocytes 9 %    % Eosinophils 0 %    % Basophils 0 %    % Immature Granulocytes 0 %    NRBCs per 100 WBC 0 <1 /100    Absolute Neutrophils 6.6 1.6 - 8.3 10e3/uL    Absolute Lymphocytes 3.1 0.8 - 5.3 10e3/uL    Absolute Monocytes 0.9 0.0 - 1.3 10e3/uL    Absolute Eosinophils 0.0 0.0 - 0.7 10e3/uL    Absolute Basophils 0.0 0.0 - 0.2 10e3/uL    Absolute Immature Granulocytes 0.0 <=0.4 10e3/uL    Absolute NRBCs 0.0 10e3/uL   [place] Draw    Narrative    The following orders were created for panel order [place] Draw.  Procedure                                Abnormality         Status                     ---------                               -----------         ------                     Extra Blue Top Tube[[id number]]                              Final result               Extra Red Top Tube[[id number]]                               Final result               Extra Green Top (Lithium...[[id number]]                      Final result                 Please view results for these tests on the individual orders.   Extra Blue Top Tube   Result Value Ref Range    Hold Specimen JIC    Extra Red Top Tube   Result Value Ref Range    Hold Specimen JIC    Extra Green Top (Lithium Heparin) Tube   Result Value Ref Range    Hold Specimen JIC    UA with Microscopic reflex to Culture    Specimen: Urine, Midstream   Result Value Ref Range    Color Urine Yellow Colorless, Straw, Light Yellow, Yellow    Appearance Urine Slightly Cloudy (A) Clear    Glucose Urine 30  (A) Negative mg/dL    Bilirubin Urine Negative Negative    Ketones Urine Negative Negative mg/dL    Specific Gravity Urine 1.019 1.003 - 1.035    Blood Urine Negative Negative    pH Urine 7.0 4.7 - 8.0    Protein Albumin Urine Negative Negative mg/dL    Urobilinogen Urine 2.0 Normal, 2.0 mg/dL    Nitrite Urine Positive (A) Negative    Leukocyte Esterase Urine Negative Negative    Bacteria Urine Few (A) None Seen /HPF    Mucus Urine Present (A) None Seen /LPF    RBC Urine 2 <=2 /HPF    WBC Urine 4 <=5 /HPF    Squamous Epithelials Urine 0 <=1 /HPF    Narrative    Urine Culture ordered based on laboratory criteria   CT Abdomen Pelvis w Contrast    Narrative    PROCEDURE:  CT ABDOMEN PELVIS W CONTRAST    HISTORY: Abdominal abscess/infection suspected.    TECHNIQUE: Helical CT of the abdomen and pelvis was performed  following injection of intravenous contrast.    COMPARISON: CT 2/10/2022, 10/5/2021, pelvic ultrasound 9/17/2020.    MEDS/CONTRAST: ISOVUE 370 56ml    FINDINGS:      Limited images through the lung bases demonstrate  trace atelectasis.    The liver demonstrates periportal edema likely related to fluid  resuscitation. The gallbladder, spleen, pancreas and adrenal glands  are unremarkable. Symmetric nephrograms are present without  hydronephrosis. There is no abdominal aortic aneurysm.    The bowel is normal in caliber. The appendix is normal.    The previously described left adnexal mass is redemonstrated,  measuring 8.7 x 4.9 cm. Multiple peripheral cysts along this lesion  are questioned. There is adjacent mild free fluid. The previously seen  normal left ovary on the prior CT dated 10/5/2021 is not identified.    No free air is present. No suspicious osseous lesions are identified.      Impression    IMPRESSION:      8.7 cm mass of the left adnexa with possible peripheral cystic areas  and adjacent free fluid. The appearance is not specific but raises the  possibility of ovarian torsion or neoplasm. Recommend pelvic  ultrasound for further assessment.    Finding was identified on 2/11/2022 4:40 PM.     JORDAN Shelton was contacted by me on 2/11/2022 4:45 PM and verbalized  understanding of the critical result.     KATHRYN PANDEY MD         SYSTEM ID:  [id number]   US Pelvis Cmplt w Transvag & Doppler LmtPel Duplex Limited    Narrative    PROCEDURE: US PELVIS COMPLETE W TRANSVAGINAL AND DOPPLER LIMITED    HISTORY: Left ovarian mass     TECHNIQUE: Transabdominal and transvaginal ultrasound of the pelvis.    COMPARISON: 2/11/2022    FINDINGS:     UTERUS: The uterus is surgically absent    ADNEXA: The left ovary is enlarged, measuring 90 mL, and diffusely  heterogeneous with a few peripheral cystic areas. The ovary  demonstrates some central venous blood flow. Arterial flow is only  seen at a few sites, high resistance. A complex internal area without  significant flow measures 2.5 x 2.4 x 2.4 cm, likely a corpus luteum.    MISC: Free fluid is again seen.      Impression    IMPRESSION: Enlarged left ovary with limited high resistance  arterial  flow. Some central venous flow is seen. The appearance remains  concerning for adnexal/ovarian torsion.    KATHRYN PANDEY MD         SYSTEM ID:  [id number]   Asymptomatic Influenza A/B & SARS-CoV2 (COVID-19) Virus PCR Multiplex Nasopharyngeal    Specimen: Nasopharyngeal; Swab   Result Value Ref Range    Influenza A PCR Negative Negative    Influenza B PCR Negative Negative    RSV PCR Negative Negative    SARS CoV2 PCR Negative Negative    Narrative    Testing was performed using the Xpert Xpress CoV2/Flu/RSV Assay on the [employer] GeneXpert Instrument. This test should be ordered for the detection of SARS-CoV-2 and influenza viruses in individuals who meet clinical and/or epidemiological criteria. Test performance is unknown in asymptomatic patients. This test is for in vitro diagnostic use under the FDA EUA for laboratories certified under CLIA to perform high or moderate complexity testing. This test has not been FDA cleared or approved. A negative result does not rule out the presence of PCR inhibitors in the specimen or target RNA in concentration below the limit of detection for the assay. If only one viral target is positive but coinfection with multiple targets is suspected, the sample should be re-tested with another FDA cleared, approved, or authorized test, if coinfection would change clinical management. This test was validated by the [facility]. These laboratories are certified under the Clinical  Laboratory Improvement Amendments of 1988 (CLIA-88) as qualified to perform high complexity laboratory testing.       Medications   ondansetron (ZOFRAN) injection 4 mg ( Intravenous Auto Hold 2/11/22 2127)   cefOXitin (MEFOXIN) 1 g in sodium chloride 0.9 % 50 mL intermittent infusion (has no administration in time range)   morphine (PF) injection 4 mg (4 mg Intravenous Given 2/11/22 1502)   ondansetron (ZOFRAN) injection 4 mg (4 mg Intravenous Given 2/11/22 1500)   HYDROmorphone  (PF) (DILAUDID) injection 0.5 mg (0.5 mg Intravenous Given 2/11/22 1601)   iohexol (OMNIPAQUE) 9 MG/ML oral solution 500 mL (500 mLs Oral Given 2/11/22 1605)   iopamidol (ISOVUE-370) solution 56 mL (56 mLs Intravenous Given 2/11/22 1605)   sodium chloride (PF) 0.9% PF flush 60 mL (50 mLs Intravenous Given 2/11/22 1606)   HYDROmorphone (PF) (DILAUDID) injection 0.5 mg (0.5 mg Intravenous Given 2/11/22 1718)       Assessments & Plan (with Medical Decision Making)   Purnima presents for evaluation of increased abdominal pain that started yesterday.  Yesterday she was seen by her primary care provider, a UA resulted in amorphous crystals in urine, and a CT of the abdomen/pelvis was obtained. The impression showed limited evaluation suggesting the presence of a 7.3 x 4.0 x 6.5 cm heterogeneously dense, possibly cystic mass in the left hemipelvis.  CT results recommending abdomen and pelvis with IV and oral contrast.      Assessment patient has tenderness to the pelvic region with palpation.  Increased pain to the left lower quadrant with palpation.  Negative flank pain with palpation.  Bowel sounds normal in all 4 quadrants.  Patient continues to rate her pain a 10 out of 10.  4 mg of morphine given bringing pain down 7/10.  Dilaudid 0.5 mg given bringing pain down 3/100.  Labs: UA shows nitrite positive, CMP shows AST elevated at 457, ALT elevated at 338.  Patient had normal AST and ALT yesterday on her CMP.  CBC is stable and reassuring.  CT abdomen and pelvis results show 8.7 cm mass of the left adnexa with possible peripheral cystic areas and adjacent free fluid.  The appearance is not specific but raises the possibility of ovarian torsion or neoplasm.  Recommend pelvic ultrasound for further assessment.  Ultrasound pelvis shows enlarged left ovary with limited higher resistance arterial flow.  Some central venous flow is seen.  The appearance remains concerning for adnexal/ovarian torsion.    18::45 I consulted with  Dr. Sharif OB/GYN specialist and discuss concerning ultrasound results.  He came and assessed patient and patient will go to surgery.  Patient admitted to hospital after surgery.        I have reviewed the nursing notes.    I have reviewed the findings, diagnosis, plan and need for follow up with the patient.      Current Discharge Medication List          Final diagnoses:   Cyst of left ovary       2/11/2022   [facility]     Anabela Shelton APRN CNP  02/11/22 2700

## 2022-02-12 VITALS
OXYGEN SATURATION: 100 % | HEIGHT: 63 IN | SYSTOLIC BLOOD PRESSURE: 109 MMHG | DIASTOLIC BLOOD PRESSURE: 65 MMHG | BODY MASS INDEX: 21.17 KG/M2 | WEIGHT: 119.5 LBS | HEART RATE: 63 BPM | TEMPERATURE: 98.2 F | RESPIRATION RATE: 20 BRPM

## 2022-02-12 LAB — BACTERIA UR CULT: ABNORMAL

## 2022-02-12 PROCEDURE — 250N000011 HC RX IP 250 OP 636: Performed by: NURSE ANESTHETIST, CERTIFIED REGISTERED

## 2022-02-12 PROCEDURE — 250N000011 HC RX IP 250 OP 636: Performed by: OBSTETRICS & GYNECOLOGY

## 2022-02-12 PROCEDURE — 250N000009 HC RX 250: Performed by: NURSE ANESTHETIST, CERTIFIED REGISTERED

## 2022-02-12 PROCEDURE — 250N000013 HC RX MED GY IP 250 OP 250 PS 637: Performed by: OBSTETRICS & GYNECOLOGY

## 2022-02-12 RX ORDER — OXYCODONE AND ACETAMINOPHEN 5; 325 MG/1; MG/1
1 TABLET ORAL EVERY 6 HOURS PRN
Qty: 10 TABLET | Refills: 0 | Status: SHIPPED | OUTPATIENT
Start: 2022-02-12 | End: 2022-02-12

## 2022-02-12 RX ORDER — HYDROMORPHONE HYDROCHLORIDE 1 MG/ML
0.4 INJECTION, SOLUTION INTRAMUSCULAR; INTRAVENOUS; SUBCUTANEOUS EVERY 5 MIN PRN
Status: DISCONTINUED | OUTPATIENT
Start: 2022-02-12 | End: 2022-02-12 | Stop reason: HOSPADM

## 2022-02-12 RX ORDER — FENTANYL CITRATE 50 UG/ML
50 INJECTION, SOLUTION INTRAMUSCULAR; INTRAVENOUS
Status: DISCONTINUED | OUTPATIENT
Start: 2022-02-12 | End: 2022-02-12 | Stop reason: HOSPADM

## 2022-02-12 RX ORDER — ONDANSETRON 2 MG/ML
INJECTION INTRAMUSCULAR; INTRAVENOUS PRN
Status: DISCONTINUED | OUTPATIENT
Start: 2022-02-12 | End: 2022-02-12

## 2022-02-12 RX ORDER — ONDANSETRON 4 MG/1
4 TABLET, ORALLY DISINTEGRATING ORAL EVERY 30 MIN PRN
Status: DISCONTINUED | OUTPATIENT
Start: 2022-02-12 | End: 2022-02-12 | Stop reason: HOSPADM

## 2022-02-12 RX ORDER — BUPIVACAINE HYDROCHLORIDE 2.5 MG/ML
INJECTION, SOLUTION INFILTRATION; PERINEURAL PRN
Status: DISCONTINUED | OUTPATIENT
Start: 2022-02-12 | End: 2022-02-12 | Stop reason: HOSPADM

## 2022-02-12 RX ORDER — NALOXONE HYDROCHLORIDE 0.4 MG/ML
0.4 INJECTION, SOLUTION INTRAMUSCULAR; INTRAVENOUS; SUBCUTANEOUS
Status: DISCONTINUED | OUTPATIENT
Start: 2022-02-12 | End: 2022-02-12 | Stop reason: HOSPADM

## 2022-02-12 RX ORDER — NALOXONE HYDROCHLORIDE 0.4 MG/ML
0.2 INJECTION, SOLUTION INTRAMUSCULAR; INTRAVENOUS; SUBCUTANEOUS
Status: DISCONTINUED | OUTPATIENT
Start: 2022-02-12 | End: 2022-02-12 | Stop reason: HOSPADM

## 2022-02-12 RX ORDER — FENTANYL CITRATE 50 UG/ML
50 INJECTION, SOLUTION INTRAMUSCULAR; INTRAVENOUS EVERY 5 MIN PRN
Status: DISCONTINUED | OUTPATIENT
Start: 2022-02-12 | End: 2022-02-12

## 2022-02-12 RX ORDER — MEPERIDINE HYDROCHLORIDE 25 MG/ML
12.5 INJECTION INTRAMUSCULAR; INTRAVENOUS; SUBCUTANEOUS
Status: DISCONTINUED | OUTPATIENT
Start: 2022-02-12 | End: 2022-02-12 | Stop reason: HOSPADM

## 2022-02-12 RX ORDER — OXYCODONE AND ACETAMINOPHEN 5; 325 MG/1; MG/1
1 TABLET ORAL
Status: COMPLETED | OUTPATIENT
Start: 2022-02-12 | End: 2022-02-12

## 2022-02-12 RX ORDER — OXYCODONE AND ACETAMINOPHEN 5; 325 MG/1; MG/1
1 TABLET ORAL EVERY 6 HOURS PRN
Qty: 10 TABLET | Refills: 0 | Status: SHIPPED | OUTPATIENT
Start: 2022-02-12 | End: 2022-03-22

## 2022-02-12 RX ORDER — KETOROLAC TROMETHAMINE 30 MG/ML
INJECTION, SOLUTION INTRAMUSCULAR; INTRAVENOUS PRN
Status: DISCONTINUED | OUTPATIENT
Start: 2022-02-12 | End: 2022-02-12

## 2022-02-12 RX ORDER — OXYCODONE AND ACETAMINOPHEN 5; 325 MG/1; MG/1
1 TABLET ORAL
Status: DISCONTINUED | OUTPATIENT
Start: 2022-02-12 | End: 2022-02-12 | Stop reason: HOSPADM

## 2022-02-12 RX ORDER — OXYCODONE HYDROCHLORIDE 5 MG/1
5 TABLET ORAL EVERY 4 HOURS PRN
Status: DISCONTINUED | OUTPATIENT
Start: 2022-02-12 | End: 2022-02-12 | Stop reason: HOSPADM

## 2022-02-12 RX ORDER — ONDANSETRON 2 MG/ML
4 INJECTION INTRAMUSCULAR; INTRAVENOUS EVERY 30 MIN PRN
Status: DISCONTINUED | OUTPATIENT
Start: 2022-02-12 | End: 2022-02-12 | Stop reason: HOSPADM

## 2022-02-12 RX ADMIN — SUGAMMADEX 100 MG: 100 INJECTION, SOLUTION INTRAVENOUS at 00:11

## 2022-02-12 RX ADMIN — FENTANYL CITRATE 50 MCG: 50 INJECTION, SOLUTION INTRAMUSCULAR; INTRAVENOUS at 00:09

## 2022-02-12 RX ADMIN — FENTANYL CITRATE 50 MCG: 50 INJECTION INTRAMUSCULAR; INTRAVENOUS at 01:10

## 2022-02-12 RX ADMIN — ONDANSETRON 4 MG: 2 INJECTION INTRAMUSCULAR; INTRAVENOUS at 00:10

## 2022-02-12 RX ADMIN — KETOROLAC TROMETHAMINE 30 MG: 30 INJECTION, SOLUTION INTRAMUSCULAR at 00:21

## 2022-02-12 RX ADMIN — FENTANYL CITRATE 50 MCG: 50 INJECTION, SOLUTION INTRAMUSCULAR; INTRAVENOUS at 00:07

## 2022-02-12 RX ADMIN — OXYCODONE HYDROCHLORIDE AND ACETAMINOPHEN 1 TABLET: 5; 325 TABLET ORAL at 01:39

## 2022-02-12 RX ADMIN — CEFOXITIN SODIUM 1 G: 1 INJECTION, SOLUTION INTRAVENOUS at 01:53

## 2022-02-12 NOTE — OP NOTE
Surgeon:  Malick Malhotra MD     Assistants: Scrub technician    Anesthesia:  General endotracheal per CRNA    PREOP DIAGNOSIS:  Left-sided acute pelvic pain  Left ovarian cystic mass with possible torsion  History of multiple abdominal and pelvic surgeries with presumed pelvic   Adhesions, but no intraoperative evidence of endometriosis;  Left-sided acute urolithiasis with documented left kidney stone and    history of urolithiasis  Status post supracervical hysterectomy and right    salpingo-oophorectomy, with prior postpartum bilateral partial    salpingectomy followed by laparoscopic complete left remnant    salpingectomy    POSTOP DIAGNOSIS:  Left-sided ovarian cystic mass with acute torsion and suspected   nonviability and thrombosed ovarian vessels  Pelvic appendix intraperitoneal  Minimal pelvic adhesions    PROCEDURE:  Diagnostic laparoscopy  Complete left oophorectomy  Appendectomy    FINDINGS:  Absent right ovary and any remnant fallopian tube, and absent uterus,    and absent remnant left fallopian tube;  Absent any omental to anterior abdominal wall adhesions or pelvic    adhesions other than minimal descending colon epiploica to sidewall,    and no adhesions involving vaginal cuff/remnant cervix (freely mobile);  Dark blue to black enlarged left ovary with acute torsion/3 twists and    scant bleeding from ovarian capsule, and distended ovarian vessels    without pulsations visible within pedicle or on ovary;  Slightly elongated intraperitoneal appendix with possible fecalith in distal    portion;  Normal-appearing liver (post cholecystectomy) and other abdominal    viscera;    EBL:   50 ml    MEDICATIONS:  Mefoxin 1 g IV    URINE OUTPUT:  Intraoperative John, 300 mL clear    COMPLICATIONS:  None    PROCEDURE:    The patient was escorted to the operating room and placed in a supine position with an IV running and pneumatic stockings in place on both lower extremities;    General endotracheal anesthesia  "was induced by the nurse anesthetist and the patient was placed in the dorsolithotomy position utilizing Preston type stirrups,     Examination under anesthesia performed, and the patient's abdomen/perineum/vagina were prepped and draped in usual sterile fashion;    A John catheter was inserted under aseptic conditions, to closed gravity drainage, and a \"sponge on a stick was inserted into the vagina for manipulation of the remnant cervix/vaginal cuff;    An incision was made along the previous umbilical scar and the peritoneal cavity was entered under direct visualization using an open technique, and a 10 mm Parkinson cannula was inserted with a balloon tip inflated/secured;    A second 5 mm blunt trocar was inserted through an incision made in the left lower quadrant along the previous incision scar, and a third 5 mm blunt trocar inserted through a previous incision in the suprapubic region;    Using a blunt probe, the pelvis was isolated from the bowel and the findings were as noted above, and a probe was used to gently untwist the ovary;    The left ovary was then observed for any evidence of resumed partial or normal circulation, and there was some slight decrease in the ovarian congestion but after a period of time no appearance of anticipated recovery, and the ovarian capsule was incised for any evidence of fresh bleeding and none was noted, and the ovarian vascular pedicle remained distended and presumably thrombosed;    The decision was made to remove the ovary along with the thrombosed ovarian pedicle, and using the LigaSure for vessel sealing and transection, the pedicle was divided just proximal to the perceived swollen portion of the pedicle and the specimen was placed in a retrieval bag, and the specimen was removed with the retrieval bag withdrawn through the larger umbilical incision with no internal contamination by the contents of the retrieval bag, the 10 mm trocar removed and then replaced without " difficulty;    The pelvis was irrigated/evacuated and hemostasis appeared complete;    The appendix was visualized with the findings as noted above, and in accordance to the patient's wishes, the decision was made to remove the appendix, so the mesoappendix was sequentially fulgurated and ligated using the LigaSure, down to the base of the appendix with the appendiceal artery secured;    The base of the appendix was gently compressed for a length of 1 to 2 cm and then a loop suture of 2-0 Vicryl (x 2) was placed around the base and secured, and then the isolated appendix was sealed/divided and removed through the larger trocar without difficulty;    The right lower quadrant was irrigated with sterile saline all residual fluid evacuated, and hemostasis appeared complete;    The abdominal CO2 inflammation was partially reduced, and 2 sutures of 2-0 Vicryl were inserted across the umbilical incision using the Eugene-Abiola instrument and thereby closing the umbilical incision completely, with the closure reinforced with 2 more fascial 0 Vicryl sutures;    The 5 cm trochars were removed after complete deflation of carbon dioxide, and closed with 2-0 Vicryl subcutaneous sutures, and then all of the incisions skin edges were reapproximated with 3-0 Vicryl subcuticular sutures, reinforced with tissue glue;    The sponge on a stick was removed and the patient was extubated and transferred from the operating room with stable vital signs and spontaneous respirations;    The surgical specimens, consisting of the left ovary and the appendix, were submitted to pathology for routine examination;    Intraoperative findings and photographs were shared with the patient after her initial recovery postoperatively, and after a couple of hours she wished to be discharged to home, voiding well and tolerating p.o. well and without any notable symptom complaints;    PATIENT RELEASE:    She was discharged home with stable vital signs,  "afebrile, tolerating p.o. well, voiding well and ambulating well with minimal pain complaints;    Discharge medications were discussed, and she was prescribed Percocet 5/325 to take on a as needed basis, total of #10, to be dispensed in the ER/vending process;    Discussed that the patient was now in \"surgical menopause\" and as such would benefit from estrogen replacement therapy, and the different routes of administration along with associated risks were reviewed, and she was recommended to hold on replacement for up to a week postop, and then  her medication at the pharmacy of choice ([facility]), to be prescribed an estradiol patch 0.1 mg weekly (or twice a week depending on the type);    Routine postop precautions were reviewed, abdominal binder was given to her for comfort, and incision care instructions were reviewed;    She was recommended to follow-up with her primary care physician within a week and also at the Gynecology clinic, Dr. Reynoso or Dr. Johnston within the next 1 to 2 weeks;      "

## 2022-02-12 NOTE — ANESTHESIA CARE TRANSFER NOTE
Patient: Purnima Fallon    Procedure: Procedure(s):  Left OOPHORECTOMY, LAPAROSCOPIC, Laparoscopic Appendectomy Torsion and Pelvic Appensix       Diagnosis: * No pre-op diagnosis entered *  Diagnosis Additional Information: No value filed.    Anesthesia Type:   General     Note:    Oropharynx: spontaneously breathing and oropharynx clear of all foreign objects  Level of Consciousness: awake and drowsy  Oxygen Supplementation: room air    Independent Airway: airway patency satisfactory and stable  Dentition: dentition unchanged  Vital Signs Stable: post-procedure vital signs reviewed and stable  Report to RN Given: handoff report given  Patient transferred to: ICU    ICU Handoff: Call for PAUSE to initiate/utilize ICU HANDOFF, Identified Patient, Identified Responsible Provider, Reviewed the Pertinent Medical History, Discussed Surgical Course, Reviewed Intra-OP Anesthesia Management and Issues during Anesthesia, Set Expectations for Post Procedure Period and Allowed Opportunity for Questions and Acknowledgement of Understanding      Vitals:  Vitals Value Taken Time   /75 02/12/22 0050   Temp     Pulse 65 02/12/22 0052   Resp     SpO2 99 % 02/12/22 0052   Vitals shown include unvalidated device data.    Electronically Signed By: ALONDRA Heath CRNA  February 12, 2022  12:53 AM

## 2022-02-12 NOTE — OR NURSING
Dr. Malhotra in to see patient.  Received ok to discharge home. Incision x3 intact, no drainage. Denies nausea. Eating and drinking. No vaginal  bleeding noted, no beeding to pad. Up walking with no pain or dizziness.  Patient and responsible adult given discharge instructions with no questions regarding instructions. Aliya score 20. Pain level 0/10.  Discharged from unit via wheelchair. Patient discharged to home.

## 2022-02-12 NOTE — ANESTHESIA POSTPROCEDURE EVALUATION
Patient: Purnima Fallon    Procedure: Procedure(s):  Left OOPHORECTOMY, LAPAROSCOPIC, Laparoscopic Appendectomy Torsion and Pelvic Appensix       Diagnosis:* No pre-op diagnosis entered *  Diagnosis Additional Information: No value filed.    Anesthesia Type:  General    Note:  Disposition: Inpatient   Postop Pain Control: Uneventful            Sign Out: Well controlled pain   PONV: No   Neuro/Psych: Uneventful            Sign Out: Acceptable/Baseline neuro status   Airway/Respiratory: Uneventful            Sign Out: Acceptable/Baseline resp. status   CV/Hemodynamics: Uneventful            Sign Out: Acceptable CV status; No obvious hypovolemia; No obvious fluid overload   Other NRE: NONE   DID A NON-ROUTINE EVENT OCCUR? No           Last vitals:  Vitals Value Taken Time   /75 02/12/22 0050   Temp     Pulse 66 02/12/22 0053   Resp     SpO2 100 % 02/12/22 0053   Vitals shown include unvalidated device data.    Electronically Signed By: ALONDRA Heath CRNA  February 12, 2022  12:54 AM

## 2022-02-12 NOTE — ANESTHESIA PROCEDURE NOTES
Airway       Patient location during procedure: OR       Procedure Start/Stop Times: 2/11/2022 9:40 PM and 2/11/2022 9:40 PM  Staff -        CRNA: Malick Krishna APRN CRNA       Performed By: CRNA  Consent for Airway        Urgency: emergent  Indications and Patient Condition       Indications for airway management: willie-procedural       Induction type:intravenous       Mask difficulty assessment: 1 - vent by mask    Final Airway Details       Final airway type: endotracheal airway       Successful airway: ETT - single  Endotracheal Airway Details        ETT size (mm): 7.0       Cuffed: yes       Cuff volume (mL): 8       Successful intubation technique: direct laryngoscopy       DL Blade Type: MAC 3       Grade View of Cords: 1       Adjucts: stylet       Position: Right       Measured from: gums/teeth       Secured at (cm): 22    Post intubation assessment        Placement verified by: capnometry, equal breath sounds and chest rise        Number of attempts at approach: 1       Secured with: plastic tape       Ease of procedure: easy       Dentition: Intact and Unchanged

## 2022-02-13 LAB — BACTERIA UR CULT: ABNORMAL

## 2022-02-15 DIAGNOSIS — R31.9 URINARY TRACT INFECTION WITH HEMATURIA, SITE UNSPECIFIED: Primary | ICD-10-CM

## 2022-02-15 DIAGNOSIS — N39.0 URINARY TRACT INFECTION WITH HEMATURIA, SITE UNSPECIFIED: Primary | ICD-10-CM

## 2022-02-15 RX ORDER — OXYCODONE AND ACETAMINOPHEN 5; 325 MG/1; MG/1
1 TABLET ORAL EVERY 6 HOURS PRN
Qty: 8 TABLET | Refills: 0 | Status: CANCELLED | OUTPATIENT
Start: 2022-02-15

## 2022-02-15 RX ORDER — ESTRADIOL 0.1 MG/D
1 FILM, EXTENDED RELEASE TRANSDERMAL
Qty: 8 PATCH | Refills: 11 | Status: SHIPPED | OUTPATIENT
Start: 2022-02-17 | End: 2022-03-22

## 2022-02-15 RX ORDER — NITROFURANTOIN 25; 75 MG/1; MG/1
100 CAPSULE ORAL 2 TIMES DAILY
Qty: 14 CAPSULE | Refills: 0 | Status: SHIPPED | OUTPATIENT
Start: 2022-02-15 | End: 2022-02-22

## 2022-02-16 RX ORDER — OXYCODONE AND ACETAMINOPHEN 5; 325 MG/1; MG/1
1 TABLET ORAL EVERY 6 HOURS PRN
Qty: 10 TABLET | Refills: 0 | Status: SHIPPED | OUTPATIENT
Start: 2022-02-16 | End: 2022-03-22

## 2022-02-17 ENCOUNTER — OFFICE VISIT (OUTPATIENT)
Dept: FAMILY MEDICINE | Facility: OTHER | Age: 42
End: 2022-02-17
Attending: NURSE PRACTITIONER
Payer: COMMERCIAL

## 2022-02-17 VITALS
OXYGEN SATURATION: 99 % | DIASTOLIC BLOOD PRESSURE: 70 MMHG | HEART RATE: 66 BPM | BODY MASS INDEX: 20.35 KG/M2 | SYSTOLIC BLOOD PRESSURE: 106 MMHG | RESPIRATION RATE: 18 BRPM | WEIGHT: 114.9 LBS | TEMPERATURE: 98 F

## 2022-02-17 DIAGNOSIS — Z02.89 ENCOUNTER FOR COMPLETION OF FORM WITH PATIENT: ICD-10-CM

## 2022-02-17 DIAGNOSIS — Z98.890 POSTOPERATIVE STATE: Primary | ICD-10-CM

## 2022-02-17 LAB
PATH REPORT.COMMENTS IMP SPEC: NORMAL
PATH REPORT.COMMENTS IMP SPEC: NORMAL
PATH REPORT.FINAL DX SPEC: NORMAL
PATH REPORT.GROSS SPEC: NORMAL
PATH REPORT.MICROSCOPIC SPEC OTHER STN: NORMAL
PATH REPORT.RELEVANT HX SPEC: NORMAL
PHOTO IMAGE: NORMAL

## 2022-02-17 PROCEDURE — 99212 OFFICE O/P EST SF 10 MIN: CPT | Mod: 24 | Performed by: NURSE PRACTITIONER

## 2022-02-17 ASSESSMENT — PAIN SCALES - GENERAL: PAINLEVEL: MODERATE PAIN (5)

## 2022-02-17 ASSESSMENT — ANXIETY QUESTIONNAIRES
3. WORRYING TOO MUCH ABOUT DIFFERENT THINGS: NOT AT ALL
GAD7 TOTAL SCORE: 0
2. NOT BEING ABLE TO STOP OR CONTROL WORRYING: NOT AT ALL
7. FEELING AFRAID AS IF SOMETHING AWFUL MIGHT HAPPEN: NOT AT ALL
5. BEING SO RESTLESS THAT IT IS HARD TO SIT STILL: NOT AT ALL
1. FEELING NERVOUS, ANXIOUS, OR ON EDGE: NOT AT ALL
6. BECOMING EASILY ANNOYED OR IRRITABLE: NOT AT ALL
GAD7 TOTAL SCORE: 0
4. TROUBLE RELAXING: NOT AT ALL
GAD7 TOTAL SCORE: 0
7. FEELING AFRAID AS IF SOMETHING AWFUL MIGHT HAPPEN: NOT AT ALL

## 2022-02-17 ASSESSMENT — PATIENT HEALTH QUESTIONNAIRE - PHQ9
10. IF YOU CHECKED OFF ANY PROBLEMS, HOW DIFFICULT HAVE THESE PROBLEMS MADE IT FOR YOU TO DO YOUR WORK, TAKE CARE OF THINGS AT HOME, OR GET ALONG WITH OTHER PEOPLE: NOT DIFFICULT AT ALL
SUM OF ALL RESPONSES TO PHQ QUESTIONS 1-9: 0
SUM OF ALL RESPONSES TO PHQ QUESTIONS 1-9: 0

## 2022-02-17 NOTE — PROGRESS NOTES
Answers for HPI/ROS submitted by the patient on 2/17/2022  If you checked off any problems, how difficult have these problems made it for you to do your work, take care of things at home, or get along with other people?: Not difficult at all  PHQ9 TOTAL SCORE: 0  WENDY 7 TOTAL SCORE: 0      Assessment & Plan     (Z98.890) Postoperative state  (primary encounter diagnosis)  Comment: feeling good. Much less pain   Plan: Continue to rest and take it easy for full 2 weeks post op     (Z02.89) Encounter for completion of form with patient  Comment: form completed for 2 weeks off work   Plan: She will need a return to work form and will do a phone call visit with her prior to return to work        See Patient Instructions    Return in about 8 days (around 2/25/2022) for using a phone visit return to work .    Payton Levi NP  [facility]    Mariah Felton is a 41 year old who presents for the following health issues     HPI     Concern - Surgical follow up   Onset: 2/10/2022  Description: Left ovary and appendix removal   Intensity: mild  Progression of Symptoms:  improving  Accompanying Signs & Symptoms: Pain   Previous history of similar problem: Nonw  Precipitating factors:        Worsened by: movement   Alleviating factors:        Improved by: Percocet   Therapies tried and outcome: Percocet - Effective   She is feeling much better post surgery  Denies fever or chills  Eating and drinking well       Review of Systems   Constitutional, HEENT, cardiovascular, pulmonary, gi and gu systems are negative, except as otherwise noted.      Objective    /70   Pulse 66   Temp 98  F (36.7  C)   Resp 18   Wt 52.1 kg (114 lb 14.4 oz)   LMP 08/10/2013   SpO2 99%   BMI 20.35 kg/m    Body mass index is 20.35 kg/m .  Physical Exam   GENERAL: healthy, alert and no distress  RESP: lungs clear to auscultation - no rales, rhonchi or wheezes  CV: regular rate and rhythm, normal S1 S2, no S3 or  S4, no murmur, click or rub, no peripheral edema and peripheral pulses strong  ABDOMEN: soft, nontender, no hepatosplenomegaly, no masses and bowel sounds normal. Incisions X3 on abdomen are clean dry and intact. Surgical glue intact. No stitches   MS: no gross musculoskeletal defects noted, no edema  SKIN: no suspicious lesions or rashes  NEURO: Normal strength and tone, mentation intact and speech normal  PSYCH: mentation appears normal, affect normal/bright

## 2022-02-17 NOTE — NURSING NOTE
"Chief Complaint   Patient presents with     RECHECK     Surgical Followup       Initial /70   Pulse 66   Temp 98  F (36.7  C)   Resp 18   Wt 52.1 kg (114 lb 14.4 oz)   LMP 08/10/2013   SpO2 99%   BMI 20.35 kg/m   Estimated body mass index is 20.35 kg/m  as calculated from the following:    Height as of 2/11/22: 1.6 m (5' 3\").    Weight as of this encounter: 52.1 kg (114 lb 14.4 oz).  Medication Reconciliation: adan Lagunas  "

## 2022-02-18 ASSESSMENT — ANXIETY QUESTIONNAIRES: GAD7 TOTAL SCORE: 0

## 2022-02-18 ASSESSMENT — PATIENT HEALTH QUESTIONNAIRE - PHQ9: SUM OF ALL RESPONSES TO PHQ QUESTIONS 1-9: 0

## 2022-02-25 ENCOUNTER — VIRTUAL VISIT (OUTPATIENT)
Dept: FAMILY MEDICINE | Facility: OTHER | Age: 42
End: 2022-02-25
Attending: NURSE PRACTITIONER
Payer: COMMERCIAL

## 2022-02-25 DIAGNOSIS — Z02.89 ENCOUNTER FOR COMPLETION OF FORM WITH PATIENT: Primary | ICD-10-CM

## 2022-02-25 DIAGNOSIS — Z79.890 HORMONE REPLACEMENT THERAPY (HRT): ICD-10-CM

## 2022-02-25 PROCEDURE — 99213 OFFICE O/P EST LOW 20 MIN: CPT | Mod: TEL | Performed by: NURSE PRACTITIONER

## 2022-02-25 NOTE — PROGRESS NOTES
Purnima is a 41 year old who is being evaluated via a billable telephone visit.      What phone number would you like to be contacted at? [phone]  How would you like to obtain your AVS? Raza    Assessment & Plan     (Z02.89) Encounter for completion of form with patient  (primary encounter diagnosis)  Comment: form completed to return to work without limitations   Plan: as above       (Z22.155) Hormone replacement therapy (HRT)  Comment: ref to gyn for hormone replacement   Plan: Ob/Gyn Referral               See Patient Instructions    Return if symptoms worsen or fail to improve.    Payton Levi NP  [facility]      Purnima is a 41 year old who presents for the following health issues     HPI     Concern - Surgical follow up   Onset: 2/11/2022  Description: Left oophorectomy    Intensity: moderate  Progression of Symptoms:  improving  Accompanying Signs & Symptoms: Mild discomfort to incision site - denies any infections s/sx   Previous history of similar problem: None  Precipitating factors:        Worsened by: n/a  Alleviating factors:        Improved by: n/a  Therapies tried and outcome: Percoset - helped with pain    She needed to be off work for 2 weeks post operatively. She feels ready to return to work without limitations. She works for [employer].     She denies fever, chills, or night sweats. Eating and drinking well.     She would like to see GYN for hormone replacement as both of her ovaries have been removed.         Review of Systems   Constitutional, HEENT, cardiovascular, pulmonary, gi and gu systems are negative, except as otherwise noted.      Objective           Vitals:  No vitals were obtained today due to virtual visit.    Physical Exam   healthy, alert and no distress  PSYCH: Alert and oriented times 3; coherent speech, normal   rate and volume, able to articulate logical thoughts, able   to abstract reason, no tangential thoughts, no  hallucinations   or delusions  Her affect is normal  RESP: No cough, no audible wheezing, able to talk in full sentences  Remainder of exam unable to be completed due to telephone visits        Phone call duration: 5 minutes

## 2022-02-25 NOTE — NURSING NOTE
"Chief Complaint   Patient presents with     RECHECK       Initial LMP 08/10/2013  Estimated body mass index is 20.35 kg/m  as calculated from the following:    Height as of 2/11/22: 1.6 m (5' 3\").    Weight as of 2/17/22: 52.1 kg (114 lb 14.4 oz).  Medication Reconciliation: adan Lagunas  "

## 2022-03-04 ENCOUNTER — MYC MEDICAL ADVICE (OUTPATIENT)
Dept: FAMILY MEDICINE | Facility: OTHER | Age: 42
End: 2022-03-04
Payer: COMMERCIAL

## 2022-03-04 DIAGNOSIS — B96.89 BV (BACTERIAL VAGINOSIS): Primary | ICD-10-CM

## 2022-03-04 DIAGNOSIS — N76.0 BV (BACTERIAL VAGINOSIS): Primary | ICD-10-CM

## 2022-03-08 RX ORDER — METRONIDAZOLE 7.5 MG/G
1 GEL VAGINAL DAILY
Qty: 25 G | Refills: 0 | Status: SHIPPED | OUTPATIENT
Start: 2022-03-08 | End: 2022-03-13

## 2022-03-10 ENCOUNTER — TELEPHONE (OUTPATIENT)
Dept: FAMILY MEDICINE | Facility: OTHER | Age: 42
End: 2022-03-10
Payer: COMMERCIAL

## 2022-03-10 NOTE — TELEPHONE ENCOUNTER
Patient updated to MRI results - mild bursitis and tendinitis. Advised RICE. Patient declined PT at this time

## 2022-03-16 PROBLEM — Z87.2 H/O PILONIDAL CYST: Status: RESOLVED | Noted: 2017-09-26 | Resolved: 2022-03-16

## 2022-03-16 PROBLEM — L05.91 PILONIDAL CYST: Status: RESOLVED | Noted: 2017-09-12 | Resolved: 2022-03-16

## 2022-03-22 ENCOUNTER — OFFICE VISIT (OUTPATIENT)
Dept: OBGYN | Facility: OTHER | Age: 42
End: 2022-03-22
Attending: OBSTETRICS & GYNECOLOGY
Payer: COMMERCIAL

## 2022-03-22 VITALS
WEIGHT: 112 LBS | RESPIRATION RATE: 16 BRPM | BODY MASS INDEX: 19.84 KG/M2 | HEART RATE: 64 BPM | SYSTOLIC BLOOD PRESSURE: 112 MMHG | HEIGHT: 63 IN | DIASTOLIC BLOOD PRESSURE: 70 MMHG

## 2022-03-22 DIAGNOSIS — E89.40 SURGICAL MENOPAUSE: ICD-10-CM

## 2022-03-22 PROBLEM — G89.29 CHRONIC FEMALE PELVIC PAIN: Status: ACTIVE | Noted: 2018-09-24

## 2022-03-22 PROBLEM — R10.2 CHRONIC FEMALE PELVIC PAIN: Status: ACTIVE | Noted: 2018-09-24

## 2022-03-22 PROCEDURE — 99243 OFF/OP CNSLTJ NEW/EST LOW 30: CPT | Mod: 24 | Performed by: OBSTETRICS & GYNECOLOGY

## 2022-03-22 RX ORDER — ESTRADIOL 1 MG/1
1 TABLET ORAL DAILY
Qty: 90 TABLET | Refills: 3 | Status: SHIPPED | OUTPATIENT
Start: 2022-03-22 | End: 2022-09-27

## 2022-03-22 ASSESSMENT — PAIN SCALES - GENERAL: PAINLEVEL: NO PAIN (0)

## 2022-03-22 NOTE — PROGRESS NOTES
GYN CONSULT NOTE     CHIEF COMPLAINT / REASON FOR VISIT  Patient presents for Gynecology consultation at the request of her primary provider, Dr. Payton Levi, for surgical menopause.    HISTORY OF PRESENT ILLNESS  Purnima Fallon is a 41 year old  with Patient's last menstrual period was 08/10/2013. who presents for Gynecology consultation for surgical menopause.  She had a laparoscopic supracervical hysterectomy with right salpingo-oophorectomy on 2013.  She recently underwent a laparoscopic left oophorectomy and appendectomy on 2022 for symptomatic ovarian torsion.  The patient was started on estradiol patch 0.1 mg 2 times per week.  The patient has minimal hot flashes, night sweats, and flushing now on the estradiol patch.  She would like to discuss other management options for surgical menopause.  She denies mood swings or irritability.  She denies vaginal dryness.  She has not had intercourse since her surgery.  She denies vaginal itching, irritation, discharge, or bleeding.  No change in bowel habits, nausea, diarrhea, constipation.  No difficulty urinating.  No fever or chills.  The patient reports her ADHD, depression, and anxiety symptoms are controlled with Adderall, Wellbutrin, and Ativan.    MENSTRUAL HISTORY  Menarche was at age 13.  Postmenopausal: Yes, surgical menopause.  Menopause was at age 41 on 2022.  Current menopause symptoms: Occasional hot flashes, flushing, night sweats.  She denies bleeding since menopause.  She is sexually active and denies issues with intercourse.   Dyspareunia: Denies.  Postcoital spotting: Denies.  Current contraception: hysterectomy.  STD History: Herpes.  Last Pap smear history: Normal.  Mammogram history: Normal.    ALLERGIES   No Known Allergies    MEDICATIONS    Current Outpatient Medications:      adalimumab (HUMIRA) 40 MG/0.8ML pen kit, Inject 0.8 mLs (40 mg) Subcutaneous every 7 days, Disp: 4 each, Rfl: 11      amphetamine-dextroamphetamine (ADDERALL XR) 20 MG 24 hr capsule, Take 1 capsule (20 mg) by mouth daily, Disp: 30 capsule, Rfl: 0     amphetamine-dextroamphetamine (ADDERALL) 30 MG tablet, Take 1 tablet (30 mg) by mouth daily, Disp: 30 tablet, Rfl: 0     buPROPion (WELLBUTRIN XL) 300 MG 24 hr tablet, TAKE 1 TABLET BY MOUTH EVERY MORNING, Disp: 30 tablet, Rfl: 5     estradiol (ESTRACE) 1 MG tablet, Take 1 tablet (1 mg) by mouth daily, Disp: 90 tablet, Rfl: 3     estradiol (VIVELLE-DOT) 0.1 MG/24HR bi-weekly patch, Place 1 patch onto the skin twice a week, Disp: 8 patch, Rfl: 11     ibuprofen (ADVIL/MOTRIN) 800 MG tablet, Take 1 tablet (800 mg) by mouth every 8 hours as needed for moderate pain, Disp: 90 tablet, Rfl: 0     LORazepam (ATIVAN) 1 MG tablet, TAKE 1/2 TO 1 TABLET(0.5 TO 1 MG) BY MOUTH EVERY 6 HOURS AS NEEDED FOR ANXIETY, Disp: 20 tablet, Rfl: 0     valACYclovir (VALTREX) 500 MG tablet, Take 1 tablet (500 mg) by mouth daily, Disp: 90 tablet, Rfl: 2    REVIEW OF SYSTEMS  As per the HPI, otherwise negative.    Past Medical History:   Diagnosis Date     ADHD (attention deficit hyperactivity disorder)      Biliary dyskinesia 10/06/2017     Carpal tunnel syndrome 08/23/2006     Chronic female pelvic pain 09/24/2018     Cystic acne 07/23/2013     Depression      Hidradenitis 02/16/2007     Hidradenitis suppurativa 05/13/2019     Ischiorectal abscess and fistula      Kidney stones 2008    x2 passed on her own     Malunion of fracture 05/17/2007     Mass of breast 02/02/2012     Migraines      Nondependent tobacco use disorder 10/11/2012     Papanicolaou smear of cervix with low grade squamous intraepithelial lesion (LGSIL) 10/29/2008     Pilonidal cyst 09/12/2017     S/p partial hysterectomy with remaining cervical stump 09/27/2013     Surgical menopause 2/11/2022     Trigger thumb of both thumbs 06/17/2020    Added automatically from request for surgery [id number]     Past Surgical History:   Procedure Laterality  Date     CYSTECTOMY PILONIDAL N/A 09/18/2017    Procedure: CYSTECTOMY PILONIDAL;  PILONIDAL CYSTECTOMY ;  Surgeon: Cordell Stephens MD;  Location: [facility]     ENDOSCOPY UPPER, COLONOSCOPY, COMBINED N/A 10/05/2018    Procedure: COMBINED ENDOSCOPY UPPER, COLONOSCOPY;  UPPER ENDOSCOPY with Biopsy  AND COLONOSCOPY;  Surgeon: Cordell Stephens MD;  Location: [facility]     EXCISE LESION BUTTOCK(S) Left 06/08/2018    Procedure: EXCISE LESION BUTTOCK(S);  EXCISION OF GLUTEAL LEFT SKIN LESION;  Surgeon: Cordell Stephens MD;  [facility]     EXCISE MASS NECK Right 08/04/2015    Procedure: EXCISE MASS NECK;  Surgeon: Nasir Jones MD;  [facility]     INCISION AND DRAINAGE PERINEAL, COMBINED N/A 03/18/2015    Procedure: COMBINED INCISION AND DRAINAGE PERINEAL;  Surgeon: Jay Torres DO;  [facility]     IRRIGATION AND DEBRIDEMENT GROIN N/A 02/07/2019    Procedure: IRRIGATION AND DEBRIDEMENT LEFT GROIN ABSCESS;  Surgeon: Cordell Stephens MD;  [facility]     LAPAROSCOPIC APPENDECTOMY  02/11/2022     LAPAROSCOPIC CHOLECYSTECTOMY N/A 11/20/2017    Procedure: LAPAROSCOPIC CHOLECYSTECTOMY;  LAPAROSCOPIC CHOLECYSTECTOMY;  Surgeon: Cordell Stephens MD;  [facility]     LAPAROSCOPIC HYSTERECTOMY SUPRACERVICAL, BILATERAL SALPINGO-OOPHORECTOMY, COMBINED  09/27/2013    Procedure: COMBINED LAPAROSCOPIC HYSTERECTOMY SUPRACERVICAL, SALPINGO-OOPHORECTOMY;  LAPAROSCOPIC SUPRACERVICAL HYSTERECTOMY AND RIGHT SALPINGO-OOPHORECTOMY, CYSTOSCOPY;  Surgeon: Denys Callahan MD;  [facility]     LAPAROSCOPIC OOPHORECTOMY Left 02/11/2022    Procedure: Left OOPHORECTOMY, LAPAROSCOPIC, Laparoscopic Appendectomy Torsion and Pelvic Appensix;  Surgeon: Malick Malhotra MD;  [facility]     LAPAROSCOPY DIAGNOSTIC (GYN) N/A 09/06/2018    Procedure: LAPAROSCOPY DIAGNOSTIC (GYN);  LAPAROSCOPY WITH PERITONEAL BIOPSIES AND REMOVAL LEFT FALLOPIAN TUBE;  Surgeon: Suraj Whitaker MD;  Location: [facility]      "marsupialization of pilonidal cyst  2007     RELEASE TRIGGER FINGER BILATERAL Bilateral 07/10/2020    Procedure: RELEASE BILATERAL TRIGGER THUMBS;  Surgeon: Vnice Murillo DO;  Location: [facility]     TUBAL LIGATION  2005     OB History    Para Term  AB Living   2 2 2 0 0 2   SAB IAB Ectopic Multiple Live Births   0 0 0 0 2      # Outcome Date GA Lbr Bassam/2nd Weight Sex Delivery Anes PTL Lv   2 Term 05   3.175 kg (7 lb) M Vag-Spont   LENIN   1 Term 00 39w5d  3.544 kg (7 lb 13 oz) M Vag-Spont   LENIN     Social History     Tobacco Use     Smoking status: Current Every Day Smoker     Packs/day: 0.50     Years: 15.00     Pack years: 7.50     Types: Cigarettes     Start date: 2001     Smokeless tobacco: Never Used     Tobacco comment: declined 2020   Substance Use Topics     Alcohol use: No     History   Sexual Activity     Sexual activity: Yes     Partners: Male     Birth control/ protection: Female Surgical     Family History   Problem Relation Age of Onset     Diabetes Paternal Grandmother      OBJECTIVE  /70   Pulse 64   Resp 16   Ht 1.6 m (5' 3\")   Wt 50.8 kg (112 lb)   LMP 08/10/2013   BMI 19.84 kg/m      General:  Well-developed, well-nourished female in no apparent distress.  Neurological: Alert and oriented x3.  Lungs:  Clear to auscultation bilaterally with good inspiratory effort.  No wheezing rhonchi or rales noted. Breathing nonlabored.  Heart:  Regular rate and rhythm without murmur. No JVD.  No peripheral vascular disease.  Abdomen: Soft, nontender, nondistended, positive bowel sounds.  No organomegaly. No rebound, no guarding.  Well-healed surgical incisions.  Pelvic exam: Deferred.  Extremities:  No clubbing cyanosis or edema. Nontender bilaterally.    DIAGNOSTICS  2021 Pap NILM, negative HPV  2022 Pathology: Left hemorrhagic ovary with functional cysts consistent with ovarian torsion, negative for neoplasia.  Appendix with mild " appendicitis without serositis.  Negative for malignancy.    ASSESSMENT / PLAN  Purnima Fallon is a 41 year old  female who presents in consultation for surgical menopsuae.    1 Surgical menopause  2 Tobacco abuse    - I discussed surgical menopause with the patient.  - The patient had a laparoscopic supracervical hysterectomy with right oophorectomy in  and just underwent a laparoscopic left oophorectomy on 2022 for ovarian torsion.  - I discussed menopause and the common course of vasomotor symptoms with the patient.  - The patient has bothersome menopausal symptoms including hot flashes, flushing, night sweats, and sleep disturbance.  Her symptoms have improved with estradiol patch.  - I discussed management options for menopausal symptoms with the patient including SSRI medication, hormone replacement therapy, and herbal medications.  - I discussed hormone replacement therapy and the potential benefits for mood swings and irritability associated with menopause. There is a bone benefit and HRT can help alleviate hot flashes.  There is no evidence in her age group of cardiac protectiveness as once thought. There is a small increased risk of blood clots and stoke and according to some studies (although controversial) for breast cancer. It is recommended to take HRT for the shortest time necessary to manage and get through her vasomotor symptoms, which averages about 2 years for most women. Close monitoring would be indicated.  - I recommend hormone replacement therapy.  The patient verbalized understanding of the risk, benefits and expected outcomes of HRT and she desires to proceed.  - I discussed the option of continuing with estradiol patch as well as taking oral estrogen.  As the patient does not have a uterus, she does not need progesterone therapy.  I reviewed the expected outcome, risk, benefits, alternatives, indication of both estradiol patch as well as oral estrogen.  The patient would  like to stop the estradiol patch and switch to oral estrogen.  - I gave the patient a prescription for Estrace 1 mg po daily with instructions for use.  - I recommend the patient follow-up if her symptoms have not improved in 3 months.  - I encouraged tobacco cessation.  - I reviewed with the patient that she will need to continue with regular Pap smears for cervical cancer screening as she had a supracervical hysterectomy.  - The patient asked appropriate questions and these were answered for her.    - Problem list reviewed and updated.  - Follow up annually or sooner as needed.    Tavo Johnston MD  Obstetrics and Gynecology      CC: PRIMARY CARE PROVIDER: Payton Lane.

## 2022-03-22 NOTE — NURSING NOTE
"Chief Complaint   Patient presents with     Consult     Payton Levi-hormone replacement       Initial /70   Pulse 64   Ht 1.6 m (5' 3\")   Wt 50.8 kg (112 lb)   LMP 08/10/2013   BMI 19.84 kg/m   Estimated body mass index is 19.84 kg/m  as calculated from the following:    Height as of this encounter: 1.6 m (5' 3\").    Weight as of this encounter: 50.8 kg (112 lb).  Medication Reconciliation: complete  Nathalia Epps LPN    "

## 2022-03-31 ENCOUNTER — TELEPHONE (OUTPATIENT)
Dept: FAMILY MEDICINE | Facility: OTHER | Age: 42
End: 2022-03-31
Payer: COMMERCIAL

## 2022-03-31 DIAGNOSIS — B00.9 HERPES SIMPLEX VIRUS INFECTION: ICD-10-CM

## 2022-03-31 DIAGNOSIS — L73.2 HIDRADENITIS SUPPURATIVA: ICD-10-CM

## 2022-03-31 RX ORDER — VALACYCLOVIR HYDROCHLORIDE 500 MG/1
500 TABLET, FILM COATED ORAL DAILY
Qty: 90 TABLET | Refills: 2 | Status: SHIPPED | OUTPATIENT
Start: 2022-03-31 | End: 2022-04-20

## 2022-03-31 NOTE — TELEPHONE ENCOUNTER
Spoke with patient . She has been informed that she will need a follow up appointment with Holli to discuss the dosage increase in her Bupropion . She is currently driving and will call back to schedule . She is also inquiring about getting her Humira ordered citrus free as the one she has now hurts  to administer.

## 2022-03-31 NOTE — TELEPHONE ENCOUNTER
Valtrex 500 mg      Last Written Prescription Date:  7-1-2021  Last Fill Quantity: 90,   # refills: 0  Last Office Visit: 2-    Future Office visit:

## 2022-04-05 NOTE — ADDENDUM NOTE
Addended by: BENJI AVILA on: 5/17/2018 10:48 AM     Modules accepted: Orders    
[FreeTextEntry1] : cmp norm\par cbc norm\par \par a1c 5.4\par \par esr 76\par  20\par \par \par  US Joint Nonvasc Extremity Limited, Left             Final\par \par No Documents Attached\par \par \par Result Annotated 25Mar2022 09:54AM by MISHA OTT\par \par \par Appt 4/5/2022\par \par \par   EXAM:  US JOINT NONVASC EXT LTD LT\par EXAM:  US JOINT NONVASC EXT LTD LT\par \par PROCEDURE DATE:  03/17/2022\par \par \par \par \par INTERPRETATION:  History: Left wrist/hand swelling\par \par Technique: Ultrasound imaging of the left hand and wrist was performed utilizing grayscale and power Doppler techniques, in the region of the patient's pain.\par \par Comparison: None\par \par Findings:\par \par There is mild tendinosis with tenosynovitis of the flexor carpi radialis. Mild synovitis of the first carpometacarpal joint and radiocarpal interval. Extensor compartments of the wrist appear unremarkable.\par \par Median nerve appears normal.\par \par No evidence of synovitis, tenosynovitis, or joint effusion within the visualized portions of the hand.\par \par Impression:\par \par Mild tendinosis with tenosynovitis of the flexor carpi radialis.\par \par Mild synovitis of the first carpometacarpal joint and radiocarpal interval.\par \par --- End of Report ---\par \par \par \par \par \par \par JANELL CASAREZ M.D., ATTENDING RADIOLOGIST\par This document has been electronically signed. Mar 17 2022  4:41PM\par \par  \par \par  Ordered by: MISHA OTT       Collected/Examined: 17Mar2022 03:41PM       \par Verified by: MISHA OTT 25Mar2022 09:55AM       \par  Result Communication: No patient communication needed at this time;\par Stage: Final       \par  Performed at: [facility]       Resulted: 17Mar2022 03:39PM       Last Updated: 25Mar2022 09:55AM       Accession: [id number]

## 2022-04-18 DIAGNOSIS — B00.9 HERPES SIMPLEX VIRUS INFECTION: ICD-10-CM

## 2022-04-20 RX ORDER — VALACYCLOVIR HYDROCHLORIDE 500 MG/1
TABLET, FILM COATED ORAL
Qty: 90 TABLET | Refills: 1 | Status: SHIPPED | OUTPATIENT
Start: 2022-04-20 | End: 2023-02-26

## 2022-05-02 ENCOUNTER — MYC REFILL (OUTPATIENT)
Dept: FAMILY MEDICINE | Facility: OTHER | Age: 42
End: 2022-05-02
Payer: COMMERCIAL

## 2022-05-02 DIAGNOSIS — F41.8 SITUATIONAL ANXIETY: ICD-10-CM

## 2022-05-02 DIAGNOSIS — F90.8 ATTENTION DEFICIT HYPERACTIVITY DISORDER (ADHD), OTHER TYPE: ICD-10-CM

## 2022-05-04 DIAGNOSIS — F90.8 ATTENTION DEFICIT HYPERACTIVITY DISORDER (ADHD), OTHER TYPE: ICD-10-CM

## 2022-05-04 DIAGNOSIS — F41.8 SITUATIONAL ANXIETY: ICD-10-CM

## 2022-05-04 RX ORDER — LORAZEPAM 1 MG/1
TABLET ORAL
Qty: 20 TABLET | Refills: 0 | Status: SHIPPED | OUTPATIENT
Start: 2022-05-04 | End: 2022-05-31

## 2022-05-04 RX ORDER — DEXTROAMPHETAMINE SACCHARATE, AMPHETAMINE ASPARTATE, DEXTROAMPHETAMINE SULFATE AND AMPHETAMINE SULFATE 7.5; 7.5; 7.5; 7.5 MG/1; MG/1; MG/1; MG/1
30 TABLET ORAL DAILY
Qty: 30 TABLET | Refills: 0 | Status: SHIPPED | OUTPATIENT
Start: 2022-05-04 | End: 2022-05-31

## 2022-05-04 RX ORDER — DEXTROAMPHETAMINE SACCHARATE, AMPHETAMINE ASPARTATE MONOHYDRATE, DEXTROAMPHETAMINE SULFATE AND AMPHETAMINE SULFATE 5; 5; 5; 5 MG/1; MG/1; MG/1; MG/1
20 CAPSULE, EXTENDED RELEASE ORAL DAILY
Qty: 30 CAPSULE | Refills: 0 | Status: SHIPPED | OUTPATIENT
Start: 2022-05-04 | End: 2022-05-31

## 2022-05-10 RX ORDER — DEXTROAMPHETAMINE SACCHARATE, AMPHETAMINE ASPARTATE, DEXTROAMPHETAMINE SULFATE AND AMPHETAMINE SULFATE 7.5; 7.5; 7.5; 7.5 MG/1; MG/1; MG/1; MG/1
TABLET ORAL
Qty: 30 TABLET | Refills: 0 | OUTPATIENT
Start: 2022-05-10

## 2022-05-10 RX ORDER — DEXTROAMPHETAMINE SACCHARATE, AMPHETAMINE ASPARTATE MONOHYDRATE, DEXTROAMPHETAMINE SULFATE AND AMPHETAMINE SULFATE 5; 5; 5; 5 MG/1; MG/1; MG/1; MG/1
CAPSULE, EXTENDED RELEASE ORAL
Qty: 30 CAPSULE | Refills: 0 | OUTPATIENT
Start: 2022-05-10

## 2022-05-10 RX ORDER — LORAZEPAM 1 MG/1
TABLET ORAL
Qty: 20 TABLET | Refills: 0 | OUTPATIENT
Start: 2022-05-10

## 2022-06-14 ENCOUNTER — OFFICE VISIT (OUTPATIENT)
Dept: FAMILY MEDICINE | Facility: OTHER | Age: 42
End: 2022-06-14
Attending: NURSE PRACTITIONER
Payer: COMMERCIAL

## 2022-06-14 VITALS
SYSTOLIC BLOOD PRESSURE: 104 MMHG | BODY MASS INDEX: 19.4 KG/M2 | HEART RATE: 74 BPM | OXYGEN SATURATION: 98 % | DIASTOLIC BLOOD PRESSURE: 60 MMHG | RESPIRATION RATE: 18 BRPM | TEMPERATURE: 98 F | WEIGHT: 109.5 LBS

## 2022-06-14 DIAGNOSIS — F41.1 GAD (GENERALIZED ANXIETY DISORDER): ICD-10-CM

## 2022-06-14 DIAGNOSIS — L73.2 HIDRADENITIS SUPPURATIVA: ICD-10-CM

## 2022-06-14 DIAGNOSIS — M77.8 LEFT SHOULDER TENDINITIS: Primary | ICD-10-CM

## 2022-06-14 PROCEDURE — 99214 OFFICE O/P EST MOD 30 MIN: CPT | Performed by: NURSE PRACTITIONER

## 2022-06-14 RX ORDER — HYDROCODONE BITARTRATE AND ACETAMINOPHEN 5; 325 MG/1; MG/1
1 TABLET ORAL
Qty: 20 TABLET | Refills: 0 | Status: SHIPPED | OUTPATIENT
Start: 2022-06-14 | End: 2022-06-22

## 2022-06-14 RX ORDER — MELOXICAM 7.5 MG/1
7.5 TABLET ORAL DAILY
Qty: 90 TABLET | Refills: 0 | Status: SHIPPED | OUTPATIENT
Start: 2022-06-14 | End: 2022-09-27

## 2022-06-14 RX ORDER — BUPROPION HYDROCHLORIDE 450 MG/1
450 TABLET, FILM COATED, EXTENDED RELEASE ORAL EVERY MORNING
Qty: 90 TABLET | Refills: 0 | Status: SHIPPED | OUTPATIENT
Start: 2022-06-14 | End: 2022-06-28

## 2022-06-14 ASSESSMENT — PAIN SCALES - GENERAL: PAINLEVEL: NO PAIN (0)

## 2022-06-14 ASSESSMENT — ANXIETY QUESTIONNAIRES
2. NOT BEING ABLE TO STOP OR CONTROL WORRYING: NOT AT ALL
4. TROUBLE RELAXING: NOT AT ALL
6. BECOMING EASILY ANNOYED OR IRRITABLE: NOT AT ALL
GAD7 TOTAL SCORE: 0
3. WORRYING TOO MUCH ABOUT DIFFERENT THINGS: NOT AT ALL
5. BEING SO RESTLESS THAT IT IS HARD TO SIT STILL: NOT AT ALL
7. FEELING AFRAID AS IF SOMETHING AWFUL MIGHT HAPPEN: NOT AT ALL
1. FEELING NERVOUS, ANXIOUS, OR ON EDGE: NOT AT ALL
GAD7 TOTAL SCORE: 0

## 2022-06-14 ASSESSMENT — PATIENT HEALTH QUESTIONNAIRE - PHQ9: SUM OF ALL RESPONSES TO PHQ QUESTIONS 1-9: 2

## 2022-06-14 NOTE — PROGRESS NOTES
Assessment & Plan     (M77.8) Left shoulder tendinitis  (primary encounter diagnosis)  Comment: treat with Mobic. Educated to take Mobic with food and not mix with other NSAIDS. Agreed to a short fill of Norco for acute shoulder pain She is aware to not mix Ativan with Norco   Plan: meloxicam (MOBIC) 7.5 MG tablet,         HYDROcodone-acetaminophen (NORCO) 5-325 MG         tablet            (F41.1) WENDY (generalized anxiety disorder)  Comment: increase to 450 mg daily   Plan: buPROPion 450 MG TB24            (L73.2) Hidradenitis suppurativa  Comment: RF. She needs citrate free Humira as she reacts to citrate  Plan: adalimumab (HUMIRA) 40 MG/0.8ML pen kit            See Patient Instructions    Return in about 6 weeks (around 7/26/2022) for Med check follow up .    Payton Levi NP  [facility]   Purnima is a 41 year old who presents for the following health issues     HPI     Medication Followup of ADDERALL    Taking Medication as prescribed: yes    Side Effects:  None    Medication Helping Symptoms:  Yes    No concerns or side effects with taking      Depression and Anxiety Follow-Up    How are you doing with your depression since your last visit? No change    How are you doing with your anxiety since your last visit?  No change    Are you having other symptoms that might be associated with depression or anxiety? No    Have you had a significant life event? No     Do you have any concerns with your use of alcohol or other drugs? No     Purnima is inquiring about increasing Wellbutrin to 450 mg daily     Social History     Tobacco Use     Smoking status: Current Every Day Smoker     Packs/day: 0.50     Years: 15.00     Pack years: 7.50     Types: Cigarettes     Start date: 1/1/2001     Smokeless tobacco: Never Used     Tobacco comment: declined 2/27/2020   Substance Use Topics     Alcohol use: No     Drug use: No     PHQ 9/2/2021 2/17/2022 6/14/2022   PHQ-9 Total Score 7 0 2    Q9: Thoughts of better off dead/self-harm past 2 weeks Not at all Not at all Not at all     WENDY-7 SCORE 9/2/2021 2/17/2022 6/14/2022   Total Score - 0 (minimal anxiety) -   Total Score 3 0 0     Last PHQ-9 6/14/2022   1.  Little interest or pleasure in doing things 0   2.  Feeling down, depressed, or hopeless 0   3.  Trouble falling or staying asleep, or sleeping too much 1   4.  Feeling tired or having little energy 1   5.  Poor appetite or overeating 0   6.  Feeling bad about yourself 0   7.  Trouble concentrating 0   8.  Moving slowly or restless 0   Q9: Thoughts of better off dead/self-harm past 2 weeks 0   PHQ-9 Total Score 2   Difficulty at work, home, or with people Not difficult at all     WENDY-7  6/14/2022   1. Feeling nervous, anxious, or on edge 0   2. Not being able to stop or control worrying 0   3. Worrying too much about different things 0   4. Trouble relaxing 0   5. Being so restless that it is hard to sit still 0   6. Becoming easily annoyed or irritable 0   7. Feeling afraid, as if something awful might happen 0   WENDY-7 Total Score 0   If you checked any problems, how difficult have they made it for you to do your work, take care of things at home, or get along with other people? -     Denies SI/HI thoughts     Suicide Assessment Five-step Evaluation and Treatment (SAFE-T)    Migraine     Since your last clinic visit, how have your headaches changed?  No change    How often are you getting headaches or migraines? Once weekly      Are you able to do normal daily activities when you have a migraine? No    Are you taking rescue/relief medications? (Select all that apply) No    How helpful is your rescue/relief medication?  n/a    Are you taking any medications to prevent migraines? (Select all that apply)  No    In the past 4 weeks, how often have you gone to urgent care or the emergency room because of your headaches?  0     Joint Pain//L Shoulder, Bilateral carpal tunnel     Onset: Ongoing      Description:   Location:Let shoulder and Bilateral hands   Character: Sharp    Intensity: moderate    Progression of Symptoms: worse    Accompanying Signs & Symptoms:  Other symptoms: none    History:   Previous similar pain: YES- Hx of carpal tunnel surgery       Precipitating factors:   Trauma or overuse: no     Alleviating factors:  Improved by: Ice, heat, immobilization/brace, lidocaine patches     Therapies Tried and outcome: Helpful temporarily          How many servings of fruits and vegetables do you eat daily?  0-1    On average, how many sweetened beverages do you drink each day (Examples: soda, juice, sweet tea, etc.  Do NOT count diet or artificially sweetened beverages)?   0    How many days per week do you exercise enough to make your heart beat faster? 3 or less    How many minutes a day do you exercise enough to make your heart beat faster? 60 or more    How many days per week do you miss taking your medication? 0      Review of Systems   Constitutional, HEENT, cardiovascular, pulmonary, GI, , musculoskeletal, neuro, skin, endocrine and psych systems are negative, except as otherwise noted.      Objective    /60   Pulse 74   Temp 98  F (36.7  C)   Resp 18   Wt 49.7 kg (109 lb 8 oz)   LMP 08/10/2013   SpO2 98%   BMI 19.40 kg/m    Body mass index is 19.4 kg/m .  Physical Exam   GENERAL: healthy, alert and no distress  NECK: no adenopathy, no asymmetry, masses, or scars and thyroid normal to palpation  RESP: lungs clear to auscultation - no rales, rhonchi or wheezes  CV: regular rate and rhythm, normal S1 S2, no S3 or S4, no murmur, click or rub, no peripheral edema and peripheral pulses strong  MS: no gross musculoskeletal defects noted, no edema. +TTP to left anterior rotator cuff region. Distal pulses intact. No rash, erythema, bruising, or warmth to the touch to left shoulder   SKIN: no suspicious lesions or rashes  NEURO: Normal strength and tone, mentation intact and speech  normal  PSYCH: mentation appears normal, affect normal/bright

## 2022-06-14 NOTE — NURSING NOTE
"Chief Complaint   Patient presents with     Recheck Medication     Depression     Anxiety     A.D.H.D     Migraine       Initial /60   Pulse 74   Temp 98  F (36.7  C)   Resp 18   Wt 49.7 kg (109 lb 8 oz)   LMP 08/10/2013   SpO2 98%   BMI 19.40 kg/m   Estimated body mass index is 19.4 kg/m  as calculated from the following:    Height as of 3/22/22: 1.6 m (5' 3\").    Weight as of this encounter: 49.7 kg (109 lb 8 oz).  Medication Reconciliation: adan Lagunas  "

## 2022-06-29 ENCOUNTER — MYC REFILL (OUTPATIENT)
Dept: FAMILY MEDICINE | Facility: OTHER | Age: 42
End: 2022-06-29

## 2022-06-29 DIAGNOSIS — M77.8 LEFT SHOULDER TENDINITIS: ICD-10-CM

## 2022-06-29 DIAGNOSIS — F41.8 SITUATIONAL ANXIETY: ICD-10-CM

## 2022-06-29 DIAGNOSIS — M77.11 RIGHT LATERAL EPICONDYLITIS: ICD-10-CM

## 2022-06-29 DIAGNOSIS — F90.8 ATTENTION DEFICIT HYPERACTIVITY DISORDER (ADHD), OTHER TYPE: ICD-10-CM

## 2022-07-01 RX ORDER — DEXTROAMPHETAMINE SACCHARATE, AMPHETAMINE ASPARTATE MONOHYDRATE, DEXTROAMPHETAMINE SULFATE AND AMPHETAMINE SULFATE 5; 5; 5; 5 MG/1; MG/1; MG/1; MG/1
20 CAPSULE, EXTENDED RELEASE ORAL DAILY
Qty: 30 CAPSULE | Refills: 0 | OUTPATIENT
Start: 2022-07-01

## 2022-07-01 RX ORDER — IBUPROFEN 800 MG/1
800 TABLET, FILM COATED ORAL EVERY 8 HOURS PRN
Qty: 90 TABLET | Refills: 0 | OUTPATIENT
Start: 2022-07-01

## 2022-07-01 RX ORDER — DEXTROAMPHETAMINE SACCHARATE, AMPHETAMINE ASPARTATE, DEXTROAMPHETAMINE SULFATE AND AMPHETAMINE SULFATE 7.5; 7.5; 7.5; 7.5 MG/1; MG/1; MG/1; MG/1
30 TABLET ORAL DAILY
Qty: 30 TABLET | Refills: 0 | OUTPATIENT
Start: 2022-07-01

## 2022-07-01 RX ORDER — LORAZEPAM 1 MG/1
TABLET ORAL
Qty: 20 TABLET | Refills: 0 | OUTPATIENT
Start: 2022-07-01

## 2022-07-01 RX ORDER — HYDROCODONE BITARTRATE AND ACETAMINOPHEN 5; 325 MG/1; MG/1
1 TABLET ORAL
Qty: 7 TABLET | Refills: 0 | OUTPATIENT
Start: 2022-07-01

## 2022-07-27 ENCOUNTER — MYC REFILL (OUTPATIENT)
Dept: FAMILY MEDICINE | Facility: OTHER | Age: 42
End: 2022-07-27

## 2022-07-27 DIAGNOSIS — M77.8 LEFT SHOULDER TENDINITIS: ICD-10-CM

## 2022-07-27 DIAGNOSIS — F90.8 ATTENTION DEFICIT HYPERACTIVITY DISORDER (ADHD), OTHER TYPE: ICD-10-CM

## 2022-07-27 NOTE — TELEPHONE ENCOUNTER
Norco       Last Written Prescription Date:  7/21/22  Last Fill Quantity: 7,   # refills: 0  Last Office Visit: 6/14/22  Future Office visit:

## 2022-07-27 NOTE — TELEPHONE ENCOUNTER
Adderall 30      Last Written Prescription Date:  7/1/22  Last Fill Quantity: 30,   # refills: 0    Adderall 20      Last Written Prescription Date:  7/1/22  Last Fill Quantity: 30,   # refills: 0  Last Office Visit: 6/14/22  Future Office visit:

## 2022-07-28 RX ORDER — DEXTROAMPHETAMINE SACCHARATE, AMPHETAMINE ASPARTATE, DEXTROAMPHETAMINE SULFATE AND AMPHETAMINE SULFATE 7.5; 7.5; 7.5; 7.5 MG/1; MG/1; MG/1; MG/1
30 TABLET ORAL DAILY
Qty: 30 TABLET | Refills: 0 | Status: SHIPPED | OUTPATIENT
Start: 2022-07-28 | End: 2022-08-28

## 2022-07-28 RX ORDER — DEXTROAMPHETAMINE SACCHARATE, AMPHETAMINE ASPARTATE MONOHYDRATE, DEXTROAMPHETAMINE SULFATE AND AMPHETAMINE SULFATE 5; 5; 5; 5 MG/1; MG/1; MG/1; MG/1
20 CAPSULE, EXTENDED RELEASE ORAL DAILY
Qty: 30 CAPSULE | Refills: 0 | Status: SHIPPED | OUTPATIENT
Start: 2022-07-28 | End: 2022-08-28

## 2022-07-28 RX ORDER — HYDROCODONE BITARTRATE AND ACETAMINOPHEN 5; 325 MG/1; MG/1
1 TABLET ORAL
Qty: 7 TABLET | Refills: 0 | Status: SHIPPED | OUTPATIENT
Start: 2022-07-28 | End: 2022-08-10

## 2022-09-22 PROBLEM — F11.90 CHRONIC, CONTINUOUS USE OF OPIOIDS: Status: ACTIVE | Noted: 2022-09-22

## 2022-09-27 ENCOUNTER — OFFICE VISIT (OUTPATIENT)
Dept: FAMILY MEDICINE | Facility: OTHER | Age: 42
End: 2022-09-27
Attending: NURSE PRACTITIONER
Payer: COMMERCIAL

## 2022-09-27 VITALS
HEART RATE: 77 BPM | WEIGHT: 112.6 LBS | DIASTOLIC BLOOD PRESSURE: 62 MMHG | SYSTOLIC BLOOD PRESSURE: 104 MMHG | OXYGEN SATURATION: 99 % | TEMPERATURE: 98.1 F | BODY MASS INDEX: 19.95 KG/M2 | HEIGHT: 63 IN

## 2022-09-27 DIAGNOSIS — E89.40 SURGICAL MENOPAUSE: ICD-10-CM

## 2022-09-27 DIAGNOSIS — M77.8 LEFT SHOULDER TENDINITIS: ICD-10-CM

## 2022-09-27 DIAGNOSIS — L98.9 SKIN LESION: Primary | ICD-10-CM

## 2022-09-27 DIAGNOSIS — F90.8 ATTENTION DEFICIT HYPERACTIVITY DISORDER (ADHD), OTHER TYPE: ICD-10-CM

## 2022-09-27 DIAGNOSIS — H61.21 IMPACTED CERUMEN OF RIGHT EAR: ICD-10-CM

## 2022-09-27 DIAGNOSIS — T74.11XA DOMESTIC PHYSICAL ABUSE OF ADULT: ICD-10-CM

## 2022-09-27 PROCEDURE — 99214 OFFICE O/P EST MOD 30 MIN: CPT | Performed by: NURSE PRACTITIONER

## 2022-09-27 RX ORDER — ESTRADIOL 1 MG/1
1 TABLET ORAL DAILY
Qty: 90 TABLET | Refills: 3 | Status: SHIPPED | OUTPATIENT
Start: 2022-09-27 | End: 2023-01-04

## 2022-09-27 RX ORDER — DEXTROAMPHETAMINE SACCHARATE, AMPHETAMINE ASPARTATE, DEXTROAMPHETAMINE SULFATE AND AMPHETAMINE SULFATE 7.5; 7.5; 7.5; 7.5 MG/1; MG/1; MG/1; MG/1
30 TABLET ORAL DAILY
Qty: 30 TABLET | Refills: 0 | Status: SHIPPED | OUTPATIENT
Start: 2022-09-29 | End: 2022-10-27

## 2022-09-27 RX ORDER — DEXTROAMPHETAMINE SACCHARATE, AMPHETAMINE ASPARTATE MONOHYDRATE, DEXTROAMPHETAMINE SULFATE AND AMPHETAMINE SULFATE 5; 5; 5; 5 MG/1; MG/1; MG/1; MG/1
20 CAPSULE, EXTENDED RELEASE ORAL DAILY
Qty: 30 CAPSULE | Refills: 0 | Status: SHIPPED | OUTPATIENT
Start: 2022-09-29 | End: 2022-10-27

## 2022-09-27 RX ORDER — HYDROCODONE BITARTRATE AND ACETAMINOPHEN 5; 325 MG/1; MG/1
1 TABLET ORAL
Qty: 7 TABLET | Refills: 0 | Status: SHIPPED | OUTPATIENT
Start: 2022-09-27 | End: 2022-10-10

## 2022-09-27 ASSESSMENT — ANXIETY QUESTIONNAIRES
GAD7 TOTAL SCORE: 0
3. WORRYING TOO MUCH ABOUT DIFFERENT THINGS: NOT AT ALL
1. FEELING NERVOUS, ANXIOUS, OR ON EDGE: NOT AT ALL
7. FEELING AFRAID AS IF SOMETHING AWFUL MIGHT HAPPEN: NOT AT ALL
2. NOT BEING ABLE TO STOP OR CONTROL WORRYING: NOT AT ALL
4. TROUBLE RELAXING: NOT AT ALL
5. BEING SO RESTLESS THAT IT IS HARD TO SIT STILL: NOT AT ALL
6. BECOMING EASILY ANNOYED OR IRRITABLE: NOT AT ALL
GAD7 TOTAL SCORE: 0

## 2022-09-27 ASSESSMENT — PAIN SCALES - GENERAL: PAINLEVEL: SEVERE PAIN (7)

## 2022-09-27 ASSESSMENT — PATIENT HEALTH QUESTIONNAIRE - PHQ9: SUM OF ALL RESPONSES TO PHQ QUESTIONS 1-9: 1

## 2022-09-27 NOTE — NURSING NOTE
"Chief Complaint   Patient presents with     Chronic pain     Depression     Anxiety       Initial /62 (BP Location: Right arm, Patient Position: Sitting, Cuff Size: Adult Regular)   Pulse 77   Temp 98.1  F (36.7  C) (Tympanic)   Ht 1.6 m (5' 3\")   Wt 51.1 kg (112 lb 9.6 oz)   LMP 08/10/2013   SpO2 99%   BMI 19.95 kg/m   Estimated body mass index is 19.95 kg/m  as calculated from the following:    Height as of this encounter: 1.6 m (5' 3\").    Weight as of this encounter: 51.1 kg (112 lb 9.6 oz).  Medication Reconciliation: complete  Christel Alfaro LPN  "
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
## 2022-11-07 ENCOUNTER — NURSE TRIAGE (OUTPATIENT)
Dept: FAMILY MEDICINE | Facility: OTHER | Age: 42
End: 2022-11-07

## 2022-11-07 NOTE — TELEPHONE ENCOUNTER
"Protocol advises patient to be seen within 3 days for a headache that started a week ago. PCP out today. No available appointments today or all week in either clinics and [facility] closed today.   Patient requesting future overbook. Advised for patient to be seen in UC/ER for new or worsening symptoms. Patient verbalized understanding.   Can patient be worked in this week with PCP? Please advise, thank you.     Reason for Disposition    [1] MILD-MODERATE headache AND [2] present > 72 hours    Additional Information    Negative: Difficult to awaken or acting confused (e.g., disoriented, slurred speech)    Negative: [1] Weakness of the face, arm or leg on one side of the body AND [2] new-onset    Negative: Passed out (i.e., lost consciousness, collapsed and was not responding)    Negative: [1] Loss of speech or garbled speech AND [2] new-onset    Negative: [1] Numbness of the face, arm or leg on one side of the body AND [2] new-onset    Negative: Sounds like a life-threatening emergency to the triager    Negative: Followed a head injury    Negative: Pregnant    Negative: Postpartum (from 0 to 6 weeks after delivery)    Negative: Traumatic Brain Injury (TBI) is suspected    Negative: Unable to walk, or can only walk with assistance (e.g., requires support)    Negative: Stiff neck (can't touch chin to chest)    Negative: Severe pain in one eye    Negative: [1] Other family members (or roommates) with headaches AND [2] possibility of carbon monoxide exposure    Negative: [1] SEVERE headache (e.g., excruciating) AND [2] \"worst headache\" of life    Negative: [1] SEVERE headache AND [2] sudden-onset (i.e., reaching maximum intensity within seconds to 1 hour)    Negative: [1] SEVERE headache AND [2] fever    Negative: Loss of vision or double vision (Exception: same as prior migraines)    Negative: [1] Fever > 100.0 F (37.8 C) AND [2] diabetes mellitus or weak immune system (e.g., HIV positive, cancer chemo, " "splenectomy, organ transplant, chronic steroids)    Negative: Patient sounds very sick or weak to the triager    Negative: [1] SEVERE headache (e.g., excruciating) AND [2] not improved after 2 hours of pain medicine    Negative: [1] Vomiting AND [2] 2 or more times (Exception: similar to previous migraines)    Negative: Fever > 104 F (40 C)    Negative: [1] MODERATE headache (e.g., interferes with normal activities) AND [2] present > 24 hours AND [3] unexplained  (Exceptions: analgesics not tried, typical migraine, or headache part of viral illness)    Negative: [1] New headache AND [2] weak immune system (e.g., HIV positive, cancer chemo, splenectomy, organ transplant, chronic steroids)    Negative: [1] New headache AND [2] age > 50    Negative: [1] Sinus pain of forehead AND [2] yellow or green nasal discharge    Negative: Fever present > 3 days (72 hours)    Answer Assessment - Initial Assessment Questions  1. LOCATION: \"Where does it hurt?\"       Back of head and wraps around to the front of the head  2. ONSET: \"When did the headache start?\" (Minutes, hours or days)       About a week ago  3. PATTERN: \"Does the pain come and go, or has it been constant since it started?\"      Constant for last few days   4. SEVERITY: \"How bad is the pain?\" and \"What does it keep you from doing?\"  (e.g., Scale 1-10; mild, moderate, or severe)    - MILD (1-3): doesn't interfere with normal activities     - MODERATE (4-7): interferes with normal activities or awakens from sleep     - SEVERE (8-10): excruciating pain, unable to do any normal activities         7-8/10  5. RECURRENT SYMPTOM: \"Have you ever had headaches before?\" If Yes, ask: \"When was the last time?\" and \"What happened that time?\"       Yes on and off, but not anything like this.   6. CAUSE: \"What do you think is causing the headache?\"      Muscle strain or nerve.   7. MIGRAINE: \"Have you been diagnosed with migraine headaches?\" If Yes, ask: \"Is this headache " "similar?\"       Yes. Does not feel the same as a migraine headache  8. HEAD INJURY: \"Has there been any recent injury to the head?\"       No   9. OTHER SYMPTOMS: \"Do you have any other symptoms?\" (fever, stiff neck, eye pain, sore throat, cold symptoms)      Can feel it in right arm  10. PREGNANCY: \"Is there any chance you are pregnant?\" \"When was your last menstrual period?\"        No    Protocols used: HEADACHE-A-AH      "

## 2022-11-10 ENCOUNTER — OFFICE VISIT (OUTPATIENT)
Dept: FAMILY MEDICINE | Facility: OTHER | Age: 42
End: 2022-11-10
Attending: NURSE PRACTITIONER
Payer: COMMERCIAL

## 2022-11-10 VITALS
TEMPERATURE: 98.5 F | BODY MASS INDEX: 20.05 KG/M2 | RESPIRATION RATE: 18 BRPM | HEART RATE: 66 BPM | WEIGHT: 113.2 LBS | DIASTOLIC BLOOD PRESSURE: 64 MMHG | OXYGEN SATURATION: 98 % | SYSTOLIC BLOOD PRESSURE: 100 MMHG

## 2022-11-10 DIAGNOSIS — M25.512 CHRONIC LEFT SHOULDER PAIN: ICD-10-CM

## 2022-11-10 DIAGNOSIS — M77.8 LEFT SHOULDER TENDINITIS: ICD-10-CM

## 2022-11-10 DIAGNOSIS — J01.00 ACUTE MAXILLARY SINUSITIS, RECURRENCE NOT SPECIFIED: Primary | ICD-10-CM

## 2022-11-10 DIAGNOSIS — G89.29 CHRONIC LEFT SHOULDER PAIN: ICD-10-CM

## 2022-11-10 PROCEDURE — 99214 OFFICE O/P EST MOD 30 MIN: CPT | Performed by: NURSE PRACTITIONER

## 2022-11-10 RX ORDER — FLUCONAZOLE 150 MG/1
150 TABLET ORAL ONCE
Qty: 1 TABLET | Refills: 0 | Status: SHIPPED | OUTPATIENT
Start: 2022-11-10 | End: 2022-11-10

## 2022-11-10 RX ORDER — AZITHROMYCIN 250 MG/1
TABLET, FILM COATED ORAL
Qty: 6 TABLET | Refills: 0 | Status: SHIPPED | OUTPATIENT
Start: 2022-11-10 | End: 2023-02-12

## 2022-11-10 RX ORDER — FLUTICASONE PROPIONATE 50 MCG
1 SPRAY, SUSPENSION (ML) NASAL DAILY
Qty: 16 G | Refills: 0 | Status: SHIPPED | OUTPATIENT
Start: 2022-11-10 | End: 2023-02-12

## 2022-11-10 RX ORDER — CETIRIZINE HYDROCHLORIDE 10 MG/1
10 TABLET ORAL DAILY
Qty: 90 TABLET | Refills: 0 | Status: SHIPPED | OUTPATIENT
Start: 2022-11-10 | End: 2023-02-12

## 2022-11-10 ASSESSMENT — PAIN SCALES - GENERAL: PAINLEVEL: SEVERE PAIN (6)

## 2022-11-10 NOTE — PROGRESS NOTES
Assessment & Plan     (J01.00) Acute maxillary sinusitis, recurrence not specified  (primary encounter diagnosis)  Comment: treat for a sinus infection. Supportive care discussed   Plan: azithromycin (ZITHROMAX Z-SHAHLA) 250 MG tablet,         cetirizine (ZYRTEC) 10 MG tablet, fluticasone         (FLONASE) 50 MCG/ACT nasal spray, fluconazole         (DIFLUCAN) 150 MG tablet            (M77.8) Left shoulder tendinitis  Comment: Ongoing. Ref to ortho   Plan: Orthopedic  Referral            (M25.512,  G89.29) Chronic left shoulder pain  Comment: Ref to ortho. She has had an XRAY and MRI. Imaging should be sent with referral. She uses Norco sparingly for her pain. She is aware of risk with use of opioids   Plan: Orthopedic  Referral            See Patient Instructions    Return if symptoms worsen or fail to improve.    ALONDRA Gray [facility]    Mariah Felton is a 41 year old, presenting for the following health issues:  Sinus Problem      HPI     Acute Illness  Acute illness concerns: Sinus infection   Onset/Duration: 3 weeks   Symptoms:  Fever: YES. 100.3   Chills/Sweats: YES  Headache (location?): YES  Sinus Pressure: YES  Conjunctivitis:  No  Ear Pain: no  Rhinorrhea: No  Congestion: YES  Sore Throat: No  Cough: YES-productive of green sputum  Wheeze: YES  Decreased Appetite: No  Nausea: No  Vomiting: YES  Diarrhea: No  Dysuria/Freq.: No  Dysuria or Hematuria: No  Fatigue/Achiness: No  Sick/Strep Exposure: No  Therapies tried and outcome: OTC       Review of Systems   Constitutional, HEENT, cardiovascular, pulmonary, gi and gu systems are negative, except as otherwise noted.      Objective    /64   Pulse 66   Temp 98.5  F (36.9  C)   Resp 18   Wt 51.3 kg (113 lb 3.2 oz)   LMP 08/10/2013   SpO2 98%   BMI 20.05 kg/m    Body mass index is 20.05 kg/m .  Physical Exam   GENERAL: healthy, alert and no distress  HENT: +Bilateral middle ears with  clear fluid level behind TM's with TM's intact. nose and mouth without ulcers or lesions. +TTP and fullness to bilateral maxillary sinuses   NECK: no adenopathy, no asymmetry, masses, or scars and thyroid normal to palpation  RESP: lungs clear to auscultation - no rales, rhonchi or wheezes  CV: regular rate and rhythm, normal S1 S2, no S3 or S4, no murmur, click or rub, no peripheral edema and peripheral pulses strong  ABDOMEN: soft, nontender, no hepatosplenomegaly, no masses and bowel sounds normal  MS: no gross musculoskeletal defects noted, no edema. +Generalized left shoulder tenderness. Distal pulses intact   SKIN: no suspicious lesions or rashes  NEURO: Normal strength and tone, mentation intact and speech normal  PSYCH: mentation appears normal, affect normal/bright

## 2022-11-10 NOTE — NURSING NOTE
"Chief Complaint   Patient presents with     Sinus Problem       Initial /64   Pulse 66   Temp 98.5  F (36.9  C)   Resp 18   Wt 51.3 kg (113 lb 3.2 oz)   LMP 08/10/2013   SpO2 98%   BMI 20.05 kg/m   Estimated body mass index is 20.05 kg/m  as calculated from the following:    Height as of 9/27/22: 1.6 m (5' 3\").    Weight as of this encounter: 51.3 kg (113 lb 3.2 oz).  Medication Reconciliation: adan Lagunas  "

## 2022-11-29 ENCOUNTER — TRANSFERRED RECORDS (OUTPATIENT)
Dept: HEALTH INFORMATION MANAGEMENT | Facility: CLINIC | Age: 42
End: 2022-11-29

## 2022-12-08 ENCOUNTER — MYC REFILL (OUTPATIENT)
Dept: FAMILY MEDICINE | Facility: OTHER | Age: 42
End: 2022-12-08

## 2022-12-08 DIAGNOSIS — M77.8 LEFT SHOULDER TENDINITIS: ICD-10-CM

## 2022-12-08 NOTE — TELEPHONE ENCOUNTER
HYDROcodone-acetaminophen      Last Written Prescription Date:  11/29/22  Last Fill Quantity: 7,   # refills: 0  Last Office Visit: 11/10/22  Future Office visit:       Routing refill request to provider for review/approval because:

## 2022-12-09 RX ORDER — HYDROCODONE BITARTRATE AND ACETAMINOPHEN 5; 325 MG/1; MG/1
1 TABLET ORAL
Qty: 7 TABLET | Refills: 0 | Status: SHIPPED | OUTPATIENT
Start: 2022-12-09 | End: 2022-12-20

## 2022-12-31 ENCOUNTER — MYC REFILL (OUTPATIENT)
Dept: FAMILY MEDICINE | Facility: OTHER | Age: 42
End: 2022-12-31

## 2022-12-31 ENCOUNTER — MYC MEDICAL ADVICE (OUTPATIENT)
Dept: FAMILY MEDICINE | Facility: OTHER | Age: 42
End: 2022-12-31

## 2022-12-31 DIAGNOSIS — M77.8 LEFT SHOULDER TENDINITIS: ICD-10-CM

## 2022-12-31 DIAGNOSIS — E89.40 SURGICAL MENOPAUSE: ICD-10-CM

## 2023-01-03 NOTE — TELEPHONE ENCOUNTER
HYDROcodone-acetaminophen (NORCO) 5-325 MG tablet      Last Written Prescription Date:  12-  Last Fill Quantity: 7,   # refills: 0  Last Office Visit: 11-  Future Office visit:       Routing refill request to provider for review/approval because:  Drug not on the [facility], [facility] or [facility] refill protocol or controlled substance

## 2023-01-04 ENCOUNTER — TELEPHONE (OUTPATIENT)
Dept: FAMILY MEDICINE | Facility: OTHER | Age: 43
End: 2023-01-04

## 2023-01-04 RX ORDER — HYDROCODONE BITARTRATE AND ACETAMINOPHEN 5; 325 MG/1; MG/1
1 TABLET ORAL
Qty: 7 TABLET | Refills: 0 | Status: SHIPPED | OUTPATIENT
Start: 2023-01-04 | End: 2023-01-16

## 2023-01-04 RX ORDER — ESTRADIOL 1 MG/1
1 TABLET ORAL DAILY
Qty: 90 TABLET | Refills: 3 | Status: SHIPPED | OUTPATIENT
Start: 2023-01-04 | End: 2023-09-15

## 2023-01-04 NOTE — TELEPHONE ENCOUNTER
Received a PA through CMM from [facility] for Humira Pen (CF) 40MG/0.4ML pen-injector kit. Submitted on CMM. Received an instant APPROVAL. Effective 12/05/2022 to 04/04/2023. Forms scanned to Epic.

## 2023-01-05 ENCOUNTER — TRANSFERRED RECORDS (OUTPATIENT)
Dept: HEALTH INFORMATION MANAGEMENT | Facility: CLINIC | Age: 43
End: 2023-01-05

## 2023-01-07 ENCOUNTER — HEALTH MAINTENANCE LETTER (OUTPATIENT)
Age: 43
End: 2023-01-07

## 2023-01-26 ENCOUNTER — TRANSFERRED RECORDS (OUTPATIENT)
Dept: HEALTH INFORMATION MANAGEMENT | Facility: CLINIC | Age: 43
End: 2023-01-26

## 2023-02-09 ENCOUNTER — OFFICE VISIT (OUTPATIENT)
Dept: FAMILY MEDICINE | Facility: OTHER | Age: 43
End: 2023-02-09
Attending: NURSE PRACTITIONER
Payer: COMMERCIAL

## 2023-02-09 ENCOUNTER — ANCILLARY PROCEDURE (OUTPATIENT)
Dept: GENERAL RADIOLOGY | Facility: OTHER | Age: 43
End: 2023-02-09
Attending: NURSE PRACTITIONER
Payer: COMMERCIAL

## 2023-02-09 VITALS
BODY MASS INDEX: 19.88 KG/M2 | SYSTOLIC BLOOD PRESSURE: 104 MMHG | DIASTOLIC BLOOD PRESSURE: 68 MMHG | TEMPERATURE: 98 F | WEIGHT: 112.2 LBS | OXYGEN SATURATION: 100 % | HEART RATE: 64 BPM | RESPIRATION RATE: 18 BRPM

## 2023-02-09 DIAGNOSIS — M54.2 NECK PAIN: ICD-10-CM

## 2023-02-09 DIAGNOSIS — M89.8X1 PAIN OF LEFT CLAVICLE: ICD-10-CM

## 2023-02-09 DIAGNOSIS — Z48.02 ENCOUNTER FOR REMOVAL OF SUTURES: ICD-10-CM

## 2023-02-09 DIAGNOSIS — M54.2 NECK PAIN: Primary | ICD-10-CM

## 2023-02-09 DIAGNOSIS — R11.0 NAUSEA: ICD-10-CM

## 2023-02-09 PROCEDURE — 73000 X-RAY EXAM OF COLLAR BONE: CPT | Mod: TC | Performed by: RADIOLOGY

## 2023-02-09 PROCEDURE — 99213 OFFICE O/P EST LOW 20 MIN: CPT | Performed by: NURSE PRACTITIONER

## 2023-02-09 PROCEDURE — 72050 X-RAY EXAM NECK SPINE 4/5VWS: CPT | Mod: TC | Performed by: RADIOLOGY

## 2023-02-09 RX ORDER — ONDANSETRON 4 MG/1
4 TABLET, ORALLY DISINTEGRATING ORAL EVERY 8 HOURS PRN
Qty: 20 TABLET | Refills: 0 | Status: SHIPPED | OUTPATIENT
Start: 2023-02-09 | End: 2023-04-19

## 2023-02-09 ASSESSMENT — PAIN SCALES - GENERAL: PAINLEVEL: MODERATE PAIN (5)
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
## 2023-02-09 NOTE — PROGRESS NOTES
"Purnima Fallon presents to the clinic for removal of {SUTURE:838632::\"sutures,staples, steri strips\"}. The patient has had {SUTURE:652058::\"sutures\"} in place for {NUMBERS 1-16:10} days. There has been no patient reported signs or symptoms of infection or drainage. {NUMBERS 1-16:10}  {SUTURE:014046::\"sutures,staples, staple, steri strips\"} are seen and located on the ***. Tetanus status is up to date. All {SUTURE:231545::\"sutures,staples, steri strips\"} were easily removed today. Routine wound care discussed by the RN or provider. The patient will follow up as needed.    "

## 2023-02-10 DIAGNOSIS — M54.12 CERVICAL RADICULOPATHY: Primary | ICD-10-CM

## 2023-02-16 ENCOUNTER — MYC REFILL (OUTPATIENT)
Dept: FAMILY MEDICINE | Facility: OTHER | Age: 43
End: 2023-02-16

## 2023-02-16 DIAGNOSIS — M77.8 LEFT SHOULDER TENDINITIS: ICD-10-CM

## 2023-02-17 NOTE — TELEPHONE ENCOUNTER
norco      Last Written Prescription Date:  2/9/23  Last Fill Quantity: 7,   # refills: 0  Last Office Visit: 2/9/23  Future Office visit:

## 2023-02-20 RX ORDER — HYDROCODONE BITARTRATE AND ACETAMINOPHEN 5; 325 MG/1; MG/1
1 TABLET ORAL
Qty: 7 TABLET | Refills: 0 | Status: SHIPPED | OUTPATIENT
Start: 2023-02-20 | End: 2024-04-21

## 2023-02-21 ENCOUNTER — TRANSFERRED RECORDS (OUTPATIENT)
Dept: HEALTH INFORMATION MANAGEMENT | Facility: CLINIC | Age: 43
End: 2023-02-21

## 2023-02-22 ENCOUNTER — MYC REFILL (OUTPATIENT)
Dept: FAMILY MEDICINE | Facility: OTHER | Age: 43
End: 2023-02-22

## 2023-02-22 DIAGNOSIS — F90.8 ATTENTION DEFICIT HYPERACTIVITY DISORDER (ADHD), OTHER TYPE: ICD-10-CM

## 2023-02-24 NOTE — TELEPHONE ENCOUNTER
Adderall 30mg   Last Written Prescription Date:  1.27.2023  Last Fill Quantity: 30,   # refills: 0  Last Office Visit: 2.9.2023  Future Office visit:       Routing refill request to provider for review/approval because:  Drug not on the [facility], [facility] or [facility] refill protocol or controlled substance        Adderall 20mg      Last Written Prescription Date:  1.27.2023  Last Fill Quantity: 30,   # refills: 0  Last Office Visit:   Future Office visit:       Routing refill request to provider for review/approval because:  Drug not on the [facility], [facility] or [facility] refill protocol or controlled substance    Martha Payan RN

## 2023-02-26 RX ORDER — DEXTROAMPHETAMINE SACCHARATE, AMPHETAMINE ASPARTATE MONOHYDRATE, DEXTROAMPHETAMINE SULFATE AND AMPHETAMINE SULFATE 5; 5; 5; 5 MG/1; MG/1; MG/1; MG/1
20 CAPSULE, EXTENDED RELEASE ORAL DAILY
Qty: 30 CAPSULE | Refills: 0 | Status: SHIPPED | OUTPATIENT
Start: 2023-02-26 | End: 2023-03-20

## 2023-02-26 RX ORDER — DEXTROAMPHETAMINE SACCHARATE, AMPHETAMINE ASPARTATE, DEXTROAMPHETAMINE SULFATE AND AMPHETAMINE SULFATE 7.5; 7.5; 7.5; 7.5 MG/1; MG/1; MG/1; MG/1
30 TABLET ORAL DAILY
Qty: 30 TABLET | Refills: 0 | Status: SHIPPED | OUTPATIENT
Start: 2023-02-26 | End: 2023-03-20

## 2023-02-27 ENCOUNTER — TELEPHONE (OUTPATIENT)
Dept: FAMILY MEDICINE | Facility: OTHER | Age: 43
End: 2023-02-27

## 2023-02-27 NOTE — TELEPHONE ENCOUNTER
Central Prior Authorization Team   Phone: [phone]      PRIOR AUTHORIZATION DENIED    Medication: buPROPion HCl ER, XL, 450 MG TB24 - EPA DENIED     Denial Date: 2/27/2023    Denial Rational: Coverage is provided when according to the prescriber, there is a significant clinical concern such that the patient is unable to use the bupropion hydrochloride  mg and/or 300 mg tablets (Wellbutrin  XL, generics). Coverage cannot be authorized at this time.          Appeal Information:

## 2023-03-03 ENCOUNTER — TELEPHONE (OUTPATIENT)
Dept: FAMILY MEDICINE | Facility: OTHER | Age: 43
End: 2023-03-03

## 2023-03-03 NOTE — TELEPHONE ENCOUNTER
4:13 PM    Reason for Call: Phone Call    Description: pt has been calling for test results    Was an appointment offered for this call? No  If yes : Appointment type              Date    Preferred method for responding to this message: Telephone Call  What is your phone number ?[phone]    If we cannot reach you directly, may we leave a detailed response at the number you provided? Yes    Can this message wait until your PCP/provider returns, if available today? Not applicable    Brunilda Puentes

## 2023-03-06 NOTE — TELEPHONE ENCOUNTER
Pt was notified of MRI results done in [place].  She declines Payton's recommendation for IR referral.

## 2023-03-21 ENCOUNTER — MYC MEDICAL ADVICE (OUTPATIENT)
Dept: FAMILY MEDICINE | Facility: OTHER | Age: 43
End: 2023-03-21

## 2023-03-21 ASSESSMENT — ANXIETY QUESTIONNAIRES
6. BECOMING EASILY ANNOYED OR IRRITABLE: MORE THAN HALF THE DAYS
GAD7 TOTAL SCORE: 10
1. FEELING NERVOUS, ANXIOUS, OR ON EDGE: MORE THAN HALF THE DAYS
4. TROUBLE RELAXING: MORE THAN HALF THE DAYS
3. WORRYING TOO MUCH ABOUT DIFFERENT THINGS: MORE THAN HALF THE DAYS
GAD7 TOTAL SCORE: 10
8. IF YOU CHECKED OFF ANY PROBLEMS, HOW DIFFICULT HAVE THESE MADE IT FOR YOU TO DO YOUR WORK, TAKE CARE OF THINGS AT HOME, OR GET ALONG WITH OTHER PEOPLE?: SOMEWHAT DIFFICULT
7. FEELING AFRAID AS IF SOMETHING AWFUL MIGHT HAPPEN: NOT AT ALL
IF YOU CHECKED OFF ANY PROBLEMS ON THIS QUESTIONNAIRE, HOW DIFFICULT HAVE THESE PROBLEMS MADE IT FOR YOU TO DO YOUR WORK, TAKE CARE OF THINGS AT HOME, OR GET ALONG WITH OTHER PEOPLE: SOMEWHAT DIFFICULT
5. BEING SO RESTLESS THAT IT IS HARD TO SIT STILL: NOT AT ALL
GAD7 TOTAL SCORE: 10
7. FEELING AFRAID AS IF SOMETHING AWFUL MIGHT HAPPEN: NOT AT ALL
2. NOT BEING ABLE TO STOP OR CONTROL WORRYING: MORE THAN HALF THE DAYS

## 2023-03-21 ASSESSMENT — PATIENT HEALTH QUESTIONNAIRE - PHQ9
10. IF YOU CHECKED OFF ANY PROBLEMS, HOW DIFFICULT HAVE THESE PROBLEMS MADE IT FOR YOU TO DO YOUR WORK, TAKE CARE OF THINGS AT HOME, OR GET ALONG WITH OTHER PEOPLE: SOMEWHAT DIFFICULT
SUM OF ALL RESPONSES TO PHQ QUESTIONS 1-9: 6
SUM OF ALL RESPONSES TO PHQ QUESTIONS 1-9: 6

## 2023-03-21 NOTE — TELEPHONE ENCOUNTER
Patient completed PHQ-9 & WENDY-7 MyChart questionnaires for Chronic Pain Management with Opioid Therapy quality patient outreach per [facility] quality measure guidelines. This writer does not see any major mental health and/or safety concerns at present.    PHQ 6/14/2022 9/27/2022 3/21/2023   PHQ-9 Total Score 2 1 6   Q9: Thoughts of better off dead/self-harm past 2 weeks Not at all Not at all Not at all     WENDY-7 SCORE 6/14/2022 9/27/2022 3/21/2023   Total Score - - 10 (moderate anxiety)   Total Score 0 0 10     Fernanda Kirk, RN

## 2023-03-22 ENCOUNTER — TELEPHONE (OUTPATIENT)
Dept: FAMILY MEDICINE | Facility: OTHER | Age: 43
End: 2023-03-22

## 2023-03-22 NOTE — TELEPHONE ENCOUNTER
Received a PA from [facility] through [facility] for Humira Pen (CF) 40MG/0.4ML pen-injector kit. Submitted on CM. Received an instant APPROVAL. Effective 02/20/2023 to 03/21/2024. Forms scanned to [facility].

## 2023-03-23 ENCOUNTER — MYC REFILL (OUTPATIENT)
Dept: FAMILY MEDICINE | Facility: OTHER | Age: 43
End: 2023-03-23

## 2023-03-23 DIAGNOSIS — F41.1 GAD (GENERALIZED ANXIETY DISORDER): ICD-10-CM

## 2023-03-23 DIAGNOSIS — B00.9 HERPES SIMPLEX VIRUS INFECTION: ICD-10-CM

## 2023-03-23 DIAGNOSIS — F90.8 ATTENTION DEFICIT HYPERACTIVITY DISORDER (ADHD), OTHER TYPE: ICD-10-CM

## 2023-03-23 DIAGNOSIS — M77.8 LEFT SHOULDER TENDINITIS: ICD-10-CM

## 2023-03-24 RX ORDER — HYDROCODONE BITARTRATE AND ACETAMINOPHEN 5; 325 MG/1; MG/1
1 TABLET ORAL
Qty: 7 TABLET | Refills: 0 | OUTPATIENT
Start: 2023-03-24

## 2023-03-24 RX ORDER — DEXTROAMPHETAMINE SACCHARATE, AMPHETAMINE ASPARTATE, DEXTROAMPHETAMINE SULFATE AND AMPHETAMINE SULFATE 7.5; 7.5; 7.5; 7.5 MG/1; MG/1; MG/1; MG/1
30 TABLET ORAL DAILY
Qty: 30 TABLET | Refills: 0 | OUTPATIENT
Start: 2023-03-24

## 2023-03-24 RX ORDER — BUPROPION HYDROCHLORIDE 450 MG/1
450 TABLET, FILM COATED, EXTENDED RELEASE ORAL EVERY MORNING
Qty: 90 TABLET | Refills: 0 | OUTPATIENT
Start: 2023-03-24

## 2023-03-24 RX ORDER — DEXTROAMPHETAMINE SACCHARATE, AMPHETAMINE ASPARTATE MONOHYDRATE, DEXTROAMPHETAMINE SULFATE AND AMPHETAMINE SULFATE 5; 5; 5; 5 MG/1; MG/1; MG/1; MG/1
20 CAPSULE, EXTENDED RELEASE ORAL DAILY
Qty: 30 CAPSULE | Refills: 0 | OUTPATIENT
Start: 2023-03-24

## 2023-03-24 RX ORDER — VALACYCLOVIR HYDROCHLORIDE 500 MG/1
500 TABLET, FILM COATED ORAL DAILY
Qty: 90 TABLET | Refills: 0 | OUTPATIENT
Start: 2023-03-24

## 2023-03-24 NOTE — TELEPHONE ENCOUNTER
valACYclovir (VALTREX) 500 MG tablet  Last Written Prescription Date:  2-26-23  Last Fill Quantity: 90,   # refills: 0  Last Office Visit: 2-9-23  Future Office visit:       Routing refill request to provider for review/approval because:  Drug not on the [facility] or [facility] refill protocol or controlled substance      HYDROcodone-acetaminophen (NORCO) 5-325 MG tablet  Last Written Prescription Date:  3-24-23  Last Fill Quantity: 7,   # refills: 0  Last Office Visit: 2-9-23  Future Office visit:       Routing refill request to provider for review/approval because:  Just filled today      buPROPion HCl ER, XL, 450 MG TB24    Last Written Prescription Date:  3-24-23  Last Fill Quantity: 90,   # refills: 0  Last Office Visit: 2-9-23  Future Office visit:       Routing refill request to provider for review/approval because:  Just filled for 3 months    BOTH ADDERALL DOSES      Last Written Prescription Date:  3-24-23  Last Fill Quantity: ONE OMNTH,   # refills: 0  Last Office Visit: 2-9-23  Future Office visit:       Routing refill request to provider for review/approval because:  BOTH JUST FILLED TODAY

## 2023-04-17 ENCOUNTER — MYC REFILL (OUTPATIENT)
Dept: FAMILY MEDICINE | Facility: OTHER | Age: 43
End: 2023-04-17

## 2023-04-17 DIAGNOSIS — M77.8 LEFT SHOULDER TENDINITIS: ICD-10-CM

## 2023-04-18 NOTE — TELEPHONE ENCOUNTER
HYDROcodone-acetaminophen (NORCO) 5-325 MG tablet 7 tablet 0 4/7/2023     Last Office Visit: 2.9.23    Future Office visit:    Next 5 appointments (look out 90 days)    Apr 19, 2023  3:15 PM  (Arrive by 3:00 PM)  [facility] [address]  [facility] [address]  [phone]           Routing refill request to provider for review/approval because:

## 2023-04-19 ENCOUNTER — OFFICE VISIT (OUTPATIENT)
Dept: FAMILY MEDICINE | Facility: OTHER | Age: 43
End: 2023-04-19
Attending: NURSE PRACTITIONER
Payer: COMMERCIAL

## 2023-04-19 VITALS
TEMPERATURE: 97.9 F | OXYGEN SATURATION: 98 % | RESPIRATION RATE: 18 BRPM | BODY MASS INDEX: 19.93 KG/M2 | WEIGHT: 112.5 LBS | SYSTOLIC BLOOD PRESSURE: 129 MMHG | DIASTOLIC BLOOD PRESSURE: 77 MMHG | HEART RATE: 80 BPM

## 2023-04-19 DIAGNOSIS — M54.2 NECK PAIN: ICD-10-CM

## 2023-04-19 DIAGNOSIS — F90.8 ATTENTION DEFICIT HYPERACTIVITY DISORDER (ADHD), OTHER TYPE: ICD-10-CM

## 2023-04-19 DIAGNOSIS — L73.2 HIDRADENITIS SUPPURATIVA: Primary | ICD-10-CM

## 2023-04-19 DIAGNOSIS — L98.9 FACIAL LESION: ICD-10-CM

## 2023-04-19 PROCEDURE — 99214 OFFICE O/P EST MOD 30 MIN: CPT | Performed by: NURSE PRACTITIONER

## 2023-04-19 RX ORDER — HYDROCODONE BITARTRATE AND ACETAMINOPHEN 5; 325 MG/1; MG/1
1 TABLET ORAL
Qty: 7 TABLET | Refills: 0 | Status: SHIPPED | OUTPATIENT
Start: 2023-04-19 | End: 2023-04-19

## 2023-04-19 RX ORDER — HYDROCODONE BITARTRATE AND ACETAMINOPHEN 5; 325 MG/1; MG/1
1 TABLET ORAL 2 TIMES DAILY PRN
Qty: 20 TABLET | Refills: 0 | Status: SHIPPED | OUTPATIENT
Start: 2023-04-19 | End: 2023-05-08

## 2023-04-19 ASSESSMENT — PAIN SCALES - GENERAL: PAINLEVEL: SEVERE PAIN (6)

## 2023-04-19 NOTE — PROGRESS NOTES
Assessment & Plan     (L73.2) Hidradenitis suppurativa  (primary encounter diagnosis)  Comment: area to right gluteal sulcus. Ref to Dr. Lobo at [facility]. She is aware to not mix benzos with opioids   Plan: HYDROcodone-acetaminophen (NORCO) 5-325 MG         tablet, Adult General Surg Referral            (L98.9) Facial lesion  Comment: To left mandibular region. Ref to Dr. Lobo at [facility]   Plan: Adult General Surg Referral            (F90.8) Attention deficit hyperactivity disorder (ADHD), other type  Comment: Stable. Tolerating well   Plan: Continue medication regime       (M54.2) Neck pain  Comment: Ongoing neck pain. Denies change in characteristic. Denies recent injury or trauma  Plan: IR Consultation for IR Exam (Pain Consult)             See Patient Instructions    Return in about 3 months (around 7/19/2023) for Med check .    ALONDRA Gray [facility]   Purnima is a 42 year old, presenting for the following health issues:  Recheck Medication and Skin concern    HPI     Medication Followup of ADDERALL 20mg ADDERALL 30mg    Taking Medication as prescribed: yes    Side Effects:  None    Medication Helping Symptoms:  yes    Concern - Derm concern   Onset: 2 months     Description:   Left cheek/jawline and right lower gluteus       Depression and Anxiety Follow-Up    How are you doing with your depression since your last visit? Improved     How are you doing with your anxiety since your last visit?  No change    Are you having other symptoms that might be associated with depression or anxiety? No    Have you had a significant life event? No     Do you have any concerns with your use of alcohol or other drugs? No    Social History     Tobacco Use     Smoking status: Every Day     Packs/day: 0.50     Years: 15.00     Pack years: 7.50     Types: Cigarettes     Start date: 1/1/2001     Smokeless tobacco: Never     Tobacco comments:      declined 2/27/2020   Substance Use Topics     Alcohol use: No     Drug use: No         9/27/2022     3:00 PM 3/21/2023    11:20 AM 4/20/2023     9:00 AM   PHQ   PHQ-9 Total Score 1 6 2   Q9: Thoughts of better off dead/self-harm past 2 weeks Not at all Not at all Not at all         6/14/2022     2:00 PM 9/27/2022     3:00 PM 3/21/2023    11:21 AM   WENDY-7 SCORE   Total Score   10 (moderate anxiety)   Total Score 0 0 10         4/20/2023     9:00 AM   Last PHQ-9   1.  Little interest or pleasure in doing things 0   2.  Feeling down, depressed, or hopeless 0   3.  Trouble falling or staying asleep, or sleeping too much 1   4.  Feeling tired or having little energy 1   5.  Poor appetite or overeating 0   6.  Feeling bad about yourself 0   7.  Trouble concentrating 0   8.  Moving slowly or restless 0   Q9: Thoughts of better off dead/self-harm past 2 weeks 0   PHQ-9 Total Score 2         4/20/2023     9:00 AM   WENDY-7    2. Not being able to stop or control worrying 1   3. Worrying too much about different things 0   4. Trouble relaxing 0   5. Being so restless that it is hard to sit still 0   6. Becoming easily annoyed or irritable 0   7. Feeling afraid, as if something awful might happen 1   If you checked any problems, how difficult have they made it for you to do your work, take care of things at home, or get along with other people? Somewhat difficult       Suicide Assessment Five-step Evaluation and Treatment (SAFE-T)      How many servings of fruits and vegetables do you eat daily?  2-3    On average, how many sweetened beverages do you drink each day (Examples: soda, juice, sweet tea, etc.  Do NOT count diet or artificially sweetened beverages)?   0    How many days per week do you exercise enough to make your heart beat faster? 5    How many minutes a day do you exercise enough to make your heart beat faster? 30 - 60    How many days per week do you miss taking your medication? 0    Ongoing neck pain. She would  like an IR consult for injections. Same pain she's been having. Denies recent injury or trauma. Denies change in characteristic     Review of Systems   Constitutional, HEENT, cardiovascular, pulmonary, GI, , musculoskeletal, neuro, skin, endocrine and psych systems are negative, except as otherwise noted.      Objective    /77   Pulse 80   Temp 97.9  F (36.6  C)   Resp 18   Wt 51 kg (112 lb 8 oz)   LMP 08/10/2013   SpO2 98%   BMI 19.93 kg/m    Body mass index is 19.93 kg/m .  Physical Exam   GENERAL: healthy, alert and no distress  NECK: no adenopathy, no asymmetry, masses, or scars and thyroid normal to palpation  RESP: lungs clear to auscultation - no rales, rhonchi or wheezes  CV: regular rate and rhythm, normal S1 S2, no S3 or S4, no murmur, click or rub, no peripheral edema and peripheral pulses strong  MS: no gross musculoskeletal defects noted, no edema. No step offs or TTP to spinal column. TTP to paraspinal muscles at C3-C5 bilaterally. Distal pulses intact. No rash, erythema, bruising, or warmth to the touch to skin.   SKIN: no suspicious rashes. +facial lesion to left mandibular region with firmness on palpation. No drainage erythema, or warmth to the touch. +Right gluteal sulcus with erythema and TTP. No drainage, warmth to the touch, or rash  PSYCH: mentation appears normal, affect normal/bright

## 2023-04-20 ASSESSMENT — ANXIETY QUESTIONNAIRES
5. BEING SO RESTLESS THAT IT IS HARD TO SIT STILL: NOT AT ALL
2. NOT BEING ABLE TO STOP OR CONTROL WORRYING: SEVERAL DAYS
4. TROUBLE RELAXING: NOT AT ALL
7. FEELING AFRAID AS IF SOMETHING AWFUL MIGHT HAPPEN: SEVERAL DAYS
IF YOU CHECKED OFF ANY PROBLEMS ON THIS QUESTIONNAIRE, HOW DIFFICULT HAVE THESE PROBLEMS MADE IT FOR YOU TO DO YOUR WORK, TAKE CARE OF THINGS AT HOME, OR GET ALONG WITH OTHER PEOPLE: SOMEWHAT DIFFICULT
3. WORRYING TOO MUCH ABOUT DIFFERENT THINGS: NOT AT ALL
6. BECOMING EASILY ANNOYED OR IRRITABLE: NOT AT ALL

## 2023-04-20 ASSESSMENT — PATIENT HEALTH QUESTIONNAIRE - PHQ9: SUM OF ALL RESPONSES TO PHQ QUESTIONS 1-9: 2

## 2023-05-09 ENCOUNTER — TELEPHONE (OUTPATIENT)
Dept: INTERVENTIONAL RADIOLOGY/VASCULAR | Facility: HOSPITAL | Age: 43
End: 2023-05-09

## 2023-05-09 ENCOUNTER — HOSPITAL ENCOUNTER (OUTPATIENT)
Dept: INTERVENTIONAL RADIOLOGY/VASCULAR | Facility: HOSPITAL | Age: 43
Discharge: HOME | End: 2023-05-09
Attending: NURSE PRACTITIONER | Admitting: RADIOLOGY
Payer: COMMERCIAL

## 2023-05-09 ENCOUNTER — ANCILLARY ORDERS (OUTPATIENT)
Dept: FAMILY MEDICINE | Facility: OTHER | Age: 43
End: 2023-05-09

## 2023-05-09 DIAGNOSIS — M54.2 NECK PAIN: ICD-10-CM

## 2023-05-09 DIAGNOSIS — M47.812 CERVICAL SPONDYLOSIS: ICD-10-CM

## 2023-05-09 PROCEDURE — G0463 HOSPITAL OUTPT CLINIC VISIT: HCPCS

## 2023-05-09 NOTE — TELEPHONE ENCOUNTER
Patient was in a meeting at the time I called her. She will call IR department back.       Marian Dorantes

## 2023-05-22 RX ORDER — ADALIMUMAB 40MG/0.4ML
KIT SUBCUTANEOUS
COMMUNITY
Start: 2023-05-01 | End: 2023-07-06

## 2023-05-23 DIAGNOSIS — F41.1 GAD (GENERALIZED ANXIETY DISORDER): Primary | ICD-10-CM

## 2023-05-23 DIAGNOSIS — F90.8 ATTENTION DEFICIT HYPERACTIVITY DISORDER (ADHD), OTHER TYPE: ICD-10-CM

## 2023-05-23 DIAGNOSIS — L73.2 HIDRADENITIS SUPPURATIVA: ICD-10-CM

## 2023-05-25 ENCOUNTER — TRANSFERRED RECORDS (OUTPATIENT)
Dept: HEALTH INFORMATION MANAGEMENT | Facility: CLINIC | Age: 43
End: 2023-05-25

## 2023-05-25 RX ORDER — BUPROPION HYDROCHLORIDE 300 MG/1
TABLET ORAL
Qty: 90 TABLET | Refills: 1 | Status: SHIPPED | OUTPATIENT
Start: 2023-05-25 | End: 2024-01-08

## 2023-05-25 RX ORDER — DEXTROAMPHETAMINE SACCHARATE, AMPHETAMINE ASPARTATE MONOHYDRATE, DEXTROAMPHETAMINE SULFATE AND AMPHETAMINE SULFATE 5; 5; 5; 5 MG/1; MG/1; MG/1; MG/1
CAPSULE, EXTENDED RELEASE ORAL
Qty: 30 CAPSULE | Refills: 0 | OUTPATIENT
Start: 2023-05-25

## 2023-05-25 RX ORDER — HYDROCODONE BITARTRATE AND ACETAMINOPHEN 5; 325 MG/1; MG/1
1 TABLET ORAL 2 TIMES DAILY PRN
Qty: 20 TABLET | Refills: 0 | OUTPATIENT
Start: 2023-05-25

## 2023-05-25 RX ORDER — DEXTROAMPHETAMINE SACCHARATE, AMPHETAMINE ASPARTATE, DEXTROAMPHETAMINE SULFATE AND AMPHETAMINE SULFATE 7.5; 7.5; 7.5; 7.5 MG/1; MG/1; MG/1; MG/1
TABLET ORAL
Qty: 30 TABLET | Refills: 0 | OUTPATIENT
Start: 2023-05-25

## 2023-06-02 ENCOUNTER — TELEPHONE (OUTPATIENT)
Dept: INTERVENTIONAL RADIOLOGY/VASCULAR | Facility: HOSPITAL | Age: 43
End: 2023-06-02

## 2023-06-05 ENCOUNTER — ANCILLARY ORDERS (OUTPATIENT)
Dept: FAMILY MEDICINE | Facility: OTHER | Age: 43
End: 2023-06-05

## 2023-06-05 ENCOUNTER — HOSPITAL ENCOUNTER (OUTPATIENT)
Dept: INTERVENTIONAL RADIOLOGY/VASCULAR | Facility: HOSPITAL | Age: 43
Discharge: HOME | End: 2023-06-05
Attending: NURSE PRACTITIONER
Payer: COMMERCIAL

## 2023-06-05 ENCOUNTER — HOSPITAL ENCOUNTER (OUTPATIENT)
Facility: HOSPITAL | Age: 43
Discharge: HOME | End: 2023-06-05
Attending: RADIOLOGY | Admitting: RADIOLOGY
Payer: COMMERCIAL

## 2023-06-05 DIAGNOSIS — M60.9 MYOSITIS: ICD-10-CM

## 2023-06-05 DIAGNOSIS — M47.812 CERVICAL SPONDYLOSIS: ICD-10-CM

## 2023-06-05 PROCEDURE — 76000 FLUOROSCOPY <1 HR PHYS/QHP: CPT

## 2023-06-05 ASSESSMENT — ACTIVITIES OF DAILY LIVING (ADL)
ADLS_ACUITY_SCORE: 35

## 2023-06-07 ENCOUNTER — TELEPHONE (OUTPATIENT)
Dept: FAMILY MEDICINE | Facility: OTHER | Age: 43
End: 2023-06-07

## 2023-06-07 ENCOUNTER — HOSPITAL ENCOUNTER (OUTPATIENT)
Facility: HOSPITAL | Age: 43
End: 2023-06-07
Attending: RADIOLOGY | Admitting: RADIOLOGY
Payer: COMMERCIAL

## 2023-06-08 ENCOUNTER — TELEPHONE (OUTPATIENT)
Dept: FAMILY MEDICINE | Facility: OTHER | Age: 43
End: 2023-06-08

## 2023-06-08 ENCOUNTER — MYC REFILL (OUTPATIENT)
Dept: FAMILY MEDICINE | Facility: OTHER | Age: 43
End: 2023-06-08

## 2023-06-08 DIAGNOSIS — L73.2 HIDRADENITIS SUPPURATIVA: ICD-10-CM

## 2023-06-08 RX ORDER — HYDROCODONE BITARTRATE AND ACETAMINOPHEN 5; 325 MG/1; MG/1
1 TABLET ORAL 2 TIMES DAILY PRN
Qty: 20 TABLET | Refills: 0 | Status: CANCELLED | OUTPATIENT
Start: 2023-06-08

## 2023-06-15 ENCOUNTER — TRANSFERRED RECORDS (OUTPATIENT)
Dept: HEALTH INFORMATION MANAGEMENT | Facility: CLINIC | Age: 43
End: 2023-06-15

## 2023-06-26 ENCOUNTER — OFFICE VISIT (OUTPATIENT)
Dept: FAMILY MEDICINE | Facility: OTHER | Age: 43
End: 2023-06-26
Attending: STUDENT IN AN ORGANIZED HEALTH CARE EDUCATION/TRAINING PROGRAM
Payer: COMMERCIAL

## 2023-06-26 VITALS
SYSTOLIC BLOOD PRESSURE: 126 MMHG | RESPIRATION RATE: 16 BRPM | HEART RATE: 69 BPM | BODY MASS INDEX: 20.24 KG/M2 | DIASTOLIC BLOOD PRESSURE: 76 MMHG | OXYGEN SATURATION: 99 % | WEIGHT: 114.25 LBS | TEMPERATURE: 98.2 F

## 2023-06-26 DIAGNOSIS — L73.2 HIDRADENITIS SUPPURATIVA: Primary | ICD-10-CM

## 2023-06-26 DIAGNOSIS — Z48.02 ENCOUNTER FOR REMOVAL OF SUTURES: ICD-10-CM

## 2023-06-26 PROCEDURE — 99213 OFFICE O/P EST LOW 20 MIN: CPT | Performed by: STUDENT IN AN ORGANIZED HEALTH CARE EDUCATION/TRAINING PROGRAM

## 2023-06-26 RX ORDER — BUPROPION HYDROCHLORIDE 150 MG/1
TABLET ORAL
COMMUNITY
Start: 2023-05-24 | End: 2023-09-15

## 2023-06-26 NOTE — PROGRESS NOTES
Assessment & Plan     Hidradenitis suppurativa  Encounter for removal of sutures  Excellent healing of left chin surgical wound.  Right gluteal crease mildly inflamed but no signs of infection.  Sutures removed in total without complication.  Resumed intermittent cares for at bedtime and reviewed return precautions.  - REMOVAL OF SUTURES    15 minutes spent by me on the date of the encounter doing chart review, history and exam, documentation and further activities per the note       Follow-up as needed.  Rd Montiel MD  [facility]   Purnima is a 42 year old, presenting for the following health issues:  Suture Removal      HPI     Suture removal: Dr. Lashell Bower sutures applied: 6/15/23         Where (setting) in which they applied:clinic visit    Description:  Type: sutures  Location: left chin; right buttock    History:    Cause of laceration: HS    Accompanying Signs & Symptoms: (staff: if yes-describe)  Redness: No  Warmth: No  Drainage: No  Still bleeding: YES-buttocks  Fevers: No    Last tetanus shot: last tetanus booster within 10 years (9/2/2021)    -Wound debridement for hidradenitis suppurativa 6/15/2023 at [facility]  -3 sutures left mid mandible skin  -8 sutures right infra gluteal crease    -Recommended to have suture removal in 10 days  -Did complete some course of postop antibiotics but does not recall details  -Face wound healing up very nicely  -Gluteal wound still a bit tender, has been scant drainage as well    -No fevers, chills, flulike symptoms  -No expanding redness or swelling    Review of Systems   Constitutional, HEENT, cardiovascular, pulmonary, gi and gu systems are negative, except as otherwise noted.      Objective    /76   Pulse 69   Temp 98.2  F (36.8  C) (Tympanic)   Resp 16   Wt 51.8 kg (114 lb 4 oz)   LMP 08/10/2013   SpO2 99%   BMI 20.24 kg/m    Body mass index is 20.24 kg/m .  Physical Exam  Vitals reviewed.    Constitutional:       General: She is not in acute distress.     Appearance: Normal appearance. She is not toxic-appearing.   HENT:      Head: Normocephalic and atraumatic.   Skin:     General: Skin is warm and dry.      Comments: Left lateral face: Excellently healing surgical wound over left mid mandible without erythema, edema, drainage.  3 simple interrupted sutures removed without complication.  Right gluteal crease: Mildly tender, mildly darkened flesh-colored skin around surgical lesion, no erythema, edema, bleeding, purulent drainage.  8 simple interrupted sutures removed without complication.   Neurological:      General: No focal deficit present.      Mental Status: She is alert and oriented to person, place, and time.   Psychiatric:         Mood and Affect: Mood normal.         Behavior: Behavior normal.

## 2023-07-06 DIAGNOSIS — L73.2 HIDRADENITIS SUPPURATIVA: Primary | ICD-10-CM

## 2023-07-06 RX ORDER — ADALIMUMAB 40MG/0.4ML
40 KIT SUBCUTANEOUS
Qty: 1.6 ML | Refills: 1 | Status: SHIPPED | OUTPATIENT
Start: 2023-07-06 | End: 2023-08-18

## 2023-07-06 NOTE — TELEPHONE ENCOUNTER
Attempt # 1  Outcome: Talked with Patient    Comment: Writer called patient to schedule Physical / fasting labs with Payton Levi, phone call was disconnected.  Will attempt to contact patient again to schedule

## 2023-07-06 NOTE — TELEPHONE ENCOUNTER
Humira      Last Written Prescription Date:  5/1/23  Last Fill Quantity: n/a,   # refills: n/a  Last Office Visit: 6/26/23  Future Office visit:       Routing refill request to provider for review/approval because:  Medication is reported/historical

## 2023-08-03 ENCOUNTER — MYC REFILL (OUTPATIENT)
Dept: FAMILY MEDICINE | Facility: OTHER | Age: 43
End: 2023-08-03

## 2023-08-03 DIAGNOSIS — L73.2 HIDRADENITIS SUPPURATIVA: ICD-10-CM

## 2023-08-07 RX ORDER — HYDROCODONE BITARTRATE AND ACETAMINOPHEN 5; 325 MG/1; MG/1
1 TABLET ORAL 2 TIMES DAILY PRN
Qty: 20 TABLET | Refills: 0 | Status: SHIPPED | OUTPATIENT
Start: 2023-08-07 | End: 2023-08-25

## 2023-08-07 NOTE — TELEPHONE ENCOUNTER
Norco      Last Written Prescription Date:  7/14/23  Last Fill Quantity: 20,   # refills: 0  Last Office Visit: 6/26/23  Future Office visit:    Next 5 appointments (look out 90 days)      Aug 22, 2023  3:15 PM  (Arrive by 3:00 PM)  [facility] [address]  [facility] [address]  [phone]             Routing refill request to provider for review/approval because:

## 2023-08-17 ASSESSMENT — ANXIETY QUESTIONNAIRES
3. WORRYING TOO MUCH ABOUT DIFFERENT THINGS: MORE THAN HALF THE DAYS
1. FEELING NERVOUS, ANXIOUS, OR ON EDGE: MORE THAN HALF THE DAYS
7. FEELING AFRAID AS IF SOMETHING AWFUL MIGHT HAPPEN: NOT AT ALL
GAD7 TOTAL SCORE: 8
5. BEING SO RESTLESS THAT IT IS HARD TO SIT STILL: NOT AT ALL
6. BECOMING EASILY ANNOYED OR IRRITABLE: NOT AT ALL
4. TROUBLE RELAXING: MORE THAN HALF THE DAYS
2. NOT BEING ABLE TO STOP OR CONTROL WORRYING: MORE THAN HALF THE DAYS
IF YOU CHECKED OFF ANY PROBLEMS ON THIS QUESTIONNAIRE, HOW DIFFICULT HAVE THESE PROBLEMS MADE IT FOR YOU TO DO YOUR WORK, TAKE CARE OF THINGS AT HOME, OR GET ALONG WITH OTHER PEOPLE: SOMEWHAT DIFFICULT

## 2023-08-17 ASSESSMENT — PATIENT HEALTH QUESTIONNAIRE - PHQ9: SUM OF ALL RESPONSES TO PHQ QUESTIONS 1-9: 6

## 2023-08-25 ENCOUNTER — MYC REFILL (OUTPATIENT)
Dept: FAMILY MEDICINE | Facility: OTHER | Age: 43
End: 2023-08-25

## 2023-08-25 DIAGNOSIS — L73.2 HIDRADENITIS SUPPURATIVA: ICD-10-CM

## 2023-08-25 RX ORDER — HYDROCODONE BITARTRATE AND ACETAMINOPHEN 5; 325 MG/1; MG/1
1 TABLET ORAL 2 TIMES DAILY PRN
Qty: 20 TABLET | Refills: 0 | Status: SHIPPED | OUTPATIENT
Start: 2023-08-25 | End: 2023-09-16

## 2023-08-25 NOTE — TELEPHONE ENCOUNTER
Norco      Last Written Prescription Date:  8.7.23  Last Fill Quantity: #20,   # refills: 0  Last Office Visit: 6.26.23  Future Office visit:    Next 5 appointments (look out 90 days)      Sep 15, 2023  3:15 PM  (Arrive by 3:00 PM)  PHYSICAL with ALONDRA Gray CNP  [facility] [address] [facility] [place] [facility] [address]  [phone]             Routing refill request to provider for review/approval because:  Drug not on the [facility], [facility] or  Health refill protocol or controlled substance

## 2023-09-14 ASSESSMENT — ENCOUNTER SYMPTOMS
FEVER: 0
ARTHRALGIAS: 1
HEARTBURN: 0
SORE THROAT: 0
HEADACHES: 1
WEAKNESS: 0
NAUSEA: 0
CONSTIPATION: 0
DYSURIA: 0
HEMATURIA: 0
BREAST MASS: 0
DIARRHEA: 0
DIZZINESS: 0
NERVOUS/ANXIOUS: 0
COUGH: 0
PARESTHESIAS: 0
MYALGIAS: 1
SHORTNESS OF BREATH: 0
EYE PAIN: 0
FREQUENCY: 0
JOINT SWELLING: 0
HEMATOCHEZIA: 0
PALPITATIONS: 0
ABDOMINAL PAIN: 0
CHILLS: 0

## 2023-09-14 ASSESSMENT — ANXIETY QUESTIONNAIRES
6. BECOMING EASILY ANNOYED OR IRRITABLE: NOT AT ALL
GAD7 TOTAL SCORE: 8
3. WORRYING TOO MUCH ABOUT DIFFERENT THINGS: MORE THAN HALF THE DAYS
2. NOT BEING ABLE TO STOP OR CONTROL WORRYING: MORE THAN HALF THE DAYS
1. FEELING NERVOUS, ANXIOUS, OR ON EDGE: MORE THAN HALF THE DAYS
GAD7 TOTAL SCORE: 8
5. BEING SO RESTLESS THAT IT IS HARD TO SIT STILL: NOT AT ALL
7. FEELING AFRAID AS IF SOMETHING AWFUL MIGHT HAPPEN: NOT AT ALL
IF YOU CHECKED OFF ANY PROBLEMS ON THIS QUESTIONNAIRE, HOW DIFFICULT HAVE THESE PROBLEMS MADE IT FOR YOU TO DO YOUR WORK, TAKE CARE OF THINGS AT HOME, OR GET ALONG WITH OTHER PEOPLE: SOMEWHAT DIFFICULT
4. TROUBLE RELAXING: MORE THAN HALF THE DAYS

## 2023-09-14 ASSESSMENT — PAIN SCALES - PAIN ENJOYMENT GENERAL ACTIVITY SCALE (PEG)
PEG_TOTALSCORE: 7
INTERFERED_ENJOYMENT_LIFE: 7
AVG_PAIN_PASTWEEK: 7
INTERFERED_GENERAL_ACTIVITY: 7

## 2023-09-14 ASSESSMENT — PATIENT HEALTH QUESTIONNAIRE - PHQ9
SUM OF ALL RESPONSES TO PHQ QUESTIONS 1-9: 6
SUM OF ALL RESPONSES TO PHQ QUESTIONS 1-9: 6
10. IF YOU CHECKED OFF ANY PROBLEMS, HOW DIFFICULT HAVE THESE PROBLEMS MADE IT FOR YOU TO DO YOUR WORK, TAKE CARE OF THINGS AT HOME, OR GET ALONG WITH OTHER PEOPLE: SOMEWHAT DIFFICULT

## 2023-09-15 ENCOUNTER — OFFICE VISIT (OUTPATIENT)
Dept: FAMILY MEDICINE | Facility: OTHER | Age: 43
End: 2023-09-15
Attending: NURSE PRACTITIONER
Payer: COMMERCIAL

## 2023-09-15 VITALS
BODY MASS INDEX: 20.61 KG/M2 | SYSTOLIC BLOOD PRESSURE: 110 MMHG | TEMPERATURE: 98.6 F | WEIGHT: 112 LBS | HEART RATE: 88 BPM | DIASTOLIC BLOOD PRESSURE: 70 MMHG | OXYGEN SATURATION: 99 % | HEIGHT: 62 IN

## 2023-09-15 DIAGNOSIS — Z12.31 ENCOUNTER FOR SCREENING MAMMOGRAM FOR BREAST CANCER: Primary | ICD-10-CM

## 2023-09-15 DIAGNOSIS — E89.40 SURGICAL MENOPAUSE: ICD-10-CM

## 2023-09-15 DIAGNOSIS — G89.29 OTHER CHRONIC PAIN: ICD-10-CM

## 2023-09-15 DIAGNOSIS — B00.9 HERPES SIMPLEX VIRUS INFECTION: ICD-10-CM

## 2023-09-15 DIAGNOSIS — Z87.891 PERSONAL HISTORY OF TOBACCO USE, PRESENTING HAZARDS TO HEALTH: ICD-10-CM

## 2023-09-15 DIAGNOSIS — L73.2 HIDRADENITIS SUPPURATIVA: ICD-10-CM

## 2023-09-15 DIAGNOSIS — F41.1 GAD (GENERALIZED ANXIETY DISORDER): ICD-10-CM

## 2023-09-15 DIAGNOSIS — F90.8 ATTENTION DEFICIT HYPERACTIVITY DISORDER (ADHD), OTHER TYPE: ICD-10-CM

## 2023-09-15 DIAGNOSIS — F11.90 CHRONIC, CONTINUOUS USE OF OPIOIDS: ICD-10-CM

## 2023-09-15 DIAGNOSIS — Z00.00 ROUTINE GENERAL MEDICAL EXAMINATION AT A HEALTH CARE FACILITY: ICD-10-CM

## 2023-09-15 LAB
ERYTHROCYTE [DISTWIDTH] IN BLOOD BY AUTOMATED COUNT: 12.8 % (ref 10–15)
HCT VFR BLD AUTO: 41.4 % (ref 35–47)
HGB BLD-MCNC: 14 G/DL (ref 11.7–15.7)
MCH RBC QN AUTO: 33.8 PG (ref 26.5–33)
MCHC RBC AUTO-ENTMCNC: 33.8 G/DL (ref 31.5–36.5)
MCV RBC AUTO: 100 FL (ref 78–100)
PLATELET # BLD AUTO: 288 10E3/UL (ref 150–450)
RBC # BLD AUTO: 4.14 10E6/UL (ref 3.8–5.2)
WBC # BLD AUTO: 9.3 10E3/UL (ref 4–11)

## 2023-09-15 PROCEDURE — 36415 COLL VENOUS BLD VENIPUNCTURE: CPT | Performed by: NURSE PRACTITIONER

## 2023-09-15 PROCEDURE — 80053 COMPREHEN METABOLIC PANEL: CPT | Performed by: NURSE PRACTITIONER

## 2023-09-15 PROCEDURE — 85027 COMPLETE CBC AUTOMATED: CPT | Performed by: NURSE PRACTITIONER

## 2023-09-15 PROCEDURE — 99396 PREV VISIT EST AGE 40-64: CPT | Performed by: NURSE PRACTITIONER

## 2023-09-15 RX ORDER — BUPROPION HYDROCHLORIDE 300 MG/1
TABLET ORAL
Qty: 90 TABLET | Refills: 3 | Status: CANCELLED | OUTPATIENT
Start: 2023-09-15

## 2023-09-15 RX ORDER — DEXTROAMPHETAMINE SACCHARATE, AMPHETAMINE ASPARTATE, DEXTROAMPHETAMINE SULFATE AND AMPHETAMINE SULFATE 7.5; 7.5; 7.5; 7.5 MG/1; MG/1; MG/1; MG/1
30 TABLET ORAL DAILY
Qty: 30 TABLET | Refills: 0 | Status: CANCELLED | OUTPATIENT
Start: 2023-09-15

## 2023-09-15 RX ORDER — POLYETHYLENE GLYCOL 3350 17 G
2 POWDER IN PACKET (EA) ORAL
Qty: 48 LOZENGE | Refills: 11 | Status: SHIPPED | OUTPATIENT
Start: 2023-09-15 | End: 2024-06-05

## 2023-09-15 RX ORDER — ADALIMUMAB 40MG/0.4ML
KIT SUBCUTANEOUS
Qty: 4 EACH | Refills: 11 | Status: CANCELLED | OUTPATIENT
Start: 2023-09-15

## 2023-09-15 RX ORDER — VALACYCLOVIR HYDROCHLORIDE 500 MG/1
500 TABLET, FILM COATED ORAL DAILY
Qty: 90 TABLET | Refills: 3 | Status: CANCELLED | OUTPATIENT
Start: 2023-09-15

## 2023-09-15 RX ORDER — HYDROCODONE BITARTRATE AND ACETAMINOPHEN 5; 325 MG/1; MG/1
1 TABLET ORAL 2 TIMES DAILY PRN
Qty: 20 TABLET | Refills: 0 | Status: CANCELLED | OUTPATIENT
Start: 2023-09-15

## 2023-09-15 RX ORDER — BUPROPION HYDROCHLORIDE 150 MG/1
150 TABLET ORAL EVERY MORNING
Qty: 90 TABLET | Refills: 3 | Status: CANCELLED | OUTPATIENT
Start: 2023-09-15

## 2023-09-15 RX ORDER — DEXTROAMPHETAMINE SACCHARATE, AMPHETAMINE ASPARTATE MONOHYDRATE, DEXTROAMPHETAMINE SULFATE AND AMPHETAMINE SULFATE 5; 5; 5; 5 MG/1; MG/1; MG/1; MG/1
20 CAPSULE, EXTENDED RELEASE ORAL DAILY
Qty: 30 CAPSULE | Refills: 0 | Status: CANCELLED | OUTPATIENT
Start: 2023-09-15

## 2023-09-15 ASSESSMENT — ENCOUNTER SYMPTOMS
CHILLS: 0
DYSURIA: 0
PALPITATIONS: 0
FEVER: 0
NAUSEA: 0
DIZZINESS: 0
SHORTNESS OF BREATH: 0
NERVOUS/ANXIOUS: 0
MYALGIAS: 1
COUGH: 0
FREQUENCY: 0
ABDOMINAL PAIN: 0
SORE THROAT: 0
EYE PAIN: 0
CONSTIPATION: 0
ARTHRALGIAS: 1
HEMATURIA: 0
HEMATOCHEZIA: 0
WEAKNESS: 0
BREAST MASS: 0
PARESTHESIAS: 0
JOINT SWELLING: 0
DIARRHEA: 0
HEARTBURN: 0

## 2023-09-15 ASSESSMENT — PAIN SCALES - GENERAL: PAINLEVEL: SEVERE PAIN (6)

## 2023-09-15 NOTE — PROGRESS NOTES
SUBJECTIVE:   CC: Purnima is an 42 year old who presents for preventive health visit.       9/15/2023     3:21 PM   Additional Questions   Roomed by Markus Knox CMA   Accompanied by Self         9/15/2023     3:21 PM   Patient Reported Additional Medications   Patient reports taking the following new medications None       Healthy Habits:     Getting at least 3 servings of Calcium per day:  Yes    Bi-annual eye exam:  Yes    Dental care twice a year:  Yes    Sleep apnea or symptoms of sleep apnea:  None    Diet:  Regular (no restrictions)    Frequency of exercise:  4-5 days/week    Duration of exercise:  30-45 minutes    Taking medications regularly:  Yes    Medication side effects:  Not applicable    Additional concerns today:  No      Today's PHQ-9 Score:       2023     3:56 PM   PHQ-9 SCORE   PHQ-9 Total Score MyChart 6 (Mild depression)   PHQ-9 Total Score 6         Chronic/Recurring Back Pain Follow Up    Where is your back pain located? (Select all that apply) neck and head  How would you describe your back pain?  Causes headaches  Where does your back pain spread? nowhere  Since your last clinic visit for back pain, how has your pain changed? gradually worsening  Does your back pain interfere with your job? YES  Since your last visit, have you tried any new treatment? No      Social History     Tobacco Use     Smoking status: Every Day     Packs/day: 0.50     Years: 15.00     Pack years: 7.50     Types: Cigarettes     Start date: 2001     Passive exposure: Current     Smokeless tobacco: Never     Tobacco comments:     I have been working on quitting   Substance Use Topics     Alcohol use: No             2023     3:58 PM   Alcohol Use   Prescreen: >3 drinks/day or >7 drinks/week? No          No data to display              Reviewed orders with patient.  Reviewed health maintenance and updated orders accordingly - Yes      Breast Cancer Screenin/14/2021    11:09 AM   Breast CA  Risk Assessment (FHS-7)   Do you have a family history of breast, colon, or ovarian cancer? No / Unknown       click delete button to remove this line now  Mammogram Screening - Offered annual screening and updated Health Maintenance for [place] plan based on risk factor consideration  Pertinent mammograms are reviewed under the imaging tab.    History of abnormal Pap smear: NO - age 30-65 PAP every 5 years with negative HPV co-testing recommended      Latest Ref Rng & Units 9/2/2021     3:13 PM 12/17/2019     8:19 AM 5/15/2018     5:04 PM   PAP / HPV   PAP  Negative for Intraepithelial Lesion or Malignancy (NILM)      PAP (Historical)   NIL  NIL    HPV 16 DNA Negative Negative  Negative  Negative    HPV 18 DNA Negative Negative  Negative  Negative    Other HR HPV Negative Negative  Negative  Negative      Reviewed and updated as needed this visit by clinical staff   Tobacco  Allergies  Meds              Reviewed and updated as needed this visit by Provider                   Review of Systems   Constitutional:  Negative for chills and fever.   HENT:  Negative for congestion, ear pain, hearing loss and sore throat.    Eyes:  Negative for pain and visual disturbance.   Respiratory:  Negative for cough and shortness of breath.    Cardiovascular:  Negative for chest pain, palpitations and peripheral edema.   Gastrointestinal:  Negative for abdominal pain, constipation, diarrhea, heartburn, hematochezia and nausea.   Breasts:  Negative for tenderness, breast mass and discharge.   Genitourinary:  Negative for dysuria, frequency, genital sores, hematuria, pelvic pain, urgency, vaginal bleeding and vaginal discharge.   Musculoskeletal:  Positive for arthralgias and myalgias. Negative for joint swelling.   Skin:  Negative for rash.   Neurological:  Negative for dizziness, weakness and paresthesias.   Psychiatric/Behavioral:  Negative for mood changes. The patient is not nervous/anxious.      CONSTITUTIONAL: NEGATIVE for  "fever, chills, change in weight  INTEGUMENTARY/SKIN: NEGATIVE for worrisome rashes, moles or lesions  EYES: NEGATIVE for vision changes or irritation  ENT: NEGATIVE for ear, mouth and throat problems  RESP: NEGATIVE for significant cough or SOB  BREAST: NEGATIVE for masses, tenderness or discharge  CV: NEGATIVE for chest pain, palpitations or peripheral edema  GI: NEGATIVE for nausea, abdominal pain, heartburn, or change in bowel habits  : NEGATIVE for unusual urinary or vaginal symptoms. No vaginal bleeding.  MUSCULOSKELETAL: NEGATIVE for significant arthralgias or myalgia  NEURO: NEGATIVE for weakness, dizziness or paresthesias  PSYCHIATRIC: NEGATIVE for changes in mood or affect      OBJECTIVE:   /70   Pulse 88   Temp 98.6  F (37  C) (Tympanic)   Ht 1.575 m (5' 2\")   Wt 50.8 kg (112 lb)   LMP 08/10/2013   SpO2 99%   BMI 20.49 kg/m    Physical Exam  GENERAL: healthy, alert and no distress  EYES: Eyes grossly normal to inspection, PERRL and conjunctivae and sclerae normal  HENT: ear canals and TM's normal, nose and mouth without ulcers or lesions  NECK: no adenopathy, no asymmetry, masses, or scars and thyroid normal to palpation  RESP: lungs clear to auscultation - no rales, rhonchi or wheezes  BREAST: declined exam   CV: regular rate and rhythm, normal S1 S2, no S3 or S4, no murmur, click or rub, no peripheral edema and peripheral pulses strong  ABDOMEN: soft, nontender, no hepatosplenomegaly, no masses and bowel sounds normal   (female): declined exam   MS: no gross musculoskeletal defects noted, no edema  SKIN: no suspicious lesions or rashes  NEURO: Normal strength and tone, mentation intact and speech normal  PSYCH: mentation appears normal, affect normal/bright    Diagnostic Test Results:  Labs reviewed in Epic    ASSESSMENT/PLAN:       ICD-10-CM    1. Encounter for screening mammogram for breast cancer  Z12.31 MA Screen Bilateral w/Anastacio      2. Chronic, continuous use of opioids  F11.90 " Comprehensive metabolic panel     Comprehensive metabolic panel     CANCELED: Drugs of Abuse Screen Urine (POC CUPS) POCT      3. Routine general medical examination at a health care facility  Z00.00 CBC with platelets     Comprehensive metabolic panel     Comprehensive metabolic panel     CBC with platelets      4. Personal history of tobacco use, presenting hazards to health  Z87.891 nicotine (NICODERM CQ) 7 MG/24HR 24 hr patch     nicotine (COMMIT) 2 MG lozenge      5. Attention deficit hyperactivity disorder (ADHD), other type  F90.8       6. WENDY (generalized anxiety disorder)  F41.1       7. Surgical menopause  E89.40 estradiol (ESTRACE) 1 MG tablet      8. Hidradenitis suppurativa  L73.2       9. Herpes simplex virus infection  B00.9       10. Other chronic pain  G89.29     Yearly opioid contract signed today          Patient has been advised of split billing requirements and indicates understanding: Yes      COUNSELING:  Reviewed preventive health counseling, as reflected in patient instructions       Regular exercise       Healthy diet/nutrition       Vision screening       Immunizations  Declined: Influenza and Pneumococcal due to Other doesn't want             Osteoporosis prevention/bone health        She reports that she has been smoking cigarettes. She started smoking about 22 years ago. She has a 7.50 pack-year smoking history. She has been exposed to tobacco smoke. She has never used smokeless tobacco.  Nicotine/Tobacco Cessation Plan:   Medication Therapy Management  (Referral to MTM)      ALONDRA Gray [facility]

## 2023-09-15 NOTE — LETTER
Opioid / Opioid Plus Controlled Substance Agreement    This is an agreement between you and your provider about the safe and appropriate use of controlled substance/opioids prescribed by your care team. Controlled substances are medicines that can cause physical and mental dependence (abuse).    There are strict laws about having and using these medicines. We here at [facility] are committing to working with you in your efforts to get better. To support you in this work, we ll help you schedule regular office appointments for medicine refills. If we must cancel or change your appointment for any reason, we ll make sure you have enough medicine to last until your next appointment.     As a Provider, I will:  Listen carefully to your concerns and treat you with respect.   Recommend a treatment plan that I believe is in your best interest. This plan may involve therapies other than opioid pain medication.   Talk with you often about the possible benefits, and the risk of harm of any medicine that we prescribe for you.   Provide a plan on how to taper (discontinue or go off) using this medicine if the decision is made to stop its use.    As a Patient, I understand that opioid(s):   Are a controlled substance prescribed by my care team to help me function or work and manage my condition(s).   Are strong medicines and can cause serious side effects such as:  Drowsiness, which can seriously affect my driving ability  A lower breathing rate, enough to cause death  Harm to my thinking ability   Depression   Abuse of and addiction to this medicine  Need to be taken exactly as prescribed. Combining opioids with certain medicines or chemicals (such as illegal drugs, sedatives, sleeping pills, and benzodiazepines) can be dangerous or even fatal. If I stop opioids suddenly, I may have severe withdrawal symptoms.  Do not work for all types of pain nor for all patients. If they re not helpful, I may be asked to stop  them.        The risks, benefits and side effects of these medicine(s) were explained to me. I agree that:  I will take part in other treatments as advised by my care team. This may be psychiatry or counseling, physical therapy, behavioral therapy, group treatment or a referral to a specialist.     I will keep all my appointments. I understand that this is part of the monitoring of opioids. My care team may require an office visit for EVERY opioid/controlled substance refill. If I miss appointments or don t follow instructions, my care team may stop my medicine.    I will take my medicines as prescribed. I will not change the dose or schedule unless my care team tells me to. There will be no refills if I run out early.     I may be asked to come to the clinic and complete a urine drug test or complete a pill count at any time. If I don t give a urine sample or participate in a pill count, the care team may stop my medicine.    I will only receive prescriptions from this clinic for chronic pain. If I am treated by another provider for acute pain issues, I will tell them that I am taking opioid pain medication for chronic pain and that I have a treatment agreement with this provider. I will inform my [facility] care team within one business day if I am given a prescription for any pain medication by another healthcare provider. My [facility] care team can contact other providers and pharmacists about my use of any medicines.    It is up to me to make sure that I don t run out of my medicines on weekends or holidays. If my care team is willing to refill my opioid prescription without a visit, I must request refills only during office hours. Refills may take up to 3 business days to process. I will use one pharmacy to fill all my opioid and other controlled substance prescriptions. I will notify the clinic about any changes to my insurance or medication availability.    I am responsible for my  prescriptions. If the medicine/prescription is lost, stolen or destroyed, it will not be replaced. I also agree not to share controlled substance medicines with anyone.    I am aware I should not use any illegal or recreational drugs. I agree not to drink alcohol unless my care team says I can.       If I enroll in the [place] [facility] program, I will tell my care team prior to my next refill.     I will tell my care team right away if I become pregnant, have a new medical problem treated outside of my regular clinic, or have a change in my medications.    I understand that this medicine can affect my thinking, judgment and reaction time. Alcohol and drugs affect the brain and body, which can affect the safety of my driving. Being under the influence of alcohol or drugs can affect my decision-making, behaviors, personal safety, and the safety of others. Driving while impaired (DWI) can occur if a person is driving, operating, or in physical control of a car, motorcycle, boat, snowmobile, ATV, motorbike, off-road vehicle, or any other motor vehicle ([place] Statute 169A.20). I understand the risk if I choose to drive or operate any vehicle or machinery.    I understand that if I do not follow any of the conditions above, my prescriptions or treatment may be stopped or changed.          Opioids  What You Need to Know    What are opioids?   Opioids are pain medicines that must be prescribed by a doctor. They are also known as narcotics.     Examples are:   morphine (MS Contin, Lydia)  oxycodone (Oxycontin)  oxycodone and acetaminophen (Percocet)  hydrocodone and acetaminophen (Vicodin, Norco)   fentanyl patch (Duragesic)   hydromorphone (Dilaudid)   methadone  codeine (Tylenol #3)     What do opioids do well?   Opioids are best for severe short-term pain such as after a surgery or injury. They may work well for cancer pain. They may help some people with long-lasting (chronic) pain.     What do opioids NOT do  well?   Opioids never get rid of pain entirely, and they don t work well for most patients with chronic pain. Opioids don t reduce swelling, one of the causes of pain.                                    Other ways to manage chronic pain and improve function include:     Treat the health problem that may be causing pain  Anti-inflammation medicines, which reduce swelling and tenderness, such as ibuprofen (Advil, Motrin) or naproxen (Aleve)  Acetaminophen (Tylenol)  Antidepressants and anti-seizure medicines, especially for nerve pain  Topical treatments such as patches or creams  Injections or nerve blocks  Chiropractic or osteopathic treatment  Acupuncture, massage, deep breathing, meditation, visual imagery, aromatherapy  Use heat or ice at the pain site  Physical therapy   Exercise  Stop smoking  Take part in therapy       Risks and side effects     Talk to your doctor before you start or decide to keep taking opioids. Possible side effects include:    Lowering your breathing rate enough to cause death  Overdose, including death, especially if taking higher than prescribed doses  Worse depression symptoms; less pleasure in things you usually enjoy  Feeling tired or sluggish  Slower thoughts or cloudy thinking  Being more sensitive to pain over time; pain is harder to control  Trouble sleeping or restless sleep  Changes in hormone levels (for example, less testosterone)  Changes in sex drive or ability to have sex  Constipation  Unsafe driving  Itching and sweating  Dizziness  Nausea, throwing up and dry mouth    What else should I know about opioids?    Opioids may lead to dependence, tolerance, or addiction.    Dependence means that if you stop or reduce the medicine too quickly, you will have withdrawal symptoms. These include loose poop (diarrhea), jitters, flu-like symptoms, nervousness and tremors. Dependence is not the same as addiction.                     Tolerance means needing higher doses over time to  get the same effect. This may increase the chance of serious side effects.    Addiction is when people improperly use a substance that harms their body, their mind or their relations with others. Use of opiates can cause a relapse of addiction if you have a history of drug or alcohol abuse.    People who have used opioids for a long time may have a lower quality of life, worse depression, higher levels of pain and more visits to doctors.    You can overdose on opioids. Take these steps to lower your risk of overdose:    Recognize the signs:  Signs of overdose include decrease or loss of consciousness (blackout), slowed breathing, trouble waking up and blue lips. If someone is worried about overdose, they should call 911.    Talk to your doctor about Narcan (naloxone).   If you are at risk for overdose, you may be given a prescription for Narcan. This medicine very quickly reverses the effects of opioids.   If you overdose, a friend or family member can give you Narcan while waiting for the ambulance. They need to know the signs of overdose and how to give Narcan.     Don't use alcohol or street drugs.   Taking them with opioids can cause death.    Do not take any of these medicines unless your doctor says it s OK. Taking these with opioids can cause death:  Benzodiazepines, such as lorazepam (Ativan), alprazolam (Xanax) or diazepam (Valium)  Muscle relaxers, such as cyclobenzaprine (Flexeril)  Sleeping pills like zolpidem (Ambien)   Other opioids      How to keep you and other people safe while taking opioids:    Never share your opioids with others.  Opioid medicines are regulated by the Drug Enforcement Agency ([place]). Selling or sharing medications is a criminal act.    2. Be sure to store opioids in a secure place, locked up if possible. Young children can easily swallow them and overdose.    3. When you are traveling with your medicines, keep them in the original bottles. If you use a pill box, be sure you also  carry a copy of your medicine list from your clinic or pharmacy.    4. Safe disposal of opioids    Most pharmacies have places to get rid of medicine, called disposal kiosks. Medicine disposal options are also available in every [facility]. Search your county and  medication disposal  to find more options. You can find more details at:  https://[email or web address].[facility].[place]./living-green/managing-unwanted-medications     I agree that my provider, clinic care team, and pharmacy may work with any city, state or federal law enforcement agency that investigates the misuse, sale, or other diversion of my controlled medicine. I will allow my provider to discuss my care with, or share a copy of, this agreement with any other treating provider, pharmacy or emergency room where I receive care.    I have read this agreement and have asked questions about anything I did not understand.    _______________________________________________________  Patient Signature - Purnima Fallon _____________________                   Date     _______________________________________________________  Provider Signature - ALONDRA Gray CNP   _____________________                   Date     _______________________________________________________  Witness Signature (required if provider not present while patient signing)   _____________________                   Date

## 2023-09-18 ENCOUNTER — TELEPHONE (OUTPATIENT)
Dept: FAMILY MEDICINE | Facility: OTHER | Age: 43
End: 2023-09-18

## 2023-09-18 ENCOUNTER — ANCILLARY PROCEDURE (OUTPATIENT)
Dept: MAMMOGRAPHY | Facility: OTHER | Age: 43
End: 2023-09-18
Attending: NURSE PRACTITIONER
Payer: COMMERCIAL

## 2023-09-18 DIAGNOSIS — L73.2 HIDRADENITIS SUPPURATIVA: ICD-10-CM

## 2023-09-18 DIAGNOSIS — B00.9 HERPES SIMPLEX VIRUS INFECTION: ICD-10-CM

## 2023-09-18 DIAGNOSIS — F90.8 ATTENTION DEFICIT HYPERACTIVITY DISORDER (ADHD), OTHER TYPE: ICD-10-CM

## 2023-09-18 DIAGNOSIS — F41.8 SITUATIONAL ANXIETY: ICD-10-CM

## 2023-09-18 DIAGNOSIS — Z12.31 ENCOUNTER FOR SCREENING MAMMOGRAM FOR BREAST CANCER: ICD-10-CM

## 2023-09-18 LAB
ALBUMIN SERPL BCG-MCNC: 5.2 G/DL (ref 3.5–5.2)
ALP SERPL-CCNC: 54 U/L (ref 35–104)
ALT SERPL W P-5'-P-CCNC: 24 U/L (ref 0–50)
ANION GAP SERPL CALCULATED.3IONS-SCNC: 14 MMOL/L (ref 7–15)
AST SERPL W P-5'-P-CCNC: 25 U/L (ref 0–45)
BILIRUB SERPL-MCNC: 0.3 MG/DL
BUN SERPL-MCNC: 12.5 MG/DL (ref 6–20)
CALCIUM SERPL-MCNC: 9.8 MG/DL (ref 8.6–10)
CHLORIDE SERPL-SCNC: 99 MMOL/L (ref 98–107)
CREAT SERPL-MCNC: 0.91 MG/DL (ref 0.51–0.95)
DEPRECATED HCO3 PLAS-SCNC: 27 MMOL/L (ref 22–29)
EGFRCR SERPLBLD CKD-EPI 2021: 81 ML/MIN/1.73M2
GLUCOSE SERPL-MCNC: 94 MG/DL (ref 70–99)
POTASSIUM SERPL-SCNC: 3.8 MMOL/L (ref 3.4–5.3)
PROT SERPL-MCNC: 7.3 G/DL (ref 6.4–8.3)
SODIUM SERPL-SCNC: 140 MMOL/L (ref 136–145)

## 2023-09-18 PROCEDURE — 77063 BREAST TOMOSYNTHESIS BI: CPT | Mod: TC | Performed by: RADIOLOGY

## 2023-09-18 PROCEDURE — 77067 SCR MAMMO BI INCL CAD: CPT | Mod: TC | Performed by: RADIOLOGY

## 2023-09-18 RX ORDER — DEXTROAMPHETAMINE SACCHARATE, AMPHETAMINE ASPARTATE, DEXTROAMPHETAMINE SULFATE AND AMPHETAMINE SULFATE 7.5; 7.5; 7.5; 7.5 MG/1; MG/1; MG/1; MG/1
30 TABLET ORAL DAILY
Qty: 30 TABLET | Refills: 0 | Status: SHIPPED | OUTPATIENT
Start: 2023-09-18 | End: 2023-10-11

## 2023-09-18 RX ORDER — DEXTROAMPHETAMINE SACCHARATE, AMPHETAMINE ASPARTATE MONOHYDRATE, DEXTROAMPHETAMINE SULFATE AND AMPHETAMINE SULFATE 5; 5; 5; 5 MG/1; MG/1; MG/1; MG/1
20 CAPSULE, EXTENDED RELEASE ORAL DAILY
Qty: 30 CAPSULE | Refills: 0 | Status: SHIPPED | OUTPATIENT
Start: 2023-09-18 | End: 2023-10-11

## 2023-09-18 RX ORDER — VALACYCLOVIR HYDROCHLORIDE 500 MG/1
500 TABLET, FILM COATED ORAL DAILY
Qty: 90 TABLET | Refills: 0 | Status: SHIPPED | OUTPATIENT
Start: 2023-09-18 | End: 2023-11-06

## 2023-09-18 RX ORDER — ESTRADIOL 1 MG/1
1 TABLET ORAL DAILY
Qty: 90 TABLET | Refills: 3 | Status: SHIPPED | OUTPATIENT
Start: 2023-09-18

## 2023-09-18 RX ORDER — LORAZEPAM 1 MG/1
TABLET ORAL
Qty: 20 TABLET | Refills: 0 | Status: SHIPPED | OUTPATIENT
Start: 2023-09-18 | End: 2023-10-04

## 2023-09-18 NOTE — TELEPHONE ENCOUNTER
Adderall    20  Last Written Prescription Date:  8/9/23  Last Fill Quantity: 30,   # refills: 0  Last Office Visit: 9/15/23  Future Office visit:       Adderall 30      Last Written Prescription Date:  8/18/23  Last Fill Quantity: 30,   # refills: 0  Last Office Visit: 9/15/23  Future Office visit:       Ativan      Last Written Prescription Date:  1/1/22  Last Fill Quantity: 20,   # refills: 0  Last Office Visit: 9/15/23  Future Office visit:       Valtrex      Last Written Prescription Date:  8/18/23  Last Fill Quantity: 90,   # refills: 0  Last Office Visit: 9/15/23  Future Office visit:

## 2023-09-18 NOTE — TELEPHONE ENCOUNTER
4:36 PM    Reason for Call: Phone Call    Description: n/a    Was an appointment offered for this call? No  If yes : Appointment type              Date    Preferred method for responding to this message: Telephone Call  What is your phone number ?[phone]     If we cannot reach you directly, may we leave a detailed response at the number you provided? Yes    Can this message wait until your PCP/provider returns, if available today? Not applicable    Brunilda Puentes

## 2023-09-19 RX ORDER — DEXTROAMPHETAMINE SACCHARATE, AMPHETAMINE ASPARTATE MONOHYDRATE, DEXTROAMPHETAMINE SULFATE AND AMPHETAMINE SULFATE 5; 5; 5; 5 MG/1; MG/1; MG/1; MG/1
20 CAPSULE, EXTENDED RELEASE ORAL DAILY
Qty: 30 CAPSULE | Refills: 0 | OUTPATIENT
Start: 2023-09-19

## 2023-09-19 RX ORDER — HYDROCODONE BITARTRATE AND ACETAMINOPHEN 5; 325 MG/1; MG/1
TABLET ORAL
Qty: 20 TABLET | Refills: 0 | OUTPATIENT
Start: 2023-09-19

## 2023-09-19 RX ORDER — DEXTROAMPHETAMINE SACCHARATE, AMPHETAMINE ASPARTATE, DEXTROAMPHETAMINE SULFATE AND AMPHETAMINE SULFATE 7.5; 7.5; 7.5; 7.5 MG/1; MG/1; MG/1; MG/1
30 TABLET ORAL DAILY
Qty: 30 TABLET | Refills: 0 | OUTPATIENT
Start: 2023-09-19

## 2023-09-20 ENCOUNTER — TELEPHONE (OUTPATIENT)
Dept: MAMMOGRAPHY | Facility: OTHER | Age: 43
End: 2023-09-20

## 2023-09-27 NOTE — NURSING NOTE
"Chief Complaint   Patient presents with     Physical       Initial /66   Pulse 85   Temp 98.7  F (37.1  C)   Ht 1.603 m (5' 3.1\")   Wt 55.2 kg (121 lb 9.6 oz)   LMP 08/10/2013   SpO2 99%   BMI 21.47 kg/m   Estimated body mass index is 21.47 kg/m  as calculated from the following:    Height as of this encounter: 1.603 m (5' 3.1\").    Weight as of this encounter: 55.2 kg (121 lb 9.6 oz).  Medication Reconciliation: complete  Iraida La  " Isotretinoin Pregnancy And Lactation Text: This medication is Pregnancy Category X and is considered extremely dangerous during pregnancy. It is unknown if it is excreted in breast milk.

## 2023-10-04 DIAGNOSIS — L73.2 HIDRADENITIS SUPPURATIVA: ICD-10-CM

## 2023-10-05 ENCOUNTER — TELEPHONE (OUTPATIENT)
Dept: FAMILY MEDICINE | Facility: OTHER | Age: 43
End: 2023-10-05

## 2023-10-05 DIAGNOSIS — L73.2 HIDRADENITIS SUPPURATIVA: ICD-10-CM

## 2023-10-05 RX ORDER — HYDROCODONE BITARTRATE AND ACETAMINOPHEN 5; 325 MG/1; MG/1
1 TABLET ORAL 2 TIMES DAILY PRN
Qty: 20 TABLET | Refills: 0 | OUTPATIENT
Start: 2023-10-05

## 2023-10-05 RX ORDER — ADALIMUMAB 40MG/0.4ML
KIT SUBCUTANEOUS
Qty: 4 EACH | Refills: 3 | Status: SHIPPED | OUTPATIENT
Start: 2023-10-05 | End: 2024-01-12

## 2023-10-05 NOTE — TELEPHONE ENCOUNTER
4:50 PM    Reason for Call: Phone Call    Description: needs humara sent to [facility] pharmacy/pharmacy is on file/needs to be done by Monday/pt needs shot on mond    Was an appointment offered for this call? No  If yes : Appointment type              Date    Preferred method for responding to this message: Telephone Call  What is your phone number ?[phone]    If we cannot reach you directly, may we leave a detailed response at the number you provided? Yes    Can this message wait until your PCP/provider returns, if available today? Not applicable    Brunilda Puentes

## 2023-10-10 ENCOUNTER — MYC REFILL (OUTPATIENT)
Dept: FAMILY MEDICINE | Facility: OTHER | Age: 43
End: 2023-10-10

## 2023-10-10 DIAGNOSIS — L73.2 HIDRADENITIS SUPPURATIVA: ICD-10-CM

## 2023-10-10 RX ORDER — HYDROCODONE BITARTRATE AND ACETAMINOPHEN 5; 325 MG/1; MG/1
1 TABLET ORAL 2 TIMES DAILY PRN
Qty: 20 TABLET | Refills: 0 | Status: CANCELLED | OUTPATIENT
Start: 2023-10-10

## 2023-11-28 ENCOUNTER — MYC REFILL (OUTPATIENT)
Dept: FAMILY MEDICINE | Facility: OTHER | Age: 43
End: 2023-11-28

## 2023-11-28 DIAGNOSIS — L73.2 HIDRADENITIS SUPPURATIVA: ICD-10-CM

## 2023-11-28 RX ORDER — HYDROCODONE BITARTRATE AND ACETAMINOPHEN 5; 325 MG/1; MG/1
1 TABLET ORAL 2 TIMES DAILY PRN
Qty: 20 TABLET | Refills: 0 | Status: SHIPPED | OUTPATIENT
Start: 2023-11-28 | End: 2023-12-12

## 2023-11-28 NOTE — TELEPHONE ENCOUNTER
norco      Last Written Prescription Date:  11/7/23  Last Fill Quantity: 20,   # refills: 0  Last Office Visit: 9/15/23  Future Office visit:       Routing refill request to provider for review/approval because:

## 2023-12-11 DIAGNOSIS — L73.2 HIDRADENITIS SUPPURATIVA: ICD-10-CM

## 2023-12-12 RX ORDER — HYDROCODONE BITARTRATE AND ACETAMINOPHEN 5; 325 MG/1; MG/1
1 TABLET ORAL 2 TIMES DAILY PRN
Qty: 20 TABLET | Refills: 0 | OUTPATIENT
Start: 2023-12-12

## 2023-12-12 NOTE — TELEPHONE ENCOUNTER
Norco      Last Written Prescription Date:  11/28/23  Last Fill Quantity: 20,   # refills: 0  Last Office Visit: 9/15/23  Future Office visit:       Routing refill request to provider for review/approval because:

## 2024-01-08 DIAGNOSIS — F41.1 GAD (GENERALIZED ANXIETY DISORDER): Primary | ICD-10-CM

## 2024-01-08 NOTE — TELEPHONE ENCOUNTER
Wellbutrin       Last Written Prescription Date:  5/25/2023  Last Fill Quantity: 90,   # refills: 1  Last Office Visit: 9/15/2023  Future Office visit:

## 2024-01-09 RX ORDER — BUPROPION HYDROCHLORIDE 150 MG/1
TABLET ORAL
Qty: 90 TABLET | Refills: 1 | Status: SHIPPED | OUTPATIENT
Start: 2024-01-09 | End: 2024-04-30

## 2024-01-10 DIAGNOSIS — L73.2 HIDRADENITIS SUPPURATIVA: ICD-10-CM

## 2024-01-10 RX ORDER — HYDROCODONE BITARTRATE AND ACETAMINOPHEN 5; 325 MG/1; MG/1
1 TABLET ORAL 2 TIMES DAILY PRN
Qty: 20 TABLET | Refills: 0 | OUTPATIENT
Start: 2024-01-10

## 2024-01-31 ENCOUNTER — OFFICE VISIT (OUTPATIENT)
Dept: FAMILY MEDICINE | Facility: OTHER | Age: 44
End: 2024-01-31
Attending: NURSE PRACTITIONER
Payer: COMMERCIAL

## 2024-01-31 VITALS
SYSTOLIC BLOOD PRESSURE: 118 MMHG | DIASTOLIC BLOOD PRESSURE: 72 MMHG | OXYGEN SATURATION: 98 % | HEART RATE: 84 BPM | TEMPERATURE: 99 F | HEIGHT: 63 IN | BODY MASS INDEX: 20.09 KG/M2 | WEIGHT: 113.4 LBS

## 2024-01-31 DIAGNOSIS — F41.1 GAD (GENERALIZED ANXIETY DISORDER): ICD-10-CM

## 2024-01-31 DIAGNOSIS — F90.8 ATTENTION DEFICIT HYPERACTIVITY DISORDER (ADHD), OTHER TYPE: Primary | ICD-10-CM

## 2024-01-31 DIAGNOSIS — F34.1 DYSTHYMIC DISORDER: ICD-10-CM

## 2024-01-31 PROCEDURE — 99213 OFFICE O/P EST LOW 20 MIN: CPT | Performed by: NURSE PRACTITIONER

## 2024-01-31 RX ORDER — DEXTROAMPHETAMINE SACCHARATE, AMPHETAMINE ASPARTATE MONOHYDRATE, DEXTROAMPHETAMINE SULFATE AND AMPHETAMINE SULFATE 7.5; 7.5; 7.5; 7.5 MG/1; MG/1; MG/1; MG/1
30 CAPSULE, EXTENDED RELEASE ORAL DAILY
Qty: 30 CAPSULE | Refills: 0 | Status: SHIPPED | OUTPATIENT
Start: 2024-02-07 | End: 2024-03-05

## 2024-01-31 ASSESSMENT — ANXIETY QUESTIONNAIRES
7. FEELING AFRAID AS IF SOMETHING AWFUL MIGHT HAPPEN: SEVERAL DAYS
6. BECOMING EASILY ANNOYED OR IRRITABLE: NOT AT ALL
GAD7 TOTAL SCORE: 6
GAD7 TOTAL SCORE: 6
2. NOT BEING ABLE TO STOP OR CONTROL WORRYING: SEVERAL DAYS
1. FEELING NERVOUS, ANXIOUS, OR ON EDGE: SEVERAL DAYS
5. BEING SO RESTLESS THAT IT IS HARD TO SIT STILL: SEVERAL DAYS
IF YOU CHECKED OFF ANY PROBLEMS ON THIS QUESTIONNAIRE, HOW DIFFICULT HAVE THESE PROBLEMS MADE IT FOR YOU TO DO YOUR WORK, TAKE CARE OF THINGS AT HOME, OR GET ALONG WITH OTHER PEOPLE: VERY DIFFICULT
3. WORRYING TOO MUCH ABOUT DIFFERENT THINGS: SEVERAL DAYS
7. FEELING AFRAID AS IF SOMETHING AWFUL MIGHT HAPPEN: SEVERAL DAYS
GAD7 TOTAL SCORE: 6
8. IF YOU CHECKED OFF ANY PROBLEMS, HOW DIFFICULT HAVE THESE MADE IT FOR YOU TO DO YOUR WORK, TAKE CARE OF THINGS AT HOME, OR GET ALONG WITH OTHER PEOPLE?: VERY DIFFICULT
4. TROUBLE RELAXING: SEVERAL DAYS

## 2024-01-31 ASSESSMENT — PATIENT HEALTH QUESTIONNAIRE - PHQ9
SUM OF ALL RESPONSES TO PHQ QUESTIONS 1-9: 7
10. IF YOU CHECKED OFF ANY PROBLEMS, HOW DIFFICULT HAVE THESE PROBLEMS MADE IT FOR YOU TO DO YOUR WORK, TAKE CARE OF THINGS AT HOME, OR GET ALONG WITH OTHER PEOPLE: VERY DIFFICULT
SUM OF ALL RESPONSES TO PHQ QUESTIONS 1-9: 7

## 2024-01-31 ASSESSMENT — PAIN SCALES - GENERAL: PAINLEVEL: SEVERE PAIN (6)

## 2024-01-31 NOTE — LETTER
January 31, 2024      Purnima DE JESUS Leeanne  [address]  [place] [address]        To Whom It May Concern:    Purnima DE JESUS Leeanne  was seen on January 31, 2024. Please excuse her  until February 5, 2024.        Sincerely,        ALONDRA Gray CNP

## 2024-01-31 NOTE — PROGRESS NOTES
Assessment & Plan     (F90.8) Attention deficit hyperactivity disorder (ADHD), other type  (primary encounter diagnosis)  Comment: she feels she needs an increase in XR dose. She is currently at 20 mg. Will increase to 30 mg   Plan: amphetamine-dextroamphetamine (ADDERALL XR) 30         MG 24 hr capsule            (F41.1) WENDY (generalized anxiety disorder)  Comment: her anxiety is increased with her son being moved to a dangerous retirement. She feels her anxiety is situational. She has a meeting at 4:30 today and she thinks she is going to be fired    Plan: Continue medication regimen       (F34.1) Dysthymic disorder  Comment: her depression is increased with her son being moved to a dangerous retirement. She feels her depression is situational. She has a meeting at 4:30 today and she thinks she is going to be fired    Plan: Continue medication regimen    See Patient Instructions    Return in about 3 months (around 4/30/2024) for Med check .    Mariah Felton is a 43 year old, presenting for the following health issues:  Recheck Medication        1/31/2024     3:10 PM   Additional Questions   Roomed by Ina JANSEN     Medication Followup of adderall  Taking Medication as prescribed: yes  Side Effects:  none  Medication Helping Symptoms:  yes    Medication Followup of Wellbutrin  Taking Medication as prescribed: yes  Side Effects:  None  Medication Helping Symptoms:  NO    Depression and Anxiety Follow-Up  How are you doing with your depression since your last visit? Worsened   How are you doing with your anxiety since your last visit?  Worsened   Are you having other symptoms that might be associated with depression or anxiety? No  Have you had a significant life event? Job Concerns and OTHER: son was moved to a very dangerous retirement ([place]). She has a meeting at 4:30 today and feels she is going to get fired.     Do you have any concerns with your use of alcohol or other drugs? No    Social History  "    Tobacco Use    Smoking status: Every Day     Packs/day: 0.25     Years: 15.00     Additional pack years: 0.00     Total pack years: 3.75     Types: Cigarettes     Start date: 1/1/2001     Passive exposure: Current    Smokeless tobacco: Never    Tobacco comments:     About 5 cigs per day   Vaping Use    Vaping Use: Never used   Substance Use Topics    Alcohol use: No    Drug use: No         8/17/2023    12:59 PM 9/14/2023     3:56 PM 1/31/2024    12:15 PM   PHQ   PHQ-9 Total Score 6    6 6 7   Q9: Thoughts of better off dead/self-harm past 2 weeks Not at all Not at all Not at all         8/17/2023     1:00 PM 9/14/2023     3:56 PM 1/31/2024    12:16 PM   WENDY-7 SCORE   Total Score 8 (mild anxiety) 8 (mild anxiety) 6 (mild anxiety)   Total Score 8    8 8 6     Denies SI/HI thoughts     ADHD Follow-Up  Date of last ADHD office visit: 9/15/23  Status since last visit: Worse, in the evening she stated she feels worse. Feels she needs a dose increase of XR.   Taking controlled (daily) medications as prescribed: Yes                       Patient Concerns with Medications: None    Sleep: trouble falling asleep and trouble staying asleep  Home/Family Concerns: Worse, evening time is bad  Co-Morbid Diagnosis: Depression and Anxiety  Currently in counseling: No      Review of Systems  Constitutional, HEENT, cardiovascular, pulmonary, gi and gu systems are negative, except as otherwise noted.      Objective    /72 (BP Location: Right arm, Patient Position: Sitting, Cuff Size: Adult Regular)   Pulse 84   Temp 99  F (37.2  C) (Tympanic)   Ht 1.588 m (5' 2.5\")   Wt 51.4 kg (113 lb 6.4 oz)   LMP 08/10/2013   SpO2 98%   BMI 20.41 kg/m    Body mass index is 20.41 kg/m .  Physical Exam   GENERAL: alert and no distress  NECK: no adenopathy, no asymmetry, masses, or scars  RESP: lungs clear to auscultation - no rales, rhonchi or wheezes  CV: regular rate and rhythm, normal S1 S2, no S3 or S4, no murmur, click or rub, no " peripheral edema  MS: no gross musculoskeletal defects noted, no edema  SKIN: no suspicious lesions or rashes  PSYCH: mentation appears normal, affect normal/bright        Signed Electronically by: ALONDRA Gray CNP

## 2024-03-05 ENCOUNTER — MYC REFILL (OUTPATIENT)
Dept: FAMILY MEDICINE | Facility: OTHER | Age: 44
End: 2024-03-05

## 2024-03-05 DIAGNOSIS — F90.8 ATTENTION DEFICIT HYPERACTIVITY DISORDER (ADHD), OTHER TYPE: ICD-10-CM

## 2024-03-05 DIAGNOSIS — L73.2 HIDRADENITIS SUPPURATIVA: ICD-10-CM

## 2024-03-05 NOTE — TELEPHONE ENCOUNTER
Disp Refills Start End CHELSEA   amphetamine-dextroamphetamine (ADDERALL XR) 30 MG 24 hr capsule 30 capsule 0 2/7/2024 3/8/2024 No      Disp Refills Start End CHELSEA   amphetamine-dextroamphetamine (ADDERALL) 30 MG tablet 30 tablet 0 2/7/2024 -- No      Disp Refills Start End CHELSEA   HYDROcodone-acetaminophen (NORCO) 5-325 MG tablet 20 tablet 0 2/28/2024 -- No     Last Office Visit: 01/31/2024  Future Office visit:       Routing refill request to provider for review/approval because:

## 2024-03-06 RX ORDER — DEXTROAMPHETAMINE SACCHARATE, AMPHETAMINE ASPARTATE MONOHYDRATE, DEXTROAMPHETAMINE SULFATE AND AMPHETAMINE SULFATE 7.5; 7.5; 7.5; 7.5 MG/1; MG/1; MG/1; MG/1
30 CAPSULE, EXTENDED RELEASE ORAL DAILY
Qty: 30 CAPSULE | Refills: 0 | Status: SHIPPED | OUTPATIENT
Start: 2024-03-06 | End: 2024-04-04

## 2024-03-06 RX ORDER — HYDROCODONE BITARTRATE AND ACETAMINOPHEN 5; 325 MG/1; MG/1
1 TABLET ORAL 2 TIMES DAILY PRN
Qty: 20 TABLET | Refills: 0 | OUTPATIENT
Start: 2024-03-06

## 2024-03-06 RX ORDER — DEXTROAMPHETAMINE SACCHARATE, AMPHETAMINE ASPARTATE, DEXTROAMPHETAMINE SULFATE AND AMPHETAMINE SULFATE 7.5; 7.5; 7.5; 7.5 MG/1; MG/1; MG/1; MG/1
30 TABLET ORAL DAILY
Qty: 30 TABLET | Refills: 0 | Status: SHIPPED | OUTPATIENT
Start: 2024-03-06 | End: 2024-04-04

## 2024-03-06 NOTE — TELEPHONE ENCOUNTER
Please let Purnima know on the Norco that I can do 20 tablets a month. She is too early for a refill.

## 2024-03-08 ENCOUNTER — TELEPHONE (OUTPATIENT)
Dept: FAMILY MEDICINE | Facility: OTHER | Age: 44
End: 2024-03-08

## 2024-03-08 NOTE — TELEPHONE ENCOUNTER
Received an APPROVAL from [employer] for  HUMIRA *CF* PEN 40 MG/0.4ML pen kit. Effective dates 2/7/24-3/8/25.

## 2024-03-08 NOTE — TELEPHONE ENCOUNTER
Received a PA request from [facility] for HUMIRA *CF* PEN 40 MG/0.4ML pen kit. Submitted on CMM. Waiting for a response.

## 2024-03-11 ENCOUNTER — MYC REFILL (OUTPATIENT)
Dept: FAMILY MEDICINE | Facility: OTHER | Age: 44
End: 2024-03-11

## 2024-03-11 DIAGNOSIS — L73.2 HIDRADENITIS SUPPURATIVA: ICD-10-CM

## 2024-03-11 RX ORDER — HYDROCODONE BITARTRATE AND ACETAMINOPHEN 5; 325 MG/1; MG/1
1 TABLET ORAL 2 TIMES DAILY PRN
Qty: 20 TABLET | Refills: 0 | OUTPATIENT
Start: 2024-03-11

## 2024-03-12 RX ORDER — HYDROCODONE BITARTRATE AND ACETAMINOPHEN 5; 325 MG/1; MG/1
1 TABLET ORAL 2 TIMES DAILY PRN
Qty: 20 TABLET | Refills: 0 | OUTPATIENT
Start: 2024-03-12

## 2024-03-12 NOTE — TELEPHONE ENCOUNTER
Norco      Last Written Prescription Date:  2/28/24  Last Fill Quantity: 20,   # refills: 0  Last Office Visit: 1/31/24  Future Office visit:       Routing refill request to provider for review/approval because:

## 2024-04-05 DIAGNOSIS — M54.12 CERVICAL RADICULOPATHY: ICD-10-CM

## 2024-04-05 DIAGNOSIS — M25.512 CHRONIC LEFT SHOULDER PAIN: ICD-10-CM

## 2024-04-05 DIAGNOSIS — F41.8 SITUATIONAL ANXIETY: ICD-10-CM

## 2024-04-05 DIAGNOSIS — G89.29 CHRONIC LEFT SHOULDER PAIN: ICD-10-CM

## 2024-04-05 DIAGNOSIS — F90.8 ATTENTION DEFICIT HYPERACTIVITY DISORDER (ADHD), OTHER TYPE: ICD-10-CM

## 2024-04-05 RX ORDER — HYDROCODONE BITARTRATE AND ACETAMINOPHEN 5; 325 MG/1; MG/1
1 TABLET ORAL 2 TIMES DAILY PRN
Qty: 20 TABLET | Refills: 0 | OUTPATIENT
Start: 2024-04-05

## 2024-04-05 RX ORDER — DEXTROAMPHETAMINE SACCHARATE, AMPHETAMINE ASPARTATE MONOHYDRATE, DEXTROAMPHETAMINE SULFATE AND AMPHETAMINE SULFATE 7.5; 7.5; 7.5; 7.5 MG/1; MG/1; MG/1; MG/1
30 CAPSULE, EXTENDED RELEASE ORAL DAILY
Qty: 30 CAPSULE | Refills: 0 | OUTPATIENT
Start: 2024-04-05

## 2024-04-05 RX ORDER — LORAZEPAM 1 MG/1
TABLET ORAL
Qty: 20 TABLET | Refills: 0 | OUTPATIENT
Start: 2024-04-05

## 2024-04-05 RX ORDER — DEXTROAMPHETAMINE SACCHARATE, AMPHETAMINE ASPARTATE, DEXTROAMPHETAMINE SULFATE AND AMPHETAMINE SULFATE 7.5; 7.5; 7.5; 7.5 MG/1; MG/1; MG/1; MG/1
30 TABLET ORAL DAILY
Qty: 30 TABLET | Refills: 0 | OUTPATIENT
Start: 2024-04-05

## 2024-04-15 NOTE — PROGRESS NOTES
Assessment & Plan     Discussion on chronic opioid use today. Ongoing bilateral upper neck pain with radiation into the back of her head with radiation into her head causing headaches. Last neck imaging on chart review was 2/2023. No recent injury or trauma. Plan going forward will be a monthly refill of Norco 5/325 #60 tablets monthly and Ativan 1 mg #60 tablets monthly. Drug screen is pending and above dosing will be dependant on drug screening findings.     PDMP is appropriate.     (M25.531) Right wrist pain  (primary encounter diagnosis)  Comment: Check XRAY. RICE   Plan: XR Wrist Right G/E 3 Views            (F34.1) Dysthymia  Comment: Stable. RF   Plan: escitalopram (LEXAPRO) 20 MG tablet            (F11.90) Chronic, continuous use of opioids  Comment: Due for drug screen   Plan: Drug Confirmation Panel Urine with Creat           See Patient Instructions    Return in about 6 weeks (around 5/28/2024) for Med check follow up .    Mariah Felton is a 43 year old, presenting for the following health issues:  Pain Management        4/16/2024     3:04 PM   Additional Questions   Roomed by Ina LOWERY     History of Present Illness       Reason for visit:  Med follow up and hands hurting. Possible beoken bone in my wrist    She eats 0-1 servings of fruits and vegetables daily.She consumes 0 sweetened beverage(s) daily.She exercises with enough effort to increase her heart rate 30 to 60 minutes per day.  She exercises with enough effort to increase her heart rate 5 days per week.   She is taking medications regularly.       Pain History:  When did you first notice your pain? Over a year   Have you seen this provider for your pain in the past? Yes   Where in your body do you have pain? Shoulder, neck, back, hands  Are you seeing anyone else for your pain? No      9/14/2023     3:56 PM 1/31/2024    12:15 PM 4/16/2024    12:23 PM   PHQ-9 SCORE   PHQ-9 Total Score Radhahart 6 (Mild depression) 7 (Mild depression) 8  "(Mild depression)   PHQ-9 Total Score 6 7 8           8/17/2023     1:00 PM 9/14/2023     3:56 PM 1/31/2024    12:16 PM   WENDY-7 SCORE   Total Score 8 (mild anxiety) 8 (mild anxiety) 6 (mild anxiety)   Total Score 8    8 8 6           1/31/2024     3:13 PM 4/16/2024     3:07 PM 4/16/2024     3:08 PM   PEG Score   PEG Total Score 4.67 5.33 5.33       Chronic Pain Follow Up:  Location of pain: hands, wrists, shoulder, back  Analgesia/pain control:    - Recent changes:  pain is getting worse in hands and wrists    - Overall control: Comfortably manageable    - Current treatments: Norco 5-325   Adherence:     - Do you ever take more pain medicine than prescribed? No    - When did you take your last dose of pain medicine?  This morning   Adverse effects: No   PDMP Review         Value Time User    State PDMP site checked  Yes 4/10/2024  8:47 AM Payton Levi APRN CNP          Last CSA Agreement:   CSA -- Patient Level:     [Media Unavailable] Controlled Substance Agreement - Opioid - Scan on 9/19/2023  9:34 AM: OPIOID/OPIOID PLUS CONTROLLED SUBSTANCE AGREEMENT       Last UDS: Ordered today       Review of Systems  Constitutional, neuro, ENT, endocrine, pulmonary, cardiac, gastrointestinal, genitourinary, musculoskeletal, integument and psychiatric systems are negative, except as otherwise noted.      Objective    /70 (BP Location: Right arm, Patient Position: Sitting, Cuff Size: Adult Regular)   Pulse 87   Temp 97.7  F (36.5  C) (Tympanic)   Ht 1.575 m (5' 2\")   Wt 53.7 kg (118 lb 4.8 oz)   LMP 08/10/2013   SpO2 99%   BMI 21.64 kg/m    Body mass index is 21.64 kg/m .  Physical Exam   GENERAL: alert and no distress  NECK: no adenopathy, no asymmetry, masses, or scars  RESP: lungs clear to auscultation - no rales, rhonchi or wheezes  CV: regular rate and rhythm, normal S1 S2, no S3 or S4, no murmur, click or rub, no peripheral edema  MS: no gross musculoskeletal defects noted, no edema. CMS and ROM " intact to right upper extremity. TTP to right radial wrist with a ganglion appearing cyst. No step offs or TTP to spinal column. TTP to paraspinal muscles at C3-C6 bilaterally. Straight leg raises intact. Distal pulses intact. No rash, erythema, bruising, or warmth to the touch to skin.   SKIN: no suspicious lesions or rashes  NEURO: Normal strength and tone, mentation intact and speech normal  PSYCH: mentation appears normal, affect normal/bright      Signed Electronically by: ALONDRA Gray CNP

## 2024-04-16 ENCOUNTER — OFFICE VISIT (OUTPATIENT)
Dept: FAMILY MEDICINE | Facility: OTHER | Age: 44
End: 2024-04-16
Attending: NURSE PRACTITIONER
Payer: COMMERCIAL

## 2024-04-16 ENCOUNTER — ANCILLARY PROCEDURE (OUTPATIENT)
Dept: GENERAL RADIOLOGY | Facility: OTHER | Age: 44
End: 2024-04-16
Attending: NURSE PRACTITIONER
Payer: COMMERCIAL

## 2024-04-16 VITALS
TEMPERATURE: 97.7 F | WEIGHT: 118.3 LBS | SYSTOLIC BLOOD PRESSURE: 118 MMHG | HEART RATE: 87 BPM | DIASTOLIC BLOOD PRESSURE: 70 MMHG | HEIGHT: 62 IN | BODY MASS INDEX: 21.77 KG/M2 | OXYGEN SATURATION: 99 %

## 2024-04-16 DIAGNOSIS — M25.531 RIGHT WRIST PAIN: Primary | ICD-10-CM

## 2024-04-16 DIAGNOSIS — F34.1 DYSTHYMIA: ICD-10-CM

## 2024-04-16 DIAGNOSIS — M25.531 RIGHT WRIST PAIN: ICD-10-CM

## 2024-04-16 DIAGNOSIS — F11.90 CHRONIC, CONTINUOUS USE OF OPIOIDS: ICD-10-CM

## 2024-04-16 PROCEDURE — 80307 DRUG TEST PRSMV CHEM ANLYZR: CPT | Performed by: NURSE PRACTITIONER

## 2024-04-16 PROCEDURE — G0480 DRUG TEST DEF 1-7 CLASSES: HCPCS | Performed by: NURSE PRACTITIONER

## 2024-04-16 PROCEDURE — 99214 OFFICE O/P EST MOD 30 MIN: CPT | Performed by: NURSE PRACTITIONER

## 2024-04-16 PROCEDURE — 73110 X-RAY EXAM OF WRIST: CPT | Mod: TC | Performed by: RADIOLOGY

## 2024-04-16 RX ORDER — ESCITALOPRAM OXALATE 20 MG/1
20 TABLET ORAL DAILY
Qty: 30 TABLET | Refills: 1 | Status: SHIPPED | OUTPATIENT
Start: 2024-04-16 | End: 2024-06-17

## 2024-04-16 ASSESSMENT — PAIN SCALES - GENERAL: PAINLEVEL: MODERATE PAIN (5)

## 2024-04-16 ASSESSMENT — PATIENT HEALTH QUESTIONNAIRE - PHQ9
SUM OF ALL RESPONSES TO PHQ QUESTIONS 1-9: 8
10. IF YOU CHECKED OFF ANY PROBLEMS, HOW DIFFICULT HAVE THESE PROBLEMS MADE IT FOR YOU TO DO YOUR WORK, TAKE CARE OF THINGS AT HOME, OR GET ALONG WITH OTHER PEOPLE: VERY DIFFICULT
SUM OF ALL RESPONSES TO PHQ QUESTIONS 1-9: 8

## 2024-04-17 LAB
CANNABINOIDS UR QL SCN: ABNORMAL
CREAT UR-MCNC: 16 MG/DL

## 2024-04-19 LAB
AMPHET UR CFM-MCNC: 6460 NG/ML
AMPHET/CREAT UR: ABNORMAL NG/MG {CREAT}
DHC UR CFM-MCNC: 65 NG/ML
DHC/CREAT UR: 406 NG/MG {CREAT}
HYDROCODONE UR CFM-MCNC: 123 NG/ML
HYDROCODONE/CREAT UR: 769 NG/MG {CREAT}
LORAZEPAM UR QL CFM: PRESENT

## 2024-05-09 ENCOUNTER — APPOINTMENT (OUTPATIENT)
Dept: GENERAL RADIOLOGY | Facility: HOSPITAL | Age: 44
End: 2024-05-09
Attending: STUDENT IN AN ORGANIZED HEALTH CARE EDUCATION/TRAINING PROGRAM
Payer: COMMERCIAL

## 2024-05-09 ENCOUNTER — HOSPITAL ENCOUNTER (EMERGENCY)
Facility: HOSPITAL | Age: 44
Discharge: LEFT BEFORE DISCHARGE | End: 2024-05-09
Admitting: STUDENT IN AN ORGANIZED HEALTH CARE EDUCATION/TRAINING PROGRAM
Payer: COMMERCIAL

## 2024-05-09 VITALS
HEART RATE: 54 BPM | OXYGEN SATURATION: 97 % | SYSTOLIC BLOOD PRESSURE: 119 MMHG | TEMPERATURE: 98.3 F | DIASTOLIC BLOOD PRESSURE: 80 MMHG | RESPIRATION RATE: 16 BRPM

## 2024-05-09 PROCEDURE — 99281 EMR DPT VST MAYX REQ PHY/QHP: CPT | Performed by: STUDENT IN AN ORGANIZED HEALTH CARE EDUCATION/TRAINING PROGRAM

## 2024-05-09 PROCEDURE — 99281 EMR DPT VST MAYX REQ PHY/QHP: CPT

## 2024-05-09 PROCEDURE — 73030 X-RAY EXAM OF SHOULDER: CPT | Mod: RT

## 2024-05-09 ASSESSMENT — COLUMBIA-SUICIDE SEVERITY RATING SCALE - C-SSRS
1. IN THE PAST MONTH, HAVE YOU WISHED YOU WERE DEAD OR WISHED YOU COULD GO TO SLEEP AND NOT WAKE UP?: NO
6. HAVE YOU EVER DONE ANYTHING, STARTED TO DO ANYTHING, OR PREPARED TO DO ANYTHING TO END YOUR LIFE?: NO
2. HAVE YOU ACTUALLY HAD ANY THOUGHTS OF KILLING YOURSELF IN THE PAST MONTH?: NO

## 2024-05-09 NOTE — ED TRIAGE NOTES
Pt reports she was standing on a stool she should not have been on when she fell, landing on her right shoulder, and right leg. Pt denies any loss of conciousness, denies hitting her head. Pt states her right should hurts really bad, pt is seen here cradling her arm bent up to her chest, states this is the most comfortable position.

## 2024-05-17 ENCOUNTER — TELEPHONE (OUTPATIENT)
Dept: FAMILY MEDICINE | Facility: OTHER | Age: 44
End: 2024-05-17

## 2024-05-17 DIAGNOSIS — G89.29 CHRONIC LEFT SHOULDER PAIN: ICD-10-CM

## 2024-05-17 DIAGNOSIS — F41.1 GAD (GENERALIZED ANXIETY DISORDER): ICD-10-CM

## 2024-05-17 DIAGNOSIS — M54.12 CERVICAL RADICULOPATHY: ICD-10-CM

## 2024-05-17 DIAGNOSIS — M25.512 CHRONIC LEFT SHOULDER PAIN: ICD-10-CM

## 2024-05-17 RX ORDER — LORAZEPAM 0.5 MG/1
.5-1 TABLET ORAL
Qty: 20 TABLET | Refills: 0 | OUTPATIENT
Start: 2024-05-17

## 2024-05-17 RX ORDER — HYDROCODONE BITARTRATE AND ACETAMINOPHEN 5; 325 MG/1; MG/1
1 TABLET ORAL DAILY PRN
Qty: 20 TABLET | Refills: 0 | OUTPATIENT
Start: 2024-05-17

## 2024-05-17 NOTE — TELEPHONE ENCOUNTER
With THC in urine, it is 's policy for providers not to prescribe. It will be up to pain doctor to prescribe. Risk for respiratory depression and death with combination. Please advise.

## 2024-05-17 NOTE — TELEPHONE ENCOUNTER
2:14 PM    Reason for Call: Phone Call    Description: pt needs refills for hydrocodone/ativan/    Was an appointment offered for this call? No  If yes : Appointment type              Date    Preferred method for responding to this message: Telephone Call  What is your phone number ?[phone]     If we cannot reach you directly, may we leave a detailed response at the number you provided? Yes    Can this message wait until your PCP/provider returns, if available today? Not applicable    Brunilda Puentes

## 2024-05-17 NOTE — TELEPHONE ENCOUNTER
Had issues with referral. Finally in contact with correct department. Referral is being refaxed today and patient has the correct number. Patient states that she took the last Norco today. Does not understand why she is being weaned off medications if she is going to be seeing a pain doctor.

## 2024-05-17 NOTE — TELEPHONE ENCOUNTER
Norco      Last Written Prescription Date:  5/2/24  Last Fill Quantity: 20,   # refills: 0  Last Office Visit: 4/16/24  Future Office visit:         Ativan      Last Written Prescription Date:  5/2/24  Last Fill Quantity: 20,   # refills: 0  Last Office Visit: 4/16/24  Future Office visit:

## 2024-05-17 NOTE — TELEPHONE ENCOUNTER
I called patient and informed her of the information listed below. She verbalized understanding. She asked that pcp call her to discuss this because she thinks it is not okay to just be cut off. There were issues with the referral that was put in so she wasn't able to find a pain provider nor get in with anyone. She has been off her ativan for a week or 2 now and took her last norco this morning.

## 2024-05-17 NOTE — TELEPHONE ENCOUNTER
I did discuss this Purnima and break in contract with THC in urine. She is aware that she was weaned off Ativan and Norco with last fill. It was on her Norco bottle as well from the pharmacy.

## 2024-05-17 NOTE — TELEPHONE ENCOUNTER
[facility] has received her referral and they will call her to schedule an appt

## 2024-05-18 ENCOUNTER — MYC REFILL (OUTPATIENT)
Dept: FAMILY MEDICINE | Facility: OTHER | Age: 44
End: 2024-05-18

## 2024-05-18 DIAGNOSIS — F34.1 DYSTHYMIA: ICD-10-CM

## 2024-05-20 ENCOUNTER — TELEPHONE (OUTPATIENT)
Dept: FAMILY MEDICINE | Facility: OTHER | Age: 44
End: 2024-05-20

## 2024-05-20 DIAGNOSIS — F41.1 GAD (GENERALIZED ANXIETY DISORDER): ICD-10-CM

## 2024-05-20 DIAGNOSIS — F11.90 CHRONIC, CONTINUOUS USE OF OPIOIDS: Primary | ICD-10-CM

## 2024-05-20 DIAGNOSIS — Z91.148 PAIN MANAGEMENT CONTRACT BROKEN: ICD-10-CM

## 2024-05-20 DIAGNOSIS — G89.29 CHRONIC LEFT SHOULDER PAIN: ICD-10-CM

## 2024-05-20 DIAGNOSIS — M25.512 CHRONIC LEFT SHOULDER PAIN: ICD-10-CM

## 2024-05-20 DIAGNOSIS — M54.12 CERVICAL RADICULOPATHY: ICD-10-CM

## 2024-05-20 RX ORDER — ESCITALOPRAM OXALATE 20 MG/1
20 TABLET ORAL DAILY
Qty: 30 TABLET | Refills: 1 | OUTPATIENT
Start: 2024-05-20

## 2024-05-20 RX ORDER — LORAZEPAM 0.5 MG/1
TABLET ORAL
Qty: 20 TABLET | Refills: 0 | OUTPATIENT
Start: 2024-05-20

## 2024-05-20 RX ORDER — HYDROCODONE BITARTRATE AND ACETAMINOPHEN 5; 325 MG/1; MG/1
TABLET ORAL
Qty: 20 TABLET | Refills: 0 | OUTPATIENT
Start: 2024-05-20

## 2024-05-20 NOTE — TELEPHONE ENCOUNTER
Patient called back and is upset about not having her medication. She stated that her referral was sent to the wrong department again. She stated that she needs a pain rehab referral not a pmr referral. New referral is pended to pcp. Patient was wondering about getting her medication. I did inform her that she will have to make an appointment for her medication. She was upset and schedule appt with registration.

## 2024-05-21 NOTE — TELEPHONE ENCOUNTER
Purnima can come see me for an appointment, but I am unable to prescribe opioids or Ativan. She will need to await pain management. Per conversation, Purnima is aware that she was weaned from medications. Further conversation on this can await her appointment as well. With THC use, per policy and contract unable to prescribe any further.

## 2024-05-27 ASSESSMENT — PATIENT HEALTH QUESTIONNAIRE - PHQ9: SUM OF ALL RESPONSES TO PHQ QUESTIONS 1-9: 7

## 2024-05-28 ASSESSMENT — PATIENT HEALTH QUESTIONNAIRE - PHQ9
10. IF YOU CHECKED OFF ANY PROBLEMS, HOW DIFFICULT HAVE THESE PROBLEMS MADE IT FOR YOU TO DO YOUR WORK, TAKE CARE OF THINGS AT HOME, OR GET ALONG WITH OTHER PEOPLE: SOMEWHAT DIFFICULT
SUM OF ALL RESPONSES TO PHQ QUESTIONS 1-9: 7
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
## 2024-06-05 ENCOUNTER — TELEPHONE (OUTPATIENT)
Dept: FAMILY MEDICINE | Facility: OTHER | Age: 44
End: 2024-06-05

## 2024-06-05 ENCOUNTER — OFFICE VISIT (OUTPATIENT)
Dept: FAMILY MEDICINE | Facility: OTHER | Age: 44
End: 2024-06-05
Attending: NURSE PRACTITIONER
Payer: COMMERCIAL

## 2024-06-05 ENCOUNTER — ANCILLARY PROCEDURE (OUTPATIENT)
Dept: GENERAL RADIOLOGY | Facility: OTHER | Age: 44
End: 2024-06-05
Attending: NURSE PRACTITIONER
Payer: COMMERCIAL

## 2024-06-05 VITALS
WEIGHT: 111.1 LBS | TEMPERATURE: 98.3 F | HEART RATE: 73 BPM | BODY MASS INDEX: 20.44 KG/M2 | SYSTOLIC BLOOD PRESSURE: 112 MMHG | OXYGEN SATURATION: 99 % | HEIGHT: 62 IN | DIASTOLIC BLOOD PRESSURE: 68 MMHG

## 2024-06-05 DIAGNOSIS — F11.90 CHRONIC, CONTINUOUS USE OF OPIOIDS: Primary | ICD-10-CM

## 2024-06-05 DIAGNOSIS — L73.2 HIDRADENITIS SUPPURATIVA: ICD-10-CM

## 2024-06-05 DIAGNOSIS — M62.838 MUSCLE SPASM: ICD-10-CM

## 2024-06-05 DIAGNOSIS — R07.89 STERNUM PAIN: Primary | ICD-10-CM

## 2024-06-05 DIAGNOSIS — Z91.148 PAIN MANAGEMENT CONTRACT BROKEN: ICD-10-CM

## 2024-06-05 DIAGNOSIS — Z02.89 ENCOUNTER FOR COMPLETION OF FORM WITH PATIENT: ICD-10-CM

## 2024-06-05 DIAGNOSIS — G43.909 MIGRAINE WITHOUT STATUS MIGRAINOSUS, NOT INTRACTABLE, UNSPECIFIED MIGRAINE TYPE: ICD-10-CM

## 2024-06-05 DIAGNOSIS — R07.89 STERNUM PAIN: ICD-10-CM

## 2024-06-05 PROCEDURE — G2211 COMPLEX E/M VISIT ADD ON: HCPCS | Performed by: NURSE PRACTITIONER

## 2024-06-05 PROCEDURE — 99214 OFFICE O/P EST MOD 30 MIN: CPT | Performed by: NURSE PRACTITIONER

## 2024-06-05 PROCEDURE — 71130 X-RAY STRENOCLAVIC JT 3/>VWS: CPT | Mod: TC | Performed by: RADIOLOGY

## 2024-06-05 RX ORDER — SUMATRIPTAN 25 MG/1
25 TABLET, FILM COATED ORAL
Qty: 20 TABLET | Refills: 0 | Status: SHIPPED | OUTPATIENT
Start: 2024-06-05 | End: 2024-08-27

## 2024-06-05 ASSESSMENT — PAIN SCALES - GENERAL: PAINLEVEL: SEVERE PAIN (7)

## 2024-06-10 ENCOUNTER — TRANSCRIBE ORDERS (OUTPATIENT)
Dept: OTHER | Age: 44
End: 2024-06-10

## 2024-06-10 DIAGNOSIS — L73.2 HIDRADENITIS SUPPURATIVA: Primary | ICD-10-CM

## 2024-06-11 ENCOUNTER — TELEPHONE (OUTPATIENT)
Dept: DERMATOLOGY | Facility: CLINIC | Age: 44
End: 2024-06-11

## 2024-06-11 NOTE — TELEPHONE ENCOUNTER
Health Call Center    Phone Message    May a detailed message be left on voicemail: yes     Reason for Call: Appointment Intake /Referral Received   Referring Provider Name: Payton Levi APRN CNP in  FAMILY PRACTICE  Diagnosis and/or Symptoms: Hidradenitis suppurativa [L73.2]    Patient has only seen Dr. Jha for HS. Last seen 10/13/2019. Patient would like to know if this referral will count in order to see him again due to him being her dermatologist.     Routing to clinic for review and to assist patient further.    Action Taken: Message routed to:  [facility]    Travel Screening: Not Applicable     Date of Service: 10/13/2019            No need to reply to sender

## 2024-06-17 NOTE — TELEPHONE ENCOUNTER
LVM letting Purnima know were booking out into September. Dr Jha is okay with pt scheduling as a return. Please route to writer if needing assistance with scheduling.

## 2024-06-17 NOTE — TELEPHONE ENCOUNTER
[facility] Call [facility]    Phone Message    May a detailed message be left on voicemail: no     Reason for Call: Other: Need Return HS Appt with Dr. Jha/ No Templates pulling up for Sept or anything in 2024 or 2025.  Please call Purnima with time/date: [phone]     Action Taken: Message routed to:  [facility] DERM/Abhijeet    Travel Screening: Not Applicable

## 2024-06-22 ENCOUNTER — MYC REFILL (OUTPATIENT)
Dept: FAMILY MEDICINE | Facility: OTHER | Age: 44
End: 2024-06-22

## 2024-06-22 DIAGNOSIS — F41.1 GAD (GENERALIZED ANXIETY DISORDER): ICD-10-CM

## 2024-06-24 RX ORDER — BUPROPION HYDROCHLORIDE 150 MG/1
150 TABLET ORAL EVERY MORNING
Qty: 90 TABLET | Refills: 1 | OUTPATIENT
Start: 2024-06-24

## 2024-06-24 RX ORDER — BUPROPION HYDROCHLORIDE 300 MG/1
TABLET ORAL
Qty: 90 TABLET | Refills: 1 | OUTPATIENT
Start: 2024-06-24

## 2024-07-29 DIAGNOSIS — L73.2 HIDRADENITIS SUPPURATIVA: ICD-10-CM

## 2024-07-29 RX ORDER — ADALIMUMAB 40MG/0.4ML
KIT SUBCUTANEOUS
Qty: 12 EACH | Refills: 0 | Status: SHIPPED | OUTPATIENT
Start: 2024-07-29

## 2024-07-29 NOTE — TELEPHONE ENCOUNTER
Humira      Last Written Prescription Date:  7.17.24  Last Fill Quantity: #12,   # refills: 0  Last Office Visit: 6.5.24  Future Office visit:       Routing refill request to provider for review/approval because:  Drug not on the [facility], P or [facility] refill protocol or controlled substance

## 2024-08-27 ENCOUNTER — MYC REFILL (OUTPATIENT)
Dept: FAMILY MEDICINE | Facility: OTHER | Age: 44
End: 2024-08-27

## 2024-08-27 DIAGNOSIS — F90.8 ATTENTION DEFICIT HYPERACTIVITY DISORDER (ADHD), OTHER TYPE: ICD-10-CM

## 2024-08-27 DIAGNOSIS — M62.838 MUSCLE SPASM: ICD-10-CM

## 2024-08-27 DIAGNOSIS — G43.909 MIGRAINE WITHOUT STATUS MIGRAINOSUS, NOT INTRACTABLE, UNSPECIFIED MIGRAINE TYPE: ICD-10-CM

## 2024-08-28 NOTE — TELEPHONE ENCOUNTER
Adderall, Adderall, Imitrex, Zanaflex      Last Written Prescription Date:  7.30.24, 7.30.24, 6.5.24, 6.5.24  Last Fill Quantity: #30, #30, #20, #30,   # refills: 0  Last Office Visit: 6.5.24  Future Office visit:       Routing refill request to provider for review/approval because:  Drug not on the [facility], P or [facility] refill protocol or controlled substance

## 2024-08-29 RX ORDER — DEXTROAMPHETAMINE SACCHARATE, AMPHETAMINE ASPARTATE, DEXTROAMPHETAMINE SULFATE AND AMPHETAMINE SULFATE 7.5; 7.5; 7.5; 7.5 MG/1; MG/1; MG/1; MG/1
30 TABLET ORAL DAILY
Qty: 30 TABLET | Refills: 0 | Status: SHIPPED | OUTPATIENT
Start: 2024-08-29 | End: 2024-09-25

## 2024-08-29 RX ORDER — SUMATRIPTAN 25 MG/1
25 TABLET, FILM COATED ORAL
Qty: 20 TABLET | Refills: 0 | Status: SHIPPED | OUTPATIENT
Start: 2024-08-29

## 2024-08-29 RX ORDER — DEXTROAMPHETAMINE SACCHARATE, AMPHETAMINE ASPARTATE MONOHYDRATE, DEXTROAMPHETAMINE SULFATE AND AMPHETAMINE SULFATE 7.5; 7.5; 7.5; 7.5 MG/1; MG/1; MG/1; MG/1
30 CAPSULE, EXTENDED RELEASE ORAL DAILY
Qty: 30 CAPSULE | Refills: 0 | Status: SHIPPED | OUTPATIENT
Start: 2024-08-29 | End: 2024-09-25

## 2024-09-25 ENCOUNTER — MYC REFILL (OUTPATIENT)
Dept: FAMILY MEDICINE | Facility: OTHER | Age: 44
End: 2024-09-25

## 2024-09-25 DIAGNOSIS — F34.1 DYSTHYMIA: ICD-10-CM

## 2024-09-25 RX ORDER — ESCITALOPRAM OXALATE 20 MG/1
20 TABLET ORAL DAILY
Qty: 90 TABLET | Refills: 1 | OUTPATIENT
Start: 2024-09-25

## 2024-09-25 NOTE — TELEPHONE ENCOUNTER
Lexapro       Last Written Prescription Date:6/18/2024  Last Fill Quantity: 90,   # refills: 1  Last Office Visit: 6/05/2024  Future Office visit:

## 2024-10-14 DIAGNOSIS — L73.2 HIDRADENITIS SUPPURATIVA: ICD-10-CM

## 2024-10-14 NOTE — TELEPHONE ENCOUNTER
Humira      Last Written Prescription Date:  10.1.24  Last Fill Quantity: #12mL,   # refills: 0  Last Office Visit: 6.5.24  Future Office visit:       Routing refill request to provider for review/approval because:  Drug not on the [facility], P or [facility] refill protocol or controlled substance

## 2024-11-07 SDOH — HEALTH STABILITY: PHYSICAL HEALTH: ON AVERAGE, HOW MANY DAYS PER WEEK DO YOU ENGAGE IN MODERATE TO STRENUOUS EXERCISE (LIKE A BRISK WALK)?: 5 DAYS

## 2024-11-07 SDOH — HEALTH STABILITY: PHYSICAL HEALTH: ON AVERAGE, HOW MANY MINUTES DO YOU ENGAGE IN EXERCISE AT THIS LEVEL?: 30 MIN

## 2024-11-07 ASSESSMENT — SOCIAL DETERMINANTS OF HEALTH (SDOH): HOW OFTEN DO YOU GET TOGETHER WITH FRIENDS OR RELATIVES?: MORE THAN THREE TIMES A WEEK
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
## 2024-11-12 ENCOUNTER — OFFICE VISIT (OUTPATIENT)
Dept: FAMILY MEDICINE | Facility: OTHER | Age: 44
End: 2024-11-12
Attending: NURSE PRACTITIONER
Payer: COMMERCIAL

## 2024-11-12 VITALS
WEIGHT: 108.1 LBS | SYSTOLIC BLOOD PRESSURE: 114 MMHG | RESPIRATION RATE: 18 BRPM | OXYGEN SATURATION: 98 % | TEMPERATURE: 98.8 F | HEART RATE: 66 BPM | DIASTOLIC BLOOD PRESSURE: 60 MMHG | BODY MASS INDEX: 19.15 KG/M2 | HEIGHT: 63 IN

## 2024-11-12 DIAGNOSIS — Z12.31 ENCOUNTER FOR SCREENING MAMMOGRAM FOR BREAST CANCER: ICD-10-CM

## 2024-11-12 DIAGNOSIS — M54.12 CERVICAL RADICULOPATHY: ICD-10-CM

## 2024-11-12 DIAGNOSIS — M77.8 LEFT SHOULDER TENDINITIS: ICD-10-CM

## 2024-11-12 DIAGNOSIS — F90.8 OTHER SPECIFIED ATTENTION DEFICIT HYPERACTIVITY DISORDER (ADHD): ICD-10-CM

## 2024-11-12 DIAGNOSIS — G89.29 CHRONIC LEFT SHOULDER PAIN: ICD-10-CM

## 2024-11-12 DIAGNOSIS — G89.4 CHRONIC PAIN SYNDROME: ICD-10-CM

## 2024-11-12 DIAGNOSIS — M25.512 CHRONIC LEFT SHOULDER PAIN: ICD-10-CM

## 2024-11-12 DIAGNOSIS — T74.91XD DOMESTIC VIOLENCE OF ADULT, SUBSEQUENT ENCOUNTER: ICD-10-CM

## 2024-11-12 DIAGNOSIS — Z00.00 ROUTINE GENERAL MEDICAL EXAMINATION AT A HEALTH CARE FACILITY: Primary | ICD-10-CM

## 2024-11-12 PROBLEM — F11.90 CHRONIC, CONTINUOUS USE OF OPIOIDS: Status: RESOLVED | Noted: 2022-09-22 | Resolved: 2024-11-12

## 2024-11-12 PROCEDURE — 99213 OFFICE O/P EST LOW 20 MIN: CPT | Mod: 25 | Performed by: NURSE PRACTITIONER

## 2024-11-12 PROCEDURE — 99396 PREV VISIT EST AGE 40-64: CPT | Performed by: NURSE PRACTITIONER

## 2024-11-12 RX ORDER — DEXTROAMPHETAMINE SACCHARATE, AMPHETAMINE ASPARTATE, DEXTROAMPHETAMINE SULFATE AND AMPHETAMINE SULFATE 2.5; 2.5; 2.5; 2.5 MG/1; MG/1; MG/1; MG/1
10 TABLET ORAL DAILY
Qty: 11 TABLET | Refills: 0 | Status: SHIPPED | OUTPATIENT
Start: 2024-11-12

## 2024-11-12 ASSESSMENT — PAIN SCALES - GENERAL: PAINLEVEL_OUTOF10: SEVERE PAIN (7)

## 2024-11-12 ASSESSMENT — PATIENT HEALTH QUESTIONNAIRE - PHQ9: SUM OF ALL RESPONSES TO PHQ QUESTIONS 1-9: 2

## 2024-11-12 NOTE — PATIENT INSTRUCTIONS
Patient Education   Preventive Care Advice   This is general advice given by our system to help you stay healthy. However, your care team may have specific advice just for you. Please talk to your care team about your preventive care needs.  Nutrition  Eat 5 or more servings of fruits and vegetables each day.  Try wheat bread, brown rice and whole grain pasta (instead of white bread, rice, and pasta).  Get enough calcium and vitamin D. Check the label on foods and aim for 100% of the RDA (recommended daily allowance).  Lifestyle  Exercise at least 150 minutes each week  (30 minutes a day, 5 days a week).  Do muscle strengthening activities 2 days a week. These help control your weight and prevent disease.  No smoking.  Wear sunscreen to prevent skin cancer.  Have a dental exam and cleaning every 6 months.  Yearly exams  See your health care team every year to talk about:  Any changes in your health.  Any medicines your care team has prescribed.  Preventive care, family planning, and ways to prevent chronic diseases.  Shots (vaccines)   HPV shots (up to age 26), if you've never had them before.  Hepatitis B shots (up to age 59), if you've never had them before.  COVID-19 shot: Get this shot when it's due.  Flu shot: Get a flu shot every year.  Tetanus shot: Get a tetanus shot every 10 years.  Pneumococcal, hepatitis A, and RSV shots: Ask your care team if you need these based on your risk.  Shingles shot (for age 50 and up)  General health tests  Diabetes screening:  Starting at age 35, Get screened for diabetes at least every 3 years.  If you are younger than age 35, ask your care team if you should be screened for diabetes.  Cholesterol test: At age 39, start having a cholesterol test every 5 years, or more often if advised.  Bone density scan (DEXA): At age 50, ask your care team if you should have this scan for osteoporosis (brittle bones).  Hepatitis C: Get tested at least once in your life.  STIs (sexually  transmitted infections)  Before age 24: Ask your care team if you should be screened for STIs.  After age 24: Get screened for STIs if you're at risk. You are at risk for STIs (including HIV) if:  You are sexually active with more than one person.  You don't use condoms every time.  You or a partner was diagnosed with a sexually transmitted infection.  If you are at risk for HIV, ask about PrEP medicine to prevent HIV.  Get tested for HIV at least once in your life, whether you are at risk for HIV or not.  Cancer screening tests  Cervical cancer screening: If you have a cervix, begin getting regular cervical cancer screening tests starting at age 21.  Breast cancer scan (mammogram): If you've ever had breasts, begin having regular mammograms starting at age 40. This is a scan to check for breast cancer.  Colon cancer screening: It is important to start screening for colon cancer at age 45.  Have a colonoscopy test every 10 years (or more often if you're at risk) Or, ask your provider about stool tests like a FIT test every year or Cologuard test every 3 years.  To learn more about your testing options, visit:   .  For help making a decision, visit:   https://bit.ly/eh36766.  Prostate cancer screening test: If you have a prostate, ask your care team if a prostate cancer screening test (PSA) at age 55 is right for you.  Lung cancer screening: If you are a current or former smoker ages 50 to 80, ask your care team if ongoing lung cancer screenings are right for you.  For informational purposes only. Not to replace the advice of your health care provider. Copyright   2023 [facility]. All rights reserved. Clinically reviewed by the [facility]. [employer] 503560 - REV 01/24.  Learning About Stress  What is stress?     Stress is your body's response to a hard situation. Your body can have a physical, emotional, or mental response. Stress is a fact of life for most people, and it  affects everyone differently. What causes stress for you may not be stressful for someone else.  A lot of things can cause stress. You may feel stress when you go on a job interview, take a test, or run a race. This kind of short-term stress is normal and even useful. It can help you if you need to work hard or react quickly. For example, stress can help you finish an important job on time.  Long-term stress is caused by ongoing stressful situations or events. Examples of long-term stress include long-term health problems, ongoing problems at work, or conflicts in your family. Long-term stress can harm your health.  How does stress affect your health?  When you are stressed, your body responds as though you are in danger. It makes hormones that speed up your heart, make you breathe faster, and give you a burst of energy. This is called the fight-or-flight stress response. If the stress is over quickly, your body goes back to normal and no harm is done.  But if stress happens too often or lasts too long, it can have bad effects. Long-term stress can make you more likely to get sick, and it can make symptoms of some diseases worse. If you tense up when you are stressed, you may develop neck, shoulder, or low back pain. Stress is linked to high blood pressure and heart disease.  Stress also harms your emotional health. It can make you hathaway, tense, or depressed. Your relationships may suffer, and you may not do well at work or school.  What can you do to manage stress?  You can try these things to help manage stress:   Do something active. Exercise or activity can help reduce stress. Walking is a great way to get started. Even everyday activities such as housecleaning or yard work can help.  Try yoga or donny chi. These techniques combine exercise and meditation. You may need some training at first to learn them.  Do something you enjoy. For example, listen to music or go to a movie. Practice your hobby or do volunteer  "work.  Meditate. This can help you relax, because you are not worrying about what happened before or what may happen in the future.  Do guided imagery. Imagine yourself in any setting that helps you feel calm. You can use online videos, books, or a teacher to guide you.  Do breathing exercises. For example:  From a standing position, bend forward from the waist with your knees slightly bent. Let your arms dangle close to the floor.  Breathe in slowly and deeply as you return to a standing position. Roll up slowly and lift your head last.  Hold your breath for just a few seconds in the standing position.  Breathe out slowly and bend forward from the waist.  Let your feelings out. Talk, laugh, cry, and express anger when you need to. Talking with supportive friends or family, a counselor, or a mohsen leader about your feelings is a healthy way to relieve stress. Avoid discussing your feelings with people who make you feel worse.  Write. It may help to write about things that are bothering you. This helps you find out how much stress you feel and what is causing it. When you know this, you can find better ways to cope.  What can you do to prevent stress?  You might try some of these things to help prevent stress:  Manage your time. This helps you find time to do the things you want and need to do.  Get enough sleep. Your body recovers from the stresses of the day while you are sleeping.  Get support. Your family, friends, and community can make a difference in how you experience stress.  Limit your news feed. Avoid or limit time on social media or news that may make you feel stressed.  Do something active. Exercise or activity can help reduce stress. Walking is a great way to get started.  Where can you learn more?  Go to https://www.[employer].net/patiented  Enter N032 in the search box to learn more about \"Learning About Stress.\"  Current as of: October 24, 2023  Content Version: 14.2 2024 [employer]. "   Care instructions adapted under license by your healthcare professional. If you have questions about a medical condition or this instruction, always ask your healthcare professional. [employer] disclaims any warranty or liability for your use of this information.    Substance Use Disorder: Care Instructions  Overview     You can improve your life and health by stopping your use of alcohol or drugs. When you don't drink or use drugs, you may feel and sleep better. You may get along better with your family, friends, and coworkers. There are medicines and programs that can help with substance use disorder.  How can you care for yourself at home?  Here are some ways to help you stay sober and prevent relapse.  If you have been given medicine to help keep you sober or reduce your cravings, be sure to take it exactly as prescribed.  Talk to your doctor about programs that can help you stop using drugs or drinking alcohol.  Do not keep alcohol or drugs in your home.  Plan ahead. Think about what you'll say if other people ask you to drink or use drugs. Try not to spend time with people who drink or use drugs.  Use the time and money spent on drinking or drugs to do something that's important to you.  Preventing a relapse  Have a plan to deal with relapse. Learn to recognize changes in your thinking that lead you to drink or use drugs. Get help before you start to drink or use drugs again.  Try to stay away from situations, friends, or places that may lead you to drink or use drugs.  If you feel the need to drink alcohol or use drugs again, seek help right away. Call a trusted friend or family member. Some people get support from organizations such as Narcotics Anonymous or Meludia or from treatment facilities.  If you relapse, get help as soon as you can. Some people make a plan with another person that outlines what they want that person to do for them if they relapse. The plan usually includes how  to handle the relapse and who to notify in case of relapse.  Don't give up. Remember that a relapse doesn't mean that you have failed. Use the experience to learn the triggers that lead you to drink or use drugs. Then quit again. Recovery is a lifelong process. Many people have several relapses before they are able to quit for good.  Follow-up care is a key part of your treatment and safety. Be sure to make and go to all appointments, and call your doctor if you are having problems. It's also a good idea to know your test results and keep a list of the medicines you take.  When should you call for help?   Call 911  anytime you think you may need emergency care. For example, call if you or someone else:    Has overdosed or has withdrawal signs. Be sure to tell the emergency workers that you are or someone else is using or trying to quit using drugs. Overdose or withdrawal signs may include:  Losing consciousness.  Seizure.  Seeing or hearing things that aren't there (hallucinations).     Is thinking or talking about suicide or harming others.   Where to get help 24 hours a day, 7 days a week   If you or someone you know talks about suicide, self-harm, a mental health crisis, a substance use crisis, or any other kind of emotional distress, get help right away. You can:    Call the Suicide and Crisis Lifeline at 138.     Call 1-284-581-TALK (1-931.719.5817).     Text HOME to 118334 to access the Crisis Text Line.   Consider saving these numbers in your phone.  Go to [employer] for more information or to chat online.  Call your doctor now or seek immediate medical care if:    You are having withdrawal symptoms. These may include nausea or vomiting, sweating, shakiness, and anxiety.   Watch closely for changes in your health, and be sure to contact your doctor if:    You have a relapse.     You need more help or support to stop.   Where can you learn more?  Go to https://www.healthwise.net/patiented  Enter H573 in  "the search box to learn more about \"Substance Use Disorder: Care Instructions.\"  Current as of: November 15, 2023  Content Version: 14.2 2024 [facility].   Care instructions adapted under license by your healthcare professional. If you have questions about a medical condition or this instruction, always ask your healthcare professional. Healthwise[employer] disclaims any warranty or liability for your use of this information.       "

## 2024-11-19 ENCOUNTER — TELEPHONE (OUTPATIENT)
Dept: FAMILY MEDICINE | Facility: OTHER | Age: 44
End: 2024-11-19
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
## 2024-11-19 NOTE — TELEPHONE ENCOUNTER
I called patient and informed her that yes, she would need an appt otherwise she can go to urgent care. She verbalized understanding.

## 2024-12-17 ENCOUNTER — MYC REFILL (OUTPATIENT)
Dept: FAMILY MEDICINE | Facility: OTHER | Age: 44
End: 2024-12-17

## 2024-12-17 DIAGNOSIS — E89.40 SURGICAL MENOPAUSE: ICD-10-CM

## 2024-12-17 DIAGNOSIS — F41.1 GAD (GENERALIZED ANXIETY DISORDER): ICD-10-CM

## 2024-12-17 DIAGNOSIS — B00.9 HERPES SIMPLEX VIRUS INFECTION: ICD-10-CM

## 2024-12-17 DIAGNOSIS — F34.1 DYSTHYMIA: ICD-10-CM

## 2024-12-17 DIAGNOSIS — F90.8 OTHER SPECIFIED ATTENTION DEFICIT HYPERACTIVITY DISORDER (ADHD): ICD-10-CM

## 2024-12-18 RX ORDER — ESCITALOPRAM OXALATE 20 MG/1
20 TABLET ORAL DAILY
Qty: 90 TABLET | Refills: 0 | Status: SHIPPED | OUTPATIENT
Start: 2024-12-18

## 2024-12-18 RX ORDER — DEXTROAMPHETAMINE SACCHARATE, AMPHETAMINE ASPARTATE MONOHYDRATE, DEXTROAMPHETAMINE SULFATE AND AMPHETAMINE SULFATE 7.5; 7.5; 7.5; 7.5 MG/1; MG/1; MG/1; MG/1
30 CAPSULE, EXTENDED RELEASE ORAL DAILY
Qty: 30 CAPSULE | Refills: 0 | Status: SHIPPED | OUTPATIENT
Start: 2024-12-18

## 2024-12-18 RX ORDER — ESTRADIOL 1 MG/1
1 TABLET ORAL DAILY
Qty: 90 TABLET | Refills: 0 | Status: SHIPPED | OUTPATIENT
Start: 2024-12-18

## 2024-12-18 RX ORDER — DEXTROAMPHETAMINE SACCHARATE, AMPHETAMINE ASPARTATE, DEXTROAMPHETAMINE SULFATE AND AMPHETAMINE SULFATE 7.5; 7.5; 7.5; 7.5 MG/1; MG/1; MG/1; MG/1
30 TABLET ORAL DAILY
Qty: 30 TABLET | Refills: 0 | Status: SHIPPED | OUTPATIENT
Start: 2024-12-18

## 2024-12-18 RX ORDER — BUPROPION HYDROCHLORIDE 150 MG/1
150 TABLET ORAL EVERY MORNING
Qty: 90 TABLET | Refills: 1 | Status: SHIPPED | OUTPATIENT
Start: 2024-12-18

## 2024-12-18 RX ORDER — VALACYCLOVIR HYDROCHLORIDE 500 MG/1
500 TABLET, FILM COATED ORAL DAILY
Qty: 90 TABLET | Refills: 3 | OUTPATIENT
Start: 2024-12-18

## 2024-12-18 RX ORDER — DEXTROAMPHETAMINE SACCHARATE, AMPHETAMINE ASPARTATE, DEXTROAMPHETAMINE SULFATE AND AMPHETAMINE SULFATE 2.5; 2.5; 2.5; 2.5 MG/1; MG/1; MG/1; MG/1
10 TABLET ORAL DAILY
Qty: 30 TABLET | Refills: 0 | Status: SHIPPED | OUTPATIENT
Start: 2024-12-18

## 2024-12-18 RX ORDER — BUPROPION HYDROCHLORIDE 300 MG/1
TABLET ORAL
Qty: 90 TABLET | Refills: 1 | Status: SHIPPED | OUTPATIENT
Start: 2024-12-18

## 2024-12-18 NOTE — TELEPHONE ENCOUNTER
Wellbutrin 300 mg  Last Written Prescription Date: 5/2/24  Last Fill Quantity: 90 # of Refills: 1  Last Office Visit: 11/12/24    Wellbutrin 150 mg  Last Written Prescription Date: 5/2/24  Last Fill Quantity: 90 # of Refills: 1  Last Office Visit: 11/12/24    Valtrex  Last Written Prescription Date: 5/2/24  Last Fill Quantity: 90 # of Refills: 3  Last Office Visit: 11/12/24    Lexapro  Last Written Prescription Date: 6/18/24  Last Fill Quantity: 90 # of Refills: 1  Last Office Visit: 11/12/24    Estrace  Last Written Prescription Date: 10/10/24  Last Fill Quantity: 90 # of Refills: 0  Last Office Visit: 11/12/24    Adderall XR 30 mg  Last Written Prescription Date: 11/21/24  Last Fill Quantity: 30 # of Refills: 0  Last Office Visit: 11/12/24    Adderall 30 mg  Last Written Prescription Date: 11/21/24  Last Fill Quantity: 30 # of Refills: 0  Last Office Visit: 11/12/24    Adderall 10 mg  Last Written Prescription Date: 11/21/24  Last Fill Quantity: 30 # of Refills: 0  Last Office Visit: 11/12/24

## 2024-12-23 ENCOUNTER — LAB (OUTPATIENT)
Dept: LAB | Facility: OTHER | Age: 44
End: 2024-12-23
Payer: COMMERCIAL

## 2024-12-23 DIAGNOSIS — Z00.00 ROUTINE GENERAL MEDICAL EXAMINATION AT A HEALTH CARE FACILITY: ICD-10-CM

## 2024-12-23 LAB
ALBUMIN SERPL BCG-MCNC: 4.4 G/DL (ref 3.5–5.2)
ALP SERPL-CCNC: 65 U/L (ref 40–150)
ALT SERPL W P-5'-P-CCNC: 23 U/L (ref 0–50)
ANION GAP SERPL CALCULATED.3IONS-SCNC: 7 MMOL/L (ref 7–15)
AST SERPL W P-5'-P-CCNC: 25 U/L (ref 0–45)
BILIRUB SERPL-MCNC: 0.3 MG/DL
BUN SERPL-MCNC: 14.4 MG/DL (ref 6–20)
CALCIUM SERPL-MCNC: 9.3 MG/DL (ref 8.8–10.4)
CHLORIDE SERPL-SCNC: 105 MMOL/L (ref 98–107)
CHOLEST SERPL-MCNC: 148 MG/DL
CREAT SERPL-MCNC: 0.9 MG/DL (ref 0.51–0.95)
EGFRCR SERPLBLD CKD-EPI 2021: 81 ML/MIN/1.73M2
ERYTHROCYTE [DISTWIDTH] IN BLOOD BY AUTOMATED COUNT: 13 % (ref 10–15)
FASTING STATUS PATIENT QL REPORTED: YES
FASTING STATUS PATIENT QL REPORTED: YES
GLUCOSE SERPL-MCNC: 93 MG/DL (ref 70–99)
HCO3 SERPL-SCNC: 29 MMOL/L (ref 22–29)
HCT VFR BLD AUTO: 40.4 % (ref 35–47)
HDLC SERPL-MCNC: 72 MG/DL
HGB BLD-MCNC: 14 G/DL (ref 11.7–15.7)
LDLC SERPL CALC-MCNC: 70 MG/DL
MCH RBC QN AUTO: 34.3 PG (ref 26.5–33)
MCHC RBC AUTO-ENTMCNC: 34.7 G/DL (ref 31.5–36.5)
MCV RBC AUTO: 99 FL (ref 78–100)
NONHDLC SERPL-MCNC: 76 MG/DL
PLATELET # BLD AUTO: 274 10E3/UL (ref 150–450)
POTASSIUM SERPL-SCNC: 4.7 MMOL/L (ref 3.4–5.3)
PROT SERPL-MCNC: 6.9 G/DL (ref 6.4–8.3)
RBC # BLD AUTO: 4.08 10E6/UL (ref 3.8–5.2)
SODIUM SERPL-SCNC: 141 MMOL/L (ref 135–145)
TRIGL SERPL-MCNC: 32 MG/DL
TSH SERPL DL<=0.005 MIU/L-ACNC: 1.3 UIU/ML (ref 0.3–4.2)
WBC # BLD AUTO: 8.3 10E3/UL (ref 4–11)

## 2024-12-23 PROCEDURE — 85027 COMPLETE CBC AUTOMATED: CPT

## 2024-12-23 PROCEDURE — 84443 ASSAY THYROID STIM HORMONE: CPT

## 2024-12-23 PROCEDURE — 80061 LIPID PANEL: CPT

## 2024-12-23 PROCEDURE — 80053 COMPREHEN METABOLIC PANEL: CPT

## 2024-12-23 PROCEDURE — 36415 COLL VENOUS BLD VENIPUNCTURE: CPT

## 2025-01-15 ENCOUNTER — ANCILLARY PROCEDURE (OUTPATIENT)
Dept: GENERAL RADIOLOGY | Facility: OTHER | Age: 45
End: 2025-01-15
Attending: NURSE PRACTITIONER
Payer: COMMERCIAL

## 2025-01-15 ENCOUNTER — OFFICE VISIT (OUTPATIENT)
Dept: FAMILY MEDICINE | Facility: OTHER | Age: 45
End: 2025-01-15
Attending: NURSE PRACTITIONER
Payer: COMMERCIAL

## 2025-01-15 VITALS
BODY MASS INDEX: 19.15 KG/M2 | TEMPERATURE: 97.8 F | OXYGEN SATURATION: 98 % | DIASTOLIC BLOOD PRESSURE: 68 MMHG | HEIGHT: 63 IN | HEART RATE: 62 BPM | SYSTOLIC BLOOD PRESSURE: 124 MMHG | WEIGHT: 108.1 LBS

## 2025-01-15 DIAGNOSIS — R07.81 RIB PAIN ON RIGHT SIDE: ICD-10-CM

## 2025-01-15 DIAGNOSIS — R07.89 STERNUM PAIN: ICD-10-CM

## 2025-01-15 DIAGNOSIS — T74.91XD DOMESTIC VIOLENCE OF ADULT, SUBSEQUENT ENCOUNTER: ICD-10-CM

## 2025-01-15 DIAGNOSIS — R53.83 FATIGUE, UNSPECIFIED TYPE: Primary | ICD-10-CM

## 2025-01-15 LAB
IRON BINDING CAPACITY (ROCHE): 255 UG/DL (ref 240–430)
IRON SATN MFR SERPL: 17 % (ref 15–46)
IRON SERPL-MCNC: 44 UG/DL (ref 37–145)

## 2025-01-15 PROCEDURE — 71100 X-RAY EXAM RIBS UNI 2 VIEWS: CPT | Mod: TC | Performed by: RADIOLOGY

## 2025-01-15 ASSESSMENT — PAIN SCALES - GENERAL: PAINLEVEL_OUTOF10: SEVERE PAIN (6)

## 2025-01-15 ASSESSMENT — ANXIETY QUESTIONNAIRES
3. WORRYING TOO MUCH ABOUT DIFFERENT THINGS: SEVERAL DAYS
1. FEELING NERVOUS, ANXIOUS, OR ON EDGE: SEVERAL DAYS
GAD7 TOTAL SCORE: 4
GAD7 TOTAL SCORE: 4
6. BECOMING EASILY ANNOYED OR IRRITABLE: NOT AT ALL
7. FEELING AFRAID AS IF SOMETHING AWFUL MIGHT HAPPEN: NOT AT ALL
2. NOT BEING ABLE TO STOP OR CONTROL WORRYING: SEVERAL DAYS
IF YOU CHECKED OFF ANY PROBLEMS ON THIS QUESTIONNAIRE, HOW DIFFICULT HAVE THESE PROBLEMS MADE IT FOR YOU TO DO YOUR WORK, TAKE CARE OF THINGS AT HOME, OR GET ALONG WITH OTHER PEOPLE: SOMEWHAT DIFFICULT
8. IF YOU CHECKED OFF ANY PROBLEMS, HOW DIFFICULT HAVE THESE MADE IT FOR YOU TO DO YOUR WORK, TAKE CARE OF THINGS AT HOME, OR GET ALONG WITH OTHER PEOPLE?: SOMEWHAT DIFFICULT
7. FEELING AFRAID AS IF SOMETHING AWFUL MIGHT HAPPEN: NOT AT ALL
4. TROUBLE RELAXING: SEVERAL DAYS
GAD7 TOTAL SCORE: 4
5. BEING SO RESTLESS THAT IT IS HARD TO SIT STILL: NOT AT ALL
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
## 2025-01-16 ENCOUNTER — MYC REFILL (OUTPATIENT)
Dept: FAMILY MEDICINE | Facility: OTHER | Age: 45
End: 2025-01-16

## 2025-01-16 DIAGNOSIS — G43.909 MIGRAINE WITHOUT STATUS MIGRAINOSUS, NOT INTRACTABLE, UNSPECIFIED MIGRAINE TYPE: ICD-10-CM

## 2025-01-16 DIAGNOSIS — M62.838 MUSCLE SPASM: ICD-10-CM

## 2025-01-16 DIAGNOSIS — F90.8 OTHER SPECIFIED ATTENTION DEFICIT HYPERACTIVITY DISORDER (ADHD): ICD-10-CM

## 2025-01-16 LAB — FOLATE SERPL-MCNC: 5.7 NG/ML (ref 4.6–34.8)

## 2025-01-17 DIAGNOSIS — M62.838 MUSCLE SPASM: ICD-10-CM

## 2025-01-17 DIAGNOSIS — G43.909 MIGRAINE WITHOUT STATUS MIGRAINOSUS, NOT INTRACTABLE, UNSPECIFIED MIGRAINE TYPE: ICD-10-CM

## 2025-01-17 LAB — VIT B12 SERPL-MCNC: >4000 PG/ML (ref 232–1245)

## 2025-01-17 NOTE — TELEPHONE ENCOUNTER
amphetamine-dextroamphetamine (ADDERALL) 10 MG tablet           Last Written Prescription Date:  12/18/24  Last Fill Quantity: 30,   # refills: 0  Last Office Visit: 1/15/25  Future Office visit:       Routing refill request to provider for review/approval because:  Drug not on the [facility], [facility] or [facility] refill protocol or controlled substance

## 2025-01-20 RX ORDER — SUMATRIPTAN SUCCINATE 25 MG/1
TABLET ORAL
Qty: 20 TABLET | Refills: 0 | Status: SHIPPED | OUTPATIENT
Start: 2025-01-20

## 2025-01-20 RX ORDER — DEXTROAMPHETAMINE SACCHARATE, AMPHETAMINE ASPARTATE, DEXTROAMPHETAMINE SULFATE AND AMPHETAMINE SULFATE 2.5; 2.5; 2.5; 2.5 MG/1; MG/1; MG/1; MG/1
10 TABLET ORAL DAILY
Qty: 30 TABLET | Refills: 0 | Status: SHIPPED | OUTPATIENT
Start: 2025-01-20

## 2025-01-20 RX ORDER — SUMATRIPTAN SUCCINATE 25 MG/1
25 TABLET ORAL
Qty: 20 TABLET | Refills: 0 | Status: SHIPPED | OUTPATIENT
Start: 2025-01-20

## 2025-01-21 DIAGNOSIS — R79.89 INCREASED VITAMIN B12 LEVEL: Primary | ICD-10-CM

## 2025-03-16 ENCOUNTER — APPOINTMENT (OUTPATIENT)
Dept: CT IMAGING | Facility: HOSPITAL | Age: 45
End: 2025-03-16
Attending: PHYSICIAN ASSISTANT
Payer: MEDICAID

## 2025-03-16 ENCOUNTER — HOSPITAL ENCOUNTER (EMERGENCY)
Facility: HOSPITAL | Age: 45
Discharge: HOME | End: 2025-03-16
Attending: PHYSICIAN ASSISTANT
Payer: MEDICAID

## 2025-03-16 VITALS
HEART RATE: 59 BPM | OXYGEN SATURATION: 99 % | RESPIRATION RATE: 16 BRPM | DIASTOLIC BLOOD PRESSURE: 87 MMHG | SYSTOLIC BLOOD PRESSURE: 144 MMHG | TEMPERATURE: 98.9 F

## 2025-03-16 DIAGNOSIS — S22.31XA CLOSED FRACTURE OF ONE RIB OF RIGHT SIDE, INITIAL ENCOUNTER: ICD-10-CM

## 2025-03-16 PROCEDURE — 99284 EMERGENCY DEPT VISIT MOD MDM: CPT | Mod: 25

## 2025-03-16 PROCEDURE — 250N000013 HC RX MED GY IP 250 OP 250 PS 637: Performed by: PHYSICIAN ASSISTANT

## 2025-03-16 PROCEDURE — 71250 CT THORAX DX C-: CPT

## 2025-03-16 PROCEDURE — 99284 EMERGENCY DEPT VISIT MOD MDM: CPT | Performed by: PHYSICIAN ASSISTANT

## 2025-03-16 RX ORDER — DIAZEPAM 5 MG/1
5 TABLET ORAL EVERY 6 HOURS PRN
Qty: 12 TABLET | Refills: 0 | Status: SHIPPED | OUTPATIENT
Start: 2025-03-16

## 2025-03-16 RX ORDER — LIDOCAINE 4 G/G
1 PATCH TOPICAL ONCE
Status: DISCONTINUED | OUTPATIENT
Start: 2025-03-16 | End: 2025-03-16 | Stop reason: HOSPADM

## 2025-03-16 RX ORDER — LIDOCAINE 4 G/G
1 PATCH TOPICAL EVERY 24 HOURS
Qty: 7 PATCH | Refills: 0 | Status: SHIPPED | OUTPATIENT
Start: 2025-03-16

## 2025-03-16 RX ORDER — LORAZEPAM 1 MG/1
1 TABLET ORAL ONCE
Status: COMPLETED | OUTPATIENT
Start: 2025-03-16 | End: 2025-03-16

## 2025-03-16 RX ORDER — KETOROLAC TROMETHAMINE 10 MG/1
10 TABLET, FILM COATED ORAL EVERY 6 HOURS PRN
Qty: 20 TABLET | Refills: 0 | Status: SHIPPED | OUTPATIENT
Start: 2025-03-16

## 2025-03-16 RX ADMIN — LIDOCAINE 1 PATCH: 4 PATCH TOPICAL at 12:21

## 2025-03-16 RX ADMIN — LORAZEPAM 1 MG: 1 TABLET ORAL at 12:21

## 2025-03-16 ASSESSMENT — ACTIVITIES OF DAILY LIVING (ADL)
ADLS_ACUITY_SCORE: 41

## 2025-03-16 ASSESSMENT — COLUMBIA-SUICIDE SEVERITY RATING SCALE - C-SSRS
2. HAVE YOU ACTUALLY HAD ANY THOUGHTS OF KILLING YOURSELF IN THE PAST MONTH?: NO
6. HAVE YOU EVER DONE ANYTHING, STARTED TO DO ANYTHING, OR PREPARED TO DO ANYTHING TO END YOUR LIFE?: NO
1. IN THE PAST MONTH, HAVE YOU WISHED YOU WERE DEAD OR WISHED YOU COULD GO TO SLEEP AND NOT WAKE UP?: NO

## 2025-03-16 ASSESSMENT — ENCOUNTER SYMPTOMS: FEVER: 0

## 2025-03-16 NOTE — ED NOTES
"Patient discharged to Home at this time. Patient given written and verbal discharge instructions regarding home care, follow-up, and medications. Patient verbalized understanding of all discharge instructions. Rest and hydration encouraged. Patient encouraged to return to the ED if they experience new, worsening, or concerning symptoms.     Patients RX for valium, Toradol, and lidocaine sent to [facility] pharmacy.    Patient to follow-up with PCP this week.    At discharge patient states \"so nothing really for pain then?, I won't take the valium, it makes me sleepy.\"  Explained to patient that she can use the valium at night, lidocaine patches, tylenol, and an NSAID such as toradol. Also explained that it's import to cough and deep breath while splinting the area of pain to prevent pneumonia. Pt appeared frustrated with this answer and states \"I'm just tired of being in pain, if they know its this painful why won't they given me anything stronger?\"    " intermittent

## 2025-03-16 NOTE — DISCHARGE INSTRUCTIONS
Pain medications and lidocaine as needed.    Follow-up in the clinic this week for recheck.    Coughing and deep breathing are important.    Please return here for any other questions or concerns.

## 2025-03-16 NOTE — ED PROVIDER NOTES
History     Chief Complaint   Patient presents with    Rib Pain     The history is provided by the patient.     Purnima Fallon is a 44 year old female who presented to the emergency department ambulatory for evaluation of several months of right sided chest discomfort.  She was seen in January by her primary for similar and was supposed to have an MRI but she canceled it due to claustrophobia.  Ongoing discomfort that is daily and worsening.  Very focal in nature.  Very superficial.  She denies any substernal chest pain, significant dyspnea, hemoptysis, fatigue, unilateral leg swelling, history of cancer, or estrogen use.  Denies any recent falls.  She denies any skin changes.    Allergies:  No Known Allergies    Problem List:    Patient Active Problem List    Diagnosis Date Noted    Surgical menopause 02/11/2022     Priority: Medium    Trigger thumb of both thumbs 06/17/2020     Priority: Medium     Added automatically from request for surgery [id number]      Hidradenitis suppurativa 05/13/2019     Priority: Medium    Chronic female pelvic pain 09/24/2018     Priority: Medium    Biliary dyskinesia 10/06/2017     Priority: Medium    ACP (advance care planning) 08/05/2016     Priority: Medium     Advance Care Planning 8/5/2016: ACP Review of Chart / Resources Provided:  Reviewed chart for advance care plan.  Purnima Del Real has no plan or code status on file. Discussed available resources and provided with information. Confirmed code status reflects current choices pending further ACP discussions.  Confirmed/documented legally designated decision makers.  Added by Purnima Mireles            Ischiorectal abscess and fistula      Priority: Medium    Kidney stones      Priority: Medium     x2 passed on her own      Cystic acne 07/23/2013     Priority: Medium    Migraines      Priority: Medium    Depression      Priority: Medium    ADHD (attention deficit hyperactivity disorder)      Priority: Medium    Mass of breast  02/02/2012     Priority: Medium    Pain in joint, forearm 05/17/2007     Priority: Medium     Overview:   IMO Update 10/11      Carpal tunnel syndrome 08/23/2006     Priority: Medium        Past Medical History:    Past Medical History:   Diagnosis Date    ADHD (attention deficit hyperactivity disorder)     Arthritis 2022    Biliary dyskinesia 10/06/2017    Carpal tunnel syndrome 08/23/2006    Chronic female pelvic pain 09/24/2018    Cystic acne 07/23/2013    Depression     Depressive disorder     Hidradenitis 02/16/2007    Hidradenitis suppurativa 05/13/2019    Ischiorectal abscess and fistula     Kidney stones 2008    Malunion of fracture 05/17/2007    Mass of breast 02/02/2012    Migraines     Need for prophylactic hormone replacement therapy (postmenopausal)     Nondependent tobacco use disorder 10/11/2012    Papanicolaou smear of cervix with low grade squamous intraepithelial lesion (LGSIL) 10/29/2008    Pilonidal cyst 09/12/2017    S/p partial hysterectomy with remaining cervical stump 09/27/2013    Surgical menopause 02/11/2022    Trigger thumb of both thumbs 06/17/2020       Past Surgical History:    Past Surgical History:   Procedure Laterality Date    ABDOMEN SURGERY      BIOPSY      COLONOSCOPY      CYSTECTOMY PILONIDAL N/A 09/18/2017    Procedure: CYSTECTOMY PILONIDAL;  PILONIDAL CYSTECTOMY ;  Surgeon: Cordell Stephens MD;  Location: [facility]    ENDOSCOPY UPPER, COLONOSCOPY, COMBINED N/A 10/05/2018    Procedure: COMBINED ENDOSCOPY UPPER, COLONOSCOPY;  UPPER ENDOSCOPY with Biopsy  AND COLONOSCOPY;  Surgeon: Cordell Stephens MD;  Location: [facility]    EXCISE LESION BUTTOCK(S) Left 06/08/2018    Procedure: EXCISE LESION BUTTOCK(S);  EXCISION OF GLUTEAL LEFT SKIN LESION;  Surgeon: Cordell Stephens MD;  Location: [facility]    EXCISE MASS NECK Right 08/04/2015    Procedure: EXCISE MASS NECK;  Surgeon: Nasir Jones MD;  Location: [facility]    INCISION AND DRAINAGE PERINEAL, COMBINED N/A 03/18/2015     Procedure: COMBINED INCISION AND DRAINAGE PERINEAL;  Surgeon: Jay Torres DO;  Location: [facility]    IR CONSULTATION FOR IR EXAM  05/09/2023    IR FLUORO 0-1 HOUR  06/05/2023    IRRIGATION AND DEBRIDEMENT GROIN N/A 02/07/2019    Procedure: IRRIGATION AND DEBRIDEMENT LEFT GROIN ABSCESS;  Surgeon: Cordell Stephens MD;  Location: [facility]    LAPAROSCOPIC APPENDECTOMY  02/11/2022    LAPAROSCOPIC CHOLECYSTECTOMY N/A 11/20/2017    Procedure: LAPAROSCOPIC CHOLECYSTECTOMY;  LAPAROSCOPIC CHOLECYSTECTOMY;  Surgeon: Cordell Stephens MD;  [facility]: [facility]    LAPAROSCOPIC HYSTERECTOMY SUPRACERVICAL, BILATERAL SALPINGO-OOPHORECTOMY, COMBINED  09/27/2013    Procedure: COMBINED LAPAROSCOPIC HYSTERECTOMY SUPRACERVICAL, SALPINGO-OOPHORECTOMY;  LAPAROSCOPIC SUPRACERVICAL HYSTERECTOMY AND RIGHT SALPINGO-OOPHORECTOMY, CYSTOSCOPY;  Surgeon: Denys Callahan MD;  Location: [facility]    LAPAROSCOPIC OOPHORECTOMY Left 02/11/2022    Procedure: Left OOPHORECTOMY, LAPAROSCOPIC, Laparoscopic Appendectomy Torsion and Pelvic Appensix;  Surgeon: Malick Malhotra MD;  Location: [facility]    LAPAROSCOPY DIAGNOSTIC (GYN) N/A 09/06/2018    Procedure: LAPAROSCOPY DIAGNOSTIC (GYN);  LAPAROSCOPY WITH PERITONEAL BIOPSIES AND REMOVAL LEFT FALLOPIAN TUBE;  Surgeon: Suraj Whitaker MD;  Location: [facility]    marsupialization of pilonidal cyst  01/01/2007    RELEASE TRIGGER FINGER BILATERAL Bilateral 07/10/2020    Procedure: RELEASE BILATERAL TRIGGER THUMBS;  Surgeon: Vince Murillo DO;  Location: [facility]    TUBAL LIGATION  01/01/2005       Family History:    Family History   Problem Relation Age of Onset    Other Cancer Father         Lung cancer    Diabetes Paternal Grandmother     Other Cancer Maternal Grandmother         Lung cancer       Social History:  Marital Status:   [4]  Social History     Tobacco Use    Smoking status: Every Day     Current packs/day: 0.25     Average packs/day: 0.3 packs/day for 24.2 years (6.1 ttl pk-yrs)     Types:  Cigarettes     Start date: 1/1/2001     Passive exposure: Current    Smokeless tobacco: Never    Tobacco comments:     I have been working on quitting   Vaping Use    Vaping status: Never Used   Substance Use Topics    Alcohol use: No    Drug use: No        Medications:    diazepam (VALIUM) 5 MG tablet  ketorolac (TORADOL) 10 MG tablet  Lidocaine (LIDOCARE) 4 % Patch  amphetamine-dextroamphetamine (ADDERALL XR) 30 MG 24 hr capsule  amphetamine-dextroamphetamine (ADDERALL) 10 MG tablet  amphetamine-dextroamphetamine (ADDERALL) 30 MG tablet  buPROPion (WELLBUTRIN XL) 150 MG 24 hr tablet  buPROPion (WELLBUTRIN XL) 300 MG 24 hr tablet  escitalopram (LEXAPRO) 20 MG tablet  estradiol (ESTRACE) 1 MG tablet  HUMIRA *CF* PEN 40 MG/0.4ML pen kit  SUMAtriptan (IMITREX) 25 MG tablet  SUMAtriptan (IMITREX) 25 MG tablet  tiZANidine (ZANAFLEX) 4 MG tablet  tiZANidine (ZANAFLEX) 4 MG tablet  valACYclovir (VALTREX) 500 MG tablet          Review of Systems   Constitutional:  Negative for fever.   Cardiovascular:         See HPI   Musculoskeletal:         See HPI       Physical Exam   BP: 120/70  Pulse: 87  Temp: 98.9  F (37.2  C)  Resp: 16  SpO2: 97 %      Physical Exam  Vitals and nursing note reviewed.   Constitutional:       General: She is not in acute distress.     Appearance: Normal appearance. She is not ill-appearing, toxic-appearing or diaphoretic.      Comments: This is a pleasant and talkative thin 44-year-old female found semireclined in no distress   Pulmonary:      Effort: Pulmonary effort is normal.      Comments: She has no evidence of tachypnea, increased work of breathing, or respiratory distress.  She has very focal and palpable discomfort without any evidence of swelling or other concerning features upon palpation of the right mid axillary chest at approximate ribs 8 through 10.  She tells me this is the identical area of her pain.  Chest:       Skin:     General: Skin is warm and dry.      Capillary Refill:  Capillary refill takes less than 2 seconds.   Neurological:      General: No focal deficit present.      Mental Status: She is alert and oriented to person, place, and time.         ED Course     ED Course as of 03/16/25 1356   Sun Mar 16, 2025   1227 Broad differential considered includes but is not limited to musculoskeletal pain, rib fracture, rib lesion, shingles, infectious etiology such as pneumonia, pulmonary embolism, ACS   1227 Given the subacute course and very superficial and focal finding with palpation, intrathoracic catastrophe is low on the differential.   1305 My independent review of the noncontrast CT scan of the chest that shows what appears to be a healing fracture of the 10th rib in the midaxillary area.  This would be consistent with the patient's pain.  She also has what appears to be ill-defined areas in the lower lobe with possible pneumonia.     Procedures              Critical Care time:  none     None         Results for orders placed or performed during the hospital encounter of 03/16/25 (from the past 24 hours)   Chest CT w/o contrast    Narrative    EXAM: CT CHEST W/O CONTRAST  LOCATION: [facility]  DATE: 3/16/2025    INDICATION: Persistent focal right mid axillary pain at approximately rib 9 through 11.  COMPARISON: None.  TECHNIQUE: CT chest without IV contrast. Multiplanar reformats were obtained. Dose reduction techniques were used.  CONTRAST: None.    FINDINGS:   LUNGS AND PLEURA: Normal.    MEDIASTINUM/AXILLAE: Normal.    CORONARY ARTERY CALCIFICATION: None.    UPPER ABDOMEN: Cholecystectomy. Nonobstructing left renal calculus.    MUSCULOSKELETAL: Partially healed nondisplaced fracture of the right eighth rib laterally.      Impression    IMPRESSION:   Partially healed nondisplaced fracture of the right eighth rib laterally.           Medications   Lidocaine (LIDOCARE) 4 % Patch 1 patch (1 patch Transdermal $Patch/Med Applied 3/16/25 1221)   LORazepam (ATIVAN) tablet  1 mg (1 mg Oral $Given 3/16/25 1221)       Assessments & Plan (with Medical Decision Making)   44-year-old female with persistent right mid axillary superficial chest pain that is focal in nature.  Broad differential considered.  Cross-sectional imaging of the chest without IV contrast shows a subacute fracture of rib 8.  Quite consistent with the patient's discomfort.  Long discussion.  Pain control as noted.  Clinic follow-up discussed.  Return here for any other concerns or questions.  No reasonable indication for laboratory evaluation.    This document was prepared using a combination of typing and voice generated software.  While every attempt was made for accuracy, spelling and grammatical errors may exist.     I have reviewed the nursing notes.    I have reviewed the findings, diagnosis, plan and need for follow up with the patient.           Medical Decision Making  The patient's presentation was of low complexity (an acute and uncomplicated illness or injury).    The patient's evaluation involved:  ordering and/or review of 1 test(s) in this encounter (CT chest)    The patient's management necessitated moderate risk (prescription drug management including medications given in the ED).        New Prescriptions    DIAZEPAM (VALIUM) 5 MG TABLET    Take 1 tablet (5 mg) by mouth every 6 hours as needed for pain or muscle spasms.    KETOROLAC (TORADOL) 10 MG TABLET    Take 1 tablet (10 mg) by mouth every 6 hours as needed for moderate pain.    LIDOCAINE (LIDOCARE) 4 % PATCH    Place 1 patch over 12 hours onto the skin every 24 hours. To prevent lidocaine toxicity, patient should be patch free for 12 hrs daily.       Final diagnoses:   Closed fracture of one rib of right side, initial encounter       3/16/2025   [facility]       Beth Sumner PA-C  03/16/25 [id number]
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
## 2025-03-16 NOTE — ED TRIAGE NOTES
Pt states she has had on going right rib pain for a few months. Pt saw her primary in January and was supposed to have a scan but has not due to anxiety and fear of enclosed spaces. Pt reports the pain has gradually gotten worse.

## 2025-03-17 ENCOUNTER — TELEPHONE (OUTPATIENT)
Dept: FAMILY MEDICINE | Facility: OTHER | Age: 45
End: 2025-03-17

## 2025-03-17 NOTE — TELEPHONE ENCOUNTER
Symptom or reason needing to speak to RN: [facility] 03/16 for broken ribs. Needs appointment with RADAMES Levi. Please call her to schedule     Best number to return call: [phone]      Best time to return call: Anytime

## 2025-03-19 ENCOUNTER — MYC REFILL (OUTPATIENT)
Dept: FAMILY MEDICINE | Facility: OTHER | Age: 45
End: 2025-03-19

## 2025-03-19 DIAGNOSIS — F34.1 DYSTHYMIA: ICD-10-CM

## 2025-03-19 DIAGNOSIS — E89.40 SURGICAL MENOPAUSE: ICD-10-CM

## 2025-03-19 RX ORDER — ESCITALOPRAM OXALATE 20 MG/1
20 TABLET ORAL DAILY
Qty: 90 TABLET | Refills: 0 | Status: SHIPPED | OUTPATIENT
Start: 2025-03-19

## 2025-03-19 RX ORDER — ESTRADIOL 1 MG/1
1 TABLET ORAL DAILY
Qty: 90 TABLET | Refills: 0 | Status: SHIPPED | OUTPATIENT
Start: 2025-03-19

## 2025-03-19 NOTE — TELEPHONE ENCOUNTER
estradiol (ESTRACE) 1 MG tablet       Last Written Prescription Date:  12/18/24  Last Fill Quantity: 90,   # refills: 0  Last Office Visit: 1/15/25  Future Office visit:   3/21/25    Routing refill request to provider for review/approval because:  Hormone Replacement Therapy [id number]/2025 09:10 AM   Protocol Details Most recent blood pressure under 140/90 in past 12 months     BP Readings from Last 3 Encounters:   03/16/25 (!) 144/87   01/15/25 124/68   11/12/24 114/60

## 2025-04-05 ENCOUNTER — APPOINTMENT (OUTPATIENT)
Dept: GENERAL RADIOLOGY | Facility: HOSPITAL | Age: 45
End: 2025-04-05
Payer: COMMERCIAL

## 2025-04-05 ENCOUNTER — HOSPITAL ENCOUNTER (EMERGENCY)
Facility: HOSPITAL | Age: 45
Discharge: HOME | End: 2025-04-05
Payer: COMMERCIAL

## 2025-04-05 VITALS
RESPIRATION RATE: 16 BRPM | DIASTOLIC BLOOD PRESSURE: 58 MMHG | SYSTOLIC BLOOD PRESSURE: 92 MMHG | TEMPERATURE: 98.9 F | OXYGEN SATURATION: 97 % | HEART RATE: 79 BPM

## 2025-04-05 DIAGNOSIS — S62.626A DISPLACED FRACTURE OF MIDDLE PHALANX OF RIGHT LITTLE FINGER, INITIAL ENCOUNTER FOR CLOSED FRACTURE: ICD-10-CM

## 2025-04-05 PROCEDURE — 73130 X-RAY EXAM OF HAND: CPT | Mod: 26 | Performed by: RADIOLOGY

## 2025-04-05 PROCEDURE — 73130 X-RAY EXAM OF HAND: CPT | Mod: RT

## 2025-04-05 PROCEDURE — G0463 HOSPITAL OUTPT CLINIC VISIT: HCPCS

## 2025-04-05 PROCEDURE — 99213 OFFICE O/P EST LOW 20 MIN: CPT

## 2025-04-05 RX ORDER — HYDROCODONE BITARTRATE AND ACETAMINOPHEN 5; 325 MG/1; MG/1
1 TABLET ORAL EVERY 6 HOURS PRN
Qty: 6 TABLET | Refills: 0 | Status: SHIPPED | OUTPATIENT
Start: 2025-04-05

## 2025-04-05 ASSESSMENT — ENCOUNTER SYMPTOMS
ACTIVITY CHANGE: 1
WOUND: 0
JOINT SWELLING: 1
COLOR CHANGE: 1
ARTHRALGIAS: 1
NUMBNESS: 0

## 2025-04-05 ASSESSMENT — ACTIVITIES OF DAILY LIVING (ADL): ADLS_ACUITY_SCORE: 41

## 2025-04-05 NOTE — Clinical Note
Purnima Fallon was seen and treated in our emergency department on 4/5/2025.  She may return to work on 04/11/2025.       If you have any questions or concerns, please don't hesitate to call.      Nadiya Jarquin, NP

## 2025-04-05 NOTE — ED TRIAGE NOTES
Pt presents with c/o right hand pain. Reports she was chasing her dog around and fell. Incident happened 3 days ago. Bruising visualized. Pt took Toradol yesterday.          Yes

## 2025-04-05 NOTE — ED PROVIDER NOTES
History     Chief Complaint   Patient presents with    Hand Pain     Right pinky pain     HPI  Purnima Fallon is a 44 year old female who presents to the urgent care with pain and swelling in right hand into right 5th finger after a fall inside home 3 days ago. Right hand dominant. No numbness. Toradol last night.     Allergies:  No Known Allergies    Problem List:    Patient Active Problem List    Diagnosis Date Noted    Surgical menopause 02/11/2022     Priority: Medium    Trigger thumb of both thumbs 06/17/2020     Priority: Medium     Added automatically from request for surgery [id number]      Hidradenitis suppurativa 05/13/2019     Priority: Medium    Chronic female pelvic pain 09/24/2018     Priority: Medium    Biliary dyskinesia 10/06/2017     Priority: Medium    ACP (advance care planning) 08/05/2016     Priority: Medium     Advance Care Planning 8/5/2016: ACP Review of Chart / Resources Provided:  Reviewed chart for advance care plan.  Purnima Del Real has no plan or code status on file. Discussed available resources and provided with information. Confirmed code status reflects current choices pending further ACP discussions.  Confirmed/documented legally designated decision makers.  Added by Purnima Mireles            Ischiorectal abscess and fistula      Priority: Medium    Kidney stones      Priority: Medium     x2 passed on her own      Cystic acne 07/23/2013     Priority: Medium    Migraines      Priority: Medium    Depression      Priority: Medium    ADHD (attention deficit hyperactivity disorder)      Priority: Medium    Mass of breast 02/02/2012     Priority: Medium    Pain in joint, forearm 05/17/2007     Priority: Medium     Overview:   IMO Update 10/11      Carpal tunnel syndrome 08/23/2006     Priority: Medium        Past Medical History:    Past Medical History:   Diagnosis Date    ADHD (attention deficit hyperactivity disorder)     Arthritis 2022    Biliary dyskinesia 10/06/2017    Carpal tunnel  syndrome 08/23/2006    Chronic female pelvic pain 09/24/2018    Cystic acne 07/23/2013    Depression     Depressive disorder     Hidradenitis 02/16/2007    Hidradenitis suppurativa 05/13/2019    Ischiorectal abscess and fistula     Kidney stones 2008    Malunion of fracture 05/17/2007    Mass of breast 02/02/2012    Migraines     Need for prophylactic hormone replacement therapy (postmenopausal)     Nondependent tobacco use disorder 10/11/2012    Papanicolaou smear of cervix with low grade squamous intraepithelial lesion (LGSIL) 10/29/2008    Pilonidal cyst 09/12/2017    S/p partial hysterectomy with remaining cervical stump 09/27/2013    Surgical menopause 02/11/2022    Trigger thumb of both thumbs 06/17/2020       Past Surgical History:    Past Surgical History:   Procedure Laterality Date    ABDOMEN SURGERY      BIOPSY      COLONOSCOPY      CYSTECTOMY PILONIDAL N/A 09/18/2017    Procedure: CYSTECTOMY PILONIDAL;  PILONIDAL CYSTECTOMY ;  Surgeon: Cordell Stephens MD;  Location: [facility]    ENDOSCOPY UPPER, COLONOSCOPY, COMBINED N/A 10/05/2018    Procedure: COMBINED ENDOSCOPY UPPER, COLONOSCOPY;  UPPER ENDOSCOPY with Biopsy  AND COLONOSCOPY;  Surgeon: Cordell Stephens MD;  Location: [facility]    EXCISE LESION BUTTOCK(S) Left 06/08/2018    Procedure: EXCISE LESION BUTTOCK(S);  EXCISION OF GLUTEAL LEFT SKIN LESION;  Surgeon: Cordell Stephens MD;  Location: [facility]    EXCISE MASS NECK Right 08/04/2015    Procedure: EXCISE MASS NECK;  Surgeon: Nasir Jones MD;  Location: [facility]    INCISION AND DRAINAGE PERINEAL, COMBINED N/A 03/18/2015    Procedure: COMBINED INCISION AND DRAINAGE PERINEAL;  Surgeon: Jay Torres DO;  Location: [facility]    IR CONSULTATION FOR IR EXAM  05/09/2023    IR FLUORO 0-1 HOUR  06/05/2023    IRRIGATION AND DEBRIDEMENT GROIN N/A 02/07/2019    Procedure: IRRIGATION AND DEBRIDEMENT LEFT GROIN ABSCESS;  Surgeon: Cordell Stephens MD;  [facility]: [facility]    LAPAROSCOPIC APPENDECTOMY   02/11/2022    LAPAROSCOPIC CHOLECYSTECTOMY N/A 11/20/2017    Procedure: LAPAROSCOPIC CHOLECYSTECTOMY;  LAPAROSCOPIC CHOLECYSTECTOMY;  Surgeon: Cordell Stephens MD;  [facility]    LAPAROSCOPIC HYSTERECTOMY SUPRACERVICAL, BILATERAL SALPINGO-OOPHORECTOMY, COMBINED  09/27/2013    Procedure: COMBINED LAPAROSCOPIC HYSTERECTOMY SUPRACERVICAL, SALPINGO-OOPHORECTOMY;  LAPAROSCOPIC SUPRACERVICAL HYSTERECTOMY AND RIGHT SALPINGO-OOPHORECTOMY, CYSTOSCOPY;  Surgeon: Denys Callahan MD;  [facility]    LAPAROSCOPIC OOPHORECTOMY Left 02/11/2022    Procedure: Left OOPHORECTOMY, LAPAROSCOPIC, Laparoscopic Appendectomy Torsion and Pelvic Appensix;  Surgeon: Malick Malhotra MD;  [facility]    LAPAROSCOPY DIAGNOSTIC (GYN) N/A 09/06/2018    Procedure: LAPAROSCOPY DIAGNOSTIC (GYN);  LAPAROSCOPY WITH PERITONEAL BIOPSIES AND REMOVAL LEFT FALLOPIAN TUBE;  Surgeon: Suraj Whitaker MD;  Location: [facility]    marsupialization of pilonidal cyst  01/01/2007    RELEASE TRIGGER FINGER BILATERAL Bilateral 07/10/2020    Procedure: RELEASE BILATERAL TRIGGER THUMBS;  Surgeon: Vince Murillo DO;  Location: [facility]    TUBAL LIGATION  01/01/2005       Family History:    Family History   Problem Relation Age of Onset    Other Cancer Father         Lung cancer    Diabetes Paternal Grandmother     Other Cancer Maternal Grandmother         Lung cancer       Social History:  Marital Status:   [4]  Social History     Tobacco Use    Smoking status: Every Day     Current packs/day: 0.25     Average packs/day: 0.3 packs/day for 24.3 years (6.1 ttl pk-yrs)     Types: Cigarettes     Start date: 1/1/2001     Passive exposure: Current    Smokeless tobacco: Never    Tobacco comments:     I have been working on quitting   Vaping Use    Vaping status: Never Used   Substance Use Topics    Alcohol use: No    Drug use: No        Medications:    HYDROcodone-acetaminophen (NORCO) 5-325 MG tablet  amphetamine-dextroamphetamine (ADDERALL XR) 30 MG 24 hr  capsule  amphetamine-dextroamphetamine (ADDERALL) 10 MG tablet  amphetamine-dextroamphetamine (ADDERALL) 30 MG tablet  buPROPion (WELLBUTRIN XL) 150 MG 24 hr tablet  buPROPion (WELLBUTRIN XL) 300 MG 24 hr tablet  diazepam (VALIUM) 5 MG tablet  escitalopram (LEXAPRO) 20 MG tablet  estradiol (ESTRACE) 1 MG tablet  HUMIRA *CF* PEN 40 MG/0.4ML pen kit  ketorolac (TORADOL) 10 MG tablet  Lidocaine (LIDOCARE) 4 % Patch  Lidocaine (LIDOCARE) 4 % Patch  meloxicam (MOBIC) 15 MG tablet  SUMAtriptan (IMITREX) 25 MG tablet  tiZANidine (ZANAFLEX) 4 MG tablet  valACYclovir (VALTREX) 500 MG tablet          Review of Systems   Constitutional:  Positive for activity change.   Musculoskeletal:  Positive for arthralgias and joint swelling.   Skin:  Positive for color change. Negative for wound.   Neurological:  Negative for numbness.   All other systems reviewed and are negative.      Physical Exam   BP: 92/58  Pulse: 79  Temp: 98.9  F (37.2  C)  Resp: 16  SpO2: 97 %      Physical Exam  Vitals and nursing note reviewed.   Constitutional:       General: She is not in acute distress.     Appearance: Normal appearance. She is not ill-appearing or toxic-appearing.   Cardiovascular:      Pulses: Normal pulses.   Musculoskeletal:         General: Swelling and tenderness present. No deformity.      Right hand: Swelling, tenderness and bony tenderness present. Decreased range of motion. Normal capillary refill. Normal pulse.   Skin:     General: Skin is warm and dry.      Findings: Bruising present. No erythema.   Neurological:      Mental Status: She is alert.         ED Course        Procedures       Results for orders placed or performed during the hospital encounter of 04/05/25 (from the past 24 hours)   XR Hand Right G/E 3 Views    Narrative    EXAM: XR HAND RIGHT G/E 3 VIEWS  LOCATION: [facility]  DATE: 4/5/2025    INDICATION: Pain and swelling in dorsum into 5th finger after fall.  COMPARISON: 06/17/2020.      Impression     IMPRESSION: Acute, mildly displaced oblique fracture middle phalanx of the fifth finger with intra-articular extension into the PIP joint. There is associated soft tissue swelling.     NOTE: ABNORMAL REPORT    THE DICTATION ABOVE DESCRIBES AN ABNORMALITY FOR WHICH FOLLOW-UP IS NEEDED.        Medications - No data to display    Assessments & Plan (with Medical Decision Making)     I have reviewed the nursing notes.    I have reviewed the findings, diagnosis, plan and need for follow up with the patient.  Purnima Fallon is a 44 year old female who presents to the urgent care with pain and swelling in right hand into right 5th finger after a fall inside home 3 days ago. Right hand dominant. No numbness. Toradol last night.     MDM: vital signs normal, afebrile. Non toxic in appearance with no noted distress. Strong pulses to right upper extremity. CMS intact and cap refill within 2 seconds. Swelling and bruising in dorsum of right hand extending into 5th finger. XR reviewed with Acute, mildly displaced oblique fracture middle phalanx of the fifth finger with intra-articular extension into the PIP joint. There is associated soft tissue swelling, per radiologist. Finger splint and ace wrap applied. CMS intact post application. Orthopedic referral placed. Short fill of hydrocodone prescribed as she has been taking Toradol and tylenol without improvement in pain. Supportive measures and strict return precautions discussed. She is in agreement with plan.     (V41.561P) Displaced fracture of middle phalanx of right little finger, initial encounter for closed fracture  Plan: Orthopedic  Referral  You can take 650-1000mg of tylenol every 6 hours as needed, max of 3000mg in 24 hours and 600-800mg of ibuprofen every 8 hours as needed, max of 2400mg in 24 hours.     Norco as needed every 6 hours for severe pain. Do not drive, operate machinery, or drink alcohol while taking.     Ice for 15-20 minutes every 2-3 hours.  Please make sure to protect skin to prevent frost bite.     Follow up with orthopedics.     Return with any new or concerning symptoms. Understanding verbalized.     New Prescriptions    HYDROCODONE-ACETAMINOPHEN (NORCO) 5-325 MG TABLET    Take 1 tablet by mouth every 6 hours as needed for severe pain.       Final diagnoses:   Displaced fracture of middle phalanx of right little finger, initial encounter for closed fracture       4/5/2025   [facility]       Nadiya Jarquin NP  04/05/25 1948

## 2025-04-06 NOTE — DISCHARGE INSTRUCTIONS
You can take 650-1000mg of tylenol every 6 hours as needed, max of 3000mg in 24 hours and 600-800mg of ibuprofen every 8 hours as needed, max of 2400mg in 24 hours.     Norco as needed every 6 hours for severe pain. Do not drive, operate machinery, or drink alcohol while taking.     Ice for 15-20 minutes every 2-3 hours. Please make sure to protect skin to prevent frost bite.     Follow up with orthopedics.     Return with any new or concerning symptoms.

## 2025-04-09 ENCOUNTER — OFFICE VISIT (OUTPATIENT)
Dept: ORTHOPEDICS | Facility: OTHER | Age: 45
End: 2025-04-09
Attending: ORTHOPAEDIC SURGERY
Payer: COMMERCIAL

## 2025-04-09 ENCOUNTER — TELEPHONE (OUTPATIENT)
Dept: FAMILY MEDICINE | Facility: OTHER | Age: 45
End: 2025-04-09

## 2025-04-09 ENCOUNTER — OFFICE VISIT (OUTPATIENT)
Dept: FAMILY MEDICINE | Facility: OTHER | Age: 45
End: 2025-04-09
Attending: NURSE PRACTITIONER
Payer: COMMERCIAL

## 2025-04-09 ENCOUNTER — PREP FOR PROCEDURE (OUTPATIENT)
Dept: ORTHOPEDICS | Facility: OTHER | Age: 45
End: 2025-04-09

## 2025-04-09 VITALS
DIASTOLIC BLOOD PRESSURE: 60 MMHG | TEMPERATURE: 98.7 F | SYSTOLIC BLOOD PRESSURE: 96 MMHG | HEART RATE: 71 BPM | OXYGEN SATURATION: 98 % | HEIGHT: 62 IN | BODY MASS INDEX: 20.32 KG/M2 | WEIGHT: 110.4 LBS

## 2025-04-09 VITALS
WEIGHT: 110 LBS | HEIGHT: 62 IN | HEART RATE: 80 BPM | OXYGEN SATURATION: 100 % | BODY MASS INDEX: 20.24 KG/M2 | SYSTOLIC BLOOD PRESSURE: 136 MMHG | DIASTOLIC BLOOD PRESSURE: 85 MMHG

## 2025-04-09 DIAGNOSIS — S62.626A CLOSED DISPLACED FRACTURE OF MIDDLE PHALANX OF RIGHT LITTLE FINGER, INITIAL ENCOUNTER: ICD-10-CM

## 2025-04-09 DIAGNOSIS — S62.626A DISPLACED FRACTURE OF MIDDLE PHALANX OF RIGHT LITTLE FINGER, INITIAL ENCOUNTER FOR CLOSED FRACTURE: ICD-10-CM

## 2025-04-09 DIAGNOSIS — S62.626A DISPLACED FRACTURE OF MIDDLE PHALANX OF RIGHT LITTLE FINGER, INITIAL ENCOUNTER FOR CLOSED FRACTURE: Primary | ICD-10-CM

## 2025-04-09 DIAGNOSIS — R79.89 INCREASED VITAMIN B12 LEVEL: ICD-10-CM

## 2025-04-09 DIAGNOSIS — Z01.818 PREOP GENERAL PHYSICAL EXAM: Primary | ICD-10-CM

## 2025-04-09 LAB
ALBUMIN SERPL BCG-MCNC: 4.3 G/DL (ref 3.5–5.2)
ALBUMIN UR-MCNC: ABNORMAL MG/DL
ALP SERPL-CCNC: 77 U/L (ref 40–150)
ALT SERPL W P-5'-P-CCNC: 21 U/L (ref 0–50)
ANION GAP SERPL CALCULATED.3IONS-SCNC: 10 MMOL/L (ref 7–15)
APPEARANCE UR: CLEAR
AST SERPL W P-5'-P-CCNC: 24 U/L (ref 0–45)
ATRIAL RATE - MUSE: 70 BPM
BASOPHILS # BLD AUTO: 0.1 10E3/UL (ref 0–0.2)
BASOPHILS NFR BLD AUTO: 1 %
BILIRUB SERPL-MCNC: 0.2 MG/DL
BILIRUB UR QL STRIP: NEGATIVE
BUN SERPL-MCNC: 19.3 MG/DL (ref 6–20)
CALCIUM SERPL-MCNC: 9.2 MG/DL (ref 8.8–10.4)
CHLORIDE SERPL-SCNC: 105 MMOL/L (ref 98–107)
COLOR UR AUTO: YELLOW
CREAT SERPL-MCNC: 0.94 MG/DL (ref 0.51–0.95)
DIASTOLIC BLOOD PRESSURE - MUSE: NORMAL MMHG
EGFRCR SERPLBLD CKD-EPI 2021: 77 ML/MIN/1.73M2
EOSINOPHIL # BLD AUTO: 0.1 10E3/UL (ref 0–0.7)
EOSINOPHIL NFR BLD AUTO: 1 %
ERYTHROCYTE [DISTWIDTH] IN BLOOD BY AUTOMATED COUNT: 13.1 % (ref 10–15)
GLUCOSE SERPL-MCNC: 66 MG/DL (ref 70–99)
GLUCOSE UR STRIP-MCNC: NEGATIVE MG/DL
HCO3 SERPL-SCNC: 26 MMOL/L (ref 22–29)
HCT VFR BLD AUTO: 37.4 % (ref 35–47)
HGB BLD-MCNC: 13 G/DL (ref 11.7–15.7)
HGB UR QL STRIP: ABNORMAL
IMM GRANULOCYTES # BLD: 0 10E3/UL
IMM GRANULOCYTES NFR BLD: 0 %
INTERPRETATION ECG - MUSE: NORMAL
KETONES UR STRIP-MCNC: NEGATIVE MG/DL
LEUKOCYTE ESTERASE UR QL STRIP: ABNORMAL
LYMPHOCYTES # BLD AUTO: 4.7 10E3/UL (ref 0.8–5.3)
LYMPHOCYTES NFR BLD AUTO: 53 %
MCH RBC QN AUTO: 34.3 PG (ref 26.5–33)
MCHC RBC AUTO-ENTMCNC: 34.8 G/DL (ref 31.5–36.5)
MCV RBC AUTO: 99 FL (ref 78–100)
MONOCYTES # BLD AUTO: 0.6 10E3/UL (ref 0–1.3)
MONOCYTES NFR BLD AUTO: 7 %
NEUTROPHILS # BLD AUTO: 3.4 10E3/UL (ref 1.6–8.3)
NEUTROPHILS NFR BLD AUTO: 38 %
NITRATE UR QL: NEGATIVE
NRBC # BLD AUTO: 0 10E3/UL
NRBC BLD AUTO-RTO: 0 /100
P AXIS - MUSE: 58 DEGREES
PH UR STRIP: 6 [PH] (ref 5–7)
PLAT MORPH BLD: ABNORMAL
PLATELET # BLD AUTO: 297 10E3/UL (ref 150–450)
POTASSIUM SERPL-SCNC: 3.5 MMOL/L (ref 3.4–5.3)
PR INTERVAL - MUSE: 118 MS
PROT SERPL-MCNC: 7.1 G/DL (ref 6.4–8.3)
QRS DURATION - MUSE: 84 MS
QT - MUSE: 402 MS
QTC - MUSE: 434 MS
R AXIS - MUSE: 88 DEGREES
RBC # BLD AUTO: 3.79 10E6/UL (ref 3.8–5.2)
RBC #/AREA URNS AUTO: >100 /HPF
RBC MORPH BLD: ABNORMAL
SODIUM SERPL-SCNC: 141 MMOL/L (ref 135–145)
SP GR UR STRIP: >=1.03 (ref 1–1.03)
SQUAMOUS #/AREA URNS AUTO: ABNORMAL /LPF
SYSTOLIC BLOOD PRESSURE - MUSE: NORMAL MMHG
T AXIS - MUSE: 68 DEGREES
UROBILINOGEN UR STRIP-ACNC: 0.2 E.U./DL
VARIANT LYMPHS BLD QL SMEAR: PRESENT
VENTRICULAR RATE- MUSE: 70 BPM
WBC # BLD AUTO: 8.9 10E3/UL (ref 4–11)
WBC #/AREA URNS AUTO: ABNORMAL /HPF

## 2025-04-09 PROCEDURE — 99203 OFFICE O/P NEW LOW 30 MIN: CPT | Performed by: ORTHOPAEDIC SURGERY

## 2025-04-09 PROCEDURE — 85048 AUTOMATED LEUKOCYTE COUNT: CPT | Mod: ZL | Performed by: NURSE PRACTITIONER

## 2025-04-09 PROCEDURE — 81015 MICROSCOPIC EXAM OF URINE: CPT | Mod: ZL | Performed by: NURSE PRACTITIONER

## 2025-04-09 PROCEDURE — 82607 VITAMIN B-12: CPT | Mod: ZL | Performed by: NURSE PRACTITIONER

## 2025-04-09 PROCEDURE — 36415 COLL VENOUS BLD VENIPUNCTURE: CPT | Mod: ZL | Performed by: NURSE PRACTITIONER

## 2025-04-09 PROCEDURE — 80051 ELECTROLYTE PANEL: CPT | Mod: ZL | Performed by: NURSE PRACTITIONER

## 2025-04-09 PROCEDURE — 93005 ELECTROCARDIOGRAM TRACING: CPT | Performed by: NURSE PRACTITIONER

## 2025-04-09 PROCEDURE — G0463 HOSPITAL OUTPT CLINIC VISIT: HCPCS

## 2025-04-09 PROCEDURE — 85004 AUTOMATED DIFF WBC COUNT: CPT | Mod: ZL | Performed by: NURSE PRACTITIONER

## 2025-04-09 PROCEDURE — 84155 ASSAY OF PROTEIN SERUM: CPT | Mod: ZL | Performed by: NURSE PRACTITIONER

## 2025-04-09 PROCEDURE — 81001 URINALYSIS AUTO W/SCOPE: CPT | Mod: ZL | Performed by: NURSE PRACTITIONER

## 2025-04-09 PROCEDURE — 85018 HEMOGLOBIN: CPT | Mod: ZL | Performed by: NURSE PRACTITIONER

## 2025-04-09 ASSESSMENT — PAIN SCALES - GENERAL
PAINLEVEL_OUTOF10: SEVERE PAIN (8)
PAINLEVEL_OUTOF10: SEVERE PAIN (8)

## 2025-04-09 NOTE — TELEPHONE ENCOUNTER
April 9, 2025    10:47 AM    Reason for Call: OVERBOOK    Patient is having ORIF R small finger middle phalanx fracture repair 4/15/2 with [email or web address]    The patient is requesting an appointment for pre-op with Payton Levi.    Preferred method for responding to this message: Telephone Call  What is your phone number? [phone]    If we cannot reach you directly, may we leave a detailed response at the number you provided? Yes    -Kerry Delgado on 4/9/2025 at 10:48 AM

## 2025-04-09 NOTE — PROGRESS NOTES
Preoperative Evaluation  [place] [address] [address]  [facility] [address]  Phone: [phone]  Primary Provider: ALONDRA Gray CNP  Pre-op Performing Provider: ALONDRA Gray CNP  Apr 9, 2025 4/9/2025   Surgical Information   What procedure is being done? Surgery on right little finger- fracture   Facility or Hospital where procedure/surgery will be performed: [facility]   Who is doing the procedure / surgery? Dr. Acevedo   Date of surgery / procedure: 04/15/2025   Time of surgery / procedure: Unknown   Where do you plan to recover after surgery? at home with family     Fax number for surgical facility: Note does not need to be faxed, will be available electronically in Epic.    Assessment & Plan     The proposed surgical procedure is considered INTERMEDIATE risk.    (Z01.818) Preop general physical exam  (primary encounter diagnosis)  Comment: Check labs, UA, and EKG   Plan: CBC with platelets and differential,         Comprehensive metabolic panel, UA Macroscopic         with reflex to Microscopic and Culture, EKG         12-lead complete w/read - (Clinic Performed),         UA Microscopic with Reflex to Culture            (S62.626A) Closed displaced fracture of middle phalanx of right little finger, initial encounter  Comment: Check labs, UA, and EKG  Plan: CBC with platelets and differential,         Comprehensive metabolic panel, UA Macroscopic         with reflex to Microscopic and Culture, EKG         12-lead complete w/read - (Clinic Performed),         UA Microscopic with Reflex to Culture             - No identified additional risk factors other than previously addressed    Preoperative Medication Instructions  Antiplatelet or Anticoagulation Medication Instructions   - We reviewed the medication list and the patient is not on an antiplatelet or anticoagulation medications.    Additional Medication Instructions  Take all scheduled medications on  the day of surgery EXCEPT for modifications listed below:   - meloxicam (Mobic): DO NOT TAKE 10 days before surgery.     Hold Adderall am of procedure.     Hold Humira for a week.     Recommendation  Approval given to proceed with proposed procedure without further diagnostic evaluation.    Subjective   Purnima is a 44 year old, presenting for the following:  Pre-Op Exam          4/9/2025     2:08 PM   Additional Questions   Roomed by Ina LOWERY     HPI: Right little finger fracture with joint involvement. Purnima was chasing after 1 of her dogs and struck her hand on the floor. Splint in place. Pre operative examination.     No change in health in past 30 days.     Right hand dominant.         4/9/2025   Pre-Op Questionnaire   Have you ever had a heart attack or stroke? No   Have you ever had surgery on your heart or blood vessels, such as a stent placement, a coronary artery bypass, or surgery on an artery in your head, neck, heart, or legs? No   Do you have chest pain with activity? No   Do you have a history of heart failure? No   Do you currently have a cold, bronchitis or symptoms of other infection? No   Do you have a cough, shortness of breath, or wheezing? No   Do you or anyone in your family have previous history of blood clots? No   Do you or does anyone in your family have a serious bleeding problem such as prolonged bleeding following surgeries or cuts? No   Have you ever had problems with anemia or been told to take iron pills? (!) YES, when she was pregnant she needed to take iron.    Have you had any abnormal blood loss such as black, tarry or bloody stools, or abnormal vaginal bleeding? No   Have you ever had a blood transfusion? No   Are you willing to have a blood transfusion if it is medically needed before, during, or after your surgery? Yes   Have you or any of your relatives ever had problems with anesthesia? No   Do you have sleep apnea, excessive snoring or daytime drowsiness? No   Do you have  any artifical heart valves or other implanted medical devices like a pacemaker, defibrillator, or continuous glucose monitor? No   Do you have artificial joints? No   Are you allergic to latex? No     METS 4+     Health Care Directive  Patient does not have a Health Care Directive: Discussed advance care planning with patient; however, patient declined at this time.    Preoperative Review of    reviewed - controlled substances reflected in medication list.      Status of Chronic Conditions:  See problem list for active medical problems.  Problems all longstanding and stable, except as noted/documented.  See ROS for pertinent symptoms related to these conditions.    Patient Active Problem List    Diagnosis Date Noted    Surgical menopause 02/11/2022     Priority: Medium    Trigger thumb of both thumbs 06/17/2020     Priority: Medium     Added automatically from request for surgery [id number]      Hidradenitis suppurativa 05/13/2019     Priority: Medium    Chronic female pelvic pain 09/24/2018     Priority: Medium    Biliary dyskinesia 10/06/2017     Priority: Medium    ACP (advance care planning) 08/05/2016     Priority: Medium     Advance Care Planning 8/5/2016: ACP Review of Chart / Resources Provided:  Reviewed chart for advance care plan.  Purnima Del Real has no plan or code status on file. Discussed available resources and provided with information. Confirmed code status reflects current choices pending further ACP discussions.  Confirmed/documented legally designated decision makers.  Added by Purnima Mireles            Ischiorectal abscess and fistula      Priority: Medium    Kidney stones      Priority: Medium     x2 passed on her own      Cystic acne 07/23/2013     Priority: Medium    Migraines      Priority: Medium    Depression      Priority: Medium    ADHD (attention deficit hyperactivity disorder)      Priority: Medium    Mass of breast 02/02/2012     Priority: Medium    Pain in joint, forearm  05/17/2007     Priority: Medium     Overview:   IMO Update 10/11      Carpal tunnel syndrome 08/23/2006     Priority: Medium      Past Medical History:   Diagnosis Date    ADHD (attention deficit hyperactivity disorder)     Arthritis 2022    Biliary dyskinesia 10/06/2017    Carpal tunnel syndrome 08/23/2006    Chronic female pelvic pain 09/24/2018    Cystic acne 07/23/2013    Depression     Depressive disorder     Hidradenitis 02/16/2007    Hidradenitis suppurativa 05/13/2019    Ischiorectal abscess and fistula     Kidney stones 2008    x2 passed on her own    Malunion of fracture 05/17/2007    Mass of breast 02/02/2012    Migraines     Need for prophylactic hormone replacement therapy (postmenopausal)     Nondependent tobacco use disorder 10/11/2012    Papanicolaou smear of cervix with low grade squamous intraepithelial lesion (LGSIL) 10/29/2008    Pilonidal cyst 09/12/2017    S/p partial hysterectomy with remaining cervical stump 09/27/2013    Surgical menopause 02/11/2022    Trigger thumb of both thumbs 06/17/2020    Added automatically from request for surgery [id number]     Past Surgical History:   Procedure Laterality Date    ABDOMEN SURGERY      BIOPSY      COLONOSCOPY      CYSTECTOMY PILONIDAL N/A 09/18/2017    Procedure: CYSTECTOMY PILONIDAL;  PILONIDAL CYSTECTOMY ;  Surgeon: Cordell Stephens MD;  [facility]: [facility]    ENDOSCOPY UPPER, COLONOSCOPY, COMBINED N/A 10/05/2018    Procedure: COMBINED ENDOSCOPY UPPER, COLONOSCOPY;  UPPER ENDOSCOPY with Biopsy  AND COLONOSCOPY;  Surgeon: Cordell Stephens MD;  [facility]    EXCISE LESION BUTTOCK(S) Left 06/08/2018    Procedure: EXCISE LESION BUTTOCK(S);  EXCISION OF GLUTEAL LEFT SKIN LESION;  Surgeon: Cordell Stephens MD;  [facility]    EXCISE MASS NECK Right 08/04/2015    Procedure: EXCISE MASS NECK;  Surgeon: Nasir Jones MD;  [facility]    INCISION AND DRAINAGE PERINEAL, COMBINED N/A 03/18/2015    Procedure: COMBINED INCISION AND  DRAINAGE PERINEAL;  Surgeon: Jay Torres DO;  [facility]    IR CONSULTATION FOR IR EXAM  05/09/2023    IR FLUORO 0-1 HOUR  06/05/2023    IRRIGATION AND DEBRIDEMENT GROIN N/A 02/07/2019    Procedure: IRRIGATION AND DEBRIDEMENT LEFT GROIN ABSCESS;  Surgeon: Cordell Stephens MD;  [facility]    LAPAROSCOPIC APPENDECTOMY  02/11/2022    LAPAROSCOPIC CHOLECYSTECTOMY N/A 11/20/2017    Procedure: LAPAROSCOPIC CHOLECYSTECTOMY;  LAPAROSCOPIC CHOLECYSTECTOMY;  Surgeon: Cordell Stephens MD;  [facility]    LAPAROSCOPIC HYSTERECTOMY SUPRACERVICAL, BILATERAL SALPINGO-OOPHORECTOMY, COMBINED  09/27/2013    Procedure: COMBINED LAPAROSCOPIC HYSTERECTOMY SUPRACERVICAL, SALPINGO-OOPHORECTOMY;  LAPAROSCOPIC SUPRACERVICAL HYSTERECTOMY AND RIGHT SALPINGO-OOPHORECTOMY, CYSTOSCOPY;  Surgeon: Denys Callahan MD;  [facility]    LAPAROSCOPIC OOPHORECTOMY Left 02/11/2022    Procedure: Left OOPHORECTOMY, LAPAROSCOPIC, Laparoscopic Appendectomy Torsion and Pelvic Appensix;  Surgeon: Malick Malhotra MD;  [facility]    LAPAROSCOPY DIAGNOSTIC (GYN) N/A 09/06/2018    Procedure: LAPAROSCOPY DIAGNOSTIC (GYN);  LAPAROSCOPY WITH PERITONEAL BIOPSIES AND REMOVAL LEFT FALLOPIAN TUBE;  Surgeon: Suraj Whitaker MD;  Location: [facility]    marsupialization of pilonidal cyst  01/01/2007    RELEASE TRIGGER FINGER BILATERAL Bilateral 07/10/2020    Procedure: RELEASE BILATERAL TRIGGER THUMBS;  Surgeon: Vince Murillo DO;  Location: [facility]    TUBAL LIGATION  01/01/2005     Current Outpatient Medications   Medication Sig Dispense Refill    amphetamine-dextroamphetamine (ADDERALL XR) 30 MG 24 hr capsule Take 1 capsule (30 mg) by mouth daily. 30 capsule 0    amphetamine-dextroamphetamine (ADDERALL) 10 MG tablet Take 1 tablet (10 mg) by mouth daily. 30 tablet 0    amphetamine-dextroamphetamine (ADDERALL) 30 MG tablet Take 1 tablet (30 mg) by mouth daily. 30 tablet 0    buPROPion (WELLBUTRIN XL) 150 MG 24 hr tablet Take 1 tablet  (150 mg) by mouth every morning. 90 tablet 1    buPROPion (WELLBUTRIN XL) 300 MG 24 hr tablet TAKE 1 TABLET BY MOUTH EVERY MORNING ALONG WITH 1 150MG TABLET - TOTAL DOSE 450MG 90 tablet 1    diazepam (VALIUM) 5 MG tablet Take 1 tablet (5 mg) by mouth every 6 hours as needed for pain or muscle spasms. 12 tablet 0    escitalopram (LEXAPRO) 20 MG tablet Take 1 tablet (20 mg) by mouth daily. 90 tablet 0    estradiol (ESTRACE) 1 MG tablet Take 1 tablet (1 mg) by mouth daily. 90 tablet 0    HUMIRA *CF* PEN 40 MG/0.4ML pen kit INJECT 40 MG (0.4 ML) UNDER THE SKIN EVERY 7 DAYS 12 mL 0    HYDROcodone-acetaminophen (NORCO) 5-325 MG tablet Take 1 tablet by mouth every 6 hours as needed for severe pain. 6 tablet 0    Lidocaine (LIDOCARE) 4 % Patch Place 1 patch over 12 hours onto the skin every 24 hours. To prevent lidocaine toxicity, patient should be patch free for 12 hrs daily. Please allow her to self pay if not covered by insurance. 10 patch 0    Lidocaine (LIDOCARE) 4 % Patch Place 1 patch over 12 hours onto the skin every 24 hours. To prevent lidocaine toxicity, patient should be patch free for 12 hrs daily. 7 patch 0    meloxicam (MOBIC) 15 MG tablet Take 1 tablet (15 mg) by mouth daily. 30 tablet 0    SUMAtriptan (IMITREX) 25 MG tablet TAKE 1 TABLET BY MOUTH AT ONSET OF HEADACHE FOR MIGRAINE, MAY REPEAT IN 2 HOURS MAX OF 8 TABLETS PER 24 HOURS 20 tablet 0    tiZANidine (ZANAFLEX) 4 MG tablet Take 1 tablet (4 mg) by mouth 3 times daily as needed for muscle spasms. 30 tablet 0    valACYclovir (VALTREX) 500 MG tablet Take 1 tablet (500 mg) by mouth daily 90 tablet 3       No Known Allergies     Social History     Tobacco Use    Smoking status: Every Day     Current packs/day: 0.50     Average packs/day: 0.5 packs/day for 24.3 years (12.1 ttl pk-yrs)     Types: Cigarettes     Start date: 1/1/2001     Passive exposure: Current    Smokeless tobacco: Never    Tobacco comments:     I have been working on quitting   Substance  "Use Topics    Alcohol use: No       History   Drug Use No           Review of Systems  CONSTITUTIONAL: NEGATIVE for fever, chills, change in weight  INTEGUMENTARY/SKIN: NEGATIVE for worrisome rashes, moles or lesions  EYES: NEGATIVE for vision changes or irritation  ENT/MOUTH: NEGATIVE for ear, mouth and throat problems  RESP: NEGATIVE for significant cough or SOB  BREAST: NEGATIVE for masses, tenderness or discharge  CV: NEGATIVE for chest pain, palpitations or peripheral edema  GI: NEGATIVE for nausea, abdominal pain, heartburn, or change in bowel habits  : NEGATIVE for frequency, dysuria, or hematuria  MUSCULOSKELETAL: NEGATIVE for significant arthralgias or myalgia. +right little finger fracture-splint in place   NEURO: NEGATIVE for weakness, dizziness or paresthesias  ENDOCRINE: NEGATIVE for temperature intolerance, skin/hair changes  HEME: NEGATIVE for bleeding problems  PSYCHIATRIC: NEGATIVE for changes in mood or affect. +HX ADHD     Objective    BP 96/60 (BP Location: Right arm, Patient Position: Sitting, Cuff Size: Adult Regular)   Pulse 71   Temp 98.7  F (37.1  C) (Tympanic)   Ht 1.575 m (5' 2\")   Wt 50.1 kg (110 lb 6.4 oz)   LMP 08/10/2013   SpO2 98%   BMI 20.19 kg/m     Estimated body mass index is 20.19 kg/m  as calculated from the following:    Height as of this encounter: 1.575 m (5' 2\").    Weight as of this encounter: 50.1 kg (110 lb 6.4 oz).  Physical Exam  GENERAL: alert and no distress  EYES: Eyes grossly normal to inspection, PERRL and conjunctivae and sclerae normal  HENT: ear canals and TM's normal, nose and mouth without ulcers or lesions  NECK: no adenopathy, no asymmetry, masses, or scars  RESP: lungs clear to auscultation - no rales, rhonchi or wheezes  CV: regular rate and rhythm, normal S1 S2, no S3 or S4, no murmur, click or rub, no peripheral edema  ABDOMEN: soft, nontender, no hepatosplenomegaly, no masses and bowel sounds normal  MS: no gross musculoskeletal defects noted, " no edema. +splint in place to right little finger   SKIN: no suspicious lesions or rashes  NEURO: Normal strength and tone, mentation intact and speech normal  PSYCH: mentation appears normal, affect normal/bright    Recent Labs   Lab Test 12/23/24  1012   HGB 14.0         POTASSIUM 4.7   CR 0.90        Diagnostics  Recent Results (from the past 24 hours)   Comprehensive metabolic panel    Collection Time: 04/09/25  2:03 PM   Result Value Ref Range    Sodium 141 135 - 145 mmol/L    Potassium 3.5 3.4 - 5.3 mmol/L    Carbon Dioxide (CO2) 26 22 - 29 mmol/L    Anion Gap 10 7 - 15 mmol/L    Urea Nitrogen 19.3 6.0 - 20.0 mg/dL    Creatinine 0.94 0.51 - 0.95 mg/dL    GFR Estimate 77 >60 mL/min/1.73m2    Calcium 9.2 8.8 - 10.4 mg/dL    Chloride 105 98 - 107 mmol/L    Glucose 66 (L) 70 - 99 mg/dL    Alkaline Phosphatase 77 40 - 150 U/L    AST 24 0 - 45 U/L    ALT 21 0 - 50 U/L    Protein Total 7.1 6.4 - 8.3 g/dL    Albumin 4.3 3.5 - 5.2 g/dL    Bilirubin Total 0.2 <=1.2 mg/dL   UA Macroscopic with reflex to Microscopic and Culture    Collection Time: 04/09/25  2:03 PM    Specimen: Urine, Midstream   Result Value Ref Range    Color Urine Yellow Colorless, Straw, Light Yellow, Yellow    Appearance Urine Clear Clear    Glucose Urine Negative Negative mg/dL    Bilirubin Urine Negative Negative    Ketones Urine Negative Negative mg/dL    Specific Gravity Urine >=1.030 1.003 - 1.035    Blood Urine Large (A) Negative    pH Urine 6.0 5.0 - 7.0    Protein Albumin Urine Trace (A) Negative mg/dL    Urobilinogen Urine 0.2 0.2, 1.0 E.U./dL    Nitrite Urine Negative Negative    Leukocyte Esterase Urine Trace (A) Negative   CBC with platelets and differential    Collection Time: 04/09/25  2:03 PM   Result Value Ref Range    WBC Count 8.9 4.0 - 11.0 10e3/uL    RBC Count 3.79 (L) 3.80 - 5.20 10e6/uL    Hemoglobin 13.0 11.7 - 15.7 g/dL    Hematocrit 37.4 35.0 - 47.0 %    MCV 99 78 - 100 fL    MCH 34.3 (H) 26.5 - 33.0 pg    MCHC  34.8 31.5 - 36.5 g/dL    RDW 13.1 10.0 - 15.0 %    Platelet Count 297 150 - 450 10e3/uL    % Neutrophils 38 %    % Lymphocytes 53 %    % Monocytes 7 %    % Eosinophils 1 %    % Basophils 1 %    % Immature Granulocytes 0 %    NRBCs per 100 WBC 0 <1 /100    Absolute Neutrophils 3.4 1.6 - 8.3 10e3/uL    Absolute Lymphocytes 4.7 0.8 - 5.3 10e3/uL    Absolute Monocytes 0.6 0.0 - 1.3 10e3/uL    Absolute Eosinophils 0.1 0.0 - 0.7 10e3/uL    Absolute Basophils 0.1 0.0 - 0.2 10e3/uL    Absolute Immature Granulocytes 0.0 <=0.4 10e3/uL    Absolute NRBCs 0.0 10e3/uL   UA Microscopic with Reflex to Culture    Collection Time: 04/09/25  2:03 PM   Result Value Ref Range    RBC Urine >100 (A) 0-2 /HPF /HPF    WBC Urine 0-5 0-5 /HPF /HPF    Squamous Epithelials Urine Few (A) None Seen /LPF   RBC and Platelet Morphology    Collection Time: 04/09/25  2:03 PM   Result Value Ref Range    RBC Morphology Confirmed RBC Indices     Platelet Assessment  Automated Count Confirmed. Platelet morphology is normal.     Automated Count Confirmed. Platelet morphology is normal.    Reactive Lymphocytes Present (A) None Seen   EKG 12-lead complete w/read - (Clinic Performed)    Collection Time: 04/09/25  2:17 PM   Result Value Ref Range    Systolic Blood Pressure  mmHg    Diastolic Blood Pressure  mmHg    Ventricular Rate 70 BPM    Atrial Rate 70 BPM    NH Interval 118 ms    QRS Duration 84 ms     ms    QTc 434 ms    P Axis 58 degrees    R AXIS 88 degrees    T Axis 68 degrees    Interpretation ECG       Sinus rhythm  Normal ECG  No previous ECGs available  Confirmed by MD Jules Anthony ([id number]) on 4/9/2025 3:49:10 PM        Large amount of blood in urine as she feels she is passing a kidney stone.     Revised Cardiac Risk Index (RCRI)  The patient has the following serious cardiovascular risks for perioperative complications:   - No serious cardiac risks = 0 points     RCRI Interpretation: 0 points: Class I (very low risk - 0.4%  complication rate)      Signed Electronically by: ALONDRA Gray CNP  A copy of this evaluation report is provided to the requesting physician.

## 2025-04-09 NOTE — PATIENT INSTRUCTIONS
How to Take Your Medication Before Surgery  Preoperative Medication Instructions   Antiplatelet or Anticoagulation Medication Instructions   - We reviewed the medication list and the patient is not on an antiplatelet or anticoagulation medications.    Additional Medication Instructions   - meloxicam (Mobic): DO NOT TAKE 10 days before surgery.      Hold Adderall am of procedure.     Patient Education   Preparing for Your Surgery  For Adults  Getting started  In most cases, a nurse will call to review your health history and instructions. They will give you an arrival time based on your scheduled surgery time. Please be ready to share:  Your doctor's clinic name and phone number  Your medical, surgical, and anesthesia history  A list of allergies and sensitivities  A list of medicines, including herbal treatments and over-the-counter drugs  Whether the patient has a legal guardian (ask how to send us the papers in advance)  Note: You may not receive a call if you were seen at our PAC (Preoperative Assessment Center).  Please tell us if you're pregnant--or if there's any chance you might be pregnant. Some surgeries may injure a fetus (unborn baby), so they require a pregnancy test. Surgeries that are safe for a fetus don't always need a test, and you can choose whether to have one.   Preparing for surgery  Within 10 to 30 days of surgery: Have a pre-op exam (sometimes called an H&P, or History and Physical). This can be done at a clinic or pre-operative center.  If you're having a , you may not need this exam. Talk to your care team.  At your pre-op exam, talk to your care team about all medicines you take. (This includes CBD oil and any drugs, such as THC, marijuana, and other forms of cannabis.) If you need to stop any medicine before surgery, ask when to start taking it again.  This is for your safety. Many medicines and drugs can make you bleed too much during surgery. Some change how well surgery  (anesthesia) drugs work.  Call your insurance company to let them know you're having surgery. (If you don't have insurance, call [phone].)  Call your clinic if there's any change in your health. This includes a scrape or scratch near the surgery site, or any signs of a cold (sore throat, runny nose, cough, rash, fever).  Eating and drinking guidelines  For your safety: Unless your surgeon tells you otherwise, follow the guidelines below.  Eat and drink as normal until 8 hours before you arrive for surgery. After that, no food or milk. You can spit out gum when you arrive.  Drink clear liquids until 2 hours before you arrive. These are liquids you can see through, like water, Gatorade, and Propel Water. They also include plain black coffee and tea (no cream or milk).  No alcohol for 24 hours before you arrive. The night before surgery, stop any drinks that contain THC.  If your care team tells you to take medicine on the morning of surgery, it's okay to take it with a sip of water. No other medicines or drugs are allowed (including CBD oil)--follow your care team's instructions.  If you have questions the day of surgery, call your hospital or surgery center.   Preventing infection  Shower or bathe the night before and the morning of surgery. Follow the instructions your clinic gave you. (If no instructions, use regular soap.)  Don't shave or clip hair near your surgery site. We'll remove the hair if needed.  Don't smoke or vape the morning of surgery. No chewing tobacco for 6 hours before you arrive. A nicotine patch is okay. You may spit out nicotine gum when you arrive.  For some surgeries, the surgeon will tell you to fully quit smoking and nicotine.  We will make every effort to keep you safe from infection. We will:  Clean our hands often with soap and water (or an alcohol-based hand rub).  Clean the skin at your surgery site with a special soap that kills germs.  Give you a special gown to keep you warm.  (Cold raises the risk of infection.)  Wear hair covers, masks, gowns, and gloves during surgery.  Give antibiotic medicine, if prescribed. Not all surgeries need this medicine.  What to bring on the day of surgery  Photo ID and insurance card  Copy of your health care directive, if you have one  Glasses and hearing aids (bring cases)  You can't wear contacts during surgery  Inhaler and eye drops, if you use them (tell us about these when you arrive)  CPAP machine or breathing device, if you use them  A few personal items, if spending the night  If you have . . .  A pacemaker, ICD (cardiac defibrillator), or other implant: Bring the ID card.  An implanted stimulator: Bring the remote control.  A legal guardian: Bring a copy of the certified (court-stamped) guardianship papers.  Please remove any jewelry, including body piercings. Leave jewelry and other valuables at home.  If you're going home the day of surgery  You must have a responsible adult drive you home. They should stay with you overnight as well.  If you don't have someone to stay with you, and you aren't safe to go home alone, we may keep you overnight. Insurance often won't pay for this.  After surgery  If it's hard to control your pain or you need more pain medicine, please call your surgeon's office.  Questions?   If you have any questions for your care team, list them here:   ____________________________________________________________________________________________________________________________________________________________________________________________________________________________________________________________  For informational purposes only. Not to replace the advice of your health care provider. Copyright   2003, 2019 [facility]. All rights reserved. Clinically reviewed by Naseem Corado MD. [employer] [id number] - REV 08/24.

## 2025-04-10 LAB — VIT B12 SERPL-MCNC: 818 PG/ML (ref 232–1245)

## 2025-04-11 ENCOUNTER — ANESTHESIA EVENT (OUTPATIENT)
Dept: SURGERY | Facility: HOSPITAL | Age: 45
End: 2025-04-11
Payer: COMMERCIAL

## 2025-04-11 ASSESSMENT — LIFESTYLE VARIABLES: TOBACCO_USE: 1

## 2025-04-11 NOTE — ANESTHESIA PREPROCEDURE EVALUATION
Anesthesia Pre-Procedure Evaluation    Patient: Purnima Fallon   MRN: [id number] : 1980        Procedure : Procedure(s):  Open Reduction Internal Fixation  Right Small Finger Intra Articular Fracture of Middle Phalanx          Past Medical History:   Diagnosis Date     ADHD (attention deficit hyperactivity disorder)      Arthritis      Biliary dyskinesia 10/06/2017     Carpal tunnel syndrome 2006     Chronic female pelvic pain 2018     Cystic acne 2013     Depression      Depressive disorder      Hidradenitis 2007     Hidradenitis suppurativa 2019     Ischiorectal abscess and fistula      Kidney stones 2008    x2 passed on her own     Malunion of fracture 2007     Mass of breast 2012     Migraines      Need for prophylactic hormone replacement therapy (postmenopausal)      Nondependent tobacco use disorder 10/11/2012     Papanicolaou smear of cervix with low grade squamous intraepithelial lesion (LGSIL) 10/29/2008     Pilonidal cyst 2017     S/p partial hysterectomy with remaining cervical stump 2013     Surgical menopause 2022     Trigger thumb of both thumbs 2020    Added automatically from request for surgery [id number]      Past Surgical History:   Procedure Laterality Date     ABDOMEN SURGERY       BIOPSY       COLONOSCOPY       CYSTECTOMY PILONIDAL N/A 2017    Procedure: CYSTECTOMY PILONIDAL;  PILONIDAL CYSTECTOMY ;  Surgeon: Cordell Stephens MD;  [facility]     ENDOSCOPY UPPER, COLONOSCOPY, COMBINED N/A 10/05/2018    Procedure: COMBINED ENDOSCOPY UPPER, COLONOSCOPY;  UPPER ENDOSCOPY with Biopsy  AND COLONOSCOPY;  Surgeon: Cordell Stephens MD;  [facility]     EXCISE LESION BUTTOCK(S) Left 2018    Procedure: EXCISE LESION BUTTOCK(S);  EXCISION OF GLUTEAL LEFT SKIN LESION;  Surgeon: Cordell Stephens MD;  [facility]     EXCISE MASS NECK Right 2015    Procedure: EXCISE MASS NECK;  Surgeon:  Nasir Jones MD;  [facility]     INCISION AND DRAINAGE PERINEAL, COMBINED N/A 03/18/2015    Procedure: COMBINED INCISION AND DRAINAGE PERINEAL;  Surgeon: Jay Torres DO;  [facility]     IR CONSULTATION FOR IR EXAM  05/09/2023     IR FLUORO 0-1 HOUR  06/05/2023     IRRIGATION AND DEBRIDEMENT GROIN N/A 02/07/2019    Procedure: IRRIGATION AND DEBRIDEMENT LEFT GROIN ABSCESS;  Surgeon: Cordell Stephens MD;  [facility]     LAPAROSCOPIC APPENDECTOMY  02/11/2022     LAPAROSCOPIC CHOLECYSTECTOMY N/A 11/20/2017    Procedure: LAPAROSCOPIC CHOLECYSTECTOMY;  LAPAROSCOPIC CHOLECYSTECTOMY;  Surgeon: Cordell Stephens MD;  [facility]     LAPAROSCOPIC HYSTERECTOMY SUPRACERVICAL, BILATERAL SALPINGO-OOPHORECTOMY, COMBINED  09/27/2013    Procedure: COMBINED LAPAROSCOPIC HYSTERECTOMY SUPRACERVICAL, SALPINGO-OOPHORECTOMY;  LAPAROSCOPIC SUPRACERVICAL HYSTERECTOMY AND RIGHT SALPINGO-OOPHORECTOMY, CYSTOSCOPY;  Surgeon: Denys Callahan MD;  [facility]     LAPAROSCOPIC OOPHORECTOMY Left 02/11/2022    Procedure: Left OOPHORECTOMY, LAPAROSCOPIC, Laparoscopic Appendectomy Torsion and Pelvic Appensix;  Surgeon: Malick Malhotra MD;  [facility]     LAPAROSCOPY DIAGNOSTIC (GYN) N/A 09/06/2018    Procedure: LAPAROSCOPY DIAGNOSTIC (GYN);  LAPAROSCOPY WITH PERITONEAL BIOPSIES AND REMOVAL LEFT FALLOPIAN TUBE;  Surgeon: Suraj Whitaker MD;  [facility]     marsupialization of pilonidal cyst  01/01/2007     RELEASE TRIGGER FINGER BILATERAL Bilateral 07/10/2020    Procedure: RELEASE BILATERAL TRIGGER THUMBS;  Surgeon: Vince Murillo DO;  [facility]     TUBAL LIGATION  01/01/2005      No Known Allergies   Social History     Tobacco Use     Smoking status: Every Day     Current packs/day: 0.50     Average packs/day: 0.5 packs/day for 24.3 years (12.1 ttl pk-yrs)     Types: Cigarettes     Start date: 1/1/2001     Passive exposure: Current     Smokeless tobacco: Never     Tobacco comments:     I  have been working on quitting   Substance Use Topics     Alcohol use: No      Wt Readings from Last 1 Encounters:   04/09/25 50.1 kg (110 lb 6.4 oz)        Anesthesia Evaluation   Pt has had prior anesthetic. Type: General.        ROS/MED HX  ENT/Pulmonary:     (+)                tobacco use, Current use,                       Neurologic:     (+)      migraines,                          Cardiovascular:  - neg cardiovascular ROS   (+)  - -   -  - -                                 Previous cardiac testing   Echo: Date: Results:    Stress Test:  Date: Results:    ECG Reviewed:  Date: hp Results:  Interpretation ECG           Sinus rhythm  Normal ECG  No previous ECGs available  Confirmed by MD Jules Anthony ([id number]) on 4/9/2025 3:49:10 PM      Cath:  Date: Results:      METS/Exercise Tolerance: >4 METS    Hematologic:  - neg hematologic  ROS     Musculoskeletal: Comment: CTS      GI/Hepatic: Comment: Biliary dyskinesia      Renal/Genitourinary:     (+)       Nephrolithiasis ,       Endo:  - neg endo ROS     Psychiatric/Substance Use: Comment: ADHD    (+) psychiatric history depression (ADHD)       Infectious Disease:  - neg infectious disease ROS     Malignancy:  - neg malignancy ROS     Other: Comment: S/p hysterectomy           Physical Exam    Airway        Mallampati: I   TM distance: > 3 FB   Neck ROM: full   Mouth opening: > 3 cm    Respiratory Devices and Support         Dental       (+) Completely normal teeth      Cardiovascular          Rhythm and rate: regular and normal     Pulmonary           breath sounds clear to auscultation       OUTSIDE LABS:  CBC:   Lab Results   Component Value Date    WBC 8.9 04/09/2025    WBC 8.3 12/23/2024    HGB 13.0 04/09/2025    HGB 14.0 12/23/2024    HCT 37.4 04/09/2025    HCT 40.4 12/23/2024     04/09/2025     12/23/2024     BMP:   Lab Results   Component Value Date     04/09/2025     12/23/2024    POTASSIUM 3.5 04/09/2025    POTASSIUM 4.7  "12/23/2024    CHLORIDE 105 04/09/2025    CHLORIDE 105 12/23/2024    CO2 26 04/09/2025    CO2 29 12/23/2024    BUN 19.3 04/09/2025    BUN 14.4 12/23/2024    CR 0.94 04/09/2025    CR 0.90 12/23/2024    GLC 66 (L) 04/09/2025    GLC 93 12/23/2024     COAGS: No results found for: \"PTT\", \"INR\", \"FIBR\"  POC:   Lab Results   Component Value Date    HCG Negative 09/30/2014    HCGS Negative 07/22/2013     HEPATIC:   Lab Results   Component Value Date    ALBUMIN 4.3 04/09/2025    PROTTOTAL 7.1 04/09/2025    ALT 21 04/09/2025    AST 24 04/09/2025    ALKPHOS 77 04/09/2025    BILITOTAL 0.2 04/09/2025     OTHER:   Lab Results   Component Value Date    LACT 0.9 11/29/2017    ANDRÉS 9.2 04/09/2025    LIPASE 96 02/10/2022    AMYLASE 349 (H) 06/27/2017    TSH 1.30 12/23/2024    CRP <2.9 02/10/2022       Anesthesia Plan    ASA Status:  2       Anesthesia Type: MAC.     - Reason for MAC: chronic cardiopulmonary disease, straight local not clinically adequate              Consents    Anesthesia Plan(s) and associated risks, benefits, and realistic alternatives discussed. Questions answered and patient/representative(s) expressed understanding.     - Discussed: Risks, Benefits and Alternatives for BOTH SEDATION and the PROCEDURE were discussed     - Discussed with:  Patient            Postoperative Care            Comments:    Other Comments:  4-9-25           ALONDRA Serrato CRNA    Clinically Significant Risk Factors Present on Admission                                          "

## 2025-04-13 NOTE — PROGRESS NOTES
ORTHOPEDIC CLINIC CONSULT    Referred by:     Chief Complaint: Purnima Fallon is a 44 year old female who is being seen for   Chief Complaint   Patient presents with    Consult     5 finger middle phalanx fracture date of injury 04/02/2025        History of Present Illness:   Patient 44-year-old female presents with the fifth finger middle phalanx fracture date injury 4/2/2025.  She was seen in the emergency room  4/5/2025 and was put in a splint.  She is right-hand dominant.  She notes she was chasing her [place] bulldog and then dove and trying to edging the [place] bulldog get injured her finger small finger right hand.  She was seen and had a fracture intra-articular into the proximal phalanx of P2 of her middle phalanx.    Patient's past medical, surgical, social and family histories reviewed.     Past Medical History:   Diagnosis Date    ADHD (attention deficit hyperactivity disorder)     Arthritis 2022    Biliary dyskinesia 10/06/2017    Carpal tunnel syndrome 08/23/2006    Chronic female pelvic pain 09/24/2018    Cystic acne 07/23/2013    Depression     Depressive disorder     Hidradenitis 02/16/2007    Hidradenitis suppurativa 05/13/2019    Ischiorectal abscess and fistula     Kidney stones 2008    x2 passed on her own    Malunion of fracture 05/17/2007    Mass of breast 02/02/2012    Migraines     Need for prophylactic hormone replacement therapy (postmenopausal)     Nondependent tobacco use disorder 10/11/2012    Papanicolaou smear of cervix with low grade squamous intraepithelial lesion (LGSIL) 10/29/2008    Pilonidal cyst 09/12/2017    S/p partial hysterectomy with remaining cervical stump 09/27/2013    Surgical menopause 02/11/2022    Trigger thumb of both thumbs 06/17/2020    Added automatically from request for surgery [id number]       Past Surgical History:   Procedure Laterality Date    ABDOMEN SURGERY      BIOPSY      COLONOSCOPY      CYSTECTOMY PILONIDAL N/A 09/18/2017    Procedure: CYSTECTOMY  PILONIDAL;  PILONIDAL CYSTECTOMY ;  Surgeon: Cordell Stephens MD;  [facility]    ENDOSCOPY UPPER, COLONOSCOPY, COMBINED N/A 10/05/2018    Procedure: COMBINED ENDOSCOPY UPPER, COLONOSCOPY;  UPPER ENDOSCOPY with Biopsy  AND COLONOSCOPY;  Surgeon: Cordell Stephens MD;  [facility]    EXCISE LESION BUTTOCK(S) Left 06/08/2018    Procedure: EXCISE LESION BUTTOCK(S);  EXCISION OF GLUTEAL LEFT SKIN LESION;  Surgeon: Cordell Stephens MD;  [facility]    EXCISE MASS NECK Right 08/04/2015    Procedure: EXCISE MASS NECK;  Surgeon: Nasir Jones MD;  [facility]    INCISION AND DRAINAGE PERINEAL, COMBINED N/A 03/18/2015    Procedure: COMBINED INCISION AND DRAINAGE PERINEAL;  Surgeon: Jay Torres DO;  [facility]    IR CONSULTATION FOR IR EXAM  05/09/2023    IR FLUORO 0-1 HOUR  06/05/2023    IRRIGATION AND DEBRIDEMENT GROIN N/A 02/07/2019    Procedure: IRRIGATION AND DEBRIDEMENT LEFT GROIN ABSCESS;  Surgeon: Cordell Stephens MD;  [facility]    LAPAROSCOPIC APPENDECTOMY  02/11/2022    LAPAROSCOPIC CHOLECYSTECTOMY N/A 11/20/2017    Procedure: LAPAROSCOPIC CHOLECYSTECTOMY;  LAPAROSCOPIC CHOLECYSTECTOMY;  Surgeon: Cordell Stephens MD;  [facility]    LAPAROSCOPIC HYSTERECTOMY SUPRACERVICAL, BILATERAL SALPINGO-OOPHORECTOMY, COMBINED  09/27/2013    Procedure: COMBINED LAPAROSCOPIC HYSTERECTOMY SUPRACERVICAL, SALPINGO-OOPHORECTOMY;  LAPAROSCOPIC SUPRACERVICAL HYSTERECTOMY AND RIGHT SALPINGO-OOPHORECTOMY, CYSTOSCOPY;  Surgeon: Denys Callahan MD;  [facility]    LAPAROSCOPIC OOPHORECTOMY Left 02/11/2022    Procedure: Left OOPHORECTOMY, LAPAROSCOPIC, Laparoscopic Appendectomy Torsion and Pelvic Appensix;  Surgeon: Malick Malhotra MD;  [facility]    LAPAROSCOPY DIAGNOSTIC (GYN) N/A 09/06/2018    Procedure: LAPAROSCOPY DIAGNOSTIC (GYN);  LAPAROSCOPY WITH PERITONEAL BIOPSIES AND REMOVAL LEFT FALLOPIAN TUBE;  Surgeon: Suraj Whitaker MD;  [facility]     marsupialization of pilonidal cyst  01/01/2007    RELEASE TRIGGER FINGER BILATERAL Bilateral 07/10/2020    Procedure: RELEASE BILATERAL TRIGGER THUMBS;  Surgeon: Vince Murillo DO;  [facility]    TUBAL LIGATION  01/01/2005       Home Medications:  Prior to Admission medications    Medication Sig Start Date End Date Taking? Authorizing Provider   amphetamine-dextroamphetamine (ADDERALL XR) 30 MG 24 hr capsule Take 1 capsule (30 mg) by mouth daily. 3/21/25  Yes Payton Levi APRN CNP   amphetamine-dextroamphetamine (ADDERALL) 10 MG tablet Take 1 tablet (10 mg) by mouth daily. 3/21/25  Yes Payton Levi APRN CNP   amphetamine-dextroamphetamine (ADDERALL) 30 MG tablet Take 1 tablet (30 mg) by mouth daily. 3/21/25  Yes Payton Levi APRN CNP   buPROPion (WELLBUTRIN XL) 150 MG 24 hr tablet Take 1 tablet (150 mg) by mouth every morning. 12/18/24  Yes Payton Levi APRN CNP   buPROPion (WELLBUTRIN XL) 300 MG 24 hr tablet TAKE 1 TABLET BY MOUTH EVERY MORNING ALONG WITH 1 150MG TABLET - TOTAL DOSE 450MG 12/18/24  Yes Payton Levi APRN CNP   diazepam (VALIUM) 5 MG tablet Take 1 tablet (5 mg) by mouth every 6 hours as needed for pain or muscle spasms. 3/16/25  Yes Beth Sumner PA-C   escitalopram (LEXAPRO) 20 MG tablet Take 1 tablet (20 mg) by mouth daily. 3/19/25  Yes Yovani Vega MD   estradiol (ESTRACE) 1 MG tablet Take 1 tablet (1 mg) by mouth daily. 3/19/25  Yes Yovani Vega MD   HUMIRA *CF* PEN 40 MG/0.4ML pen kit INJECT 40 MG (0.4 ML) UNDER THE SKIN EVERY 7 DAYS 10/14/24  Yes Yovani Vega MD   HYDROcodone-acetaminophen (NORCO) 5-325 MG tablet Take 1 tablet by mouth every 6 hours as needed for severe pain. 4/5/25  Yes Nadiya Jarquin, MYLES   meloxicam (MOBIC) 15 MG tablet Take 1 tablet (15 mg) by mouth daily. 3/21/25  Yes Payton Levi APRN CNP   SUMAtriptan (IMITREX) 25 MG tablet TAKE 1 TABLET BY MOUTH AT ONSET OF HEADACHE FOR MIGRAINE, MAY REPEAT IN 2  "HOURS MAX OF 8 TABLETS PER 24 HOURS 1/20/25  Yes Yovani Vega MD   tiZANidine (ZANAFLEX) 4 MG tablet Take 1 tablet (4 mg) by mouth 3 times daily as needed for muscle spasms. 3/21/25  Yes Payton Levi APRN CNP   valACYclovir (VALTREX) 500 MG tablet Take 1 tablet (500 mg) by mouth daily 5/2/24  Yes Payton Levi APRN CNP       No Known Allergies    Social History     Occupational History    Not on file   Tobacco Use    Smoking status: Every Day     Current packs/day: 0.50     Average packs/day: 0.5 packs/day for 24.3 years (12.1 ttl pk-yrs)     Types: Cigarettes     Start date: 1/1/2001     Passive exposure: Current    Smokeless tobacco: Never    Tobacco comments:     I have been working on quitting   Vaping Use    Vaping status: Never Used   Substance and Sexual Activity    Alcohol use: No    Drug use: Yes     Types: Marijuana    Sexual activity: Not Currently     Partners: Male     Birth control/protection: None       Family History   Problem Relation Age of Onset    Other Cancer Father         Lung cancer    Diabetes Paternal Grandmother     Other Cancer Maternal Grandmother         Lung cancer       REVIEW OF SYSTEMS            Physical Exam:    Vitals: /85 (BP Location: Left arm, Patient Position: Sitting, Cuff Size: Adult Regular)   Pulse 80   Ht 1.575 m (5' 2\")   Wt 49.9 kg (110 lb)   LMP 08/10/2013   SpO2 100%   BMI 20.12 kg/m    BMI= Body mass index is 20.12 kg/m .  On examination awake alert oriented cooperative no apparent distress afebrile vital signs stable.  Has a swelling of the right hand of the small finger overall still has reasonable clinical longitudinal alignment no large angular deformity still has brisk cap refill.  Swelling around the proximal phalanx middle phalanx in the splint without gentle joint  Radiographs: She had radiographs at his limited comminution with the intra-articular fracture with some shortening and splitting widening of the space the " intra-articular fracture of the middle phalanx intra-articular at the proximal interphalangeal joint.     Independent visualization of the films was made.         Impression:      ICD-10-CM    1. Displaced fracture of middle phalanx of right little finger, initial encounter for closed fracture  S62.626A Orthopedic  Referral          Plan: Small finger right hand middle phalanx fracture displaced that extends into the PIP joint.  After detailed discussion and options she was to proceed with surgical invention and open reduction internal fixation.  Depending on stability of the strength of the bone wound trying to close down the fracture with the probable interfrag screws from the handset or mini frag set possibly a small external fixator to keep the off of the joint if the quality of bone is not sufficient.  However please update cleared and placed on schedule for the open duction fixation small finger middle phalanx intra-articular joint the PIP    All of the above pertinent physical exam and imaging modalities findings was reviewed with Purnima.    Return to clinic in postoperative weeks.    Further imaging required     Time spent with evaluation: 30 minutes    Fernando Acevedo MD  4/13/2025  3:32 PM

## 2025-04-15 ENCOUNTER — ANESTHESIA (OUTPATIENT)
Dept: SURGERY | Facility: HOSPITAL | Age: 45
End: 2025-04-15
Payer: COMMERCIAL

## 2025-04-15 ENCOUNTER — APPOINTMENT (OUTPATIENT)
Dept: GENERAL RADIOLOGY | Facility: HOSPITAL | Age: 45
End: 2025-04-15
Attending: ORTHOPAEDIC SURGERY
Payer: COMMERCIAL

## 2025-04-15 ENCOUNTER — HOSPITAL ENCOUNTER (OUTPATIENT)
Facility: HOSPITAL | Age: 45
Discharge: HOME | End: 2025-04-15
Attending: ORTHOPAEDIC SURGERY | Admitting: ORTHOPAEDIC SURGERY
Payer: COMMERCIAL

## 2025-04-15 VITALS
HEIGHT: 62 IN | SYSTOLIC BLOOD PRESSURE: 125 MMHG | DIASTOLIC BLOOD PRESSURE: 68 MMHG | TEMPERATURE: 98.3 F | WEIGHT: 110 LBS | RESPIRATION RATE: 20 BRPM | OXYGEN SATURATION: 97 % | HEART RATE: 60 BPM | BODY MASS INDEX: 20.24 KG/M2

## 2025-04-15 DIAGNOSIS — Z87.81 STATUS POST OPEN REDUCTION AND INTERNAL FIXATION (ORIF) OF FRACTURE: Primary | ICD-10-CM

## 2025-04-15 DIAGNOSIS — Z98.890 STATUS POST OPEN REDUCTION AND INTERNAL FIXATION (ORIF) OF FRACTURE: Primary | ICD-10-CM

## 2025-04-15 PROCEDURE — 258N000003 HC RX IP 258 OP 636: Performed by: NURSE ANESTHETIST, CERTIFIED REGISTERED

## 2025-04-15 PROCEDURE — 250N000013 HC RX MED GY IP 250 OP 250 PS 637: Performed by: ORTHOPAEDIC SURGERY

## 2025-04-15 PROCEDURE — 250N000011 HC RX IP 250 OP 636: Performed by: ORTHOPAEDIC SURGERY

## 2025-04-15 PROCEDURE — 26735 TREAT FINGER FRACTURE EACH: CPT | Mod: F9 | Performed by: ORTHOPAEDIC SURGERY

## 2025-04-15 PROCEDURE — 250N000025 HC SEVOFLURANE, PER MIN: Performed by: ORTHOPAEDIC SURGERY

## 2025-04-15 PROCEDURE — 370N000017 HC ANESTHESIA TECHNICAL FEE, PER MIN: Performed by: ORTHOPAEDIC SURGERY

## 2025-04-15 PROCEDURE — 999N000141 HC STATISTIC PRE-PROCEDURE NURSING ASSESSMENT: Performed by: ORTHOPAEDIC SURGERY

## 2025-04-15 PROCEDURE — 250N000009 HC RX 250: Performed by: NURSE ANESTHETIST, CERTIFIED REGISTERED

## 2025-04-15 PROCEDURE — 360N000083 HC SURGERY LEVEL 3 W/ FLUORO, PER MIN: Performed by: ORTHOPAEDIC SURGERY

## 2025-04-15 PROCEDURE — 710N000012 HC RECOVERY PHASE 2, PER MINUTE: Performed by: ORTHOPAEDIC SURGERY

## 2025-04-15 PROCEDURE — 710N000010 HC RECOVERY PHASE 1, LEVEL 2, PER MIN: Performed by: ORTHOPAEDIC SURGERY

## 2025-04-15 PROCEDURE — 999N000179 XR SURGERY CARM FLUORO LESS THAN 5 MIN W STILLS

## 2025-04-15 PROCEDURE — 272N000001 HC OR GENERAL SUPPLY STERILE: Performed by: ORTHOPAEDIC SURGERY

## 2025-04-15 PROCEDURE — 250N000011 HC RX IP 250 OP 636: Performed by: NURSE ANESTHETIST, CERTIFIED REGISTERED

## 2025-04-15 DEVICE — K-WIRE 1.25 X 150MM: Type: IMPLANTABLE DEVICE | Site: FINGER | Status: FUNCTIONAL

## 2025-04-15 DEVICE — IMPLANTABLE DEVICE: Type: IMPLANTABLE DEVICE | Site: FINGER | Status: FUNCTIONAL

## 2025-04-15 RX ORDER — THERA TABS 400 MCG
1 TAB ORAL DAILY
COMMUNITY

## 2025-04-15 RX ORDER — HYDROCODONE BITARTRATE AND ACETAMINOPHEN 5; 325 MG/1; MG/1
2 TABLET ORAL
Status: COMPLETED | OUTPATIENT
Start: 2025-04-15 | End: 2025-04-15

## 2025-04-15 RX ORDER — FENTANYL CITRATE 50 UG/ML
INJECTION, SOLUTION INTRAMUSCULAR; INTRAVENOUS PRN
Status: DISCONTINUED | OUTPATIENT
Start: 2025-04-15 | End: 2025-04-15

## 2025-04-15 RX ORDER — PROPOFOL 10 MG/ML
INJECTION, EMULSION INTRAVENOUS CONTINUOUS PRN
Status: DISCONTINUED | OUTPATIENT
Start: 2025-04-15 | End: 2025-04-15

## 2025-04-15 RX ORDER — CEFAZOLIN SODIUM/WATER 2 G/20 ML
2 SYRINGE (ML) INTRAVENOUS SEE ADMIN INSTRUCTIONS
Status: DISCONTINUED | OUTPATIENT
Start: 2025-04-15 | End: 2025-04-15 | Stop reason: HOSPADM

## 2025-04-15 RX ORDER — HYDROCODONE BITARTRATE AND ACETAMINOPHEN 5; 325 MG/1; MG/1
1-2 TABLET ORAL EVERY 4 HOURS PRN
Qty: 25 TABLET | Refills: 0 | Status: SHIPPED | OUTPATIENT
Start: 2025-04-15 | End: 2025-04-18

## 2025-04-15 RX ORDER — HYDROXYZINE HYDROCHLORIDE 50 MG/ML
50 INJECTION, SOLUTION INTRAMUSCULAR
Status: DISCONTINUED | OUTPATIENT
Start: 2025-04-15 | End: 2025-04-15 | Stop reason: HOSPADM

## 2025-04-15 RX ORDER — SODIUM CHLORIDE, SODIUM LACTATE, POTASSIUM CHLORIDE, CALCIUM CHLORIDE 600; 310; 30; 20 MG/100ML; MG/100ML; MG/100ML; MG/100ML
INJECTION, SOLUTION INTRAVENOUS CONTINUOUS
Status: DISCONTINUED | OUTPATIENT
Start: 2025-04-15 | End: 2025-04-15 | Stop reason: HOSPADM

## 2025-04-15 RX ORDER — LIDOCAINE HYDROCHLORIDE 10 MG/ML
INJECTION, SOLUTION EPIDURAL; INFILTRATION; INTRACAUDAL; PERINEURAL
Status: DISCONTINUED
Start: 2025-04-15 | End: 2025-04-15 | Stop reason: WASHOUT

## 2025-04-15 RX ORDER — LIDOCAINE 40 MG/G
CREAM TOPICAL
Status: DISCONTINUED | OUTPATIENT
Start: 2025-04-15 | End: 2025-04-15 | Stop reason: HOSPADM

## 2025-04-15 RX ORDER — ACETAMINOPHEN 325 MG/1
650 TABLET ORAL
Status: DISCONTINUED | OUTPATIENT
Start: 2025-04-15 | End: 2025-04-15 | Stop reason: HOSPADM

## 2025-04-15 RX ORDER — CEFAZOLIN SODIUM/WATER 2 G/20 ML
2 SYRINGE (ML) INTRAVENOUS
Status: COMPLETED | OUTPATIENT
Start: 2025-04-15 | End: 2025-04-15

## 2025-04-15 RX ORDER — ONDANSETRON 2 MG/ML
4 INJECTION INTRAMUSCULAR; INTRAVENOUS EVERY 30 MIN PRN
Status: DISCONTINUED | OUTPATIENT
Start: 2025-04-15 | End: 2025-04-15 | Stop reason: HOSPADM

## 2025-04-15 RX ORDER — BUPIVACAINE HYDROCHLORIDE 2.5 MG/ML
INJECTION, SOLUTION EPIDURAL; INFILTRATION; INTRACAUDAL; PERINEURAL
Status: DISCONTINUED
Start: 2025-04-15 | End: 2025-04-15 | Stop reason: HOSPADM

## 2025-04-15 RX ORDER — NALOXONE HYDROCHLORIDE 0.4 MG/ML
0.1 INJECTION, SOLUTION INTRAMUSCULAR; INTRAVENOUS; SUBCUTANEOUS
Status: DISCONTINUED | OUTPATIENT
Start: 2025-04-15 | End: 2025-04-15 | Stop reason: HOSPADM

## 2025-04-15 RX ORDER — BUPIVACAINE HYDROCHLORIDE 2.5 MG/ML
INJECTION, SOLUTION INFILTRATION; PERINEURAL PRN
Status: DISCONTINUED | OUTPATIENT
Start: 2025-04-15 | End: 2025-04-15 | Stop reason: HOSPADM

## 2025-04-15 RX ORDER — DEXAMETHASONE SODIUM PHOSPHATE 4 MG/ML
4 INJECTION, SOLUTION INTRA-ARTICULAR; INTRALESIONAL; INTRAMUSCULAR; INTRAVENOUS; SOFT TISSUE
Status: DISCONTINUED | OUTPATIENT
Start: 2025-04-15 | End: 2025-04-15 | Stop reason: HOSPADM

## 2025-04-15 RX ORDER — ONDANSETRON 4 MG/1
4 TABLET, ORALLY DISINTEGRATING ORAL EVERY 30 MIN PRN
Status: DISCONTINUED | OUTPATIENT
Start: 2025-04-15 | End: 2025-04-15 | Stop reason: HOSPADM

## 2025-04-15 RX ORDER — PROPOFOL 10 MG/ML
INJECTION, EMULSION INTRAVENOUS PRN
Status: DISCONTINUED | OUTPATIENT
Start: 2025-04-15 | End: 2025-04-15

## 2025-04-15 RX ORDER — LIDOCAINE HYDROCHLORIDE 20 MG/ML
INJECTION, SOLUTION INFILTRATION; PERINEURAL PRN
Status: DISCONTINUED | OUTPATIENT
Start: 2025-04-15 | End: 2025-04-15

## 2025-04-15 RX ADMIN — LIDOCAINE HYDROCHLORIDE 40 MG: 20 INJECTION, SOLUTION INFILTRATION; PERINEURAL at 13:20

## 2025-04-15 RX ADMIN — PROPOFOL 30 MG: 10 INJECTION, EMULSION INTRAVENOUS at 14:08

## 2025-04-15 RX ADMIN — SODIUM CHLORIDE, SODIUM LACTATE, POTASSIUM CHLORIDE, AND CALCIUM CHLORIDE: .6; .31; .03; .02 INJECTION, SOLUTION INTRAVENOUS at 12:34

## 2025-04-15 RX ADMIN — FENTANYL CITRATE 50 MCG: 50 INJECTION INTRAMUSCULAR; INTRAVENOUS at 15:10

## 2025-04-15 RX ADMIN — FENTANYL CITRATE 25 MCG: 50 INJECTION INTRAMUSCULAR; INTRAVENOUS at 13:28

## 2025-04-15 RX ADMIN — MIDAZOLAM 2 MG: 1 INJECTION INTRAMUSCULAR; INTRAVENOUS at 13:13

## 2025-04-15 RX ADMIN — FENTANYL CITRATE 50 MCG: 50 INJECTION INTRAMUSCULAR; INTRAVENOUS at 14:49

## 2025-04-15 RX ADMIN — Medication 2 G: at 12:35

## 2025-04-15 RX ADMIN — FENTANYL CITRATE 50 MCG: 50 INJECTION INTRAMUSCULAR; INTRAVENOUS at 13:17

## 2025-04-15 RX ADMIN — DEXMEDETOMIDINE HYDROCHLORIDE 8 MCG: 100 INJECTION, SOLUTION INTRAVENOUS at 14:53

## 2025-04-15 RX ADMIN — PROPOFOL 150 MCG/KG/MIN: 10 INJECTION, EMULSION INTRAVENOUS at 13:21

## 2025-04-15 RX ADMIN — HYDROMORPHONE HYDROCHLORIDE 0.5 MG: 1 INJECTION, SOLUTION INTRAMUSCULAR; INTRAVENOUS; SUBCUTANEOUS at 14:14

## 2025-04-15 RX ADMIN — PROPOFOL 150 MG: 10 INJECTION, EMULSION INTRAVENOUS at 13:20

## 2025-04-15 RX ADMIN — HYDROCODONE BITARTRATE AND ACETAMINOPHEN 1 TABLET: 5; 325 TABLET ORAL at 16:14

## 2025-04-15 RX ADMIN — DEXMEDETOMIDINE HYDROCHLORIDE 12 MCG: 100 INJECTION, SOLUTION INTRAVENOUS at 13:44

## 2025-04-15 RX ADMIN — PROPOFOL 50 MG: 10 INJECTION, EMULSION INTRAVENOUS at 13:31

## 2025-04-15 RX ADMIN — FENTANYL CITRATE 25 MCG: 50 INJECTION INTRAMUSCULAR; INTRAVENOUS at 13:32

## 2025-04-15 ASSESSMENT — ACTIVITIES OF DAILY LIVING (ADL)
ADLS_ACUITY_SCORE: 20

## 2025-04-15 NOTE — DISCHARGE INSTRUCTIONS
"IMPORTANT OBSERVATIONS  Check C-M-S of operative extremity every 4 hours while you are awake for the first 48 hours:    * C: color - should appear normal/pink   * M: motion - able to move fingers and toes.   * S: sensation - able to \"feel\": no numbness, tingling  If you experience changes in C-M-S and/or in severe pain, you may remove the elastic bandages and reapply ot - tight enough to provide support for the gauze dressing but loose enough to improve C-M-S. The gauze dressing should NOT be removed when rewrapping the elastic bandage.     Thank you for allowing Dr. Acevedo and his team to take care of you today at [facility].  If you have any questions and/or concerns please reach out to his clinic team Monday-Friday 08:00 to 4:00 PM at [phone].  After hours please go to the Urgent Care and/or Emergency Room.  If you feel like it is an emergency call 911.   "

## 2025-04-15 NOTE — BRIEF OP NOTE
[facility]    Brief Operative Note    Pre-operative diagnosis: Displaced fracture of middle phalanx of right little finger, initial encounter for closed fracture [S62.474A]  Post-operative diagnosis Same as pre-operative diagnosis    Procedure: Open Reduction Internal Fixation  Right Small Finger Intra Articular Fracture of Middle Phalanx, Right - Finger  With pin fixation secondary to comminution of joint and fragments  Surgeon: Surgeons and Role:     * Fernando Acevedo MD - Primary  Anesthesia: General   Estimated Blood Loss: None    Drains: None  Specimens: * No specimens in log *  Findings:   Comminution and fragmentation in the joint requiring pinning rather than screw fixation .  Complications: None.  Implants:   Implant Name Type Inv. Item Serial No.  Lot No. LRB No. Used Action   K-Wire 1.25 x 150mm - [id number] Other K-Wire 1.25 x 150mm  ROBBY  Right 1 Implanted   Mini Frag K Wire 1.0mm x 150mm Other   ROBBY  Right 1 Implanted

## 2025-04-15 NOTE — ANESTHESIA CARE TRANSFER NOTE
Patient: Purnima Fallon    Procedure: Procedure(s):  Open Reduction Internal Fixation  Right Small Finger Intra Articular Fracture of Middle Phalanx       Diagnosis: Displaced fracture of middle phalanx of right little finger, initial encounter for closed fracture [S62.821U]  Diagnosis Additional Information: No value filed.    Anesthesia Type:   MAC     Note:    Oropharynx: spontaneously breathing  Level of Consciousness: awake  Oxygen Supplementation: room air    Independent Airway: airway patency satisfactory and stable  Dentition: dentition unchanged  Vital Signs Stable: post-procedure vital signs reviewed and stable  Report to RN Given: handoff report given  Patient transferred to: PACU    Handoff Report: Identifed the Patient, Identified the Reponsible Provider, Reviewed the pertinent medical history, Discussed the surgical course, Reviewed Intra-OP anesthesia mangement and issues during anesthesia, Set expectations for post-procedure period and Allowed opportunity for questions and acknowledgement of understanding  Vitals:  Vitals Value Taken Time   BP     Temp     Pulse     Resp     SpO2         Electronically Signed By: ALONDRA Castro CRNA  April 15, 2025  3:25 PM

## 2025-04-15 NOTE — ANESTHESIA POSTPROCEDURE EVALUATION
Patient: Purnima Fallon    Procedure: Procedure(s):  Open Reduction Internal Fixation  Right Small Finger Intra Articular Fracture of Middle Phalanx       Anesthesia Type:  MAC    Note:  Disposition: Outpatient   Postop Pain Control: Uneventful            Sign Out: Well controlled pain   PONV: No   Neuro/Psych: Uneventful            Sign Out: Acceptable/Baseline neuro status   Airway/Respiratory: Uneventful            Sign Out: Acceptable/Baseline resp. status   CV/Hemodynamics: Uneventful            Sign Out: Acceptable CV status; No obvious hypovolemia; No obvious fluid overload   Other NRE: NONE   DID A NON-ROUTINE EVENT OCCUR?        Last vitals:  Vitals Value Taken Time   /68 04/15/25 1620   Temp 99.3  F (37.4  C) 04/15/25 1600   Pulse 64 04/15/25 1625   Resp 22 04/15/25 1625   SpO2 96 % 04/15/25 1625   Vitals shown include unfiled device data.    Electronically Signed By: ALONDRA Chavez CRNA  April 15, 2025  4:52 PM

## 2025-04-15 NOTE — OR NURSING
Patient and responsible adult given discharge instructions with no questions regarding instructions. Aliya score 19/20. Pain level 4/10.  Discharged from unit via ambulation. Patient discharged to home with son.

## 2025-04-15 NOTE — ANESTHESIA PROCEDURE NOTES
Airway       Patient location during procedure: OR  Staff -        CRNA: Nathalia Hurt APRN CRNA       Performed By: CRNA  Consent for Airway        Urgency: elective  Indications and Patient Condition       Indications for airway management: willie-procedural       Induction type:intravenous       Mask difficulty assessment: 0 - not attempted    Final Airway Details       Final airway type: supraglottic airway    Supraglottic Airway Details        Type: LMA       Brand: I-Gel       LMA size: 4    Post intubation assessment        Placement verified by: capnometry        Number of attempts at approach: 1       Ease of procedure: easy       Dentition: Intact and Unchanged

## 2025-04-17 NOTE — OP NOTE
[facility] Operative Note    Pre-operative diagnosis: Displaced fracture of middle phalanx of right little finger, initial encounter for closed fracture [O08.313A]   Post-operative diagnosis Same plus comminuted intra-articular fracture   Procedure: Procedure(s):  Open Reduction Internal Fixation  Right Small Finger Intra Articular Fracture of Middle Phalanx with pin fixation   Surgeon: Fernando Acevedo MD   Assistants(s):    Estimated blood loss: None    Specimens: None   Findings:            Details of procedure:  Comminuted intra-articular fracture small difficult to fix with interfrag screws gaining stability.  2 results with stabilization of fixation with pin fixation.          Preop clearance surgical site marked vital by anesthesia and antibiotics was able to be brought back to the operating placed in supine position.  He was administered anesthesia.  Augmented anesthesia coverage with a digital block around the small finger.  Once well set anesthesia as well as a filtration was able to be positioned on the operative table well-padded and secured and allow access right hand on the hand table.  It was then prepped prepped and draped in the standard standard surgical fashion.  After prepping and draping landmarks were then marked with a sterile marking pen.  Timeout was completed.  Was augmented to the anesthesia with the digital block as noted.  Incision then made in the more dorsal aspect ending axis of the fracture intra-articular at the to the PIP joint.  Is able to open up along the dorsal heart capsule tagging the extensor area in the split with a suture for repair.  Had good visualization of the joint and noted that comminution of the actual joint surface of the larger fragment which is the more dorsal ulnar head and impacted and comminuted area of joint surface unstable with the smaller fragment that was more proximal radial volar also had some comminution and small fragment.  Getting to  interdigitate and created joint is no actual good joint surface and sides of fragment for replacement of interfragment screws.    Noting the comminution and joint issues elected to improve overall clinically comminution placed the smaller fragments in the position and some ligamentous traction and using a K wire that went into the tip of the finger through the distal phalanx into the middle phalanx and then extending into the proximal phalanx keeping it in alignment and limb length of the finger noted on C arm and then secondarily fixing the smaller fragment with a transverse K wire pin going in from as it was held in the position from the ulnar side towards the radial side tried to recreate thumbs up to someone's elbow joint joint surface.  After completion of pinning it is then able to be irrigated out the dorsal eagle capsule was then repaired and then skin closure then ensued and nylon suture.  Xeroform gauze 4 x 4 sterile Webril were then used to wrapping around the finger to also include the small to the ring for secondary splinting and then a ulnar gutter splint placed.  After completion of fixation and splinting tourniquet was elevated and an element released sterile drapes and removed patient was able to be awoken and transferred also bed into postop anesthesia recovery in a stable condition.  Please of the procedure all needle instrument and sponge counts were complete and intact.

## 2025-04-18 ENCOUNTER — MYC REFILL (OUTPATIENT)
Dept: FAMILY MEDICINE | Facility: OTHER | Age: 45
End: 2025-04-18

## 2025-04-18 DIAGNOSIS — Z87.81 STATUS POST OPEN REDUCTION AND INTERNAL FIXATION (ORIF) OF FRACTURE: ICD-10-CM

## 2025-04-18 DIAGNOSIS — Z98.890 STATUS POST OPEN REDUCTION AND INTERNAL FIXATION (ORIF) OF FRACTURE: ICD-10-CM

## 2025-04-18 NOTE — TELEPHONE ENCOUNTER
Norco      Last Written Prescription Date:  4.15.25  Last Fill Quantity: #25,   # refills: 0  Last Office Visit: surgery date 4.15.25  Future Office visit:       Routing refill request to provider for review/approval because:  Drug not on the [facility], [facility] or [facility] refill protocol or controlled substance

## 2025-04-22 DIAGNOSIS — S62.626D CLOSED DISPLACED FRACTURE OF MIDDLE PHALANX OF RIGHT LITTLE FINGER WITH ROUTINE HEALING, SUBSEQUENT ENCOUNTER: Primary | ICD-10-CM

## 2025-04-22 RX ORDER — HYDROCODONE BITARTRATE AND ACETAMINOPHEN 5; 325 MG/1; MG/1
1-2 TABLET ORAL EVERY 4 HOURS PRN
Qty: 25 TABLET | Refills: 0 | Status: SHIPPED | OUTPATIENT
Start: 2025-04-22

## 2025-04-23 ENCOUNTER — ANCILLARY PROCEDURE (OUTPATIENT)
Dept: GENERAL RADIOLOGY | Facility: OTHER | Age: 45
End: 2025-04-23
Attending: ORTHOPAEDIC SURGERY
Payer: COMMERCIAL

## 2025-04-23 ENCOUNTER — APPOINTMENT (OUTPATIENT)
Dept: CT IMAGING | Facility: HOSPITAL | Age: 45
End: 2025-04-23
Attending: PHYSICIAN ASSISTANT
Payer: COMMERCIAL

## 2025-04-23 ENCOUNTER — OFFICE VISIT (OUTPATIENT)
Dept: ORTHOPEDICS | Facility: OTHER | Age: 45
End: 2025-04-23
Attending: ORTHOPAEDIC SURGERY
Payer: COMMERCIAL

## 2025-04-23 ENCOUNTER — HOSPITAL ENCOUNTER (EMERGENCY)
Facility: HOSPITAL | Age: 45
Discharge: HOME | End: 2025-04-24
Attending: PHYSICIAN ASSISTANT
Payer: COMMERCIAL

## 2025-04-23 VITALS
OXYGEN SATURATION: 95 % | BODY MASS INDEX: 20.24 KG/M2 | WEIGHT: 110 LBS | HEIGHT: 62 IN | SYSTOLIC BLOOD PRESSURE: 105 MMHG | RESPIRATION RATE: 16 BRPM | DIASTOLIC BLOOD PRESSURE: 70 MMHG | HEART RATE: 89 BPM | TEMPERATURE: 98.3 F

## 2025-04-23 DIAGNOSIS — N20.0 KIDNEY STONE ON RIGHT SIDE: ICD-10-CM

## 2025-04-23 DIAGNOSIS — S62.626A DISPLACED FRACTURE OF MIDDLE PHALANX OF RIGHT LITTLE FINGER, INITIAL ENCOUNTER FOR CLOSED FRACTURE: Primary | ICD-10-CM

## 2025-04-23 DIAGNOSIS — R10.9 RIGHT SIDED ABDOMINAL PAIN: ICD-10-CM

## 2025-04-23 DIAGNOSIS — S62.626D CLOSED DISPLACED FRACTURE OF MIDDLE PHALANX OF RIGHT LITTLE FINGER WITH ROUTINE HEALING, SUBSEQUENT ENCOUNTER: ICD-10-CM

## 2025-04-23 DIAGNOSIS — N39.0 UTI (URINARY TRACT INFECTION): ICD-10-CM

## 2025-04-23 LAB
ALBUMIN UR-MCNC: NEGATIVE MG/DL
ANION GAP SERPL CALCULATED.3IONS-SCNC: 11 MMOL/L (ref 7–15)
APPEARANCE UR: CLEAR
BACTERIA #/AREA URNS HPF: ABNORMAL /HPF
BASOPHILS # BLD AUTO: 0 10E3/UL (ref 0–0.2)
BASOPHILS NFR BLD AUTO: 0 %
BILIRUB UR QL STRIP: NEGATIVE
BUN SERPL-MCNC: 16.6 MG/DL (ref 6–20)
CALCIUM SERPL-MCNC: 9.1 MG/DL (ref 8.8–10.4)
CHLORIDE SERPL-SCNC: 100 MMOL/L (ref 98–107)
COLOR UR AUTO: YELLOW
CREAT SERPL-MCNC: 0.86 MG/DL (ref 0.51–0.95)
EGFRCR SERPLBLD CKD-EPI 2021: 85 ML/MIN/1.73M2
EOSINOPHIL # BLD AUTO: 0.1 10E3/UL (ref 0–0.7)
EOSINOPHIL NFR BLD AUTO: 2 %
ERYTHROCYTE [DISTWIDTH] IN BLOOD BY AUTOMATED COUNT: 12.9 % (ref 10–15)
GLUCOSE SERPL-MCNC: 93 MG/DL (ref 70–99)
GLUCOSE UR STRIP-MCNC: NEGATIVE MG/DL
HCO3 SERPL-SCNC: 28 MMOL/L (ref 22–29)
HCT VFR BLD AUTO: 36 % (ref 35–47)
HGB BLD-MCNC: 12.2 G/DL (ref 11.7–15.7)
HGB UR QL STRIP: NEGATIVE
HOLD SPECIMEN: NORMAL
HYALINE CASTS: 2 /LPF
IMM GRANULOCYTES # BLD: 0 10E3/UL
IMM GRANULOCYTES NFR BLD: 0 %
KETONES UR STRIP-MCNC: NEGATIVE MG/DL
LEUKOCYTE ESTERASE UR QL STRIP: ABNORMAL
LYMPHOCYTES # BLD AUTO: 4.1 10E3/UL (ref 0.8–5.3)
LYMPHOCYTES NFR BLD AUTO: 46 %
MCH RBC QN AUTO: 33.5 PG (ref 26.5–33)
MCHC RBC AUTO-ENTMCNC: 33.9 G/DL (ref 31.5–36.5)
MCV RBC AUTO: 99 FL (ref 78–100)
MONOCYTES # BLD AUTO: 0.6 10E3/UL (ref 0–1.3)
MONOCYTES NFR BLD AUTO: 7 %
MUCOUS THREADS #/AREA URNS LPF: PRESENT /LPF
NEUTROPHILS # BLD AUTO: 4 10E3/UL (ref 1.6–8.3)
NEUTROPHILS NFR BLD AUTO: 45 %
NITRATE UR QL: NEGATIVE
NRBC # BLD AUTO: 0 10E3/UL
NRBC BLD AUTO-RTO: 0 /100
PH UR STRIP: 6.5 [PH] (ref 4.7–8)
PLAT MORPH BLD: ABNORMAL
PLATELET # BLD AUTO: 298 10E3/UL (ref 150–450)
POTASSIUM SERPL-SCNC: 3.6 MMOL/L (ref 3.4–5.3)
RBC # BLD AUTO: 3.64 10E6/UL (ref 3.8–5.2)
RBC MORPH BLD: ABNORMAL
RBC URINE: 3 /HPF
SODIUM SERPL-SCNC: 139 MMOL/L (ref 135–145)
SP GR UR STRIP: 1.02 (ref 1–1.03)
SQUAMOUS EPITHELIAL: 3 /HPF
UROBILINOGEN UR STRIP-MCNC: NORMAL MG/DL
VARIANT LYMPHS BLD QL SMEAR: PRESENT
WBC # BLD AUTO: 8.9 10E3/UL (ref 4–11)
WBC CLUMPS #/AREA URNS HPF: PRESENT /HPF
WBC URINE: 14 /HPF

## 2025-04-23 PROCEDURE — 74177 CT ABD & PELVIS W/CONTRAST: CPT | Mod: 26 | Performed by: RADIOLOGY

## 2025-04-23 PROCEDURE — 99285 EMERGENCY DEPT VISIT HI MDM: CPT | Mod: 25

## 2025-04-23 PROCEDURE — 250N000011 HC RX IP 250 OP 636: Mod: JZ | Performed by: PHYSICIAN ASSISTANT

## 2025-04-23 PROCEDURE — 36415 COLL VENOUS BLD VENIPUNCTURE: CPT | Performed by: EMERGENCY MEDICINE

## 2025-04-23 PROCEDURE — 85025 COMPLETE CBC W/AUTO DIFF WBC: CPT | Performed by: EMERGENCY MEDICINE

## 2025-04-23 PROCEDURE — 250N000013 HC RX MED GY IP 250 OP 250 PS 637: Performed by: PHYSICIAN ASSISTANT

## 2025-04-23 PROCEDURE — 81001 URINALYSIS AUTO W/SCOPE: CPT | Performed by: EMERGENCY MEDICINE

## 2025-04-23 PROCEDURE — 73130 X-RAY EXAM OF HAND: CPT | Mod: TC,RT

## 2025-04-23 PROCEDURE — 82565 ASSAY OF CREATININE: CPT | Performed by: EMERGENCY MEDICINE

## 2025-04-23 PROCEDURE — 96374 THER/PROPH/DIAG INJ IV PUSH: CPT | Mod: XU

## 2025-04-23 PROCEDURE — 73130 X-RAY EXAM OF HAND: CPT | Mod: 26 | Performed by: RADIOLOGY

## 2025-04-23 PROCEDURE — 74177 CT ABD & PELVIS W/CONTRAST: CPT

## 2025-04-23 PROCEDURE — 87086 URINE CULTURE/COLONY COUNT: CPT | Performed by: PHYSICIAN ASSISTANT

## 2025-04-23 PROCEDURE — 80048 BASIC METABOLIC PNL TOTAL CA: CPT | Performed by: PHYSICIAN ASSISTANT

## 2025-04-23 PROCEDURE — 81001 URINALYSIS AUTO W/SCOPE: CPT | Performed by: PHYSICIAN ASSISTANT

## 2025-04-23 PROCEDURE — 250N000011 HC RX IP 250 OP 636: Performed by: PHYSICIAN ASSISTANT

## 2025-04-23 PROCEDURE — G0463 HOSPITAL OUTPT CLINIC VISIT: HCPCS

## 2025-04-23 PROCEDURE — 85025 COMPLETE CBC W/AUTO DIFF WBC: CPT | Performed by: PHYSICIAN ASSISTANT

## 2025-04-23 PROCEDURE — 99284 EMERGENCY DEPT VISIT MOD MDM: CPT | Performed by: PHYSICIAN ASSISTANT

## 2025-04-23 RX ORDER — IOPAMIDOL 755 MG/ML
54 INJECTION, SOLUTION INTRAVASCULAR ONCE
Status: COMPLETED | OUTPATIENT
Start: 2025-04-23 | End: 2025-04-23

## 2025-04-23 RX ORDER — OXYCODONE AND ACETAMINOPHEN 5; 325 MG/1; MG/1
1 TABLET ORAL ONCE
Status: COMPLETED | OUTPATIENT
Start: 2025-04-23 | End: 2025-04-24

## 2025-04-23 RX ORDER — OXYCODONE AND ACETAMINOPHEN 5; 325 MG/1; MG/1
1 TABLET ORAL ONCE
Status: COMPLETED | OUTPATIENT
Start: 2025-04-23 | End: 2025-04-23

## 2025-04-23 RX ORDER — OXYCODONE HYDROCHLORIDE 5 MG/1
5 TABLET ORAL EVERY 6 HOURS PRN
Qty: 6 TABLET | Refills: 0 | Status: SHIPPED | OUTPATIENT
Start: 2025-04-23

## 2025-04-23 RX ORDER — KETOROLAC TROMETHAMINE 30 MG/ML
30 INJECTION, SOLUTION INTRAMUSCULAR; INTRAVENOUS ONCE
Status: COMPLETED | OUTPATIENT
Start: 2025-04-23 | End: 2025-04-23

## 2025-04-23 RX ORDER — CEPHALEXIN 500 MG/1
500 CAPSULE ORAL 2 TIMES DAILY
Qty: 20 CAPSULE | Refills: 0 | Status: SHIPPED | OUTPATIENT
Start: 2025-04-23

## 2025-04-23 RX ADMIN — KETOROLAC TROMETHAMINE 30 MG: 30 INJECTION, SOLUTION INTRAMUSCULAR at 22:49

## 2025-04-23 RX ADMIN — OXYCODONE HYDROCHLORIDE AND ACETAMINOPHEN 1 TABLET: 5; 325 TABLET ORAL at 22:44

## 2025-04-23 RX ADMIN — IOPAMIDOL 54 ML: 755 INJECTION, SOLUTION INTRAVENOUS at 22:58

## 2025-04-23 ASSESSMENT — ENCOUNTER SYMPTOMS
SHORTNESS OF BREATH: 0
FLANK PAIN: 1
BACK PAIN: 0
ABDOMINAL PAIN: 1
FEVER: 0

## 2025-04-23 ASSESSMENT — PAIN SCALES - GENERAL: PAINLEVEL_OUTOF10: MODERATE PAIN (5)

## 2025-04-23 ASSESSMENT — COLUMBIA-SUICIDE SEVERITY RATING SCALE - C-SSRS
1. IN THE PAST MONTH, HAVE YOU WISHED YOU WERE DEAD OR WISHED YOU COULD GO TO SLEEP AND NOT WAKE UP?: NO
2. HAVE YOU ACTUALLY HAD ANY THOUGHTS OF KILLING YOURSELF IN THE PAST MONTH?: NO
6. HAVE YOU EVER DONE ANYTHING, STARTED TO DO ANYTHING, OR PREPARED TO DO ANYTHING TO END YOUR LIFE?: NO

## 2025-04-23 ASSESSMENT — ACTIVITIES OF DAILY LIVING (ADL): ADLS_ACUITY_SCORE: 41

## 2025-04-24 ENCOUNTER — PREP FOR PROCEDURE (OUTPATIENT)
Dept: UROLOGY | Facility: OTHER | Age: 45
End: 2025-04-24

## 2025-04-24 ENCOUNTER — HOSPITAL ENCOUNTER (EMERGENCY)
Facility: HOSPITAL | Age: 45
Discharge: HOME | End: 2025-04-25
Attending: EMERGENCY MEDICINE
Payer: COMMERCIAL

## 2025-04-24 ENCOUNTER — OFFICE VISIT (OUTPATIENT)
Dept: UROLOGY | Facility: OTHER | Age: 45
End: 2025-04-24
Attending: UROLOGY
Payer: COMMERCIAL

## 2025-04-24 VITALS
TEMPERATURE: 98.5 F | HEART RATE: 66 BPM | DIASTOLIC BLOOD PRESSURE: 76 MMHG | WEIGHT: 110.5 LBS | SYSTOLIC BLOOD PRESSURE: 115 MMHG | OXYGEN SATURATION: 100 % | BODY MASS INDEX: 20.21 KG/M2 | RESPIRATION RATE: 18 BRPM

## 2025-04-24 VITALS — SYSTOLIC BLOOD PRESSURE: 98 MMHG | OXYGEN SATURATION: 100 % | DIASTOLIC BLOOD PRESSURE: 62 MMHG | HEART RATE: 87 BPM

## 2025-04-24 DIAGNOSIS — N20.1 URETERAL STONE: Primary | ICD-10-CM

## 2025-04-24 DIAGNOSIS — Z01.818 PRE-OP EXAM: Primary | ICD-10-CM

## 2025-04-24 DIAGNOSIS — R82.81 PYURIA: ICD-10-CM

## 2025-04-24 DIAGNOSIS — N20.1 RIGHT URETERAL STONE: Primary | ICD-10-CM

## 2025-04-24 DIAGNOSIS — N20.0 KIDNEY STONE: ICD-10-CM

## 2025-04-24 DIAGNOSIS — N20.0 RECURRENT NEPHROLITHIASIS: ICD-10-CM

## 2025-04-24 DIAGNOSIS — L73.2 HIDRADENITIS SUPPURATIVA: ICD-10-CM

## 2025-04-24 DIAGNOSIS — N20.1 RIGHT URETERAL STONE: ICD-10-CM

## 2025-04-24 PROBLEM — S62.626A: Status: ACTIVE | Noted: 2025-04-24

## 2025-04-24 LAB
ALBUMIN UR-MCNC: 10 MG/DL
APPEARANCE UR: CLEAR
BACTERIA UR CULT: NORMAL
BILIRUB UR QL STRIP: NEGATIVE
CAOX CRY #/AREA URNS HPF: ABNORMAL /HPF
COLOR UR AUTO: YELLOW
GLUCOSE UR STRIP-MCNC: NEGATIVE MG/DL
HGB UR QL STRIP: NEGATIVE
KETONES UR STRIP-MCNC: ABNORMAL MG/DL
LEUKOCYTE ESTERASE UR QL STRIP: ABNORMAL
MUCOUS THREADS #/AREA URNS LPF: PRESENT /LPF
NITRATE UR QL: NEGATIVE
PH UR STRIP: 5.5 [PH] (ref 4.7–8)
RBC URINE: 2 /HPF
SP GR UR STRIP: 1.03 (ref 1–1.03)
SQUAMOUS EPITHELIAL: 2 /HPF
UROBILINOGEN UR STRIP-MCNC: 2 MG/DL
WBC URINE: 14 /HPF

## 2025-04-24 PROCEDURE — G0463 HOSPITAL OUTPT CLINIC VISIT: HCPCS

## 2025-04-24 PROCEDURE — 250N000013 HC RX MED GY IP 250 OP 250 PS 637: Performed by: EMERGENCY MEDICINE

## 2025-04-24 PROCEDURE — 99283 EMERGENCY DEPT VISIT LOW MDM: CPT

## 2025-04-24 PROCEDURE — 99283 EMERGENCY DEPT VISIT LOW MDM: CPT | Performed by: EMERGENCY MEDICINE

## 2025-04-24 PROCEDURE — 87086 URINE CULTURE/COLONY COUNT: CPT | Performed by: EMERGENCY MEDICINE

## 2025-04-24 PROCEDURE — 81001 URINALYSIS AUTO W/SCOPE: CPT | Performed by: EMERGENCY MEDICINE

## 2025-04-24 RX ORDER — TAMSULOSIN HYDROCHLORIDE 0.4 MG/1
0.4 CAPSULE ORAL DAILY
Qty: 14 CAPSULE | Refills: 0 | Status: SHIPPED | OUTPATIENT
Start: 2025-04-24 | End: 2025-05-08

## 2025-04-24 RX ADMIN — OXYCODONE HYDROCHLORIDE AND ACETAMINOPHEN 1 TABLET: 5; 325 TABLET ORAL at 00:09

## 2025-04-24 ASSESSMENT — COLUMBIA-SUICIDE SEVERITY RATING SCALE - C-SSRS
6. HAVE YOU EVER DONE ANYTHING, STARTED TO DO ANYTHING, OR PREPARED TO DO ANYTHING TO END YOUR LIFE?: NO
1. IN THE PAST MONTH, HAVE YOU WISHED YOU WERE DEAD OR WISHED YOU COULD GO TO SLEEP AND NOT WAKE UP?: NO
2. HAVE YOU ACTUALLY HAD ANY THOUGHTS OF KILLING YOURSELF IN THE PAST MONTH?: NO

## 2025-04-24 ASSESSMENT — PAIN SCALES - GENERAL: PAINLEVEL_OUTOF10: SEVERE PAIN (8)

## 2025-04-24 NOTE — PROGRESS NOTES
ORTHOPEDIC CLINIC CONSULT    Referred by:     Chief Complaint: Purnima Fallon is a 44 year old female who is being seen for   Chief Complaint   Patient presents with    Surgical Followup     Right fifth digit phalanx fracture        History of Present Illness:   Patient 44-year-old female with postop from [facility] small finger proximal and middle phalanx intra-articular comminuted fracture.  She is from 4/15/2025 presents back for evaluation and wound check and change splint    Patient's past medical, surgical, social and family histories reviewed.     Past Medical History:   Diagnosis Date    ADHD (attention deficit hyperactivity disorder)     Arthritis 2022    Biliary dyskinesia 10/06/2017    Carpal tunnel syndrome 08/23/2006    Chronic female pelvic pain 09/24/2018    Cystic acne 07/23/2013    Depression     Depressive disorder     Hidradenitis 02/16/2007    Hidradenitis suppurativa 05/13/2019    Ischiorectal abscess and fistula     Kidney stones 2008    x2 passed on her own    Malunion of fracture 05/17/2007    Mass of breast 02/02/2012    Migraines     Need for prophylactic hormone replacement therapy (postmenopausal)     Nondependent tobacco use disorder 10/11/2012    Papanicolaou smear of cervix with low grade squamous intraepithelial lesion (LGSIL) 10/29/2008    Pilonidal cyst 09/12/2017    S/p partial hysterectomy with remaining cervical stump 09/27/2013    Surgical menopause 02/11/2022    Trigger thumb of both thumbs 06/17/2020    Added automatically from request for surgery [id number]       Past Surgical History:   Procedure Laterality Date    ABDOMEN SURGERY      BIOPSY      COLONOSCOPY      CYSTECTOMY PILONIDAL N/A 09/18/2017    Procedure: CYSTECTOMY PILONIDAL;  PILONIDAL CYSTECTOMY ;  Surgeon: Cordell Stephens MD;  [facility]: [facility]    ENDOSCOPY UPPER, COLONOSCOPY, COMBINED N/A 10/05/2018    Procedure: COMBINED ENDOSCOPY UPPER, COLONOSCOPY;  UPPER ENDOSCOPY with Biopsy  AND COLONOSCOPY;  Surgeon: Kat  Cordell VILA MD;  [facility]    EXCISE LESION BUTTOCK(S) Left 06/08/2018    Procedure: EXCISE LESION BUTTOCK(S);  EXCISION OF GLUTEAL LEFT SKIN LESION;  Surgeon: Cordell Stephens MD;  [facility]    EXCISE MASS NECK Right 08/04/2015    Procedure: EXCISE MASS NECK;  Surgeon: Nasir Jones MD;  [facility]    INCISION AND DRAINAGE PERINEAL, COMBINED N/A 03/18/2015    Procedure: COMBINED INCISION AND DRAINAGE PERINEAL;  Surgeon: Jay Torres DO;  [facility]    IR CONSULTATION FOR IR EXAM  05/09/2023    IR FLUORO 0-1 HOUR  06/05/2023    IRRIGATION AND DEBRIDEMENT GROIN N/A 02/07/2019    Procedure: IRRIGATION AND DEBRIDEMENT LEFT GROIN ABSCESS;  Surgeon: Cordell Stephens MD;  [facility]    LAPAROSCOPIC APPENDECTOMY  02/11/2022    LAPAROSCOPIC CHOLECYSTECTOMY N/A 11/20/2017    Procedure: LAPAROSCOPIC CHOLECYSTECTOMY;  LAPAROSCOPIC CHOLECYSTECTOMY;  Surgeon: Cordell Stephens MD;  [facility]    LAPAROSCOPIC HYSTERECTOMY SUPRACERVICAL, BILATERAL SALPINGO-OOPHORECTOMY, COMBINED  09/27/2013    Procedure: COMBINED LAPAROSCOPIC HYSTERECTOMY SUPRACERVICAL, SALPINGO-OOPHORECTOMY;  LAPAROSCOPIC SUPRACERVICAL HYSTERECTOMY AND RIGHT SALPINGO-OOPHORECTOMY, CYSTOSCOPY;  Surgeon: Denys Callahan MD;  [facility]    LAPAROSCOPIC OOPHORECTOMY Left 02/11/2022    Procedure: Left OOPHORECTOMY, LAPAROSCOPIC, Laparoscopic Appendectomy Torsion and Pelvic Appensix;  Surgeon: Malick Malhotra MD;  [facility]    LAPAROSCOPY DIAGNOSTIC (GYN) N/A 09/06/2018    Procedure: LAPAROSCOPY DIAGNOSTIC (GYN);  LAPAROSCOPY WITH PERITONEAL BIOPSIES AND REMOVAL LEFT FALLOPIAN TUBE;  Surgeon: Suraj Whitaker MD;  [facility]    marsupialization of pilonidal cyst  01/01/2007    OPEN REDUCTION INTERNAL FIXATION FINGER(S) Right 4/15/2025    Procedure: Open Reduction Internal Fixation  Right Small Finger Intra Articular Fracture of Middle Phalanx;  Surgeon: Fernando Acevedo MD;  [facility]     RELEASE TRIGGER FINGER BILATERAL Bilateral 07/10/2020    Procedure: RELEASE BILATERAL TRIGGER THUMBS;  Surgeon: Vicne Murillo DO;  [facility]: [facility]    TUBAL LIGATION  01/01/2005       Home Medications:  Prior to Admission medications    Medication Sig Start Date End Date Taking? Authorizing Provider   amphetamine-dextroamphetamine (ADDERALL XR) 30 MG 24 hr capsule Take 1 capsule (30 mg) by mouth daily. 4/18/25  Yes Payton Levi APRN CNP   amphetamine-dextroamphetamine (ADDERALL) 10 MG tablet Take 1 tablet (10 mg) by mouth daily. 4/18/25  Yes Payton Levi APRN CNP   amphetamine-dextroamphetamine (ADDERALL) 30 MG tablet Take 1 tablet (30 mg) by mouth daily. 4/18/25  Yes Payton Levi APRN CNP   buPROPion (WELLBUTRIN XL) 150 MG 24 hr tablet Take 1 tablet (150 mg) by mouth every morning. 4/18/25  Yes Payton Levi APRN CNP   buPROPion (WELLBUTRIN XL) 300 MG 24 hr tablet TAKE 1 TABLET BY MOUTH EVERY MORNING ALONG WITH 1 150MG TABLET - TOTAL DOSE 450MG 4/18/25  Yes Payton Levi APRN CNP   escitalopram (LEXAPRO) 20 MG tablet Take 1 tablet (20 mg) by mouth daily. 4/18/25  Yes Payton Levi APRN CNP   estradiol (ESTRACE) 1 MG tablet Take 1 tablet (1 mg) by mouth daily. 4/18/25  Yes Payton Levi APRN CNP   HUMIRA *CF* PEN 40 MG/0.4ML pen kit INJECT 40 MG (0.4 ML) UNDER THE SKIN EVERY 7 DAYS 10/14/24  Yes Yovani Vega MD   HYDROcodone-acetaminophen (NORCO) 5-325 MG tablet Take 1-2 tablets by mouth every 4 hours as needed for moderate to severe pain. 4/22/25  Yes Fernando Acevedo MD   multivitamin, therapeutic (THERA-VIT) TABS tablet Take 1 tablet by mouth daily.   Yes Reported, Patient   SUMAtriptan (IMITREX) 25 MG tablet TAKE 1 TABLET BY MOUTH AT ONSET OF HEADACHE FOR MIGRAINE, MAY REPEAT IN 2 HOURS MAX OF 8 TABLETS PER 24 HOURS 4/18/25  Yes Payton Levi APRN CNP   tiZANidine (ZANAFLEX) 4 MG tablet Take 1 tablet (4 mg) by mouth 3 times daily as needed for  "muscle spasms. 4/18/25  Yes Payton Levi APRN CNP   valACYclovir (VALTREX) 500 MG tablet Take 1 tablet (500 mg) by mouth daily. 4/18/25  Yes Payton Levi APRN CNP   cephALEXin (KEFLEX) 500 MG capsule Take 1 capsule (500 mg) by mouth 2 times daily. 4/23/25   Beth Sumner PA-C   oxyCODONE (ROXICODONE) 5 MG tablet Take 1 tablet (5 mg) by mouth every 6 hours as needed for severe pain. 4/23/25   Beth Sumner PA-C       No Known Allergies    Social History     Occupational History    Not on file   Tobacco Use    Smoking status: Every Day     Current packs/day: 0.50     Average packs/day: 0.5 packs/day for 24.3 years (12.2 ttl pk-yrs)     Types: Cigarettes     Start date: 1/1/2001     Passive exposure: Current    Smokeless tobacco: Never    Tobacco comments:     I have been working on quitting   Vaping Use    Vaping status: Never Used   Substance and Sexual Activity    Alcohol use: No    Drug use: Yes     Types: Marijuana     Comment: pt states yesterday evening    Sexual activity: Not Currently     Partners: Male     Birth control/protection: None       Family History   Problem Relation Age of Onset    Other Cancer Father         Lung cancer    Diabetes Paternal Grandmother     Other Cancer Maternal Grandmother         Lung cancer       REVIEW OF SYSTEMS            Physical Exam:    Vitals: /70 (BP Location: Left arm, Patient Position: Sitting, Cuff Size: Adult Regular)   Pulse 89   Temp 98.3  F (36.8  C) (Tympanic)   Resp 16   Ht 1.575 m (5' 2\")   Wt 49.9 kg (110 lb)   LMP 08/10/2013   SpO2 95%   BMI 20.12 kg/m    BMI= Body mass index is 20.12 kg/m .  Examination awake alert oriented cooperative no distress did have some x-rays today dressing and splint were taken down today and wounds are clean dry intact no signs erythema hyperemia pharynx injection intact pins  Radiographs: Radiographs today AP lateral is difficult to get great visualization but no obvious displacement of the " fractures or changes in alignment there is a longitudinal pin that comes the distal phalanx to the middle phalanx into the proximal phalanx holding that alignment in the transverse 1 holding the fragments together.     Independent visualization of the films was made.         Impression:      ICD-10-CM    1. Displaced fracture of middle phalanx of right little finger, initial encounter for closed fracture  S62.626A           Plan: Status post open reduction and pinning.  Due to the intra-articular comminution and fracture there was not able to yet adequately just do an interfrag screws to repair reconstruct that area.  Thus improved alignment try and hold some valuable stuff together ended a longitudinal pin pinning it and then transverse pin holding the bigger fragment and position to try and help adequate healing of comminuted intra-articular fracture.  Wounds and pins are intact.  They were recleaned and redressed with Xeroform placed back into a ulnar gutter splint today.  Will have her follow-up again in a week and change the splint but also remove sutures from the dorsal aspect of the small finger.  She is comfortable this plan    All of the above pertinent physical exam and imaging modalities findings was reviewed with Purnima.    Return to clinic in 1 weeks.    Further imaging required repeat radiographs of the finger with splint off    Time spent with evaluation: Postop minutes    Fernando Acevedo MD  4/24/2025  11:02 AM

## 2025-04-24 NOTE — PROGRESS NOTES
HPI:    Purnima Fallon is a 44 year old woman seen today for evaluation of a ureteral stone and possible UTI.  She is seen at the request of Dr. Márquez.    The patient presented to the emergency department overnight with right sided flank pain.  She was afebrile and stable.  Labs were overall unremarkable, however she had moderate leukocyte esterase and 14 WBC on her urine.  Her CT scan showed a 3 millimeter right ureteral stone.  She was referred urgently given the concern for possible UTI.    A couple weeks ago she noticed pain in the right side.  It has progressed.  Pain is about a 7/10 at present.  She is also dealing with a fractured pinky and had surgery for that.    She was around her early 20s when she got her first stone. She has passed about 3 stones spontaneously.  She does not know what her stone composition is.  She has family history significant for stones in her mother.      ROS:   Pertinent positives and negatives in HPI.  Patient Active Problem List   Diagnosis    Migraines    Depression    ADHD (attention deficit hyperactivity disorder)    Cystic acne    Kidney stones    Ischiorectal abscess and fistula    ACP (advance care planning)    Mass of breast    Biliary dyskinesia    Chronic female pelvic pain    Hidradenitis suppurativa    Carpal tunnel syndrome    Pain in joint, forearm    Trigger thumb of both thumbs    Surgical menopause    Displaced fracture of middle phalanx of right little finger, initial encounter for closed fracture         Past Medical History:   Diagnosis Date    ADHD (attention deficit hyperactivity disorder)     Arthritis 2022    Biliary dyskinesia 10/06/2017    Carpal tunnel syndrome 08/23/2006    Chronic female pelvic pain 09/24/2018    Cystic acne 07/23/2013    Depression     Depressive disorder     Hidradenitis 02/16/2007    Hidradenitis suppurativa 05/13/2019    Ischiorectal abscess and fistula     Kidney stones 2008    x2 passed on her own    Malunion of fracture  05/17/2007    Mass of breast 02/02/2012    Migraines     Need for prophylactic hormone replacement therapy (postmenopausal)     Nondependent tobacco use disorder 10/11/2012    Papanicolaou smear of cervix with low grade squamous intraepithelial lesion (LGSIL) 10/29/2008    Pilonidal cyst 09/12/2017    S/p partial hysterectomy with remaining cervical stump 09/27/2013    Surgical menopause 02/11/2022    Trigger thumb of both thumbs 06/17/2020    Added automatically from request for surgery [id number]       Past Surgical History:   Procedure Laterality Date    ABDOMEN SURGERY      BIOPSY      COLONOSCOPY      CYSTECTOMY PILONIDAL N/A 09/18/2017    Procedure: CYSTECTOMY PILONIDAL;  PILONIDAL CYSTECTOMY ;  Surgeon: Cordell Stephens MD;  [facility]    ENDOSCOPY UPPER, COLONOSCOPY, COMBINED N/A 10/05/2018    Procedure: COMBINED ENDOSCOPY UPPER, COLONOSCOPY;  UPPER ENDOSCOPY with Biopsy  AND COLONOSCOPY;  Surgeon: Cordell Stephens MD;  [facility]    EXCISE LESION BUTTOCK(S) Left 06/08/2018    Procedure: EXCISE LESION BUTTOCK(S);  EXCISION OF GLUTEAL LEFT SKIN LESION;  Surgeon: Cordell Stephens MD;  [facility]    EXCISE MASS NECK Right 08/04/2015    Procedure: EXCISE MASS NECK;  Surgeon: Nasir Jones MD;  [facility]    INCISION AND DRAINAGE PERINEAL, COMBINED N/A 03/18/2015    Procedure: COMBINED INCISION AND DRAINAGE PERINEAL;  Surgeon: Jay Torres DO;  [facility]    IR CONSULTATION FOR IR EXAM  05/09/2023    IR FLUORO 0-1 HOUR  06/05/2023    IRRIGATION AND DEBRIDEMENT GROIN N/A 02/07/2019    Procedure: IRRIGATION AND DEBRIDEMENT LEFT GROIN ABSCESS;  Surgeon: Cordell Stephens MD;  [facility]    LAPAROSCOPIC APPENDECTOMY  02/11/2022    LAPAROSCOPIC CHOLECYSTECTOMY N/A 11/20/2017    Procedure: LAPAROSCOPIC CHOLECYSTECTOMY;  LAPAROSCOPIC CHOLECYSTECTOMY;  Surgeon: Cordell Stephens MD;  [facility]    LAPAROSCOPIC HYSTERECTOMY SUPRACERVICAL, BILATERAL  SALPINGO-OOPHORECTOMY, COMBINED  09/27/2013    Procedure: COMBINED LAPAROSCOPIC HYSTERECTOMY SUPRACERVICAL, SALPINGO-OOPHORECTOMY;  LAPAROSCOPIC SUPRACERVICAL HYSTERECTOMY AND RIGHT SALPINGO-OOPHORECTOMY, CYSTOSCOPY;  Surgeon: Denys Callahan MD;  [facility]    LAPAROSCOPIC OOPHORECTOMY Left 02/11/2022    Procedure: Left OOPHORECTOMY, LAPAROSCOPIC, Laparoscopic Appendectomy Torsion and Pelvic Appensix;  Surgeon: Malick Malhotra MD;  [facility]    LAPAROSCOPY DIAGNOSTIC (GYN) N/A 09/06/2018    Procedure: LAPAROSCOPY DIAGNOSTIC (GYN);  LAPAROSCOPY WITH PERITONEAL BIOPSIES AND REMOVAL LEFT FALLOPIAN TUBE;  Surgeon: Suraj Whitaker MD;  [facility]    marsupialization of pilonidal cyst  01/01/2007    OPEN REDUCTION INTERNAL FIXATION FINGER(S) Right 4/15/2025    Procedure: Open Reduction Internal Fixation  Right Small Finger Intra Articular Fracture of Middle Phalanx;  Surgeon: Fernando Acevedo MD;  [facility]    RELEASE TRIGGER FINGER BILATERAL Bilateral 07/10/2020    Procedure: RELEASE BILATERAL TRIGGER THUMBS;  Surgeon: Vince Murillo DO;  [facility]    TUBAL LIGATION  01/01/2005       Family History   Problem Relation Age of Onset    Other Cancer Father         Lung cancer    Diabetes Paternal Grandmother     Other Cancer Maternal Grandmother         Lung cancer        Social History     Tobacco Use    Smoking status: Every Day     Current packs/day: 0.50     Average packs/day: 0.5 packs/day for 24.3 years (12.2 ttl pk-yrs)     Types: Cigarettes     Start date: 1/1/2001     Passive exposure: Current    Smokeless tobacco: Never    Tobacco comments:     I have been working on quitting   Vaping Use    Vaping status: Never Used   Substance Use Topics    Alcohol use: No    Drug use: Yes     Types: Marijuana     Comment: pt states yesterday evening        Current Outpatient Medications   Medication Sig Dispense Refill    amphetamine-dextroamphetamine (ADDERALL XR) 30 MG 24 hr capsule Take 1  capsule (30 mg) by mouth daily. 30 capsule 0    amphetamine-dextroamphetamine (ADDERALL) 10 MG tablet Take 1 tablet (10 mg) by mouth daily. 30 tablet 0    amphetamine-dextroamphetamine (ADDERALL) 30 MG tablet Take 1 tablet (30 mg) by mouth daily. 30 tablet 0    buPROPion (WELLBUTRIN XL) 150 MG 24 hr tablet Take 1 tablet (150 mg) by mouth every morning. 90 tablet 1    buPROPion (WELLBUTRIN XL) 300 MG 24 hr tablet TAKE 1 TABLET BY MOUTH EVERY MORNING ALONG WITH 1 150MG TABLET - TOTAL DOSE 450MG 90 tablet 1    cephALEXin (KEFLEX) 500 MG capsule Take 1 capsule (500 mg) by mouth 2 times daily. 20 capsule 0    escitalopram (LEXAPRO) 20 MG tablet Take 1 tablet (20 mg) by mouth daily. 90 tablet 0    estradiol (ESTRACE) 1 MG tablet Take 1 tablet (1 mg) by mouth daily. 90 tablet 3    HUMIRA *CF* PEN 40 MG/0.4ML pen kit INJECT 40 MG (0.4 ML) UNDER THE SKIN EVERY 7 DAYS 12 mL 0    HYDROcodone-acetaminophen (NORCO) 5-325 MG tablet Take 1-2 tablets by mouth every 4 hours as needed for moderate to severe pain. 25 tablet 0    multivitamin, therapeutic (THERA-VIT) TABS tablet Take 1 tablet by mouth daily.      oxyCODONE (ROXICODONE) 5 MG tablet Take 1 tablet (5 mg) by mouth every 6 hours as needed for severe pain. 6 tablet 0    SUMAtriptan (IMITREX) 25 MG tablet TAKE 1 TABLET BY MOUTH AT ONSET OF HEADACHE FOR MIGRAINE, MAY REPEAT IN 2 HOURS MAX OF 8 TABLETS PER 24 HOURS 20 tablet 0    tiZANidine (ZANAFLEX) 4 MG tablet Take 1 tablet (4 mg) by mouth 3 times daily as needed for muscle spasms. 30 tablet 0    valACYclovir (VALTREX) 500 MG tablet Take 1 tablet (500 mg) by mouth daily. 90 tablet 3     No current facility-administered medications for this visit.       Exam:  BP 98/62 (BP Location: Right arm)   Pulse 87   LMP 08/10/2013   SpO2 100%     Gen: Pleasant, NAD  HEENT: WNL  Resp: nonlabored on RA      Results/Data:    ER labs reviewed.      [id number] CT scan abdomen/pelvis images and report reviewed.  IMPRESSION:   1.   3 mm obstructing stone distal right ureter resulting in moderate right hydronephrosis.     2.  No other acute findings in the abdomen or pelvis. Appendix not seen with certainty but no inflammatory changes in the right lower quadrant.     3.  Cholecystectomy.     4.  Subacute healing right lateral ninth rib fracture with callus formation.    Assessment and Plan:    Purnima Fallon is a 44 year old woman seen today for the following:       Right ureteral stone  Pyuria  Recurrent nephrolithiasis    Patient with a distal obstructing right ureteral stone and some pyuria at present.  This is slightly concerning for possible concurrent UTI.  We discussed the risk of developing more serious infection with a UTI in the setting of obstruction.  Her other labs were reassuring.    Notably she is on Humira for HS.  She last took it on Monday, it is dosed weekly.  Would have her hold until stone passes or she is done with surgery.    We reviewed her imaging together.  Presently, her stone is nearly at the UVJ.  She has passed other stones.  She can do a trial on tamsulosin and straining her urine over the weekend.  Unfortunately our next OR availability would be on Monday.  We are going to set her up for cystoscopy, right sided retrograde pyelogram, ureteroscopy, laser lithotripsy, stone extraction, stent placement.  If she passes her stone over the weekend, this procedure can be canceled.    The patient was counseled on the need to seek emergent intervention if she were to develop systemic signs of infection over the course of the weekend.  He has a fever, she should present immediately to the ER.  She would likely need to go to [place] to have a stent placed.    Ideally we can submit a stone for analysis at some point and consider metabolic evaluation.

## 2025-04-24 NOTE — PROGRESS NOTES
Preoperative Evaluation  [facility] [place]  [address]  [place] [address]  Phone: [phone]  Primary Provider: ALONDRA Gray CNP  Pre-op Performing Provider: ALONDRA Fuller CNP  Apr 24, 2025 4/24/2025   Surgical Information   What procedure is being done?  kidney stone   Facility or Hospital where procedure/surgery will be performed: [place]   Who is doing the procedure / surgery? dr gottlieb   Date of surgery / procedure: 4/28/25   Time of surgery / procedure: 110   Where do you plan to recover after surgery? at home with family     Fax number for surgical facility: Note does not need to be faxed, will be available electronically in Epic.    Assessment & Plan     The proposed surgical procedure is considered LOW risk.    Pre-op exam  Labs, EKG performed recently and stable for approval of surgery. Surgical education done in clinic and surgery book given to patient. She is aware she will need a ride home after surgery    Right ureteral stone  Plan is to perform cystoscopy, right sided retrograde pyelogram, ureteroscopy, laser lithotripsy, stone extraction, stent placement on Monday 4/28/25.  If she passes her stone over the weekend, this procedure can be canceled. Patient will notify us if she passes stone. She is currently of antibiotic for possible UTI with stone, will await urine culture. Patient will continue antibiotic and will start Flomax.      - No identified additional risk factors other than previously addressed    Antiplatelet or Anticoagulation Medication Instructions   - We reviewed the medication list and the patient is not on an antiplatelet or anticoagulation medications.    Additional Medication Instructions  Patient was instructed to hold her Humira 1 week prior to surgery. She took her last dose on 4/21/25    Hold Adderall am of procedure.     Recommendation  Approval given to proceed with proposed procedure, without further diagnostic  evaluation.        Mariah Felton is a 44 year old, presenting for the following:  Pre-op physical      HPI: Patient was seen by urology today for evaluation of a ureteral stone and possible UTI.  The patient presented to the emergency department last night with right sided flank pain.  She was afebrile and stable.  Labs were overall unremarkable, however she had moderate leukocyte esterase and 14 WBC on her urine.  Her CT scan showed a 3 millimeter right ureteral stone.  She was referred urgently given the concern for possible UTI.     A couple weeks ago she noticed pain in the right side.  It has progressed.  Pain is about a 7/10 at present.  She is also dealing with a fractured pinky and had surgery for that.     She has passed about 3 stones spontaneously in the past.  She has family history significant for stones in her mother.    Presently, her stone is nearly at the UVJ. She has passed other stones and it was decided to do a trial on tamsulosin and straining her urine over the weekend. Plan is to proceed with cystoscopy, right sided retrograde pyelogram, ureteroscopy, laser lithotripsy, stone extraction, stent placement on Monday 4/28/25.           4/24/2025   Pre-Op Questionnaire   Have you ever had a heart attack or stroke? No   Have you ever had surgery on your heart or blood vessels, such as a stent placement, a coronary artery bypass, or surgery on an artery in your head, neck, heart, or legs? No   Do you have chest pain with activity? No   Do you have a history of heart failure? No   Do you currently have a cold, bronchitis or symptoms of other infection? No   Do you have a cough, shortness of breath, or wheezing? No   Do you or anyone in your family have previous history of blood clots? No   Do you or does anyone in your family have a serious bleeding problem such as prolonged bleeding following surgeries or cuts? No   Have you ever had problems with anemia or been told to take iron pills? No    Have you had any abnormal blood loss such as black, tarry or bloody stools, or abnormal vaginal bleeding? No   Have you ever had a blood transfusion? No   Are you willing to have a blood transfusion if it is medically needed before, during, or after your surgery? Yes   Have you or any of your relatives ever had problems with anesthesia? No   Do you have sleep apnea, excessive snoring or daytime drowsiness? No   Do you have any artifical heart valves or other implanted medical devices like a pacemaker, defibrillator, or continuous glucose monitor? No   Do you have artificial joints? No   Are you allergic to latex? No     METS 4+    Advance Care Planning  Discussed advance care planning with patient; however, patient declined at this time.          Patient Active Problem List    Diagnosis Date Noted    Displaced fracture of middle phalanx of right little finger, initial encounter for closed fracture 04/24/2025     Priority: Medium    Surgical menopause 02/11/2022     Priority: Medium    Trigger thumb of both thumbs 06/17/2020     Priority: Medium     Added automatically from request for surgery [id number]      Hidradenitis suppurativa 05/13/2019     Priority: Medium    Chronic female pelvic pain 09/24/2018     Priority: Medium    Biliary dyskinesia 10/06/2017     Priority: Medium    ACP (advance care planning) 08/05/2016     Priority: Medium     Advance Care Planning 8/5/2016: ACP Review of Chart / Resources Provided:  Reviewed chart for advance care plan.  Purnima Del Real has no plan or code status on file. Discussed available resources and provided with information. Confirmed code status reflects current choices pending further ACP discussions.  Confirmed/documented legally designated decision makers.  Added by Purnima Mireles            Ischiorectal abscess and fistula      Priority: Medium    Kidney stones      Priority: Medium     x2 passed on her own      Cystic acne 07/23/2013     Priority: Medium    Migraines       Priority: Medium    Depression      Priority: Medium    ADHD (attention deficit hyperactivity disorder)      Priority: Medium    Mass of breast 02/02/2012     Priority: Medium    Pain in joint, forearm 05/17/2007     Priority: Medium     Overview:   IMO Update 10/11      Carpal tunnel syndrome 08/23/2006     Priority: Medium      Past Medical History:   Diagnosis Date    ADHD (attention deficit hyperactivity disorder)     Arthritis 2022    Biliary dyskinesia 10/06/2017    Carpal tunnel syndrome 08/23/2006    Chronic female pelvic pain 09/24/2018    Cystic acne 07/23/2013    Depression     Depressive disorder     Hidradenitis 02/16/2007    Hidradenitis suppurativa 05/13/2019    Ischiorectal abscess and fistula     Kidney stones 2008    x2 passed on her own    Malunion of fracture 05/17/2007    Mass of breast 02/02/2012    Migraines     Need for prophylactic hormone replacement therapy (postmenopausal)     Nondependent tobacco use disorder 10/11/2012    Papanicolaou smear of cervix with low grade squamous intraepithelial lesion (LGSIL) 10/29/2008    Pilonidal cyst 09/12/2017    S/p partial hysterectomy with remaining cervical stump 09/27/2013    Surgical menopause 02/11/2022    Trigger thumb of both thumbs 06/17/2020    Added automatically from request for surgery [id number]     Past Surgical History:   Procedure Laterality Date    ABDOMEN SURGERY      BIOPSY      COLONOSCOPY      CYSTECTOMY PILONIDAL N/A 09/18/2017    Procedure: CYSTECTOMY PILONIDAL;  PILONIDAL CYSTECTOMY ;  Surgeon: Cordell Stephens MD;  Location: [facility]    ENDOSCOPY UPPER, COLONOSCOPY, COMBINED N/A 10/05/2018    Procedure: COMBINED ENDOSCOPY UPPER, COLONOSCOPY;  UPPER ENDOSCOPY with Biopsy  AND COLONOSCOPY;  Surgeon: Cordell Stephens MD;  [facility]    EXCISE LESION BUTTOCK(S) Left 06/08/2018    Procedure: EXCISE LESION BUTTOCK(S);  EXCISION OF GLUTEAL LEFT SKIN LESION;  Surgeon: Cordell Stephens MD;  [facility]    EXCISE MASS  NECK Right 08/04/2015    Procedure: EXCISE MASS NECK;  Surgeon: Nasir Jones MD;  [facility]: [facility]    INCISION AND DRAINAGE PERINEAL, COMBINED N/A 03/18/2015    Procedure: COMBINED INCISION AND DRAINAGE PERINEAL;  Surgeon: Jay Torres DO;  [facility]    IR CONSULTATION FOR IR EXAM  05/09/2023    IR FLUORO 0-1 HOUR  06/05/2023    IRRIGATION AND DEBRIDEMENT GROIN N/A 02/07/2019    Procedure: IRRIGATION AND DEBRIDEMENT LEFT GROIN ABSCESS;  Surgeon: Cordell Stephens MD;  [facility]    LAPAROSCOPIC APPENDECTOMY  02/11/2022    LAPAROSCOPIC CHOLECYSTECTOMY N/A 11/20/2017    Procedure: LAPAROSCOPIC CHOLECYSTECTOMY;  LAPAROSCOPIC CHOLECYSTECTOMY;  Surgeon: Cordell Stephens MD;  [facility]    LAPAROSCOPIC HYSTERECTOMY SUPRACERVICAL, BILATERAL SALPINGO-OOPHORECTOMY, COMBINED  09/27/2013    Procedure: COMBINED LAPAROSCOPIC HYSTERECTOMY SUPRACERVICAL, SALPINGO-OOPHORECTOMY;  LAPAROSCOPIC SUPRACERVICAL HYSTERECTOMY AND RIGHT SALPINGO-OOPHORECTOMY, CYSTOSCOPY;  Surgeon: Denys Callahan MD;  [facility]    LAPAROSCOPIC OOPHORECTOMY Left 02/11/2022    Procedure: Left OOPHORECTOMY, LAPAROSCOPIC, Laparoscopic Appendectomy Torsion and Pelvic Appensix;  Surgeon: Malick Malhotra MD;  [facility]    LAPAROSCOPY DIAGNOSTIC (GYN) N/A 09/06/2018    Procedure: LAPAROSCOPY DIAGNOSTIC (GYN);  LAPAROSCOPY WITH PERITONEAL BIOPSIES AND REMOVAL LEFT FALLOPIAN TUBE;  Surgeon: Suraj Whitaker MD;  [facility]: [facility]    marsupialization of pilonidal cyst  01/01/2007    OPEN REDUCTION INTERNAL FIXATION FINGER(S) Right 4/15/2025    Procedure: Open Reduction Internal Fixation  Right Small Finger Intra Articular Fracture of Middle Phalanx;  Surgeon: Fernando Acevedo MD;  [facility]    RELEASE TRIGGER FINGER BILATERAL Bilateral 07/10/2020    Procedure: RELEASE BILATERAL TRIGGER THUMBS;  Surgeon: Vince Murillo DO;  [facility]: [facility]    TUBAL LIGATION  01/01/2005     Current Outpatient Medications    Medication Sig Dispense Refill    amphetamine-dextroamphetamine (ADDERALL XR) 30 MG 24 hr capsule Take 1 capsule (30 mg) by mouth daily. 30 capsule 0    amphetamine-dextroamphetamine (ADDERALL) 10 MG tablet Take 1 tablet (10 mg) by mouth daily. 30 tablet 0    amphetamine-dextroamphetamine (ADDERALL) 30 MG tablet Take 1 tablet (30 mg) by mouth daily. 30 tablet 0    buPROPion (WELLBUTRIN XL) 150 MG 24 hr tablet Take 1 tablet (150 mg) by mouth every morning. 90 tablet 1    buPROPion (WELLBUTRIN XL) 300 MG 24 hr tablet TAKE 1 TABLET BY MOUTH EVERY MORNING ALONG WITH 1 150MG TABLET - TOTAL DOSE 450MG 90 tablet 1    cephALEXin (KEFLEX) 500 MG capsule Take 1 capsule (500 mg) by mouth 2 times daily. 20 capsule 0    escitalopram (LEXAPRO) 20 MG tablet Take 1 tablet (20 mg) by mouth daily. 90 tablet 0    estradiol (ESTRACE) 1 MG tablet Take 1 tablet (1 mg) by mouth daily. 90 tablet 3    HUMIRA *CF* PEN 40 MG/0.4ML pen kit INJECT 40 MG (0.4 ML) UNDER THE SKIN EVERY 7 DAYS 12 mL 0    HYDROcodone-acetaminophen (NORCO) 5-325 MG tablet Take 1-2 tablets by mouth every 4 hours as needed for moderate to severe pain. 25 tablet 0    multivitamin, therapeutic (THERA-VIT) TABS tablet Take 1 tablet by mouth daily.      oxyCODONE (ROXICODONE) 5 MG tablet Take 1 tablet (5 mg) by mouth every 6 hours as needed for severe pain. 6 tablet 0    SUMAtriptan (IMITREX) 25 MG tablet TAKE 1 TABLET BY MOUTH AT ONSET OF HEADACHE FOR MIGRAINE, MAY REPEAT IN 2 HOURS MAX OF 8 TABLETS PER 24 HOURS 20 tablet 0    tamsulosin (FLOMAX) 0.4 MG capsule Take 1 capsule (0.4 mg) by mouth daily for 14 days. 14 capsule 0    tiZANidine (ZANAFLEX) 4 MG tablet Take 1 tablet (4 mg) by mouth 3 times daily as needed for muscle spasms. 30 tablet 0    valACYclovir (VALTREX) 500 MG tablet Take 1 tablet (500 mg) by mouth daily. 90 tablet 3       No Known Allergies     Social History     Tobacco Use    Smoking status: Every Day     Current packs/day: 0.50     Average  "packs/day: 0.5 packs/day for 24.3 years (12.2 ttl pk-yrs)     Types: Cigarettes     Start date: 1/1/2001     Passive exposure: Current    Smokeless tobacco: Never    Tobacco comments:     I have been working on quitting   Substance Use Topics    Alcohol use: No       History   Drug Use    Types: Marijuana     Comment: pt states yesterday evening     Review Of Systems  Skin: no rashes, skin changes  Eyes: denies new vision changes  Ears/Nose/Throat:denies hearing changes, no sore throat  Respiratory: No shortness of breath, dyspnea on exertion, cough  Cardiovascular: denies chest pain or palpitations   Gastrointestinal: reports nausea with this kidney stone, denies abdominal pain, no bowel changes  Genitourinary: reports hematuria, pelvic pressure  Musculoskeletal: reports right flank pain, fracture right pinky finger  Neurologic: reports history of migraines, denies dizziness  Hematologic/Lymphatic/Immunologic: denies easy bruising or bleeding, denies fevers      Objective    LMP 08/10/2013    Estimated body mass index is 20.21 kg/m  as calculated from the following:    Height as of 4/23/25: 1.575 m (5' 2\").    Weight as of 4/23/25: 50.1 kg (110 lb 8 oz).  Physical Exam  GENERAL: alert and no distress  EYES: Eyes grossly normal to inspection  NECK: no adenopathy, no asymmetry, masses  RESP: lungs clear to auscultation - no rales, rhonchi or wheezes  CV: regular rate and rhythm, normal S1 S2, no murmur  ABDOMEN: soft, nontender, no masses and bowel sounds normal  MS: no gross musculoskeletal defects noted, no edema  SKIN: no suspicious lesions or rashes on exposed skin  NEURO: Normal strength and tone, mentation intact and speech normal  PSYCH: mentation appears normal, affect normal/bright    Recent Labs   Lab Test 04/23/25 2009 04/09/25  1403   HGB 12.2 13.0    297    141   POTASSIUM 3.6 3.5   CR 0.86 0.94        Diagnostics  Recent Results (from the past week)   Basic metabolic panel    Collection " Time: 04/23/25  8:09 PM   Result Value Ref Range    Sodium 139 135 - 145 mmol/L    Potassium 3.6 3.4 - 5.3 mmol/L    Chloride 100 98 - 107 mmol/L    Carbon Dioxide (CO2) 28 22 - 29 mmol/L    Anion Gap 11 7 - 15 mmol/L    Urea Nitrogen 16.6 6.0 - 20.0 mg/dL    Creatinine 0.86 0.51 - 0.95 mg/dL    GFR Estimate 85 >60 mL/min/1.73m2    Calcium 9.1 8.8 - 10.4 mg/dL    Glucose 93 70 - 99 mg/dL   Extra Blue Top Tube    Collection Time: 04/23/25  8:09 PM   Result Value Ref Range    Hold Specimen JIC    Extra Red Top Tube    Collection Time: 04/23/25  8:09 PM   Result Value Ref Range    Hold Specimen JIC    Extra Green Top (Lithium Heparin) Tube    Collection Time: 04/23/25  8:09 PM   Result Value Ref Range    Hold Specimen JIC    Extra Purple Top Tube    Collection Time: 04/23/25  8:09 PM   Result Value Ref Range    Hold Specimen JIC    CBC with platelets and differential    Collection Time: 04/23/25  8:09 PM   Result Value Ref Range    WBC Count 8.9 4.0 - 11.0 10e3/uL    RBC Count 3.64 (L) 3.80 - 5.20 10e6/uL    Hemoglobin 12.2 11.7 - 15.7 g/dL    Hematocrit 36.0 35.0 - 47.0 %    MCV 99 78 - 100 fL    MCH 33.5 (H) 26.5 - 33.0 pg    MCHC 33.9 31.5 - 36.5 g/dL    RDW 12.9 10.0 - 15.0 %    Platelet Count 298 150 - 450 10e3/uL    % Neutrophils 45 %    % Lymphocytes 46 %    % Monocytes 7 %    % Eosinophils 2 %    % Basophils 0 %    % Immature Granulocytes 0 %    NRBCs per 100 WBC 0 <1 /100    Absolute Neutrophils 4.0 1.6 - 8.3 10e3/uL    Absolute Lymphocytes 4.1 0.8 - 5.3 10e3/uL    Absolute Monocytes 0.6 0.0 - 1.3 10e3/uL    Absolute Eosinophils 0.1 0.0 - 0.7 10e3/uL    Absolute Basophils 0.0 0.0 - 0.2 10e3/uL    Absolute Immature Granulocytes 0.0 <=0.4 10e3/uL    Absolute NRBCs 0.0 10e3/uL   RBC and Platelet Morphology    Collection Time: 04/23/25  8:09 PM   Result Value Ref Range    RBC Morphology Confirmed RBC Indices     Platelet Assessment  Automated Count Confirmed. Platelet morphology is normal.     Automated Count  Confirmed. Platelet morphology is normal.    Reactive Lymphocytes Present (A) None Seen   UA with Microscopic reflex to Culture    Collection Time: 04/23/25  9:49 PM    Specimen: Urine, Clean Catch   Result Value Ref Range    Color Urine Yellow Colorless, Straw, Light Yellow, Yellow    Appearance Urine Clear Clear    Glucose Urine Negative Negative mg/dL    Bilirubin Urine Negative Negative    Ketones Urine Negative Negative mg/dL    Specific Gravity Urine 1.021 1.003 - 1.035    Blood Urine Negative Negative    pH Urine 6.5 4.7 - 8.0    Protein Albumin Urine Negative Negative mg/dL    Urobilinogen Urine Normal Normal mg/dL    Nitrite Urine Negative Negative    Leukocyte Esterase Urine Moderate (A) Negative    Bacteria Urine Few (A) None Seen /HPF    WBC Clumps Urine Present (A) None Seen /HPF    Mucus Urine Present (A) None Seen /LPF    RBC Urine 3 (H) <=2 /HPF    WBC Urine 14 (H) <=5 /HPF    Squamous Epithelials Urine 3 (H) <=1 /HPF    Hyaline Casts Urine 2 <=2 /LPF   Urine Culture    Collection Time: 04/23/25  9:49 PM    Specimen: Urine, Clean Catch   Result Value Ref Range    Culture Culture in progress       No EKG this visit, completed in the last 90 days.    Revised Cardiac Risk Index (RCRI)  The patient has the following serious cardiovascular risks for perioperative complications:   - No serious cardiac risks = 0 points     RCRI Interpretation: 0 points: Class I (very low risk - 0.4% complication rate)      Forty six minutes spent on day of encounter with time spent reviewing patient records, counseling patient regarding upcoming surgical procedure, reviewing pre op questions, obtaining a review of systems, performing pre-op exam, discussing how to hold medications prior to surgery, documenting in EHR and coordination of care    Signed Electronically by: ALONDRA Fuller CNP  A copy of this evaluation report is provided to the requesting physician.

## 2025-04-24 NOTE — ED NOTES
.RN assessed patient in triage and determined patient not Urgent Care appropriate. Will be seen in Emergency Department.

## 2025-04-24 NOTE — PROGRESS NOTES
CYSTOURETEROSCOPY, WITH LITHOTRIPSY USING LASER AND URETERAL STENT INSERTION with retrograde pyelogram   Performed by Dr. Cabral on 4/28   Pre-op education provided  Pre-op completed 4/24  C-ARM    Thank you,     Jayshree CARBAJAL BSN, PHN  RN Care Coordinator Urology  [facility]

## 2025-04-24 NOTE — DISCHARGE INSTRUCTIONS
See the urologist today, their phone number is [phone]. You need to see them because you have a kidney stone which is  blocking the urine from draining out of your kidney, as well as a urine infection so you may need a stent. I will talk to our  in the morning to see if we can facilitate this appointment as well. Otherwise, you may have to go to [place] or [place].     If you develop any fever, severe weakness, vomiting or sweating, come back immediately as that is a sign your kidney stone has become infected. At that point, we would need to transfer you emergently for a procedure.    Take the Keflex twice a day for 7 days and not 10.    Pain medication sparingly.

## 2025-04-24 NOTE — ED NOTES
Right sided flank pain for the last few days that she rates a 8 out of 10. She has not taken anything for pain since this morning.   She also reports a broken rib on the right side.   Afebrile. Denies chest pain, SOB, headache, dizziness/lightheadedness, ABD pain.  Blood in urine for a few days, improving.     Resting in a position of comfort.   TV remote given.  Call light with reach.

## 2025-04-24 NOTE — ED NOTES
Resting in a position of comfort.   She would like a little something more for pain.   Provider updated.

## 2025-04-24 NOTE — ED PROVIDER NOTES
History     Chief Complaint   Patient presents with    Flank Pain     The history is provided by the patient.     Purnima Fallon is a 44 year old female who presented to the emergency department ambulatory for evaluation of right flank pain.  She does have a history of a broken rib on that side.  Pain has been ongoing for a few days.  No gallbladder.  Seen in the clinic earlier this month and noted to have hematuria.  No imaging.  Recent treatment of a finger fracture.    Allergies:  No Known Allergies    Problem List:    Patient Active Problem List    Diagnosis Date Noted    Surgical menopause 02/11/2022     Priority: Medium    Trigger thumb of both thumbs 06/17/2020     Priority: Medium     Added automatically from request for surgery [id number]      Hidradenitis suppurativa 05/13/2019     Priority: Medium    Chronic female pelvic pain 09/24/2018     Priority: Medium    Biliary dyskinesia 10/06/2017     Priority: Medium    ACP (advance care planning) 08/05/2016     Priority: Medium     Advance Care Planning 8/5/2016: ACP Review of Chart / Resources Provided:  Reviewed chart for advance care plan.  Purnima DE JESUS Gt has no plan or code status on file. Discussed available resources and provided with information. Confirmed code status reflects current choices pending further ACP discussions.  Confirmed/documented legally designated decision makers.  Added by Purnima Mireles            Ischiorectal abscess and fistula      Priority: Medium    Kidney stones      Priority: Medium     x2 passed on her own      Cystic acne 07/23/2013     Priority: Medium    Migraines      Priority: Medium    Depression      Priority: Medium    ADHD (attention deficit hyperactivity disorder)      Priority: Medium    Mass of breast 02/02/2012     Priority: Medium    Pain in joint, forearm 05/17/2007     Priority: Medium     Overview:   IMO Update 10/11      Carpal tunnel syndrome 08/23/2006     Priority: Medium        Past Medical History:     Past Medical History:   Diagnosis Date    ADHD (attention deficit hyperactivity disorder)     Arthritis 2022    Biliary dyskinesia 10/06/2017    Carpal tunnel syndrome 08/23/2006    Chronic female pelvic pain 09/24/2018    Cystic acne 07/23/2013    Depression     Depressive disorder     Hidradenitis 02/16/2007    Hidradenitis suppurativa 05/13/2019    Ischiorectal abscess and fistula     Kidney stones 2008    Malunion of fracture 05/17/2007    Mass of breast 02/02/2012    Migraines     Need for prophylactic hormone replacement therapy (postmenopausal)     Nondependent tobacco use disorder 10/11/2012    Papanicolaou smear of cervix with low grade squamous intraepithelial lesion (LGSIL) 10/29/2008    Pilonidal cyst 09/12/2017    S/p partial hysterectomy with remaining cervical stump 09/27/2013    Surgical menopause 02/11/2022    Trigger thumb of both thumbs 06/17/2020       Past Surgical History:    Past Surgical History:   Procedure Laterality Date    ABDOMEN SURGERY      BIOPSY      COLONOSCOPY      CYSTECTOMY PILONIDAL N/A 09/18/2017    Procedure: CYSTECTOMY PILONIDAL;  PILONIDAL CYSTECTOMY ;  Surgeon: Cordell Stephens MD;  Location: [facility]    ENDOSCOPY UPPER, COLONOSCOPY, COMBINED N/A 10/05/2018    Procedure: COMBINED ENDOSCOPY UPPER, COLONOSCOPY;  UPPER ENDOSCOPY with Biopsy  AND COLONOSCOPY;  Surgeon: Cordell Stephens MD;  Location: [facility]    EXCISE LESION BUTTOCK(S) Left 06/08/2018    Procedure: EXCISE LESION BUTTOCK(S);  EXCISION OF GLUTEAL LEFT SKIN LESION;  Surgeon: Cordell Stephens MD;  Location: [facility]    EXCISE MASS NECK Right 08/04/2015    Procedure: EXCISE MASS NECK;  Surgeon: Nasir Jones MD;  Location: [facility]    INCISION AND DRAINAGE PERINEAL, COMBINED N/A 03/18/2015    Procedure: COMBINED INCISION AND DRAINAGE PERINEAL;  Surgeon: Jay Torres DO;  Location: [facility]    IR CONSULTATION FOR IR EXAM  05/09/2023    IR FLUORO 0-1 HOUR  06/05/2023    IRRIGATION AND DEBRIDEMENT GROIN  N/A 02/07/2019    Procedure: IRRIGATION AND DEBRIDEMENT LEFT GROIN ABSCESS;  Surgeon: Cordell Stephens MD;  Location: [facility]    LAPAROSCOPIC APPENDECTOMY  02/11/2022    LAPAROSCOPIC CHOLECYSTECTOMY N/A 11/20/2017    Procedure: LAPAROSCOPIC CHOLECYSTECTOMY;  LAPAROSCOPIC CHOLECYSTECTOMY;  Surgeon: Cordell Stephens MD;  [facility]    LAPAROSCOPIC HYSTERECTOMY SUPRACERVICAL, BILATERAL SALPINGO-OOPHORECTOMY, COMBINED  09/27/2013    Procedure: COMBINED LAPAROSCOPIC HYSTERECTOMY SUPRACERVICAL, SALPINGO-OOPHORECTOMY;  LAPAROSCOPIC SUPRACERVICAL HYSTERECTOMY AND RIGHT SALPINGO-OOPHORECTOMY, CYSTOSCOPY;  Surgeon: Denys Callahan MD;  [facility]: [facility]    LAPAROSCOPIC OOPHORECTOMY Left 02/11/2022    Procedure: Left OOPHORECTOMY, LAPAROSCOPIC, Laparoscopic Appendectomy Torsion and Pelvic Appensix;  Surgeon: Malick Malhotra MD;  [facility]    LAPAROSCOPY DIAGNOSTIC (GYN) N/A 09/06/2018    Procedure: LAPAROSCOPY DIAGNOSTIC (GYN);  LAPAROSCOPY WITH PERITONEAL BIOPSIES AND REMOVAL LEFT FALLOPIAN TUBE;  Surgeon: Suraj Whitaker MD;  Location: [facility]    marsupialization of pilonidal cyst  01/01/2007    OPEN REDUCTION INTERNAL FIXATION FINGER(S) Right 4/15/2025    Procedure: Open Reduction Internal Fixation  Right Small Finger Intra Articular Fracture of Middle Phalanx;  Surgeon: Fernando Acevedo MD;  Location: [facility]    RELEASE TRIGGER FINGER BILATERAL Bilateral 07/10/2020    Procedure: RELEASE BILATERAL TRIGGER THUMBS;  Surgeon: Vince Murillo DO;  Location: [facility]    TUBAL LIGATION  01/01/2005       Family History:    Family History   Problem Relation Age of Onset    Other Cancer Father         Lung cancer    Diabetes Paternal Grandmother     Other Cancer Maternal Grandmother         Lung cancer       Social History:  Marital Status:   [4]  Social History     Tobacco Use    Smoking status: Every Day     Current packs/day: 0.50     Average packs/day: 0.5 packs/day for 24.3 years (12.2 ttl pk-yrs)     Types:  Cigarettes     Start date: 1/1/2001     Passive exposure: Current    Smokeless tobacco: Never    Tobacco comments:     I have been working on quitting   Vaping Use    Vaping status: Never Used   Substance Use Topics    Alcohol use: No    Drug use: Yes     Types: Marijuana     Comment: pt states yesterday evening        Medications:    amphetamine-dextroamphetamine (ADDERALL XR) 30 MG 24 hr capsule  amphetamine-dextroamphetamine (ADDERALL) 10 MG tablet  amphetamine-dextroamphetamine (ADDERALL) 30 MG tablet  buPROPion (WELLBUTRIN XL) 150 MG 24 hr tablet  buPROPion (WELLBUTRIN XL) 300 MG 24 hr tablet  escitalopram (LEXAPRO) 20 MG tablet  estradiol (ESTRACE) 1 MG tablet  HUMIRA *CF* PEN 40 MG/0.4ML pen kit  HYDROcodone-acetaminophen (NORCO) 5-325 MG tablet  multivitamin, therapeutic (THERA-VIT) TABS tablet  SUMAtriptan (IMITREX) 25 MG tablet  tiZANidine (ZANAFLEX) 4 MG tablet  valACYclovir (VALTREX) 500 MG tablet          Review of Systems   Constitutional:  Negative for fever.   Respiratory:  Negative for shortness of breath.    Gastrointestinal:  Positive for abdominal pain.   Genitourinary:  Positive for flank pain.   Musculoskeletal:  Negative for back pain.   Skin: Negative.        Physical Exam   BP: 124/76  Pulse: 79  Temp: 98.5  F (36.9  C)  Resp: 18  Weight: 50.1 kg (110 lb 8 oz)  SpO2: 97 %      Physical Exam  Vitals and nursing note reviewed.   Constitutional:       General: She is not in acute distress.     Appearance: Normal appearance. She is not ill-appearing, toxic-appearing or diaphoretic.   Cardiovascular:      Rate and Rhythm: Normal rate and regular rhythm.   Pulmonary:      Effort: Pulmonary effort is normal.   Abdominal:      Palpations: Abdomen is soft.      Comments: Right-sided abdominal tenderness without focality.   Skin:     General: Skin is warm and dry.      Capillary Refill: Capillary refill takes less than 2 seconds.   Neurological:      General: No focal deficit present.      Mental  Status: She is alert and oriented to person, place, and time.         ED Course        Procedures              Critical Care time:  none     None         Results for orders placed or performed during the hospital encounter of 04/23/25 (from the past 24 hours)   [facility] Draw    Narrative    The following orders were created for panel order [place] Draw.  Procedure                               Abnormality         Status                     ---------                               -----------         ------                     Extra Blue Top Tube[[id number]]                             Final result               Extra Red Top Tube[[id number]]                              Final result               Extra Green Top (Lithiu...[[id number]]                      Final result               Extra Purple Top Tube[[id number]]                           Final result                 Please view results for these tests on the individual orders.   Basic metabolic panel   Result Value Ref Range    Sodium 139 135 - 145 mmol/L    Potassium 3.6 3.4 - 5.3 mmol/L    Chloride 100 98 - 107 mmol/L    Carbon Dioxide (CO2) 28 22 - 29 mmol/L    Anion Gap 11 7 - 15 mmol/L    Urea Nitrogen 16.6 6.0 - 20.0 mg/dL    Creatinine 0.86 0.51 - 0.95 mg/dL    GFR Estimate 85 >60 mL/min/1.73m2    Calcium 9.1 8.8 - 10.4 mg/dL    Glucose 93 70 - 99 mg/dL   CBC with platelets differential    Narrative    The following orders were created for panel order CBC with platelets differential.  Procedure                               Abnormality         Status                     ---------                               -----------         ------                     CBC with platelets and ...[[id number]]  Abnormal            Final result               RBC and Platelet Morpho...[[id number]]  Abnormal            Final result                 Please view results for these tests on the individual orders.   Extra Blue Top Tube   Result Value Ref Range    Hold Specimen [facility]     Extra Red Top Tube   Result Value Ref Range    Hold Specimen [facility]    Extra Green Top (Lithium Heparin) Tube   Result Value Ref Range    Hold Specimen [facility]    Extra Purple Top Tube   Result Value Ref Range    Hold Specimen [facility]    CBC with platelets and differential   Result Value Ref Range    WBC Count 8.9 4.0 - 11.0 10e3/uL    RBC Count 3.64 (L) 3.80 - 5.20 10e6/uL    Hemoglobin 12.2 11.7 - 15.7 g/dL    Hematocrit 36.0 35.0 - 47.0 %    MCV 99 78 - 100 fL    MCH 33.5 (H) 26.5 - 33.0 pg    MCHC 33.9 31.5 - 36.5 g/dL    RDW 12.9 10.0 - 15.0 %    Platelet Count 298 150 - 450 10e3/uL    % Neutrophils 45 %    % Lymphocytes 46 %    % Monocytes 7 %    % Eosinophils 2 %    % Basophils 0 %    % Immature Granulocytes 0 %    NRBCs per 100 WBC 0 <1 /100    Absolute Neutrophils 4.0 1.6 - 8.3 10e3/uL    Absolute Lymphocytes 4.1 0.8 - 5.3 10e3/uL    Absolute Monocytes 0.6 0.0 - 1.3 10e3/uL    Absolute Eosinophils 0.1 0.0 - 0.7 10e3/uL    Absolute Basophils 0.0 0.0 - 0.2 10e3/uL    Absolute Immature Granulocytes 0.0 <=0.4 10e3/uL    Absolute NRBCs 0.0 10e3/uL   RBC and Platelet Morphology   Result Value Ref Range    RBC Morphology Confirmed RBC Indices     Platelet Assessment  Automated Count Confirmed. Platelet morphology is normal.     Automated Count Confirmed. Platelet morphology is normal.    Reactive Lymphocytes Present (A) None Seen   UA with Microscopic reflex to Culture    Specimen: Urine, Clean Catch   Result Value Ref Range    Color Urine Yellow Colorless, Straw, Light Yellow, Yellow    Appearance Urine Clear Clear    Glucose Urine Negative Negative mg/dL    Bilirubin Urine Negative Negative    Ketones Urine Negative Negative mg/dL    Specific Gravity Urine 1.021 1.003 - 1.035    Blood Urine Negative Negative    pH Urine 6.5 4.7 - 8.0    Protein Albumin Urine Negative Negative mg/dL    Urobilinogen Urine Normal Normal mg/dL    Nitrite Urine Negative Negative    Leukocyte Esterase Urine Moderate (A) Negative    Bacteria  Urine Few (A) None Seen /HPF    WBC Clumps Urine Present (A) None Seen /HPF    Mucus Urine Present (A) None Seen /LPF    RBC Urine 3 (H) <=2 /HPF    WBC Urine 14 (H) <=5 /HPF    Squamous Epithelials Urine 3 (H) <=1 /HPF    Hyaline Casts Urine 2 <=2 /LPF    Narrative    Urine Culture ordered based on laboratory criteria       Medications   ketorolac (TORADOL) injection 30 mg (has no administration in time range)   oxyCODONE-acetaminophen (PERCOCET) 5-325 MG per tablet 1 tablet (has no administration in time range)   iopamidol (ISOVUE-370) solution 54 mL (has no administration in time range)   sodium chloride (PF) 0.9% PF flush 50 mL (has no administration in time range)       Assessments & Plan (with Medical Decision Making)   Broad differential considered on this patient to include any number of possible etiologies intra-abdominal or musculoskeletal.  She does have a history of rib fracture on that side.  Could be persistent referred pain.  However, given the hematuria as well as right flank pain and tenderness to palpation she would fit any reasonable indication for cross-sectional imaging.  Plan will be for pain control.  Patient was signed out to Dr. Márquez at routine change of shift pending imaging.    This document was prepared using a combination of typing and voice generated software.  While every attempt was made for accuracy, spelling and grammatical errors may exist.     I have reviewed the nursing notes.    I have reviewed the findings, diagnosis, plan and need for follow up with the patient.           Medical Decision Making  The patient's presentation was of moderate complexity (an undiagnosed new problem with uncertain prognosis).    The patient's evaluation involved:  review of 1 test result(s) ordered prior to this encounter (clinic UA)  ordering and/or review of 3+ test(s) in this encounter (labs and CT)    The patient's management necessitated moderate risk (prescription drug management including  medications given in the ED) and moderate risk (IV contrast administration).        New Prescriptions    No medications on file       Final diagnoses:   Right sided abdominal pain       4/23/2025   [facility]       Beth Sumner PA-C  04/23/25 [id number]

## 2025-04-24 NOTE — H&P (VIEW-ONLY)
Preoperative Evaluation  [facility] [place]  [address]  [place] [address]  Phone: [phone]  Primary Provider: ALONDRA Gray CNP  Pre-op Performing Provider: ALONDRA Fuller CNP  Apr 24, 2025 4/24/2025   Surgical Information   What procedure is being done?  kidney stone   Facility or Hospital where procedure/surgery will be performed: [facility]   Who is doing the procedure / surgery? dr [email or web address]   Date of surgery / procedure: 4/28/25   Time of surgery / procedure: 110   Where do you plan to recover after surgery? at home with family     Fax number for surgical facility: Note does not need to be faxed, will be available electronically in Epic.    Assessment & Plan     The proposed surgical procedure is considered LOW risk.    Pre-op exam  Labs, EKG performed recently and stable for approval of surgery. Surgical education done in clinic and surgery book given to patient. She is aware she will need a ride home after surgery    Right ureteral stone  Plan is to perform cystoscopy, right sided retrograde pyelogram, ureteroscopy, laser lithotripsy, stone extraction, stent placement on Monday 4/28/25.  If she passes her stone over the weekend, this procedure can be canceled. Patient will notify us if she passes stone. She is currently of antibiotic for possible UTI with stone, will await urine culture. Patient will continue antibiotic and will start Flomax.      - No identified additional risk factors other than previously addressed    Antiplatelet or Anticoagulation Medication Instructions   - We reviewed the medication list and the patient is not on an antiplatelet or anticoagulation medications.    Additional Medication Instructions  Patient was instructed to hold her Humira 1 week prior to surgery. She took her last dose on 4/21/25    Hold Adderall am of procedure.     Recommendation  Approval given to proceed with proposed procedure, without further diagnostic  evaluation.        Mariah Felton is a 44 year old, presenting for the following:  Pre-op physical      HPI: Patient was seen by urology today for evaluation of a ureteral stone and possible UTI.  The patient presented to the emergency department last night with right sided flank pain.  She was afebrile and stable.  Labs were overall unremarkable, however she had moderate leukocyte esterase and 14 WBC on her urine.  Her CT scan showed a 3 millimeter right ureteral stone.  She was referred urgently given the concern for possible UTI.     A couple weeks ago she noticed pain in the right side.  It has progressed.  Pain is about a 7/10 at present.  She is also dealing with a fractured pinky and had surgery for that.     She has passed about 3 stones spontaneously in the past.  She has family history significant for stones in her mother.    Presently, her stone is nearly at the UVJ. She has passed other stones and it was decided to do a trial on tamsulosin and straining her urine over the weekend. Plan is to proceed with cystoscopy, right sided retrograde pyelogram, ureteroscopy, laser lithotripsy, stone extraction, stent placement on Monday 4/28/25.           4/24/2025   Pre-Op Questionnaire   Have you ever had a heart attack or stroke? No   Have you ever had surgery on your heart or blood vessels, such as a stent placement, a coronary artery bypass, or surgery on an artery in your head, neck, heart, or legs? No   Do you have chest pain with activity? No   Do you have a history of heart failure? No   Do you currently have a cold, bronchitis or symptoms of other infection? No   Do you have a cough, shortness of breath, or wheezing? No   Do you or anyone in your family have previous history of blood clots? No   Do you or does anyone in your family have a serious bleeding problem such as prolonged bleeding following surgeries or cuts? No   Have you ever had problems with anemia or been told to take iron pills? No    Have you had any abnormal blood loss such as black, tarry or bloody stools, or abnormal vaginal bleeding? No   Have you ever had a blood transfusion? No   Are you willing to have a blood transfusion if it is medically needed before, during, or after your surgery? Yes   Have you or any of your relatives ever had problems with anesthesia? No   Do you have sleep apnea, excessive snoring or daytime drowsiness? No   Do you have any artifical heart valves or other implanted medical devices like a pacemaker, defibrillator, or continuous glucose monitor? No   Do you have artificial joints? No   Are you allergic to latex? No     METS 4+    Advance Care Planning  Discussed advance care planning with patient; however, patient declined at this time.          Patient Active Problem List    Diagnosis Date Noted    Displaced fracture of middle phalanx of right little finger, initial encounter for closed fracture 04/24/2025     Priority: Medium    Surgical menopause 02/11/2022     Priority: Medium    Trigger thumb of both thumbs 06/17/2020     Priority: Medium     Added automatically from request for surgery [id number]      Hidradenitis suppurativa 05/13/2019     Priority: Medium    Chronic female pelvic pain 09/24/2018     Priority: Medium    Biliary dyskinesia 10/06/2017     Priority: Medium    ACP (advance care planning) 08/05/2016     Priority: Medium     Advance Care Planning 8/5/2016: ACP Review of Chart / Resources Provided:  Reviewed chart for advance care plan.  Purnima Del Real has no plan or code status on file. Discussed available resources and provided with information. Confirmed code status reflects current choices pending further ACP discussions.  Confirmed/documented legally designated decision makers.  Added by Purnima Mireles            Ischiorectal abscess and fistula      Priority: Medium    Kidney stones      Priority: Medium     x2 passed on her own      Cystic acne 07/23/2013     Priority: Medium    Migraines       Priority: Medium    Depression      Priority: Medium    ADHD (attention deficit hyperactivity disorder)      Priority: Medium    Mass of breast 02/02/2012     Priority: Medium    Pain in joint, forearm 05/17/2007     Priority: Medium     Overview:   IMO Update 10/11      Carpal tunnel syndrome 08/23/2006     Priority: Medium      Past Medical History:   Diagnosis Date    ADHD (attention deficit hyperactivity disorder)     Arthritis 2022    Biliary dyskinesia 10/06/2017    Carpal tunnel syndrome 08/23/2006    Chronic female pelvic pain 09/24/2018    Cystic acne 07/23/2013    Depression     Depressive disorder     Hidradenitis 02/16/2007    Hidradenitis suppurativa 05/13/2019    Ischiorectal abscess and fistula     Kidney stones 2008    x2 passed on her own    Malunion of fracture 05/17/2007    Mass of breast 02/02/2012    Migraines     Need for prophylactic hormone replacement therapy (postmenopausal)     Nondependent tobacco use disorder 10/11/2012    Papanicolaou smear of cervix with low grade squamous intraepithelial lesion (LGSIL) 10/29/2008    Pilonidal cyst 09/12/2017    S/p partial hysterectomy with remaining cervical stump 09/27/2013    Surgical menopause 02/11/2022    Trigger thumb of both thumbs 06/17/2020    Added automatically from request for surgery [id number]     Past Surgical History:   Procedure Laterality Date    ABDOMEN SURGERY      BIOPSY      COLONOSCOPY      CYSTECTOMY PILONIDAL N/A 09/18/2017    Procedure: CYSTECTOMY PILONIDAL;  PILONIDAL CYSTECTOMY ;  Surgeon: Cordell Stephens MD;  Location: [facility]    ENDOSCOPY UPPER, COLONOSCOPY, COMBINED N/A 10/05/2018    Procedure: COMBINED ENDOSCOPY UPPER, COLONOSCOPY;  UPPER ENDOSCOPY with Biopsy  AND COLONOSCOPY;  Surgeon: Cordell Stephens MD;  Location: [facility]    EXCISE LESION BUTTOCK(S) Left 06/08/2018    Procedure: EXCISE LESION BUTTOCK(S);  EXCISION OF GLUTEAL LEFT SKIN LESION;  Surgeon: Cordell Stephens MD;  [facility]: [facility]    EXCISE MASS  NECK Right 08/04/2015    Procedure: EXCISE MASS NECK;  Surgeon: Nasir Jones MD;  Location: [facility]    INCISION AND DRAINAGE PERINEAL, COMBINED N/A 03/18/2015    Procedure: COMBINED INCISION AND DRAINAGE PERINEAL;  Surgeon: Jay Torres DO;  Location: [facility]    IR CONSULTATION FOR IR EXAM  05/09/2023    IR FLUORO 0-1 HOUR  06/05/2023    IRRIGATION AND DEBRIDEMENT GROIN N/A 02/07/2019    Procedure: IRRIGATION AND DEBRIDEMENT LEFT GROIN ABSCESS;  Surgeon: Cordell Stephens MD;  [facility]    LAPAROSCOPIC APPENDECTOMY  02/11/2022    LAPAROSCOPIC CHOLECYSTECTOMY N/A 11/20/2017    Procedure: LAPAROSCOPIC CHOLECYSTECTOMY;  LAPAROSCOPIC CHOLECYSTECTOMY;  Surgeon: Cordell Stephens MD;  [facility]    LAPAROSCOPIC HYSTERECTOMY SUPRACERVICAL, BILATERAL SALPINGO-OOPHORECTOMY, COMBINED  09/27/2013    Procedure: COMBINED LAPAROSCOPIC HYSTERECTOMY SUPRACERVICAL, SALPINGO-OOPHORECTOMY;  LAPAROSCOPIC SUPRACERVICAL HYSTERECTOMY AND RIGHT SALPINGO-OOPHORECTOMY, CYSTOSCOPY;  Surgeon: Denys Callahan MD;  [facility]    LAPAROSCOPIC OOPHORECTOMY Left 02/11/2022    Procedure: Left OOPHORECTOMY, LAPAROSCOPIC, Laparoscopic Appendectomy Torsion and Pelvic Appensix;  Surgeon: Malick Malhotra MD;  [facility]    LAPAROSCOPY DIAGNOSTIC (GYN) N/A 09/06/2018    Procedure: LAPAROSCOPY DIAGNOSTIC (GYN);  LAPAROSCOPY WITH PERITONEAL BIOPSIES AND REMOVAL LEFT FALLOPIAN TUBE;  Surgeon: Suraj Whitaker MD;  Location: [facility]    marsupialization of pilonidal cyst  01/01/2007    OPEN REDUCTION INTERNAL FIXATION FINGER(S) Right 4/15/2025    Procedure: Open Reduction Internal Fixation  Right Small Finger Intra Articular Fracture of Middle Phalanx;  Surgeon: Fernando Acevedo MD;  Location: [facility]    RELEASE TRIGGER FINGER BILATERAL Bilateral 07/10/2020    Procedure: RELEASE BILATERAL TRIGGER THUMBS;  Surgeon: Vince Murillo DO;  Location: [facility]    TUBAL LIGATION  01/01/2005     Current Outpatient Medications    Medication Sig Dispense Refill    amphetamine-dextroamphetamine (ADDERALL XR) 30 MG 24 hr capsule Take 1 capsule (30 mg) by mouth daily. 30 capsule 0    amphetamine-dextroamphetamine (ADDERALL) 10 MG tablet Take 1 tablet (10 mg) by mouth daily. 30 tablet 0    amphetamine-dextroamphetamine (ADDERALL) 30 MG tablet Take 1 tablet (30 mg) by mouth daily. 30 tablet 0    buPROPion (WELLBUTRIN XL) 150 MG 24 hr tablet Take 1 tablet (150 mg) by mouth every morning. 90 tablet 1    buPROPion (WELLBUTRIN XL) 300 MG 24 hr tablet TAKE 1 TABLET BY MOUTH EVERY MORNING ALONG WITH 1 150MG TABLET - TOTAL DOSE 450MG 90 tablet 1    cephALEXin (KEFLEX) 500 MG capsule Take 1 capsule (500 mg) by mouth 2 times daily. 20 capsule 0    escitalopram (LEXAPRO) 20 MG tablet Take 1 tablet (20 mg) by mouth daily. 90 tablet 0    estradiol (ESTRACE) 1 MG tablet Take 1 tablet (1 mg) by mouth daily. 90 tablet 3    HUMIRA *CF* PEN 40 MG/0.4ML pen kit INJECT 40 MG (0.4 ML) UNDER THE SKIN EVERY 7 DAYS 12 mL 0    HYDROcodone-acetaminophen (NORCO) 5-325 MG tablet Take 1-2 tablets by mouth every 4 hours as needed for moderate to severe pain. 25 tablet 0    multivitamin, therapeutic (THERA-VIT) TABS tablet Take 1 tablet by mouth daily.      oxyCODONE (ROXICODONE) 5 MG tablet Take 1 tablet (5 mg) by mouth every 6 hours as needed for severe pain. 6 tablet 0    SUMAtriptan (IMITREX) 25 MG tablet TAKE 1 TABLET BY MOUTH AT ONSET OF HEADACHE FOR MIGRAINE, MAY REPEAT IN 2 HOURS MAX OF 8 TABLETS PER 24 HOURS 20 tablet 0    tamsulosin (FLOMAX) 0.4 MG capsule Take 1 capsule (0.4 mg) by mouth daily for 14 days. 14 capsule 0    tiZANidine (ZANAFLEX) 4 MG tablet Take 1 tablet (4 mg) by mouth 3 times daily as needed for muscle spasms. 30 tablet 0    valACYclovir (VALTREX) 500 MG tablet Take 1 tablet (500 mg) by mouth daily. 90 tablet 3       No Known Allergies     Social History     Tobacco Use    Smoking status: Every Day     Current packs/day: 0.50     Average  "packs/day: 0.5 packs/day for 24.3 years (12.2 ttl pk-yrs)     Types: Cigarettes     Start date: 1/1/2001     Passive exposure: Current    Smokeless tobacco: Never    Tobacco comments:     I have been working on quitting   Substance Use Topics    Alcohol use: No       History   Drug Use    Types: Marijuana     Comment: pt states yesterday evening     Review Of Systems  Skin: no rashes, skin changes  Eyes: denies new vision changes  Ears/Nose/Throat:denies hearing changes, no sore throat  Respiratory: No shortness of breath, dyspnea on exertion, cough  Cardiovascular: denies chest pain or palpitations   Gastrointestinal: reports nausea with this kidney stone, denies abdominal pain, no bowel changes  Genitourinary: reports hematuria, pelvic pressure  Musculoskeletal: reports right flank pain, fracture right pinky finger  Neurologic: reports history of migraines, denies dizziness  Hematologic/Lymphatic/Immunologic: denies easy bruising or bleeding, denies fevers      Objective    LMP 08/10/2013    Estimated body mass index is 20.21 kg/m  as calculated from the following:    Height as of 4/23/25: 1.575 m (5' 2\").    Weight as of 4/23/25: 50.1 kg (110 lb 8 oz).  Physical Exam  GENERAL: alert and no distress  EYES: Eyes grossly normal to inspection  NECK: no adenopathy, no asymmetry, masses  RESP: lungs clear to auscultation - no rales, rhonchi or wheezes  CV: regular rate and rhythm, normal S1 S2, no murmur  ABDOMEN: soft, nontender, no masses and bowel sounds normal  MS: no gross musculoskeletal defects noted, no edema  SKIN: no suspicious lesions or rashes on exposed skin  NEURO: Normal strength and tone, mentation intact and speech normal  PSYCH: mentation appears normal, affect normal/bright    Recent Labs   Lab Test 04/23/25 2009 04/09/25  1403   HGB 12.2 13.0    297    141   POTASSIUM 3.6 3.5   CR 0.86 0.94        Diagnostics  Recent Results (from the past week)   Basic metabolic panel    Collection " Time: 04/23/25  8:09 PM   Result Value Ref Range    Sodium 139 135 - 145 mmol/L    Potassium 3.6 3.4 - 5.3 mmol/L    Chloride 100 98 - 107 mmol/L    Carbon Dioxide (CO2) 28 22 - 29 mmol/L    Anion Gap 11 7 - 15 mmol/L    Urea Nitrogen 16.6 6.0 - 20.0 mg/dL    Creatinine 0.86 0.51 - 0.95 mg/dL    GFR Estimate 85 >60 mL/min/1.73m2    Calcium 9.1 8.8 - 10.4 mg/dL    Glucose 93 70 - 99 mg/dL   Extra Blue Top Tube    Collection Time: 04/23/25  8:09 PM   Result Value Ref Range    Hold Specimen JIC    Extra Red Top Tube    Collection Time: 04/23/25  8:09 PM   Result Value Ref Range    Hold Specimen JIC    Extra Green Top (Lithium Heparin) Tube    Collection Time: 04/23/25  8:09 PM   Result Value Ref Range    Hold Specimen JIC    Extra Purple Top Tube    Collection Time: 04/23/25  8:09 PM   Result Value Ref Range    Hold Specimen JIC    CBC with platelets and differential    Collection Time: 04/23/25  8:09 PM   Result Value Ref Range    WBC Count 8.9 4.0 - 11.0 10e3/uL    RBC Count 3.64 (L) 3.80 - 5.20 10e6/uL    Hemoglobin 12.2 11.7 - 15.7 g/dL    Hematocrit 36.0 35.0 - 47.0 %    MCV 99 78 - 100 fL    MCH 33.5 (H) 26.5 - 33.0 pg    MCHC 33.9 31.5 - 36.5 g/dL    RDW 12.9 10.0 - 15.0 %    Platelet Count 298 150 - 450 10e3/uL    % Neutrophils 45 %    % Lymphocytes 46 %    % Monocytes 7 %    % Eosinophils 2 %    % Basophils 0 %    % Immature Granulocytes 0 %    NRBCs per 100 WBC 0 <1 /100    Absolute Neutrophils 4.0 1.6 - 8.3 10e3/uL    Absolute Lymphocytes 4.1 0.8 - 5.3 10e3/uL    Absolute Monocytes 0.6 0.0 - 1.3 10e3/uL    Absolute Eosinophils 0.1 0.0 - 0.7 10e3/uL    Absolute Basophils 0.0 0.0 - 0.2 10e3/uL    Absolute Immature Granulocytes 0.0 <=0.4 10e3/uL    Absolute NRBCs 0.0 10e3/uL   RBC and Platelet Morphology    Collection Time: 04/23/25  8:09 PM   Result Value Ref Range    RBC Morphology Confirmed RBC Indices     Platelet Assessment  Automated Count Confirmed. Platelet morphology is normal.     Automated Count  Confirmed. Platelet morphology is normal.    Reactive Lymphocytes Present (A) None Seen   UA with Microscopic reflex to Culture    Collection Time: 04/23/25  9:49 PM    Specimen: Urine, Clean Catch   Result Value Ref Range    Color Urine Yellow Colorless, Straw, Light Yellow, Yellow    Appearance Urine Clear Clear    Glucose Urine Negative Negative mg/dL    Bilirubin Urine Negative Negative    Ketones Urine Negative Negative mg/dL    Specific Gravity Urine 1.021 1.003 - 1.035    Blood Urine Negative Negative    pH Urine 6.5 4.7 - 8.0    Protein Albumin Urine Negative Negative mg/dL    Urobilinogen Urine Normal Normal mg/dL    Nitrite Urine Negative Negative    Leukocyte Esterase Urine Moderate (A) Negative    Bacteria Urine Few (A) None Seen /HPF    WBC Clumps Urine Present (A) None Seen /HPF    Mucus Urine Present (A) None Seen /LPF    RBC Urine 3 (H) <=2 /HPF    WBC Urine 14 (H) <=5 /HPF    Squamous Epithelials Urine 3 (H) <=1 /HPF    Hyaline Casts Urine 2 <=2 /LPF   Urine Culture    Collection Time: 04/23/25  9:49 PM    Specimen: Urine, Clean Catch   Result Value Ref Range    Culture Culture in progress       No EKG this visit, completed in the last 90 days.    Revised Cardiac Risk Index (RCRI)  The patient has the following serious cardiovascular risks for perioperative complications:   - No serious cardiac risks = 0 points     RCRI Interpretation: 0 points: Class I (very low risk - 0.4% complication rate)      Forty six minutes spent on day of encounter with time spent reviewing patient records, counseling patient regarding upcoming surgical procedure, reviewing pre op questions, obtaining a review of systems, performing pre-op exam, discussing how to hold medications prior to surgery, documenting in EHR and coordination of care    Signed Electronically by: ALONDRA Fuller CNP  A copy of this evaluation report is provided to the requesting physician.

## 2025-04-24 NOTE — ED TRIAGE NOTES
"\"Having right flank pain for a couple of weeks, it comes and goes.  It has been worse for a couple of days.\"         "

## 2025-04-25 VITALS
HEART RATE: 82 BPM | RESPIRATION RATE: 18 BRPM | DIASTOLIC BLOOD PRESSURE: 61 MMHG | SYSTOLIC BLOOD PRESSURE: 101 MMHG | TEMPERATURE: 98.1 F | OXYGEN SATURATION: 97 %

## 2025-04-25 LAB — BACTERIA UR CULT: NORMAL

## 2025-04-25 ASSESSMENT — ENCOUNTER SYMPTOMS
COUGH: 0
FEVER: 0
SHORTNESS OF BREATH: 0
CHILLS: 0

## 2025-04-25 NOTE — ED PROVIDER NOTES
History     Chief Complaint   Patient presents with    Fever    Urinary Retention     HPI  Purnima Fallon is a 44 year old female who is here with a fever at home.  Reports 100.2.  Took no antipyretics afterwards.  Seen by me yesterday, diagnosed with kidney stone, possibly infected, had urology follow-up, they told her to come to the emergency department immediately if she developed any fever.  Pain about the same.  No significant nausea.  Would not have come in unless she had the fever.    Allergies:  No Known Allergies    Problem List:    Patient Active Problem List    Diagnosis Date Noted    Displaced fracture of middle phalanx of right little finger, initial encounter for closed fracture 04/24/2025     Priority: Medium    Surgical menopause 02/11/2022     Priority: Medium    Trigger thumb of both thumbs 06/17/2020     Priority: Medium     Added automatically from request for surgery [id number]      Hidradenitis suppurativa 05/13/2019     Priority: Medium    Chronic female pelvic pain 09/24/2018     Priority: Medium    Biliary dyskinesia 10/06/2017     Priority: Medium    ACP (advance care planning) 08/05/2016     Priority: Medium     Advance Care Planning 8/5/2016: ACP Review of Chart / Resources Provided:  Reviewed chart for advance care plan.  Purnima Del Real has no plan or code status on file. Discussed available resources and provided with information. Confirmed code status reflects current choices pending further ACP discussions.  Confirmed/documented legally designated decision makers.  Added by Purnima Mireles            Ischiorectal abscess and fistula      Priority: Medium    Kidney stones      Priority: Medium     x2 passed on her own      Cystic acne 07/23/2013     Priority: Medium    Migraines      Priority: Medium    Depression      Priority: Medium    ADHD (attention deficit hyperactivity disorder)      Priority: Medium    Mass of breast 02/02/2012     Priority: Medium    Pain in joint, forearm  05/17/2007     Priority: Medium     Overview:   IMO Update 10/11      Carpal tunnel syndrome 08/23/2006     Priority: Medium        Past Medical History:    Past Medical History:   Diagnosis Date    ADHD (attention deficit hyperactivity disorder)     Arthritis 2022    Biliary dyskinesia 10/06/2017    Carpal tunnel syndrome 08/23/2006    Chronic female pelvic pain 09/24/2018    Cystic acne 07/23/2013    Depression     Depressive disorder     Hidradenitis 02/16/2007    Hidradenitis suppurativa 05/13/2019    Ischiorectal abscess and fistula     Kidney stones 2008    Malunion of fracture 05/17/2007    Mass of breast 02/02/2012    Migraines     Need for prophylactic hormone replacement therapy (postmenopausal)     Nondependent tobacco use disorder 10/11/2012    Papanicolaou smear of cervix with low grade squamous intraepithelial lesion (LGSIL) 10/29/2008    Pilonidal cyst 09/12/2017    S/p partial hysterectomy with remaining cervical stump 09/27/2013    Surgical menopause 02/11/2022    Trigger thumb of both thumbs 06/17/2020       Past Surgical History:    Past Surgical History:   Procedure Laterality Date    ABDOMEN SURGERY      BIOPSY      COLONOSCOPY      CYSTECTOMY PILONIDAL N/A 09/18/2017    Procedure: CYSTECTOMY PILONIDAL;  PILONIDAL CYSTECTOMY ;  Surgeon: Cordell Stephens MD;  Location: [facility]    ENDOSCOPY UPPER, COLONOSCOPY, COMBINED N/A 10/05/2018    Procedure: COMBINED ENDOSCOPY UPPER, COLONOSCOPY;  UPPER ENDOSCOPY with Biopsy  AND COLONOSCOPY;  Surgeon: Cordell Stephens MD;  [facility]    EXCISE LESION BUTTOCK(S) Left 06/08/2018    Procedure: EXCISE LESION BUTTOCK(S);  EXCISION OF GLUTEAL LEFT SKIN LESION;  Surgeon: Cordell Stephens MD;  [facility]    EXCISE MASS NECK Right 08/04/2015    Procedure: EXCISE MASS NECK;  Surgeon: Nasir Jones MD;  [facility]    INCISION AND DRAINAGE PERINEAL, COMBINED N/A 03/18/2015    Procedure: COMBINED INCISION AND DRAINAGE PERINEAL;  Surgeon:  Jay Torres DO;  [facility]    IR CONSULTATION FOR IR EXAM  05/09/2023    IR FLUORO 0-1 HOUR  06/05/2023    IRRIGATION AND DEBRIDEMENT GROIN N/A 02/07/2019    Procedure: IRRIGATION AND DEBRIDEMENT LEFT GROIN ABSCESS;  Surgeon: Cordell Stephens MD;  [facility]    LAPAROSCOPIC APPENDECTOMY  02/11/2022    LAPAROSCOPIC CHOLECYSTECTOMY N/A 11/20/2017    Procedure: LAPAROSCOPIC CHOLECYSTECTOMY;  LAPAROSCOPIC CHOLECYSTECTOMY;  Surgeon: Cordell Stephens MD;  [facility]    LAPAROSCOPIC HYSTERECTOMY SUPRACERVICAL, BILATERAL SALPINGO-OOPHORECTOMY, COMBINED  09/27/2013    Procedure: COMBINED LAPAROSCOPIC HYSTERECTOMY SUPRACERVICAL, SALPINGO-OOPHORECTOMY;  LAPAROSCOPIC SUPRACERVICAL HYSTERECTOMY AND RIGHT SALPINGO-OOPHORECTOMY, CYSTOSCOPY;  Surgeon: Denys Callahan MD;  [facility]    LAPAROSCOPIC OOPHORECTOMY Left 02/11/2022    Procedure: Left OOPHORECTOMY, LAPAROSCOPIC, Laparoscopic Appendectomy Torsion and Pelvic Appensix;  Surgeon: Malick Malhotra MD;  [facility]    LAPAROSCOPY DIAGNOSTIC (GYN) N/A 09/06/2018    Procedure: LAPAROSCOPY DIAGNOSTIC (GYN);  LAPAROSCOPY WITH PERITONEAL BIOPSIES AND REMOVAL LEFT FALLOPIAN TUBE;  Surgeon: Suraj Whitaker MD;  [facility]    marsupialization of pilonidal cyst  01/01/2007    OPEN REDUCTION INTERNAL FIXATION FINGER(S) Right 4/15/2025    Procedure: Open Reduction Internal Fixation  Right Small Finger Intra Articular Fracture of Middle Phalanx;  Surgeon: Fernando Acevedo MD;  [facility]    RELEASE TRIGGER FINGER BILATERAL Bilateral 07/10/2020    Procedure: RELEASE BILATERAL TRIGGER THUMBS;  Surgeon: Vince Murillo DO;  [facility]    TUBAL LIGATION  01/01/2005       Family History:    Family History   Problem Relation Age of Onset    Other Cancer Father         Lung cancer    Diabetes Paternal Grandmother     Other Cancer Maternal Grandmother         Lung cancer       Social History:  Marital Status:   4  Social History      Tobacco Use    Smoking status: Every Day     Current packs/day: 0.50     Average packs/day: 0.5 packs/day for 24.3 years (12.2 ttl pk-yrs)     Types: Cigarettes     Start date: 1/1/2001     Passive exposure: Current    Smokeless tobacco: Never    Tobacco comments:     I have been working on quitting   Vaping Use    Vaping status: Never Used   Substance Use Topics    Alcohol use: No    Drug use: Yes     Types: Marijuana     Comment: pt states yesterday evening        Medications:    amphetamine-dextroamphetamine (ADDERALL XR) 30 MG 24 hr capsule  amphetamine-dextroamphetamine (ADDERALL) 10 MG tablet  amphetamine-dextroamphetamine (ADDERALL) 30 MG tablet  buPROPion (WELLBUTRIN XL) 150 MG 24 hr tablet  buPROPion (WELLBUTRIN XL) 300 MG 24 hr tablet  cephALEXin (KEFLEX) 500 MG capsule  escitalopram (LEXAPRO) 20 MG tablet  estradiol (ESTRACE) 1 MG tablet  HUMIRA *CF* PEN 40 MG/0.4ML pen kit  HYDROcodone-acetaminophen (NORCO) 5-325 MG tablet  multivitamin, therapeutic (THERA-VIT) TABS tablet  oxyCODONE (ROXICODONE) 5 MG tablet  SUMAtriptan (IMITREX) 25 MG tablet  tamsulosin (FLOMAX) 0.4 MG capsule  tiZANidine (ZANAFLEX) 4 MG tablet  valACYclovir (VALTREX) 500 MG tablet          Review of Systems   Constitutional:  Negative for chills and fever.   Respiratory:  Negative for cough and shortness of breath.    All other systems reviewed and are negative.      Physical Exam   BP: 119/77  Pulse: 87  Temp: 98.1  F (36.7  C)  Resp: 18  SpO2: 97 %      Physical Exam  Constitutional:       General: She is not in acute distress.     Appearance: Normal appearance.   HENT:      Head: Normocephalic and atraumatic.      Right Ear: External ear normal.      Left Ear: External ear normal.      Nose: Nose normal.   Eyes:      General: No scleral icterus.     Extraocular Movements: Extraocular movements intact.   Pulmonary:      Effort: Pulmonary effort is normal. No respiratory distress.   Musculoskeletal:         General: No deformity  or signs of injury.   Skin:     General: Skin is dry.      Capillary Refill: Capillary refill takes less than 2 seconds.      Coloration: Skin is not jaundiced.   Neurological:      General: No focal deficit present.      Mental Status: She is alert and oriented to person, place, and time.   Psychiatric:         Mood and Affect: Mood normal.         Behavior: Behavior normal.         ED Course        Procedures             Critical Care time:               Results for orders placed or performed during the hospital encounter of 04/24/25 (from the past 24 hours)   UA with Microscopic reflex to Culture    Specimen: Urine, Midstream   Result Value Ref Range    Color Urine Yellow Colorless, Straw, Light Yellow, Yellow    Appearance Urine Clear Clear    Glucose Urine Negative Negative mg/dL    Bilirubin Urine Negative Negative    Ketones Urine Trace (A) Negative mg/dL    Specific Gravity Urine 1.032 1.003 - 1.035    Blood Urine Negative Negative    pH Urine 5.5 4.7 - 8.0    Protein Albumin Urine 10 (A) Negative mg/dL    Urobilinogen Urine 2.0 (A) Normal mg/dL    Nitrite Urine Negative Negative    Leukocyte Esterase Urine Moderate (A) Negative    Mucus Urine Present (A) None Seen /LPF    Calcium Oxalate Crystals Urine Few (A) None Seen /HPF    RBC Urine 2 <=2 /HPF    WBC Urine 14 (H) <=5 /HPF    Squamous Epithelials Urine 2 (H) <=1 /HPF    Narrative    Urine Culture ordered based on laboratory criteria       Medications - No data to display    Assessments & Plan (with Medical Decision Making)     I have reviewed the nursing notes.    I have reviewed the findings, diagnosis, plan and need for follow up with the patient.          Medical Decision Making  The patient's presentation was of low complexity (an acute and uncomplicated illness or injury).    The patient's evaluation involved:  ordering and/or review of 1 test(s) in this encounter (see separate area of note for details)    The patient's management necessitated only  low risk treatment.    44-year-old female here with concern for fever, here without fever.  Does not feel warm so will not get rectal temperature.  Patient has not had any antipyretics so she would have a fever, it would still be present.  It is not.  Therefore okay to discharge patient.  She has an appointment Monday to get a stent placed.  Currently on antibiotics.  Instructed her to come back immediately if she generally has a fever.    Discharge Medication List as of 4/25/2025 12:21 AM          Final diagnoses:   Kidney stone       4/24/2025   [facility]       Douglas Márquez MD  04/25/25 2958

## 2025-04-25 NOTE — ED TRIAGE NOTES
"\"Saw urology today and they told me to come to ER if having a fever or any changes.  Temp of 100.2 at home.  Only urinating very small amounts.  I have a kidney stone.\"          "

## 2025-04-25 NOTE — ED NOTES
Patient reports kidney stone for the last two weeks and complains of continued R flank pain at 8/10. Patient states she saw Dr. Rush today in urology and was told by Dr. Rush that she would do surgery on Monday if the stone hadn't passed by then. Patient states she is unable to go to the bathroom more than dribbles at a time and has constant frequency and urinary retention. Patient is frustrated with the situation, but kind and gracious to nurse.

## 2025-04-27 LAB — BACTERIA UR CULT: NO GROWTH

## 2025-04-28 ENCOUNTER — ANESTHESIA EVENT (OUTPATIENT)
Dept: SURGERY | Facility: HOSPITAL | Age: 45
End: 2025-04-28
Payer: COMMERCIAL

## 2025-04-28 ENCOUNTER — HOSPITAL ENCOUNTER (OUTPATIENT)
Facility: HOSPITAL | Age: 45
Discharge: HOME | End: 2025-04-28
Attending: UROLOGY | Admitting: UROLOGY
Payer: COMMERCIAL

## 2025-04-28 ENCOUNTER — ANESTHESIA (OUTPATIENT)
Dept: SURGERY | Facility: HOSPITAL | Age: 45
End: 2025-04-28
Payer: COMMERCIAL

## 2025-04-28 ENCOUNTER — APPOINTMENT (OUTPATIENT)
Dept: GENERAL RADIOLOGY | Facility: HOSPITAL | Age: 45
End: 2025-04-28
Attending: UROLOGY
Payer: COMMERCIAL

## 2025-04-28 VITALS
BODY MASS INDEX: 20.17 KG/M2 | HEIGHT: 62 IN | OXYGEN SATURATION: 97 % | HEART RATE: 64 BPM | TEMPERATURE: 98.5 F | WEIGHT: 109.6 LBS | RESPIRATION RATE: 16 BRPM | SYSTOLIC BLOOD PRESSURE: 101 MMHG | DIASTOLIC BLOOD PRESSURE: 66 MMHG

## 2025-04-28 DIAGNOSIS — N20.0 KIDNEY STONES: Primary | ICD-10-CM

## 2025-04-28 PROCEDURE — 370N000017 HC ANESTHESIA TECHNICAL FEE, PER MIN: Performed by: UROLOGY

## 2025-04-28 PROCEDURE — 999N000179 XR SURGERY CARM FLUORO LESS THAN 5 MIN W STILLS

## 2025-04-28 PROCEDURE — C1758 CATHETER, URETERAL: HCPCS | Performed by: UROLOGY

## 2025-04-28 PROCEDURE — 52352 CYSTOURETERO W/STONE REMOVE: CPT | Performed by: UROLOGY

## 2025-04-28 PROCEDURE — 250N000011 HC RX IP 250 OP 636: Performed by: UROLOGY

## 2025-04-28 PROCEDURE — 710N000010 HC RECOVERY PHASE 1, LEVEL 2, PER MIN: Performed by: UROLOGY

## 2025-04-28 PROCEDURE — 710N000012 HC RECOVERY PHASE 2, PER MINUTE: Performed by: UROLOGY

## 2025-04-28 PROCEDURE — 250N000025 HC SEVOFLURANE, PER MIN: Performed by: UROLOGY

## 2025-04-28 PROCEDURE — 999N000141 HC STATISTIC PRE-PROCEDURE NURSING ASSESSMENT: Performed by: UROLOGY

## 2025-04-28 PROCEDURE — 250N000009 HC RX 250: Performed by: NURSE ANESTHETIST, CERTIFIED REGISTERED

## 2025-04-28 PROCEDURE — 250N000011 HC RX IP 250 OP 636: Performed by: NURSE ANESTHETIST, CERTIFIED REGISTERED

## 2025-04-28 PROCEDURE — 82365 CALCULUS SPECTROSCOPY: CPT | Performed by: UROLOGY

## 2025-04-28 PROCEDURE — 360N000084 HC SURGERY LEVEL 4 W/ FLUORO, PER MIN: Performed by: UROLOGY

## 2025-04-28 PROCEDURE — 250N000011 HC RX IP 250 OP 636: Mod: JZ | Performed by: NURSE ANESTHETIST, CERTIFIED REGISTERED

## 2025-04-28 PROCEDURE — 258N000003 HC RX IP 258 OP 636: Performed by: NURSE ANESTHETIST, CERTIFIED REGISTERED

## 2025-04-28 PROCEDURE — 250N000013 HC RX MED GY IP 250 OP 250 PS 637: Performed by: UROLOGY

## 2025-04-28 PROCEDURE — C1769 GUIDE WIRE: HCPCS | Performed by: UROLOGY

## 2025-04-28 PROCEDURE — 272N000001 HC OR GENERAL SUPPLY STERILE: Performed by: UROLOGY

## 2025-04-28 RX ORDER — FENTANYL CITRATE 50 UG/ML
50 INJECTION, SOLUTION INTRAMUSCULAR; INTRAVENOUS EVERY 5 MIN PRN
Status: DISCONTINUED | OUTPATIENT
Start: 2025-04-28 | End: 2025-04-28 | Stop reason: HOSPADM

## 2025-04-28 RX ORDER — DEXAMETHASONE SODIUM PHOSPHATE 4 MG/ML
INJECTION, SOLUTION INTRA-ARTICULAR; INTRALESIONAL; INTRAMUSCULAR; INTRAVENOUS; SOFT TISSUE PRN
Status: DISCONTINUED | OUTPATIENT
Start: 2025-04-28 | End: 2025-04-28

## 2025-04-28 RX ORDER — ONDANSETRON 2 MG/ML
4 INJECTION INTRAMUSCULAR; INTRAVENOUS EVERY 30 MIN PRN
Status: DISCONTINUED | OUTPATIENT
Start: 2025-04-28 | End: 2025-04-28 | Stop reason: HOSPADM

## 2025-04-28 RX ORDER — CEFAZOLIN SODIUM/WATER 2 G/20 ML
2 SYRINGE (ML) INTRAVENOUS
Status: COMPLETED | OUTPATIENT
Start: 2025-04-28 | End: 2025-04-28

## 2025-04-28 RX ORDER — PROPOFOL 10 MG/ML
INJECTION, EMULSION INTRAVENOUS PRN
Status: DISCONTINUED | OUTPATIENT
Start: 2025-04-28 | End: 2025-04-28

## 2025-04-28 RX ORDER — LIDOCAINE HYDROCHLORIDE 20 MG/ML
INJECTION, SOLUTION INFILTRATION; PERINEURAL PRN
Status: DISCONTINUED | OUTPATIENT
Start: 2025-04-28 | End: 2025-04-28

## 2025-04-28 RX ORDER — HYDROMORPHONE HYDROCHLORIDE 1 MG/ML
0.4 INJECTION, SOLUTION INTRAMUSCULAR; INTRAVENOUS; SUBCUTANEOUS EVERY 5 MIN PRN
Status: DISCONTINUED | OUTPATIENT
Start: 2025-04-28 | End: 2025-04-28 | Stop reason: HOSPADM

## 2025-04-28 RX ORDER — SODIUM CHLORIDE, SODIUM LACTATE, POTASSIUM CHLORIDE, CALCIUM CHLORIDE 600; 310; 30; 20 MG/100ML; MG/100ML; MG/100ML; MG/100ML
INJECTION, SOLUTION INTRAVENOUS CONTINUOUS
Status: DISCONTINUED | OUTPATIENT
Start: 2025-04-28 | End: 2025-04-28 | Stop reason: HOSPADM

## 2025-04-28 RX ORDER — LIDOCAINE 40 MG/G
CREAM TOPICAL
Status: DISCONTINUED | OUTPATIENT
Start: 2025-04-28 | End: 2025-04-28 | Stop reason: HOSPADM

## 2025-04-28 RX ORDER — DEXAMETHASONE SODIUM PHOSPHATE 4 MG/ML
4 INJECTION, SOLUTION INTRA-ARTICULAR; INTRALESIONAL; INTRAMUSCULAR; INTRAVENOUS; SOFT TISSUE
Status: DISCONTINUED | OUTPATIENT
Start: 2025-04-28 | End: 2025-04-28 | Stop reason: HOSPADM

## 2025-04-28 RX ORDER — CEFAZOLIN SODIUM/WATER 2 G/20 ML
2 SYRINGE (ML) INTRAVENOUS SEE ADMIN INSTRUCTIONS
Status: DISCONTINUED | OUTPATIENT
Start: 2025-04-28 | End: 2025-04-28 | Stop reason: HOSPADM

## 2025-04-28 RX ORDER — FENTANYL CITRATE 50 UG/ML
25 INJECTION, SOLUTION INTRAMUSCULAR; INTRAVENOUS EVERY 5 MIN PRN
Status: DISCONTINUED | OUTPATIENT
Start: 2025-04-28 | End: 2025-04-28 | Stop reason: HOSPADM

## 2025-04-28 RX ORDER — ONDANSETRON 2 MG/ML
INJECTION INTRAMUSCULAR; INTRAVENOUS PRN
Status: DISCONTINUED | OUTPATIENT
Start: 2025-04-28 | End: 2025-04-28

## 2025-04-28 RX ORDER — TAMSULOSIN HYDROCHLORIDE 0.4 MG/1
0.4 CAPSULE ORAL ONCE
Status: COMPLETED | OUTPATIENT
Start: 2025-04-28 | End: 2025-04-28

## 2025-04-28 RX ORDER — ONDANSETRON 4 MG/1
4 TABLET, ORALLY DISINTEGRATING ORAL EVERY 30 MIN PRN
Status: DISCONTINUED | OUTPATIENT
Start: 2025-04-28 | End: 2025-04-28 | Stop reason: HOSPADM

## 2025-04-28 RX ORDER — FENTANYL CITRATE 50 UG/ML
INJECTION, SOLUTION INTRAMUSCULAR; INTRAVENOUS PRN
Status: DISCONTINUED | OUTPATIENT
Start: 2025-04-28 | End: 2025-04-28

## 2025-04-28 RX ORDER — KETOROLAC TROMETHAMINE 30 MG/ML
15 INJECTION, SOLUTION INTRAMUSCULAR; INTRAVENOUS ONCE
Status: COMPLETED | OUTPATIENT
Start: 2025-04-28 | End: 2025-04-28

## 2025-04-28 RX ORDER — HYDROXYZINE HYDROCHLORIDE 50 MG/ML
50 INJECTION, SOLUTION INTRAMUSCULAR
Status: COMPLETED | OUTPATIENT
Start: 2025-04-28 | End: 2025-04-28

## 2025-04-28 RX ORDER — OXYCODONE HYDROCHLORIDE 5 MG/1
5 TABLET ORAL EVERY 6 HOURS PRN
Qty: 4 TABLET | Refills: 0 | Status: SHIPPED | OUTPATIENT
Start: 2025-04-28 | End: 2025-04-30

## 2025-04-28 RX ORDER — HYDROMORPHONE HYDROCHLORIDE 1 MG/ML
0.2 INJECTION, SOLUTION INTRAMUSCULAR; INTRAVENOUS; SUBCUTANEOUS EVERY 5 MIN PRN
Status: DISCONTINUED | OUTPATIENT
Start: 2025-04-28 | End: 2025-04-28 | Stop reason: HOSPADM

## 2025-04-28 RX ORDER — NALOXONE HYDROCHLORIDE 0.4 MG/ML
0.1 INJECTION, SOLUTION INTRAMUSCULAR; INTRAVENOUS; SUBCUTANEOUS
Status: DISCONTINUED | OUTPATIENT
Start: 2025-04-28 | End: 2025-04-28 | Stop reason: HOSPADM

## 2025-04-28 RX ADMIN — FENTANYL CITRATE 100 MCG: 50 INJECTION INTRAMUSCULAR; INTRAVENOUS at 10:42

## 2025-04-28 RX ADMIN — ONDANSETRON 4 MG: 2 INJECTION INTRAMUSCULAR; INTRAVENOUS at 10:50

## 2025-04-28 RX ADMIN — KETOROLAC TROMETHAMINE 15 MG: 30 INJECTION, SOLUTION INTRAMUSCULAR at 12:03

## 2025-04-28 RX ADMIN — FENTANYL CITRATE 100 MCG: 50 INJECTION INTRAMUSCULAR; INTRAVENOUS at 10:58

## 2025-04-28 RX ADMIN — FENTANYL CITRATE 25 MCG: 50 INJECTION, SOLUTION INTRAMUSCULAR; INTRAVENOUS at 11:52

## 2025-04-28 RX ADMIN — HYDROXYZINE HYDROCHLORIDE 50 MG: 50 INJECTION, SOLUTION INTRAMUSCULAR at 12:31

## 2025-04-28 RX ADMIN — DEXAMETHASONE SODIUM PHOSPHATE 10 MG: 4 INJECTION, SOLUTION INTRA-ARTICULAR; INTRALESIONAL; INTRAMUSCULAR; INTRAVENOUS; SOFT TISSUE at 10:50

## 2025-04-28 RX ADMIN — PROPOFOL 50 MG: 10 INJECTION, EMULSION INTRAVENOUS at 10:58

## 2025-04-28 RX ADMIN — LIDOCAINE HYDROCHLORIDE 80 MG: 20 INJECTION, SOLUTION INFILTRATION; PERINEURAL at 10:44

## 2025-04-28 RX ADMIN — TAMSULOSIN HYDROCHLORIDE 0.4 MG: 0.4 CAPSULE ORAL at 13:41

## 2025-04-28 RX ADMIN — FENTANYL CITRATE 50 MCG: 50 INJECTION, SOLUTION INTRAMUSCULAR; INTRAVENOUS at 12:14

## 2025-04-28 RX ADMIN — PROPOFOL 200 MG: 10 INJECTION, EMULSION INTRAVENOUS at 10:44

## 2025-04-28 RX ADMIN — SODIUM CHLORIDE, SODIUM LACTATE, POTASSIUM CHLORIDE, AND CALCIUM CHLORIDE: .6; .31; .03; .02 INJECTION, SOLUTION INTRAVENOUS at 10:33

## 2025-04-28 RX ADMIN — Medication 2 G: at 10:33

## 2025-04-28 ASSESSMENT — ACTIVITIES OF DAILY LIVING (ADL)
ADLS_ACUITY_SCORE: 20

## 2025-04-28 ASSESSMENT — LIFESTYLE VARIABLES: TOBACCO_USE: 1

## 2025-04-28 NOTE — INTERVAL H&P NOTE
I have reviewed the virtual H&P that is linked to this encounter. The physical exam performed by anesthesia during this surgical encounter serves as the physical portion of that the virtual H&P. There are no significant changes from that history and physical as noted.    Clinical Conditions Present on Arrival:  Clinically Significant Risk Factors Present on Admission

## 2025-04-28 NOTE — ANESTHESIA PROCEDURE NOTES
Airway       Patient location during procedure: OR  Staff -        CRNA: Melvin Dhillon APRN CRNA       Performed By: CRNA  Consent for Airway        Urgency: elective  Indications and Patient Condition       Indications for airway management: willie-procedural       Induction type:intravenous       Mask difficulty assessment: 1 - vent by mask    Final Airway Details       Final airway type: supraglottic airway    Supraglottic Airway Details        Type: LMA       Brand: I-Gel       LMA size: 3    Post intubation assessment        Placement verified by: capnometry, equal breath sounds and chest rise        Number of attempts at approach: 1       Secured with: plastic tape       Ease of procedure: easy       Dentition: Intact and Unchanged

## 2025-04-28 NOTE — ANESTHESIA PREPROCEDURE EVALUATION
Anesthesia Pre-Procedure Evaluation    Patient: Purnima Fallon   MRN: [id number] : 1980        Procedure : Procedure(s):  CYSTOURETEROSCOPY, WITH LITHOTRIPSY USING LASER AND URETERAL STENT INSERTION with retrograde pyelogram          Past Medical History:   Diagnosis Date     ADHD (attention deficit hyperactivity disorder)      Arthritis      Biliary dyskinesia 10/06/2017     Carpal tunnel syndrome 2006     Chronic female pelvic pain 2018     Cystic acne 2013     Depression      Depressive disorder      Hidradenitis 2007     Hidradenitis suppurativa 2019     Ischiorectal abscess and fistula      Kidney stones 2008    x2 passed on her own     Malunion of fracture 2007     Mass of breast 2012     Migraines      Need for prophylactic hormone replacement therapy (postmenopausal)      Nondependent tobacco use disorder 10/11/2012     Papanicolaou smear of cervix with low grade squamous intraepithelial lesion (LGSIL) 10/29/2008     Pilonidal cyst 2017     S/p partial hysterectomy with remaining cervical stump 2013     Surgical menopause 2022     Trigger thumb of both thumbs 2020    Added automatically from request for surgery [id number]      Past Surgical History:   Procedure Laterality Date     ABDOMEN SURGERY       BIOPSY       COLONOSCOPY       CYSTECTOMY PILONIDAL N/A 2017    Procedure: CYSTECTOMY PILONIDAL;  PILONIDAL CYSTECTOMY ;  Surgeon: Cordell Stephens MD;  [facility]     ENDOSCOPY UPPER, COLONOSCOPY, COMBINED N/A 10/05/2018    Procedure: COMBINED ENDOSCOPY UPPER, COLONOSCOPY;  UPPER ENDOSCOPY with Biopsy  AND COLONOSCOPY;  Surgeon: Cordell Stephens MD;  [facility]     EXCISE LESION BUTTOCK(S) Left 2018    Procedure: EXCISE LESION BUTTOCK(S);  EXCISION OF GLUTEAL LEFT SKIN LESION;  Surgeon: Cordell Stephens MD;  [facility]     EXCISE MASS NECK Right 2015    Procedure: EXCISE MASS NECK;   Surgeon: Nasir Jones MD;  [facility]     INCISION AND DRAINAGE PERINEAL, COMBINED N/A 03/18/2015    Procedure: COMBINED INCISION AND DRAINAGE PERINEAL;  Surgeon: Jay Torres DO;  [facility]     IR CONSULTATION FOR IR EXAM  05/09/2023     IR FLUORO 0-1 HOUR  06/05/2023     IRRIGATION AND DEBRIDEMENT GROIN N/A 02/07/2019    Procedure: IRRIGATION AND DEBRIDEMENT LEFT GROIN ABSCESS;  Surgeon: Cordell Stephens MD;  [facility]     LAPAROSCOPIC APPENDECTOMY  02/11/2022     LAPAROSCOPIC CHOLECYSTECTOMY N/A 11/20/2017    Procedure: LAPAROSCOPIC CHOLECYSTECTOMY;  LAPAROSCOPIC CHOLECYSTECTOMY;  Surgeon: Cordell Stephens MD;  [facility]     LAPAROSCOPIC HYSTERECTOMY SUPRACERVICAL, BILATERAL SALPINGO-OOPHORECTOMY, COMBINED  09/27/2013    Procedure: COMBINED LAPAROSCOPIC HYSTERECTOMY SUPRACERVICAL, SALPINGO-OOPHORECTOMY;  LAPAROSCOPIC SUPRACERVICAL HYSTERECTOMY AND RIGHT SALPINGO-OOPHORECTOMY, CYSTOSCOPY;  Surgeon: Denys Callahan MD;  [facility]     LAPAROSCOPIC OOPHORECTOMY Left 02/11/2022    Procedure: Left OOPHORECTOMY, LAPAROSCOPIC, Laparoscopic Appendectomy Torsion and Pelvic Appensix;  Surgeon: Malick Malhotra MD;  [facility]     LAPAROSCOPY DIAGNOSTIC (GYN) N/A 09/06/2018    Procedure: LAPAROSCOPY DIAGNOSTIC (GYN);  LAPAROSCOPY WITH PERITONEAL BIOPSIES AND REMOVAL LEFT FALLOPIAN TUBE;  Surgeon: Suraj Whitaker MD;  [facility]     marsupialization of pilonidal cyst  01/01/2007     OPEN REDUCTION INTERNAL FIXATION FINGER(S) Right 4/15/2025    Procedure: Open Reduction Internal Fixation  Right Small Finger Intra Articular Fracture of Middle Phalanx;  Surgeon: Fernando Acevedo MD;  [facility]     RELEASE TRIGGER FINGER BILATERAL Bilateral 07/10/2020    Procedure: RELEASE BILATERAL TRIGGER THUMBS;  Surgeon: Vince Murillo DO;  [facility]     TUBAL LIGATION  01/01/2005      No Known Allergies   Social History     Tobacco Use     Smoking status: Every Day      Current packs/day: 0.50     Average packs/day: 0.5 packs/day for 24.3 years (12.2 ttl pk-yrs)     Types: Cigarettes     Start date: 1/1/2001     Passive exposure: Current     Smokeless tobacco: Never     Tobacco comments:     I have been working on quitting   Substance Use Topics     Alcohol use: No      Wt Readings from Last 1 Encounters:   04/23/25 50.1 kg (110 lb 8 oz)        Anesthesia Evaluation   Pt has had prior anesthetic.         ROS/MED HX  ENT/Pulmonary:     (+)                tobacco use,  0.5 packs/day,                      Neurologic:  - neg neurologic ROS     Cardiovascular:  - neg cardiovascular ROS     METS/Exercise Tolerance:     Hematologic:  - neg hematologic  ROS     Musculoskeletal:   (+)     fracture, Fracture location: RUE,         GI/Hepatic:  - neg GI/hepatic ROS     Renal/Genitourinary: Comment: Right kidney stone.    (+) renal disease,             Endo:  - neg endo ROS     Psychiatric/Substance Use:     (+)     Recreational drug usage: Cannabis.    Infectious Disease:  - neg infectious disease ROS     Malignancy:       Other:          Physical Exam    Airway        Mallampati: I   TM distance: > 3 FB   Neck ROM: full   Mouth opening: > 3 cm    Respiratory Devices and Support         Dental       (+) Completely normal teeth      Cardiovascular          Rhythm and rate: regular and normal     Pulmonary           breath sounds clear to auscultation       OUTSIDE LABS:  CBC:   Lab Results   Component Value Date    WBC 8.9 04/23/2025    WBC 8.9 04/09/2025    HGB 12.2 04/23/2025    HGB 13.0 04/09/2025    HCT 36.0 04/23/2025    HCT 37.4 04/09/2025     04/23/2025     04/09/2025     BMP:   Lab Results   Component Value Date     04/23/2025     04/09/2025    POTASSIUM 3.6 04/23/2025    POTASSIUM 3.5 04/09/2025    CHLORIDE 100 04/23/2025    CHLORIDE 105 04/09/2025    CO2 28 04/23/2025    CO2 26 04/09/2025    BUN 16.6 04/23/2025    BUN 19.3 04/09/2025    CR 0.86  "04/23/2025    CR 0.94 04/09/2025    GLC 93 04/23/2025    GLC 66 (L) 04/09/2025     COAGS: No results found for: \"PTT\", \"INR\", \"FIBR\"  POC:   Lab Results   Component Value Date    HCG Negative 09/30/2014    HCGS Negative 07/22/2013     HEPATIC:   Lab Results   Component Value Date    ALBUMIN 4.3 04/09/2025    PROTTOTAL 7.1 04/09/2025    ALT 21 04/09/2025    AST 24 04/09/2025    ALKPHOS 77 04/09/2025    BILITOTAL 0.2 04/09/2025     OTHER:   Lab Results   Component Value Date    LACT 0.9 11/29/2017    ANDRÉS 9.1 04/23/2025    LIPASE 96 02/10/2022    AMYLASE 349 (H) 06/27/2017    TSH 1.30 12/23/2024    CRP <2.9 02/10/2022       Anesthesia Plan    ASA Status:  2    NPO Status:  NPO Appropriate    Anesthesia Type: General.     - Airway: ETT   Induction: Intravenous.   Maintenance: Balanced.        Consents    Anesthesia Plan(s) and associated risks, benefits, and realistic alternatives discussed. Questions answered and patient/representative(s) expressed understanding.     - Discussed: Risks, Benefits and Alternatives for BOTH SEDATION and the PROCEDURE were discussed     - Discussed with:  Patient      - Extended Intubation/Ventilatory Support Discussed: Yes.      - Patient is DNR/DNI Status: No     Use of blood products discussed: Yes.     - Discussed with: Patient.     Postoperative Care    Pain management: IV analgesics.   PONV prophylaxis: Ondansetron (or other 5HT-3), Dexamethasone or Solumedrol     Comments:             ALONDRA Chavez CRNA    Clinically Significant Risk Factors Present on Admission                                          "

## 2025-04-28 NOTE — ANESTHESIA POSTPROCEDURE EVALUATION
Patient: Purnima Fallon    Procedure: Procedure(s):  CYSTOURETEROSCOPY, with right ureterosopy and stone retrieval       Anesthesia Type:  General    Note:  Disposition: Outpatient   Postop Pain Control: Uneventful            Sign Out: Well controlled pain   PONV: No   Neuro/Psych: Uneventful            Sign Out: Acceptable/Baseline neuro status   Airway/Respiratory: Uneventful   CV/Hemodynamics: Uneventful            Sign Out: Acceptable CV status; No obvious hypovolemia; No obvious fluid overload   Other NRE: NONE   DID A NON-ROUTINE EVENT OCCUR? No       Last vitals:  Vitals Value Taken Time   /67 04/28/25 1240   Temp 98.3  F (36.8  C) 04/28/25 1240   Pulse 68 04/28/25 1244   Resp 12 04/28/25 1244   SpO2 95 % 04/28/25 1243   Vitals shown include unfiled device data.    Electronically Signed By: ALONDRA Serrato CRNA  April 28, 2025  1:39 PM

## 2025-04-28 NOTE — PROGRESS NOTES
PACU Respiratory Event Documentation     1) Episodes of Apnea greater than or equal to 10 seconds: 0     2) Bradypnea - less than 8 breaths per minute: 0    3) Pain score on 0 to 10 scale: 4    4) Pain-sedation mismatch (yes or no): no    5) Repeated 02 desaturation less than 90% (yes or no): no    Anesthesia notified? (yes or no): n/a    Any of the above events occuring repeatedly in separate 30 minute intervals may be considered recurrent PACU respiratory events.

## 2025-04-28 NOTE — DISCHARGE INSTRUCTIONS
Thank you for having your procedure with [facility].  Our nurse, Jayshree will be reaching out to you from the clinic to help make all of your necessary follow up arrangements.  If you are having pain that is out of proportion as is described in the surgery sheet provided to you by the clinic please reach out to  Jayshree at [phone].      Ureteroscopy: What to Expect at Home  Your Recovery  Most people are able to go home the same day of the procedure. But you may need to stay in the hospital. If you do, the stay is usually no more than 24 to 48 hours.  For several hours after the procedure you may have a burning feeling when you urinate. This feeling should go away within a day. Drinking a lot of water can help. Your doctor also may advise you to take medicine that numbs the burning. This medicine is called phenazopyridine. It is available by prescription and over the counter. Brand names include Pyridium and Uristat.  You may have some blood in your urine for 2 or 3 days.  Your doctor may prescribe an antibiotic for a day or two. This will help prevent an infection.  This care sheet gives you a general idea about how long it will take for you to recover. But each person recovers at a different pace. Follow the steps below to feel better as quickly as possible.  How can you care for yourself at home?  Activity    You can go back to work and other activities the next day.   Diet    Try to drink two 8-ounce glasses of water each hour for 2 hours after the procedure. This may help ease the burning when you urinate.   Medicines    Your doctor will tell you if and when you can restart your medicines. He or she will also give you instructions about taking any new medicines.     If you stopped taking aspirin or some other blood thinner, your doctor will tell you when to start taking it again.     If you take medicine to stop the burning when you urinate, take it exactly as recommended. Be safe with medicines.  Call your doctor if you think you are having a problem with your medicine. You will get more details on the specific medicine your doctor recommends.     If your doctor prescribed antibiotics, take them as directed. Do not stop taking them just because you feel better. You need to take the full course of antibiotics.     Ask your doctor if you can take an over-the-counter pain medicine, such as acetaminophen (Tylenol), ibuprofen (Advil, Motrin), or naproxen (Aleve). Read and follow all instructions on the label. Do not take two or more pain medicines at the same time unless the doctor told you to. Many pain medicines have acetaminophen, which is Tylenol. Too much acetaminophen (Tylenol) can be harmful.   Heat    Take a warm bath. This may soothe the burning.     You also can hold a warm washcloth over your urethra for comfort. (The urethra is where your urine comes out.)   Follow-up care is a key part of your treatment and safety. Be sure to make and go to all appointments, and call your doctor if you are having problems. It's also a good idea to know your test results and keep a list of the medicines you take.  When should you call for help?   Call 911 anytime you think you may need emergency care. For example, call if:    You passed out (lost consciousness).     You have chest pain, are short of breath, or cough up blood.   Call your doctor now or seek immediate medical care if:    You have pain that does not get better after you take pain medicine.     You have new or more blood clots in your urine. (It is normal for the urine to be pink for a few days.)     You cannot urinate.     You have symptoms of a urinary tract infection. These may include:  Pain or burning when you urinate.  A frequent need to urinate without being able to pass much urine.  Pain in the flank, which is just below the rib cage and above the waist on either side of the back.  Blood in the urine.  A fever.     You are sick to your stomach or  "cannot drink fluids.     You have signs of a blood clot in your leg (called a deep vein thrombosis), such as:  Pain in the calf, back of the knee, thigh, or groin.  Redness and swelling in your leg.   Watch closely for changes in your health, and be sure to contact your doctor if you are having any problems.  Where can you learn more?  Go to https://www.[employer].net/patiented  Enter P672 in the search box to learn more about \"Ureteroscopy: What to Expect at Home.\"  Current as of: April 30, 2024  Content Version: 14.4    3970-3642 [employer].   Care instructions adapted under license by your healthcare professional. If you have questions about a medical condition or this instruction, always ask your healthcare professional. [employer] disclaims any warranty or liability for your use of this information.    "

## 2025-04-28 NOTE — ANESTHESIA CARE TRANSFER NOTE
Patient: Purnima Fallon    Procedure: Procedure(s):  CYSTOURETEROSCOPY, with right ureterosopy and stone retrieval       Diagnosis: Ureteral stone [N20.1]  Diagnosis Additional Information: No value filed.    Anesthesia Type:   General     Note:    Oropharynx: spontaneously breathing  Level of Consciousness: awake and drowsy  Oxygen Supplementation: nasal cannula    Independent Airway: airway patency satisfactory and stable  Dentition: dentition unchanged  Vital Signs Stable: post-procedure vital signs reviewed and stable  Report to RN Given: handoff report given  Patient transferred to: PACU    Handoff Report: Identifed the Patient, Identified the Reponsible Provider, Reviewed the pertinent medical history, Discussed the surgical course, Reviewed Intra-OP anesthesia mangement and issues during anesthesia, Set expectations for post-procedure period and Allowed opportunity for questions and acknowledgement of understanding  Vitals:  Vitals Value Taken Time   /60 04/28/25 1135   Temp     Pulse 66 04/28/25 1138   Resp 17 04/28/25 1138   SpO2 97 % 04/28/25 1138   Vitals shown include unfiled device data.    Electronically Signed By: ALONDRA Serrato CRNA  April 28, 2025  11:39 AM

## 2025-04-28 NOTE — OR NURSING
Patient and responsible adult given discharge instructions with no questions regarding instructions. Aliya score 19/20. Pain level 1/10.  Discharged from unit via ambulation. Patient discharged to home with son.

## 2025-04-28 NOTE — OP NOTE
[facility] Operative Note    Pre-operative diagnosis: Ureteral stone [N20.1]   Post-operative diagnosis right ureteral stone   Procedure: CYSTOURETEROSCOPY, with right ureterosopy and stone retrieval - Right   Surgeon: Joaquín Cabral MD   Assistants(s): None   Estimated blood loss: none    Specimens: Right ureteral stone   Findings: Distal right ureteral stone basketed and extracted. No stent.     Indication for procedure: The patient is a 44-year-old woman with a distal right ureteral stone which has been symptomatic for several days.  She presents for operative intervention to remove the stone.  Risks include bleeding, infection, damage to bladder ureter ureter and kidney, failure to remove the stone and the need for reoperation.  She signed informed consent.    Procedure in detail: The patient was brought to the operating room and placed supine on the operating table.  Following the induction of general anesthesia the patient was prepped and draped in dorsolithotomy.  The urethra was tight enough to prohibit the entry of the 22 Kazakh cystoscope.  The urethra was dilated up to 22 Kazakh serially with the Riri dilators.  The cystoscope was inserted and the bladder was surveyed.  There was mild erythema surrounding the right ureteral orifice but the bladder was otherwise normal.  The right ureteral orifice was cannulated with a 5 Kazakh open-ended catheter and a retrograde pyelogram attempted to outline the collecting system but the stone was so distal that it blocked the entry of the contrast material.  A 0.03 Glidewire was advanced through the catheter passed the stone into the renal pelvis under fluoroscopy.  The cystoscope and catheter were removed.  The semirigid ureteroscope was deployed per urethra alongside the wire but the distal ureter was tight enough that it would not go straight in.  A second wire was inserted through the ureteroscope and the ureteroscope was easily advanced into the distal  ureter where the stone was immediately encountered.  The stone was basketed with a 4 wire basket and easily extracted.  The stone was sent for analysis.  There was minimal trauma to the distal ureter so the decision was made not to place a ureteral stent.  The bladder was emptied with the cystoscope.  The cystoscope was removed, the patient was washed, dried, awakened from anesthesia, and transferred stable from the operating room to the PACU.    Plan: The patient will follow-up in 6 to 8 weeks with a renal ultrasound and 24-hour urine collection prior.

## 2025-04-30 ENCOUNTER — OFFICE VISIT (OUTPATIENT)
Dept: ORTHOPEDICS | Facility: OTHER | Age: 45
End: 2025-04-30
Attending: ORTHOPAEDIC SURGERY
Payer: COMMERCIAL

## 2025-04-30 ENCOUNTER — ANCILLARY PROCEDURE (OUTPATIENT)
Dept: GENERAL RADIOLOGY | Facility: OTHER | Age: 45
End: 2025-04-30
Attending: ORTHOPAEDIC SURGERY
Payer: COMMERCIAL

## 2025-04-30 VITALS
BODY MASS INDEX: 20.04 KG/M2 | DIASTOLIC BLOOD PRESSURE: 70 MMHG | OXYGEN SATURATION: 97 % | WEIGHT: 109.57 LBS | HEART RATE: 97 BPM | RESPIRATION RATE: 18 BRPM | SYSTOLIC BLOOD PRESSURE: 102 MMHG

## 2025-04-30 DIAGNOSIS — S62.626D CLOSED DISPLACED FRACTURE OF MIDDLE PHALANX OF RIGHT LITTLE FINGER WITH ROUTINE HEALING, SUBSEQUENT ENCOUNTER: Primary | ICD-10-CM

## 2025-04-30 DIAGNOSIS — S62.626D CLOSED DISPLACED FRACTURE OF MIDDLE PHALANX OF RIGHT LITTLE FINGER WITH ROUTINE HEALING, SUBSEQUENT ENCOUNTER: ICD-10-CM

## 2025-04-30 PROCEDURE — G0463 HOSPITAL OUTPT CLINIC VISIT: HCPCS

## 2025-04-30 PROCEDURE — 73130 X-RAY EXAM OF HAND: CPT | Mod: TC,RT

## 2025-04-30 PROCEDURE — 73130 X-RAY EXAM OF HAND: CPT | Mod: 26 | Performed by: RADIOLOGY

## 2025-04-30 PROCEDURE — 99024 POSTOP FOLLOW-UP VISIT: CPT | Performed by: ORTHOPAEDIC SURGERY

## 2025-04-30 ASSESSMENT — PAIN SCALES - GENERAL: PAINLEVEL_OUTOF10: NO PAIN (0)

## 2025-05-01 LAB
APPEARANCE STONE: NORMAL
COMPN STONE: NORMAL
SPECIMEN WT: 17 MG

## 2025-05-04 NOTE — PROGRESS NOTES
ORTHOPEDIC CLINIC CONSULT    Referred by:     Chief Complaint: Purnima Fallon is a 44 year old female who is being seen for   Chief Complaint   Patient presents with    RECHECK     R 5th finger reduction and pinning       History of Present Illness:   Patient 44-year-old female presents back for evaluation recheck status post open reduction and pinning of a comminuted intra-articular fracture right fifth finger.  She underwent reduction and pinning as comminuted fragments not adequately amenable for screw fixation.  She presents back for evaluation suture removal and pin site cleaning    Patient's past medical, surgical, social and family histories reviewed.     Past Medical History:   Diagnosis Date    ADHD (attention deficit hyperactivity disorder)     Arthritis 2022    Biliary dyskinesia 10/06/2017    Carpal tunnel syndrome 08/23/2006    Chronic female pelvic pain 09/24/2018    Cystic acne 07/23/2013    Depression     Depressive disorder     Hidradenitis 02/16/2007    Hidradenitis suppurativa 05/13/2019    Ischiorectal abscess and fistula     Kidney stones 2008    x2 passed on her own    Malunion of fracture 05/17/2007    Mass of breast 02/02/2012    Migraines     Need for prophylactic hormone replacement therapy (postmenopausal)     Nondependent tobacco use disorder 10/11/2012    Papanicolaou smear of cervix with low grade squamous intraepithelial lesion (LGSIL) 10/29/2008    Pilonidal cyst 09/12/2017    S/p partial hysterectomy with remaining cervical stump 09/27/2013    Surgical menopause 02/11/2022    Trigger thumb of both thumbs 06/17/2020    Added automatically from request for surgery [id number]       Past Surgical History:   Procedure Laterality Date    ABDOMEN SURGERY      BIOPSY      COLONOSCOPY      CYSTECTOMY PILONIDAL N/A 09/18/2017    Procedure: CYSTECTOMY PILONIDAL;  PILONIDAL CYSTECTOMY ;  Surgeon: Cordell Stephens MD;  Location: [facility]    ENDOSCOPY UPPER, COLONOSCOPY, COMBINED N/A 10/05/2018     Procedure: COMBINED ENDOSCOPY UPPER, COLONOSCOPY;  UPPER ENDOSCOPY with Biopsy  AND COLONOSCOPY;  Surgeon: Cordell Stephens MD;  Location: [facility]    EXCISE LESION BUTTOCK(S) Left 06/08/2018    Procedure: EXCISE LESION BUTTOCK(S);  EXCISION OF GLUTEAL LEFT SKIN LESION;  Surgeon: Cordell Stephens MD;  Location: [facility]    EXCISE MASS NECK Right 08/04/2015    Procedure: EXCISE MASS NECK;  Surgeon: Nasir Jones MD;  Location: [facility]    INCISION AND DRAINAGE PERINEAL, COMBINED N/A 03/18/2015    Procedure: COMBINED INCISION AND DRAINAGE PERINEAL;  Surgeon: Jay Torres DO;  Location: [facility]    IR CONSULTATION FOR IR EXAM  05/09/2023    IR FLUORO 0-1 HOUR  06/05/2023    IRRIGATION AND DEBRIDEMENT GROIN N/A 02/07/2019    Procedure: IRRIGATION AND DEBRIDEMENT LEFT GROIN ABSCESS;  Surgeon: Cordell Stephens MD;  Location: [facility]    LAPAROSCOPIC APPENDECTOMY  02/11/2022    LAPAROSCOPIC CHOLECYSTECTOMY N/A 11/20/2017    Procedure: LAPAROSCOPIC CHOLECYSTECTOMY;  LAPAROSCOPIC CHOLECYSTECTOMY;  Surgeon: Cordell Stephens MD;  [facility]    LAPAROSCOPIC HYSTERECTOMY SUPRACERVICAL, BILATERAL SALPINGO-OOPHORECTOMY, COMBINED  09/27/2013    Procedure: COMBINED LAPAROSCOPIC HYSTERECTOMY SUPRACERVICAL, SALPINGO-OOPHORECTOMY;  LAPAROSCOPIC SUPRACERVICAL HYSTERECTOMY AND RIGHT SALPINGO-OOPHORECTOMY, CYSTOSCOPY;  Surgeon: Denys Callahan MD;  Location: [facility]    LAPAROSCOPIC OOPHORECTOMY Left 02/11/2022    Procedure: Left OOPHORECTOMY, LAPAROSCOPIC, Laparoscopic Appendectomy Torsion and Pelvic Appensix;  Surgeon: Malick Malhotra MD;  [facility]: [facility]    LAPAROSCOPY DIAGNOSTIC (GYN) N/A 09/06/2018    Procedure: LAPAROSCOPY DIAGNOSTIC (GYN);  LAPAROSCOPY WITH PERITONEAL BIOPSIES AND REMOVAL LEFT FALLOPIAN TUBE;  Surgeon: Suraj Whitaker MD;  Location: [facility]    LASER HOLMIUM LITHOTRIPSY URETER(S), INSERT STENT, COMBINED Right 4/28/2025    Procedure: CYSTOURETEROSCOPY, with right ureterosopy and stone retrieval;   Surgeon: Joaquín Cabral MD;  Location: [facility]    marsupialization of pilonidal cyst  01/01/2007    OPEN REDUCTION INTERNAL FIXATION FINGER(S) Right 4/15/2025    Procedure: Open Reduction Internal Fixation  Right Small Finger Intra Articular Fracture of Middle Phalanx;  Surgeon: Fernando Acevedo MD;  Location: [facility]    RELEASE TRIGGER FINGER BILATERAL Bilateral 07/10/2020    Procedure: RELEASE BILATERAL TRIGGER THUMBS;  Surgeon: Vince Murillo DO;  Location: [facility]    TUBAL LIGATION  01/01/2005       Home Medications:  Prior to Admission medications    Medication Sig Start Date End Date Taking? Authorizing Provider   amphetamine-dextroamphetamine (ADDERALL XR) 30 MG 24 hr capsule Take 1 capsule (30 mg) by mouth daily. 4/18/25  Yes Payton Levi APRN CNP   amphetamine-dextroamphetamine (ADDERALL) 10 MG tablet Take 1 tablet (10 mg) by mouth daily. 4/18/25  Yes Payton Levi APRN CNP   amphetamine-dextroamphetamine (ADDERALL) 30 MG tablet Take 1 tablet (30 mg) by mouth daily. 4/18/25  Yes Payton Levi APRN CNP   buPROPion (WELLBUTRIN XL) 150 MG 24 hr tablet Take 1 tablet (150 mg) by mouth every morning. 4/18/25  Yes Payton Levi APRN CNP   buPROPion (WELLBUTRIN XL) 300 MG 24 hr tablet TAKE 1 TABLET BY MOUTH EVERY MORNING ALONG WITH 1 150MG TABLET - TOTAL DOSE 450MG 4/18/25  Yes Payton Levi APRN CNP   cephALEXin (KEFLEX) 500 MG capsule Take 1 capsule (500 mg) by mouth 2 times daily. 4/23/25  Yes Beth Sumner PA-C   escitalopram (LEXAPRO) 20 MG tablet Take 1 tablet (20 mg) by mouth daily. 4/18/25  Yes Payton Levi APRN CNP   estradiol (ESTRACE) 1 MG tablet Take 1 tablet (1 mg) by mouth daily. 4/18/25  Yes Payton Levi APRN CNP   HUMIRA *CF* PEN 40 MG/0.4ML pen kit INJECT 40 MG (0.4 ML) UNDER THE SKIN EVERY 7 DAYS 10/14/24  Yes Yovani Vega MD   multivitamin, therapeutic (THERA-VIT) TABS tablet Take 1 tablet by mouth daily.   Yes Reported, Patient    oxyCODONE (ROXICODONE) 5 MG tablet Take 1 tablet (5 mg) by mouth every 6 hours as needed for severe pain. 4/23/25  Yes Beth Sumner PA-C   SUMAtriptan (IMITREX) 25 MG tablet TAKE 1 TABLET BY MOUTH AT ONSET OF HEADACHE FOR MIGRAINE, MAY REPEAT IN 2 HOURS MAX OF 8 TABLETS PER 24 HOURS 4/18/25  Yes Payton Levi APRN CNP   tamsulosin (FLOMAX) 0.4 MG capsule Take 1 capsule (0.4 mg) by mouth daily for 14 days. 4/24/25 5/8/25 Yes Marian Rush MD   tiZANidine (ZANAFLEX) 4 MG tablet Take 1 tablet (4 mg) by mouth 3 times daily as needed for muscle spasms. 4/18/25  Yes Payton Levi APRN CNP   valACYclovir (VALTREX) 500 MG tablet Take 1 tablet (500 mg) by mouth daily. 4/18/25  Yes Payton Levi APRN CNP   HYDROcodone-acetaminophen (NORCO) 5-325 MG tablet Take 1-2 tablets by mouth every 4 hours as needed for moderate to severe pain. 4/22/25   Fernando Acevedo MD       No Known Allergies    Social History     Occupational History    Not on file   Tobacco Use    Smoking status: Every Day     Current packs/day: 0.50     Average packs/day: 0.5 packs/day for 24.3 years (12.2 ttl pk-yrs)     Types: Cigarettes     Start date: 1/1/2001     Passive exposure: Current    Smokeless tobacco: Never    Tobacco comments:     I have been working on quitting   Vaping Use    Vaping status: Never Used   Substance and Sexual Activity    Alcohol use: No    Drug use: Yes     Types: Marijuana     Comment: pt states yesterday evening    Sexual activity: Not Currently     Partners: Male     Birth control/protection: None       Family History   Problem Relation Age of Onset    Other Cancer Father         Lung cancer    Diabetes Paternal Grandmother     Other Cancer Maternal Grandmother         Lung cancer       REVIEW OF SYSTEMS            Physical Exam:    Vitals: /70 (BP Location: Left arm, Patient Position: Sitting, Cuff Size: Adult Regular)   Pulse 97   Resp 18   Wt 49.7 kg (109 lb 9.1 oz)   LMP  08/10/2013   SpO2 97%   BMI 20.04 kg/m    BMI= Body mass index is 20.04 kg/m .  Examination wound is healing nicely ready for suture removal pins are intact with no signs of pin site infection continue good down longitude alignment.  Radiographs: Had radiographs AP lateral some overlap difficult to see fracture well with the plaster in that area but still appears no shift from previous films     Independent visualization of the films was made.         Impression:      ICD-10-CM    1. Closed displaced fracture of middle phalanx of right little finger with routine healing, subsequent encounter  S62.626D XR Hand Right G/E 3 Views (Clinic Performed)          Plan: Status post open duction and pinning for comminuted intra-articular P2 fracture small finger.  Pin sites were cleaned and sutures removed and Steri-Strips and then placed back into a ulnar gutter splint.  Will have her follow-up in about 10 days and to splint off x-rays and check pin sites.    All of the above pertinent physical exam and imaging modalities findings was reviewed with Purnima.    Return to clinic in 10 days     Further imaging required splint off x-rays next visit    Time spent with evaluation: Postop minutes    Fernando Acevedo MD  5/4/2025  7:55 AM

## 2025-05-07 ENCOUNTER — OFFICE VISIT (OUTPATIENT)
Dept: ORTHOPEDICS | Facility: OTHER | Age: 45
End: 2025-05-07
Attending: ORTHOPAEDIC SURGERY
Payer: COMMERCIAL

## 2025-05-07 ENCOUNTER — ANCILLARY PROCEDURE (OUTPATIENT)
Dept: GENERAL RADIOLOGY | Facility: OTHER | Age: 45
End: 2025-05-07
Attending: ORTHOPAEDIC SURGERY
Payer: COMMERCIAL

## 2025-05-07 ENCOUNTER — PREP FOR PROCEDURE (OUTPATIENT)
Dept: ORTHOPEDICS | Facility: OTHER | Age: 45
End: 2025-05-07

## 2025-05-07 VITALS
DIASTOLIC BLOOD PRESSURE: 60 MMHG | HEART RATE: 83 BPM | SYSTOLIC BLOOD PRESSURE: 100 MMHG | OXYGEN SATURATION: 98 % | TEMPERATURE: 97.9 F

## 2025-05-07 DIAGNOSIS — T84.210A: Primary | ICD-10-CM

## 2025-05-07 DIAGNOSIS — S62.626D CLOSED DISPLACED FRACTURE OF MIDDLE PHALANX OF RIGHT LITTLE FINGER WITH ROUTINE HEALING, SUBSEQUENT ENCOUNTER: ICD-10-CM

## 2025-05-07 DIAGNOSIS — S62.626D CLOSED DISPLACED FRACTURE OF MIDDLE PHALANX OF RIGHT LITTLE FINGER WITH ROUTINE HEALING, SUBSEQUENT ENCOUNTER: Primary | ICD-10-CM

## 2025-05-07 PROCEDURE — 73130 X-RAY EXAM OF HAND: CPT | Mod: TC,RT

## 2025-05-07 PROCEDURE — 73130 X-RAY EXAM OF HAND: CPT | Mod: 26 | Performed by: RADIOLOGY

## 2025-05-07 PROCEDURE — G0463 HOSPITAL OUTPT CLINIC VISIT: HCPCS

## 2025-05-07 ASSESSMENT — PAIN SCALES - GENERAL: PAINLEVEL_OUTOF10: MODERATE PAIN (6)

## 2025-05-07 NOTE — PATIENT INSTRUCTIONS
Thank you for allowing Dr. Fernando Acevedo and his team to participate in your care.    You have been scheduled for Right 5th Finger Hardware Removal and ORIF with Dr. Fernando Acevedo on 05/13/2025  at [facility].    You will be notified the weekday before the procedure for your check in time. If you do not hear from them by 5pm the day before you can call admitting directly at [phone].    You will need to have and adult  to bring you home.    The Surgery Education nurse will call you one week prior to procedure. If you do not hear from them or miss their call you can call them back at [phone]. Please have your medication list available for this phone call.    Please see your surgery handbook for additional instruction.    You will need a pre-operative appointment with your primary care provider within 30 days prior to your procedure.    Please call our health unit coordinator at [phone] with scheduling questions or the nurse by [employer] message or [phone] with any other questions or concerns.

## 2025-05-08 ENCOUNTER — ANESTHESIA EVENT (OUTPATIENT)
Dept: SURGERY | Facility: HOSPITAL | Age: 45
End: 2025-05-08
Payer: COMMERCIAL

## 2025-05-08 RX ORDER — ONDANSETRON 2 MG/ML
4 INJECTION INTRAMUSCULAR; INTRAVENOUS EVERY 30 MIN PRN
Status: CANCELLED | OUTPATIENT
Start: 2025-05-08

## 2025-05-08 RX ORDER — ONDANSETRON 4 MG/1
4 TABLET, ORALLY DISINTEGRATING ORAL EVERY 30 MIN PRN
Status: CANCELLED | OUTPATIENT
Start: 2025-05-08

## 2025-05-08 RX ORDER — NALOXONE HYDROCHLORIDE 0.4 MG/ML
0.1 INJECTION, SOLUTION INTRAMUSCULAR; INTRAVENOUS; SUBCUTANEOUS
Status: CANCELLED | OUTPATIENT
Start: 2025-05-08

## 2025-05-08 RX ORDER — DEXAMETHASONE SODIUM PHOSPHATE 10 MG/ML
4 INJECTION, SOLUTION INTRAMUSCULAR; INTRAVENOUS
Status: CANCELLED | OUTPATIENT
Start: 2025-05-08

## 2025-05-08 RX ORDER — SODIUM CHLORIDE, SODIUM LACTATE, POTASSIUM CHLORIDE, CALCIUM CHLORIDE 600; 310; 30; 20 MG/100ML; MG/100ML; MG/100ML; MG/100ML
INJECTION, SOLUTION INTRAVENOUS CONTINUOUS
Status: CANCELLED | OUTPATIENT
Start: 2025-05-08

## 2025-05-08 RX ORDER — LIDOCAINE 40 MG/G
CREAM TOPICAL
Status: CANCELLED | OUTPATIENT
Start: 2025-05-08

## 2025-05-08 ASSESSMENT — LIFESTYLE VARIABLES: TOBACCO_USE: 1

## 2025-05-08 NOTE — ANESTHESIA PREPROCEDURE EVALUATION
Anesthesia Pre-Procedure Evaluation    Patient: Purnima Fallon   MRN: [id number] : 1980          Procedure : Procedure(s):  Open Reduction and Fixation Right Fifth Finger  Right fifth finger hardware removal         Past Medical History:   Diagnosis Date    ADHD (attention deficit hyperactivity disorder)     Arthritis     Biliary dyskinesia 10/06/2017    Carpal tunnel syndrome 2006    Chronic female pelvic pain 2018    Cystic acne 2013    Depression     Depressive disorder     Hidradenitis 2007    Hidradenitis suppurativa 2019    Ischiorectal abscess and fistula     Kidney stones 2008    x2 passed on her own    Malunion of fracture 2007    Mass of breast 2012    Migraines     Need for prophylactic hormone replacement therapy (postmenopausal)     Nondependent tobacco use disorder 10/11/2012    Papanicolaou smear of cervix with low grade squamous intraepithelial lesion (LGSIL) 10/29/2008    Pilonidal cyst 2017    S/p partial hysterectomy with remaining cervical stump 2013    Surgical menopause 2022    Trigger thumb of both thumbs 2020    Added automatically from request for surgery [id number]      Past Surgical History:   Procedure Laterality Date    ABDOMEN SURGERY      BIOPSY      COLONOSCOPY      CYSTECTOMY PILONIDAL N/A 2017    Procedure: CYSTECTOMY PILONIDAL;  PILONIDAL CYSTECTOMY ;  Surgeon: Cordell Stephens MD;  [facility]    ENDOSCOPY UPPER, COLONOSCOPY, COMBINED N/A 10/05/2018    Procedure: COMBINED ENDOSCOPY UPPER, COLONOSCOPY;  UPPER ENDOSCOPY with Biopsy  AND COLONOSCOPY;  Surgeon: Cordell Stephens MD;  [facility]    EXCISE LESION BUTTOCK(S) Left 2018    Procedure: EXCISE LESION BUTTOCK(S);  EXCISION OF GLUTEAL LEFT SKIN LESION;  Surgeon: Cordell Stephens MD;  [facility]    EXCISE MASS NECK Right 2015    Procedure: EXCISE MASS NECK;  Surgeon: Nasir Jones MD;  [facility]     INCISION AND DRAINAGE PERINEAL, COMBINED N/A 03/18/2015    Procedure: COMBINED INCISION AND DRAINAGE PERINEAL;  Surgeon: Jay Torres DO;  [facility]    IR CONSULTATION FOR IR EXAM  05/09/2023    IR FLUORO 0-1 HOUR  06/05/2023    IRRIGATION AND DEBRIDEMENT GROIN N/A 02/07/2019    Procedure: IRRIGATION AND DEBRIDEMENT LEFT GROIN ABSCESS;  Surgeon: Cordell Stephens MD;  [facility]    LAPAROSCOPIC APPENDECTOMY  02/11/2022    LAPAROSCOPIC CHOLECYSTECTOMY N/A 11/20/2017    Procedure: LAPAROSCOPIC CHOLECYSTECTOMY;  LAPAROSCOPIC CHOLECYSTECTOMY;  Surgeon: Cordell Stephens MD;  [facility]    LAPAROSCOPIC HYSTERECTOMY SUPRACERVICAL, BILATERAL SALPINGO-OOPHORECTOMY, COMBINED  09/27/2013    Procedure: COMBINED LAPAROSCOPIC HYSTERECTOMY SUPRACERVICAL, SALPINGO-OOPHORECTOMY;  LAPAROSCOPIC SUPRACERVICAL HYSTERECTOMY AND RIGHT SALPINGO-OOPHORECTOMY, CYSTOSCOPY;  Surgeon: Denys Callahan MD;  [facility]    LAPAROSCOPIC OOPHORECTOMY Left 02/11/2022    Procedure: Left OOPHORECTOMY, LAPAROSCOPIC, Laparoscopic Appendectomy Torsion and Pelvic Appensix;  Surgeon: Malick Malhotra MD;  [facility]    LAPAROSCOPY DIAGNOSTIC (GYN) N/A 09/06/2018    Procedure: LAPAROSCOPY DIAGNOSTIC (GYN);  LAPAROSCOPY WITH PERITONEAL BIOPSIES AND REMOVAL LEFT FALLOPIAN TUBE;  Surgeon: Suraj Whitaker MD;  [facility]    LASER HOLMIUM LITHOTRIPSY URETER(S), INSERT STENT, COMBINED Right 4/28/2025    Procedure: CYSTOURETEROSCOPY, with right ureterosopy and stone retrieval;  Surgeon: Joaquín Cabral MD;  [facility]    marsupialization of pilonidal cyst  01/01/2007    OPEN REDUCTION INTERNAL FIXATION FINGER(S) Right 4/15/2025    Procedure: Open Reduction Internal Fixation  Right Small Finger Intra Articular Fracture of Middle Phalanx;  Surgeon: Fernadno Acevedo MD;  [facility]    RELEASE TRIGGER FINGER BILATERAL Bilateral 07/10/2020    Procedure: RELEASE BILATERAL TRIGGER THUMBS;  Surgeon: Vince Murillo  DO Molina;  [facility]    TUBAL LIGATION  01/01/2005      No Known Allergies   Social History     Tobacco Use    Smoking status: Every Day     Current packs/day: 0.50     Average packs/day: 0.5 packs/day for 24.3 years (12.2 ttl pk-yrs)     Types: Cigarettes     Start date: 1/1/2001     Passive exposure: Current    Smokeless tobacco: Never    Tobacco comments:     I have been working on quitting   Substance Use Topics    Alcohol use: No      Wt Readings from Last 1 Encounters:   04/30/25 49.7 kg (109 lb 9.1 oz)        Anesthesia Evaluation   Pt has had prior anesthetic. Type: General.    No history of anesthetic complications       ROS/MED HX  ENT/Pulmonary:     (+)                tobacco use, Current use, 0.5 packs/day,  patient smoked within 24 hours,                    Neurologic:  - neg neurologic ROS     Cardiovascular:  - neg cardiovascular ROS     METS/Exercise Tolerance: >4 METS    Hematologic:  - neg hematologic  ROS     Musculoskeletal:   (+)     fracture, Fracture location: RUE,         GI/Hepatic:  - neg GI/hepatic ROS     Renal/Genitourinary: Comment: Right kidney stone.    (+) renal disease,      Nephrolithiasis ,       Endo:  - neg endo ROS     Psychiatric/Substance Use:     (+)     Recreational drug usage: Cannabis (none in 24 hours).    Infectious Disease:  - neg infectious disease ROS     Malignancy:  - neg malignancy ROS     Other:  - neg other ROS            Physical Exam  Airway  Mallampati: II  TM distance: >3 FB  Neck ROM: full  Mouth opening: >= 4 cm    Cardiovascular - normal exam  Rhythm: regular  Rate: normal rate     Dental   (+) Minor Abnormalities - some fillings, tiny chips      Pulmonary Breath sounds clear to auscultation        Neurological   Other Findings       OUTSIDE LABS:  CBC:   Lab Results   Component Value Date    WBC 8.9 04/23/2025    WBC 8.9 04/09/2025    HGB 12.2 04/23/2025    HGB 13.0 04/09/2025    HCT 36.0 04/23/2025    HCT 37.4 04/09/2025     04/23/2025     " 04/09/2025     BMP:   Lab Results   Component Value Date     04/23/2025     04/09/2025    POTASSIUM 3.6 04/23/2025    POTASSIUM 3.5 04/09/2025    CHLORIDE 100 04/23/2025    CHLORIDE 105 04/09/2025    CO2 28 04/23/2025    CO2 26 04/09/2025    BUN 16.6 04/23/2025    BUN 19.3 04/09/2025    CR 0.86 04/23/2025    CR 0.94 04/09/2025    GLC 93 04/23/2025    GLC 66 (L) 04/09/2025     COAGS: No results found for: \"PTT\", \"INR\", \"FIBR\"  POC:   Lab Results   Component Value Date    HCG Negative 09/30/2014    HCGS Negative 07/22/2013     HEPATIC:   Lab Results   Component Value Date    ALBUMIN 4.3 04/09/2025    PROTTOTAL 7.1 04/09/2025    ALT 21 04/09/2025    AST 24 04/09/2025    ALKPHOS 77 04/09/2025    BILITOTAL 0.2 04/09/2025     OTHER:   Lab Results   Component Value Date    LACT 0.9 11/29/2017    ANDRÉS 9.1 04/23/2025    LIPASE 96 02/10/2022    AMYLASE 349 (H) 06/27/2017    TSH 1.30 12/23/2024    CRP <2.9 02/10/2022       Anesthesia Plan    ASA Status:  2      NPO Status: NPO Appropriate   Anesthesia Type: General.   Induction: intravenous.   Techniques and Equipment:     - Airway:   Planned airway equipment includes supraglottic airway.     Consents    Anesthesia Plan(s) and associated risks, benefits, and realistic alternatives discussed. Questions answered and patient/representative(s) expressed understanding.     - Discussed:     - Discussed with:  Patient       Blood Consent:      - Discussed with: not discussed.     Postoperative Care            Comments:    Other Comments: Discussed risks and benefits with patient for general anesthesia including sore throat, nausea, vomiting, aspiration, dental damage, loss of airway, CV complications, stroke, MI, death. Pt wishes to proceed.                Polly Borja, APRN CRNA    I have reviewed the pertinent notes and labs in the chart from the past 30 days and (re)examined the patient.  Any updates or changes from those notes are reflected in this " note.    Clinically Significant Risk Factors Present on Admission

## 2025-05-12 NOTE — PROGRESS NOTES
ORTHOPEDIC CLINIC CONSULT    Referred by:     Chief Complaint: Purnima Fallon is a 44 year old female who is being seen for   Chief Complaint   Patient presents with    Fracture     Right fifth finger       History of Present Illness:   44-year-old female presents back for evaluation status post reduction and pinning of the fracture.  She now presents for repeat radiographs and evaluation with the splint off x-rays    Patient's past medical, surgical, social and family histories reviewed.     Past Medical History:   Diagnosis Date    ADHD (attention deficit hyperactivity disorder)     Arthritis 2022    Biliary dyskinesia 10/06/2017    Carpal tunnel syndrome 08/23/2006    Chronic female pelvic pain 09/24/2018    Cystic acne 07/23/2013    Depression     Depressive disorder     Hidradenitis 02/16/2007    Hidradenitis suppurativa 05/13/2019    Ischiorectal abscess and fistula     Kidney stones 2008    x2 passed on her own    Malunion of fracture 05/17/2007    Mass of breast 02/02/2012    Migraines     Need for prophylactic hormone replacement therapy (postmenopausal)     Nondependent tobacco use disorder 10/11/2012    Papanicolaou smear of cervix with low grade squamous intraepithelial lesion (LGSIL) 10/29/2008    Pilonidal cyst 09/12/2017    S/p partial hysterectomy with remaining cervical stump 09/27/2013    Surgical menopause 02/11/2022    Trigger thumb of both thumbs 06/17/2020    Added automatically from request for surgery [id number]       Past Surgical History:   Procedure Laterality Date    ABDOMEN SURGERY      BIOPSY      COLONOSCOPY      CYSTECTOMY PILONIDAL N/A 09/18/2017    Procedure: CYSTECTOMY PILONIDAL;  PILONIDAL CYSTECTOMY ;  Surgeon: Cordell Stephens MD;  [facility]    ENDOSCOPY UPPER, COLONOSCOPY, COMBINED N/A 10/05/2018    Procedure: COMBINED ENDOSCOPY UPPER, COLONOSCOPY;  UPPER ENDOSCOPY with Biopsy  AND COLONOSCOPY;  Surgeon: Cordell Stephens MD;  [facility]    EXCISE LESION  BUTTOCK(S) Left 06/08/2018    Procedure: EXCISE LESION BUTTOCK(S);  EXCISION OF GLUTEAL LEFT SKIN LESION;  Surgeon: Cordell Stephens MD;  [facility]    EXCISE MASS NECK Right 08/04/2015    Procedure: EXCISE MASS NECK;  Surgeon: Nasir Jones MD;  [facility]    INCISION AND DRAINAGE PERINEAL, COMBINED N/A 03/18/2015    Procedure: COMBINED INCISION AND DRAINAGE PERINEAL;  Surgeon: Jay Torres DO;  [facility]    IR CONSULTATION FOR IR EXAM  05/09/2023    IR FLUORO 0-1 HOUR  06/05/2023    IRRIGATION AND DEBRIDEMENT GROIN N/A 02/07/2019    Procedure: IRRIGATION AND DEBRIDEMENT LEFT GROIN ABSCESS;  Surgeon: Cordell Stephens MD;  [facility]    LAPAROSCOPIC APPENDECTOMY  02/11/2022    LAPAROSCOPIC CHOLECYSTECTOMY N/A 11/20/2017    Procedure: LAPAROSCOPIC CHOLECYSTECTOMY;  LAPAROSCOPIC CHOLECYSTECTOMY;  Surgeon: Cordell Stephens MD;  [facility]    LAPAROSCOPIC HYSTERECTOMY SUPRACERVICAL, BILATERAL SALPINGO-OOPHORECTOMY, COMBINED  09/27/2013    Procedure: COMBINED LAPAROSCOPIC HYSTERECTOMY SUPRACERVICAL, SALPINGO-OOPHORECTOMY;  LAPAROSCOPIC SUPRACERVICAL HYSTERECTOMY AND RIGHT SALPINGO-OOPHORECTOMY, CYSTOSCOPY;  Surgeon: Denys Callahan MD;  [facility]    LAPAROSCOPIC OOPHORECTOMY Left 02/11/2022    Procedure: Left OOPHORECTOMY, LAPAROSCOPIC, Laparoscopic Appendectomy Torsion and Pelvic Appensix;  Surgeon: Malick Malhotra MD;  [facility]    LAPAROSCOPY DIAGNOSTIC (GYN) N/A 09/06/2018    Procedure: LAPAROSCOPY DIAGNOSTIC (GYN);  LAPAROSCOPY WITH PERITONEAL BIOPSIES AND REMOVAL LEFT FALLOPIAN TUBE;  Surgeon: Suraj Whitaker MD;  [facility]    LASER HOLMIUM LITHOTRIPSY URETER(S), INSERT STENT, COMBINED Right 4/28/2025    Procedure: CYSTOURETEROSCOPY, with right ureterosopy and stone retrieval;  Surgeon: Joaquín Cabral MD;  [facility]    marsupialization of pilonidal cyst  01/01/2007    OPEN REDUCTION INTERNAL FIXATION FINGER(S) Right 4/15/2025     Procedure: Open Reduction Internal Fixation  Right Small Finger Intra Articular Fracture of Middle Phalanx;  Surgeon: Fernando Acevedo MD;  [facility]    RELEASE TRIGGER FINGER BILATERAL Bilateral 07/10/2020    Procedure: RELEASE BILATERAL TRIGGER THUMBS;  Surgeon: Vince Murillo DO;  [facility]    TUBAL LIGATION  01/01/2005       Home Medications:  Prior to Admission medications    Medication Sig Start Date End Date Taking? Authorizing Provider   amphetamine-dextroamphetamine (ADDERALL XR) 30 MG 24 hr capsule Take 1 capsule (30 mg) by mouth daily. 4/18/25  Yes Payton Levi APRN CNP   amphetamine-dextroamphetamine (ADDERALL) 10 MG tablet Take 1 tablet (10 mg) by mouth daily. 4/18/25  Yes Payton Levi APRN CNP   amphetamine-dextroamphetamine (ADDERALL) 30 MG tablet Take 1 tablet (30 mg) by mouth daily. 4/18/25  Yes Payton Levi APRN CNP   buPROPion (WELLBUTRIN XL) 150 MG 24 hr tablet Take 1 tablet (150 mg) by mouth every morning. 4/18/25  Yes Payton Levi APRN CNP   buPROPion (WELLBUTRIN XL) 300 MG 24 hr tablet TAKE 1 TABLET BY MOUTH EVERY MORNING ALONG WITH 1 150MG TABLET - TOTAL DOSE 450MG 4/18/25  Yes Payton Levi APRN CNP   escitalopram (LEXAPRO) 20 MG tablet Take 1 tablet (20 mg) by mouth daily. 4/18/25  Yes Payton Levi APRN CNP   estradiol (ESTRACE) 1 MG tablet Take 1 tablet (1 mg) by mouth daily. 4/18/25  Yes Payton Levi APRN CNP   HUMIRA *CF* PEN 40 MG/0.4ML pen kit INJECT 40 MG (0.4 ML) UNDER THE SKIN EVERY 7 DAYS 10/14/24  Yes Yovani Vega MD   multivitamin, therapeutic (THERA-VIT) TABS tablet Take 1 tablet by mouth daily.   Yes Reported, Patient   SUMAtriptan (IMITREX) 25 MG tablet TAKE 1 TABLET BY MOUTH AT ONSET OF HEADACHE FOR MIGRAINE, MAY REPEAT IN 2 HOURS MAX OF 8 TABLETS PER 24 HOURS 4/18/25  Yes Payton Levi APRN CNP   tiZANidine (ZANAFLEX) 4 MG tablet Take 1 tablet (4 mg) by mouth 3 times daily as needed for muscle  spasms. 4/18/25  Yes Payton Levi APRN CNP   valACYclovir (VALTREX) 500 MG tablet Take 1 tablet (500 mg) by mouth daily. 4/18/25  Yes Payton Levi APRN CNP       No Known Allergies    Social History     Occupational History    Not on file   Tobacco Use    Smoking status: Every Day     Current packs/day: 0.50     Average packs/day: 0.5 packs/day for 24.4 years (12.2 ttl pk-yrs)     Types: Cigarettes     Start date: 1/1/2001     Passive exposure: Current    Smokeless tobacco: Never    Tobacco comments:     I have been working on quitting   Vaping Use    Vaping status: Never Used   Substance and Sexual Activity    Alcohol use: No    Drug use: Yes     Types: Marijuana     Comment: pt states yesterday evening    Sexual activity: Not Currently     Partners: Male     Birth control/protection: None       Family History   Problem Relation Age of Onset    Other Cancer Father         Lung cancer    Diabetes Paternal Grandmother     Other Cancer Maternal Grandmother         Lung cancer       REVIEW OF SYSTEMS            Physical Exam:    Vitals: /60 (BP Location: Left arm, Patient Position: Sitting, Cuff Size: Adult Regular)   Pulse 83   Temp 97.9  F (36.6  C) (Tympanic)   LMP 08/10/2013   SpO2 98%   BMI= There is no height or weight on file to calculate BMI.  Examination pins on the transverse 1 started and poked through the skin of the small finger the longitudinal pin is still otherwise intact.  There is cap refill no clinical alignment change other than patient feels parents have more prominence of the pin at the fingertip.  Radiographs: Radiographs AP lateral bleak of the small finger right hand fifth finger.  Notes progressive distal displacement over the pins with the proximal migration of the larger fragment and displacement of the smaller fragment both the separation from the initial well reduced fracture on the initial reduction and pinning.     Independent visualization of the films was  made.         Impression:      ICD-10-CM    1. Closed displaced fracture of middle phalanx of right little finger with routine healing, subsequent encounter  S62.626D XR Hand Right G/E 3 Views (Clinic Performed)     XR Hand Right G/E 3 Views (Clinic Performed)          Plan: Comminuted intra-articular P2 fracture of the small finger of the right right hand intra-articular.  Distally an open reduction trying to get better reduction but had comminuted intra-articular fracture and had an note that pinning Norma about the length.  Had an overall nice reduction in the initial pinning but now subsequently has this displaced over the pins and shortened and displaced 2 fragments.  At this point recommended proceeding back getting her back out to length and improve the overall length and reduction and also place an external fixator to maintain position and then also try and improve orientation of the fracture fragments either by pinning more likely.  After this discussion she would like to proceed with the this plan.  Will get an placed on the surgical schedule for again open reduction external fixation of the displacing and failed hardware.  Will remove that hardware which is percutaneous pins.    All of the above pertinent physical exam and imaging modalities findings was reviewed with Purnima.    Return to clinic in postop weeks.    Further imaging required none    Time spent with evaluation:   minutes    Fernando Acevedo MD  5/11/2025  8:48 PM

## 2025-05-13 ENCOUNTER — HOSPITAL ENCOUNTER (OUTPATIENT)
Facility: HOSPITAL | Age: 45
Discharge: HOME | End: 2025-05-13
Attending: ORTHOPAEDIC SURGERY | Admitting: ORTHOPAEDIC SURGERY
Payer: COMMERCIAL

## 2025-05-13 ENCOUNTER — ANESTHESIA (OUTPATIENT)
Dept: SURGERY | Facility: HOSPITAL | Age: 45
End: 2025-05-13
Payer: COMMERCIAL

## 2025-05-13 ENCOUNTER — APPOINTMENT (OUTPATIENT)
Dept: GENERAL RADIOLOGY | Facility: HOSPITAL | Age: 45
End: 2025-05-13
Attending: ORTHOPAEDIC SURGERY
Payer: COMMERCIAL

## 2025-05-13 VITALS
HEIGHT: 62 IN | TEMPERATURE: 98.6 F | SYSTOLIC BLOOD PRESSURE: 120 MMHG | HEART RATE: 61 BPM | BODY MASS INDEX: 20.24 KG/M2 | OXYGEN SATURATION: 98 % | RESPIRATION RATE: 16 BRPM | WEIGHT: 110 LBS | DIASTOLIC BLOOD PRESSURE: 75 MMHG

## 2025-05-13 DIAGNOSIS — S62.626A DISPLACED FRACTURE OF MIDDLE PHALANX OF RIGHT LITTLE FINGER, INITIAL ENCOUNTER FOR CLOSED FRACTURE: Primary | ICD-10-CM

## 2025-05-13 PROCEDURE — 258N000003 HC RX IP 258 OP 636: Performed by: NURSE ANESTHETIST, CERTIFIED REGISTERED

## 2025-05-13 PROCEDURE — 360N000083 HC SURGERY LEVEL 3 W/ FLUORO, PER MIN: Performed by: ORTHOPAEDIC SURGERY

## 2025-05-13 PROCEDURE — 370N000017 HC ANESTHESIA TECHNICAL FEE, PER MIN: Performed by: ORTHOPAEDIC SURGERY

## 2025-05-13 PROCEDURE — 250N000011 HC RX IP 250 OP 636: Mod: JZ | Performed by: ORTHOPAEDIC SURGERY

## 2025-05-13 PROCEDURE — 250N000025 HC SEVOFLURANE, PER MIN: Performed by: ORTHOPAEDIC SURGERY

## 2025-05-13 PROCEDURE — 26735 TREAT FINGER FRACTURE EACH: CPT | Mod: F9 | Performed by: ORTHOPAEDIC SURGERY

## 2025-05-13 PROCEDURE — 250N000011 HC RX IP 250 OP 636: Performed by: NURSE ANESTHETIST, CERTIFIED REGISTERED

## 2025-05-13 PROCEDURE — C1713 ANCHOR/SCREW BN/BN,TIS/BN: HCPCS | Performed by: ORTHOPAEDIC SURGERY

## 2025-05-13 PROCEDURE — 710N000010 HC RECOVERY PHASE 1, LEVEL 2, PER MIN: Performed by: ORTHOPAEDIC SURGERY

## 2025-05-13 PROCEDURE — 250N000009 HC RX 250: Performed by: NURSE ANESTHETIST, CERTIFIED REGISTERED

## 2025-05-13 PROCEDURE — 20680 REMOVAL OF IMPLANT DEEP: CPT | Mod: F9 | Performed by: ORTHOPAEDIC SURGERY

## 2025-05-13 PROCEDURE — 999N000179 XR SURGERY CARM FLUORO LESS THAN 5 MIN W STILLS

## 2025-05-13 PROCEDURE — 999N000141 HC STATISTIC PRE-PROCEDURE NURSING ASSESSMENT: Performed by: ORTHOPAEDIC SURGERY

## 2025-05-13 PROCEDURE — 250N000011 HC RX IP 250 OP 636: Mod: JZ | Performed by: NURSE ANESTHETIST, CERTIFIED REGISTERED

## 2025-05-13 PROCEDURE — 710N000012 HC RECOVERY PHASE 2, PER MINUTE: Performed by: ORTHOPAEDIC SURGERY

## 2025-05-13 PROCEDURE — 272N000001 HC OR GENERAL SUPPLY STERILE: Performed by: ORTHOPAEDIC SURGERY

## 2025-05-13 DEVICE — IMPLANTABLE DEVICE: Type: IMPLANTABLE DEVICE | Site: FINGER | Status: FUNCTIONAL

## 2025-05-13 RX ORDER — SODIUM CHLORIDE, SODIUM LACTATE, POTASSIUM CHLORIDE, CALCIUM CHLORIDE 600; 310; 30; 20 MG/100ML; MG/100ML; MG/100ML; MG/100ML
INJECTION, SOLUTION INTRAVENOUS CONTINUOUS
Status: DISCONTINUED | OUTPATIENT
Start: 2025-05-13 | End: 2025-05-13 | Stop reason: HOSPADM

## 2025-05-13 RX ORDER — ONDANSETRON 4 MG/1
4 TABLET, ORALLY DISINTEGRATING ORAL EVERY 30 MIN PRN
Status: DISCONTINUED | OUTPATIENT
Start: 2025-05-13 | End: 2025-05-15 | Stop reason: HOSPADM

## 2025-05-13 RX ORDER — CEFAZOLIN SODIUM/WATER 2 G/20 ML
2 SYRINGE (ML) INTRAVENOUS
Status: COMPLETED | OUTPATIENT
Start: 2025-05-13 | End: 2025-05-13

## 2025-05-13 RX ORDER — HYDROMORPHONE HYDROCHLORIDE 1 MG/ML
0.2 INJECTION, SOLUTION INTRAMUSCULAR; INTRAVENOUS; SUBCUTANEOUS EVERY 5 MIN PRN
Status: DISCONTINUED | OUTPATIENT
Start: 2025-05-13 | End: 2025-05-13 | Stop reason: HOSPADM

## 2025-05-13 RX ORDER — ACETAMINOPHEN 325 MG/1
650 TABLET ORAL
Status: DISCONTINUED | OUTPATIENT
Start: 2025-05-13 | End: 2025-05-15 | Stop reason: HOSPADM

## 2025-05-13 RX ORDER — HYDROCODONE BITARTRATE AND ACETAMINOPHEN 5; 325 MG/1; MG/1
2 TABLET ORAL
Status: DISCONTINUED | OUTPATIENT
Start: 2025-05-13 | End: 2025-05-15 | Stop reason: HOSPADM

## 2025-05-13 RX ORDER — CEFAZOLIN SODIUM/WATER 2 G/20 ML
2 SYRINGE (ML) INTRAVENOUS SEE ADMIN INSTRUCTIONS
Status: DISCONTINUED | OUTPATIENT
Start: 2025-05-13 | End: 2025-05-13 | Stop reason: HOSPADM

## 2025-05-13 RX ORDER — DEXAMETHASONE SODIUM PHOSPHATE 4 MG/ML
4 INJECTION, SOLUTION INTRA-ARTICULAR; INTRALESIONAL; INTRAMUSCULAR; INTRAVENOUS; SOFT TISSUE
Status: DISCONTINUED | OUTPATIENT
Start: 2025-05-13 | End: 2025-05-15 | Stop reason: HOSPADM

## 2025-05-13 RX ORDER — NALOXONE HYDROCHLORIDE 0.4 MG/ML
0.2 INJECTION, SOLUTION INTRAMUSCULAR; INTRAVENOUS; SUBCUTANEOUS
Status: DISCONTINUED | OUTPATIENT
Start: 2025-05-13 | End: 2025-05-15 | Stop reason: HOSPADM

## 2025-05-13 RX ORDER — ONDANSETRON 2 MG/ML
4 INJECTION INTRAMUSCULAR; INTRAVENOUS EVERY 30 MIN PRN
Status: DISCONTINUED | OUTPATIENT
Start: 2025-05-13 | End: 2025-05-13 | Stop reason: HOSPADM

## 2025-05-13 RX ORDER — NALOXONE HYDROCHLORIDE 0.4 MG/ML
0.4 INJECTION, SOLUTION INTRAMUSCULAR; INTRAVENOUS; SUBCUTANEOUS
Status: DISCONTINUED | OUTPATIENT
Start: 2025-05-13 | End: 2025-05-15 | Stop reason: HOSPADM

## 2025-05-13 RX ORDER — NALOXONE HYDROCHLORIDE 0.4 MG/ML
0.1 INJECTION, SOLUTION INTRAMUSCULAR; INTRAVENOUS; SUBCUTANEOUS
Status: DISCONTINUED | OUTPATIENT
Start: 2025-05-13 | End: 2025-05-13 | Stop reason: HOSPADM

## 2025-05-13 RX ORDER — LABETALOL HYDROCHLORIDE 5 MG/ML
10 INJECTION, SOLUTION INTRAVENOUS
Status: DISCONTINUED | OUTPATIENT
Start: 2025-05-13 | End: 2025-05-13 | Stop reason: HOSPADM

## 2025-05-13 RX ORDER — LIDOCAINE HYDROCHLORIDE 10 MG/ML
INJECTION, SOLUTION EPIDURAL; INFILTRATION; INTRACAUDAL; PERINEURAL
Status: DISCONTINUED
Start: 2025-05-13 | End: 2025-05-13 | Stop reason: WASHOUT

## 2025-05-13 RX ORDER — DEXAMETHASONE SODIUM PHOSPHATE 4 MG/ML
4 INJECTION, SOLUTION INTRA-ARTICULAR; INTRALESIONAL; INTRAMUSCULAR; INTRAVENOUS; SOFT TISSUE
Status: DISCONTINUED | OUTPATIENT
Start: 2025-05-13 | End: 2025-05-13 | Stop reason: HOSPADM

## 2025-05-13 RX ORDER — SODIUM CHLORIDE, SODIUM LACTATE, POTASSIUM CHLORIDE, CALCIUM CHLORIDE 600; 310; 30; 20 MG/100ML; MG/100ML; MG/100ML; MG/100ML
INJECTION, SOLUTION INTRAVENOUS CONTINUOUS PRN
Status: DISCONTINUED | OUTPATIENT
Start: 2025-05-13 | End: 2025-05-13

## 2025-05-13 RX ORDER — ONDANSETRON 4 MG/1
4 TABLET, ORALLY DISINTEGRATING ORAL EVERY 30 MIN PRN
Status: DISCONTINUED | OUTPATIENT
Start: 2025-05-13 | End: 2025-05-13 | Stop reason: HOSPADM

## 2025-05-13 RX ORDER — KETAMINE HYDROCHLORIDE 10 MG/ML
INJECTION INTRAMUSCULAR; INTRAVENOUS PRN
Status: DISCONTINUED | OUTPATIENT
Start: 2025-05-13 | End: 2025-05-13

## 2025-05-13 RX ORDER — HYDROXYZINE HYDROCHLORIDE 50 MG/ML
25 INJECTION, SOLUTION INTRAMUSCULAR
Status: DISCONTINUED | OUTPATIENT
Start: 2025-05-13 | End: 2025-05-15 | Stop reason: HOSPADM

## 2025-05-13 RX ORDER — FENTANYL CITRATE 50 UG/ML
50 INJECTION, SOLUTION INTRAMUSCULAR; INTRAVENOUS
Status: COMPLETED | OUTPATIENT
Start: 2025-05-13 | End: 2025-05-13

## 2025-05-13 RX ORDER — FENTANYL CITRATE 50 UG/ML
INJECTION, SOLUTION INTRAMUSCULAR; INTRAVENOUS PRN
Status: DISCONTINUED | OUTPATIENT
Start: 2025-05-13 | End: 2025-05-13

## 2025-05-13 RX ORDER — LIDOCAINE HYDROCHLORIDE 20 MG/ML
INJECTION, SOLUTION INFILTRATION; PERINEURAL PRN
Status: DISCONTINUED | OUTPATIENT
Start: 2025-05-13 | End: 2025-05-13

## 2025-05-13 RX ORDER — BUPIVACAINE HYDROCHLORIDE 2.5 MG/ML
INJECTION, SOLUTION INFILTRATION; PERINEURAL PRN
Status: DISCONTINUED | OUTPATIENT
Start: 2025-05-13 | End: 2025-05-13 | Stop reason: HOSPADM

## 2025-05-13 RX ORDER — HYDROMORPHONE HYDROCHLORIDE 1 MG/ML
0.4 INJECTION, SOLUTION INTRAMUSCULAR; INTRAVENOUS; SUBCUTANEOUS EVERY 5 MIN PRN
Status: DISCONTINUED | OUTPATIENT
Start: 2025-05-13 | End: 2025-05-13 | Stop reason: HOSPADM

## 2025-05-13 RX ORDER — DEXAMETHASONE SODIUM PHOSPHATE 4 MG/ML
INJECTION, SOLUTION INTRA-ARTICULAR; INTRALESIONAL; INTRAMUSCULAR; INTRAVENOUS; SOFT TISSUE PRN
Status: DISCONTINUED | OUTPATIENT
Start: 2025-05-13 | End: 2025-05-13

## 2025-05-13 RX ORDER — ALBUTEROL SULFATE 0.83 MG/ML
2.5 SOLUTION RESPIRATORY (INHALATION) EVERY 4 HOURS PRN
Status: DISCONTINUED | OUTPATIENT
Start: 2025-05-13 | End: 2025-05-13 | Stop reason: HOSPADM

## 2025-05-13 RX ORDER — FENTANYL CITRATE 50 UG/ML
INJECTION, SOLUTION INTRAMUSCULAR; INTRAVENOUS
Status: COMPLETED
Start: 2025-05-13 | End: 2025-05-13

## 2025-05-13 RX ORDER — HYDRALAZINE HYDROCHLORIDE 20 MG/ML
2.5-5 INJECTION INTRAMUSCULAR; INTRAVENOUS EVERY 10 MIN PRN
Status: DISCONTINUED | OUTPATIENT
Start: 2025-05-13 | End: 2025-05-13 | Stop reason: HOSPADM

## 2025-05-13 RX ORDER — BUPIVACAINE HYDROCHLORIDE 2.5 MG/ML
INJECTION, SOLUTION EPIDURAL; INFILTRATION; INTRACAUDAL; PERINEURAL
Status: DISCONTINUED
Start: 2025-05-13 | End: 2025-05-13 | Stop reason: HOSPADM

## 2025-05-13 RX ORDER — NALOXONE HYDROCHLORIDE 0.4 MG/ML
0.1 INJECTION, SOLUTION INTRAMUSCULAR; INTRAVENOUS; SUBCUTANEOUS
Status: DISCONTINUED | OUTPATIENT
Start: 2025-05-13 | End: 2025-05-15 | Stop reason: HOSPADM

## 2025-05-13 RX ORDER — HYDROCODONE BITARTRATE AND ACETAMINOPHEN 5; 325 MG/1; MG/1
1-2 TABLET ORAL EVERY 4 HOURS PRN
Qty: 25 TABLET | Refills: 0 | Status: SHIPPED | OUTPATIENT
Start: 2025-05-13 | End: 2025-05-19

## 2025-05-13 RX ORDER — HYDROMORPHONE HCL IN WATER/PF 6 MG/30 ML
PATIENT CONTROLLED ANALGESIA SYRINGE INTRAVENOUS
Status: COMPLETED
Start: 2025-05-13 | End: 2025-05-13

## 2025-05-13 RX ORDER — ONDANSETRON 2 MG/ML
INJECTION INTRAMUSCULAR; INTRAVENOUS PRN
Status: DISCONTINUED | OUTPATIENT
Start: 2025-05-13 | End: 2025-05-13

## 2025-05-13 RX ORDER — PROPOFOL 10 MG/ML
INJECTION, EMULSION INTRAVENOUS PRN
Status: DISCONTINUED | OUTPATIENT
Start: 2025-05-13 | End: 2025-05-13

## 2025-05-13 RX ORDER — FENTANYL CITRATE 50 UG/ML
25 INJECTION, SOLUTION INTRAMUSCULAR; INTRAVENOUS EVERY 5 MIN PRN
Status: DISCONTINUED | OUTPATIENT
Start: 2025-05-13 | End: 2025-05-13 | Stop reason: HOSPADM

## 2025-05-13 RX ORDER — ONDANSETRON 2 MG/ML
4 INJECTION INTRAMUSCULAR; INTRAVENOUS EVERY 30 MIN PRN
Status: DISCONTINUED | OUTPATIENT
Start: 2025-05-13 | End: 2025-05-15 | Stop reason: HOSPADM

## 2025-05-13 RX ORDER — FENTANYL CITRATE 50 UG/ML
50 INJECTION, SOLUTION INTRAMUSCULAR; INTRAVENOUS EVERY 5 MIN PRN
Status: DISCONTINUED | OUTPATIENT
Start: 2025-05-13 | End: 2025-05-13 | Stop reason: HOSPADM

## 2025-05-13 RX ADMIN — SODIUM CHLORIDE, SODIUM LACTATE, POTASSIUM CHLORIDE, AND CALCIUM CHLORIDE: .6; .31; .03; .02 INJECTION, SOLUTION INTRAVENOUS at 14:10

## 2025-05-13 RX ADMIN — DEXAMETHASONE SODIUM PHOSPHATE 6 MG: 4 INJECTION, SOLUTION INTRA-ARTICULAR; INTRALESIONAL; INTRAMUSCULAR; INTRAVENOUS; SOFT TISSUE at 17:22

## 2025-05-13 RX ADMIN — FENTANYL CITRATE 50 MCG: 50 INJECTION, SOLUTION INTRAMUSCULAR; INTRAVENOUS at 18:46

## 2025-05-13 RX ADMIN — PROPOFOL 50 MG: 10 INJECTION, EMULSION INTRAVENOUS at 16:52

## 2025-05-13 RX ADMIN — Medication 5 MG: at 18:02

## 2025-05-13 RX ADMIN — PROPOFOL 200 MG: 10 INJECTION, EMULSION INTRAVENOUS at 16:51

## 2025-05-13 RX ADMIN — HYDROMORPHONE HYDROCHLORIDE 0.2 MG: 1 INJECTION, SOLUTION INTRAMUSCULAR; INTRAVENOUS; SUBCUTANEOUS at 18:59

## 2025-05-13 RX ADMIN — Medication 2 G: at 16:38

## 2025-05-13 RX ADMIN — FENTANYL CITRATE 50 MCG: 50 INJECTION INTRAMUSCULAR; INTRAVENOUS at 16:50

## 2025-05-13 RX ADMIN — MIDAZOLAM 2 MG: 1 INJECTION INTRAMUSCULAR; INTRAVENOUS at 16:01

## 2025-05-13 RX ADMIN — FENTANYL CITRATE 25 MCG: 50 INJECTION INTRAMUSCULAR; INTRAVENOUS at 17:17

## 2025-05-13 RX ADMIN — LIDOCAINE HYDROCHLORIDE 40 MG: 20 INJECTION, SOLUTION INFILTRATION; PERINEURAL at 16:51

## 2025-05-13 RX ADMIN — Medication 10 MG: at 17:23

## 2025-05-13 RX ADMIN — DEXMEDETOMIDINE HYDROCHLORIDE 6 MCG: 100 INJECTION, SOLUTION INTRAVENOUS at 16:46

## 2025-05-13 RX ADMIN — FENTANYL CITRATE 50 MCG: 50 INJECTION, SOLUTION INTRAMUSCULAR; INTRAVENOUS at 15:16

## 2025-05-13 RX ADMIN — FENTANYL CITRATE 25 MCG: 50 INJECTION INTRAMUSCULAR; INTRAVENOUS at 17:13

## 2025-05-13 RX ADMIN — ONDANSETRON 4 MG: 2 INJECTION INTRAMUSCULAR; INTRAVENOUS at 18:10

## 2025-05-13 RX ADMIN — HYDROMORPHONE HYDROCHLORIDE 0.2 MG: 0.2 INJECTION, SOLUTION INTRAMUSCULAR; INTRAVENOUS; SUBCUTANEOUS at 19:15

## 2025-05-13 RX ADMIN — FENTANYL CITRATE 50 MCG: 50 INJECTION, SOLUTION INTRAMUSCULAR; INTRAVENOUS at 14:41

## 2025-05-13 ASSESSMENT — ACTIVITIES OF DAILY LIVING (ADL)
ADLS_ACUITY_SCORE: 23

## 2025-05-13 NOTE — H&P
ORTHOPEDIC CLINIC CONSULT    Referred by:     Chief Complaint: Purnima Fallon is a 44 year old female who is being seen for No chief complaint on file.      History of Present Illness:   44-year-old female presents for open reduction and removal of failed hardware and placement of external fixator and improve alignment of comminuted intra-articular fracture of the P2 at the PIP joint.  She had underwent previous open reduction and pinned into position as it was comminuted but it collapsed around the pins and a shortened.  After detailed discussion she wished to proceed back and get improved alignment and stability.    Patient's past medical, surgical, social and family histories reviewed.     Past Medical History:   Diagnosis Date    ADHD (attention deficit hyperactivity disorder)     Arthritis 2022    Biliary dyskinesia 10/06/2017    Carpal tunnel syndrome 08/23/2006    Chronic female pelvic pain 09/24/2018    Cystic acne 07/23/2013    Depression     Depressive disorder     Hidradenitis 02/16/2007    Hidradenitis suppurativa 05/13/2019    Ischiorectal abscess and fistula     Kidney stones 2008    x2 passed on her own    Malunion of fracture 05/17/2007    Mass of breast 02/02/2012    Migraines     Need for prophylactic hormone replacement therapy (postmenopausal)     Nondependent tobacco use disorder 10/11/2012    Papanicolaou smear of cervix with low grade squamous intraepithelial lesion (LGSIL) 10/29/2008    Pilonidal cyst 09/12/2017    S/p partial hysterectomy with remaining cervical stump 09/27/2013    Surgical menopause 02/11/2022    Trigger thumb of both thumbs 06/17/2020    Added automatically from request for surgery [id number]       Past Surgical History:   Procedure Laterality Date    ABDOMEN SURGERY      BIOPSY      COLONOSCOPY      CYSTECTOMY PILONIDAL N/A 09/18/2017    Procedure: CYSTECTOMY PILONIDAL;  PILONIDAL CYSTECTOMY ;  Surgeon: Cordell Stephens MD;  Location: [facility]    ENDOSCOPY UPPER,  COLONOSCOPY, COMBINED N/A 10/05/2018    Procedure: COMBINED ENDOSCOPY UPPER, COLONOSCOPY;  UPPER ENDOSCOPY with Biopsy  AND COLONOSCOPY;  Surgeon: Cordell Stephens MD;  Location[facility]    EXCISE LESION BUTTOCK(S) Left 06/08/2018    Procedure: EXCISE LESION BUTTOCK(S);  EXCISION OF GLUTEAL LEFT SKIN LESION;  Surgeon: Cordell Stephens MD;  [facility]    EXCISE MASS NECK Right 08/04/2015    Procedure: EXCISE MASS NECK;  Surgeon: Nasir Jones MD;  [facility]    INCISION AND DRAINAGE PERINEAL, COMBINED N/A 03/18/2015    Procedure: COMBINED INCISION AND DRAINAGE PERINEAL;  Surgeon: Jay Torres DO;  [facility]    IR CONSULTATION FOR IR EXAM  05/09/2023    IR FLUORO 0-1 HOUR  06/05/2023    IRRIGATION AND DEBRIDEMENT GROIN N/A 02/07/2019    Procedure: IRRIGATION AND DEBRIDEMENT LEFT GROIN ABSCESS;  Surgeon: Cordell Stephens MD;  [facility]    LAPAROSCOPIC APPENDECTOMY  02/11/2022    LAPAROSCOPIC CHOLECYSTECTOMY N/A 11/20/2017    Procedure: LAPAROSCOPIC CHOLECYSTECTOMY;  LAPAROSCOPIC CHOLECYSTECTOMY;  Surgeon: Cordell Stephens MD;  [facility]    LAPAROSCOPIC HYSTERECTOMY SUPRACERVICAL, BILATERAL SALPINGO-OOPHORECTOMY, COMBINED  09/27/2013    Procedure: COMBINED LAPAROSCOPIC HYSTERECTOMY SUPRACERVICAL, SALPINGO-OOPHORECTOMY;  LAPAROSCOPIC SUPRACERVICAL HYSTERECTOMY AND RIGHT SALPINGO-OOPHORECTOMY, CYSTOSCOPY;  Surgeon: Denys Callahan MD;  [facility]    LAPAROSCOPIC OOPHORECTOMY Left 02/11/2022    Procedure: Left OOPHORECTOMY, LAPAROSCOPIC, Laparoscopic Appendectomy Torsion and Pelvic Appensix;  Surgeon: Malick Malhotra MD;  [facility]    LAPAROSCOPY DIAGNOSTIC (GYN) N/A 09/06/2018    Procedure: LAPAROSCOPY DIAGNOSTIC (GYN);  LAPAROSCOPY WITH PERITONEAL BIOPSIES AND REMOVAL LEFT FALLOPIAN TUBE;  Surgeon: Suraj Whitaker MD;  [facility]    LASER HOLMIUM LITHOTRIPSY URETER(S), INSERT STENT, COMBINED Right 4/28/2025    Procedure: CYSTOURETEROSCOPY, with  right ureterosopy and stone retrieval;  Surgeon: Joaquín Cabral MD;  [facility]: [facility]    marsupialization of pilonidal cyst  01/01/2007    OPEN REDUCTION INTERNAL FIXATION FINGER(S) Right 4/15/2025    Procedure: Open Reduction Internal Fixation  Right Small Finger Intra Articular Fracture of Middle Phalanx;  Surgeon: Fernando Acevedo MD;  [facility]    RELEASE TRIGGER FINGER BILATERAL Bilateral 07/10/2020    Procedure: RELEASE BILATERAL TRIGGER THUMBS;  Surgeon: Vince Murillo DO;  Location: [facility]    TUBAL LIGATION  01/01/2005       Home Medications:  Prior to Admission medications    Medication Sig Start Date End Date Taking? Authorizing Provider   amphetamine-dextroamphetamine (ADDERALL XR) 30 MG 24 hr capsule Take 1 capsule (30 mg) by mouth daily. 4/18/25  Yes Payton Levi APRN CNP   amphetamine-dextroamphetamine (ADDERALL) 10 MG tablet Take 1 tablet (10 mg) by mouth daily. 4/18/25  Yes Payton Levi APRN CNP   amphetamine-dextroamphetamine (ADDERALL) 30 MG tablet Take 1 tablet (30 mg) by mouth daily. 4/18/25  Yes Payton Levi APRN CNP   buPROPion (WELLBUTRIN XL) 150 MG 24 hr tablet Take 1 tablet (150 mg) by mouth every morning. 4/18/25  Yes Payton Levi APRN CNP   buPROPion (WELLBUTRIN XL) 300 MG 24 hr tablet TAKE 1 TABLET BY MOUTH EVERY MORNING ALONG WITH 1 150MG TABLET - TOTAL DOSE 450MG 4/18/25  Yes Payton Levi APRN CNP   escitalopram (LEXAPRO) 20 MG tablet Take 1 tablet (20 mg) by mouth daily. 4/18/25  Yes Payton Levi APRN CNP   estradiol (ESTRACE) 1 MG tablet Take 1 tablet (1 mg) by mouth daily. 4/18/25  Yes Payton Levi APRN CNP   HUMIRA *CF* PEN 40 MG/0.4ML pen kit INJECT 40 MG (0.4 ML) UNDER THE SKIN EVERY 7 DAYS 10/14/24  Yes Yovani Vega MD   multivitamin, therapeutic (THERA-VIT) TABS tablet Take 1 tablet by mouth daily.   Yes Reported, Patient   SUMAtriptan (IMITREX) 25 MG tablet TAKE 1 TABLET BY MOUTH AT ONSET OF HEADACHE FOR  "MIGRAINE, MAY REPEAT IN 2 HOURS MAX OF 8 TABLETS PER 24 HOURS 4/18/25  Yes Payton Levi APRN CNP   tiZANidine (ZANAFLEX) 4 MG tablet Take 1 tablet (4 mg) by mouth 3 times daily as needed for muscle spasms. 4/18/25  Yes Payton Levi APRN CNP   valACYclovir (VALTREX) 500 MG tablet Take 1 tablet (500 mg) by mouth daily. 4/18/25  Yes Payton Levi APRN CNP       No Known Allergies    Social History     Occupational History    Not on file   Tobacco Use    Smoking status: Every Day     Current packs/day: 0.50     Average packs/day: 0.5 packs/day for 24.4 years (12.2 ttl pk-yrs)     Types: Cigarettes     Start date: 1/1/2001     Passive exposure: Current    Smokeless tobacco: Never    Tobacco comments:     I have been working on quitting   Vaping Use    Vaping status: Never Used   Substance and Sexual Activity    Alcohol use: No    Drug use: Yes     Types: Marijuana     Comment: pt states yesterday evening    Sexual activity: Not Currently     Partners: Male     Birth control/protection: None       Family History   Problem Relation Age of Onset    Other Cancer Father         Lung cancer    Diabetes Paternal Grandmother     Other Cancer Maternal Grandmother         Lung cancer       REVIEW OF SYSTEMS            Physical Exam:    Vitals: /75 (Cuff Size: Adult Regular)   Pulse 64   Temp 98.9  F (37.2  C) (Tympanic)   Resp 18   Ht 1.575 m (5' 2\")   Wt 49.9 kg (110 lb)   LMP 08/10/2013   SpO2 99%   BMI 20.12 kg/m    BMI= Body mass index is 20.12 kg/m .  On examination she is awake alert and oriented cooperative in no apparent distress.    HEENT: Normocephalic atraumatic pupils equal round reactive light extract muscles intact.  Conjunctiva clear    Respiratory: Clear to auscultations no wheezes or rhonchi    Cardiovascular: Regular rate rhythm no murmur    Abdomen: Soft nontender nondistended.    Right upper extremity: Has a 1 pin of the fifth finger from the tip holding a fracture in place " with a otherwise healed incision good longitudinal alignment but noted fracture displacement on previous x-ray otherwise neuro vas intact into the thumb index long and ring finger with good motion and brisk cap refill.  Full range of motion of shoulder elbow and wrist.    Left upper extremity: Is neuro vas intact with full range of motion of the shoulder elbow wrist and hand wrist cap refill sensory intact light touch.    Radiographs: Radiographs previously that had showing failure of the hardware with the displacement of fracture.     Independent visualization of the films was made.         Impression: Right hand small finger failed hardware with the fracture displacement    Plan: Plan open reduction with removal of hardware and application of external fixator to improve longitudinal and angular deformity and reduction of fracture fragments.    All of the above pertinent physical exam and imaging modalities findings was reviewed with Purnima.    Return to clinic in 1 weeks.    Further imaging required none at this time    Time spent with evaluation:   minutes    Fernando Acevedo MD  5/13/2025  2:07 PM

## 2025-05-13 NOTE — OR NURSING
CRNA called for wake up orders.  Patient notes that her pain is 7/10.  Elevating hand and using ice at this time.

## 2025-05-13 NOTE — BRIEF OP NOTE
[facility]    Brief Operative Note    Pre-operative diagnosis: Mechanical breakdown of internal fixation device of bone of hand, initial encounter [T84.210A]  Post-operative diagnosis Same as pre-operative diagnosis    Procedure: Open Reduction and Fixation Right Fifth Finger, Right - Finger  Right fifth finger hardware removal, Right - Finger  And mini external fixator  Surgeon: Surgeons and Role:     * Fernando Acevedo MD - Primary     * Jose Alejandro Mixon PA-C - Assisting  Anesthesia: General   Estimated Blood Loss: Minimal    Drains: None  Specimens: * No specimens in log *  Findings:   Comminuted intra-articular PIP joint of the middle phalanx.  Complications: None.  Previous hour of failed hardware that mild shortening no complications of the surgery  Implants:   Implant Name Type Inv. Item Serial No.  Lot No. LRB No. Used Action   K-Wire with threaded tip, 1.6mm diameter Other   [employer]  Right 4 Implanted   Holding Clamps 1.6mm Other   [employer]  Right 2 Implanted   3.0mm Carbon Fiber Chapo 60mm Other   [employer]  Right 1 Implanted

## 2025-05-13 NOTE — DISCHARGE INSTRUCTIONS
Thank you for allowing [facility] to care for you today.  If you have any questions and/or concerns please reach out to us Monday-Friday 0800-4:00 PM at [phone].  After hours please go to the Urgent Care and/or Emergency Room.  If you feel like it is an emergency call 911.

## 2025-05-13 NOTE — ANESTHESIA POSTPROCEDURE EVALUATION
Patient: Purnima Fallon    Procedure: Procedure(s):  Open Reduction and Fixation Right Fifth Finger  Right fifth finger hardware removal       Anesthesia Type:  General    Note:  Disposition: Outpatient   Postop Pain Control:             Sign Out: Pain at Preoperative BASELINE   PONV: No   Neuro/Psych: Uneventful            Sign Out: Acceptable/Baseline neuro status   Airway/Respiratory: Uneventful            Sign Out: Acceptable/Baseline resp. status   CV/Hemodynamics: Uneventful            Sign Out: Acceptable CV status; No obvious hypovolemia; No obvious fluid overload   Other NRE: NONE   DID A NON-ROUTINE EVENT OCCUR? No           Last vitals:  Vitals Value Taken Time   /79 05/13/25 18:50   Temp 98.6  F (37  C) 05/13/25 18:24   Pulse 57 05/13/25 18:54   Resp 7 05/13/25 18:54   SpO2 97 % 05/13/25 18:54   Vitals shown include unfiled device data.    Electronically Signed By: ALONDRA Rodriguez CRNA  May 13, 2025  6:55 PM

## 2025-05-13 NOTE — ANESTHESIA PROCEDURE NOTES
Airway    Staff -        CRNA: Marie Palafox APRN CRNA       Performed By: CRNA  Consent for Airway        Urgency: elective  Indications and Patient Condition       Indications for airway management: willie-procedural       Induction type:intravenous       Mask difficulty assessment: 0 - not attempted    Final Airway Details       Final airway type: supraglottic airway    Supraglottic Airway Details        Type: LMA       Brand: I-Gel       LMA size: 3    Post intubation assessment        Placement verified by: capnometry and equal breath sounds        Number of attempts at approach: 1       Number of other approaches attempted: 0       Secured with: commercial tube pena and tape       Ease of procedure: easy       Dentition: Intact and Unchanged

## 2025-05-13 NOTE — ANESTHESIA CARE TRANSFER NOTE
Patient: Purnima Fallon    Procedure: Procedure(s):  Open Reduction and Fixation Right Fifth Finger  Right fifth finger hardware removal       Diagnosis: Mechanical breakdown of internal fixation device of bone of hand, initial encounter [T84.210A]  Diagnosis Additional Information: No value filed.    Anesthesia Type:   General     Note:    Oropharynx: oropharynx clear of all foreign objects and spontaneously breathing  Level of Consciousness: drowsy  Oxygen Supplementation: nasal cannula  Level of Supplemental Oxygen (L/min / FiO2): 2  Independent Airway: airway patency satisfactory and stable  Dentition: dentition unchanged  Vital Signs Stable: post-procedure vital signs reviewed and stable  Report to RN Given: handoff report given  Patient transferred to: PACU    Handoff Report: Identifed the Patient, Identified the Reponsible Provider, Reviewed the pertinent medical history, Discussed the surgical course, Reviewed Intra-OP anesthesia mangement and issues during anesthesia, Set expectations for post-procedure period and Allowed opportunity for questions and acknowledgement of understanding      Vitals:  Vitals Value Taken Time   /80 05/13/25 18:24   Temp 98.6  F (37  C) 05/13/25 18:24   Pulse 52 05/13/25 18:30   Resp 21 05/13/25 18:30   SpO2 98 % 05/13/25 18:30   Vitals shown include unfiled device data.    Electronically Signed By: ALONDRA Rodriguez CRNA  May 13, 2025  6:32 PM

## 2025-05-14 ASSESSMENT — ACTIVITIES OF DAILY LIVING (ADL)
ADLS_ACUITY_SCORE: 23

## 2025-05-14 NOTE — OR NURSING
PACU Respiratory Event Documentation     1) Episodes of Apnea greater than or equal to 10 seconds: 0    2) Bradypnea - less than 8 breaths per minute: 0    3) Pain score on 0 to 10 scale: 7    4) Pain-sedation mismatch (yes or no): no    5) Repeated 02 desaturation less than 90% (yes or no): no    Anesthesia notified? (yes or no): n/a    Any of the above events occuring repeatedly in separate 30 minute intervals may be considered recurrent PACU respiratory events.

## 2025-05-14 NOTE — OR NURSING
Patient doing well.  Patient is awake/alert and interactive.  Notes that her hand is hurting but otherwise doing well.  Patient ready for something to eat and drink; patient phase 2.

## 2025-05-14 NOTE — OR NURSING
"Patient continues to do well.  VSS.  Patient was able to eat and drink without difficulties.  Notes that the pain is a little better but she does not want any more pain medications \"I just want to go home and sleep\"  patient's family member at bedside.  IV removed.  Discharge instructions reviewed with patient and family member; both verbalized understanding and have no further questions.  Patient able to dress self.  Has glasses and prescription medications in hand.  Patient's dressing intact; no bleeding noted.  Patient is able to wiggle finger; cms intact.  Rest of assessment as charted.    Patient and responsible adult given discharge instructions with no questions regarding instructions. Aliya score 19. Pain level 5-7/10.  Discharged from unit via ambulatory. Patient discharged to home with family member.    "

## 2025-05-15 ASSESSMENT — ACTIVITIES OF DAILY LIVING (ADL)
ADLS_ACUITY_SCORE: 23

## 2025-05-15 NOTE — OP NOTE
[facility] Operative Note    Pre-operative diagnosis: Mechanical breakdown of internal fixation device of bone of hand, initial encounter [T84.210A]   Post-operative diagnosis Same   Procedure: Procedure(s):  Open Reduction and Fixation Right Fifth Finger  Right fifth finger hardware removal application of external fixator   Surgeon: Fernando Acevedo MD   Assistants(s): Jose Alejandro Mixon PA-C   Estimated blood loss: None    Specimens: None   Findings: Comminuted intra-articular fracture with failed hardware.     Indications:  Patient with failed hardware with the pin fixation of a comminuted intra-articular P2 fracture of the fifth finger at the PIP joint.  She had underwent reduction of the comminuted area and pinned but continued to displace over the pins.  After discussion like to bring her back in place and remove the hardware of the pins and placed her in an external fixator to improve longitudinal alignment stability of the comminuted intra-articular fracture      Details of procedure:    Preop clearance surgical site marked consented and evaluated by anesthesia she was brought back to the operating room placed in supine position.  Was administered a general anesthesia and then repositioned on the operative table access the right hand and small finger.  And it was well padded and secured in position.  She was then prepped and draped standard fashion Patrick the right hand.  Landmarks were then marked with a sterile marking pen.  The timeout was completed.  The pin and then removed the distal finger.  Is then visualized under C arm and did submit gentle traction to improve alignment and noting the comminution but improved alignment with traction.  With the Synthes mini Ex-Fix the more proximal pins in the proximal phalanx were then placed with tiny stab wound and drilling the distally threaded pins for the mini Ex-Fix.  The more distal pin was then placed in the distal portion of the middle phalanx or P2.   The wound over the dorsal aspect is reopened and the open along the dorsal capsule on the radial side mobilizing.  Using a Aurora is able to help improve alignment longitudinal alignment and then secured the external fixators with a carbon bar.  Tried to manipulate some of the comminuted fragments to improve appearance contact contact and.  After completion of external fixator bring out improved length wound was then irrigated out the dorsal capsule was then repaired with Vicryl as well as then the skin with nylon suture.  Xeroform gauze around the pins as well as a drain tape sponge and Kerlix roll.  The hardware that was removed was a K wire that was removed at the beginning.  Tolerated procedure well.  After completion of the Ex-Fix and dressing she was still today so removed patient was able be awoken on the operative table transported as a bed to postop anesthesia recovery.  In addition to anesthesia a digital block was placed for postoperative pain control.  Completion the procedure all needle instrument sponge counts were complete and intact.

## 2025-05-19 ENCOUNTER — ANCILLARY PROCEDURE (OUTPATIENT)
Dept: GENERAL RADIOLOGY | Facility: OTHER | Age: 45
End: 2025-05-19
Attending: PHYSICIAN ASSISTANT
Payer: COMMERCIAL

## 2025-05-19 ENCOUNTER — OFFICE VISIT (OUTPATIENT)
Dept: ORTHOPEDICS | Facility: OTHER | Age: 45
End: 2025-05-19
Attending: PHYSICIAN ASSISTANT
Payer: COMMERCIAL

## 2025-05-19 VITALS
HEIGHT: 62 IN | BODY MASS INDEX: 20.24 KG/M2 | SYSTOLIC BLOOD PRESSURE: 121 MMHG | OXYGEN SATURATION: 98 % | TEMPERATURE: 98.2 F | WEIGHT: 110 LBS | HEART RATE: 80 BPM | DIASTOLIC BLOOD PRESSURE: 70 MMHG

## 2025-05-19 DIAGNOSIS — Z47.89 ORTHOPEDIC AFTERCARE: Primary | ICD-10-CM

## 2025-05-19 DIAGNOSIS — S62.626D CLOSED DISPLACED FRACTURE OF MIDDLE PHALANX OF RIGHT LITTLE FINGER WITH ROUTINE HEALING, SUBSEQUENT ENCOUNTER: ICD-10-CM

## 2025-05-19 PROCEDURE — 73130 X-RAY EXAM OF HAND: CPT | Mod: TC,RT

## 2025-05-19 PROCEDURE — G0463 HOSPITAL OUTPT CLINIC VISIT: HCPCS

## 2025-05-19 PROCEDURE — 73130 X-RAY EXAM OF HAND: CPT | Mod: 26 | Performed by: RADIOLOGY

## 2025-05-19 RX ORDER — HYDROCODONE BITARTRATE AND ACETAMINOPHEN 5; 325 MG/1; MG/1
1 TABLET ORAL
Qty: 15 TABLET | Refills: 0 | Status: SHIPPED | OUTPATIENT
Start: 2025-05-19

## 2025-05-19 ASSESSMENT — PAIN SCALES - GENERAL: PAINLEVEL_OUTOF10: MODERATE PAIN (6)

## 2025-05-19 NOTE — PROGRESS NOTES
"[facility]  Patient Name:Purnima Fallon  Date of Service: 25  :1980  Age:44 year old Sex:female  MRN: [id number]  Provider: Jose Alejandro Mixon PA-C    OFFICE NOTE    SITE: [facility]    REASON FOR VISIT: Purnima Fallon is a 44 year old female who returns for postoperative recheck following revision open reduction with external fixation right fifth finger middle phalanx fracture on 2025 performed by Dr. Acevedo.    HISTORY OF PRESENT ILLNESS:  Patient feels she is doing reasonably well and has mild to moderate discomfort, worse at night.  Patient is using occasional Norco for pain at this stage.  She is kept her dressing clean dry and intact.  Patient denies known issues with surgical incision(s) or pin sites.  Patient denies numbness, tingling, fevers or chills.      PHYSICAL EXAM:  /70 (BP Location: Left arm, Patient Position: Sitting, Cuff Size: Adult Regular)   Pulse 80   Temp 98.2  F (36.8  C) (Tympanic)   Ht 1.575 m (5' 2\")   Wt 49.9 kg (110 lb)   LMP 08/10/2013   SpO2 98%   BMI 20.12 kg/m    Patient alert and oriented, has appropriate mood and affect.  Upon inspection, postoperative dressing remains clean and intact.  Dressing removed.  Incision and pin sites appear benign and without surrounding erythema, drainage or warmth. Patient indicates grossly intact sensation to soft touch in a digital nerve distribution right small finger.  Right small finger pink and well-perfused with good capillary refill.    Sutures left intact today.  New sterile dressing over the pin sites and incision placed today consisting of Xeroform, gauze, and stockinette.  Wound cares discussed.    IMAGING: New x-rays of the right hand were obtained.  X-rays were interpreted and reviewed.  No gross interval change in fracture fragmentation or alignment.  Fixator pins appear stable.    IMPRESSION:  Stable postoperative recheck    PLAN:  Minimal use with the right hand at this time " limited to eating, typing and/or writing.  No lifting or resisted use.  She is to keep the dressing clean and intact.  Did discuss with her the likelihood of a 6-week period in the external fixator.  We will have her follow-up in 1 week for dressing change and suture removal.  She will return sooner if issues or concerns.  Patient appeared to understand and was in agreement with this plan.  All questions were answered.    ELECTRONICALLY SIGNED  ___________________________________  Jose Alejandro Mixon PA-C  Orthopedics

## 2025-05-20 DIAGNOSIS — F90.8 OTHER SPECIFIED ATTENTION DEFICIT HYPERACTIVITY DISORDER (ADHD): ICD-10-CM

## 2025-05-21 RX ORDER — DEXTROAMPHETAMINE SACCHARATE, AMPHETAMINE ASPARTATE, DEXTROAMPHETAMINE SULFATE AND AMPHETAMINE SULFATE 7.5; 7.5; 7.5; 7.5 MG/1; MG/1; MG/1; MG/1
30 TABLET ORAL DAILY
Qty: 30 TABLET | Refills: 0 | OUTPATIENT
Start: 2025-05-21

## 2025-05-21 RX ORDER — DEXTROAMPHETAMINE SACCHARATE, AMPHETAMINE ASPARTATE, DEXTROAMPHETAMINE SULFATE AND AMPHETAMINE SULFATE 2.5; 2.5; 2.5; 2.5 MG/1; MG/1; MG/1; MG/1
10 TABLET ORAL DAILY
Qty: 30 TABLET | Refills: 0 | OUTPATIENT
Start: 2025-05-21

## 2025-05-21 RX ORDER — DEXTROAMPHETAMINE SACCHARATE, AMPHETAMINE ASPARTATE MONOHYDRATE, DEXTROAMPHETAMINE SULFATE AND AMPHETAMINE SULFATE 7.5; 7.5; 7.5; 7.5 MG/1; MG/1; MG/1; MG/1
30 CAPSULE, EXTENDED RELEASE ORAL DAILY
Qty: 30 CAPSULE | Refills: 0 | OUTPATIENT
Start: 2025-05-21

## 2025-05-29 ENCOUNTER — ANCILLARY PROCEDURE (OUTPATIENT)
Dept: GENERAL RADIOLOGY | Facility: HOSPITAL | Age: 45
End: 2025-05-29
Attending: ORTHOPAEDIC SURGERY
Payer: COMMERCIAL

## 2025-05-29 DIAGNOSIS — S62.626D CLOSED DISPLACED FRACTURE OF MIDDLE PHALANX OF RIGHT LITTLE FINGER WITH ROUTINE HEALING, SUBSEQUENT ENCOUNTER: ICD-10-CM

## 2025-05-29 PROCEDURE — 73130 X-RAY EXAM OF HAND: CPT | Mod: RT

## 2025-05-29 PROCEDURE — 73130 X-RAY EXAM OF HAND: CPT | Mod: 26 | Performed by: RADIOLOGY

## 2025-06-04 ENCOUNTER — HOSPITAL ENCOUNTER (OUTPATIENT)
Dept: ULTRASOUND IMAGING | Facility: HOSPITAL | Age: 45
Discharge: HOME | End: 2025-06-04
Attending: UROLOGY
Payer: COMMERCIAL

## 2025-06-04 DIAGNOSIS — N20.0 KIDNEY STONES: ICD-10-CM

## 2025-06-04 PROCEDURE — 76770 US EXAM ABDO BACK WALL COMP: CPT | Mod: 26 | Performed by: RADIOLOGY

## 2025-06-04 PROCEDURE — 76770 US EXAM ABDO BACK WALL COMP: CPT

## 2025-06-17 ENCOUNTER — MYC MEDICAL ADVICE (OUTPATIENT)
Dept: FAMILY MEDICINE | Facility: OTHER | Age: 45
End: 2025-06-17

## 2025-06-17 DIAGNOSIS — L73.2 HIDRADENITIS SUPPURATIVA: ICD-10-CM

## 2025-06-17 RX ORDER — ADALIMUMAB 40MG/0.4ML
40 KIT SUBCUTANEOUS
Qty: 4.8 ML | Refills: 0 | Status: SHIPPED | OUTPATIENT
Start: 2025-06-17

## 2025-06-18 ENCOUNTER — OFFICE VISIT (OUTPATIENT)
Dept: ORTHOPEDICS | Facility: OTHER | Age: 45
End: 2025-06-18
Attending: ORTHOPAEDIC SURGERY
Payer: COMMERCIAL

## 2025-06-18 ENCOUNTER — ANCILLARY PROCEDURE (OUTPATIENT)
Dept: GENERAL RADIOLOGY | Facility: OTHER | Age: 45
End: 2025-06-18
Attending: ORTHOPAEDIC SURGERY
Payer: COMMERCIAL

## 2025-06-18 VITALS
TEMPERATURE: 96.5 F | SYSTOLIC BLOOD PRESSURE: 120 MMHG | DIASTOLIC BLOOD PRESSURE: 80 MMHG | WEIGHT: 110 LBS | OXYGEN SATURATION: 100 % | HEIGHT: 62 IN | HEART RATE: 64 BPM | BODY MASS INDEX: 20.24 KG/M2

## 2025-06-18 DIAGNOSIS — S62.626D CLOSED DISPLACED FRACTURE OF MIDDLE PHALANX OF RIGHT LITTLE FINGER WITH ROUTINE HEALING, SUBSEQUENT ENCOUNTER: Primary | ICD-10-CM

## 2025-06-18 DIAGNOSIS — S62.626D CLOSED DISPLACED FRACTURE OF MIDDLE PHALANX OF RIGHT LITTLE FINGER WITH ROUTINE HEALING, SUBSEQUENT ENCOUNTER: ICD-10-CM

## 2025-06-18 PROCEDURE — 73130 X-RAY EXAM OF HAND: CPT | Mod: TC,RT

## 2025-06-18 PROCEDURE — G0463 HOSPITAL OUTPT CLINIC VISIT: HCPCS

## 2025-06-18 PROCEDURE — 73130 X-RAY EXAM OF HAND: CPT | Mod: 26 | Performed by: RADIOLOGY

## 2025-06-18 ASSESSMENT — PAIN SCALES - GENERAL: PAINLEVEL_OUTOF10: NO PAIN (0)

## 2025-06-22 NOTE — PROGRESS NOTES
ORTHOPEDIC CLINIC CONSULT    Referred by: Primary Care Providers:  Payton Levi APRN CNP, NP (General)    Chief Complaint: Purnima Fallon is a 44 year old female who is being seen for   Chief Complaint   Patient presents with    Surgical Followup     Open reduction and fixation right fifth finger       History of Present Illness: Patient is a 44-year-old female status post revision fixation right fifth finger comminuted intra-articular fracture at the PIP joint.  Had pins removed and external fixator placed.  Presents back for evaluation.  That surgery 5/13/2025.    Patient's past medical, surgical, social and family histories reviewed.     Past Medical History:   Diagnosis Date    ADHD (attention deficit hyperactivity disorder)     Arthritis 2022    Biliary dyskinesia 10/06/2017    Carpal tunnel syndrome 08/23/2006    Chronic female pelvic pain 09/24/2018    Cystic acne 07/23/2013    Depression     Depressive disorder     Hidradenitis 02/16/2007    Hidradenitis suppurativa 05/13/2019    Ischiorectal abscess and fistula     Kidney stones 2008    x2 passed on her own    Malunion of fracture 05/17/2007    Mass of breast 02/02/2012    Migraines     Need for prophylactic hormone replacement therapy (postmenopausal)     Nondependent tobacco use disorder 10/11/2012    Papanicolaou smear of cervix with low grade squamous intraepithelial lesion (LGSIL) 10/29/2008    Pilonidal cyst 09/12/2017    S/p partial hysterectomy with remaining cervical stump 09/27/2013    Surgical menopause 02/11/2022    Trigger thumb of both thumbs 06/17/2020    Added automatically from request for surgery [id number]       Past Surgical History:   Procedure Laterality Date    ABDOMEN SURGERY      BIOPSY      COLONOSCOPY      CYSTECTOMY PILONIDAL N/A 09/18/2017    Procedure: CYSTECTOMY PILONIDAL;  PILONIDAL CYSTECTOMY ;  Surgeon: Cordell Stephens MD;  [facility]    ENDOSCOPY UPPER, COLONOSCOPY, COMBINED N/A 10/05/2018    Procedure:  COMBINED ENDOSCOPY UPPER, COLONOSCOPY;  UPPER ENDOSCOPY with Biopsy  AND COLONOSCOPY;  Surgeon: Cordell Stephens MD;  [facility]    EXCISE LESION BUTTOCK(S) Left 06/08/2018    Procedure: EXCISE LESION BUTTOCK(S);  EXCISION OF GLUTEAL LEFT SKIN LESION;  Surgeon: Cordell Stephens MD;  [facility]    EXCISE MASS NECK Right 08/04/2015    Procedure: EXCISE MASS NECK;  Surgeon: Nasir Jones MD;  [facility]    INCISION AND DRAINAGE PERINEAL, COMBINED N/A 03/18/2015    Procedure: COMBINED INCISION AND DRAINAGE PERINEAL;  Surgeon: Jay Torres DO;  [facility]    IR CONSULTATION FOR IR EXAM  05/09/2023    IR FLUORO 0-1 HOUR  06/05/2023    IRRIGATION AND DEBRIDEMENT GROIN N/A 02/07/2019    Procedure: IRRIGATION AND DEBRIDEMENT LEFT GROIN ABSCESS;  Surgeon: Cordell Stephens MD;  [facility]    LAPAROSCOPIC APPENDECTOMY  02/11/2022    LAPAROSCOPIC CHOLECYSTECTOMY N/A 11/20/2017    Procedure: LAPAROSCOPIC CHOLECYSTECTOMY;  LAPAROSCOPIC CHOLECYSTECTOMY;  Surgeon: Cordell Stephens MD;  [facility]    LAPAROSCOPIC HYSTERECTOMY SUPRACERVICAL, BILATERAL SALPINGO-OOPHORECTOMY, COMBINED  09/27/2013    Procedure: COMBINED LAPAROSCOPIC HYSTERECTOMY SUPRACERVICAL, SALPINGO-OOPHORECTOMY;  LAPAROSCOPIC SUPRACERVICAL HYSTERECTOMY AND RIGHT SALPINGO-OOPHORECTOMY, CYSTOSCOPY;  Surgeon: Denys Callahan MD;  [facility]    LAPAROSCOPIC OOPHORECTOMY Left 02/11/2022    Procedure: Left OOPHORECTOMY, LAPAROSCOPIC, Laparoscopic Appendectomy Torsion and Pelvic Appensix;  Surgeon: Malick Malhotra MD;  [facility]    LAPAROSCOPY DIAGNOSTIC (GYN) N/A 09/06/2018    Procedure: LAPAROSCOPY DIAGNOSTIC (GYN);  LAPAROSCOPY WITH PERITONEAL BIOPSIES AND REMOVAL LEFT FALLOPIAN TUBE;  Surgeon: Suraj Whitaker MD;  [facility]    LASER HOLMIUM LITHOTRIPSY URETER(S), INSERT STENT, COMBINED Right 4/28/2025    Procedure: CYSTOURETEROSCOPY, with right ureterosopy and stone retrieval;  Surgeon:  Joaquín Cabral MD;  [facility]    marsupialization of pilonidal cyst  01/01/2007    OPEN REDUCTION INTERNAL FIXATION FINGER(S) Right 4/15/2025    Procedure: Open Reduction Internal Fixation  Right Small Finger Intra Articular Fracture of Middle Phalanx;  Surgeon: Fernando Acevedo MD;  [facility]    OPEN REDUCTION INTERNAL FIXATION FINGER(S) Right 5/13/2025    Procedure: Open Reduction and Fixation Right Fifth Finger;  Surgeon: Fernando Acevedo MD;  [facility]    RELEASE TRIGGER FINGER BILATERAL Bilateral 07/10/2020    Procedure: RELEASE BILATERAL TRIGGER THUMBS;  Surgeon: Vince Murillo DO;  [facility]    REMOVE FOREIGN BODY FINGER Right 5/13/2025    Procedure: Right fifth finger hardware removal;  Surgeon: Fernando Acevedo MD;  [facility]    TUBAL LIGATION  01/01/2005       Home Medications:  Prior to Admission medications    Medication Sig Start Date End Date Taking? Authorizing Provider   Adalimumab (HUMIRA, 2 PEN,) 40 MG/0.4ML pen kit Inject 0.4 mLs (40 mg) subcutaneously every 7 days. 6/17/25  Yes Yovani Vega MD   buPROPion (WELLBUTRIN XL) 150 MG 24 hr tablet Take 1 tablet (150 mg) by mouth every morning. 4/18/25  Yes Payton Levi APRN CNP   buPROPion (WELLBUTRIN XL) 300 MG 24 hr tablet TAKE 1 TABLET BY MOUTH EVERY MORNING ALONG WITH 1 150MG TABLET - TOTAL DOSE 450MG 4/18/25  Yes Payton Levi APRN CNP   escitalopram (LEXAPRO) 20 MG tablet Take 1 tablet (20 mg) by mouth daily. 4/18/25  Yes Payton Levi APRN CNP   estradiol (ESTRACE) 1 MG tablet Take 1 tablet (1 mg) by mouth daily. 4/18/25  Yes Payton Levi APRN CNP   HYDROcodone-acetaminophen (NORCO) 5-325 MG tablet Take 1 tablet by mouth nightly as needed for moderate to severe pain. 5/19/25  Yes Jose Alejandro Mixon PA-C   multivitamin, therapeutic (THERA-VIT) TABS tablet Take 1 tablet by mouth daily.   Yes Reported, Patient   SUMAtriptan (IMITREX) 25 MG tablet TAKE 1 TABLET BY MOUTH AT  "ONSET OF HEADACHE FOR MIGRAINE, MAY REPEAT IN 2 HOURS MAX OF 8 TABLETS PER 24 HOURS 4/18/25  Yes Payton Levi APRN CNP   tiZANidine (ZANAFLEX) 4 MG tablet Take 1 tablet (4 mg) by mouth 3 times daily as needed for muscle spasms. 4/18/25  Yes Payton Levi APRN CNP   valACYclovir (VALTREX) 500 MG tablet Take 1 tablet (500 mg) by mouth daily. 4/18/25  Yes Payton Levi APRN CNP   amphetamine-dextroamphetamine (ADDERALL XR) 30 MG 24 hr capsule Take 1 capsule (30 mg) by mouth daily. 6/19/25   Payton Levi APRN CNP   amphetamine-dextroamphetamine (ADDERALL) 10 MG tablet Take 1 tablet (10 mg) by mouth daily. 6/19/25   Payton Levi APRN CNP   amphetamine-dextroamphetamine (ADDERALL) 30 MG tablet Take 1 tablet (30 mg) by mouth daily. 6/19/25   Payton Levi APRN CNP       No Known Allergies    Social History     Occupational History    Not on file   Tobacco Use    Smoking status: Every Day     Current packs/day: 0.50     Average packs/day: 0.5 packs/day for 24.5 years (12.2 ttl pk-yrs)     Types: Cigarettes     Start date: 1/1/2001     Passive exposure: Current    Smokeless tobacco: Never    Tobacco comments:     I have been working on quitting. 6/18/25   Vaping Use    Vaping status: Never Used   Substance and Sexual Activity    Alcohol use: No    Drug use: Yes     Types: Marijuana     Comment: pt states yesterday evening    Sexual activity: Not Currently     Partners: Male     Birth control/protection: None       Family History   Problem Relation Age of Onset    Other Cancer Father         Lung cancer    Diabetes Paternal Grandmother     Other Cancer Maternal Grandmother         Lung cancer       REVIEW OF SYSTEMS            Physical Exam:    Vitals: /80 (BP Location: Left arm, Patient Position: Sitting, Cuff Size: Adult Regular)   Pulse 64   Temp (!) 96.5  F (35.8  C) (Tympanic)   Ht 1.575 m (5' 2\")   Wt 49.9 kg (110 lb)   LMP 08/10/2013   SpO2 100%   BMI 20.12 " kg/m    BMI= Body mass index is 20.12 kg/m .  Examination right hand intact external fixator.  No pin site infections.  Continued maintenance of good longitudinal alignment clinically.  Brisk cap refill sensory intact to light touch to fingertip.  Radiographs: Radiographs right hand note intact external fixator pins no loosening.  Continued to notice comminution at the PIP joint of the proximal middle phalanx.  Some signs of some consolidation of the comminution.     Independent visualization of the films was made.         Impression:      ICD-10-CM    1. Closed displaced fracture of middle phalanx of right little finger with routine healing, subsequent encounter  S62.626D XR Hand Right G/E 3 Views (Clinic Performed)          Plan: Status post fixation with external fixator comminuted intra-articular fracture fifth finger with fracture at proximal middle phalanx intra-articular.  Continue with the external fixator at this time until further consolidation.  And then plan external fixator removal.  Will have her follow-up in 2 to 3 weeks at which time she will be at least 6 to 8 weeks out repeat radiographs and anticipate planning external fixator removal.    All of the above pertinent physical exam and imaging modalities findings was reviewed with Purnima.    Return to clinic in 2-3 weeks.    Further imaging required at follow-up    Time spent with evaluation:   minutes    Fernando Acveedo MD  6/22/2025  10:36 AM

## 2025-06-23 ENCOUNTER — MYC REFILL (OUTPATIENT)
Dept: ORTHOPEDICS | Facility: OTHER | Age: 45
End: 2025-06-23

## 2025-06-23 RX ORDER — HYDROCODONE BITARTRATE AND ACETAMINOPHEN 5; 325 MG/1; MG/1
1 TABLET ORAL
Qty: 15 TABLET | Refills: 0 | OUTPATIENT
Start: 2025-06-23

## 2025-07-02 ENCOUNTER — ANCILLARY PROCEDURE (OUTPATIENT)
Dept: GENERAL RADIOLOGY | Facility: OTHER | Age: 45
End: 2025-07-02
Attending: ORTHOPAEDIC SURGERY
Payer: COMMERCIAL

## 2025-07-02 ENCOUNTER — MYC REFILL (OUTPATIENT)
Dept: ORTHOPEDICS | Facility: OTHER | Age: 45
End: 2025-07-02

## 2025-07-02 ENCOUNTER — PREP FOR PROCEDURE (OUTPATIENT)
Dept: ORTHOPEDICS | Facility: OTHER | Age: 45
End: 2025-07-02

## 2025-07-02 ENCOUNTER — OFFICE VISIT (OUTPATIENT)
Dept: ORTHOPEDICS | Facility: OTHER | Age: 45
End: 2025-07-02
Attending: ORTHOPAEDIC SURGERY
Payer: COMMERCIAL

## 2025-07-02 ENCOUNTER — TELEPHONE (OUTPATIENT)
Dept: FAMILY MEDICINE | Facility: OTHER | Age: 45
End: 2025-07-02

## 2025-07-02 VITALS
OXYGEN SATURATION: 98 % | SYSTOLIC BLOOD PRESSURE: 110 MMHG | HEART RATE: 73 BPM | HEIGHT: 62 IN | WEIGHT: 110 LBS | TEMPERATURE: 98.3 F | DIASTOLIC BLOOD PRESSURE: 80 MMHG | BODY MASS INDEX: 20.24 KG/M2

## 2025-07-02 DIAGNOSIS — Z47.2 ENCOUNTER FOR REMOVAL OF INTERNAL FIXATION DEVICE: Primary | ICD-10-CM

## 2025-07-02 DIAGNOSIS — S62.626D CLOSED DISPLACED FRACTURE OF MIDDLE PHALANX OF RIGHT LITTLE FINGER WITH ROUTINE HEALING, SUBSEQUENT ENCOUNTER: ICD-10-CM

## 2025-07-02 DIAGNOSIS — S62.626D CLOSED DISPLACED FRACTURE OF MIDDLE PHALANX OF RIGHT LITTLE FINGER WITH ROUTINE HEALING, SUBSEQUENT ENCOUNTER: Primary | ICD-10-CM

## 2025-07-02 PROCEDURE — 73130 X-RAY EXAM OF HAND: CPT | Mod: 26 | Performed by: RADIOLOGY

## 2025-07-02 PROCEDURE — G0463 HOSPITAL OUTPT CLINIC VISIT: HCPCS

## 2025-07-02 PROCEDURE — 73130 X-RAY EXAM OF HAND: CPT | Mod: TC,RT

## 2025-07-02 ASSESSMENT — PATIENT HEALTH QUESTIONNAIRE - PHQ9
SUM OF ALL RESPONSES TO PHQ QUESTIONS 1-9: 1
SUM OF ALL RESPONSES TO PHQ QUESTIONS 1-9: 1
10. IF YOU CHECKED OFF ANY PROBLEMS, HOW DIFFICULT HAVE THESE PROBLEMS MADE IT FOR YOU TO DO YOUR WORK, TAKE CARE OF THINGS AT HOME, OR GET ALONG WITH OTHER PEOPLE: SOMEWHAT DIFFICULT

## 2025-07-02 ASSESSMENT — PAIN SCALES - GENERAL: PAINLEVEL_OUTOF10: NO PAIN (0)

## 2025-07-02 NOTE — TELEPHONE ENCOUNTER
July 2, 2025    2:05 PM    Reason for Call: OVERBOOK    Patient is having hardware removal with Dr Acevedo @[facility] [place] 7/10/25    The patient is requesting an appointment for pre-op with Payton Miguel.    Preferred method for responding to this message: Telephone Call  What is your phone number ? [phone]    If we cannot reach you directly, may we leave a detailed response at the number you provided? Yes    -Kerry Delgado on 7/2/2025 at 2:06 PM

## 2025-07-02 NOTE — PROGRESS NOTES
ORTHOPEDIC CLINIC CONSULT    Referred by: Primary Care Providers:  Payton Levi APRN CNP, NP (General)    Chief Complaint: Purnima Fallon is a 44 year old female who is being seen for No chief complaint on file.      History of Present Illness:   ***    Patient's past medical, surgical, social and family histories reviewed.     Past Medical History:   Diagnosis Date    ADHD (attention deficit hyperactivity disorder)     Arthritis 2022    Biliary dyskinesia 10/06/2017    Carpal tunnel syndrome 08/23/2006    Chronic female pelvic pain 09/24/2018    Cystic acne 07/23/2013    Depression     Depressive disorder     Hidradenitis 02/16/2007    Hidradenitis suppurativa 05/13/2019    Ischiorectal abscess and fistula     Kidney stones 2008    x2 passed on her own    Malunion of fracture 05/17/2007    Mass of breast 02/02/2012    Migraines     Need for prophylactic hormone replacement therapy (postmenopausal)     Nondependent tobacco use disorder 10/11/2012    Papanicolaou smear of cervix with low grade squamous intraepithelial lesion (LGSIL) 10/29/2008    Pilonidal cyst 09/12/2017    S/p partial hysterectomy with remaining cervical stump 09/27/2013    Surgical menopause 02/11/2022    Trigger thumb of both thumbs 06/17/2020    Added automatically from request for surgery [id number]       Past Surgical History:   Procedure Laterality Date    ABDOMEN SURGERY      BIOPSY      COLONOSCOPY      CYSTECTOMY PILONIDAL N/A 09/18/2017    Procedure: CYSTECTOMY PILONIDAL;  PILONIDAL CYSTECTOMY ;  Surgeon: Cordell Stephens MD;  [facility]    ENDOSCOPY UPPER, COLONOSCOPY, COMBINED N/A 10/05/2018    Procedure: COMBINED ENDOSCOPY UPPER, COLONOSCOPY;  UPPER ENDOSCOPY with Biopsy  AND COLONOSCOPY;  Surgeon: Cordell Stephens MD;  [facility]    EXCISE LESION BUTTOCK(S) Left 06/08/2018    Procedure: EXCISE LESION BUTTOCK(S);  EXCISION OF GLUTEAL LEFT SKIN LESION;  Surgeon: Cordell Stephens MD;  [facility]    EXCISE  MASS NECK Right 08/04/2015    Procedure: EXCISE MASS NECK;  Surgeon: Nasir Jones MD;  [facility]    INCISION AND DRAINAGE PERINEAL, COMBINED N/A 03/18/2015    Procedure: COMBINED INCISION AND DRAINAGE PERINEAL;  Surgeon: Jay Torres DO;  [facility]    IR CONSULTATION FOR IR EXAM  05/09/2023    IR FLUORO 0-1 HOUR  06/05/2023    IRRIGATION AND DEBRIDEMENT GROIN N/A 02/07/2019    Procedure: IRRIGATION AND DEBRIDEMENT LEFT GROIN ABSCESS;  Surgeon: Cordell Stephens MD;  [facility]    LAPAROSCOPIC APPENDECTOMY  02/11/2022    LAPAROSCOPIC CHOLECYSTECTOMY N/A 11/20/2017    Procedure: LAPAROSCOPIC CHOLECYSTECTOMY;  LAPAROSCOPIC CHOLECYSTECTOMY;  Surgeon: Cordell Stephens MD;  [facility]    LAPAROSCOPIC HYSTERECTOMY SUPRACERVICAL, BILATERAL SALPINGO-OOPHORECTOMY, COMBINED  09/27/2013    Procedure: COMBINED LAPAROSCOPIC HYSTERECTOMY SUPRACERVICAL, SALPINGO-OOPHORECTOMY;  LAPAROSCOPIC SUPRACERVICAL HYSTERECTOMY AND RIGHT SALPINGO-OOPHORECTOMY, CYSTOSCOPY;  Surgeon: Denys Callahan MD;  [facility]    LAPAROSCOPIC OOPHORECTOMY Left 02/11/2022    Procedure: Left OOPHORECTOMY, LAPAROSCOPIC, Laparoscopic Appendectomy Torsion and Pelvic Appensix;  Surgeon: Malick Malhotra MD;  [facility]    LAPAROSCOPY DIAGNOSTIC (GYN) N/A 09/06/2018    Procedure: LAPAROSCOPY DIAGNOSTIC (GYN);  LAPAROSCOPY WITH PERITONEAL BIOPSIES AND REMOVAL LEFT FALLOPIAN TUBE;  Surgeon: Suraj Whitaker MD;  [facility]    LASER HOLMIUM LITHOTRIPSY URETER(S), INSERT STENT, COMBINED Right 4/28/2025    Procedure: CYSTOURETEROSCOPY, with right ureterosopy and stone retrieval;  Surgeon: Joaquín Cabral MD;  [facility]    marsupialization of pilonidal cyst  01/01/2007    OPEN REDUCTION INTERNAL FIXATION FINGER(S) Right 4/15/2025    Procedure: Open Reduction Internal Fixation  Right Small Finger Intra Articular Fracture of Middle Phalanx;  Surgeon: Fernando Acevedo MD;  [facility]    OPEN REDUCTION  INTERNAL FIXATION FINGER(S) Right 5/13/2025    Procedure: Open Reduction and Fixation Right Fifth Finger;  Surgeon: Fernando Acevedo MD;  [facility]    RELEASE TRIGGER FINGER BILATERAL Bilateral 07/10/2020    Procedure: RELEASE BILATERAL TRIGGER THUMBS;  Surgeon: Vince Murillo DO;  [facility]    REMOVE FOREIGN BODY FINGER Right 5/13/2025    Procedure: Right fifth finger hardware removal;  Surgeon: Fernando Acevedo MD;  [facility]    TUBAL LIGATION  01/01/2005       Home Medications:  Prior to Admission medications    Medication Sig Start Date End Date Taking? Authorizing Provider   Adalimumab (HUMIRA, 2 PEN,) 40 MG/0.4ML pen kit Inject 0.4 mLs (40 mg) subcutaneously every 7 days. 6/17/25   Yovani Vega MD   amphetamine-dextroamphetamine (ADDERALL XR) 30 MG 24 hr capsule Take 1 capsule (30 mg) by mouth daily. 6/19/25   Payton Levi APRN CNP   amphetamine-dextroamphetamine (ADDERALL) 10 MG tablet Take 1 tablet (10 mg) by mouth daily. 6/19/25   Payton Levi APRN CNP   amphetamine-dextroamphetamine (ADDERALL) 30 MG tablet Take 1 tablet (30 mg) by mouth daily. 6/19/25   Payton Levi APRN CNP   buPROPion (WELLBUTRIN XL) 150 MG 24 hr tablet Take 1 tablet (150 mg) by mouth every morning. 4/18/25   Payton Levi APRN CNP   buPROPion (WELLBUTRIN XL) 300 MG 24 hr tablet TAKE 1 TABLET BY MOUTH EVERY MORNING ALONG WITH 1 150MG TABLET - TOTAL DOSE 450MG 4/18/25   Payton Levi APRN CNP   escitalopram (LEXAPRO) 20 MG tablet Take 1 tablet (20 mg) by mouth daily. 4/18/25   Payton Levi APRN CNP   estradiol (ESTRACE) 1 MG tablet Take 1 tablet (1 mg) by mouth daily. 4/18/25   Payton Levi APRN CNP   HYDROcodone-acetaminophen (NORCO) 5-325 MG tablet Take 1 tablet by mouth nightly as needed for moderate to severe pain. 5/19/25   Jose Alejandro Mixon PA-C   multivitamin, therapeutic (THERA-VIT) TABS tablet Take 1 tablet by mouth daily.    Reported, Patient    SUMAtriptan (IMITREX) 25 MG tablet TAKE 1 TABLET BY MOUTH AT ONSET OF HEADACHE FOR MIGRAINE, MAY REPEAT IN 2 HOURS MAX OF 8 TABLETS PER 24 HOURS 4/18/25   Payton Levi APRN CNP   tiZANidine (ZANAFLEX) 4 MG tablet Take 1 tablet (4 mg) by mouth 3 times daily as needed for muscle spasms. 4/18/25   Payton Levi APRN CNP   valACYclovir (VALTREX) 500 MG tablet Take 1 tablet (500 mg) by mouth daily. 4/18/25   Payton Levi APRN CNP       No Known Allergies    Social History     Occupational History    Not on file   Tobacco Use    Smoking status: Every Day     Current packs/day: 0.50     Average packs/day: 0.5 packs/day for 24.5 years (12.2 ttl pk-yrs)     Types: Cigarettes     Start date: 1/1/2001     Passive exposure: Current    Smokeless tobacco: Never    Tobacco comments:     I have been working on quitting. 6/18/25   Vaping Use    Vaping status: Never Used   Substance and Sexual Activity    Alcohol use: No    Drug use: Yes     Types: Marijuana     Comment: pt states yesterday evening    Sexual activity: Not Currently     Partners: Male     Birth control/protection: None       Family History   Problem Relation Age of Onset    Other Cancer Father         Lung cancer    Diabetes Paternal Grandmother     Other Cancer Maternal Grandmother         Lung cancer       REVIEW OF SYSTEMS            Physical Exam:    Vitals: LMP 08/10/2013   BMI= There is no height or weight on file to calculate BMI.    Radiographs: ***     Independent visualization of the films was made.         Impression:      ICD-10-CM    1. Closed displaced fracture of middle phalanx of right little finger with routine healing, subsequent encounter  S62.626D XR Hand Right G/E 3 Views (Clinic Performed)          Plan:    All of the above pertinent physical exam and imaging modalities findings was reviewed with Purnima.    Return to clinic in *** weeks.    Further imaging required ***    Time spent with evaluation:  *** minutes    Fernando  JONATHAN Acevedo MD  7/2/2025  1:35 PM

## 2025-07-03 ENCOUNTER — RESULTS FOLLOW-UP (OUTPATIENT)
Dept: FAMILY MEDICINE | Facility: OTHER | Age: 45
End: 2025-07-03

## 2025-07-03 ENCOUNTER — OFFICE VISIT (OUTPATIENT)
Dept: FAMILY MEDICINE | Facility: OTHER | Age: 45
End: 2025-07-03
Attending: NURSE PRACTITIONER
Payer: COMMERCIAL

## 2025-07-03 VITALS
HEART RATE: 70 BPM | DIASTOLIC BLOOD PRESSURE: 70 MMHG | TEMPERATURE: 97.4 F | OXYGEN SATURATION: 99 % | SYSTOLIC BLOOD PRESSURE: 120 MMHG | BODY MASS INDEX: 18.84 KG/M2 | RESPIRATION RATE: 18 BRPM | WEIGHT: 103 LBS

## 2025-07-03 DIAGNOSIS — Z01.818 PREOP GENERAL PHYSICAL EXAM: Primary | ICD-10-CM

## 2025-07-03 DIAGNOSIS — T84.84XA PAINFUL ORTHOPAEDIC HARDWARE: ICD-10-CM

## 2025-07-03 LAB
ALBUMIN UR-MCNC: NEGATIVE MG/DL
ANION GAP SERPL CALCULATED.3IONS-SCNC: 10 MMOL/L (ref 7–15)
APPEARANCE UR: CLEAR
BASOPHILS # BLD AUTO: 0 10E3/UL (ref 0–0.2)
BASOPHILS NFR BLD AUTO: 1 %
BILIRUB UR QL STRIP: NEGATIVE
BUN SERPL-MCNC: 18.5 MG/DL (ref 6–20)
CALCIUM SERPL-MCNC: 9.6 MG/DL (ref 8.8–10.4)
CHLORIDE SERPL-SCNC: 103 MMOL/L (ref 98–107)
COLOR UR AUTO: YELLOW
CREAT SERPL-MCNC: 0.88 MG/DL (ref 0.51–0.95)
EGFRCR SERPLBLD CKD-EPI 2021: 83 ML/MIN/1.73M2
EOSINOPHIL # BLD AUTO: 0.1 10E3/UL (ref 0–0.7)
EOSINOPHIL NFR BLD AUTO: 1 %
ERYTHROCYTE [DISTWIDTH] IN BLOOD BY AUTOMATED COUNT: 14 % (ref 10–15)
GLUCOSE SERPL-MCNC: 95 MG/DL (ref 70–99)
GLUCOSE UR STRIP-MCNC: NEGATIVE MG/DL
HCO3 SERPL-SCNC: 26 MMOL/L (ref 22–29)
HCT VFR BLD AUTO: 39.4 % (ref 35–47)
HGB BLD-MCNC: 13.5 G/DL (ref 11.7–15.7)
HGB UR QL STRIP: NEGATIVE
IMM GRANULOCYTES # BLD: 0 10E3/UL
IMM GRANULOCYTES NFR BLD: 0 %
KETONES UR STRIP-MCNC: NEGATIVE MG/DL
LEUKOCYTE ESTERASE UR QL STRIP: NEGATIVE
LYMPHOCYTES # BLD AUTO: 3.7 10E3/UL (ref 0.8–5.3)
LYMPHOCYTES NFR BLD AUTO: 44 %
MCH RBC QN AUTO: 33.5 PG (ref 26.5–33)
MCHC RBC AUTO-ENTMCNC: 34.3 G/DL (ref 31.5–36.5)
MCV RBC AUTO: 98 FL (ref 78–100)
MONOCYTES # BLD AUTO: 0.6 10E3/UL (ref 0–1.3)
MONOCYTES NFR BLD AUTO: 7 %
NEUTROPHILS # BLD AUTO: 3.9 10E3/UL (ref 1.6–8.3)
NEUTROPHILS NFR BLD AUTO: 47 %
NITRATE UR QL: NEGATIVE
PH UR STRIP: 5.5 [PH] (ref 5–7)
PLATELET # BLD AUTO: 310 10E3/UL (ref 150–450)
POTASSIUM SERPL-SCNC: 4.7 MMOL/L (ref 3.4–5.3)
RBC # BLD AUTO: 4.03 10E6/UL (ref 3.8–5.2)
SODIUM SERPL-SCNC: 139 MMOL/L (ref 135–145)
SP GR UR STRIP: >=1.03 (ref 1–1.03)
UROBILINOGEN UR STRIP-ACNC: 1 E.U./DL
WBC # BLD AUTO: 8.3 10E3/UL (ref 4–11)

## 2025-07-03 PROCEDURE — 85025 COMPLETE CBC W/AUTO DIFF WBC: CPT | Mod: ZL | Performed by: NURSE PRACTITIONER

## 2025-07-03 PROCEDURE — G0463 HOSPITAL OUTPT CLINIC VISIT: HCPCS

## 2025-07-03 PROCEDURE — 36415 COLL VENOUS BLD VENIPUNCTURE: CPT | Mod: ZL | Performed by: NURSE PRACTITIONER

## 2025-07-03 PROCEDURE — 80048 BASIC METABOLIC PNL TOTAL CA: CPT | Mod: ZL | Performed by: NURSE PRACTITIONER

## 2025-07-03 PROCEDURE — 81003 URINALYSIS AUTO W/O SCOPE: CPT | Mod: ZL | Performed by: NURSE PRACTITIONER

## 2025-07-03 ASSESSMENT — ANXIETY QUESTIONNAIRES
IF YOU CHECKED OFF ANY PROBLEMS ON THIS QUESTIONNAIRE, HOW DIFFICULT HAVE THESE PROBLEMS MADE IT FOR YOU TO DO YOUR WORK, TAKE CARE OF THINGS AT HOME, OR GET ALONG WITH OTHER PEOPLE: SOMEWHAT DIFFICULT
2. NOT BEING ABLE TO STOP OR CONTROL WORRYING: MORE THAN HALF THE DAYS
8. IF YOU CHECKED OFF ANY PROBLEMS, HOW DIFFICULT HAVE THESE MADE IT FOR YOU TO DO YOUR WORK, TAKE CARE OF THINGS AT HOME, OR GET ALONG WITH OTHER PEOPLE?: SOMEWHAT DIFFICULT
6. BECOMING EASILY ANNOYED OR IRRITABLE: NOT AT ALL
7. FEELING AFRAID AS IF SOMETHING AWFUL MIGHT HAPPEN: NOT AT ALL
1. FEELING NERVOUS, ANXIOUS, OR ON EDGE: NOT AT ALL
3. WORRYING TOO MUCH ABOUT DIFFERENT THINGS: NOT AT ALL
GAD7 TOTAL SCORE: 2
7. FEELING AFRAID AS IF SOMETHING AWFUL MIGHT HAPPEN: NOT AT ALL
5. BEING SO RESTLESS THAT IT IS HARD TO SIT STILL: NOT AT ALL
GAD7 TOTAL SCORE: 2
4. TROUBLE RELAXING: NOT AT ALL
GAD7 TOTAL SCORE: 2

## 2025-07-03 ASSESSMENT — PAIN SCALES - PAIN ENJOYMENT GENERAL ACTIVITY SCALE (PEG)
AVG_PAIN_PASTWEEK: 5
AVG_PAIN_PASTWEEK: 5
INTERFERED_ENJOYMENT_LIFE: 7
INTERFERED_GENERAL_ACTIVITY: 7
INTERFERED_ENJOYMENT_LIFE: 7
PEG_TOTALSCORE: 6.33
PEG_TOTALSCORE: 6.33
INTERFERED_GENERAL_ACTIVITY: 7

## 2025-07-03 NOTE — PROGRESS NOTES
Preoperative Evaluation  [place] [address] [address]  [facility] [address]  Phone: [phone]  Primary Provider: ALONDRA Gray CNP  Pre-op Performing Provider: ALONDRA Gray CNP  Jul 3, 2025           7/2/2025   Surgical Information   What procedure is being done? Hardware out of my pinky   Facility or Hospital where procedure/surgery will be performed: [facility]   Who is doing the procedure / surgery? Dr. Acevedo   Date of surgery / procedure: 07/10/2025   Time of surgery / procedure: 8:00   Where do you plan to recover after surgery? at home with family     Fax number for surgical facility: Note does not need to be faxed, will be available electronically in Epic.    Assessment & Plan     The proposed surgical procedure is considered INTERMEDIATE risk.    (Z01.818) Preop general physical exam  (primary encounter diagnosis)  Comment: Check labs and UA. Normal EKG in past 90 days   Plan: CBC with Platelets & Differential, Basic         metabolic panel, UA Macroscopic with reflex to         Microscopic and Culture, EKG 12-lead complete         w/read - (Clinic Performed)            (T84.84XA) Painful orthopaedic hardware  Comment: Check labs and UA. Normal EKG in past 90 days   Plan: CBC with Platelets & Differential, Basic         metabolic panel, UA Macroscopic with reflex to         Microscopic and Culture, EKG 12-lead complete         w/read - (Clinic Performed)             - No identified additional risk factors other than previously addressed    Preoperative Medication Instructions  Antiplatelet or Anticoagulation Medication Instructions   - We reviewed the medication list and the patient is not on an antiplatelet or anticoagulation medications.    Additional Medication Instructions  Take all scheduled medications on the day of surgery EXCEPT for modifications listed below:   - Herbal medications and vitamins: DO NOT TAKE 14 days prior to surgery.  Hold  Adderall am of procedure and then resume.   Hold Humira until after procedure.     Recommendation  Approval given to proceed with proposed procedure, without further diagnostic evaluation.    Follow-up   Return if symptoms worsen or fail to improve.        Mariah Felton is a 44 year old, presenting for the following:  Pre-Op Exam          7/3/2025     8:43 AM   Additional Questions   Roomed by KAYLIN Knox CMA   Accompanied by Self         7/3/2025     8:43 AM   Patient Reported Additional Medications   Patient reports taking the following new medications None     HPI:  History of closed displaced fracture to middle phalanx of right little finger with hardware placed. Hardware in place and painful. Pre operative examination for hardware removal.     No change in health in past 30 days.         7/2/2025   Pre-Op Questionnaire   Have you ever had a heart attack or stroke? No   Have you ever had surgery on your heart or blood vessels, such as a stent placement, a coronary artery bypass, or surgery on an artery in your head, neck, heart, or legs? No   Do you have chest pain with activity? No   Do you have a history of heart failure? No   Do you currently have a cold, bronchitis or symptoms of other infection? No   Do you have a cough, shortness of breath, or wheezing? No   Do you or anyone in your family have previous history of blood clots? No   Do you or does anyone in your family have a serious bleeding problem such as prolonged bleeding following surgeries or cuts? No   Have you ever had problems with anemia or been told to take iron pills? No   Have you had any abnormal blood loss such as black, tarry or bloody stools, or abnormal vaginal bleeding? No   Have you ever had a blood transfusion? No   Are you willing to have a blood transfusion if it is medically needed before, during, or after your surgery? Yes   Have you or any of your relatives ever had problems with anesthesia? No   Do you have sleep apnea,  excessive snoring or daytime drowsiness? No   Do you have any artifical heart valves or other implanted medical devices like a pacemaker, defibrillator, or continuous glucose monitor? No   Do you have artificial joints? No   Are you allergic to latex? No     METS 4+     Advance Care Planning    Discussed advance care planning with patient; however, patient declined at this time.    Preoperative Review of    reviewed - controlled substances reflected in medication list.      Status of Chronic Conditions:  See problem list for active medical problems.  Problems all longstanding and stable, except as noted/documented.  See ROS for pertinent symptoms related to these conditions.    Patient Active Problem List    Diagnosis Date Noted    Displaced fracture of middle phalanx of right little finger, initial encounter for closed fracture 04/24/2025     Priority: Medium    Surgical menopause 02/11/2022     Priority: Medium    Trigger thumb of both thumbs 06/17/2020     Priority: Medium     Added automatically from request for surgery [id number]      Hidradenitis suppurativa 05/13/2019     Priority: Medium    Chronic female pelvic pain 09/24/2018     Priority: Medium    Biliary dyskinesia 10/06/2017     Priority: Medium    ACP (advance care planning) 08/05/2016     Priority: Medium     Advance Care Planning 8/5/2016: ACP Review of Chart / Resources Provided:  Reviewed chart for advance care plan.  Purnima Del Real has no plan or code status on file. Discussed available resources and provided with information. Confirmed code status reflects current choices pending further ACP discussions.  Confirmed/documented legally designated decision makers.  Added by Purnima Mireles            Ischiorectal abscess and fistula      Priority: Medium    Kidney stones      Priority: Medium     x2 passed on her own      Cystic acne 07/23/2013     Priority: Medium    Migraines      Priority: Medium    Depression      Priority: Medium    ADHD  (attention deficit hyperactivity disorder)      Priority: Medium    Mass of breast 02/02/2012     Priority: Medium    Pain in joint, forearm 05/17/2007     Priority: Medium     Overview:   IMO Update 10/11      Carpal tunnel syndrome 08/23/2006     Priority: Medium      Past Medical History:   Diagnosis Date    ADHD (attention deficit hyperactivity disorder)     Arthritis 2022    Biliary dyskinesia 10/06/2017    Carpal tunnel syndrome 08/23/2006    Chronic female pelvic pain 09/24/2018    Cystic acne 07/23/2013    Depression     Depressive disorder     Hidradenitis 02/16/2007    Hidradenitis suppurativa 05/13/2019    Ischiorectal abscess and fistula     Kidney stones 2008    x2 passed on her own    Malunion of fracture 05/17/2007    Mass of breast 02/02/2012    Migraines     Need for prophylactic hormone replacement therapy (postmenopausal)     Nondependent tobacco use disorder 10/11/2012    Papanicolaou smear of cervix with low grade squamous intraepithelial lesion (LGSIL) 10/29/2008    Pilonidal cyst 09/12/2017    S/p partial hysterectomy with remaining cervical stump 09/27/2013    Surgical menopause 02/11/2022    Trigger thumb of both thumbs 06/17/2020    Added automatically from request for surgery [id number]     Past Surgical History:   Procedure Laterality Date    ABDOMEN SURGERY      BIOPSY      COLONOSCOPY      CYSTECTOMY PILONIDAL N/A 09/18/2017    Procedure: CYSTECTOMY PILONIDAL;  PILONIDAL CYSTECTOMY ;  Surgeon: Cordell Stephens MD;  [facility]: [facility]    ENDOSCOPY UPPER, COLONOSCOPY, COMBINED N/A 10/05/2018    Procedure: COMBINED ENDOSCOPY UPPER, COLONOSCOPY;  UPPER ENDOSCOPY with Biopsy  AND COLONOSCOPY;  Surgeon: Cordell Stephens MD;  [facility]    EXCISE LESION BUTTOCK(S) Left 06/08/2018    Procedure: EXCISE LESION BUTTOCK(S);  EXCISION OF GLUTEAL LEFT SKIN LESION;  Surgeon: Cordell Stephens MD;  [facility]    EXCISE MASS NECK Right 08/04/2015    Procedure: EXCISE MASS NECK;  Surgeon:  Nasir Jones MD;  [facility]    INCISION AND DRAINAGE PERINEAL, COMBINED N/A 03/18/2015    Procedure: COMBINED INCISION AND DRAINAGE PERINEAL;  Surgeon: Jay Torres DO;  [facility]    IR CONSULTATION FOR IR EXAM  05/09/2023    IR FLUORO 0-1 HOUR  06/05/2023    IRRIGATION AND DEBRIDEMENT GROIN N/A 02/07/2019    Procedure: IRRIGATION AND DEBRIDEMENT LEFT GROIN ABSCESS;  Surgeon: Cordell Stephens MD;  [facility]    LAPAROSCOPIC APPENDECTOMY  02/11/2022    LAPAROSCOPIC CHOLECYSTECTOMY N/A 11/20/2017    Procedure: LAPAROSCOPIC CHOLECYSTECTOMY;  LAPAROSCOPIC CHOLECYSTECTOMY;  Surgeon: Cordell Stephens MD;  [facility]    LAPAROSCOPIC HYSTERECTOMY SUPRACERVICAL, BILATERAL SALPINGO-OOPHORECTOMY, COMBINED  09/27/2013    Procedure: COMBINED LAPAROSCOPIC HYSTERECTOMY SUPRACERVICAL, SALPINGO-OOPHORECTOMY;  LAPAROSCOPIC SUPRACERVICAL HYSTERECTOMY AND RIGHT SALPINGO-OOPHORECTOMY, CYSTOSCOPY;  Surgeon: Denys Callahan MD;  [facility]    LAPAROSCOPIC OOPHORECTOMY Left 02/11/2022    Procedure: Left OOPHORECTOMY, LAPAROSCOPIC, Laparoscopic Appendectomy Torsion and Pelvic Appensix;  Surgeon: Malick Malhotra MD;  [facility]    LAPAROSCOPY DIAGNOSTIC (GYN) N/A 09/06/2018    Procedure: LAPAROSCOPY DIAGNOSTIC (GYN);  LAPAROSCOPY WITH PERITONEAL BIOPSIES AND REMOVAL LEFT FALLOPIAN TUBE;  Surgeon: Suraj Whitaker MD;  [facility]    LASER HOLMIUM LITHOTRIPSY URETER(S), INSERT STENT, COMBINED Right 4/28/2025    Procedure: CYSTOURETEROSCOPY, with right ureterosopy and stone retrieval;  Surgeon: Joaquín Cabral MD;  [facility]    marsupialization of pilonidal cyst  01/01/2007    OPEN REDUCTION INTERNAL FIXATION FINGER(S) Right 4/15/2025    Procedure: Open Reduction Internal Fixation  Right Small Finger Intra Articular Fracture of Middle Phalanx;  Surgeon: Fernando Acevedo MD;  [facility]    OPEN REDUCTION INTERNAL FIXATION FINGER(S) Right 5/13/2025    Procedure: Open  Reduction and Fixation Right Fifth Finger;  Surgeon: Fernando Acevedo MD;  [facility]    RELEASE TRIGGER FINGER BILATERAL Bilateral 07/10/2020    Procedure: RELEASE BILATERAL TRIGGER THUMBS;  Surgeon: Vince Murillo DO;  [facility]    REMOVE FOREIGN BODY FINGER Right 5/13/2025    Procedure: Right fifth finger hardware removal;  Surgeon: Fernando Acevedo MD;  [facility]    TUBAL LIGATION  01/01/2005     Current Outpatient Medications   Medication Sig Dispense Refill    Adalimumab (HUMIRA, 2 PEN,) 40 MG/0.4ML pen kit Inject 0.4 mLs (40 mg) subcutaneously every 7 days. 4.8 mL 0    amphetamine-dextroamphetamine (ADDERALL XR) 30 MG 24 hr capsule Take 1 capsule (30 mg) by mouth daily. 30 capsule 0    amphetamine-dextroamphetamine (ADDERALL) 10 MG tablet Take 1 tablet (10 mg) by mouth daily. 30 tablet 0    amphetamine-dextroamphetamine (ADDERALL) 30 MG tablet Take 1 tablet (30 mg) by mouth daily. 30 tablet 0    buPROPion (WELLBUTRIN XL) 150 MG 24 hr tablet Take 1 tablet (150 mg) by mouth every morning. 90 tablet 1    buPROPion (WELLBUTRIN XL) 300 MG 24 hr tablet TAKE 1 TABLET BY MOUTH EVERY MORNING ALONG WITH 1 150MG TABLET - TOTAL DOSE 450MG 90 tablet 1    escitalopram (LEXAPRO) 20 MG tablet Take 1 tablet (20 mg) by mouth daily. 90 tablet 0    estradiol (ESTRACE) 1 MG tablet Take 1 tablet (1 mg) by mouth daily. 90 tablet 3    HYDROcodone-acetaminophen (NORCO) 5-325 MG tablet Take 1 tablet by mouth nightly as needed for moderate to severe pain. 15 tablet 0    multivitamin, therapeutic (THERA-VIT) TABS tablet Take 1 tablet by mouth daily.      SUMAtriptan (IMITREX) 25 MG tablet TAKE 1 TABLET BY MOUTH AT ONSET OF HEADACHE FOR MIGRAINE, MAY REPEAT IN 2 HOURS MAX OF 8 TABLETS PER 24 HOURS 20 tablet 0    tiZANidine (ZANAFLEX) 4 MG tablet Take 1 tablet (4 mg) by mouth 3 times daily as needed for muscle spasms. 30 tablet 0    valACYclovir (VALTREX) 500 MG tablet Take 1 tablet (500 mg) by mouth daily. 90 tablet  "3       No Known Allergies     Social History     Tobacco Use    Smoking status: Every Day     Current packs/day: 0.50     Average packs/day: 0.5 packs/day for 24.5 years (12.3 ttl pk-yrs)     Types: Cigarettes     Start date: 1/1/2001     Passive exposure: Current    Smokeless tobacco: Never    Tobacco comments:     I have been working on quitting. 7/2/25   Substance Use Topics    Alcohol use: No     Family History   Problem Relation Age of Onset    Other Cancer Father         Lung cancer    Diabetes Paternal Grandmother     Other Cancer Maternal Grandmother         Lung cancer     History   Drug Use    Types: Marijuana     Comment: pt states yesterday evening             Review of Systems  CONSTITUTIONAL: NEGATIVE for fever, chills, change in weight  INTEGUMENTARY/SKIN: NEGATIVE for worrisome rashes, moles or lesions  EYES: NEGATIVE for vision changes or irritation  ENT/MOUTH: NEGATIVE for ear, mouth and throat problems  RESP: NEGATIVE for significant cough or SOB  BREAST: NEGATIVE for masses, tenderness or discharge  CV: NEGATIVE for chest pain, palpitations or peripheral edema  GI: NEGATIVE for nausea, abdominal pain, heartburn, or change in bowel habits  : NEGATIVE for frequency, dysuria, or hematuria  MUSCULOSKELETAL: NEGATIVE for significant arthralgias or myalgia. +right little finger pain   NEURO: NEGATIVE for weakness, dizziness or paresthesias  ENDOCRINE: NEGATIVE for temperature intolerance, skin/hair changes  HEME: NEGATIVE for bleeding problems  PSYCHIATRIC: NEGATIVE for changes in mood or affect    Objective    /70   Pulse 70   Temp 97.4  F (36.3  C) (Tympanic)   Resp 18   Wt 46.7 kg (103 lb)   LMP 08/10/2013   SpO2 99%   BMI 18.84 kg/m     Estimated body mass index is 18.84 kg/m  as calculated from the following:    Height as of 7/2/25: 1.575 m (5' 2\").    Weight as of this encounter: 46.7 kg (103 lb).  Physical Exam  GENERAL: alert and no distress  EYES: Eyes grossly normal to " inspection, PERRL and conjunctivae and sclerae normal  HENT: ear canals and TM's normal, nose and mouth without ulcers or lesions  NECK: no adenopathy, no asymmetry, masses, or scars  RESP: lungs clear to auscultation - no rales, rhonchi or wheezes  CV: regular rate and rhythm, normal S1 S2, no S3 or S4, no murmur, click or rub, no peripheral edema  ABDOMEN: soft, nontender, no hepatosplenomegaly, no masses and bowel sounds normal  MS: no gross musculoskeletal defects noted, no edema  SKIN: no suspicious lesions or rashes  NEURO: Normal strength and tone, mentation intact and speech normal  PSYCH: mentation appears normal, affect normal/bright    Recent Labs   Lab Test 04/23/25 2009 04/09/25  1403   HGB 12.2 13.0    297    141   POTASSIUM 3.6 3.5   CR 0.86 0.94        Diagnostics  Results for orders placed or performed in visit on 07/03/25   Basic metabolic panel     Status: Normal   Result Value Ref Range    Sodium 139 135 - 145 mmol/L    Potassium 4.7 3.4 - 5.3 mmol/L    Chloride 103 98 - 107 mmol/L    Carbon Dioxide (CO2) 26 22 - 29 mmol/L    Anion Gap 10 7 - 15 mmol/L    Urea Nitrogen 18.5 6.0 - 20.0 mg/dL    Creatinine 0.88 0.51 - 0.95 mg/dL    GFR Estimate 83 >60 mL/min/1.73m2    Calcium 9.6 8.8 - 10.4 mg/dL    Glucose 95 70 - 99 mg/dL   UA Macroscopic with reflex to Microscopic and Culture     Status: Normal    Specimen: Urine, Midstream   Result Value Ref Range    Color Urine Yellow Colorless, Straw, Light Yellow, Yellow    Appearance Urine Clear Clear    Glucose Urine Negative Negative mg/dL    Bilirubin Urine Negative Negative    Ketones Urine Negative Negative mg/dL    Specific Gravity Urine >=1.030 1.003 - 1.035    Blood Urine Negative Negative    pH Urine 5.5 5.0 - 7.0    Protein Albumin Urine Negative Negative mg/dL    Urobilinogen Urine 1.0 0.2, 1.0 E.U./dL    Nitrite Urine Negative Negative    Leukocyte Esterase Urine Negative Negative    Narrative    Microscopic not indicated   CBC  with platelets and differential     Status: Abnormal   Result Value Ref Range    WBC Count 8.3 4.0 - 11.0 10e3/uL    RBC Count 4.03 3.80 - 5.20 10e6/uL    Hemoglobin 13.5 11.7 - 15.7 g/dL    Hematocrit 39.4 35.0 - 47.0 %    MCV 98 78 - 100 fL    MCH 33.5 (H) 26.5 - 33.0 pg    MCHC 34.3 31.5 - 36.5 g/dL    RDW 14.0 10.0 - 15.0 %    Platelet Count 310 150 - 450 10e3/uL    % Neutrophils 47 %    % Lymphocytes 44 %    % Monocytes 7 %    % Eosinophils 1 %    % Basophils 1 %    % Immature Granulocytes 0 %    Absolute Neutrophils 3.9 1.6 - 8.3 10e3/uL    Absolute Lymphocytes 3.7 0.8 - 5.3 10e3/uL    Absolute Monocytes 0.6 0.0 - 1.3 10e3/uL    Absolute Eosinophils 0.1 0.0 - 0.7 10e3/uL    Absolute Basophils 0.0 0.0 - 0.2 10e3/uL    Absolute Immature Granulocytes 0.0 <=0.4 10e3/uL   CBC with Platelets & Differential     Status: Abnormal    Narrative    The following orders were created for panel order CBC with Platelets & Differential.  Procedure                               Abnormality         Status                     ---------                               -----------         ------                     CBC with platelets and ...[[id number]]  Abnormal            Final result                 Please view results for these tests on the individual orders.     Normal EKG in past 90 days.     HX hysterectomy.     Revised Cardiac Risk Index (RCRI)  The patient has the following serious cardiovascular risks for perioperative complications:   - No serious cardiac risks = 0 points     RCRI Interpretation: 0 points: Class I (very low risk - 0.4% complication rate)    Signed Electronically by: ALONDRA Gray CNP  A copy of this evaluation report is provided to the requesting physician.

## 2025-07-03 NOTE — TELEPHONE ENCOUNTER
HYDROcodone-acetaminophen (NORCO) 5-325 MG tablet       Last Written Prescription Date:  05/19/2025  Last Fill Quantity: 15,   # refills: 0  Last Office Visit: 03/21/2025  Future Office visit:    Next 5 appointments (look out 90 days)      Jul 09, 2025 10:15 AM  (Arrive by 10:00 AM)  Return Visit with Fernando Acveedo MD  [facility] [address] [place] [address]  [phone]             Routing refill request to provider for review/approval because:    Rx Protocol Controlled Substance [id number]/02/2025 04:32 PM   Protocol Details Urine drug screeen results on file in past 12 months    Controlled Substance Agreement on file in last 12 months    Auto Fail - Please forward to Provider    Naloxone on active med list    WENDY-7 done in past year        Kimberly Boecker, RN

## 2025-07-03 NOTE — PATIENT INSTRUCTIONS
How to Take Your Medication Before Surgery  Preoperative Medication Instructions   Antiplatelet or Anticoagulation Medication Instructions   - We reviewed the medication list and the patient is not on an antiplatelet or anticoagulation medications.    Additional Medication Instructions  Take all scheduled medications on the day of surgery EXCEPT for modifications listed below:   - Herbal medications and vitamins: DO NOT TAKE 14 days prior to surgery.  Take all scheduled medications on the day of surgery EXCEPT for modifications listed below:   - Herbal medications and vitamins: DO NOT TAKE 14 days prior to surgery.  Hold Adderall am of procedure and then resume.   Hold Humira until after procedure.     Patient Education   Preparing for Your Surgery  For Adults  Getting started  In most cases, a nurse will call to review your health history and instructions. They will give you an arrival time based on your scheduled surgery time. Please be ready to share:  Your doctor's clinic name and phone number  Your medical, surgical, and anesthesia history  A list of allergies and sensitivities  A list of medicines, including herbal treatments and over-the-counter drugs  Whether the patient has a legal guardian (ask how to send us the papers in advance)  Note: You may not receive a call if you were seen at our PAC (Preoperative Assessment Center).  Please tell us if you're pregnant--or if there's any chance you might be pregnant. Some surgeries may injure a fetus (unborn baby), so they require a pregnancy test. Surgeries that are safe for a fetus don't always need a test, and you can choose whether to have one.   Preparing for surgery  Within 10 to 30 days of surgery: Have a pre-op exam (sometimes called an H&P, or History and Physical). This can be done at a clinic or pre-operative center.  If you're having a , you may not need this exam. Talk to your care team.  At your pre-op exam, talk to your care team about all  medicines you take. (This includes CBD oil and any drugs, such as THC, marijuana, and other forms of cannabis.) If you need to stop any medicine before surgery, ask when to start taking it again.  This is for your safety. Many medicines and drugs can make you bleed too much during surgery. Some change how well surgery (anesthesia) drugs work.  Call your insurance company to let them know you're having surgery. (If you don't have insurance, call [phone].)  Call your clinic if there's any change in your health. This includes a scrape or scratch near the surgery site, or any signs of a cold (sore throat, runny nose, cough, rash, fever).  Eating and drinking guidelines  For your safety: Unless your surgeon tells you otherwise, follow the guidelines below.  Eat and drink as normal until 8 hours before you arrive for surgery. After that, no food or milk. You can spit out gum when you arrive.  Drink clear liquids until 2 hours before you arrive. These are liquids you can see through, like water, Gatorade, and Propel Water. They also include plain black coffee and tea (no cream or milk).  No alcohol for 24 hours before you arrive. The night before surgery, stop any drinks that contain THC.  If your care team tells you to take medicine on the morning of surgery, it's okay to take it with a sip of water. No other medicines or drugs are allowed (including CBD oil)--follow your care team's instructions.  If you have questions the day of surgery, call your hospital or surgery center.   Preventing infection  Shower or bathe the night before and the morning of surgery. Follow the instructions your clinic gave you. (If no instructions, use regular soap.)  Don't shave or clip hair near your surgery site. We'll remove the hair if needed.  Don't smoke or vape the morning of surgery. No chewing tobacco for 6 hours before you arrive. A nicotine patch is okay. You may spit out nicotine gum when you arrive.  For some surgeries, the  surgeon will tell you to fully quit smoking and nicotine.  We will make every effort to keep you safe from infection. We will:  Clean our hands often with soap and water (or an alcohol-based hand rub).  Clean the skin at your surgery site with a special soap that kills germs.  Give you a special gown to keep you warm. (Cold raises the risk of infection.)  Wear hair covers, masks, gowns, and gloves during surgery.  Give antibiotic medicine, if prescribed. Not all surgeries need this medicine.  What to bring on the day of surgery  Photo ID and insurance card  Copy of your health care directive, if you have one  Glasses and hearing aids (bring cases)  You can't wear contacts during surgery  Inhaler and eye drops, if you use them (tell us about these when you arrive)  CPAP machine or breathing device, if you use them  A few personal items, if spending the night  If you have . . .  A pacemaker, ICD (cardiac defibrillator), or other implant: Bring the ID card.  An implanted stimulator: Bring the remote control.  A legal guardian: Bring a copy of the certified (court-stamped) guardianship papers.  Please remove any jewelry, including body piercings. Leave jewelry and other valuables at home.  If you're going home the day of surgery  You must have a support person drive you home. They should stay with you overnight, and they may need to help with your self-care.  If you don't have a support person, please tells us as soon as possible. We can help.  After surgery  If it's hard to control your pain or you need more pain medicine, please call your surgeon's office.  Questions?   If you have any questions for your care team, list them here:   ____________________________________________________________________________________________________________________________________________________________________________________________________________________________________________________________  For informational purposes only. Not  to replace the advice of your health care provider. Copyright   2003, 2019 [facility]. All rights reserved. Clinically reviewed by Naseem Corado MD. Gidsy [id number] - REV 02/25.

## 2025-07-07 ENCOUNTER — ANESTHESIA EVENT (OUTPATIENT)
Dept: SURGERY | Facility: HOSPITAL | Age: 45
End: 2025-07-07
Payer: COMMERCIAL

## 2025-07-07 ASSESSMENT — LIFESTYLE VARIABLES: TOBACCO_USE: 1

## 2025-07-07 NOTE — ANESTHESIA PREPROCEDURE EVALUATION
Anesthesia Pre-Procedure Evaluation    Patient: Purnima Fallon   MRN: [id number] : 1980          Procedure : Procedure(s):  Right Hand External Fixator Removal         Past Medical History:   Diagnosis Date    ADHD (attention deficit hyperactivity disorder)     Arthritis     Biliary dyskinesia 10/06/2017    Carpal tunnel syndrome 2006    Chronic female pelvic pain 2018    Cystic acne 2013    Depression     Depressive disorder     Hidradenitis 2007    Hidradenitis suppurativa 2019    Ischiorectal abscess and fistula     Kidney stones 2008    x2 passed on her own    Malunion of fracture 2007    Mass of breast 2012    Migraines     Need for prophylactic hormone replacement therapy (postmenopausal)     Nondependent tobacco use disorder 10/11/2012    Papanicolaou smear of cervix with low grade squamous intraepithelial lesion (LGSIL) 10/29/2008    Pilonidal cyst 2017    S/p partial hysterectomy with remaining cervical stump 2013    Surgical menopause 2022    Trigger thumb of both thumbs 2020    Added automatically from request for surgery [id number]      Past Surgical History:   Procedure Laterality Date    ABDOMEN SURGERY      BIOPSY      COLONOSCOPY      CYSTECTOMY PILONIDAL N/A 2017    Procedure: CYSTECTOMY PILONIDAL;  PILONIDAL CYSTECTOMY ;  Surgeon: Cordell Stephens MD;  [facility]: [facility]    ENDOSCOPY UPPER, COLONOSCOPY, COMBINED N/A 10/05/2018    Procedure: COMBINED ENDOSCOPY UPPER, COLONOSCOPY;  UPPER ENDOSCOPY with Biopsy  AND COLONOSCOPY;  Surgeon: Cordell Stephens MD;  [facility]    EXCISE LESION BUTTOCK(S) Left 2018    Procedure: EXCISE LESION BUTTOCK(S);  EXCISION OF GLUTEAL LEFT SKIN LESION;  Surgeon: Cordell Stephens MD;  [facility]    EXCISE MASS NECK Right 2015    Procedure: EXCISE MASS NECK;  Surgeon: Nasir Jones MD;  [facility]    INCISION AND DRAINAGE PERINEAL, COMBINED N/A  03/18/2015    Procedure: COMBINED INCISION AND DRAINAGE PERINEAL;  Surgeon: Jay Torres DO;  [facility]    IR CONSULTATION FOR IR EXAM  05/09/2023    IR FLUORO 0-1 HOUR  06/05/2023    IRRIGATION AND DEBRIDEMENT GROIN N/A 02/07/2019    Procedure: IRRIGATION AND DEBRIDEMENT LEFT GROIN ABSCESS;  Surgeon: Cordell Stephens MD;  [facility]    LAPAROSCOPIC APPENDECTOMY  02/11/2022    LAPAROSCOPIC CHOLECYSTECTOMY N/A 11/20/2017    Procedure: LAPAROSCOPIC CHOLECYSTECTOMY;  LAPAROSCOPIC CHOLECYSTECTOMY;  Surgeon: Cordell Stephens MD;  [facility]    LAPAROSCOPIC HYSTERECTOMY SUPRACERVICAL, BILATERAL SALPINGO-OOPHORECTOMY, COMBINED  09/27/2013    Procedure: COMBINED LAPAROSCOPIC HYSTERECTOMY SUPRACERVICAL, SALPINGO-OOPHORECTOMY;  LAPAROSCOPIC SUPRACERVICAL HYSTERECTOMY AND RIGHT SALPINGO-OOPHORECTOMY, CYSTOSCOPY;  Surgeon: Denys Callahan MD;  [facility]    LAPAROSCOPIC OOPHORECTOMY Left 02/11/2022    Procedure: Left OOPHORECTOMY, LAPAROSCOPIC, Laparoscopic Appendectomy Torsion and Pelvic Appensix;  Surgeon: Malick Malhotra MD;  [facility]    LAPAROSCOPY DIAGNOSTIC (GYN) N/A 09/06/2018    Procedure: LAPAROSCOPY DIAGNOSTIC (GYN);  LAPAROSCOPY WITH PERITONEAL BIOPSIES AND REMOVAL LEFT FALLOPIAN TUBE;  Surgeon: Suraj Whitaker MD;  [facility]    LASER HOLMIUM LITHOTRIPSY URETER(S), INSERT STENT, COMBINED Right 4/28/2025    Procedure: CYSTOURETEROSCOPY, with right ureterosopy and stone retrieval;  Surgeon: Joaquín Cabral MD;  [facility]    marsupialization of pilonidal cyst  01/01/2007    OPEN REDUCTION INTERNAL FIXATION FINGER(S) Right 4/15/2025    Procedure: Open Reduction Internal Fixation  Right Small Finger Intra Articular Fracture of Middle Phalanx;  Surgeon: Fernando Acevedo MD;  [facility]    OPEN REDUCTION INTERNAL FIXATION FINGER(S) Right 5/13/2025    Procedure: Open Reduction and Fixation Right Fifth Finger;  Surgeon: Fernando Acevedo MD;  [facility]     RELEASE TRIGGER FINGER BILATERAL Bilateral 07/10/2020    Procedure: RELEASE BILATERAL TRIGGER THUMBS;  Surgeon: Vince Murillo DO;  [facility]    REMOVE FOREIGN BODY FINGER Right 5/13/2025    Procedure: Right fifth finger hardware removal;  Surgeon: Fernando Acevedo MD;  [facility]    TUBAL LIGATION  01/01/2005      No Known Allergies   Social History     Tobacco Use    Smoking status: Every Day     Current packs/day: 0.50     Average packs/day: 0.5 packs/day for 24.5 years (12.3 ttl pk-yrs)     Types: Cigarettes     Start date: 1/1/2001     Passive exposure: Current    Smokeless tobacco: Never    Tobacco comments:     I have been working on quitting. 7/2/25   Substance Use Topics    Alcohol use: No      Wt Readings from Last 1 Encounters:   07/03/25 46.7 kg (103 lb)        Anesthesia Evaluation   Pt has had prior anesthetic. Type: General.    No history of anesthetic complications       ROS/MED HX  ENT/Pulmonary:     (+)                tobacco use, Current use, 0.5 packs/day, 12  Pack-Year Hx,                      Neurologic:     (+)      migraines,                          Cardiovascular:  - neg cardiovascular ROS   (+)  - -   -  - -                                 Previous cardiac testing   Echo: Date: Results:    Stress Test:  Date: Results:    ECG Reviewed:  Date: 4/9/25 Results:  HR 70, NSR  Cath:  Date: Results:      METS/Exercise Tolerance: >4 METS    Hematologic:  - neg hematologic  ROS     Musculoskeletal: Comment: Painful orthopaedic hardware - reason for 7/2025 sx    Pain 4/10 daily, denies paraesthesia    Was chasing a dog and had initial injury  (+)  arthritis,             GI/Hepatic: Comment: Biliary dyskinesia      Renal/Genitourinary: Comment: Right kidney stone 4/28/25    (+)       Nephrolithiasis ,       Endo:  - neg endo ROS     Psychiatric/Substance Use:     (+) psychiatric history other (comment) and depression (ADHD)   Recreational drug usage: Cannabis (about a week ago).   "  Infectious Disease:  - neg infectious disease ROS     Malignancy:  - neg malignancy ROS     Other: Comment: Chronic female pelvic pain  Hidradenitis suppurativa - on Humira     (+)  LMP: s/p hysterectomy, ,           Physical Exam  Airway  Mallampati: II  TM distance: >3 FB  Neck ROM: full  Mouth opening: >= 4 cm    Cardiovascular - normal exam  Rhythm: regular  Rate: normal rate     Dental   (+) Minor Abnormalities - some fillings, tiny chips      Pulmonary - normal examBreath sounds clear to auscultation        Neurological - normal exam  She appears awake, alert and oriented x3.    Other Findings       OUTSIDE LABS:  CBC:   Lab Results   Component Value Date    WBC 8.3 07/03/2025    WBC 8.9 04/23/2025    HGB 13.5 07/03/2025    HGB 12.2 04/23/2025    HCT 39.4 07/03/2025    HCT 36.0 04/23/2025     07/03/2025     04/23/2025     BMP:   Lab Results   Component Value Date     07/03/2025     04/23/2025    POTASSIUM 4.7 07/03/2025    POTASSIUM 3.6 04/23/2025    CHLORIDE 103 07/03/2025    CHLORIDE 100 04/23/2025    CO2 26 07/03/2025    CO2 28 04/23/2025    BUN 18.5 07/03/2025    BUN 16.6 04/23/2025    CR 0.88 07/03/2025    CR 0.86 04/23/2025    GLC 95 07/03/2025    GLC 93 04/23/2025     COAGS: No results found for: \"PTT\", \"INR\", \"FIBR\"  POC:   Lab Results   Component Value Date    HCG Negative 09/30/2014    HCGS Negative 07/22/2013     HEPATIC:   Lab Results   Component Value Date    ALBUMIN 4.3 04/09/2025    PROTTOTAL 7.1 04/09/2025    ALT 21 04/09/2025    AST 24 04/09/2025    ALKPHOS 77 04/09/2025    BILITOTAL 0.2 04/09/2025     OTHER:   Lab Results   Component Value Date    LACT 0.9 11/29/2017    ANDRÉS 9.6 07/03/2025    LIPASE 96 02/10/2022    AMYLASE 349 (H) 06/27/2017    TSH 1.30 12/23/2024    CRP <2.9 02/10/2022       Anesthesia Plan    ASA Status:  3      NPO Status: NPO Appropriate   Anesthesia Type: General.  Maintenance: Balanced.   Techniques and Equipment:       - Monitoring Plan: " standard ASA monitoring     Consents    Anesthesia Plan(s) and associated risks, benefits, and realistic alternatives discussed. Questions answered and patient/representative(s) expressed understanding.     - Discussed: CRNA     - Discussed with:  Patient        - Pt is DNR/DNI Status: no DNR          Postoperative Care         Comments:    Other Comments: Reviewed chart & 7/3 [facility]    Discussed risks and benefits with patient for general anesthesia including sore throat, nausea, vomiting, aspiration, dental damage, loss of airway, CV complications, stroke, MI, death. Pt wishes to proceed.                Jeannie Richards, ALONDRA CNP    I have reviewed the pertinent notes and labs in the chart from the past 30 days and (re)examined the patient.  Any updates or changes from those notes are reflected in this note.    Clinically Significant Risk Factors Present on Admission

## 2025-07-10 ENCOUNTER — HOSPITAL ENCOUNTER (OUTPATIENT)
Facility: HOSPITAL | Age: 45
Discharge: HOME | End: 2025-07-10
Attending: ORTHOPAEDIC SURGERY | Admitting: ORTHOPAEDIC SURGERY
Payer: COMMERCIAL

## 2025-07-10 ENCOUNTER — ANESTHESIA (OUTPATIENT)
Dept: SURGERY | Facility: HOSPITAL | Age: 45
End: 2025-07-10
Payer: COMMERCIAL

## 2025-07-10 ENCOUNTER — APPOINTMENT (OUTPATIENT)
Dept: GENERAL RADIOLOGY | Facility: HOSPITAL | Age: 45
End: 2025-07-10
Attending: ORTHOPAEDIC SURGERY
Payer: COMMERCIAL

## 2025-07-10 VITALS
HEART RATE: 59 BPM | WEIGHT: 103.6 LBS | SYSTOLIC BLOOD PRESSURE: 119 MMHG | OXYGEN SATURATION: 99 % | TEMPERATURE: 97 F | BODY MASS INDEX: 19.06 KG/M2 | DIASTOLIC BLOOD PRESSURE: 66 MMHG | HEIGHT: 62 IN | RESPIRATION RATE: 18 BRPM

## 2025-07-10 DIAGNOSIS — S62.626A DISPLACED FRACTURE OF MIDDLE PHALANX OF RIGHT LITTLE FINGER, INITIAL ENCOUNTER FOR CLOSED FRACTURE: Primary | ICD-10-CM

## 2025-07-10 PROCEDURE — 250N000013 HC RX MED GY IP 250 OP 250 PS 637: Performed by: ORTHOPAEDIC SURGERY

## 2025-07-10 PROCEDURE — 710N000012 HC RECOVERY PHASE 2, PER MINUTE: Performed by: ORTHOPAEDIC SURGERY

## 2025-07-10 PROCEDURE — 360N000076 HC SURGERY LEVEL 3, PER MIN: Performed by: ORTHOPAEDIC SURGERY

## 2025-07-10 PROCEDURE — 272N000001 HC OR GENERAL SUPPLY STERILE: Performed by: ORTHOPAEDIC SURGERY

## 2025-07-10 PROCEDURE — 999N000141 HC STATISTIC PRE-PROCEDURE NURSING ASSESSMENT: Performed by: ORTHOPAEDIC SURGERY

## 2025-07-10 PROCEDURE — 999N000179 XR SURGERY CARM FLUORO LESS THAN 5 MIN W STILLS

## 2025-07-10 PROCEDURE — 250N000011 HC RX IP 250 OP 636: Performed by: ORTHOPAEDIC SURGERY

## 2025-07-10 PROCEDURE — 258N000003 HC RX IP 258 OP 636: Performed by: NURSE PRACTITIONER

## 2025-07-10 PROCEDURE — 20694 RMVL EXT FIXJ SYS UNDER ANES: CPT | Mod: RT | Performed by: ORTHOPAEDIC SURGERY

## 2025-07-10 PROCEDURE — 250N000011 HC RX IP 250 OP 636: Performed by: NURSE ANESTHETIST, CERTIFIED REGISTERED

## 2025-07-10 PROCEDURE — 370N000017 HC ANESTHESIA TECHNICAL FEE, PER MIN: Performed by: ORTHOPAEDIC SURGERY

## 2025-07-10 RX ORDER — SODIUM CHLORIDE, SODIUM LACTATE, POTASSIUM CHLORIDE, CALCIUM CHLORIDE 600; 310; 30; 20 MG/100ML; MG/100ML; MG/100ML; MG/100ML
INJECTION, SOLUTION INTRAVENOUS CONTINUOUS
Status: DISCONTINUED | OUTPATIENT
Start: 2025-07-10 | End: 2025-07-10 | Stop reason: HOSPADM

## 2025-07-10 RX ORDER — BUPIVACAINE HYDROCHLORIDE 5 MG/ML
INJECTION, SOLUTION EPIDURAL; INTRACAUDAL; PERINEURAL
Status: DISCONTINUED
Start: 2025-07-10 | End: 2025-07-10 | Stop reason: WASHOUT

## 2025-07-10 RX ORDER — HYDROMORPHONE HYDROCHLORIDE 1 MG/ML
0.5 INJECTION, SOLUTION INTRAMUSCULAR; INTRAVENOUS; SUBCUTANEOUS EVERY 5 MIN PRN
Status: DISCONTINUED | OUTPATIENT
Start: 2025-07-10 | End: 2025-07-10 | Stop reason: HOSPADM

## 2025-07-10 RX ORDER — CEFAZOLIN SODIUM/WATER 2 G/20 ML
2 SYRINGE (ML) INTRAVENOUS SEE ADMIN INSTRUCTIONS
Status: DISCONTINUED | OUTPATIENT
Start: 2025-07-10 | End: 2025-07-10 | Stop reason: HOSPADM

## 2025-07-10 RX ORDER — HALOPERIDOL 5 MG/ML
2 INJECTION INTRAMUSCULAR
Status: DISCONTINUED | OUTPATIENT
Start: 2025-07-10 | End: 2025-07-10 | Stop reason: HOSPADM

## 2025-07-10 RX ORDER — HYDROCODONE BITARTRATE AND ACETAMINOPHEN 5; 325 MG/1; MG/1
1-2 TABLET ORAL EVERY 4 HOURS PRN
Qty: 20 TABLET | Refills: 0 | Status: SHIPPED | OUTPATIENT
Start: 2025-07-10

## 2025-07-10 RX ORDER — FENTANYL CITRATE 50 UG/ML
25 INJECTION, SOLUTION INTRAMUSCULAR; INTRAVENOUS EVERY 5 MIN PRN
Status: DISCONTINUED | OUTPATIENT
Start: 2025-07-10 | End: 2025-07-10 | Stop reason: HOSPADM

## 2025-07-10 RX ORDER — HYDROCODONE BITARTRATE AND ACETAMINOPHEN 5; 325 MG/1; MG/1
2 TABLET ORAL
Status: COMPLETED | OUTPATIENT
Start: 2025-07-10 | End: 2025-07-10

## 2025-07-10 RX ORDER — ONDANSETRON 4 MG/1
4 TABLET, ORALLY DISINTEGRATING ORAL EVERY 30 MIN PRN
Status: DISCONTINUED | OUTPATIENT
Start: 2025-07-10 | End: 2025-07-10 | Stop reason: HOSPADM

## 2025-07-10 RX ORDER — HYDRALAZINE HYDROCHLORIDE 20 MG/ML
2.5-5 INJECTION INTRAMUSCULAR; INTRAVENOUS EVERY 10 MIN PRN
Status: DISCONTINUED | OUTPATIENT
Start: 2025-07-10 | End: 2025-07-10 | Stop reason: HOSPADM

## 2025-07-10 RX ORDER — PROPOFOL 10 MG/ML
INJECTION, EMULSION INTRAVENOUS PRN
Status: DISCONTINUED | OUTPATIENT
Start: 2025-07-10 | End: 2025-07-10

## 2025-07-10 RX ORDER — ALBUTEROL SULFATE 0.83 MG/ML
2.5 SOLUTION RESPIRATORY (INHALATION) EVERY 4 HOURS PRN
Status: DISCONTINUED | OUTPATIENT
Start: 2025-07-10 | End: 2025-07-10 | Stop reason: HOSPADM

## 2025-07-10 RX ORDER — DEXAMETHASONE SODIUM PHOSPHATE 10 MG/ML
4 INJECTION, SOLUTION INTRAMUSCULAR; INTRAVENOUS
Status: DISCONTINUED | OUTPATIENT
Start: 2025-07-10 | End: 2025-07-10 | Stop reason: HOSPADM

## 2025-07-10 RX ORDER — HYDROCODONE BITARTRATE AND ACETAMINOPHEN 5; 325 MG/1; MG/1
1 TABLET ORAL
Qty: 15 TABLET | Refills: 0 | OUTPATIENT
Start: 2025-07-10

## 2025-07-10 RX ORDER — NALOXONE HYDROCHLORIDE 0.4 MG/ML
0.1 INJECTION, SOLUTION INTRAMUSCULAR; INTRAVENOUS; SUBCUTANEOUS
Status: DISCONTINUED | OUTPATIENT
Start: 2025-07-10 | End: 2025-07-10 | Stop reason: HOSPADM

## 2025-07-10 RX ORDER — CEFAZOLIN SODIUM/WATER 2 G/20 ML
2 SYRINGE (ML) INTRAVENOUS
Status: COMPLETED | OUTPATIENT
Start: 2025-07-10 | End: 2025-07-10

## 2025-07-10 RX ORDER — PROPOFOL 10 MG/ML
INJECTION, EMULSION INTRAVENOUS CONTINUOUS PRN
Status: DISCONTINUED | OUTPATIENT
Start: 2025-07-10 | End: 2025-07-10

## 2025-07-10 RX ORDER — FENTANYL CITRATE 50 UG/ML
INJECTION, SOLUTION INTRAMUSCULAR; INTRAVENOUS PRN
Status: DISCONTINUED | OUTPATIENT
Start: 2025-07-10 | End: 2025-07-10

## 2025-07-10 RX ORDER — ACETAMINOPHEN 325 MG/1
650 TABLET ORAL
Status: DISCONTINUED | OUTPATIENT
Start: 2025-07-10 | End: 2025-07-10 | Stop reason: HOSPADM

## 2025-07-10 RX ORDER — DIAZEPAM 10 MG/2ML
2.5 INJECTION, SOLUTION INTRAMUSCULAR; INTRAVENOUS
Status: DISCONTINUED | OUTPATIENT
Start: 2025-07-10 | End: 2025-07-10 | Stop reason: HOSPADM

## 2025-07-10 RX ORDER — FENTANYL CITRATE 50 UG/ML
50 INJECTION, SOLUTION INTRAMUSCULAR; INTRAVENOUS EVERY 5 MIN PRN
Status: DISCONTINUED | OUTPATIENT
Start: 2025-07-10 | End: 2025-07-10 | Stop reason: HOSPADM

## 2025-07-10 RX ORDER — HYDROMORPHONE HYDROCHLORIDE 1 MG/ML
0.25 INJECTION, SOLUTION INTRAMUSCULAR; INTRAVENOUS; SUBCUTANEOUS EVERY 5 MIN PRN
Status: DISCONTINUED | OUTPATIENT
Start: 2025-07-10 | End: 2025-07-10 | Stop reason: HOSPADM

## 2025-07-10 RX ORDER — LIDOCAINE 40 MG/G
CREAM TOPICAL
Status: DISCONTINUED | OUTPATIENT
Start: 2025-07-10 | End: 2025-07-10 | Stop reason: HOSPADM

## 2025-07-10 RX ORDER — ONDANSETRON 2 MG/ML
4 INJECTION INTRAMUSCULAR; INTRAVENOUS EVERY 30 MIN PRN
Status: DISCONTINUED | OUTPATIENT
Start: 2025-07-10 | End: 2025-07-10 | Stop reason: HOSPADM

## 2025-07-10 RX ADMIN — PROPOFOL 50 MG: 10 INJECTION, EMULSION INTRAVENOUS at 07:48

## 2025-07-10 RX ADMIN — Medication 2 G: at 07:22

## 2025-07-10 RX ADMIN — PROPOFOL 200 MCG/KG/MIN: 10 INJECTION, EMULSION INTRAVENOUS at 07:48

## 2025-07-10 RX ADMIN — HYDROCODONE BITARTRATE AND ACETAMINOPHEN 2 TABLET: 5; 325 TABLET ORAL at 08:28

## 2025-07-10 RX ADMIN — MIDAZOLAM 2 MG: 1 INJECTION INTRAMUSCULAR; INTRAVENOUS at 07:39

## 2025-07-10 RX ADMIN — FENTANYL CITRATE 50 MCG: 50 INJECTION INTRAMUSCULAR; INTRAVENOUS at 07:44

## 2025-07-10 RX ADMIN — SODIUM CHLORIDE, SODIUM LACTATE, POTASSIUM CHLORIDE, AND CALCIUM CHLORIDE: .6; .31; .03; .02 INJECTION, SOLUTION INTRAVENOUS at 06:57

## 2025-07-10 ASSESSMENT — ACTIVITIES OF DAILY LIVING (ADL)
ADLS_ACUITY_SCORE: 18

## 2025-07-10 NOTE — DISCHARGE INSTRUCTIONS
"Thank you for allowing [facility] to care for you today.  If you have any questions and/or concerns please reach out to us Monday-Friday 0800-4:00 PM at [phone].  After hours please go to the Urgent Care and/or Emergency Room.  If you feel like it is an emergency call 911.     IMPORTANT OBSERVATIONS  Check C-M-S of operative extremity every 4 hours while you are awake for the first 48 hours:    * C: color - should appear normal/pink   * M: motion - able to move fingers.   * S: sensation - able to \"feel\": no numbness, tingling  If you experience changes in C-M-S and/or in severe pain, you may remove the elastic bandages and reapply it - tight enough to provide support for the gauze dressing but loose enough to improve C-M-S. The gauze dressing should NOT be removed when rewrapping the elastic bandage.   "

## 2025-07-10 NOTE — ANESTHESIA CARE TRANSFER NOTE
Patient: Purnima Fallon    Procedure: Procedure(s):  Right Hand External Fixator Removal       Diagnosis: Encounter for removal of internal fixation device [Z47.2]  Diagnosis Additional Information: No value filed.    Anesthesia Type:   General     Note:    Oropharynx: spontaneously breathing and oropharynx clear of all foreign objects  Level of Consciousness: awake  Oxygen Supplementation: room air    Independent Airway: airway patency satisfactory and stable    Vital Signs Stable: post-procedure vital signs reviewed and stable  Report to RN Given: handoff report given  Patient transferred to: Phase II    Handoff Report: Identifed the Patient, Identified the Reponsible Provider, Reviewed the pertinent medical history, Discussed the surgical course, Reviewed Intra-OP anesthesia mangement and issues during anesthesia, Set expectations for post-procedure period and Allowed opportunity for questions and acknowledgement of understanding  Vitals:  Vitals Value Taken Time   /69 07/10/25 08:05   Temp     Pulse 59 07/10/25 08:05   Resp     SpO2 100 % 07/10/25 08:08   Vitals shown include unfiled device data.    Electronically Signed By: ALONDRA Duffy CRNA  July 10, 2025  8:09 AM

## 2025-07-10 NOTE — OR NURSING
Patient and responsible adult given discharge instructions with no questions regarding instructions. Aliya score 19. Pain level 4/10. Discharged from unit via ambulation. Patient discharged to home with family and friend.

## 2025-07-10 NOTE — ANESTHESIA POSTPROCEDURE EVALUATION
Patient: Purnima Fallon    Procedure: Procedure(s):  Right Hand External Fixator Removal       Anesthesia Type:  General    Note:  Disposition: Outpatient   Postop Pain Control: Uneventful            Sign Out: Well controlled pain   PONV: No   Neuro/Psych: Uneventful            Sign Out: Acceptable/Baseline neuro status   Airway/Respiratory: Uneventful            Sign Out: Acceptable/Baseline resp. status   CV/Hemodynamics: Uneventful            Sign Out: Acceptable CV status; No obvious hypovolemia; No obvious fluid overload   Other NRE: NONE   DID A NON-ROUTINE EVENT OCCUR? No       Last vitals:  Vitals Value Taken Time   /66 07/10/25 09:00   Temp 97  F (36.1  C) 07/10/25 09:00   Pulse 67 07/10/25 08:53   Resp 18 07/10/25 09:00   SpO2 99 % 07/10/25 09:00   Vitals shown include unfiled device data.    Electronically Signed By: ALONDRA Young CRNA  July 10, 2025  9:20 AM

## 2025-07-10 NOTE — BRIEF OP NOTE
[facility]    Brief Operative Note    Pre-operative diagnosis: Encounter for removal of internal fixation device [Z47.2]  Post-operative diagnosis Same as pre-operative diagnosis    Procedure: Right Hand External Fixator Removal, Right - Hand    Surgeon: Surgeons and Role:     * Fernando Acevedo MD - Primary     * Jose Alejandro Mixon PA-C - Assisting  Anesthesia: Choice   Estimated Blood Loss: None    Drains: None  Specimens: * No specimens in log *  Findings:   None.  Complications: None.  Implants: * No implants in log *

## 2025-07-14 NOTE — OP NOTE
[facility] [place] Operative Note    Pre-operative diagnosis: Encounter for removal of internal fixation device [Z47.2]   Post-operative diagnosis same   Procedure: Procedure(s):  Right Hand External Fixator Removal   Surgeon: Fernando Acevedo MD   Assistants(s): Jose Alejandro Mixon PA-C   Anesthesia Mac   Estimated blood loss: * No values recorded between 7/10/2025  7:49 AM and 7/10/2025  8:03 AM *    Specimens: None   Findings: Retained Ex-Fix 2 months out with healing comminuted intra-articular fracture small finger   Implants:                                        NA         Indications: Healing comminuted fracture with intra-articular incongruity send secondary to the advanced comminution and due to the amount of comminution is subsequently placed into a external fixator to try and keep adequate healing and length.  Perry presents back for Ex-Fix removal    Details of operative procedure: After preop clearance and surgical site marked consented antibiotics was elevated brought back to (placed in supine position.  She was administered a MAC anesthesia.  After well set MAC timeout was then completed.  Once well set MAC the area was then scrubbed with the Betadine around the pin and pin sites.  The external fixator hardware was removed on the outside and the pins were just backed out and removed.  Was then cleaned and prepped and then Xeroform gauze over the pin site as well as a 4 x 4 sterile Webril and a ulnar gutter splint.  Patient was able to be then awoken from MAC anesthesia transported to postop anesthesia

## 2025-07-16 ENCOUNTER — ANCILLARY PROCEDURE (OUTPATIENT)
Dept: GENERAL RADIOLOGY | Facility: OTHER | Age: 45
End: 2025-07-16
Attending: ORTHOPAEDIC SURGERY
Payer: COMMERCIAL

## 2025-07-16 ENCOUNTER — OFFICE VISIT (OUTPATIENT)
Dept: ORTHOPEDICS | Facility: OTHER | Age: 45
End: 2025-07-16
Attending: ORTHOPAEDIC SURGERY
Payer: COMMERCIAL

## 2025-07-16 VITALS
DIASTOLIC BLOOD PRESSURE: 85 MMHG | TEMPERATURE: 97.5 F | OXYGEN SATURATION: 99 % | SYSTOLIC BLOOD PRESSURE: 130 MMHG | WEIGHT: 103 LBS | HEART RATE: 70 BPM | BODY MASS INDEX: 18.84 KG/M2

## 2025-07-16 DIAGNOSIS — S62.626D CLOSED DISPLACED FRACTURE OF MIDDLE PHALANX OF RIGHT LITTLE FINGER WITH ROUTINE HEALING, SUBSEQUENT ENCOUNTER: ICD-10-CM

## 2025-07-16 DIAGNOSIS — S62.626D CLOSED DISPLACED FRACTURE OF MIDDLE PHALANX OF RIGHT LITTLE FINGER WITH ROUTINE HEALING, SUBSEQUENT ENCOUNTER: Primary | ICD-10-CM

## 2025-07-16 PROCEDURE — 73130 X-RAY EXAM OF HAND: CPT | Mod: 26 | Performed by: RADIOLOGY

## 2025-07-16 PROCEDURE — 73130 X-RAY EXAM OF HAND: CPT | Mod: TC,RT

## 2025-07-16 PROCEDURE — G0463 HOSPITAL OUTPT CLINIC VISIT: HCPCS

## 2025-07-16 ASSESSMENT — PAIN SCALES - GENERAL: PAINLEVEL_OUTOF10: MODERATE PAIN (5)

## 2025-07-17 ENCOUNTER — MYC REFILL (OUTPATIENT)
Dept: FAMILY MEDICINE | Facility: OTHER | Age: 45
End: 2025-07-17

## 2025-07-17 DIAGNOSIS — M62.838 MUSCLE SPASM: ICD-10-CM

## 2025-07-17 DIAGNOSIS — F90.8 OTHER SPECIFIED ATTENTION DEFICIT HYPERACTIVITY DISORDER (ADHD): ICD-10-CM

## 2025-07-17 DIAGNOSIS — L73.2 HIDRADENITIS SUPPURATIVA: ICD-10-CM

## 2025-07-17 DIAGNOSIS — F41.1 GAD (GENERALIZED ANXIETY DISORDER): ICD-10-CM

## 2025-07-17 DIAGNOSIS — G43.909 MIGRAINE WITHOUT STATUS MIGRAINOSUS, NOT INTRACTABLE, UNSPECIFIED MIGRAINE TYPE: ICD-10-CM

## 2025-07-17 DIAGNOSIS — E89.40 SURGICAL MENOPAUSE: ICD-10-CM

## 2025-07-17 DIAGNOSIS — F34.1 DYSTHYMIA: ICD-10-CM

## 2025-07-17 DIAGNOSIS — B00.9 HERPES SIMPLEX VIRUS INFECTION: ICD-10-CM

## 2025-07-17 RX ORDER — DEXTROAMPHETAMINE SACCHARATE, AMPHETAMINE ASPARTATE, DEXTROAMPHETAMINE SULFATE AND AMPHETAMINE SULFATE 2.5; 2.5; 2.5; 2.5 MG/1; MG/1; MG/1; MG/1
10 TABLET ORAL DAILY
Qty: 30 TABLET | Refills: 0 | Status: SHIPPED | OUTPATIENT
Start: 2025-07-17

## 2025-07-17 RX ORDER — BUPROPION HYDROCHLORIDE 150 MG/1
150 TABLET ORAL EVERY MORNING
Qty: 90 TABLET | Refills: 1 | OUTPATIENT
Start: 2025-07-17

## 2025-07-17 RX ORDER — VALACYCLOVIR HYDROCHLORIDE 500 MG/1
500 TABLET, FILM COATED ORAL DAILY
Qty: 90 TABLET | Refills: 3 | OUTPATIENT
Start: 2025-07-17

## 2025-07-17 RX ORDER — ESCITALOPRAM OXALATE 20 MG/1
20 TABLET ORAL DAILY
Qty: 90 TABLET | Refills: 1 | Status: SHIPPED | OUTPATIENT
Start: 2025-07-17

## 2025-07-17 RX ORDER — BUPROPION HYDROCHLORIDE 300 MG/1
TABLET ORAL
Qty: 90 TABLET | Refills: 1 | OUTPATIENT
Start: 2025-07-17

## 2025-07-17 RX ORDER — SUMATRIPTAN SUCCINATE 25 MG/1
TABLET ORAL
Qty: 20 TABLET | Refills: 0 | Status: SHIPPED | OUTPATIENT
Start: 2025-07-17

## 2025-07-17 RX ORDER — ADALIMUMAB 40MG/0.4ML
40 KIT SUBCUTANEOUS
Qty: 4.8 ML | Refills: 1 | Status: SHIPPED | OUTPATIENT
Start: 2025-07-17

## 2025-07-17 RX ORDER — ESTRADIOL 1 MG/1
1 TABLET ORAL DAILY
Qty: 90 TABLET | Refills: 3 | OUTPATIENT
Start: 2025-07-17

## 2025-07-17 RX ORDER — DEXTROAMPHETAMINE SACCHARATE, AMPHETAMINE ASPARTATE, DEXTROAMPHETAMINE SULFATE AND AMPHETAMINE SULFATE 7.5; 7.5; 7.5; 7.5 MG/1; MG/1; MG/1; MG/1
30 TABLET ORAL DAILY
Qty: 30 TABLET | Refills: 0 | Status: SHIPPED | OUTPATIENT
Start: 2025-07-17

## 2025-07-17 RX ORDER — DEXTROAMPHETAMINE SACCHARATE, AMPHETAMINE ASPARTATE MONOHYDRATE, DEXTROAMPHETAMINE SULFATE AND AMPHETAMINE SULFATE 7.5; 7.5; 7.5; 7.5 MG/1; MG/1; MG/1; MG/1
30 CAPSULE, EXTENDED RELEASE ORAL DAILY
Qty: 30 CAPSULE | Refills: 0 | Status: SHIPPED | OUTPATIENT
Start: 2025-07-17

## 2025-07-17 NOTE — TELEPHONE ENCOUNTER
Adalimumab (HUMIRA, 2 PEN,) 40 MG/0.4ML pen kit         Last Written Prescription Date:  6/17/25  Last Fill Quantity: 4.8 mL,   # refills: 0  Last Office Visit: 7/3/25  Future Office visit:       Routing refill request to provider for review/approval because:  Drug not on the [facility], [facility] or [facility] refill protocol or controlled substance

## 2025-07-17 NOTE — TELEPHONE ENCOUNTER
Adderall, Adderall, Adderall, Imitrex, Zanaflex      Last Written Prescription Date:  6.19.25, 6.19.25, 6.19.25, 4.18.25, 4.18.25  Last Fill Quantity: #30, #30, #30, #18, #30,   # refills: 0  Last Office Visit: 7.3.25  Future Office visit:       Routing refill request to provider for review/approval because:  Drug not on the [facility], [facility] or [facility] refill protocol or controlled substance

## 2025-07-21 ENCOUNTER — MYC REFILL (OUTPATIENT)
Dept: FAMILY MEDICINE | Facility: OTHER | Age: 45
End: 2025-07-21

## 2025-07-21 DIAGNOSIS — E89.40 SURGICAL MENOPAUSE: ICD-10-CM

## 2025-07-21 DIAGNOSIS — F34.1 DYSTHYMIA: ICD-10-CM

## 2025-07-21 DIAGNOSIS — S62.626A DISPLACED FRACTURE OF MIDDLE PHALANX OF RIGHT LITTLE FINGER, INITIAL ENCOUNTER FOR CLOSED FRACTURE: ICD-10-CM

## 2025-07-21 DIAGNOSIS — F41.1 GAD (GENERALIZED ANXIETY DISORDER): ICD-10-CM

## 2025-07-21 DIAGNOSIS — B00.9 HERPES SIMPLEX VIRUS INFECTION: ICD-10-CM

## 2025-07-21 RX ORDER — ESTRADIOL 1 MG/1
1 TABLET ORAL DAILY
Qty: 90 TABLET | Refills: 3 | OUTPATIENT
Start: 2025-07-21

## 2025-07-21 RX ORDER — ESCITALOPRAM OXALATE 20 MG/1
20 TABLET ORAL DAILY
Qty: 90 TABLET | Refills: 1 | OUTPATIENT
Start: 2025-07-21

## 2025-07-21 RX ORDER — BUPROPION HYDROCHLORIDE 150 MG/1
150 TABLET ORAL EVERY MORNING
Qty: 90 TABLET | Refills: 1 | OUTPATIENT
Start: 2025-07-21

## 2025-07-21 RX ORDER — BUPROPION HYDROCHLORIDE 300 MG/1
TABLET ORAL
Qty: 90 TABLET | Refills: 1 | OUTPATIENT
Start: 2025-07-21

## 2025-07-21 RX ORDER — VALACYCLOVIR HYDROCHLORIDE 500 MG/1
500 TABLET, FILM COATED ORAL DAILY
Qty: 90 TABLET | Refills: 3 | OUTPATIENT
Start: 2025-07-21

## 2025-07-21 RX ORDER — HYDROCODONE BITARTRATE AND ACETAMINOPHEN 5; 325 MG/1; MG/1
1-2 TABLET ORAL EVERY 4 HOURS PRN
Qty: 20 TABLET | Refills: 0 | OUTPATIENT
Start: 2025-07-21

## 2025-07-21 NOTE — PROGRESS NOTES
ORTHOPEDIC CLINIC CONSULT    Referred by: Primary Care Providers:  Payton Levi APRN CNP, NP (General)    Chief Complaint:    Chief Complaint   Patient presents with    Surgical Followup     Right hand external fixator removal post op       History of Present Illness: Purnima Fallon is a 44 year old female who is being seen for Surgical Followup (Right hand external fixator removal post op) follow-up status post Ex-Fix removal and splinting.  Returns for evaluation otherwise doing well no other complaints.  Pain controlled      Patient's past medical, surgical, social and family histories reviewed.     Past Medical History:   Diagnosis Date    ADHD (attention deficit hyperactivity disorder)     Arthritis 2022    Biliary dyskinesia 10/06/2017    Carpal tunnel syndrome 08/23/2006    Chronic female pelvic pain 09/24/2018    Cystic acne 07/23/2013    Depression     Depressive disorder     Hidradenitis 02/16/2007    Hidradenitis suppurativa 05/13/2019    Ischiorectal abscess and fistula     Kidney stones 2008    x2 passed on her own    Malunion of fracture 05/17/2007    Mass of breast 02/02/2012    Migraines     Need for prophylactic hormone replacement therapy (postmenopausal)     Nondependent tobacco use disorder 10/11/2012    Papanicolaou smear of cervix with low grade squamous intraepithelial lesion (LGSIL) 10/29/2008    Pilonidal cyst 09/12/2017    S/p partial hysterectomy with remaining cervical stump 09/27/2013    Surgical menopause 02/11/2022    Trigger thumb of both thumbs 06/17/2020    Added automatically from request for surgery [id number]       Past Surgical History:   Procedure Laterality Date    ABDOMEN SURGERY      BIOPSY      COLONOSCOPY      CYSTECTOMY PILONIDAL N/A 09/18/2017    Procedure: CYSTECTOMY PILONIDAL;  PILONIDAL CYSTECTOMY ;  Surgeon: Cordell Stephens MD;  [facility]    ENDOSCOPY UPPER, COLONOSCOPY, COMBINED N/A 10/05/2018    Procedure: COMBINED ENDOSCOPY UPPER, COLONOSCOPY;   UPPER ENDOSCOPY with Biopsy  AND COLONOSCOPY;  Surgeon: Cordell Stephens MD;  [facility]    EXCISE LESION BUTTOCK(S) Left 06/08/2018    Procedure: EXCISE LESION BUTTOCK(S);  EXCISION OF GLUTEAL LEFT SKIN LESION;  Surgeon: Cordell Stephens MD;  [facility]    EXCISE MASS NECK Right 08/04/2015    Procedure: EXCISE MASS NECK;  Surgeon: Nasir Jones MD;  [facility]    INCISION AND DRAINAGE PERINEAL, COMBINED N/A 03/18/2015    Procedure: COMBINED INCISION AND DRAINAGE PERINEAL;  Surgeon: Jay Torres DO;  [facility]    IR CONSULTATION FOR IR EXAM  05/09/2023    IR FLUORO 0-1 HOUR  06/05/2023    IRRIGATION AND DEBRIDEMENT GROIN N/A 02/07/2019    Procedure: IRRIGATION AND DEBRIDEMENT LEFT GROIN ABSCESS;  Surgeon: Cordell Stephens MD;  [facility]    LAPAROSCOPIC APPENDECTOMY  02/11/2022    LAPAROSCOPIC CHOLECYSTECTOMY N/A 11/20/2017    Procedure: LAPAROSCOPIC CHOLECYSTECTOMY;  LAPAROSCOPIC CHOLECYSTECTOMY;  Surgeon: Cordell Stephens MD;  [facility]    LAPAROSCOPIC HYSTERECTOMY SUPRACERVICAL, BILATERAL SALPINGO-OOPHORECTOMY, COMBINED  09/27/2013    Procedure: COMBINED LAPAROSCOPIC HYSTERECTOMY SUPRACERVICAL, SALPINGO-OOPHORECTOMY;  LAPAROSCOPIC SUPRACERVICAL HYSTERECTOMY AND RIGHT SALPINGO-OOPHORECTOMY, CYSTOSCOPY;  Surgeon: Denys Callahan MD;  [facility]    LAPAROSCOPIC OOPHORECTOMY Left 02/11/2022    Procedure: Left OOPHORECTOMY, LAPAROSCOPIC, Laparoscopic Appendectomy Torsion and Pelvic Appensix;  Surgeon: Malick Malhotra MD;  [facility]    LAPAROSCOPY DIAGNOSTIC (GYN) N/A 09/06/2018    Procedure: LAPAROSCOPY DIAGNOSTIC (GYN);  LAPAROSCOPY WITH PERITONEAL BIOPSIES AND REMOVAL LEFT FALLOPIAN TUBE;  Surgeon: Suraj Whitaker MD;  [facility]    LASER HOLMIUM LITHOTRIPSY URETER(S), INSERT STENT, COMBINED Right 4/28/2025    Procedure: CYSTOURETEROSCOPY, with right ureterosopy and stone retrieval;  Surgeon: Joaquín Cabral MD;  [facility]     marsupialization of pilonidal cyst  01/01/2007    OPEN REDUCTION INTERNAL FIXATION FINGER(S) Right 4/15/2025    Procedure: Open Reduction Internal Fixation  Right Small Finger Intra Articular Fracture of Middle Phalanx;  Surgeon: Fernando Acevedo MD;  [facility]    OPEN REDUCTION INTERNAL FIXATION FINGER(S) Right 5/13/2025    Procedure: Open Reduction and Fixation Right Fifth Finger;  Surgeon: Fernando Acevedo MD;  [facility]    RELEASE TRIGGER FINGER BILATERAL Bilateral 07/10/2020    Procedure: RELEASE BILATERAL TRIGGER THUMBS;  Surgeon: Vince Murillo DO;  [facility]    REMOVE FOREIGN BODY FINGER Right 5/13/2025    Procedure: Right fifth finger hardware removal;  Surgeon: Fernando Acevedo MD;  [facility]    REMOVE HARDWARE HAND Right 7/10/2025    Procedure: Right Hand External Fixator Removal;  Surgeon: Fernando Acevedo MD;  [facility]    TUBAL LIGATION  01/01/2005       Home Medications:  Prior to Admission medications    Medication Sig Start Date End Date Taking? Authorizing Provider   buPROPion (WELLBUTRIN XL) 150 MG 24 hr tablet Take 1 tablet (150 mg) by mouth every morning. 4/18/25  Yes Payton Levi APRN CNP   buPROPion (WELLBUTRIN XL) 300 MG 24 hr tablet TAKE 1 TABLET BY MOUTH EVERY MORNING ALONG WITH 1 150MG TABLET - TOTAL DOSE 450MG 4/18/25  Yes Payton Levi APRN CNP   estradiol (ESTRACE) 1 MG tablet Take 1 tablet (1 mg) by mouth daily. 4/18/25  Yes Payton Levi APRN CNP   HYDROcodone-acetaminophen (NORCO) 5-325 MG tablet Take 1-2 tablets by mouth every 4 hours as needed for moderate to severe pain. 7/10/25  Yes Fernando Acevedo MD   HYDROcodone-acetaminophen (NORCO) 5-325 MG tablet Take 1 tablet by mouth nightly as needed for moderate to severe pain. 5/19/25  Yes Jose Alejandro Mixon PA-C   multivitamin, therapeutic (THERA-VIT) TABS tablet Take 1 tablet by mouth daily.   Yes Reported, Patient   valACYclovir (VALTREX) 500 MG tablet Take 1 tablet (500  mg) by mouth daily. 4/18/25  Yes Payton Levi APRN CNP   Adalimumab (HUMIRA, 2 PEN,) 40 MG/0.4ML pen kit Inject 0.4 mLs (40 mg) subcutaneously every 7 days. 7/17/25   Yovani Vega MD   amphetamine-dextroamphetamine (ADDERALL XR) 30 MG 24 hr capsule Take 1 capsule (30 mg) by mouth daily. 7/17/25   Yovani Vega MD   amphetamine-dextroamphetamine (ADDERALL) 10 MG tablet Take 1 tablet (10 mg) by mouth daily. 7/17/25   Yovani Vega MD   amphetamine-dextroamphetamine (ADDERALL) 30 MG tablet Take 1 tablet (30 mg) by mouth daily. 7/17/25   Yovani Vega MD   escitalopram (LEXAPRO) 20 MG tablet Take 1 tablet (20 mg) by mouth daily. 7/17/25   Payton Levi APRN CNP   SUMAtriptan (IMITREX) 25 MG tablet TAKE 1 TABLET BY MOUTH AT ONSET OF HEADACHE FOR MIGRAINE, MAY REPEAT IN 2 HOURS MAX OF 8 TABLETS PER 24 HOURS 7/17/25   Yovani Vega MD   tiZANidine (ZANAFLEX) 4 MG tablet Take 1 tablet (4 mg) by mouth 3 times daily as needed for muscle spasms. 7/17/25   Yovani Vega MD       No Known Allergies    Social History     Occupational History    Not on file   Tobacco Use    Smoking status: Every Day     Current packs/day: 0.50     Average packs/day: 0.5 packs/day for 24.5 years (12.3 ttl pk-yrs)     Types: Cigarettes     Start date: 1/1/2001     Passive exposure: Current    Smokeless tobacco: Never    Tobacco comments:     I have been working on quitting. 7//25   Vaping Use    Vaping status: Never Used   Substance and Sexual Activity    Alcohol use: No    Drug use: Yes     Types: Marijuana     Comment: pt states yesterday evening    Sexual activity: Not Currently     Partners: Male     Birth control/protection: None       Family History   Problem Relation Age of Onset    Other Cancer Father         Lung cancer    Diabetes Paternal Grandmother     Other Cancer Maternal Grandmother         Lung cancer       REVIEW OF SYSTEMS            Physical Exam:    Vitals: /85 (BP Location:  Left arm, Patient Position: Sitting, Cuff Size: Adult Regular)   Pulse 70   Temp 97.5  F (36.4  C) (Tympanic)   Wt 46.7 kg (103 lb)   LMP 08/10/2013   SpO2 99%   BMI 18.84 kg/m    BMI= Body mass index is 18.84 kg/m .  Examination wounds healing nicely pin sites are healing.  Still has good clinical longitudinal alignment still has a swelling   femoral stiffness particularly at the PIP anticipate as it was a significant comminuted intra-articular fracture requiring external fixation.  Radiographs: Radiographs of the obscured little bit by a splint but still notes the comminuted intra-articular fracture     Independent visualization of the films was made.         Impression:      ICD-10-CM    1. Closed displaced fracture of middle phalanx of right little finger with routine healing, subsequent encounter  S62.626D XR Hand Right G/E 3 Views (Clinic Performed)          Plan:  Comminuted intra-articular fracture PIP of the right small finger.  Had underwent a previous fixation but then displaced around the pins and subsequently went to external fixation of the have again significant comminution.  Long-term may need to proceed towards just fusion between the proximal and middle phalanx across the PIP joint.  At this time I will see if we get any kind of motion just for mild stiffness but likely has good longitudinal alignment.  We can place her into a AlumaFoam splint later, workable gentle motion back and AlumaFoam splint she is quite sometime out from the initial injury which was in April.  However follow-up in about 3 to 4 weeks reassess and repeat radiographs and see if she is getting any motion if we need to add in any occupational therapy and whether we will need to proceed towards a fusion in that area or show pseudo autofused.    All of the above pertinent physical exam and imaging modalities findings was reviewed with Purnima.    Return to clinic in 3-4 weeks.    Further imaging required radiographs  follow-up    Time spent with evaluation:   minutes    Fernando Acevedo MD  7/20/2025  10:54 PM

## 2025-07-22 ENCOUNTER — TELEPHONE (OUTPATIENT)
Dept: FAMILY MEDICINE | Facility: OTHER | Age: 45
End: 2025-07-22

## 2025-07-22 ENCOUNTER — TELEPHONE (OUTPATIENT)
Dept: ORTHOPEDICS | Facility: OTHER | Age: 45
End: 2025-07-22

## 2025-07-22 DIAGNOSIS — Z47.89 ORTHOPEDIC AFTERCARE: Primary | ICD-10-CM

## 2025-07-22 NOTE — TELEPHONE ENCOUNTER
11:09 AM    Reason for Call: Phone Call    Description: pt had surgery on 07/10/had follow up with him, she stated he forgot to fill her pain med prescription, their office told her to call her primary    Was an appointment offered for this call? No  If yes : Appointment type              Date    Preferred method for responding to this message: Telephone Call  What is your phone number ?[phone]    If we cannot reach you directly, may we leave a detailed response at the number you provided? Yes    Can this message wait until your PCP/provider returns, if available today? Not applicable    Brunilda Puentes

## 2025-07-22 NOTE — TELEPHONE ENCOUNTER
"Patient calling stating she reached out to her PCP regarding medication refill request and was told her primary is out for two weeks. Patient states that a nurse told her \"someone from [our] department should be able to fill the request.\" I stated a could coordinate with the provider in office today but he may have patient transition to more conservative pain management measures at this time such as over-the-counter tylenol and/or ibuprofen. Patient states she has tried those and they do not help stating she has \"a bone or I don't know what\" sticking out the top of that finger. Reassured her I would coordinate with Jose Alejandro and then reach back out.     Patient had no further concerns or questions at this time.  "

## 2025-07-22 NOTE — TELEPHONE ENCOUNTER
Post op pain meds come from surgeon.  I would not be able to do this at all.  Thanks.  Yovani Vega MD

## 2025-07-31 ENCOUNTER — TELEPHONE (OUTPATIENT)
Dept: FAMILY MEDICINE | Facility: OTHER | Age: 45
End: 2025-07-31

## 2025-08-06 ENCOUNTER — MYC REFILL (OUTPATIENT)
Dept: FAMILY MEDICINE | Facility: OTHER | Age: 45
End: 2025-08-06

## 2025-08-06 DIAGNOSIS — S62.626A DISPLACED FRACTURE OF MIDDLE PHALANX OF RIGHT LITTLE FINGER, INITIAL ENCOUNTER FOR CLOSED FRACTURE: ICD-10-CM

## 2025-08-06 RX ORDER — HYDROCODONE BITARTRATE AND ACETAMINOPHEN 5; 325 MG/1; MG/1
1-2 TABLET ORAL EVERY 4 HOURS PRN
Qty: 20 TABLET | Refills: 0 | OUTPATIENT
Start: 2025-08-06

## 2025-08-13 ENCOUNTER — TELEPHONE (OUTPATIENT)
Dept: FAMILY MEDICINE | Facility: OTHER | Age: 45
End: 2025-08-13

## 2025-08-13 ENCOUNTER — OFFICE VISIT (OUTPATIENT)
Dept: ORTHOPEDICS | Facility: OTHER | Age: 45
End: 2025-08-13
Attending: ORTHOPAEDIC SURGERY
Payer: COMMERCIAL

## 2025-08-13 ENCOUNTER — PREP FOR PROCEDURE (OUTPATIENT)
Dept: ORTHOPEDICS | Facility: OTHER | Age: 45
End: 2025-08-13

## 2025-08-13 ENCOUNTER — ANCILLARY PROCEDURE (OUTPATIENT)
Dept: GENERAL RADIOLOGY | Facility: OTHER | Age: 45
End: 2025-08-13
Attending: PHYSICIAN ASSISTANT
Payer: COMMERCIAL

## 2025-08-13 VITALS
WEIGHT: 108 LBS | SYSTOLIC BLOOD PRESSURE: 144 MMHG | BODY MASS INDEX: 19.88 KG/M2 | OXYGEN SATURATION: 100 % | HEART RATE: 60 BPM | HEIGHT: 62 IN | DIASTOLIC BLOOD PRESSURE: 82 MMHG

## 2025-08-13 DIAGNOSIS — Z47.89 ORTHOPEDIC AFTERCARE: ICD-10-CM

## 2025-08-13 DIAGNOSIS — G89.18 PAIN AT SURGICAL SITE: Primary | ICD-10-CM

## 2025-08-13 DIAGNOSIS — S61.206A OPEN WOUND OF RIGHT LITTLE FINGER: Primary | ICD-10-CM

## 2025-08-13 PROCEDURE — 73130 X-RAY EXAM OF HAND: CPT | Mod: 26 | Performed by: RADIOLOGY

## 2025-08-13 PROCEDURE — 73130 X-RAY EXAM OF HAND: CPT | Mod: TC,RT

## 2025-08-13 PROCEDURE — G0463 HOSPITAL OUTPT CLINIC VISIT: HCPCS

## 2025-08-13 RX ORDER — HYDROCODONE BITARTRATE AND ACETAMINOPHEN 5; 325 MG/1; MG/1
1 TABLET ORAL EVERY 6 HOURS PRN
Qty: 18 TABLET | Refills: 0 | Status: SHIPPED | OUTPATIENT
Start: 2025-08-13 | End: 2025-08-16

## 2025-08-13 ASSESSMENT — PAIN SCALES - GENERAL: PAINLEVEL_OUTOF10: MODERATE PAIN (4)

## 2025-08-14 PROBLEM — Z47.89 ORTHOPEDIC AFTERCARE: Status: ACTIVE | Noted: 2025-08-14

## 2025-08-16 ENCOUNTER — MYC REFILL (OUTPATIENT)
Dept: FAMILY MEDICINE | Facility: OTHER | Age: 45
End: 2025-08-16

## 2025-08-16 DIAGNOSIS — M62.838 MUSCLE SPASM: ICD-10-CM

## 2025-08-16 DIAGNOSIS — F90.8 OTHER SPECIFIED ATTENTION DEFICIT HYPERACTIVITY DISORDER (ADHD): ICD-10-CM

## 2025-08-16 DIAGNOSIS — B00.9 HERPES SIMPLEX VIRUS INFECTION: ICD-10-CM

## 2025-08-18 RX ORDER — VALACYCLOVIR HYDROCHLORIDE 500 MG/1
500 TABLET, FILM COATED ORAL DAILY
Qty: 90 TABLET | Refills: 2 | Status: SHIPPED | OUTPATIENT
Start: 2025-08-18

## 2025-08-18 RX ORDER — DEXTROAMPHETAMINE SACCHARATE, AMPHETAMINE ASPARTATE, DEXTROAMPHETAMINE SULFATE AND AMPHETAMINE SULFATE 7.5; 7.5; 7.5; 7.5 MG/1; MG/1; MG/1; MG/1
30 TABLET ORAL DAILY
Qty: 30 TABLET | Refills: 0 | Status: SHIPPED | OUTPATIENT
Start: 2025-08-18

## 2025-08-18 RX ORDER — DEXTROAMPHETAMINE SACCHARATE, AMPHETAMINE ASPARTATE MONOHYDRATE, DEXTROAMPHETAMINE SULFATE AND AMPHETAMINE SULFATE 7.5; 7.5; 7.5; 7.5 MG/1; MG/1; MG/1; MG/1
30 CAPSULE, EXTENDED RELEASE ORAL DAILY
Qty: 30 CAPSULE | Refills: 0 | Status: SHIPPED | OUTPATIENT
Start: 2025-08-18

## 2025-08-18 RX ORDER — DEXTROAMPHETAMINE SACCHARATE, AMPHETAMINE ASPARTATE, DEXTROAMPHETAMINE SULFATE AND AMPHETAMINE SULFATE 2.5; 2.5; 2.5; 2.5 MG/1; MG/1; MG/1; MG/1
10 TABLET ORAL DAILY
Qty: 30 TABLET | Refills: 0 | Status: SHIPPED | OUTPATIENT
Start: 2025-08-18

## 2025-08-21 ENCOUNTER — TELEPHONE (OUTPATIENT)
Dept: ORTHOPEDICS | Facility: OTHER | Age: 45
End: 2025-08-21

## 2025-09-18 NOTE — TELEPHONE ENCOUNTER
Therapy Activity Session  Performed by [facility] staff     TEP: AcuteTherapy Extention Program, activity plan was established by a licensed therapist and performed under the guidance of a licensed therapist.      Pt seen on [facility] nursing unit                                                                                         PT Frequency Frequency Comments: MWF 2/3-5 by 9/23 MUKUND Marin for chair follow (TEP reassess 9/19) Bring Mirror and 4WW                                                                                  OT Frequency Frequency Comments: TThF 2/3-5 by 9/23 (Monitor time of daily HBO) (TEP re-assess 9/21)    SLP Frequency      Availability:  Patient available and per RN report, able to particiate.     Tolerance/Participation  Participated and followed direction well during session.    SESSION    Activities/Exercises/Mobility completed this session:  Physical Therapy Exercises    TEP Follow Up Needed: Yes  Therapy Extender Program Discipline: OT     PT Session Length (min): 15 minutes (09/17/25 1600)  PT Frequency: ROM daily with PT reassess 9/19 Tania GALO for questions    PT Task 1: AAROM to [place] LEs as pt tolerates (APs, HS, SLR, hip ABD/ADD) Is MUCH stronger now!!  PT Reps for Task 1: 10  PT Sets for Task 1: 2  PT Resistance for Task 1: AAROM  PT Task 1 Completion: Not completed (09/18/25 1402)                PT Task 2 Completion: Not completed (09/17/25 1600)                PT Task 3 Completion: Not completed (09/17/25 1600)                     Occupational Therapy Exercises    TEP Follow Up Needed: Yes  Therapy Extender Program Discipline: OT     OT Session Length (min): 10 minutes (09/18/25 1402)  OT Frequency: TEP daily, reassess by 9/21Meir [phone]    OT Task 1: UE AAROM - shoulder flexion  OT Reps for Task 1: 10  OT Sets for Task 1: 2     OT Task 1 Completion: Completed (09/18/25 1402)    OT Task 2: UE AAROM - shoulder abduction/adduction  OT Reps for Task 2: 10  OT Sets for Task  3:03 PM    Reason for Call: Phone Call    Description: has medication questions/didn't want to wait on hold anymore for triage and stated they hung up on her    Was an appointment offered for this call? No  If yes : Appointment type              Date    Preferred method for responding to this message: Telephone Call  What is your phone number ?[phone]    If we cannot reach you directly, may we leave a detailed response at the number you provided? Yes    Can this message wait until your PCP/provider returns, if available today? Not applicable    Brunilda Puentes     2: 2     OT Task 2 Completion: Completed (09/18/25 8497)    OT Task 3: UE AAROM - elbow flexion/extention  OT Reps for Task 3: 10  OT Sets for Task 3: 2     OT Task 3 Completion: Completed       Speech Therapy Exercises    TEP Follow Up Needed: Yes

## (undated) DEVICE — LABEL-STERILE PREPRINTED FOR OR

## (undated) DEVICE — SU VICRYL 3-0 CT-2 27" UND J232H

## (undated) DEVICE — IRRIGATION-H2O 1000ML

## (undated) DEVICE — TUBING-SUCTION 20FT

## (undated) DEVICE — CORD-LAPAROSCOPIC MONOPOLAR-DISPOSABLE

## (undated) DEVICE — GOWN-SURG XL LVL 3 REINFORCED

## (undated) DEVICE — CONTAINER SPECIMEN 4OZ STERILE H1015I

## (undated) DEVICE — BLADE-SCALPEL #15

## (undated) DEVICE — DRAPE C-ARM 60X42" 1013

## (undated) DEVICE — Device

## (undated) DEVICE — GLV-7.5 PROTEXIS PI CLASSIC LF/PF

## (undated) DEVICE — PACK-LAPAROTOMY-CUSTOM

## (undated) DEVICE — PACK BASIN SET UP SUTCNBSBBA

## (undated) DEVICE — BLADE 15 RB BK SS STRL LF DISPLF DISP 371215

## (undated) DEVICE — INZII RETRIEVAL SYSTEM-10MM

## (undated) DEVICE — DRSG-ISLAND 4IN X 5IN

## (undated) DEVICE — DRAPE-UTILITY TOWEL 15X26"

## (undated) DEVICE — SUTURE-MONOCRYL 4-0 Y494G

## (undated) DEVICE — NDL-25G 1-1/2" NON-SAFETY

## (undated) DEVICE — BDG-STAT STRIP

## (undated) DEVICE — IRRIGATION-NACL 1000ML

## (undated) DEVICE — CAUTERY PAD-POLYHESIVE II ADULT

## (undated) DEVICE — CAST PADDING 3" STERILE 9043S

## (undated) DEVICE — CATH-URETHRAL 14FR

## (undated) DEVICE — APPLICATOR-CHLORAPREP 26ML TINTED CHG 2%+ 70% IPA-SURGICAL

## (undated) DEVICE — BNDG ELASTIC 2"X5YDS UNSTERILE 6611-20

## (undated) DEVICE — TROCAR-5MM BLADELESS W/FIXATION VERSAONE

## (undated) DEVICE — CLIP APPLIER-LIGAMAX 5MM MEDIUM/LARGE

## (undated) DEVICE — SUTURE-CHROMIC GUT 3-0 SH G122H

## (undated) DEVICE — DRAPE-EXTREMITY SHEET

## (undated) DEVICE — CONNECTOR-ERBEFLO 2 PORT

## (undated) DEVICE — BLANKET-BAIR UPPER BODY

## (undated) DEVICE — FORCEP-COLON BIOPSY LARGE W/NEEDLE 240CM

## (undated) DEVICE — ESU GROUND PAD ADULT W/CORD E7507

## (undated) DEVICE — STERILE SLEEVE

## (undated) DEVICE — SYRINGE-ASEPTO IRRIGATION

## (undated) DEVICE — LIGASURE-5MM BLUNT TIP LAPAROSCOPIC

## (undated) DEVICE — DRSG XEROFORM GAUZE 1X8" LP 8884433301

## (undated) DEVICE — ANTI-FOG AGENT

## (undated) DEVICE — STERI-STRIP-1/2" X 4"

## (undated) DEVICE — LIGHT HANDLE COVER

## (undated) DEVICE — PACK LAPAROTOMY CUSTOM SBA32LPMBG

## (undated) DEVICE — TOWEL-OR DISP 4PKS

## (undated) DEVICE — SOL NACL 0.9% IRRIG 1000ML BOTTLE 2F7124

## (undated) DEVICE — LABEL STERILE PREPRINTED FOR OR FRRH01-2M

## (undated) DEVICE — GOWN SURG XL LVL 3 REINFORCED 9541

## (undated) DEVICE — NDL 25GA 1.5" 305127

## (undated) DEVICE — CATHETER SOF-FLEX OPEN END URETERAL 70CM/5FR 020038-C5

## (undated) DEVICE — DRSG-SPONGE STERILE 4 X 4

## (undated) DEVICE — TUBE-SALEM SUMP 18FR STOMACH SUCTION

## (undated) DEVICE — SUCTION-IRRIGATION STRYKEFLOW II (STRYKER)

## (undated) DEVICE — GLV-8.5 BIOGEL LATEX

## (undated) DEVICE — TOPICAL SKIN ADHESIVE EXOFIN

## (undated) DEVICE — SCD SLEEVE-KNEE REG.

## (undated) DEVICE — SPATULA TIP FOR SUCTION IRRIGATION PROBE

## (undated) DEVICE — NDL COUNTER-20-40 CT MAGNET/FOAM BLOCK

## (undated) DEVICE — DRSG-SPONGE X-RAY 4 X 4

## (undated) DEVICE — PACK-GYN CYSTO-CUSTOM

## (undated) DEVICE — GLV-8.0 PROTEXIS PI BLUE W/NEU-THERA LF/PF

## (undated) DEVICE — CAUTERY PENCIL-SMOKE EVACUATION

## (undated) DEVICE — DRSG-ABDOMINAL 5 X 9

## (undated) DEVICE — PACK-LAPAROSCOPY-CUSTOM

## (undated) DEVICE — TUBING IRR TUR Y TYPE 2C4041

## (undated) DEVICE — CANISTER-SUCTION 2000CC

## (undated) DEVICE — DRSG-NON ADHERING 3 X 4 TELFA

## (undated) DEVICE — TRAY SKIN PREP POVIDONE/IODINE DYND70372

## (undated) DEVICE — SOL WATER IRRIG 1000ML BOTTLE 2F7114

## (undated) DEVICE — MARKER-SKIN REG

## (undated) DEVICE — GLV-8.5 ORTHO PROTEXIS PI LF/PF

## (undated) DEVICE — PACK-BASIN SET-UP

## (undated) DEVICE — DRAPE STERI TOWEL LG 1010

## (undated) DEVICE — BAG PRESSURE INFUSION 1000ML COLORED GA 8808

## (undated) DEVICE — PREP CHLORAPREP 26ML TINTED HI-LITE ORANGE 930815

## (undated) DEVICE — DRAPE EXTREMITY UPPER 120X76" 29414

## (undated) DEVICE — SLEEVE SCD EXPRESS KNEE LENGTH MED 9529

## (undated) DEVICE — GLOVE PROTEXIS POWDER FREE 8.0 ORTHOPEDIC 2D73ET80

## (undated) DEVICE — TROCAR SYSTEM-BLUNT TIP 12X100MM KII BALLOON

## (undated) DEVICE — COVER LT HANDLE 2/PK 5160-2FG

## (undated) DEVICE — TROCAR-BLUNT TIP 12MM BALLOON

## (undated) DEVICE — GLV-7.0 PROTEXIS PI CLASSIC LF/PF

## (undated) DEVICE — POUCH-INSTRUMENT 3 COMP 9-5/8 X 18 IN

## (undated) DEVICE — DRSG-KERLIX 6 X 6 3/4 FLUFF

## (undated) DEVICE — CAST PADDING-STERILE 2"

## (undated) DEVICE — SENSOR-OXISENSOR II ADULT

## (undated) DEVICE — BNDG ELASTIC 4"X5YDS UNSTERILE 6611-40

## (undated) DEVICE — BNDG ESMARK 4" STERILE LF 820-3412

## (undated) DEVICE — DRSG GAUZE 4X4" 8044

## (undated) DEVICE — SPONGE-LAPAROTOMY PADS 18 X 18

## (undated) DEVICE — SUTURE-VICRYL 3-0 SH J416H

## (undated) DEVICE — TROCAR SLEEVE-KII 5X100MM

## (undated) DEVICE — PACK-LAP LAVH-CUSTOM

## (undated) DEVICE — SUCTION TUBE-YANKAUR

## (undated) DEVICE — JELLY LUBRICATING SURGILUBE 2OZ TUBE

## (undated) DEVICE — TUBING-INSUFFLATION/LAPAROFLATOR W/FILTER

## (undated) DEVICE — SWABSTICK-BENZOIN

## (undated) DEVICE — UTERINE MANIPULATOR-KRONNER MANIPUJECTOR

## (undated) DEVICE — BIN-UROLOGY / CYSTO

## (undated) DEVICE — SCISSOR-ENDOPATH 5MM CURVED

## (undated) DEVICE — PUNCTURE CLOSURE DEVICE

## (undated) DEVICE — SU MONOCRYL 4-0 PS-2 18" UND Y496G

## (undated) DEVICE — SUTURE-VICRYL 0 UR-6 J603H

## (undated) DEVICE — DRAPE SHEET REV FOLD 3/4 9349

## (undated) DEVICE — CATH TRAY-ADD A FOLEY-2000ML DRAIN BAG & STATLOCK

## (undated) DEVICE — SUTURE-ENDOLOOP 0 COATED VICRYL

## (undated) DEVICE — GLV-7.5 PROTEXIS LATEX MICRO

## (undated) DEVICE — WANDS-INSULSCAN

## (undated) DEVICE — BDG-ESMARK 4 INCH X 9 FT

## (undated) DEVICE — SYRINGE-10CC LUER LOCK

## (undated) DEVICE — CAUTERY-LAP L-HOOK

## (undated) DEVICE — CAST PADDING 2" STERILE 9062S

## (undated) DEVICE — DRSG GAUZE 4X4" 3033

## (undated) DEVICE — NDL-INSUFFLATION 120MM

## (undated) DEVICE — BDG-ELASTIC 2 INCH

## (undated) DEVICE — PANTIES-MESH L/XL

## (undated) DEVICE — BNDG ELASTIC 3"X5YDS UNSTERILE 6611-30

## (undated) DEVICE — TRAY-SKIN PREP POVIDONE/IODINE

## (undated) DEVICE — DRAPE-THREE QUARTER (LARGE) SHEET

## (undated) DEVICE — NEEDLE HYPO MONOJECT STANDARD 22GA 1 1/2IN BLUE 1188822112

## (undated) DEVICE — SUTURE-VICRYL 2-0 CT-1 J945H

## (undated) DEVICE — BLADE-SURG CLIPPER

## (undated) DEVICE — NDL-18G 1 1/2" NON-SAFETY

## (undated) DEVICE — SOL-NACL 0.9% 1000ML

## (undated) DEVICE — DRAPE-STERI 45X60CM #1010

## (undated) DEVICE — COVER LT HANDLE 2/PK 15160-2FG

## (undated) DEVICE — GLV-8.0 BIOGEL PF/LF BLUE INDICATOR UNDERGLOVE

## (undated) DEVICE — TROCAR-5X100MM BLADED W/FIXATION

## (undated) DEVICE — SUTURE-CHROMIC GUT 2-0 SH G123H

## (undated) DEVICE — TOURNIQUET SGL BLADDER 18"X3" RED 5921-018-135

## (undated) DEVICE — PACK-SET UP-CUSTOM

## (undated) DEVICE — PACK GYN CYSTO CUSTOM SMA32GCMBF

## (undated) DEVICE — CUFF-DISP STERILE 18IN SINGLE BLADDER

## (undated) DEVICE — SOLUTION IV IRRIGATION 0.9% NACL 3L R8206

## (undated) DEVICE — WIRE GLIDE 0.038"X150CM 3CM STR UWR1038

## (undated) DEVICE — SUTURE-MONOCRYL 5-0 P-3 VIOLET Y463G

## (undated) DEVICE — COVER-MAYO STAND 23" X 55"

## (undated) RX ORDER — ONDANSETRON 2 MG/ML
INJECTION INTRAMUSCULAR; INTRAVENOUS
Status: DISPENSED
Start: 2025-07-10

## (undated) RX ORDER — PROPOFOL 10 MG/ML
INJECTION, EMULSION INTRAVENOUS
Status: DISPENSED
Start: 2025-04-28

## (undated) RX ORDER — SEVOFLURANE 250 ML/250ML
LIQUID RESPIRATORY (INHALATION)
Status: DISPENSED
Start: 2025-07-10

## (undated) RX ORDER — PROPOFOL 10 MG/ML
INJECTION, EMULSION INTRAVENOUS
Status: DISPENSED
Start: 2025-05-13

## (undated) RX ORDER — HYDROMORPHONE HYDROCHLORIDE 1 MG/ML
INJECTION, SOLUTION INTRAMUSCULAR; INTRAVENOUS; SUBCUTANEOUS
Status: DISPENSED
Start: 2025-04-15

## (undated) RX ORDER — FENTANYL CITRATE 50 UG/ML
INJECTION, SOLUTION INTRAMUSCULAR; INTRAVENOUS
Status: DISPENSED
Start: 2018-06-08

## (undated) RX ORDER — PROPOFOL 10 MG/ML
INJECTION, EMULSION INTRAVENOUS
Status: DISPENSED
Start: 2018-06-08

## (undated) RX ORDER — GLYCOPYRROLATE 0.2 MG/ML
INJECTION, SOLUTION INTRAMUSCULAR; INTRAVENOUS
Status: DISPENSED
Start: 2017-11-20

## (undated) RX ORDER — FENTANYL CITRATE 50 UG/ML
INJECTION, SOLUTION INTRAMUSCULAR; INTRAVENOUS
Status: DISPENSED
Start: 2022-02-12

## (undated) RX ORDER — LIDOCAINE HYDROCHLORIDE 20 MG/ML
INJECTION, SOLUTION EPIDURAL; INFILTRATION; INTRACAUDAL; PERINEURAL
Status: DISPENSED
Start: 2017-09-18

## (undated) RX ORDER — KETAMINE HCL IN 0.9 % NACL 20 MG/2 ML
SYRINGE (ML) INTRAVENOUS
Status: DISPENSED
Start: 2017-11-20

## (undated) RX ORDER — LIDOCAINE HYDROCHLORIDE 20 MG/ML
INJECTION, SOLUTION EPIDURAL; INFILTRATION; INTRACAUDAL; PERINEURAL
Status: DISPENSED
Start: 2018-06-08

## (undated) RX ORDER — DEXAMETHASONE SODIUM PHOSPHATE 10 MG/ML
INJECTION, SOLUTION INTRAMUSCULAR; INTRAVENOUS
Status: DISPENSED
Start: 2025-04-15

## (undated) RX ORDER — FENTANYL CITRATE 50 UG/ML
INJECTION, SOLUTION INTRAMUSCULAR; INTRAVENOUS
Status: DISPENSED
Start: 2018-09-06

## (undated) RX ORDER — ONDANSETRON 2 MG/ML
INJECTION INTRAMUSCULAR; INTRAVENOUS
Status: DISPENSED
Start: 2025-04-28

## (undated) RX ORDER — LIDOCAINE HYDROCHLORIDE 20 MG/ML
INJECTION, SOLUTION EPIDURAL; INFILTRATION; INTRACAUDAL; PERINEURAL
Status: DISPENSED
Start: 2022-02-11

## (undated) RX ORDER — FENTANYL CITRATE 50 UG/ML
INJECTION, SOLUTION INTRAMUSCULAR; INTRAVENOUS
Status: DISPENSED
Start: 2025-04-15

## (undated) RX ORDER — FENTANYL CITRATE 50 UG/ML
INJECTION, SOLUTION INTRAMUSCULAR; INTRAVENOUS
Status: DISPENSED
Start: 2017-09-18

## (undated) RX ORDER — PROPOFOL 10 MG/ML
INJECTION, EMULSION INTRAVENOUS
Status: DISPENSED
Start: 2022-02-11

## (undated) RX ORDER — ONDANSETRON 2 MG/ML
INJECTION INTRAMUSCULAR; INTRAVENOUS
Status: DISPENSED
Start: 2025-05-13

## (undated) RX ORDER — FENTANYL CITRATE 50 UG/ML
INJECTION, SOLUTION INTRAMUSCULAR; INTRAVENOUS
Status: DISPENSED
Start: 2022-02-11

## (undated) RX ORDER — KETAMINE HCL IN NACL, ISO-OSM 100MG/10ML
SYRINGE (ML) INJECTION
Status: DISPENSED
Start: 2022-02-11

## (undated) RX ORDER — DEXAMETHASONE SODIUM PHOSPHATE 10 MG/ML
INJECTION, SOLUTION INTRAMUSCULAR; INTRAVENOUS
Status: DISPENSED
Start: 2025-07-10

## (undated) RX ORDER — ONDANSETRON 2 MG/ML
INJECTION INTRAMUSCULAR; INTRAVENOUS
Status: DISPENSED
Start: 2020-07-10

## (undated) RX ORDER — PROPOFOL 10 MG/ML
INJECTION, EMULSION INTRAVENOUS
Status: DISPENSED
Start: 2025-07-10

## (undated) RX ORDER — ROCURONIUM BROMIDE 50 MG/5 ML
SYRINGE (ML) INTRAVENOUS
Status: DISPENSED
Start: 2017-11-20

## (undated) RX ORDER — PROPOFOL 10 MG/ML
INJECTION, EMULSION INTRAVENOUS
Status: DISPENSED
Start: 2019-02-07

## (undated) RX ORDER — ONDANSETRON 2 MG/ML
INJECTION INTRAMUSCULAR; INTRAVENOUS
Status: DISPENSED
Start: 2017-09-18

## (undated) RX ORDER — PROPOFOL 10 MG/ML
INJECTION, EMULSION INTRAVENOUS
Status: DISPENSED
Start: 2025-04-15

## (undated) RX ORDER — LIDOCAINE HYDROCHLORIDE 20 MG/ML
INJECTION, SOLUTION EPIDURAL; INFILTRATION; INTRACAUDAL; PERINEURAL
Status: DISPENSED
Start: 2018-09-06

## (undated) RX ORDER — DEXAMETHASONE SODIUM PHOSPHATE 10 MG/ML
INJECTION, SOLUTION INTRAMUSCULAR; INTRAVENOUS
Status: DISPENSED
Start: 2018-09-06

## (undated) RX ORDER — KETOROLAC TROMETHAMINE 30 MG/ML
INJECTION, SOLUTION INTRAMUSCULAR; INTRAVENOUS
Status: DISPENSED
Start: 2017-11-20

## (undated) RX ORDER — LIDOCAINE HYDROCHLORIDE 20 MG/ML
INJECTION, SOLUTION EPIDURAL; INFILTRATION; INTRACAUDAL; PERINEURAL
Status: DISPENSED
Start: 2019-02-07

## (undated) RX ORDER — LIDOCAINE HYDROCHLORIDE 20 MG/ML
INJECTION, SOLUTION EPIDURAL; INFILTRATION; INTRACAUDAL; PERINEURAL
Status: DISPENSED
Start: 2018-10-05

## (undated) RX ORDER — DEXAMETHASONE SODIUM PHOSPHATE 10 MG/ML
INJECTION, SOLUTION INTRAMUSCULAR; INTRAVENOUS
Status: DISPENSED
Start: 2025-05-13

## (undated) RX ORDER — PROPOFOL 10 MG/ML
INJECTION, EMULSION INTRAVENOUS
Status: DISPENSED
Start: 2018-09-06

## (undated) RX ORDER — DEXAMETHASONE SODIUM PHOSPHATE 10 MG/ML
INJECTION, SOLUTION INTRAMUSCULAR; INTRAVENOUS
Status: DISPENSED
Start: 2017-11-20

## (undated) RX ORDER — DEXAMETHASONE SODIUM PHOSPHATE 10 MG/ML
INJECTION, SOLUTION INTRAMUSCULAR; INTRAVENOUS
Status: DISPENSED
Start: 2020-07-10

## (undated) RX ORDER — DEXAMETHASONE SODIUM PHOSPHATE 10 MG/ML
INJECTION, SOLUTION INTRAMUSCULAR; INTRAVENOUS
Status: DISPENSED
Start: 2017-09-18

## (undated) RX ORDER — KETAMINE HCL IN 0.9 % NACL 20 MG/2 ML
SYRINGE (ML) INTRAVENOUS
Status: DISPENSED
Start: 2017-09-18

## (undated) RX ORDER — DEXAMETHASONE SODIUM PHOSPHATE 10 MG/ML
INJECTION, SOLUTION INTRAMUSCULAR; INTRAVENOUS
Status: DISPENSED
Start: 2025-04-28

## (undated) RX ORDER — FENTANYL CITRATE 50 UG/ML
INJECTION, SOLUTION INTRAMUSCULAR; INTRAVENOUS
Status: DISPENSED
Start: 2019-02-07

## (undated) RX ORDER — FENTANYL CITRATE 50 UG/ML
INJECTION, SOLUTION INTRAMUSCULAR; INTRAVENOUS
Status: DISPENSED
Start: 2020-07-10

## (undated) RX ORDER — GLYCOPYRROLATE 0.2 MG/ML
INJECTION, SOLUTION INTRAMUSCULAR; INTRAVENOUS
Status: DISPENSED
Start: 2018-09-06

## (undated) RX ORDER — FENTANYL CITRATE 50 UG/ML
INJECTION, SOLUTION INTRAMUSCULAR; INTRAVENOUS
Status: DISPENSED
Start: 2025-04-28

## (undated) RX ORDER — FENTANYL CITRATE 50 UG/ML
INJECTION, SOLUTION INTRAMUSCULAR; INTRAVENOUS
Status: DISPENSED
Start: 2025-07-10

## (undated) RX ORDER — PROPOFOL 10 MG/ML
INJECTION, EMULSION INTRAVENOUS
Status: DISPENSED
Start: 2020-07-10

## (undated) RX ORDER — DEXAMETHASONE SODIUM PHOSPHATE 10 MG/ML
INJECTION, SOLUTION INTRAMUSCULAR; INTRAVENOUS
Status: DISPENSED
Start: 2022-02-11

## (undated) RX ORDER — FENTANYL CITRATE 50 UG/ML
INJECTION, SOLUTION INTRAMUSCULAR; INTRAVENOUS
Status: DISPENSED
Start: 2025-05-13

## (undated) RX ORDER — NEOSTIGMINE METHYLSULFATE 1 MG/ML
VIAL (ML) INJECTION
Status: DISPENSED
Start: 2018-09-06

## (undated) RX ORDER — ONDANSETRON 2 MG/ML
INJECTION INTRAMUSCULAR; INTRAVENOUS
Status: DISPENSED
Start: 2017-11-20

## (undated) RX ORDER — NEOSTIGMINE METHYLSULFATE 1 MG/ML
VIAL (ML) INJECTION
Status: DISPENSED
Start: 2017-11-20

## (undated) RX ORDER — ONDANSETRON 2 MG/ML
INJECTION INTRAMUSCULAR; INTRAVENOUS
Status: DISPENSED
Start: 2018-09-06

## (undated) RX ORDER — PROPOFOL 10 MG/ML
INJECTION, EMULSION INTRAVENOUS
Status: DISPENSED
Start: 2017-09-18

## (undated) RX ORDER — ONDANSETRON 2 MG/ML
INJECTION INTRAMUSCULAR; INTRAVENOUS
Status: DISPENSED
Start: 2025-04-15

## (undated) RX ORDER — ROCURONIUM BROMIDE 50 MG/5 ML
SYRINGE (ML) INTRAVENOUS
Status: DISPENSED
Start: 2018-09-06

## (undated) RX ORDER — PROPOFOL 10 MG/ML
INJECTION, EMULSION INTRAVENOUS
Status: DISPENSED
Start: 2018-10-05

## (undated) RX ORDER — DEXMEDETOMIDINE HYDROCHLORIDE 4 UG/ML
INJECTION, SOLUTION INTRAVENOUS
Status: DISPENSED
Start: 2025-04-15

## (undated) RX ORDER — KETOROLAC TROMETHAMINE 30 MG/ML
INJECTION, SOLUTION INTRAMUSCULAR; INTRAVENOUS
Status: DISPENSED
Start: 2025-04-15

## (undated) RX ORDER — PROPOFOL 10 MG/ML
INJECTION, EMULSION INTRAVENOUS
Status: DISPENSED
Start: 2017-11-20

## (undated) RX ORDER — LIDOCAINE HYDROCHLORIDE 20 MG/ML
INJECTION, SOLUTION EPIDURAL; INFILTRATION; INTRACAUDAL; PERINEURAL
Status: DISPENSED
Start: 2017-11-20

## (undated) RX ORDER — FENTANYL CITRATE 50 UG/ML
INJECTION, SOLUTION INTRAMUSCULAR; INTRAVENOUS
Status: DISPENSED
Start: 2017-11-20

## (undated) RX ORDER — ONDANSETRON 2 MG/ML
INJECTION INTRAMUSCULAR; INTRAVENOUS
Status: DISPENSED
Start: 2022-02-11

## (undated) RX ORDER — LIDOCAINE HYDROCHLORIDE 20 MG/ML
INJECTION, SOLUTION EPIDURAL; INFILTRATION; INTRACAUDAL; PERINEURAL
Status: DISPENSED
Start: 2020-07-10